# Patient Record
Sex: FEMALE | Race: WHITE | NOT HISPANIC OR LATINO | Employment: OTHER | URBAN - METROPOLITAN AREA
[De-identification: names, ages, dates, MRNs, and addresses within clinical notes are randomized per-mention and may not be internally consistent; named-entity substitution may affect disease eponyms.]

---

## 2017-01-05 ENCOUNTER — TRANSCRIBE ORDERS (OUTPATIENT)
Dept: URGENT CARE | Facility: CLINIC | Age: 82
End: 2017-01-05

## 2017-01-05 ENCOUNTER — GENERIC CONVERSION - ENCOUNTER (OUTPATIENT)
Dept: OTHER | Facility: OTHER | Age: 82
End: 2017-01-05

## 2017-01-05 ENCOUNTER — HOSPITAL ENCOUNTER (OUTPATIENT)
Dept: RADIOLOGY | Facility: CLINIC | Age: 82
Discharge: HOME | End: 2017-01-05
Payer: MEDICARE

## 2017-01-05 DIAGNOSIS — J18.9 PNEUMONIA: ICD-10-CM

## 2017-01-05 PROCEDURE — 71020 HB CHEST X-RAY 2VW FRONTAL&LATL: CPT

## 2017-01-27 ENCOUNTER — APPOINTMENT (OUTPATIENT)
Dept: LAB | Facility: HOSPITAL | Age: 82
End: 2017-01-27
Payer: MEDICARE

## 2017-01-27 DIAGNOSIS — N39.0 URINARY TRACT INFECTION: ICD-10-CM

## 2017-01-27 LAB
BACTERIA UR QL AUTO: NORMAL /HPF
BILIRUB UR QL STRIP: NEGATIVE
CLARITY UR: CLEAR
COLOR UR: YELLOW
GLUCOSE UR STRIP-MCNC: NEGATIVE MG/DL
HGB UR QL STRIP.AUTO: NEGATIVE
HYALINE CASTS #/AREA URNS LPF: NORMAL /LPF
KETONES UR STRIP-MCNC: NEGATIVE MG/DL
LEUKOCYTE ESTERASE UR QL STRIP: ABNORMAL
NITRITE UR QL STRIP: NEGATIVE
NON-SQ EPI CELLS URNS QL MICRO: NORMAL /HPF
PH UR STRIP.AUTO: 7.5 [PH] (ref 4.5–8)
PROT UR STRIP-MCNC: NEGATIVE MG/DL
RBC #/AREA URNS AUTO: NORMAL /HPF
SP GR UR STRIP.AUTO: 1.01 (ref 1–1.03)
UROBILINOGEN UR QL STRIP.AUTO: 0.2 E.U./DL
WBC #/AREA URNS AUTO: NORMAL /HPF

## 2017-01-27 PROCEDURE — 87086 URINE CULTURE/COLONY COUNT: CPT

## 2017-01-27 PROCEDURE — 81001 URINALYSIS AUTO W/SCOPE: CPT

## 2017-01-28 LAB — BACTERIA UR CULT: NORMAL

## 2017-02-15 ENCOUNTER — ALLSCRIPTS OFFICE VISIT (OUTPATIENT)
Dept: OTHER | Facility: OTHER | Age: 82
End: 2017-02-15

## 2017-02-15 DIAGNOSIS — E78.00 PURE HYPERCHOLESTEROLEMIA: ICD-10-CM

## 2017-02-15 DIAGNOSIS — M13.0 POLYARTHRITIS: ICD-10-CM

## 2017-02-15 DIAGNOSIS — R35.0 FREQUENCY OF MICTURITION: ICD-10-CM

## 2017-02-15 LAB
BILIRUB UR QL STRIP: NORMAL
COLOR UR: YELLOW
GLUCOSE (HISTORICAL): NORMAL
HGB UR QL STRIP.AUTO: NORMAL
KETONES UR STRIP-MCNC: NORMAL MG/DL
LEUKOCYTE ESTERASE UR QL STRIP: NORMAL
NITRITE UR QL STRIP: NORMAL
PH UR STRIP.AUTO: 7 [PH]
PROT UR STRIP-MCNC: NORMAL MG/DL
SP GR UR STRIP.AUTO: 1.01
UROBILINOGEN UR QL STRIP.AUTO: NORMAL

## 2017-02-28 ENCOUNTER — APPOINTMENT (OUTPATIENT)
Dept: LAB | Facility: CLINIC | Age: 82
End: 2017-02-28
Attending: EMERGENCY MEDICINE
Payer: MEDICARE

## 2017-02-28 ENCOUNTER — OFFICE VISIT (OUTPATIENT)
Dept: URGENT CARE | Facility: CLINIC | Age: 82
End: 2017-02-28
Payer: MEDICARE

## 2017-02-28 DIAGNOSIS — R35.0 FREQUENCY OF MICTURITION: ICD-10-CM

## 2017-02-28 PROCEDURE — 87086 URINE CULTURE/COLONY COUNT: CPT

## 2017-02-28 PROCEDURE — G0463 HOSPITAL OUTPT CLINIC VISIT: HCPCS

## 2017-02-28 PROCEDURE — 99214 OFFICE O/P EST MOD 30 MIN: CPT

## 2017-02-28 PROCEDURE — 87147 CULTURE TYPE IMMUNOLOGIC: CPT

## 2017-02-28 PROCEDURE — 87186 SC STD MICRODIL/AGAR DIL: CPT

## 2017-03-06 LAB — BACTERIA UR CULT: NORMAL

## 2017-03-20 ENCOUNTER — ALLSCRIPTS OFFICE VISIT (OUTPATIENT)
Dept: OTHER | Facility: OTHER | Age: 82
End: 2017-03-20

## 2017-03-20 DIAGNOSIS — M19.011 PRIMARY OSTEOARTHRITIS OF RIGHT SHOULDER: ICD-10-CM

## 2017-03-21 ENCOUNTER — TRANSCRIBE ORDERS (OUTPATIENT)
Dept: LAB | Facility: CLINIC | Age: 82
End: 2017-03-21

## 2017-03-21 ENCOUNTER — GENERIC CONVERSION - ENCOUNTER (OUTPATIENT)
Dept: OTHER | Facility: OTHER | Age: 82
End: 2017-03-21

## 2017-03-21 ENCOUNTER — APPOINTMENT (OUTPATIENT)
Dept: LAB | Facility: CLINIC | Age: 82
End: 2017-03-21
Payer: MEDICARE

## 2017-03-21 DIAGNOSIS — M13.0 POLYARTHRITIS: ICD-10-CM

## 2017-03-21 DIAGNOSIS — E78.00 PURE HYPERCHOLESTEROLEMIA: ICD-10-CM

## 2017-03-21 LAB
ALBUMIN SERPL BCP-MCNC: 3.4 G/DL (ref 3.5–5)
ALP SERPL-CCNC: 55 U/L (ref 46–116)
ALT SERPL W P-5'-P-CCNC: 32 U/L (ref 12–78)
ANION GAP SERPL CALCULATED.3IONS-SCNC: 6 MMOL/L (ref 4–13)
AST SERPL W P-5'-P-CCNC: 18 U/L (ref 5–45)
BILIRUB SERPL-MCNC: 0.47 MG/DL (ref 0.2–1)
BUN SERPL-MCNC: 20 MG/DL (ref 5–25)
CALCIUM SERPL-MCNC: 9.4 MG/DL (ref 8.3–10.1)
CHLORIDE SERPL-SCNC: 103 MMOL/L (ref 100–108)
CHOLEST SERPL-MCNC: 181 MG/DL (ref 50–200)
CO2 SERPL-SCNC: 31 MMOL/L (ref 21–32)
CREAT SERPL-MCNC: 0.91 MG/DL (ref 0.6–1.3)
CRP SERPL QL: <3 MG/L
ERYTHROCYTE [DISTWIDTH] IN BLOOD BY AUTOMATED COUNT: 13.3 % (ref 11.6–15.1)
GFR SERPL CREATININE-BSD FRML MDRD: 58.8 ML/MIN/1.73SQ M
GLUCOSE P FAST SERPL-MCNC: 87 MG/DL (ref 65–99)
HCT VFR BLD AUTO: 40.4 % (ref 34.8–46.1)
HDLC SERPL-MCNC: 62 MG/DL (ref 40–60)
HGB BLD-MCNC: 13 G/DL (ref 11.5–15.4)
LDLC SERPL CALC-MCNC: 93 MG/DL (ref 0–100)
MCH RBC QN AUTO: 30.2 PG (ref 26.8–34.3)
MCHC RBC AUTO-ENTMCNC: 32.2 G/DL (ref 31.4–37.4)
MCV RBC AUTO: 94 FL (ref 82–98)
PLATELET # BLD AUTO: 222 THOUSANDS/UL (ref 149–390)
PMV BLD AUTO: 10.5 FL (ref 8.9–12.7)
POTASSIUM SERPL-SCNC: 3.8 MMOL/L (ref 3.5–5.3)
PROT SERPL-MCNC: 7.3 G/DL (ref 6.4–8.2)
RBC # BLD AUTO: 4.3 MILLION/UL (ref 3.81–5.12)
SODIUM SERPL-SCNC: 140 MMOL/L (ref 136–145)
TRIGL SERPL-MCNC: 128 MG/DL
WBC # BLD AUTO: 3.78 THOUSAND/UL (ref 4.31–10.16)

## 2017-03-21 PROCEDURE — 80053 COMPREHEN METABOLIC PANEL: CPT

## 2017-03-21 PROCEDURE — 80061 LIPID PANEL: CPT

## 2017-03-21 PROCEDURE — 85027 COMPLETE CBC AUTOMATED: CPT

## 2017-03-21 PROCEDURE — 86140 C-REACTIVE PROTEIN: CPT

## 2017-03-21 PROCEDURE — 86038 ANTINUCLEAR ANTIBODIES: CPT

## 2017-03-21 PROCEDURE — 36415 COLL VENOUS BLD VENIPUNCTURE: CPT

## 2017-03-22 ENCOUNTER — GENERIC CONVERSION - ENCOUNTER (OUTPATIENT)
Dept: OTHER | Facility: OTHER | Age: 82
End: 2017-03-22

## 2017-03-22 LAB — RYE IGE QN: NEGATIVE

## 2017-03-27 ENCOUNTER — ALLSCRIPTS OFFICE VISIT (OUTPATIENT)
Dept: OTHER | Facility: OTHER | Age: 82
End: 2017-03-27

## 2017-03-28 ENCOUNTER — GENERIC CONVERSION - ENCOUNTER (OUTPATIENT)
Dept: OTHER | Facility: OTHER | Age: 82
End: 2017-03-28

## 2017-07-10 ENCOUNTER — OFFICE VISIT (OUTPATIENT)
Dept: URGENT CARE | Facility: CLINIC | Age: 82
End: 2017-07-10
Payer: MEDICARE

## 2017-07-10 ENCOUNTER — APPOINTMENT (OUTPATIENT)
Dept: LAB | Facility: CLINIC | Age: 82
End: 2017-07-10
Attending: PHYSICIAN ASSISTANT
Payer: MEDICARE

## 2017-07-10 ENCOUNTER — TRANSCRIBE ORDERS (OUTPATIENT)
Dept: URGENT CARE | Facility: CLINIC | Age: 82
End: 2017-07-10

## 2017-07-10 DIAGNOSIS — R07.9 CHEST PAIN: ICD-10-CM

## 2017-07-10 DIAGNOSIS — R35.0 FREQUENCY OF MICTURITION: ICD-10-CM

## 2017-07-10 PROCEDURE — 99213 OFFICE O/P EST LOW 20 MIN: CPT

## 2017-07-10 PROCEDURE — G0463 HOSPITAL OUTPT CLINIC VISIT: HCPCS

## 2017-07-10 PROCEDURE — 87086 URINE CULTURE/COLONY COUNT: CPT

## 2017-07-10 PROCEDURE — 81002 URINALYSIS NONAUTO W/O SCOPE: CPT

## 2017-07-16 LAB — BACTERIA UR CULT: NORMAL

## 2017-07-21 ENCOUNTER — ALLSCRIPTS OFFICE VISIT (OUTPATIENT)
Dept: OTHER | Facility: OTHER | Age: 82
End: 2017-07-21

## 2017-07-21 LAB
BILIRUB UR QL STRIP: NEGATIVE
COLOR UR: CLEAR
GLUCOSE (HISTORICAL): NEGATIVE
HGB UR QL STRIP.AUTO: NEGATIVE
KETONES UR STRIP-MCNC: NEGATIVE MG/DL
LEUKOCYTE ESTERASE UR QL STRIP: NEGATIVE
NITRITE UR QL STRIP: NEGATIVE
PH UR STRIP.AUTO: 7.5 [PH]
PROT UR STRIP-MCNC: NORMAL MG/DL
SP GR UR STRIP.AUTO: 1
UROBILINOGEN UR QL STRIP.AUTO: NORMAL

## 2017-07-22 ENCOUNTER — TRANSCRIBE ORDERS (OUTPATIENT)
Dept: URGENT CARE | Facility: CLINIC | Age: 82
End: 2017-07-22

## 2017-07-22 ENCOUNTER — APPOINTMENT (OUTPATIENT)
Dept: RADIOLOGY | Facility: CLINIC | Age: 82
End: 2017-07-22
Payer: MEDICARE

## 2017-07-22 DIAGNOSIS — R07.9 CHEST PAIN: ICD-10-CM

## 2017-07-22 PROCEDURE — 71100 X-RAY EXAM RIBS UNI 2 VIEWS: CPT

## 2017-07-22 PROCEDURE — 71020 HB CHEST X-RAY 2VW FRONTAL&LATL: CPT

## 2017-08-11 ENCOUNTER — ALLSCRIPTS OFFICE VISIT (OUTPATIENT)
Dept: OTHER | Facility: OTHER | Age: 82
End: 2017-08-11

## 2017-08-15 ENCOUNTER — TRANSCRIBE ORDERS (OUTPATIENT)
Dept: LAB | Facility: CLINIC | Age: 82
End: 2017-08-15

## 2017-08-15 ENCOUNTER — APPOINTMENT (OUTPATIENT)
Dept: LAB | Facility: CLINIC | Age: 82
End: 2017-08-15
Payer: MEDICARE

## 2017-08-15 DIAGNOSIS — E83.42 HYPOMAGNESEMIA: ICD-10-CM

## 2017-08-15 DIAGNOSIS — Z12.31 ENCOUNTER FOR SCREENING MAMMOGRAM FOR MALIGNANT NEOPLASM OF BREAST: ICD-10-CM

## 2017-08-15 LAB — MAGNESIUM SERPL-MCNC: 2.4 MG/DL (ref 1.6–2.6)

## 2017-08-15 PROCEDURE — 83735 ASSAY OF MAGNESIUM: CPT

## 2017-08-15 PROCEDURE — 36415 COLL VENOUS BLD VENIPUNCTURE: CPT

## 2017-08-20 ENCOUNTER — GENERIC CONVERSION - ENCOUNTER (OUTPATIENT)
Dept: OTHER | Facility: OTHER | Age: 82
End: 2017-08-20

## 2017-08-31 ENCOUNTER — GENERIC CONVERSION - ENCOUNTER (OUTPATIENT)
Dept: OTHER | Facility: OTHER | Age: 82
End: 2017-08-31

## 2017-09-01 ENCOUNTER — ALLSCRIPTS OFFICE VISIT (OUTPATIENT)
Dept: OTHER | Facility: OTHER | Age: 82
End: 2017-09-01

## 2017-09-27 DIAGNOSIS — Z12.31 ENCOUNTER FOR SCREENING MAMMOGRAM FOR MALIGNANT NEOPLASM OF BREAST: ICD-10-CM

## 2017-09-27 DIAGNOSIS — N64.4 MASTODYNIA: ICD-10-CM

## 2017-10-04 ENCOUNTER — GENERIC CONVERSION - ENCOUNTER (OUTPATIENT)
Dept: OTHER | Facility: OTHER | Age: 82
End: 2017-10-04

## 2017-10-17 ENCOUNTER — GENERIC CONVERSION - ENCOUNTER (OUTPATIENT)
Dept: OTHER | Facility: OTHER | Age: 82
End: 2017-10-17

## 2017-10-17 ENCOUNTER — HOSPITAL ENCOUNTER (OUTPATIENT)
Dept: MAMMOGRAPHY | Facility: HOSPITAL | Age: 82
Discharge: HOME | End: 2017-10-17
Payer: MEDICARE

## 2017-10-17 DIAGNOSIS — N64.4 PAINFUL BREASTS: ICD-10-CM

## 2017-10-17 DIAGNOSIS — Z12.31 ENCOUNTER FOR SCREENING MAMMOGRAM FOR MALIGNANT NEOPLASM OF BREAST: ICD-10-CM

## 2017-10-17 PROCEDURE — G0204 DX MAMMO INCL CAD BI: HCPCS

## 2017-10-17 PROCEDURE — G0279 TOMOSYNTHESIS, MAMMO: HCPCS

## 2017-11-08 ENCOUNTER — GENERIC CONVERSION - ENCOUNTER (OUTPATIENT)
Dept: OTHER | Facility: OTHER | Age: 82
End: 2017-11-08

## 2017-11-09 ENCOUNTER — ALLSCRIPTS OFFICE VISIT (OUTPATIENT)
Dept: OTHER | Facility: OTHER | Age: 82
End: 2017-11-09

## 2017-11-09 DIAGNOSIS — I47.1 SUPRAVENTRICULAR TACHYCARDIA (HCC): ICD-10-CM

## 2017-11-10 NOTE — PROGRESS NOTES
Assessment    1  Supraventricular tachycardia (427 89) (I47 1)   2  Ecchymoses, spontaneous (782 7) (R23 3)   3  Gomez's esophagus (530 85) (K22 70)   4  Generalized anxiety disorder (300 02) (F41 1)    Plan  Gomez's esophagus, Esophageal reflux    · Pantoprazole Sodium 40 MG Oral Tablet Delayed Release; TAKE 1 TABLETDAILY   · RaNITidine HCl - 150 MG Oral Tablet; TAKE 1 TABLET AT BEDTIME  Insomnia    · ClonazePAM 0 5 MG Oral Tablet; TAKE 1/2 Tablet QAM and 1 Tablet PO QPM  Supraventricular tachycardia    · HOLTER MONITOR - 24 HOUR; Status:Hold For - Scheduling; Requestedfor:09Nov2017;     Discussion/Summary    EKG reviewed and no changes noted  The patient will need to follow up in the next several weeks and see how things go  The patient was counseled regarding impressions  Chief Complaint  Pt presents today for hair loss, bruising and grooves in nails  History of Present Illness  HPI: The patient was seen and examined and noted to have issues with an elevated heart rate that she attributes to eating  She states her heart rate is around 150 BPM  The patient states that this resolves spontaneously  She states that she has this on most days  The patient notes area of ecchymosis that are excoriated  She states that this is spontaneous  She is on ASA  The patient notes mild linear groves of the finger nails  The patient states that there are no other issues at his time  Review of Systems   Constitutional: no fever,-- not feeling poorly,-- no recent weight gain,-- no chills-- and-- not feeling tired  ENT: no sore throat-- and-- no nasal discharge  Cardiovascular: the heart rate was not slow,-- no chest pain,-- no intermittent leg claudication-- and-- no palpitations  Respiratory: no shortness of breath,-- no cough,-- no orthopnea-- and-- no shortness of breath during exertion  Breasts: no complaints of breast pain, breast lump or nipple discharge    Gastrointestinal: no abdominal pain,-- no nausea,-- no vomiting-- and-- no diarrhea  Genitourinary: no complaints of dysuria, no incontinence, no pelvic pain, no dysmenorrhea, no vaginal discharge or abnormal vaginal bleeding  Musculoskeletal: no arthralgias-- and-- no myalgias  Neurological: no headache,-- no numbness,-- no tingling-- and-- no dizziness  Active Problems  1  Gomez's esophagus (530 85) (K22 70)   2  Breast mass, left (611 72) (N63 20)   3  Colon, diverticulosis (562 10) (K57 30)   4  Depression (311) (F32 9)   5  Depression screen (V79 0) (Z13 89)   6  Elevated glucose (790 29) (R73 09)   7  Elevated LFTs (790 6) (R79 89)   8  Esophageal reflux (530 81) (K21 9)   9  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)   10  Flu vaccine need (V04 81) (Z23)   11  Generalized anxiety disorder (300 02) (F41 1)   12  Glucose intolerance (impaired glucose tolerance) (790 22) (R73 02)   13  Glycosuria (791 5) (R81)   14  Hypercholesterolemia (272 0) (E78 00)   15  Immunization, tetanus-diphtheria (V06 5) (Z23)   16  Inflamed seborrheic keratosis (702 11) (L82 0)   17  Insomnia (780 52) (G47 00)   18  Lipoma of skin and subcutaneous tissue (214 1) (D17 30)   19  Neuralgia (729 2) (M79 2)   20  Occlusion and stenosis of carotid artery (433 10) (I65 29)   21  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)   22  Osteoporosis (733 00) (M81 0)   23  Pelvic pain in female (625 9) (R10 2)   24  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)   25  Rectal pain (569 42) (K62 89)   26  Screening for genitourinary condition (V81 6) (Z13 89)   27  Screening for neurological condition (V80 09) (Z13 89)   28  Supraventricular tachycardia (427 89) (I47 1)   29  Trigger finger, acquired (727 03) (M65 30)   30  Urine frequency (788 41) (R35 0)   31  Visit for screening mammogram (O18 13) (Z12 31)    Past Medical History  Active Problems And Past Medical History Reviewed: The active problems and past medical history were reviewed and updated today  Surgical History  Surgical History Reviewed: The surgical history was reviewed and updated today  Social History     · Denied: History of Alcohol   · Denied: History of Alcohol Use (History)   · Marital History -    · Never A Smoker   · Never Drank Alcohol   · Retired From Work  The social history was reviewed and updated today  The social history was reviewed and is unchanged  Family History  Family History Reviewed: The family history was reviewed and updated today  Current Meds   1  Aspirin 81 MG TABS; Therapy: (Recorded:32Hve7198) to Recorded   2  Calcium 250 MG Oral Capsule; Take 1 capsule twice daily; Therapy: 59DVR1484 to Recorded   3  ClonazePAM 0 5 MG Oral Tablet; TAKE 1/2 Tablet QAM and 1 Tablet PO QPM; Therapy: 31RGR5393 to (Evaluate:25Nov2017); Last Rx:26Oct2017 Ordered   4  Digoxin 125 MCG Oral Tablet; TAKE 1 TABLET DAILY; Therapy: 47HIJ5570 to (QFIPCRUD:02QWD4412)  Requested for: 57NLT7386; Last Rx:82Tvh8724 Ordered   5  Melatonin 5 MG Oral Tablet; Therapy: 20RRX3908 to Recorded   6  Metoprolol Tartrate 50 MG Oral Tablet; TAKE 1 TABLET THREE x DAILY  MDD:150 TDD:150  Requested for: 80OAT0473; Last Rx:68Fvb6452 Ordered   7  Motrin  MG Oral Tablet; Take 1-2 Tablets PO Q8 x 7 DAYS; Therapy: 51XAM2068 to (Evaluate:27Mar2017)  Requested for: 20Mar2017; Last Rx:20Mar2017 Ordered   8  Omega 3 CAPS; Therapy: (Recorded:13Mar2015) to Recorded   9  Pantoprazole Sodium 40 MG Oral Tablet Delayed Release; TAKE 1 TABLET DAILY; Therapy: 06LLK7630 to (Last Rx:76Zyy9862)  Requested for: 18TKW0572 Ordered   10  RaNITidine HCl - 150 MG Oral Tablet; TAKE 1 TABLET AT BEDTIME; Therapy: 20Hcc4362 to (Evaluate:40Zqd9395)  Requested for: 37AMP1108; Last  Rx:30Jun2017 Ordered   11  Vitamin D-3 CAPS; Therapy: (Recorded:13Mar2015) to Recorded    The medication list was reviewed and updated today  Allergies  1  Codeine Derivatives   2  Codeine Sulfate TABS   3  EPINEPHrine HCl SOLN   4  Magnesium Citrate TABS   5  Iodinated Contrast Media    Vitals   Recorded: 81ZZU5403 11:06AM   Temperature 97 7 F   Heart Rate 66   Respiration 16   Systolic 577   Diastolic 74   Height 5 ft 1 in   Weight 120 lb    BMI Calculated 22 67   BSA Calculated 1 52   O2 Saturation 98       Physical Exam   Constitutional  General appearance: No acute distress, well appearing and well nourished  Eyes  Conjunctiva and lids: No swelling, erythema or discharge  Ears, Nose, Mouth, and Throat  Otoscopic examination: Tympanic membranes translucent with normal light reflex  Canals patent without erythema  Nasal mucosa, septum, and turbinates: Normal without edema or erythema  Oropharynx: Normal with no erythema, edema, exudate or lesions  Pulmonary  Respiratory effort: No increased work of breathing or signs of respiratory distress  Auscultation of lungs: Clear to auscultation  Auscultation of the lungs revealed no expiratory wheezing,-- normal expiratory time-- and-- no inspiratory wheezing  no rales or crackles were heard bilaterally  no rhonchi  no friction rub  no wheezing  no diminished breath sounds  no bronchial breath sounds  Cardiovascular  Auscultation of heart: Normal rate and rhythm, normal S1 and S2, without murmurs  Examination of extremities for edema and/or varicosities: Normal    Carotid pulses: Normal    Abdomen  Abdomen: Non-tender, no masses  Liver and spleen: No hepatomegaly or splenomegaly  Lymphatic  Palpation of lymph nodes in neck: No lymphadenopathy  Musculoskeletal  Gait and station: Normal    Skin  Skin and subcutaneous tissue: Abnormal  -- small areas of ecchymosis with excoriation, normal appearing nails  Neurologic  Cranial nerves: Cranial nerves 2-12 intact     Psychiatric  Mood and affect: Normal          Future Appointments    Date/Time Provider Specialty Site   02/07/2018 11:00 AM Dale Bunch DO Internal Medicine [facility]       Signatures Electronically signed by : Peggy Andrade DO; Nov 9 2017 11:48AM EST                       (Author)

## 2018-01-09 NOTE — RESULT NOTES
Verified Results  (1) C-REACTIVE PROTEIN 21Mar2017 09:47AM [facility] Order Number: [id number]     Test Name Result Flag Reference   C-REACT PROTEIN <3 0 mg/L  <3 0   - Patient Instructions: This is a fasting blood test  Water, black tea or black coffee only after 9:00pm the night before test Drink 2 glasses of water the morning of test      (1) CBC/ PLT (NO DIFF) 37OFM1743 09:47AM [facility] Order Number: [id number]     Test Name Result Flag Reference   HEMATOCRIT 40 4 %  34 8-46 1   HEMOGLOBIN 13 0 g/dL  11 5-15 4   MCHC 32 2 g/dL  31 4-37 4   MCH 30 2 pg  26 8-34 3   MCV 94 fL  82-98   PLATELET COUNT 845 Thousands/uL  149-390   RBC COUNT 4 30 Million/uL  3 81-5 12   RDW 13 3 %  11 6-15 1   WBC COUNT 3 78 Thousand/uL L 4 31-10 16   MPV 10 5 fL  8 9-12 7     (1) LIPID PANEL FASTING W DIRECT LDL REFLEX 46SXC9881 09:47AM [facility] Order Number: [id number]     Test Name Result Flag Reference   CHOLESTEROL 181 mg/dL     LDL CHOLESTEROL CALCULATED 93 mg/dL  0-100   - Patient Instructions: This is a fasting blood test  Water, black tea or black coffee only after 9:00pm the night before test   Drink 2 glasses of water the morning of test     - Patient Instructions:  This is a fasting blood test  Water, black tea or black coffee only after 9:00pm the night before test Drink 2 glasses of water the morning of test   Triglyceride:         Normal              <150 mg/dl       Borderline High    150-199 mg/dl       High               200-499 mg/dl       Very High          >499 mg/dl  Cholesterol:         Desirable        <200 mg/dl      Borderline High  200-239 mg/dl      High             >239 mg/dl  HDL Cholesterol:        High    >59 mg/dL      Low     <41 mg/dL  LDL Cholesterol:        Optimal          <100 mg/dl        Near Optimal     100-129 mg/dl        Above Optimal          Borderline High   130-159 mg/dl          High              160-189 mg/dl Very High        >189 mg/dl  LDL CALCULATED:    This screening LDL is a calculated result  It does not have the accuracy of the Direct Measured LDL in the monitoring of patients with hyperlipidemia and/or statin therapy  Direct Measure LDL (DQP516) must be ordered separately in these patients  TRIGLYCERIDES 128 mg/dL  <=150   Specimen collection should occur prior to N-Acetylcysteine or Metamizole administration due to the potential for falsely depressed results  HDL,DIRECT 62 mg/dL H 40-60   Specimen collection should occur prior to Metamizole administration due to the potential for falsely depressed results  (1) COMPREHENSIVE METABOLIC PANEL 36RRO9201 94:61DH [facility] Order Number: [id number]     Test Name Result Flag Reference   SODIUM 140 mmol/L  136-145   POTASSIUM 3 8 mmol/L  3 5-5 3   CHLORIDE 103 mmol/L  100-108   CARBON DIOXIDE 31 mmol/L  21-32   ANION GAP (CALC) 6 mmol/L  4-13   BLOOD UREA NITROGEN 20 mg/dL  5-25   CREATININE 0 91 mg/dL  0 60-1 30   Standardized to IDMS reference method   CALCIUM 9 4 mg/dL  8 3-10 1   BILI, TOTAL 0 47 mg/dL  0 20-1 00   ALK PHOSPHATAS 55 U/L     ALT (SGPT) 32 U/L  12-78   AST(SGOT) 18 U/L  5-45   ALBUMIN 3 4 g/dL L 3 5-5 0   TOTAL PROTEIN 7 3 g/dL  6 4-8 2   eGFR Non-African American 58 8 ml/min/1 73sq m     - Patient Instructions: This is a fasting blood test  Water, black tea or black coffee only after 9:00pm the night before test Drink 2 glasses of water the morning of test   National Kidney Disease Education Program recommendations are as follows:  GFR calculation is accurate only with a steady state creatinine  Chronic Kidney disease less than 60 ml/min/1 73 sq  meters  Kidney failure less than 15 ml/min/1 73 sq  meters     GLUCOSE FASTING 87 mg/dL  65-99

## 2018-01-10 NOTE — RESULT NOTES
Verified Results  (1) OCTAVIO SCREEN W/REFLEX TO TITER/PATTERN [id number] 09:47AM Olman Solis Order Number: [id number]     Test Name Result Flag Reference   OCTAVIO SCREEN   Negative  Negative

## 2018-01-11 NOTE — RESULT NOTES
Verified Results  (1) MAGNESIUM 27Ejs3125 01:54PM Lucrecia Half Order Number: [id number]     Test Name Result Flag Reference   MAGNESIUM 2 4 mg/dL  1 6-2 6

## 2018-01-12 VITALS
BODY MASS INDEX: 22.66 KG/M2 | WEIGHT: 120 LBS | HEART RATE: 60 BPM | TEMPERATURE: 97.3 F | HEIGHT: 61 IN | SYSTOLIC BLOOD PRESSURE: 136 MMHG | RESPIRATION RATE: 16 BRPM | DIASTOLIC BLOOD PRESSURE: 68 MMHG

## 2018-01-12 NOTE — RESULT NOTES
Verified Results  * XR CHEST PA & LATERAL [id number] 11:16AM Christal Taylor Order Number: [id number]     Test Name Result Flag Reference   XR CHEST PA & LATERAL (Report)     CHEST      INDICATION: Pneumonia  Cough  COMPARISON: Chest x-ray dated November 23, 2016  VIEWS: Frontal and lateral projections; 2 images     FINDINGS:        The cardiomediastinal silhouette is unchanged from the prior exam      There has been interval improvement in aeration of right middle and lower lobes when compared to the prior exam; however, minimal infiltrates persist at the right middle and lower lobes  No new infiltrates are identified  There is no pleural effusion  Chronic obstructive airway changes are present  No pneumothorax  Visualized osseous structures appear within normal limits for the patient's age  IMPRESSION:     Significant interval improvement in aeration of right middle and lower lobe infiltrates when compared to a chest x-ray dated November 23, 2016  However, subtle residual right middle and lower lobe infiltrates persist  A follow-up chest x-ray in 4-6    weeks is recommended to assess for complete resolution  No new infiltrates identified       ##[id number]##[id number]       Workstation performed: [id number]     Signed by:   Debbie Nieves MD   12/7/16

## 2018-01-13 VITALS
DIASTOLIC BLOOD PRESSURE: 58 MMHG | SYSTOLIC BLOOD PRESSURE: 124 MMHG | RESPIRATION RATE: 16 BRPM | WEIGHT: 117 LBS | HEIGHT: 61 IN | HEART RATE: 60 BPM | BODY MASS INDEX: 22.09 KG/M2 | TEMPERATURE: 98.1 F

## 2018-01-13 VITALS
BODY MASS INDEX: 21.79 KG/M2 | WEIGHT: 115.38 LBS | DIASTOLIC BLOOD PRESSURE: 60 MMHG | SYSTOLIC BLOOD PRESSURE: 128 MMHG | RESPIRATION RATE: 16 BRPM | HEART RATE: 64 BPM | HEIGHT: 61 IN | TEMPERATURE: 96.6 F

## 2018-01-13 VITALS
RESPIRATION RATE: 16 BRPM | TEMPERATURE: 97.7 F | BODY MASS INDEX: 22.14 KG/M2 | SYSTOLIC BLOOD PRESSURE: 166 MMHG | WEIGHT: 117.25 LBS | HEIGHT: 61 IN | DIASTOLIC BLOOD PRESSURE: 84 MMHG | HEART RATE: 60 BPM | OXYGEN SATURATION: 97 %

## 2018-01-13 VITALS
RESPIRATION RATE: 16 BRPM | HEART RATE: 64 BPM | BODY MASS INDEX: 21.9 KG/M2 | WEIGHT: 116 LBS | TEMPERATURE: 97.5 F | SYSTOLIC BLOOD PRESSURE: 184 MMHG | DIASTOLIC BLOOD PRESSURE: 80 MMHG | HEIGHT: 61 IN

## 2018-01-14 VITALS
TEMPERATURE: 96.9 F | BODY MASS INDEX: 22.35 KG/M2 | HEART RATE: 60 BPM | SYSTOLIC BLOOD PRESSURE: 118 MMHG | RESPIRATION RATE: 18 BRPM | WEIGHT: 118.38 LBS | DIASTOLIC BLOOD PRESSURE: 52 MMHG | HEIGHT: 61 IN

## 2018-01-14 VITALS
TEMPERATURE: 97.7 F | RESPIRATION RATE: 16 BRPM | BODY MASS INDEX: 22.66 KG/M2 | HEIGHT: 61 IN | HEART RATE: 66 BPM | DIASTOLIC BLOOD PRESSURE: 74 MMHG | WEIGHT: 120 LBS | SYSTOLIC BLOOD PRESSURE: 136 MMHG | OXYGEN SATURATION: 98 %

## 2018-01-15 NOTE — RESULT NOTES
Verified Results  * XR CHEST PA & LATERAL [id number] 02:06PM Lawson Hernandez Order Number: [id number]     Test Name Result Flag Reference   XR CHEST PA & LATERAL (Report)     CHEST      INDICATION: Pneumonia  COMPARISON: 12/6/2016  VIEWS: Frontal and lateral projections, with and without nipple markers; 3 images     FINDINGS:        Heart shadow is enlarged but stable from prior exam      The lungs are clear  No pneumothorax or pleural effusion  Anterior pectus deformity is again noted  There is multilevel spondylosis of the midthoracic spine which is stable  IMPRESSION:     No active pulmonary disease         Workstation performed: [id number]     Signed by:   Anderson Johnson MD   1/5/17

## 2018-01-15 NOTE — RESULT NOTES
Verified Results  MAMMO DIAGNOSTIC BILATERAL W 3D & CAD [id number] 09:43AM Najma Crawford Order Number: [id number]    - Patient Instructions: To schedule this appointment, please contact Central Scheduling at [phone]  Do not wear any perfume, powder, lotion or deodorant on breast or underarm area  Please bring your doctors order, referral (if needed) and insurance information with you on the day of the test  Failure to bring this information may result in this test being rescheduled  Arrive 15 minutes prior to your appointment time to register  On the day of your test, please bring any prior mammogram or breast studies with you that were not performed at a [facility]  Failure to bring prior exams may result in your test needing to be rescheduled  Test Name Result Flag Reference   MAMMO DIAGNOSTIC BILATERAL W 3D & CAD (Report)     Patient History:   Patient's BMI is 19 8  Reason for exam: clinical finding  Mammo Diagnostic Bilateral W DBT and CAD: October 17, 2017 -    Check In #: [de-identified]   2D/3D Procedure   3D views: Bilateral MLO view(s) were taken  2D views: Bilateral CC, ML, and XCCL view(s) were taken  Technologist: JIMY Ledbetter (JIMY)(M)   Prior study comparison: October 13, 2016, mammo diagnostic    bilateral W CAD performed at [address] [place] [address]  October 13, 2016, US breast left limited performed at [address] [place] [address]  March 16, 2015, mammo screening    bilateral, performed at [facility]  March 21, 2014,    mammo diagnostic right, performed at [facility]  March 10,   2014, bilateral 3D carmen mammo, performed at [facility]  February 25, 2013, mammo diagnostic bilateral, performed at    [facility]  April 13, 2012, mammo diagnostic right,    performed at [facility]  April 13, 2012, bilateral    ultrasound, performed at [address] [place], [place] [address] [facility]       The breast tissue is heterogeneously dense, potentially limiting    the sensitivity of mammography  Patient risk, included in this    report, assists in determining the appropriate screening regimen    (such as 3-D mammography or the inclusion of automated breast    ultrasound or MRI)  3-D mammography may also remain indicated as    screening  No new dominant soft tissue mass, architectural    distortion or suspicious calcifications are noted  The skin and    nipple structures are within normal limits  Stable asymmetric    tissue again noted bilaterally  As the patient's complaints of    stabbing pain aren't diffusely bilaterally and there is no focal    new abnormality, ultrasound was not performed  No mammographic evidence of malignancy  No    significant changes when compared with prior studies  ACR BI-RADSï¾® Assessments: BiRad:2 - Benign     Recommendation:   Clinical management of both breasts  Routine screening mammogram of both breasts in 1 year  The patient is scheduled in a reminder system for screening    mammography  8-10% of cancers will be missed on mammography  Management of a    palpable abnormality must be based on clinical grounds  Patients   will be notified of their results via letter from our facility  Accredited by [address] [place] [address] of Radiology and FDA       Transcription Location: [facility] 98: [id number]     Risk Value(s):   Myriad Table: 1 5%   Signed by:   Remy Dugan MD   10/17/17

## 2018-01-15 NOTE — RESULT NOTES
Verified Results  * XR SPINE CERVICAL COMPLETE 4 OR 5 VW NON INJURY [id number] 11:36AM Marely Tierney Order Number: [id number]     Test Name Result Flag Reference   XR SPINE CERVICAL COMPLETE 4 OR 5 VW (Report)     CERVICAL SPINE     INDICATION: Left-sided neck pain     COMPARISON: None     VIEWS: AP, lateral and obliques an open-mouth AP ; 5 images     FINDINGS:     No evidence of fracture or subluxation  Narrowing of the C5-6 and C6-7 intervertebral disc spaces is seen      Narrowing of the C3-4 neural foramina bilaterally  Prominent marginal osteophyte from aeration anteriorly at C5-6 and C6-7  The prevertebral soft tissues are within normal limits  The lung apices are intact  IMPRESSION:     Degenerative disc disease, 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

## 2018-01-15 NOTE — RESULT NOTES
Verified Results  *US BREAST LEFT LIMITED (DIAGNOSTIC) 13Oct2016 09:55AM Juan Flannery     Test Name Result Flag Reference   US BREAST LEFT LIMITED (Report)     Patient History:   Patient's BMI is 19 8  Reason for exam: clinical finding  Mammo Diagnostic Bilateral W CAD: October 13, 2016 - Check In #:    [de-identified]   Bilateral MLO, CC, and XCCL view(s) were taken  ML, spot    compression CC, and spot compression MLO view(s) were taken of    the left breast      Technologist: JIMY Kapadia (R)(M)   Prior study comparison: March 16, 2015, mammo screening    bilateral, performed at [facility]  March 21, 2014,    mammo diagnostic right, performed at [facility]  March 10,   2014, bilateral 3D carmen mammo, performed at [facility]  February 25, 2013, mammo diagnostic bilateral, performed at    [facility]  April 13, 2012, mammo diagnostic right,    performed at [facility]  April 13, 2012, bilateral    ultrasound, performed at [facility]  There are scattered fibroglandular densities  No dominant soft tissue mass or suspicious calcifications are    noted  Asymmetric density inferomedial LEFT breast becomes less    dense on compression views  The nipples, skin and visualized    axillae are within normal limits  No evidence of malignancy  No significant changes   when compared with prior studies  US Breast Left Limited: October 13, 2016 - Check In #: [de-identified]   Technologist: Patrick Cage RDMS   A uniformly echogenic layer of fibroglandular tissue  The lower    inner quadrant of the left breast was scanned  Targeted    ultrasound of inferior medial LEFT breast was performed with a    high frequency linear transducer  There is no sonographic    evidence for cystic or solid mass lesion or suspicious foci of    acoustic attenuation  ASSESSMENT: BiRad:2 - Benign (Overall)     Recommendation:   Routine screening mammogram of both breasts in 1 year  Analyzed by CAD     Transcription Location: J[facility] 98: [id number]     Risk Value(s):   Myriad Table: 1 5%, JUSTIN 5 Year: 0 8%, NCI Lifetime: 0 8%   Signed by:   JEISON Cox MD   10/13/16     [facility] DIAGNOSTIC BILATERAL W CAD [id number] 09:54AM Keesha Lucia Order Number: [id number]   Performing Comments: 1 cm mass at 8 O'Clock of the left breast   - Patient Instructions: To schedule this appointment, please contact Central Scheduling at [phone]  Do not wear any perfume, powder, lotion or deodorant on breast or underarm area  Please bring your doctors order, referral (if needed) and insurance information with you on the day of the test  Failure to bring this information may result in this test being rescheduled  Arrive 15 minutes prior to your appointment time to register  On the day of your test, please bring any prior mammogram or breast studies with you that were not performed at a [facility]  Failure to bring prior exams may result in your test needing to be rescheduled   Order Number: [id number]   Performing Comments: 1 cm mass at 8 O'Clock of the left breast   - Patient Instructions: To schedule this appointment, please contact Central Scheduling at [phone]  Do not wear any perfume, powder, lotion or deodorant on breast or underarm area  Please bring your doctors order, referral (if needed) and insurance information with you on the day of the test  Failure to bring this information may result in this test being rescheduled  Arrive 15 minutes prior to your appointment time to register  On the day of your test, please bring any prior mammogram or breast studies with you that were not performed at a [facility]  Failure to bring prior exams may result in your test needing to be rescheduled  Test Name Result Flag Reference   MAMMO DIAGNOSTIC BILATERAL W CAD (Report)     Patient History:   Patient's BMI is 19 8       Reason for exam: clinical finding  Mammo Diagnostic Bilateral W CAD: October 13, 2016 - Check In #:    [de-identified]   Bilateral MLO, CC, and XCCL view(s) were taken  ML, spot    compression CC, and spot compression MLO view(s) were taken of    the left breast      Technologist: JIMY Cardenas (JIMY)(M)   Prior study comparison: March 16, 2015, mammo screening    bilateral, performed at [facility]  March 21, 2014,    mammo diagnostic right, performed at [facility]  March 10,   2014, bilateral 3D carmen mammo, performed at [facility]  February 25, 2013, mammo diagnostic bilateral, performed at    [facility]  April 13, 2012, mammo diagnostic right,    performed at [facility]  April 13, 2012, bilateral    ultrasound, performed at [facility]  There are scattered fibroglandular densities  No dominant soft tissue mass or suspicious calcifications are    noted  Asymmetric density inferomedial LEFT breast becomes less    dense on compression views  The nipples, skin and visualized    axillae are within normal limits  No evidence of malignancy  No significant changes   when compared with prior studies  US Breast Left Limited: October 13, 2016 - Check In #: [de-identified]   Technologist: Kingston Gamboa RDMS   A uniformly echogenic layer of fibroglandular tissue  The lower    inner quadrant of the left breast was scanned  Targeted    ultrasound of inferior medial LEFT breast was performed with a    high frequency linear transducer  There is no sonographic    evidence for cystic or solid mass lesion or suspicious foci of    acoustic attenuation  ASSESSMENT: BiRad:2 - Benign (Overall)     Recommendation:   Routine screening mammogram of both breasts in 1 year  Analyzed by CAD     Transcription Location: [facility] 98: [id number]     Risk Value(s):   Myriad Table: 1 5%, JUSTIN 5 Year: 0 8%, NCI Lifetime: 0 8%   Signed by:   JEISON Luis MD   10/13/16

## 2018-01-18 NOTE — RESULT NOTES
Verified Results  (1) URINALYSIS w URINE C/S REFLEX (will reflex a microscopy if leukocytes, occult blood, or nitrites are not within normal limits) [id number] 10:05AM Nicolasa Wolf Order Number: [id number]     Test Name Result Flag Reference   COLOR Yellow     CLARITY Slightly Cloudy     PH UA 6 5  4 5-8 0   LEUKOCYTE ESTERASE UA Negative  Negative   NITRITE UA Negative  Negative   PROTEIN UA Negative mg/dl  Negative   GLUCOSE UA Negative mg/dl  Negative   KETONES UA Negative mg/dl  Negative   UROBILINOGEN UA 0 2 E U /dl  0 2, 1 0 E U /dl   BILIRUBIN UA Negative  Negative   BLOOD UA Negative  Negative, Trace-Intact   SPECIFIC GRAVITY UA 1 020  1 003-1 030     (1) TSH [id number] 10:05AM Nicolasa Wolf Order Number: [id number]    Patients undergoing fluorescein dye angiography may retain small amounts of fluorescein in the body for 48-72 hours post procedure  Samples containing fluorescein can produce falsely depressed TSH values  If the patient had this procedure,a specimen should be resubmitted post fluorescein clearance          The recommended reference ranges for TSH during pregnancy are as follows:  First trimester 0 1 to 2 5 uIU/mL  Second trimester  0 2 to 3 0 uIU/mL  Third trimester 0 3 to 3 0 uIU/m     Test Name Result Flag Reference   TSH 2 137 uIU/mL  0 358-3 740     (1) T4, FREE [id number] 10:05AM Nicolasa Wolf Order Number: [id number]     Test Name Result Flag Reference   T4,FREE 1 06 ng/dL  0 76-1 46

## 2018-03-09 ENCOUNTER — OFFICE VISIT (OUTPATIENT)
Dept: INTERNAL MEDICINE CLINIC | Facility: CLINIC | Age: 83
End: 2018-03-09
Payer: MEDICARE

## 2018-03-09 VITALS
WEIGHT: 129.2 LBS | HEIGHT: 61 IN | RESPIRATION RATE: 14 BRPM | TEMPERATURE: 97.3 F | BODY MASS INDEX: 24.39 KG/M2 | DIASTOLIC BLOOD PRESSURE: 60 MMHG | SYSTOLIC BLOOD PRESSURE: 112 MMHG | HEART RATE: 56 BPM

## 2018-03-09 DIAGNOSIS — H91.92 HEARING LOSS ASSOCIATED WITH SYNDROME OF LEFT EAR: ICD-10-CM

## 2018-03-09 DIAGNOSIS — I10 HYPERTENSION, UNSPECIFIED TYPE: Primary | ICD-10-CM

## 2018-03-09 DIAGNOSIS — F51.01 PRIMARY INSOMNIA: ICD-10-CM

## 2018-03-09 DIAGNOSIS — B36.9 FUNGAL DERMATOSIS: ICD-10-CM

## 2018-03-09 PROBLEM — M15.9 GENERALIZED OSTEOARTHRITIS OF MULTIPLE SITES: Status: ACTIVE | Noted: 2017-11-30

## 2018-03-09 PROBLEM — K59.09 CHRONIC CONSTIPATION: Status: ACTIVE | Noted: 2017-12-14

## 2018-03-09 PROBLEM — I77.9 BILATERAL CAROTID ARTERY DISEASE (HCC): Status: ACTIVE | Noted: 2017-10-04

## 2018-03-09 PROBLEM — E55.9 VITAMIN D DEFICIENCY: Status: ACTIVE | Noted: 2017-11-30

## 2018-03-09 PROCEDURE — 99214 OFFICE O/P EST MOD 30 MIN: CPT | Performed by: INTERNAL MEDICINE

## 2018-03-09 RX ORDER — PANTOPRAZOLE SODIUM 40 MG/1
1 TABLET, DELAYED RELEASE ORAL DAILY
COMMUNITY
Start: 2017-08-09 | End: 2018-04-30 | Stop reason: SDUPTHER

## 2018-03-09 RX ORDER — CLONAZEPAM 0.5 MG/1
0.5 TABLET ORAL 2 TIMES DAILY
Qty: 75 TABLET | Refills: 0 | Status: SHIPPED | OUTPATIENT
Start: 2018-03-09 | End: 2018-07-16 | Stop reason: SDUPTHER

## 2018-03-09 RX ORDER — CLOTRIMAZOLE AND BETAMETHASONE DIPROPIONATE 10; .5 MG/ML; MG/ML
LOTION TOPICAL 2 TIMES DAILY
Qty: 30 ML | Refills: 0 | Status: SHIPPED | OUTPATIENT
Start: 2018-03-09 | End: 2018-03-09 | Stop reason: SDUPTHER

## 2018-03-09 RX ORDER — CALCIUM CARBONATE 500(1250)
TABLET ORAL
COMMUNITY
Start: 2017-12-14 | End: 2018-03-09

## 2018-03-09 RX ORDER — CLOTRIMAZOLE AND BETAMETHASONE DIPROPIONATE 10; .5 MG/ML; MG/ML
LOTION TOPICAL
Qty: 30 ML | Refills: 0 | Status: SHIPPED | OUTPATIENT
Start: 2018-03-09 | End: 2018-04-27 | Stop reason: ALTCHOICE

## 2018-03-09 RX ORDER — CHOLECALCIFEROL (VITAMIN D3) 25 MCG
CAPSULE ORAL
COMMUNITY
End: 2018-04-30 | Stop reason: SDUPTHER

## 2018-03-09 RX ORDER — RANITIDINE 150 MG/1
TABLET ORAL
Status: ON HOLD | COMMUNITY
Start: 2016-08-05 | End: 2018-03-19

## 2018-03-09 RX ORDER — CHOLECALCIFEROL (VITAMIN D3) 125 MCG
CAPSULE ORAL
COMMUNITY
Start: 2017-08-11 | End: 2018-04-30 | Stop reason: SDUPTHER

## 2018-03-09 RX ORDER — METOPROLOL TARTRATE 50 MG/1
TABLET, FILM COATED ORAL 3 TIMES DAILY
COMMUNITY
Start: 2017-11-09 | End: 2018-04-30 | Stop reason: SDUPTHER

## 2018-03-09 RX ORDER — DIGOXIN 125 MCG
1 TABLET ORAL DAILY
COMMUNITY
Start: 2011-10-24 | End: 2018-04-30 | Stop reason: SDUPTHER

## 2018-03-09 RX ORDER — CALCIUM CARBONATE 500(1250)
1 TABLET ORAL 2 TIMES DAILY
COMMUNITY
Start: 2017-08-11 | End: 2018-07-16

## 2018-03-09 RX ORDER — ASPIRIN 81 MG/1
1 TABLET ORAL DAILY
COMMUNITY
Start: 2017-11-30 | End: 2018-04-30 | Stop reason: SDUPTHER

## 2018-03-09 RX ORDER — CLONAZEPAM 0.5 MG/1
TABLET ORAL
COMMUNITY
Start: 2016-09-19 | End: 2018-03-09 | Stop reason: SDUPTHER

## 2018-03-09 RX ORDER — CHLORAL HYDRATE 500 MG
CAPSULE ORAL DAILY
COMMUNITY
End: 2018-04-30 | Stop reason: SDUPTHER

## 2018-03-09 RX ORDER — CLONAZEPAM 0.5 MG/1
TABLET ORAL
Qty: 75 TABLET | Refills: 0 | Status: CANCELLED | OUTPATIENT
Start: 2018-03-09

## 2018-03-09 NOTE — PROGRESS NOTES
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  -     Ambulatory referral to Audiology; Future    Primary insomnia  -     clonazePAM (KlonoPIN) 0 5 mg tablet; Take 1 tablet (0 5 mg total) by mouth 2 (two) times a day for 30 days Take 1/2 tablet in morning and 2 tablet in the evening    Other orders  -     aspirin (ADULT ASPIRIN EC LOW STRENGTH) 81 mg EC tablet; Take 1 tablet by mouth daily  -     Calcium 250 MG CAPS; Take 1 capsule by mouth 2 (two) times a day  -     Discontinue: clonazePAM (KlonoPIN) 0 5 mg tablet; Take by mouth Take 1/2 tablet in morning and 1 tablet in the evening   -     digoxin (LANOXIN) 0 125 mg tablet; Take 1 tablet by mouth daily  -     Melatonin 5 MG TABS; Take by mouth  -     metoprolol tartrate (LOPRESSOR) 50 mg tablet; Take by mouth 2 (two) times a day    -     Omega-3 Fatty Acids (OMEGA-3 FISH OIL) 500 MG CAPS; Take by mouth 2 (two) times a day    -     Discontinue: calcium carbonate (OYSTER SHELL,OSCAL) 500 mg; Take by mouth  -     pantoprazole (PROTONIX) 40 mg tablet; Take 1 tablet by mouth daily  -     ranitidine (ZANTAC) 150 mg tablet; Take by mouth  -     Cholecalciferol (VITAMIN D-3) 1000 units CAPS; Take by mouth          Subjective:      Patient ID: Michelle Thakur is a 80 y o  female  The patient notes since her oral hearing loss in the left ear  She states this is been present for almost 40 years  However, she is concerned and would like this evaluated  She had been previously seen by an ENT in [facility] and it sounds like she had previously followed up with Dr Chan Poe  The patient states no pain or drainage from the ear  She states that she has no vertigo and notes no tinnitus    She notes no prior exposure to loud noise  The patient also notes issues with insomnia  She states the whole clonazepam only allows her to sleep for approximately 6 hours  The patient states that she awakens at 2 in the morning and is unable to fall back asleep  The patient notes a pruritus in the area of her left breast   She notes this has been present for several weeks  She notes no erythema or scaling rash in the area  The patient states that this is persistent and present on most days her she notes no trauma to the area  The patient notes arthritis and pain in her left shoulder  This has been previous we evaluated and she had been offered steroid injections in the area  However she is not interested in this at this time  The following portions of the patient's history were reviewed and updated as appropriate:   She  has a past medical history of Abdominal distension; Abnormal weight loss; Bursitis of left hip; Chest pain; Dysuria; External hemorrhoids; Hypomagnesemia; Lyme disease; and Pneumonia of right middle lobe due to infectious organism [facility])  She   Patient Active Problem List    Diagnosis Date Noted    Chronic constipation 12/14/2017    Generalized osteoarthritis of multiple sites 11/30/2017    Vitamin D deficiency 11/30/2017    Bilateral carotid artery disease ([facility] [address] ) 10/04/2017    Breast mass, left 10/03/2016    Insomnia 09/19/2016    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/24/2016    Neuralgia 09/04/2015    Esophageal reflux 01/14/2015    Glucose intolerance (impaired glucose tolerance) 09/19/2014    Colon, diverticulosis 01/29/2013    Generalized anxiety disorder 09/04/2012    Hypercholesterolemia 06/19/2012    Osteoporosis 06/19/2012    Supraventricular tachycardia ([facility] [address] ) 06/19/2012     She  has a past surgical history that includes Cataract extraction; Hemorrhoid surgery; Hysterectomy; and Pelvic floor repair  Her family history is not on file  She was adopted    She  reports that she has never smoked  She has never used smokeless tobacco  She reports that she does not drink alcohol or use drugs  Current Outpatient Prescriptions   Medication Sig Dispense Refill    aspirin (ADULT ASPIRIN EC LOW STRENGTH) 81 mg EC tablet Take 1 tablet by mouth daily      Calcium 250 MG CAPS Take 1 capsule by mouth 2 (two) times a day      Cholecalciferol (VITAMIN D-3) 1000 units CAPS Take by mouth      clonazePAM (KlonoPIN) 0 5 mg tablet Take 1 tablet (0 5 mg total) by mouth 2 (two) times a day for 30 days Take 1/2 tablet in morning and 2 tablet in the evening 75 tablet 0    digoxin (LANOXIN) 0 125 mg tablet Take 1 tablet by mouth daily      Melatonin 5 MG TABS Take by mouth      metoprolol tartrate (LOPRESSOR) 50 mg tablet Take by mouth 2 (two) times a day        Omega-3 Fatty Acids (OMEGA-3 FISH OIL) 500 MG CAPS Take by mouth 2 (two) times a day        pantoprazole (PROTONIX) 40 mg tablet Take 1 tablet by mouth daily      ranitidine (ZANTAC) 150 mg tablet Take by mouth       No current facility-administered medications for this visit  No current outpatient prescriptions on file prior to visit  No current facility-administered medications on file prior to visit  She is allergic to codeine; epinephrine; iodinated diagnostic agents; and magnesium citrate       Review of Systems   Constitutional: Negative for chills, fatigue and fever  HENT: Negative for rhinorrhea, sinus pain, sinus pressure and sore throat  Respiratory: Negative for cough, chest tightness and shortness of breath  Cardiovascular: Negative  Gastrointestinal: Negative for anal bleeding, constipation, diarrhea, nausea and vomiting  Genitourinary: Negative  Musculoskeletal: Negative for arthralgias and myalgias  Neurological: Negative  Psychiatric/Behavioral: Negative            Objective:      /60 (BP Location: Left arm, Patient Position: Sitting, Cuff Size: Standard)   Pulse 56   Temp (!) 97 3 °F (36 3 °C)   Resp 14   Ht 5' 1" (1 549 m)   Wt 58 6 kg (129 lb 3 2 oz)   BMI 24 41 kg/m²          Physical Exam   Constitutional: She is oriented to person, place, and time  She appears well-developed and well-nourished  HENT:   Head: Normocephalic and atraumatic  Eyes: Conjunctivae are normal  Pupils are equal, round, and reactive to light  Cardiovascular: Normal rate, regular rhythm, normal heart sounds and intact distal pulses  Pulmonary/Chest: Effort normal and breath sounds normal    Abdominal: Soft  Musculoskeletal: Normal range of motion  She exhibits tenderness  Arms:  Neurological: She is alert and oriented to person, place, and time  Skin: Skin is warm and dry  Psychiatric: She has a normal mood and affect

## 2018-03-14 ENCOUNTER — APPOINTMENT (OUTPATIENT)
Dept: LAB | Facility: CLINIC | Age: 83
End: 2018-03-14
Payer: MEDICARE

## 2018-03-14 LAB
ALBUMIN SERPL BCP-MCNC: 3.3 G/DL (ref 3.5–5)
ALP SERPL-CCNC: 70 U/L (ref 46–116)
ALT SERPL W P-5'-P-CCNC: 22 U/L (ref 12–78)
ANION GAP SERPL CALCULATED.3IONS-SCNC: 4 MMOL/L (ref 4–13)
AST SERPL W P-5'-P-CCNC: 15 U/L (ref 5–45)
BASOPHILS # BLD AUTO: 0.04 THOUSANDS/ΜL (ref 0–0.1)
BASOPHILS NFR BLD AUTO: 1 % (ref 0–1)
BILIRUB SERPL-MCNC: 0.54 MG/DL (ref 0.2–1)
BUN SERPL-MCNC: 15 MG/DL (ref 5–25)
CALCIUM SERPL-MCNC: 9 MG/DL (ref 8.3–10.1)
CHLORIDE SERPL-SCNC: 104 MMOL/L (ref 100–108)
CHOLEST SERPL-MCNC: 308 MG/DL (ref 50–200)
CO2 SERPL-SCNC: 31 MMOL/L (ref 21–32)
CREAT SERPL-MCNC: 0.92 MG/DL (ref 0.6–1.3)
EOSINOPHIL # BLD AUTO: 0.24 THOUSAND/ΜL (ref 0–0.61)
EOSINOPHIL NFR BLD AUTO: 6 % (ref 0–6)
ERYTHROCYTE [DISTWIDTH] IN BLOOD BY AUTOMATED COUNT: 13.1 % (ref 11.6–15.1)
GFR SERPL CREATININE-BSD FRML MDRD: 57 ML/MIN/1.73SQ M
GLUCOSE P FAST SERPL-MCNC: 105 MG/DL (ref 65–99)
HCT VFR BLD AUTO: 42.5 % (ref 34.8–46.1)
HDLC SERPL-MCNC: 37 MG/DL (ref 40–60)
HGB BLD-MCNC: 13.9 G/DL (ref 11.5–15.4)
LDLC SERPL DIRECT ASSAY-MCNC: 200 MG/DL (ref 0–100)
LYMPHOCYTES # BLD AUTO: 1.22 THOUSANDS/ΜL (ref 0.6–4.47)
LYMPHOCYTES NFR BLD AUTO: 28 % (ref 14–44)
MCH RBC QN AUTO: 30.2 PG (ref 26.8–34.3)
MCHC RBC AUTO-ENTMCNC: 32.7 G/DL (ref 31.4–37.4)
MCV RBC AUTO: 92 FL (ref 82–98)
MONOCYTES # BLD AUTO: 0.51 THOUSAND/ΜL (ref 0.17–1.22)
MONOCYTES NFR BLD AUTO: 12 % (ref 4–12)
NEUTROPHILS # BLD AUTO: 2.28 THOUSANDS/ΜL (ref 1.85–7.62)
NEUTS SEG NFR BLD AUTO: 53 % (ref 43–75)
NRBC BLD AUTO-RTO: 0 /100 WBCS
PLATELET # BLD AUTO: 254 THOUSANDS/UL (ref 149–390)
PMV BLD AUTO: 10.1 FL (ref 8.9–12.7)
POTASSIUM SERPL-SCNC: 4.4 MMOL/L (ref 3.5–5.3)
PROT SERPL-MCNC: 7.8 G/DL (ref 6.4–8.2)
RBC # BLD AUTO: 4.6 MILLION/UL (ref 3.81–5.12)
SODIUM SERPL-SCNC: 139 MMOL/L (ref 136–145)
TRIGL SERPL-MCNC: 406 MG/DL
WBC # BLD AUTO: 4.3 THOUSAND/UL (ref 4.31–10.16)

## 2018-03-14 PROCEDURE — 36415 COLL VENOUS BLD VENIPUNCTURE: CPT | Performed by: INTERNAL MEDICINE

## 2018-03-14 PROCEDURE — 80053 COMPREHEN METABOLIC PANEL: CPT | Performed by: INTERNAL MEDICINE

## 2018-03-14 PROCEDURE — 83721 ASSAY OF BLOOD LIPOPROTEIN: CPT | Performed by: INTERNAL MEDICINE

## 2018-03-14 PROCEDURE — 80061 LIPID PANEL: CPT | Performed by: INTERNAL MEDICINE

## 2018-03-14 PROCEDURE — 85025 COMPLETE CBC W/AUTO DIFF WBC: CPT | Performed by: INTERNAL MEDICINE

## 2018-03-19 ENCOUNTER — HOSPITAL ENCOUNTER (INPATIENT)
Facility: HOSPITAL | Age: 83
LOS: 7 days | Discharge: HOME | DRG: 308 | End: 2018-03-26
Attending: EMERGENCY MEDICINE | Admitting: INTERNAL MEDICINE
Payer: MEDICARE

## 2018-03-19 ENCOUNTER — APPOINTMENT (EMERGENCY)
Dept: RADIOLOGY | Facility: HOSPITAL | Age: 83
DRG: 308 | End: 2018-03-19
Payer: MEDICARE

## 2018-03-19 DIAGNOSIS — R09.02 HYPOXEMIA: ICD-10-CM

## 2018-03-19 DIAGNOSIS — R91.1 PULMONARY NODULE: ICD-10-CM

## 2018-03-19 DIAGNOSIS — R05.9 COUGH: ICD-10-CM

## 2018-03-19 DIAGNOSIS — N63.20 BREAST MASS, LEFT: ICD-10-CM

## 2018-03-19 DIAGNOSIS — N32.89 MASS OF URINARY BLADDER DETERMINED BY ULTRASOUND: ICD-10-CM

## 2018-03-19 DIAGNOSIS — J18.9 HEALTHCARE-ASSOCIATED PNEUMONIA: ICD-10-CM

## 2018-03-19 DIAGNOSIS — K22.89 ESOPHAGEAL MASS: ICD-10-CM

## 2018-03-19 DIAGNOSIS — I47.1 SUPRAVENTRICULAR TACHYCARDIA (HCC): ICD-10-CM

## 2018-03-19 DIAGNOSIS — R50.9 ACUTE FEBRILE ILLNESS: Primary | ICD-10-CM

## 2018-03-19 DIAGNOSIS — R06.02 SHORTNESS OF BREATH: ICD-10-CM

## 2018-03-19 PROBLEM — E86.0 HYPOVOLEMIA DUE TO DEHYDRATION: Status: ACTIVE | Noted: 2018-03-19

## 2018-03-19 PROBLEM — E86.1 HYPOVOLEMIA DUE TO DEHYDRATION: Status: ACTIVE | Noted: 2018-03-19

## 2018-03-19 PROBLEM — N30.00 ACUTE CYSTITIS: Status: ACTIVE | Noted: 2018-03-19

## 2018-03-19 LAB
ALBUMIN SERPL BCP-MCNC: 2.8 G/DL (ref 3.5–5)
ALP SERPL-CCNC: 65 U/L (ref 46–116)
ALT SERPL W P-5'-P-CCNC: 24 U/L (ref 12–78)
ANION GAP SERPL CALCULATED.3IONS-SCNC: 9 MMOL/L (ref 4–13)
APTT PPP: 31 SECONDS (ref 23–35)
AST SERPL W P-5'-P-CCNC: 23 U/L (ref 5–45)
BACTERIA UR QL AUTO: ABNORMAL /HPF
BASOPHILS # BLD MANUAL: 0.09 THOUSAND/UL (ref 0–0.1)
BASOPHILS NFR MAR MANUAL: 1 % (ref 0–1)
BILIRUB SERPL-MCNC: 0.8 MG/DL (ref 0.2–1)
BILIRUB UR QL STRIP: NEGATIVE
BUN SERPL-MCNC: 14 MG/DL (ref 5–25)
CALCIUM SERPL-MCNC: 9.2 MG/DL (ref 8.3–10.1)
CHLORIDE SERPL-SCNC: 93 MMOL/L (ref 100–108)
CLARITY UR: ABNORMAL
CO2 SERPL-SCNC: 28 MMOL/L (ref 21–32)
COLOR UR: YELLOW
CREAT SERPL-MCNC: 0.95 MG/DL (ref 0.6–1.3)
DIGOXIN SERPL-MCNC: 1.1 NG/ML (ref 0.8–2)
EOSINOPHIL # BLD MANUAL: 0.26 THOUSAND/UL (ref 0–0.4)
EOSINOPHIL NFR BLD MANUAL: 3 % (ref 0–6)
ERYTHROCYTE [DISTWIDTH] IN BLOOD BY AUTOMATED COUNT: 13 % (ref 11.6–15.1)
GFR SERPL CREATININE-BSD FRML MDRD: 54 ML/MIN/1.73SQ M
GLUCOSE SERPL-MCNC: 148 MG/DL (ref 65–140)
GLUCOSE UR STRIP-MCNC: NEGATIVE MG/DL
HCT VFR BLD AUTO: 39.2 % (ref 34.8–46.1)
HGB BLD-MCNC: 13 G/DL (ref 11.5–15.4)
HGB UR QL STRIP.AUTO: ABNORMAL
INR PPP: 1.13 (ref 0.86–1.16)
KETONES UR STRIP-MCNC: NEGATIVE MG/DL
LACTATE SERPL-SCNC: 1.5 MMOL/L (ref 0.5–2)
LEUKOCYTE ESTERASE UR QL STRIP: ABNORMAL
LG PLATELETS BLD QL SMEAR: PRESENT
LYMPHOCYTES # BLD AUTO: 0.6 THOUSAND/UL (ref 0.6–4.47)
LYMPHOCYTES # BLD AUTO: 7 % (ref 14–44)
MAGNESIUM SERPL-MCNC: 1.9 MG/DL (ref 1.6–2.6)
MCH RBC QN AUTO: 30.4 PG (ref 26.8–34.3)
MCHC RBC AUTO-ENTMCNC: 33.2 G/DL (ref 31.4–37.4)
MCV RBC AUTO: 92 FL (ref 82–98)
MONOCYTES # BLD AUTO: 0.77 THOUSAND/UL (ref 0–1.22)
MONOCYTES NFR BLD: 9 % (ref 4–12)
NEUTROPHILS # BLD MANUAL: 6.83 THOUSAND/UL (ref 1.85–7.62)
NEUTS BAND NFR BLD MANUAL: 2 % (ref 0–8)
NEUTS SEG NFR BLD AUTO: 78 % (ref 43–75)
NITRITE UR QL STRIP: POSITIVE
NON-SQ EPI CELLS URNS QL MICRO: ABNORMAL /HPF
PH UR STRIP.AUTO: 6.5 [PH] (ref 4.5–8)
PLATELET # BLD AUTO: 234 THOUSANDS/UL (ref 149–390)
PLATELET # BLD AUTO: 246 THOUSANDS/UL (ref 149–390)
PLATELET BLD QL SMEAR: ADEQUATE
PMV BLD AUTO: 9.7 FL (ref 8.9–12.7)
PMV BLD AUTO: 9.8 FL (ref 8.9–12.7)
POTASSIUM SERPL-SCNC: 4.3 MMOL/L (ref 3.5–5.3)
PROT SERPL-MCNC: 7.5 G/DL (ref 6.4–8.2)
PROT UR STRIP-MCNC: NEGATIVE MG/DL
PROTHROMBIN TIME: 14.4 SECONDS (ref 12.1–14.4)
RBC # BLD AUTO: 4.28 MILLION/UL (ref 3.81–5.12)
RBC #/AREA URNS AUTO: ABNORMAL /HPF
SODIUM SERPL-SCNC: 130 MMOL/L (ref 136–145)
SP GR UR STRIP.AUTO: <=1.005 (ref 1–1.03)
TOTAL CELLS COUNTED SPEC: 100
TROPONIN I SERPL-MCNC: <0.02 NG/ML
TROPONIN I SERPL-MCNC: <0.02 NG/ML
UROBILINOGEN UR QL STRIP.AUTO: 0.2 E.U./DL
WBC # BLD AUTO: 8.54 THOUSAND/UL (ref 4.31–10.16)
WBC #/AREA URNS AUTO: ABNORMAL /HPF

## 2018-03-19 PROCEDURE — 84484 ASSAY OF TROPONIN QUANT: CPT | Performed by: EMERGENCY MEDICINE

## 2018-03-19 PROCEDURE — 81001 URINALYSIS AUTO W/SCOPE: CPT | Performed by: EMERGENCY MEDICINE

## 2018-03-19 PROCEDURE — 85027 COMPLETE CBC AUTOMATED: CPT | Performed by: EMERGENCY MEDICINE

## 2018-03-19 PROCEDURE — 93005 ELECTROCARDIOGRAM TRACING: CPT | Performed by: EMERGENCY MEDICINE

## 2018-03-19 PROCEDURE — 87186 SC STD MICRODIL/AGAR DIL: CPT | Performed by: EMERGENCY MEDICINE

## 2018-03-19 PROCEDURE — 87077 CULTURE AEROBIC IDENTIFY: CPT | Performed by: EMERGENCY MEDICINE

## 2018-03-19 PROCEDURE — 85730 THROMBOPLASTIN TIME PARTIAL: CPT | Performed by: EMERGENCY MEDICINE

## 2018-03-19 PROCEDURE — 72170 X-RAY EXAM OF PELVIS: CPT

## 2018-03-19 PROCEDURE — 84484 ASSAY OF TROPONIN QUANT: CPT | Performed by: INTERNAL MEDICINE

## 2018-03-19 PROCEDURE — 71046 X-RAY EXAM CHEST 2 VIEWS: CPT

## 2018-03-19 PROCEDURE — 99223 1ST HOSP IP/OBS HIGH 75: CPT | Performed by: INTERNAL MEDICINE

## 2018-03-19 PROCEDURE — 83605 ASSAY OF LACTIC ACID: CPT | Performed by: EMERGENCY MEDICINE

## 2018-03-19 PROCEDURE — 96361 HYDRATE IV INFUSION ADD-ON: CPT

## 2018-03-19 PROCEDURE — 36415 COLL VENOUS BLD VENIPUNCTURE: CPT

## 2018-03-19 PROCEDURE — 96374 THER/PROPH/DIAG INJ IV PUSH: CPT

## 2018-03-19 PROCEDURE — 87086 URINE CULTURE/COLONY COUNT: CPT | Performed by: EMERGENCY MEDICINE

## 2018-03-19 PROCEDURE — 80053 COMPREHEN METABOLIC PANEL: CPT | Performed by: EMERGENCY MEDICINE

## 2018-03-19 PROCEDURE — 85007 BL SMEAR W/DIFF WBC COUNT: CPT | Performed by: EMERGENCY MEDICINE

## 2018-03-19 PROCEDURE — 85049 AUTOMATED PLATELET COUNT: CPT | Performed by: INTERNAL MEDICINE

## 2018-03-19 PROCEDURE — 87040 BLOOD CULTURE FOR BACTERIA: CPT | Performed by: EMERGENCY MEDICINE

## 2018-03-19 PROCEDURE — 85610 PROTHROMBIN TIME: CPT | Performed by: EMERGENCY MEDICINE

## 2018-03-19 PROCEDURE — 83735 ASSAY OF MAGNESIUM: CPT | Performed by: EMERGENCY MEDICINE

## 2018-03-19 PROCEDURE — 80162 ASSAY OF DIGOXIN TOTAL: CPT | Performed by: EMERGENCY MEDICINE

## 2018-03-19 PROCEDURE — 87633 RESP VIRUS 12-25 TARGETS: CPT | Performed by: INTERNAL MEDICINE

## 2018-03-19 PROCEDURE — 99285 EMERGENCY DEPT VISIT HI MDM: CPT

## 2018-03-19 RX ORDER — B-COMPLEX WITH VITAMIN C
1 TABLET ORAL 2 TIMES DAILY WITH MEALS
Status: DISCONTINUED | OUTPATIENT
Start: 2018-03-20 | End: 2018-03-26 | Stop reason: HOSPADM

## 2018-03-19 RX ORDER — ONDANSETRON 2 MG/ML
4 INJECTION INTRAMUSCULAR; INTRAVENOUS EVERY 6 HOURS PRN
Status: DISCONTINUED | OUTPATIENT
Start: 2018-03-19 | End: 2018-03-26 | Stop reason: HOSPADM

## 2018-03-19 RX ORDER — GUAIFENESIN/DEXTROMETHORPHAN 100-10MG/5
10 SYRUP ORAL EVERY 4 HOURS PRN
Status: DISCONTINUED | OUTPATIENT
Start: 2018-03-19 | End: 2018-03-26 | Stop reason: HOSPADM

## 2018-03-19 RX ORDER — ACETAMINOPHEN 325 MG/1
975 TABLET ORAL ONCE
Status: COMPLETED | OUTPATIENT
Start: 2018-03-19 | End: 2018-03-19

## 2018-03-19 RX ORDER — CHLORAL HYDRATE 500 MG
1000 CAPSULE ORAL 2 TIMES DAILY
Status: DISCONTINUED | OUTPATIENT
Start: 2018-03-20 | End: 2018-03-26 | Stop reason: HOSPADM

## 2018-03-19 RX ORDER — PANTOPRAZOLE SODIUM 40 MG/1
40 TABLET, DELAYED RELEASE ORAL
Status: DISCONTINUED | OUTPATIENT
Start: 2018-03-20 | End: 2018-03-26 | Stop reason: HOSPADM

## 2018-03-19 RX ORDER — 0.9 % SODIUM CHLORIDE 0.9 %
3 VIAL (ML) INJECTION AS NEEDED
Status: DISCONTINUED | OUTPATIENT
Start: 2018-03-19 | End: 2018-03-23 | Stop reason: HOSPADM

## 2018-03-19 RX ORDER — IPRATROPIUM BROMIDE AND ALBUTEROL SULFATE 2.5; .5 MG/3ML; MG/3ML
3 SOLUTION RESPIRATORY (INHALATION) EVERY 4 HOURS PRN
Status: DISCONTINUED | OUTPATIENT
Start: 2018-03-19 | End: 2018-03-23 | Stop reason: SDUPTHER

## 2018-03-19 RX ORDER — ACETAMINOPHEN 325 MG/1
650 TABLET ORAL EVERY 6 HOURS PRN
Status: DISCONTINUED | OUTPATIENT
Start: 2018-03-19 | End: 2018-03-26 | Stop reason: HOSPADM

## 2018-03-19 RX ORDER — DIGOXIN 125 MCG
125 TABLET ORAL DAILY
Status: DISCONTINUED | OUTPATIENT
Start: 2018-03-20 | End: 2018-03-20

## 2018-03-19 RX ORDER — CLOTRIMAZOLE AND BETAMETHASONE DIPROPIONATE 10; .64 MG/G; MG/G
CREAM TOPICAL 2 TIMES DAILY
Status: DISCONTINUED | OUTPATIENT
Start: 2018-03-20 | End: 2018-03-26 | Stop reason: HOSPADM

## 2018-03-19 RX ORDER — DILTIAZEM HYDROCHLORIDE 5 MG/ML
10 INJECTION INTRAVENOUS ONCE
Status: COMPLETED | OUTPATIENT
Start: 2018-03-19 | End: 2018-03-19

## 2018-03-19 RX ORDER — SODIUM CHLORIDE 9 MG/ML
100 INJECTION, SOLUTION INTRAVENOUS CONTINUOUS
Status: DISCONTINUED | OUTPATIENT
Start: 2018-03-19 | End: 2018-03-20

## 2018-03-19 RX ORDER — LANOLIN ALCOHOL/MO/W.PET/CERES
5 CREAM (GRAM) TOPICAL
Status: DISCONTINUED | OUTPATIENT
Start: 2018-03-19 | End: 2018-03-22

## 2018-03-19 RX ORDER — CLONAZEPAM 0.5 MG/1
0.5 TABLET ORAL 2 TIMES DAILY PRN
Status: DISCONTINUED | OUTPATIENT
Start: 2018-03-19 | End: 2018-03-22

## 2018-03-19 RX ORDER — ASPIRIN 81 MG/1
81 TABLET ORAL DAILY
Status: DISCONTINUED | OUTPATIENT
Start: 2018-03-20 | End: 2018-03-26 | Stop reason: HOSPADM

## 2018-03-19 RX ADMIN — CLONAZEPAM 0.5 MG: 0.5 TABLET ORAL at 23:52

## 2018-03-19 RX ADMIN — AZITHROMYCIN MONOHYDRATE 500 MG: 500 INJECTION, POWDER, LYOPHILIZED, FOR SOLUTION INTRAVENOUS at 19:37

## 2018-03-19 RX ADMIN — CEFTRIAXONE 1000 MG: 1 INJECTION, SOLUTION INTRAVENOUS at 22:10

## 2018-03-19 RX ADMIN — GUAIFENESIN AND DEXTROMETHORPHAN 10 ML: 100; 10 SYRUP ORAL at 22:11

## 2018-03-19 RX ADMIN — DILTIAZEM HYDROCHLORIDE 10 MG: 5 INJECTION INTRAVENOUS at 17:55

## 2018-03-19 RX ADMIN — ACETAMINOPHEN 975 MG: 325 TABLET, FILM COATED ORAL at 17:56

## 2018-03-19 RX ADMIN — SODIUM CHLORIDE 100 ML/HR: 0.9 INJECTION, SOLUTION INTRAVENOUS at 22:09

## 2018-03-19 RX ADMIN — SODIUM CHLORIDE 1000 ML: 0.9 INJECTION, SOLUTION INTRAVENOUS at 17:32

## 2018-03-19 NOTE — ED NOTES
Pt has no sob/chest pain  Pt HR at 87  No complaints at this time  Call bell in reach        Helen Perez, RN  03/19/18 0082

## 2018-03-19 NOTE — ED NOTES
Pt  Verbal order to give Cardizem 10mg  PT states she does not feel anything  And this is normal for her  No sob/chest pain/palpitations        Fredi Krishnan RN  03/19/18 [id number]

## 2018-03-19 NOTE — ED PROVIDER NOTES
History  Chief Complaint   Patient presents with    Fall     Patient is a patient at [facility]  [place] found the patiant on the floor next to her bed  Patient states she was bending over to pick something up when she fell backwards from a standing position, landing on her butt  Patient states she has a cough that started on Saturday  This is an 80year old woman with the noted PMH who presents from [facility] by EMS for evaluation of a fall  Patient states that she was standing in her room bending forward from waist to retrieve an object on the floor when she lost her balance and fell backwards, landing on her buttocks and remaining in that position thereafer  Patient did not strike her head against the ground and did not injure anything else  She attempted to stand up immediately thereafter but was unable to do so under her own power  She was able to stand up with the assistance of another individual and was ambulatory subsequently  She denies any headache/back pain/chest pain/extremity pain/extremity weakness/syncope/palpitations/nausea/vomiting/dyspnea since that time  Separately, she notes a 4-5 day history upper respiratory symptoms consisting of cough that is nonproductive as well as myalgias/arthralgias  Patient has multiple sick contacts with individuals at her nursing facility but is unsure of any specific sick contacts  She has not been treating her symptoms anything at home  She has not taken any antibiotics in the past 30 days  She is unaware of any fevers specifically with her symptoms  During my examination the patient, she developed an uncontrolled SVT with rate 150-160  Patient remained awake and alert throughout this episode with no complaints of dyspnea/chest pain/lightheadedness/nausea/vomiting  She did note palpitations at the time of the onset of symptoms occurred  Reports similar history of previous episodes    Patient states previous episodes have been terminated with use of medication  The episode was terminated with use of an adjusted vagal maneuver with patient blowing into a empty 3 ml syringe while simultaneously being laid flat with legs raised above her head  Within 20 seconds, SVT terminated to sinus rhythm with bigeminy and PVCs with subsequent progression to a normal sinus rhythm with occasional PVCs  She will undergo broad differential workup for infectious etiology particularly of respiratory etiology  Will obtain a pelvic x-ray although she is not having any pain or discomfort in the pelvis and in fact has been weight-bearing on the lower extremities  Disposition depending upon results of workup  Primary survey completed at time of initial evaluation:  Airway patent  Patient phonating normally with no stridor/dysphonia  Lung sounds are present bilaterally with no wheeze/crackles/rhonchi  Distal pulses are present in all extremities  There is no evidence of visible external hemorrhage  GCS 15  [place] x4 with equal tone  Sensation intact in all extremities  Exposure completed to assess for other occult injuries  Secondary survey was completed for all other injuries  Notable findings are reviewed in the full examination section  History provided by:  Patient, EMS personnel and nursing home  Fall   Associated symptoms: no abdominal pain, no back pain, no chest pain, no headaches, no nausea, no neck pain and no vomiting        Prior to Admission Medications   Prescriptions Last Dose Informant Patient Reported? Taking?    Calcium 500 MG tablet 3/19/2018 at Unknown time  Yes Yes   Sig: Take 1 capsule by mouth 2 (two) times a day   Cholecalciferol (VITAMIN D-3) 1000 units CAPS 3/19/2018 at Unknown time  Yes Yes   Sig: Take by mouth   Melatonin 5 MG TABS 3/18/2018 at Unknown time  Yes Yes   Sig: Take by mouth   Omega-3 Fatty Acids (OMEGA-3 FISH OIL) 1000 MG CAPS 3/19/2018 at Unknown time  Yes Yes   Sig: Take by mouth daily RaNITidine HCl (RANITIDINE 75 PO)   Yes Yes   Sig: Take 150 mg by mouth   aspirin (ADULT ASPIRIN EC LOW STRENGTH) 81 mg EC tablet 3/19/2018 at Unknown time  Yes Yes   Sig: Take 1 tablet by mouth daily   clonazePAM (KlonoPIN) 0 5 mg tablet 3/18/2018 at Unknown time  No Yes   Sig: Take 1 tablet (0 5 mg total) by mouth 2 (two) times a day for 30 days Take 1/2 tablet in morning and 2 tablet in the evening   clotrimazole-betamethasone (LOTRISONE) 1-0 05 % lotion   No Yes   Sig: Apply topically to affected area on sides breasts BID   digoxin (LANOXIN) 0 125 mg tablet   Yes Yes   Sig: Take 1 tablet by mouth daily   ibuprofen (MOTRIN) 200 mg tablet   Yes Yes   Sig: Take by mouth every 6 (six) hours as needed for mild pain   metoprolol tartrate (LOPRESSOR) 50 mg tablet 3/19/2018 at Unknown time  Yes Yes   Sig: Take by mouth 3 (three) times a day     pantoprazole (PROTONIX) 40 mg tablet 3/19/2018 at Unknown time  Yes Yes   Sig: Take 1 tablet by mouth daily      Facility-Administered Medications: None       Past Medical History:   Diagnosis Date    Abdominal distension     Last Assessed: [id number]    Abnormal weight loss     Last Assessed: [id number]    Bursitis of left hip     Lst Assessed: [id number]    Chest pain     Last Assessed: [id number]    Dysuria     Last Assessed: [id number]    External hemorrhoids     Last Assessed: [id number]    Hypomagnesemia     Last Assessed: [id number]    Lyme disease     Last Assessed: [id number]    Pneumonia of right middle lobe due to infectious organism [facility])     Last Assessed: [id number]       Past Surgical History:   Procedure Laterality Date    CATARACT EXTRACTION      HEMORRHOID SURGERY      HYSTERECTOMY      PELVIC FLOOR REPAIR         Family History   Problem Relation Age of Onset    Adopted: Yes     I have reviewed and agree with the history as documented      Social History   Substance Use Topics    Smoking status: Never Smoker    Smokeless tobacco: Never Used    Alcohol use No        Review of Systems   Constitutional: Positive for fatigue and fever  Negative for chills  Respiratory: Positive for cough, chest tightness and shortness of breath  Cardiovascular: Negative for chest pain and palpitations  Gastrointestinal: Negative for abdominal pain, diarrhea, nausea and vomiting  Musculoskeletal: Negative for arthralgias, back pain, neck pain and neck stiffness  Skin: Negative for color change, pallor, rash and wound  Neurological: Negative for dizziness, weakness, light-headedness, numbness and headaches  Hematological: Negative for adenopathy  Does not bruise/bleed easily  Psychiatric/Behavioral: Negative for confusion and decreased concentration  The patient is not nervous/anxious  All other systems reviewed and are negative  Physical Exam  ED Triage Vitals   Temperature Pulse Respirations Blood Pressure SpO2   03/19/18 1645 03/19/18 1645 03/19/18 1645 03/19/18 1645 03/19/18 1645   (!) 101 1 °F (38 4 °C) 92 20 (!) 186/74 90 %      Temp Source Heart Rate Source Patient Position - Orthostatic VS BP Location FiO2 (%)   03/19/18 1645 03/19/18 1645 03/19/18 1645 03/19/18 1645 --   Temporal Monitor Sitting Right arm       Pain Score       03/19/18 1647       No Pain           Orthostatic Vital Signs  Vitals:    03/20/18 0427 03/20/18 0921 03/20/18 1113 03/20/18 1502   BP: 159/69 125/59  152/69   Pulse: 85 83 90 83   Patient Position - Orthostatic VS: Lying Lying  Lying       Physical Exam   Constitutional: She is oriented to person, place, and time  She appears well-developed and well-nourished  She is cooperative  No distress  HENT:   Head: Normocephalic and atraumatic  Right Ear: Hearing and external ear normal    Left Ear: Hearing and external ear normal    Nose: Nose normal    Mouth/Throat: Uvula is midline, oropharynx is clear and moist and mucous membranes are normal  No oropharyngeal exudate     Eyes: Conjunctivae, EOM and lids are normal  Pupils are equal, round, and reactive to light  Neck: Trachea normal, normal range of motion and phonation normal  Neck supple  No JVD present  No tracheal tenderness, no spinous process tenderness and no muscular tenderness present  No tracheal deviation present  No thyroid mass and no thyromegaly present  Cardiovascular: Normal rate, regular rhythm, S1 normal, S2 normal, normal heart sounds and intact distal pulses  Exam reveals no gallop and no friction rub  No murmur heard  Pulses:       Radial pulses are 2+ on the right side, and 2+ on the left side  Dorsalis pedis pulses are 2+ on the right side, and 2+ on the left side  Posterior tibial pulses are 2+ on the right side, and 2+ on the left side  Pulmonary/Chest: Effort normal  No stridor  Tachypnea (RR 24 on my exam) noted  No respiratory distress  She has no decreased breath sounds  She has no wheezes  She has no rhonchi  She has rales (scattered crackles in all lung fields bilaterally)  She exhibits no tenderness  Abdominal: Soft  She exhibits no distension and no mass  There is no tenderness  There is no rigidity, no rebound, no guarding and no CVA tenderness  Musculoskeletal: Normal range of motion  She exhibits no edema, tenderness or deformity  Lymphadenopathy:     She has no cervical adenopathy  Neurological: She is alert and oriented to person, place, and time  She has normal strength  No cranial nerve deficit or sensory deficit  She exhibits normal muscle tone  GCS eye subscore is 4  GCS verbal subscore is 5  GCS motor subscore is 6  PERRLA; EOMI  Sensation intact to light touch over face in V1-V3 distribution bilaterally  Facial expressions symmetric  Tongue/uvula midline  Shoulder shrug equal bilaterally  Strength 5/5 in UE/LE bilaterally  Sensation intact to light touch in UE/LE bilaterally  Skin: Skin is warm, dry and intact  No rash noted  She is not diaphoretic  No erythema     Psychiatric: She has a normal mood and affect  Her speech is normal and behavior is normal    Nursing note and vitals reviewed        ED Medications  Medications   sodium chloride (PF) 0 9 % injection 3 mL (not administered)   ondansetron (ZOFRAN) injection 4 mg (not administered)   enoxaparin (LOVENOX) subcutaneous injection 40 mg (40 mg Subcutaneous Given 3/20/18 1018)   acetaminophen (TYLENOL) tablet 650 mg (not administered)   cefTRIAXone (ROCEPHIN) IVPB (premix) 1,000 mg (1,000 mg Intravenous New Bag 3/19/18 2210)   dextromethorphan-guaiFENesin (ROBITUSSIN DM)  mg/5 mL oral syrup 10 mL (10 mL Oral Given 3/20/18 1600)   ipratropium-albuterol (DUO-NEB) 0 5-2 5 mg/3 mL inhalation solution 3 mL (not administered)   aspirin (ECOTRIN LOW STRENGTH) EC tablet 81 mg (81 mg Oral Given 3/20/18 1019)   calcium carbonate-vitamin D (OSCAL-D) 500 mg-200 units per tablet 1 tablet (1 tablet Oral Given 3/20/18 0919)   melatonin tablet 4 5 mg (not administered)   fish oil capsule 1,000 mg (1,000 mg Oral Given 3/20/18 1018)   pantoprazole (PROTONIX) EC tablet 40 mg (40 mg Oral Given 3/20/18 0526)   clonazePAM (KlonoPIN) tablet 0 5 mg (0 5 mg Oral Given 3/19/18 2352)   clotrimazole-betamethasone (LOTRISONE) 1-0 05 % cream ( Topical Given 3/20/18 1020)   diltiazem (CARDIZEM) tablet 30 mg (30 mg Oral Given 3/20/18 1019)   metoprolol tartrate (LOPRESSOR) tablet 50 mg (50 mg Oral Given 3/20/18 1018)   azithromycin (ZITHROMAX) 500 mg in sodium chloride 0 9 % 250 mL IVPB (not administered)   sodium chloride 0 9 % bolus 1,000 mL (0 mL Intravenous Stopped 3/19/18 1832)   acetaminophen (TYLENOL) tablet 975 mg (975 mg Oral Given 3/19/18 1756)   diltiazem (CARDIZEM) injection 10 mg (10 mg Intravenous Given 3/19/18 1755)   azithromycin (ZITHROMAX) 500 mg in sodium chloride 0 9 % 250 mL IVPB (500 mg Intravenous New Bag 3/19/18 1937)   cloNIDine (CATAPRES) tablet 0 1 mg (0 1 mg Oral Given 3/20/18 0050)   diltiazem (CARDIZEM) injection 10 mg (10 mg Intravenous Given 3/20/18 0108) nitroglycerin (NITRO-BID) 2 % TD ointment 1 inch (1 inch Topical Given 3/20/18 0307)       Diagnostic Studies  Results Reviewed     Procedure Component Value Units Date/Time    Urine Microscopic [[id number]]  (Abnormal) Collected:  03/19/18 1937    Lab Status:  Final result Specimen:  Urine from Urine, Clean Catch Updated:  03/19/18 2035     RBC, UA 2-4 (A) /hpf      WBC, UA 10-20 (A) /hpf      Epithelial Cells None Seen /hpf      Bacteria, UA Innumerable (A) /hpf     Urine culture [[id number]] Collected:  03/19/18 1937    Lab Status: In process Specimen:  Urine from Urine, Clean Catch Updated:  03/19/18 2035    UA w Reflex to Microscopic w Reflex to Culture [[id number]]  (Abnormal) Collected:  03/19/18 1937    Lab Status:  Final result Specimen:  Urine from Urine, Clean Catch Updated:  03/19/18 2031     Color, UA Yellow     Clarity, UA Cloudy     Specific Gravity, UA <=1 005     pH, UA 6 5     Leukocytes, UA Small (A)     Nitrite, UA Positive (A)     Protein, UA Negative mg/dl      Glucose, UA Negative mg/dl      Ketones, UA Negative mg/dl      Urobilinogen, UA 0 2 E U /dl      Bilirubin, UA Negative     Blood, UA Trace-Intact    Respiratory Pathogen Profile, PCR [[id number]] Collected:  03/19/18 1937    Lab Status: In process Specimen:  Nasopharyngeal from Nasopharyngeal Swab Updated:  03/19/18 2027    Troponin I [[id number]]  (Normal) Collected:  03/19/18 1803    Lab Status:  Final result Specimen:  Blood from Arm, Right Updated:  03/19/18 1830     Troponin I <0 02 ng/mL     Narrative:         [employer] Chemistry analyzer 99% cutoff is > 0 04 ng/mL in network labs    o cTnI 99% cutoff is useful only when applied to patients in the clinical setting of myocardial ischemia  o cTnI 99% cutoff should be interpreted in the context of clinical history, ECG findings and possibly cardiac imaging to establish correct diagnosis    o cTnI 99% cutoff may be suggestive but clearly not indicative of a coronary event without the clinical setting of myocardial ischemia  Blood culture #1 [[id number]] Collected:  03/19/18 1803    Lab Status: In process Specimen:  Blood from Arm, Right Updated:  03/19/18 1810    Magnesium [[id number]]  (Normal) Collected:  03/19/18 1652    Lab Status:  Final result Specimen:  Blood from Arm, Right Updated:  03/19/18 1800     Magnesium 1 9 mg/dL     Digoxin level [[id number]]  (Normal) Collected:  03/19/18 1652    Lab Status:  Final result Specimen:  Blood from Arm, Right Updated:  03/19/18 1800     Digoxin Lvl 1 1 ng/mL     CBC and differential [[id number]]  (Normal) Collected:  03/19/18 1652    Lab Status:  Final result Specimen:  Blood from Arm, Right Updated:  03/19/18 1738     WBC 8 54 Thousand/uL      RBC 4 28 Million/uL      Hemoglobin 13 0 g/dL      Hematocrit 39 2 %      MCV 92 fL      MCH 30 4 pg      MCHC 33 2 g/dL      RDW 13 0 %      MPV 9 7 fL      Platelets 371 Thousands/uL     Narrative: This is an appended report  These results have been appended to a previously verified report  Lactic Acid x2 [[id number]]  (Normal) Collected:  03/19/18 1652    Lab Status:  Final result Specimen:  Blood from Arm, Right Updated:  03/19/18 1725     LACTIC ACID 1 5 mmol/L     Narrative:         Result may be elevated if tourniquet was used during collection      Comprehensive metabolic panel [[id number]]  (Abnormal) Collected:  03/19/18 1652    Lab Status:  Final result Specimen:  Blood from Arm, Right Updated:  03/19/18 1721     Sodium 130 (L) mmol/L      Potassium 4 3 mmol/L      Chloride 93 (L) mmol/L      CO2 28 mmol/L      Anion Gap 9 mmol/L      BUN 14 mg/dL      Creatinine 0 95 mg/dL      Glucose 148 (H) mg/dL      Calcium 9 2 mg/dL      AST 23 U/L      ALT 24 U/L      Alkaline Phosphatase 65 U/L      Total Protein 7 5 g/dL      Albumin 2 8 (L) g/dL      Total Bilirubin 0 80 mg/dL      eGFR 54 ml/min/1 73sq m     Narrative:         National Kidney Disease Education Program recommendations are as follows:  GFR calculation is accurate only with a steady state creatinine  Chronic Kidney disease less than 60 ml/min/1 73 sq  meters  Kidney failure less than 15 ml/min/1 73 sq  meters  APTT [[id number]]  (Normal) Collected:  03/19/18 1652    Lab Status:  Final result Specimen:  Blood from Arm, Right Updated:  03/19/18 1714     PTT 31 seconds     Narrative: Therapeutic Heparin Range = 60-90 seconds    Protime-INR [[id number]]  (Normal) Collected:  03/19/18 1652    Lab Status:  Final result Specimen:  Blood from Arm, Right Updated:  03/19/18 1714     Protime 14 4 seconds      INR 1 13    Blood culture #2 [[id number]] Collected:  03/19/18 1652    Lab Status: In process Specimen:  Blood from Arm, Right Updated:  03/19/18 1658                 XR pelvis ap only 1 or 2 vw   ED Interpretation by Marilynn Ward DO (03/19 4384)   Significant overlying bowel gas  No evidence of acute fracture/dislocation  Moderate degenerative disease  Final Result by Gato Mesa MD (03/20 7180)      Limited exam due to overlying bowel gas  No definite fracture  Workstation performed: [id number]         X-ray chest 2 views   ED Interpretation by Marilynn Ward DO (03/19 1838)   Airway midline  There is a mild diffuse interstitial pattern consistent with inflammatory/infectious process; there is some suggestion of faint RUL infiltrate  Cardiomegaly  Cardiac and mediastinal silhouettes are within normal limits  Osseous structures appear normal       Final Result by Paolo Luque MD (03/20 5422)      Mildly increased interstitial markings without focal consolidation              Workstation performed: [id number]                    Procedures  ECG 12 Lead Documentation  Date/Time: 3/19/2018 5:27 PM  Performed by: Monique Youngblood  Authorized by: Monique Youngblood     Indications / Diagnosis:  Cardiac arrhythmia  ECG reviewed by me, the ED Provider: yes    Patient location:  ED  Previous ECG:     Comparison to cardiac monitor: Yes Interpretation:     Interpretation: abnormal    Rate:     ECG rate:  99    ECG rate assessment: normal    Rhythm:     Rhythm: sinus rhythm    Ectopy:     Ectopy: bigeminy and PVCs      PVCs:  Unifocal and frequent  QRS:     QRS axis:  Normal    QRS intervals:  Normal  Conduction:     Conduction: normal    ST segments:     ST segments:  Normal  T waves:     T waves: normal    Other findings:     Other findings: poor R wave progression    Comments:      Pr 174 qrs 84 qtc 387           Phone Contacts  ED Phone Contact    ED Course  ED Course as of Mar 20 1658   Mon Mar 19, 2018   1744 1  WBC wnl   2  Hg/Hct wnl   3  Plt wnl   4  Electrolytes show mild hyponatremia/hypochloremia  Mild hypoalbuminemia  5  Lactic acid wnl   6  INR wnl   7  Troponin/digoxin/magnesium levels pending  Patient is now having recurrent episodes of SVT with rate in the 150s/ 160s  She remains otherwise hemodynamically normal with unchanged BP/ respiratory rate/hypoxemia  She will be given IV diltiazem bolus in addition to IVF bolus  1801 Digoxin level therapeutic  Magnesium wnl     1834 CXR demonstrates some suggestion of an interstitial pattern possibly representing infiltrate  Given the multiple episodes of tachycardia as well as patient's age and relative debility, I will plan to admit patient to hospital and treat for infectious process--patient has HCAP risk factors (as she resides in a nursing home) but no MDR risk factors  Will utilize ceftriaxone/azithromycin  Page placed to hospitalist     HR has remained in 80-90s in NSR, RR 26, BP 180s/70s, O2 sat 98% 3 lpm  No additional episodes of SVT  1846 D/w DR Carlin Rodriguez of [facility]  Patient case discussed  He requests inpatient admission          MDM  Number of Diagnoses or Management Options  Acute febrile illness: new and requires workup  Healthcare-associated pneumonia of right upper lobe: new and requires workup  Hypoxemia: new and requires workup     Amount and/or Complexity of Data Reviewed  Clinical lab tests: ordered and reviewed  Tests in the radiology section of CPT®: reviewed and ordered  Decide to obtain previous medical records or to obtain history from someone other than the patient: yes  Review and summarize past medical records: yes  Discuss the patient with other providers: yes  Independent visualization of images, tracings, or specimens: yes    Risk of Complications, Morbidity, and/or Mortality  Presenting problems: high  Diagnostic procedures: high  Management options: high    Patient Progress  Patient progress: improved    The patient presented with a condition in which there was a high probability of imminent or life-threatening deterioration, and critical care services (excluding separately billable procedures) totalled 30-74 minutes  Disposition  Final diagnoses:   Acute febrile illness   Hypoxemia   Healthcare-associated pneumonia of right upper lobe     Time reflects when diagnosis was documented in both MDM as applicable and the Disposition within this note     Time User Action Codes Description Comment    3/19/2018  6:50 PM Joshua Lav Add [R50 9] Acute febrile illness     3/19/2018  6:50 PM Joshua Lav Add [R09 02] Hypoxemia     3/19/2018  6:50 PM Joshua Lav Add [J18 9] Healthcare-associated pneumonia     3/19/2018  6:50 PM Lien Stevenson 1521 [J18 9] Healthcare-associated pneumonia of right upper lobe     3/19/2018  9:31 PM Alison Gomez Add [I47 1] Supraventricular tachycardia [facility])       ED Disposition     ED Disposition Condition Comment    Admit  Case was discussed with Dr Natalie Serna and the patient's admission status was agreed to be Admission Status: inpatient status to the service of Dr Natalie Serna          Follow-up Information    None       Current Discharge Medication List      CONTINUE these medications which have NOT CHANGED    Details   aspirin (ADULT ASPIRIN EC LOW STRENGTH) 81 mg EC tablet Take 1 tablet by mouth daily      Calcium 500 MG tablet Take 1 capsule by mouth 2 (two) times a day      Cholecalciferol (VITAMIN D-3) 1000 units CAPS Take by mouth      clonazePAM (KlonoPIN) 0 5 mg tablet Take 1 tablet (0 5 mg total) by mouth 2 (two) times a day for 30 days Take 1/2 tablet in morning and 2 tablet in the evening  Qty: 75 tablet, Refills: 0    Associated Diagnoses: Primary insomnia      clotrimazole-betamethasone (LOTRISONE) 1-0 05 % lotion Apply topically to affected area on sides breasts BID  Qty: 30 mL, Refills: 0    Associated Diagnoses: Fungal dermatosis      digoxin (LANOXIN) 0 125 mg tablet Take 1 tablet by mouth daily      ibuprofen (MOTRIN) 200 mg tablet Take by mouth every 6 (six) hours as needed for mild pain      Melatonin 5 MG TABS Take by mouth      metoprolol tartrate (LOPRESSOR) 50 mg tablet Take by mouth 3 (three) times a day        Omega-3 Fatty Acids (OMEGA-3 FISH OIL) 1000 MG CAPS Take by mouth daily        pantoprazole (PROTONIX) 40 mg tablet Take 1 tablet by mouth daily      RaNITidine HCl (RANITIDINE 75 PO) Take 150 mg by mouth           No discharge procedures on file      ED Provider  Electronically Signed by           Anselmo Adamson DO  03/20/18 0333

## 2018-03-19 NOTE — ED NOTES
Pt went to xray   Verbal order okay to transfer pt to xr from dr Inessa Greenberg, RN  03/19/18 5626

## 2018-03-20 ENCOUNTER — APPOINTMENT (INPATIENT)
Dept: NON INVASIVE DIAGNOSTICS | Facility: HOSPITAL | Age: 83
DRG: 308 | End: 2018-03-20
Payer: MEDICARE

## 2018-03-20 LAB
ALBUMIN SERPL BCP-MCNC: 2.6 G/DL (ref 3.5–5)
ALP SERPL-CCNC: 63 U/L (ref 46–116)
ALT SERPL W P-5'-P-CCNC: 22 U/L (ref 12–78)
ANION GAP SERPL CALCULATED.3IONS-SCNC: 8 MMOL/L (ref 4–13)
AST SERPL W P-5'-P-CCNC: 21 U/L (ref 5–45)
ATRIAL RATE: 99 BPM
BILIRUB SERPL-MCNC: 0.6 MG/DL (ref 0.2–1)
BUN SERPL-MCNC: 10 MG/DL (ref 5–25)
CALCIUM SERPL-MCNC: 8.8 MG/DL (ref 8.3–10.1)
CHLORIDE SERPL-SCNC: 100 MMOL/L (ref 100–108)
CO2 SERPL-SCNC: 27 MMOL/L (ref 21–32)
CREAT SERPL-MCNC: 0.89 MG/DL (ref 0.6–1.3)
DIGOXIN SERPL-MCNC: 0.7 NG/ML (ref 0.8–2)
ERYTHROCYTE [DISTWIDTH] IN BLOOD BY AUTOMATED COUNT: 13.1 % (ref 11.6–15.1)
GFR SERPL CREATININE-BSD FRML MDRD: 59 ML/MIN/1.73SQ M
GLUCOSE SERPL-MCNC: 144 MG/DL (ref 65–140)
HCT VFR BLD AUTO: 38 % (ref 34.8–46.1)
HGB BLD-MCNC: 12.3 G/DL (ref 11.5–15.4)
INR PPP: 1.19 (ref 0.86–1.16)
MAGNESIUM SERPL-MCNC: 1.9 MG/DL (ref 1.6–2.6)
MCH RBC QN AUTO: 29.9 PG (ref 26.8–34.3)
MCHC RBC AUTO-ENTMCNC: 32.4 G/DL (ref 31.4–37.4)
MCV RBC AUTO: 93 FL (ref 82–98)
NT-PROBNP SERPL-MCNC: 1723 PG/ML
P AXIS: 70 DEGREES
PHOSPHATE SERPL-MCNC: 2.4 MG/DL (ref 2.3–4.1)
PLATELET # BLD AUTO: 241 THOUSANDS/UL (ref 149–390)
PMV BLD AUTO: 10.3 FL (ref 8.9–12.7)
POTASSIUM SERPL-SCNC: 3.8 MMOL/L (ref 3.5–5.3)
PR INTERVAL: 174 MS
PROT SERPL-MCNC: 7.2 G/DL (ref 6.4–8.2)
PROTHROMBIN TIME: 15 SECONDS (ref 12.1–14.4)
QRS AXIS: 27 DEGREES
QRSD INTERVAL: 84 MS
QT INTERVAL: 302 MS
QTC INTERVAL: 387 MS
RBC # BLD AUTO: 4.11 MILLION/UL (ref 3.81–5.12)
SODIUM SERPL-SCNC: 135 MMOL/L (ref 136–145)
T WAVE AXIS: -70 DEGREES
TROPONIN I SERPL-MCNC: <0.02 NG/ML
TSH SERPL DL<=0.05 MIU/L-ACNC: 3.7 UIU/ML (ref 0.36–3.74)
VENTRICULAR RATE: 99 BPM
WBC # BLD AUTO: 7.05 THOUSAND/UL (ref 4.31–10.16)

## 2018-03-20 PROCEDURE — 84443 ASSAY THYROID STIM HORMONE: CPT | Performed by: INTERNAL MEDICINE

## 2018-03-20 PROCEDURE — 84100 ASSAY OF PHOSPHORUS: CPT | Performed by: INTERNAL MEDICINE

## 2018-03-20 PROCEDURE — 97530 THERAPEUTIC ACTIVITIES: CPT

## 2018-03-20 PROCEDURE — 99222 1ST HOSP IP/OBS MODERATE 55: CPT | Performed by: INTERNAL MEDICINE

## 2018-03-20 PROCEDURE — 99233 SBSQ HOSP IP/OBS HIGH 50: CPT | Performed by: INTERNAL MEDICINE

## 2018-03-20 PROCEDURE — 97167 OT EVAL HIGH COMPLEX 60 MIN: CPT

## 2018-03-20 PROCEDURE — 97163 PT EVAL HIGH COMPLEX 45 MIN: CPT | Performed by: PHYSICAL THERAPIST

## 2018-03-20 PROCEDURE — 93306 TTE W/DOPPLER COMPLETE: CPT | Performed by: INTERNAL MEDICINE

## 2018-03-20 PROCEDURE — G8987 SELF CARE CURRENT STATUS: HCPCS

## 2018-03-20 PROCEDURE — 80162 ASSAY OF DIGOXIN TOTAL: CPT | Performed by: INTERNAL MEDICINE

## 2018-03-20 PROCEDURE — 84484 ASSAY OF TROPONIN QUANT: CPT | Performed by: INTERNAL MEDICINE

## 2018-03-20 PROCEDURE — G8978 MOBILITY CURRENT STATUS: HCPCS | Performed by: PHYSICAL THERAPIST

## 2018-03-20 PROCEDURE — 85610 PROTHROMBIN TIME: CPT | Performed by: INTERNAL MEDICINE

## 2018-03-20 PROCEDURE — G8988 SELF CARE GOAL STATUS: HCPCS

## 2018-03-20 PROCEDURE — 85027 COMPLETE CBC AUTOMATED: CPT | Performed by: INTERNAL MEDICINE

## 2018-03-20 PROCEDURE — G8979 MOBILITY GOAL STATUS: HCPCS | Performed by: PHYSICAL THERAPIST

## 2018-03-20 PROCEDURE — 83880 ASSAY OF NATRIURETIC PEPTIDE: CPT | Performed by: INTERNAL MEDICINE

## 2018-03-20 PROCEDURE — 93306 TTE W/DOPPLER COMPLETE: CPT

## 2018-03-20 PROCEDURE — 80053 COMPREHEN METABOLIC PANEL: CPT | Performed by: INTERNAL MEDICINE

## 2018-03-20 PROCEDURE — 83735 ASSAY OF MAGNESIUM: CPT | Performed by: INTERNAL MEDICINE

## 2018-03-20 PROCEDURE — 93010 ELECTROCARDIOGRAM REPORT: CPT | Performed by: INTERNAL MEDICINE

## 2018-03-20 PROCEDURE — 97116 GAIT TRAINING THERAPY: CPT | Performed by: PHYSICAL THERAPIST

## 2018-03-20 RX ORDER — METOPROLOL TARTRATE 50 MG/1
50 TABLET, FILM COATED ORAL EVERY 12 HOURS SCHEDULED
Status: DISCONTINUED | OUTPATIENT
Start: 2018-03-20 | End: 2018-03-20

## 2018-03-20 RX ORDER — METOPROLOL TARTRATE 50 MG/1
50 TABLET, FILM COATED ORAL EVERY 12 HOURS SCHEDULED
Status: DISCONTINUED | OUTPATIENT
Start: 2018-03-20 | End: 2018-03-26 | Stop reason: HOSPADM

## 2018-03-20 RX ORDER — IBUPROFEN 200 MG
TABLET ORAL EVERY 6 HOURS PRN
COMMUNITY
End: 2019-07-05 | Stop reason: HOSPADM

## 2018-03-20 RX ORDER — CLONIDINE HYDROCHLORIDE 0.1 MG/1
0.1 TABLET ORAL ONCE
Status: COMPLETED | OUTPATIENT
Start: 2018-03-20 | End: 2018-03-20

## 2018-03-20 RX ORDER — DILTIAZEM HYDROCHLORIDE 5 MG/ML
INJECTION INTRAVENOUS
Status: COMPLETED
Start: 2018-03-20 | End: 2018-03-20

## 2018-03-20 RX ORDER — DILTIAZEM HYDROCHLORIDE 5 MG/ML
10 INJECTION INTRAVENOUS ONCE
Status: COMPLETED | OUTPATIENT
Start: 2018-03-20 | End: 2018-03-20

## 2018-03-20 RX ORDER — METOPROLOL TARTRATE 50 MG/1
50 TABLET, FILM COATED ORAL 3 TIMES DAILY
Status: DISCONTINUED | OUTPATIENT
Start: 2018-03-20 | End: 2018-03-20

## 2018-03-20 RX ADMIN — AZITHROMYCIN MONOHYDRATE 500 MG: 500 INJECTION, POWDER, LYOPHILIZED, FOR SOLUTION INTRAVENOUS at 17:17

## 2018-03-20 RX ADMIN — DILTIAZEM HYDROCHLORIDE 10 MG: 5 INJECTION INTRAVENOUS at 01:08

## 2018-03-20 RX ADMIN — CLONIDINE HYDROCHLORIDE 0.1 MG: 0.1 TABLET ORAL at 00:50

## 2018-03-20 RX ADMIN — CLOTRIMAZOLE AND BETAMETHASONE DIPROPIONATE: 10; .5 CREAM TOPICAL at 10:20

## 2018-03-20 RX ADMIN — GUAIFENESIN AND DEXTROMETHORPHAN 10 ML: 100; 10 SYRUP ORAL at 16:00

## 2018-03-20 RX ADMIN — CALCIUM CARBONATE-VITAMIN D TAB 500 MG-200 UNIT 1 TABLET: 500-200 TAB at 17:15

## 2018-03-20 RX ADMIN — CLOTRIMAZOLE AND BETAMETHASONE DIPROPIONATE: 10; .5 CREAM TOPICAL at 17:19

## 2018-03-20 RX ADMIN — NITROGLYCERIN 1 INCH: 20 OINTMENT TOPICAL at 03:07

## 2018-03-20 RX ADMIN — Medication 1000 MG: at 10:18

## 2018-03-20 RX ADMIN — DILTIAZEM HYDROCHLORIDE 30 MG: 30 TABLET, FILM COATED ORAL at 10:19

## 2018-03-20 RX ADMIN — CEFTRIAXONE 1000 MG: 1 INJECTION, SOLUTION INTRAVENOUS at 21:13

## 2018-03-20 RX ADMIN — METOPROLOL TARTRATE 50 MG: 50 TABLET ORAL at 21:13

## 2018-03-20 RX ADMIN — DILTIAZEM HYDROCHLORIDE 30 MG: 30 TABLET, FILM COATED ORAL at 17:15

## 2018-03-20 RX ADMIN — METOPROLOL TARTRATE 50 MG: 50 TABLET ORAL at 01:20

## 2018-03-20 RX ADMIN — ENOXAPARIN SODIUM 40 MG: 40 INJECTION SUBCUTANEOUS at 10:18

## 2018-03-20 RX ADMIN — CALCIUM CARBONATE-VITAMIN D TAB 500 MG-200 UNIT 1 TABLET: 500-200 TAB at 09:19

## 2018-03-20 RX ADMIN — ACETAMINOPHEN 650 MG: 325 TABLET, FILM COATED ORAL at 19:16

## 2018-03-20 RX ADMIN — METOPROLOL TARTRATE 50 MG: 50 TABLET ORAL at 10:18

## 2018-03-20 RX ADMIN — Medication 1000 MG: at 17:15

## 2018-03-20 RX ADMIN — PANTOPRAZOLE SODIUM 40 MG: 40 TABLET, DELAYED RELEASE ORAL at 05:26

## 2018-03-20 RX ADMIN — ASPIRIN 81 MG: 81 TABLET, COATED ORAL at 10:19

## 2018-03-20 NOTE — SOCIAL WORK
Pt was admitted from [facility] assisted living  Cm has a call out to Minerva Aleman:admissions/marketing to answer questions re: initial assessment  Awaiting a call back

## 2018-03-20 NOTE — PLAN OF CARE
Problem: DISCHARGE PLANNING - CARE MANAGEMENT  Goal: Discharge to post-acute care or home with appropriate resources  INTERVENTIONS:  - Conduct assessment to determine patient/family and health care team treatment goals, and need for post-acute services based on payer coverage, community resources, and patient preferences, and barriers to discharge  - Address psychosocial, clinical, and financial barriers to discharge as identified in assessment in conjunction with the patient/family and health care team  - Arrange appropriate level of post-acute services according to patient's   needs and preference and payer coverage in collaboration with the physician and health care team  - Communicate with and update the patient/family, physician, and health care team regarding progress on the discharge plan  - Arrange appropriate transportation to post-acute venues  Outcome: Progressing  -return to [place] assisted living on dc  -family to transport pt on dc

## 2018-03-20 NOTE — H&P
History and Physical - [facility] Internal Medicine    Patient Information: Brad Vinson 80 y o  female MRN: [id number]  Unit/Bed#: 400-01 Encounter: [id number]  Admitting Physician: Roma Mendez MD  PCP: Kathleen Velez DO  Date of Admission:  03/19/18    Assessment/Plan:    Hospital Problem List:     Principal Problem:    Hypoxemia  Active Problems:    Supraventricular tachycardia ([address] )    Healthcare-associated pneumonia    Acute febrile illness    Acute cystitis    Hypovolemia due to dehydration      Plan for the Primary Problem(s):  · Admit to telemetry, gentle IV fluids, O2, empiric IV Rocephin & azithromycin  · Await urine cx & bld cx results    Plan for Additional Problems:   · TTE, TSH, cardiology consult, resume BB, IV cardizem bolus PRN  · PT/OT eval, orthostatic vitals    VTE Prophylaxis: Enoxaparin (Lovenox)  / sequential compression device   Code Status: DNR/DNI  POLST: POLST form is on file already (pre-hospital)    Anticipated Length of Stay:  Patient will be admitted on an Inpatient basis with an anticipated length of stay of  > 2 midnights  Justification for Hospital Stay: IV antibiotics, IV fluids      Chief Complaint:   Cough x 2 days    History of Present Illness:    Brad Vinson is a 80 y o  female who presents from [facility] following an accidental fall after she lost her balance while standing attempting to  object from the floor, fell backwards landing on her buttocks & was discovered on the floor next to her bed  She denies any loss of consciousness, head trauma, chest pain, palpitations, near syncope, preceding aura, tongue biting, convulsive activity, bowel/bladder incontinence or postictal confusion surrounding her fall  She reports subjective fever, anorexia, malaise & nausea overnight, preceded by worsening chesty nonproductive cough past 2 days  No vomiting, diarrhea, abd pain, chest pain, headache, neck stiffness, skin rash or acute confusion   She reports dysuria & urinary frequency  While at the ED, she had an episode of apparently asymptomatic SVT up to the 150-160s, per ED physician notes, that resolved following vagal maneuver with resultant NSR + occasional PVCs  She presently feels improved after receiving IV fluids & IV antibiotics at the ED  Review of Systems:    A 10+ point review of systems was obtained & is as above, otherwise negative  Review of Systems    Past Medical and Surgical History:     Past Medical History:   Diagnosis Date    Abdominal distension     Last Assessed: [id number]    Abnormal weight loss     Last Assessed: [id number]    Bursitis of left hip     Lst Assessed: [id number]    Chest pain     Last Assessed: 28Nyq1902    Dysuria     Last Assessed: [id number]    External hemorrhoids     Last Assessed: 85MZS5679    Hypomagnesemia     Last Assessed: [id number]    Lyme disease     Last Assessed: 25Krl5069    Pneumonia of right middle lobe due to infectious organism [facility])     Last Assessed: [id number]       Past Surgical History:   Procedure Laterality Date    CATARACT EXTRACTION      HEMORRHOID SURGERY      HYSTERECTOMY      PELVIC FLOOR REPAIR         Meds/Allergies:    Prior to Admission medications    Medication Sig Start Date End Date Taking?  Authorizing Provider   aspirin (ADULT ASPIRIN EC LOW STRENGTH) 81 mg EC tablet Take 1 tablet by mouth daily 11/30/17  Yes Historical Provider, MD   Calcium 250 MG CAPS Take 1 capsule by mouth 2 (two) times a day 8/11/17  Yes Historical Provider, MD   Cholecalciferol (VITAMIN D-3) 1000 units CAPS Take by mouth   Yes Historical Provider, MD   clonazePAM (KlonoPIN) 0 5 mg tablet Take 1 tablet (0 5 mg total) by mouth 2 (two) times a day for 30 days Take 1/2 tablet in morning and 2 tablet in the evening 3/9/18 4/8/18 Yes Estiven Jules DO   clotrimazole-betamethasone (LOTRISONE) 1-0 05 % lotion Apply topically to affected area on sides breasts BID 3/9/18  Yes Estiven Jules DO digoxin (LANOXIN) 0 125 mg tablet Take 1 tablet by mouth daily 10/24/11  Yes Historical Provider, MD   Melatonin 5 MG TABS Take by mouth 8/11/17  Yes Historical Provider, MD   metoprolol tartrate (LOPRESSOR) 50 mg tablet Take by mouth 2 (two) times a day   11/9/17  Yes Historical Provider, MD   Omega-3 Fatty Acids (OMEGA-3 FISH OIL) 500 MG CAPS Take by mouth 2 (two) times a day     Yes Historical Provider, MD   pantoprazole (PROTONIX) 40 mg tablet Take 1 tablet by mouth daily 8/9/17  Yes Historical Provider, MD   ranitidine (ZANTAC) 150 mg tablet Take by mouth 8/5/16  Yes Historical Provider, MD     I have reviewed home medications with patient personally  Allergies: Allergies   Allergen Reactions    Codeine GI Intolerance    Epinephrine Other (See Comments)     Increased Heart Rate, Palpitations    Iodinated Diagnostic Agents     Magnesium Citrate GI Intolerance       Social History:     Marital Status:     Patient Pre-hospital Living Situation: nursing facility    Substance Use History:   History   Alcohol Use No     History   Smoking Status    Never Smoker   Smokeless Tobacco    Never Used     History   Drug Use No       Family History:    non-contributory    Physical Exam:   General: in no overt resp distress  HEENT: oral mucosa dry, not pale, not jaundiced  Neck: supple, no JVD  Resp: crackles R base, no wheeze  Cardiovascular: S1 S2 audible, regular  GI: soft abdomen, nontender, bowel sounds present  Musculoskeletal: no pedal edema or cyanosis  Skin: no petechiae or suspicious rash  Psych: appropriately oriented in all spheres  Neuro: Awake, alert, essentially nonfocal      Vitals:   Blood Pressure: 142/64 (03/19/18 2045)  Pulse: 78 (03/19/18 2045)  Temperature: 98 7 °F (37 1 °C) (03/19/18 2045)  Temp Source: Temporal (03/19/18 2045)  Respirations: 18 (03/19/18 2045)  Height: 5' 3" (160 cm) (03/19/18 2045)  Weight - Scale: 59 6 kg (131 lb 6 3 oz) (03/19/18 2045)  SpO2: 94 % (03/19/18 2045)      Additional Data:     Lab Results: I have personally reviewed pertinent reports  Results from last 7 days  Lab Units 03/19/18  2219 03/19/18  1652 03/14/18  0931   WBC Thousand/uL  --  8 54 4 30*   HEMOGLOBIN g/dL  --  13 0 13 9   HEMATOCRIT %  --  39 2 42 5   PLATELETS Thousands/uL 234 246 254   NEUTROS PCT %  --   --  53   LYMPHS PCT %  --   --  28   LYMPHO PCT %  --  7*  --    MONOS PCT %  --   --  12   MONO PCT MAN %  --  9  --    EOS PCT %  --   --  6   EOSINO PCT MANUAL %  --  3  --        Results from last 7 days  Lab Units 03/19/18  1652   SODIUM mmol/L 130*   POTASSIUM mmol/L 4 3   CHLORIDE mmol/L 93*   CO2 mmol/L 28   BUN mg/dL 14   CREATININE mg/dL 0 95   CALCIUM mg/dL 9 2   TOTAL PROTEIN g/dL 7 5   BILIRUBIN TOTAL mg/dL 0 80   ALK PHOS U/L 65   ALT U/L 24   AST U/L 23   GLUCOSE RANDOM mg/dL 148*       Results from last 7 days  Lab Units 03/19/18  1652   INR  1 13     Invalid input(s): TROIP    Imaging: I have personally reviewed pertinent reports  No results found  EKG, Pathology, and Other Studies Reviewed on Admission:   · EKG: NSR    [employer] Records Reviewed: Yes     ** Please Note: Dragon 360 Dictation voice to text software may have been used in the creation of this document

## 2018-03-20 NOTE — PROGRESS NOTES
Patient emergently seen & examined: tachydysrhythmia 170s per RN, /110, 24, 98% sats on 2LPM O2  Received Cardizem 10mg IV x1 stat per my order with consequent NSR 80-90s  Tachypnea & tachycardia resolved upon my assessment, feels much improved  Beta blocker PO dose given, IV fluid stopped( ?vol overload ), NTG paste, will cont to monitor  Remains DNI/DNR per her instructions, cont other current mgt

## 2018-03-20 NOTE — OCCUPATIONAL THERAPY NOTE
Occupational Therapy Evaluation     Patient Name: Goldie Chen  STZKM'X Date: 3/20/2018  Problem List  Patient Active Problem List   Diagnosis    Gomez's esophagus    Bilateral carotid artery disease [facility] )    Breast mass, left    Chronic constipation    Colon, diverticulosis    Esophageal reflux    Generalized anxiety disorder    Generalized osteoarthritis of multiple sites    Glucose intolerance (impaired glucose tolerance)    Hypercholesterolemia    Insomnia    Neuralgia    Osteoporosis    Supraventricular tachycardia ([facility] )    Vitamin D deficiency    Hypoxemia    Healthcare-associated pneumonia    Acute febrile illness    Acute cystitis    Hypovolemia due to dehydration     Past Medical History  Past Medical History:   Diagnosis Date    Abdominal distension     Last Assessed: [id number]    Abnormal weight loss     Last Assessed: [id number]    Bursitis of left hip     Lst Assessed: 14VZW0029    Chest pain     Last Assessed: [id number]    Dysuria     Last Assessed: [id number]    External hemorrhoids     Last Assessed: 77GTR3054    Hypomagnesemia     Last Assessed: 01Ket0452    Lyme disease     Last Assessed: 02Lpb7303    Pneumonia of right middle lobe due to infectious organism [facility])     Last Assessed: 83ZSB5747     Past Surgical History  Past Surgical History:   Procedure Laterality Date    CATARACT EXTRACTION      HEMORRHOID SURGERY      HYSTERECTOMY      PELVIC FLOOR REPAIR               03/20/18 0916   Note Type   Note type Eval/Treat   Restrictions/Precautions   Weight Bearing Precautions Per Order No   Other Precautions Droplet precautions; Bed Alarm;Multiple lines;Telemetry;O2;Fall Risk   Pain Assessment   Pain Assessment No/denies pain   Home Living   Type of Home Assisted living   Additional Comments pt resides at [address] [place] [address]   Prior Function   Level of [place] Independent with ADLs and functional mobility; Needs assistance with IADLs   Lives With Facility staff   Receives Help From Personal care attendant   ADL Assistance Independent   IADLs Needs assistance   Falls in the last 6 months 1 to 4   Vocational Retired   Comments pt utilizes no device at baseline    Psychosocial   Psychosocial (WDL) WDL   ADL   Where Assessed Edge of bed   LB Dressing Assistance 5  Supervision/Setup   Additional Comments pt donned slippers and adjusted socks by bending forward at EOB   Bed Mobility   Supine to Sit 5  Supervision   Additional items Bedrails   Sit to Supine 5  Supervision   Additional items Bedrails   Transfers   Sit to Stand 5  Supervision   Stand to Sit 5  Supervision   Functional Mobility   Functional Mobility 5  Supervision   Additional Comments pt performed FM in hallway; pO2 ranged 96-97% throughout session; pt with minimal SOB on 3L O2    Balance   Static Sitting Good   Dynamic Sitting Good   Static Standing Fair +   Dynamic Standing Fair +   Ambulatory Fair +   Activity Tolerance   Activity Tolerance Patient limited by fatigue   RUE Assessment   RUE Assessment WFL   LUE Assessment   LUE Assessment WFL   Hand Function   Gross Motor Coordination Functional   Fine Motor Coordination Functional   Sensation   Light Touch No apparent deficits   Sharp/Dull No apparent deficits   Cognition   Overall Cognitive Status WFL   Arousal/Participation Alert   Attention Within functional limits   Orientation Level Oriented X4   Memory Within functional limits   Following Commands Follows one step commands without difficulty   Assessment   Limitation Decreased ADL status; Decreased UE strength;Decreased high-level ADLs; Decreased self-care trans;Decreased endurance   Assessment pt presents at evaluation performing at overall (S) level with no use of device; pt with minimal SOB throughout session; pt will benefit from continued OT intervention and recommend return to [facility] at d/c    Goals   Short Term Goal  pt will perform LB dressing at (I) level    Long Term Goal #1 pt will perform UE strengthening exercises    Long Term Goal #2 pt will perform UB/LB bathing and grooming tasks at (S) level    Long Term Goal pt will perform FM c no device at (I) level    Plan   Treatment Interventions ADL retraining;Functional transfer training;UE strengthening/ROM; Endurance training;Patient/family training; Activityengagement   Goal Expiration Date 04/03/18   OT Frequency 3-5x/wk   Recommendation   OT Discharge Recommendation (return to assisted living )   OT - OK to Discharge No   Barthel Index   Feeding 10   Bathing 0   Grooming Score 0   Dressing Score 5   Bladder Score 10   Bowels Score 10   Toilet Use Score 5   Transfers (Bed/Chair) Score 10   Mobility (Level Surface) Score 10   Stairs Score 0   Barthel Index Score 60     Pt will benefit from continued OT services in order to maximize (I) c ADL performance, FM c no device, and improve overall endurance/strength required to complete functional tasks in preparation for d/c  Pt left supine in bed at end of session; all needs within reach; all lines intact; scds connected and turned on

## 2018-03-20 NOTE — RESPIRATORY THERAPY NOTE
RT Protocol Note  Asa Buckle 80 y o  female MRN: [id number]  Unit/Bed#: 400-01 Encounter: [id number]    Assessment    Principal Problem:    Hypoxemia  Active Problems:    Supraventricular tachycardia ([address] )    Healthcare-associated pneumonia    Acute febrile illness    Acute cystitis    Hypovolemia due to dehydration      Home Pulmonary Medications:  N/A     Past Medical History:   Diagnosis Date    Abdominal distension     Last Assessed: [id number]    Abnormal weight loss     Last Assessed: [id number]    Bursitis of left hip     Lst Assessed: 11UDM4770    Chest pain     Last Assessed: 56Dud1817    Dysuria     Last Assessed: 86SOS0393    External hemorrhoids     Last Assessed: 29NPO3268    Hypomagnesemia     Last Assessed: 50GYW2045    Lyme disease     Last Assessed: 88Uvr6260    Pneumonia of right middle lobe due to infectious organism [facility])     Last Assessed: 79ZQX9298     Social History     Social History    Marital status:      Spouse name: N/A    Number of children: N/A    Years of education: N/A     Occupational History    Retired      Social History Main Topics    Smoking status: Never Smoker    Smokeless tobacco: Never Used    Alcohol use No    Drug use: No    Sexual activity: Not Asked     Other Topics Concern    None     Social History Narrative    None       Subjective         Objective    Physical Exam:   Assessment Type: Assess only  General Appearance: Alert, Awake  Respiratory Pattern: Normal  Chest Assessment: Chest expansion symmetrical  Bilateral Breath Sounds: Diminished, Expiratory wheezes    Vitals:  Blood pressure 125/59, pulse 90, temperature 99 8 °F (37 7 °C), temperature source Temporal, resp  rate 19, height 5' 3" (1 6 m), weight 59 6 kg (131 lb 6 3 oz), SpO2 97 %  Imaging and other studies: I have personally reviewed pertinent reports              Plan    Respiratory Plan: No distress/Pulmonary history        Resp Comments: (P) pt takes no Resp txs at home and does not wear O2 at home

## 2018-03-20 NOTE — PROGRESS NOTES
[address] Internal Medicine Progress Note  Patient: Brad Vinson 80 y o  female   MRN: [id number]  PCP: Kathleen Velez DO  Unit/Bed#: 400-01 Encounter: [id number]  Date Of Visit: 03/20/18    Assessment:    Principal Problem:    Hypoxemia  Active Problems:    Supraventricular tachycardia ([address] )    Healthcare-associated pneumonia    Acute febrile illness    Acute cystitis    Hypovolemia due to dehydration      Plan:    · Hypoxia  · Unclear etiology-CHF versus pneumonia  · Resolved  · Elevated BNP on admission  2D echo result still pending  · Chest x-ray showed mildly increased interstitial markings without focal consolidation  Patient empirically on IV azithromycin 500 mg daily and IV ceftriaxone 1 g daily  Blood cultures pending   Respiratory pathogen profile pending  · Respiratory protocol initiated on the patient  · Acute cystitis with hematuria  · Urine cultures pending  · Urinalysis looks like a UTI  · Empirically on IV ceftriaxone 1 g daily  · Paroxysmal SVT  · On metoprolol 50 mg p o  b i d  and diltiazem 30 mg p o  B i d   · Cardiology consulted for further input and management  · Encourage PT/OT  · History of fall-secondary to age/loss of balance/UTI      VTE Pharmacologic Prophylaxis:   Pharmacologic: Enoxaparin (Lovenox)  Mechanical VTE Prophylaxis in Place: Yes    Patient Centered Rounds: I have performed bedside rounds with nursing staff today  Discussions with Specialists or Other Care Team Provider:  No    Education and Discussions with Family / Patient:  Patient's son present at bedside    Time Spent for Care: 30 minutes  More than 50% of total time spent on counseling and coordination of care as described above      Current Length of Stay: 1 day(s)    Current Patient Status: Inpatient   Certification Statement: The patient will continue to require additional inpatient hospital stay due to IV antibiotics    Discharge Plan / Estimated Discharge Date:  March 21st, 2018    Code Status: Level 3 - DNAR and DNI      Subjective:   Patient seen examined at bedside  Patient states she is doing much better today  Patient denies fever, chills, nausea, vomiting, diarrhea, abdominal pain, chest pain, palpitation, shortness of breath, headache, dizziness, blurring of vision    Objective:     Vitals:   Temp (24hrs), Av 4 °F (37 4 °C), Min:98 3 °F (36 8 °C), Max:101 1 °F (38 4 °C)    HR:  [] 83  Resp:  [18-28] 20  BP: (125-210)/() 125/59  SpO2:  [90 %-98 %] 98 %  Body mass index is 23 28 kg/m²  Input and Output Summary (last 24 hours): Intake/Output Summary (Last 24 hours) at 18 1030  Last data filed at 18 0843   Gross per 24 hour   Intake          1831 67 ml   Output             1650 ml   Net           181 67 ml       Physical Exam:     Physical Exam   Constitutional: She is oriented to person, place, and time  She appears well-developed and well-nourished  HENT:   Head: Normocephalic and atraumatic  Eyes: Conjunctivae and EOM are normal  Pupils are equal, round, and reactive to light  Neck: Normal range of motion  Neck supple  Cardiovascular: Normal rate, regular rhythm, normal heart sounds and intact distal pulses  Pulmonary/Chest: Effort normal and breath sounds normal    Abdominal: Soft  Bowel sounds are normal    Musculoskeletal: Normal range of motion  Neurological: She is alert and oriented to person, place, and time  She has normal reflexes  Skin: Skin is warm and dry  Vitals reviewed            Additional Data:     Labs:      Results from last 7 days  Lab Units 18  0429  18  1652 18  0931   WBC Thousand/uL 7 05  --  8 54 4 30*   HEMOGLOBIN g/dL 12 3  --  13 0 13 9   HEMATOCRIT % 38 0  --  39 2 42 5   PLATELETS Thousands/uL 241  < > 246 254   NEUTROS PCT %  --   --   --  53   LYMPHS PCT %  --   --   --  28   LYMPHO PCT %  --   --  7*  --    MONOS PCT %  --   --   --  12   MONO PCT MAN %  --   --  9  --    EOS PCT %  --   --   --  6 EOSINO PCT MANUAL %  --   --  3  --    < > = values in this interval not displayed  Results from last 7 days  Lab Units 03/20/18  0429   SODIUM mmol/L 135*   POTASSIUM mmol/L 3 8   CHLORIDE mmol/L 100   CO2 mmol/L 27   BUN mg/dL 10   CREATININE mg/dL 0 89   CALCIUM mg/dL 8 8   TOTAL PROTEIN g/dL 7 2   BILIRUBIN TOTAL mg/dL 0 60   ALK PHOS U/L 63   ALT U/L 22   AST U/L 21   GLUCOSE RANDOM mg/dL 144*       Results from last 7 days  Lab Units 03/20/18  0436   INR  1 19*       * I Have Reviewed All Lab Data Listed Above  * Additional Pertinent Lab Tests Reviewed:  [address] Admission Reviewed    Imaging:    Imaging Reports Reviewed Today Include:  Chest x-ray showed mild interstitial markings with no consolidation  Imaging Personally Reviewed by Myself Includes:      Recent Cultures (last 7 days):           Last 24 Hours Medication List:     Current Facility-Administered Medications:  acetaminophen 650 mg Oral Q6H PRN Suze Davila MD    aspirin 81 mg Oral Daily Suze Davila MD    azithromycin 500 mg Intravenous Q24H [place]    calcium carbonate-vitamin D 1 tablet Oral BID With Meals Suze Davila MD    cefTRIAXone 1,000 mg Intravenous Q24H Suze Davila MD Last Rate: 1,000 mg (03/19/18 2210)   clonazePAM 0 5 mg Oral BID PRN Suze Davila MD    clotrimazole-betamethasone  Topical BID Suze Davila MD    dextromethorphan-guaiFENesin 10 mL Oral Q4H PRN Suze Davila MD    diltiazem 30 mg Oral BID Luis Underwood,     enoxaparin 40 mg Subcutaneous Daily Suze Davila MD    fish oil 1,000 mg Oral BID Suze Davila MD    ipratropium-albuterol 3 mL Nebulization Q4H PRN Suze Davila MD    melatonin 4 5 mg Oral HS PRN Suze Davila MD    metoprolol tartrate 50 mg Oral Q12H [facility] Luis Underwood DO    ondansetron 4 mg Intravenous Q6H PRN Suze Davila MD    pantoprazole 40 mg Oral Early Morning Suze Davila MD    sodium chloride (PF) 3 mL Intravenous PRN Ashley Eden, DO         Today, Patient Was Seen By: Duke Avila MD    ** Please Note: This note has been constructed using a voice recognition system   **

## 2018-03-20 NOTE — PHYSICIAN ADVISOR
Current patient class: Inpatient  The patient is currently on Hospital Day: 2      The patient was admitted to the hospital at [facility] on 3/19/18 for the following diagnosis:  Cough [R05]  Hypoxemia [R09 02]  Back pain [M54 9]  Healthcare-associated pneumonia [J18 9]  Acute febrile illness [R50 9]       There is documentation in the medical record of an expected length of stay of at least 2 midnights  The patient is therefore expected to satisfy the 2 midnight benchmark and given the 2 midnight presumption is appropriate for INPATIENT ADMISSION  Given this expectation of a satisfying stay, CMS instructs us that the patient is most often appropriate for inpatient admission under part A provided medical necessity is documented in the chart  After review of the relevant documentation, labs, vital signs and test results, the patient is appropriate for INPATIENT ADMISSION  Admission to the hospital as an inpatient is a complex decision making process which requires the practitioner to consider the patients presenting complaint, history and physical examination and all relevant testing  With this in mind, in this case, the patient was deemed appropriate for INPATIENT ADMISSION  After review of the documentation and testing available at the time of the admission I concur with this clinical determination of medical necessity  She is admitted with ambulatory dysfunction, hypoxia and paroxysmal SVT  She continues on IV antibiotics for presumed infection  Rationale is as follows: The patient is a 80 yrs old Female who presented to the ED at 3/19/2018  4:41 PM with a chief complaint of Fall (Patient is a patient at [facility] found the patiant on the floor next to her bed  Patient states she was bending over to pick something up when she fell backwards from a standing position, landing on her butt   Patient states she has a cough that started on Saturday )    The patients vitals on arrival were ED Triage Vitals   Temperature Pulse Respirations Blood Pressure SpO2   03/19/18 1645 03/19/18 1645 03/19/18 1645 03/19/18 1645 03/19/18 1645   (!) 101 1 °F (38 4 °C) 92 20 (!) 186/74 90 %      Temp Source Heart Rate Source Patient Position - Orthostatic VS BP Location FiO2 (%)   03/19/18 1645 03/19/18 1645 03/19/18 1645 03/19/18 1645 --   Temporal Monitor Sitting Right arm       Pain Score       03/19/18 1647       No Pain           Past Medical History:   Diagnosis Date    Abdominal distension     Last Assessed: [id number]    Abnormal weight loss     Last Assessed: [id number]    Bursitis of left hip     Lst Assessed: [id number]    Chest pain     Last Assessed: [id number]    Dysuria     Last Assessed: [id number]    External hemorrhoids     Last Assessed: [id number]    Hypomagnesemia     Last Assessed: [id number]    Lyme disease     Last Assessed: [id number]    Pneumonia of right middle lobe due to infectious organism [facility])     Last Assessed: [id number]     Past Surgical History:   Procedure Laterality Date    CATARACT EXTRACTION      HEMORRHOID SURGERY      HYSTERECTOMY      PELVIC FLOOR REPAIR             Consults have been placed to:   IP CONSULT TO CARDIOLOGY    Vitals:    03/20/18 0600 03/20/18 0921 03/20/18 1113 03/20/18 1502   BP:  125/59  152/69   BP Location:  Left arm  Left arm   Pulse:  83 90 83   Resp:  20 19 18   Temp:  99 8 °F (37 7 °C)  100 2 °F (37 9 °C)   TempSrc:  Temporal  Temporal   SpO2:   97% 96%   Weight: 59 6 kg (131 lb 6 3 oz)      Height:           Most recent labs:    Recent Labs      03/19/18   1652   03/20/18   0429  03/20/18   0436   WBC  8 54   --   7 05   --    HGB  13 0   --   12 3   --    HCT  39 2   --   38 0   --    PLT  246   < >  241   --    K  4 3   --   3 8   --    NA  130*   --   135*   --    CALCIUM  9 2   --   8 8   --    BUN  14   --   10   --    CREATININE  0 95   --   0 89   --    INR  1 13   --    --   1 19*   TROPONINI   --    < >  <0 02   --    AST  23   --   21   -- ALT  24   --   22   --    ALKPHOS  65   --   63   --    BILITOT  0 80   --   0 60   --     < > = values in this interval not displayed         Scheduled Meds:  Current Facility-Administered Medications:  acetaminophen 650 mg Oral Q6H PRN Derral Mcburney, MD    aspirin 81 mg Oral Daily Derral Mcburney, MD    azithromycin 500 mg Intravenous Q24H Shannan Cola, DO    calcium carbonate-vitamin D 1 tablet Oral BID With Meals Derral Mcburney, MD    cefTRIAXone 1,000 mg Intravenous Q24H Derral Mcburney, MD Last Rate: 1,000 mg (03/19/18 2210)   clonazePAM 0 5 mg Oral BID PRN Derral Mcburney, MD    clotrimazole-betamethasone  Topical BID Derral Mcburney, MD    dextromethorphan-guaiFENesin 10 mL Oral Q4H PRN Derral Mcburney, MD    diltiazem 30 mg Oral BID Luis Underwood,     enoxaparin 40 mg Subcutaneous Daily Derral Mcburney, MD    fish oil 1,000 mg Oral BID Derral Mcburney, MD    ipratropium-albuterol 3 mL Nebulization Q4H PRN Derral Mcburney, MD    melatonin 4 5 mg Oral HS PRN Derral Mcburney, MD    metoprolol tartrate 50 mg Oral Q12H [address] Luis Underwood,     ondansetron 4 mg Intravenous Q6H PRN Derral Mcburney, MD    pantoprazole 40 mg Oral Early Morning Derral Mcburney, MD    sodium chloride (PF) 3 mL Intravenous PRN Stu Craig, DO      Continuous Infusions:   PRN Meds:   acetaminophen    clonazePAM    dextromethorphan-guaiFENesin    ipratropium-albuterol    melatonin    ondansetron    Insert peripheral IV **AND** sodium chloride (PF)

## 2018-03-20 NOTE — SOCIAL WORK
Spoke with Liu Wellington at [facility] who stated pt was very independent there  Did everything for herself, doesn't use an Ad  Pt's family drives her to appointments  PCP is Bethany Quarles and [facility]  Plans are for pt to return to [facility] on dc  Cm will continue to follow

## 2018-03-20 NOTE — CONSULTS
Consultation - Cardiology   Shanon Fernandez 80 y o  female MRN: [id number]  Unit/Bed#: 400-01 Encounter: [id number]    Consults      Physician Requesting Consult: Jerardo Correa DO  Reason for Consult / Principal Problem:SVT    History of Present Illness   HPI: Shanon Fernandez is a 80y o  year old female who has a history of paroxysmal supraventricular tachycardia, hypertension presented to the hospital following a fall at the assisted living facility  She has been sick over the past few days with some fevers chills and generalized weakness  She did not have lightheadedness or dizziness or pass out  She said she lost her balance and fell backwards  She has had a mild cough which has been nonproductive  She denies any dizziness or syncope  There has been no lower extremity edema, PND, orthopnea  She has a known history of supraventricular tachycardia  She has been on metoprolol and digoxin for this historically  While in the emergency department she had an episode of SVT that broke with vagal maneuvers  She had another brief episode on the floor that broke spontaneously  Review of Systems   Constitution: Positive for chills, fever and malaise/fatigue  HENT: Negative  Eyes: Negative  Cardiovascular: Positive for irregular heartbeat and palpitations  Respiratory: Positive for cough  Endocrine: Negative  Hematologic/Lymphatic: Negative  Skin: Negative  Musculoskeletal: Negative  Gastrointestinal: Negative  Genitourinary: Negative  Neurological: Negative  Psychiatric/Behavioral: Negative        Historical Information   Past Medical History:   Diagnosis Date    Abdominal distension     Last Assessed: [id number]    Abnormal weight loss     Last Assessed: [id number]    Bursitis of left hip     Lst Assessed: [id number]    Chest pain     Last Assessed: [id number]    Dysuria     Last Assessed: [id number]    External hemorrhoids     Last Assessed: [id number]   Ophelia Cedeno Hypomagnesemia     Last Assessed: [id number]    Lyme disease     Last Assessed: [id number]    Pneumonia of right middle lobe due to infectious organism [facility])     Last Assessed: [id number]     Past Surgical History:   Procedure Laterality Date    CATARACT EXTRACTION      HEMORRHOID SURGERY      HYSTERECTOMY      PELVIC FLOOR REPAIR       History   Alcohol Use No     History   Drug Use No     History   Smoking Status    Never Smoker   Smokeless Tobacco    Never Used     Family History: non-contributory    Meds/Allergies   all current active meds have been reviewed       Allergies   Allergen Reactions    Codeine GI Intolerance    Epinephrine Other (See Comments)     Increased Heart Rate, Palpitations    Iodinated Diagnostic Agents     Magnesium Citrate GI Intolerance       Objective   Vitals: Blood pressure 159/69, pulse 85, temperature 98 3 °F (36 8 °C), temperature source Temporal, resp  rate 20, height 5' 3" (1 6 m), weight 59 6 kg (131 lb 6 3 oz), SpO2 98 %  , Body mass index is 23 28 kg/m² , Orthostatic Blood Pressures    Flowsheet Row Most Recent Value   Blood Pressure  159/69 filed at 03/20/2018 0427   Patient Position - Orthostatic VS  Lying filed at 03/20/2018 9711        Systolic (62ROF), KGZ:666 , Min:142 , RWY:869     Diastolic (94YFR), OBU:13, Min:64, Max:110      Intake/Output Summary (Last 24 hours) at 03/20/18 0848  Last data filed at 03/20/18 0843   Gross per 24 hour   Intake          1831 67 ml   Output             1650 ml   Net           181 67 ml       Weight (last 2 days)     Date/Time   Weight    03/20/18 0600  59 6 (131 39)    03/19/18 2045  59 6 (131 39)    03/19/18 1645  60 4 (133 2)              Invasive Devices     Peripheral Intravenous Line            Peripheral IV 03/19/18 Right;Ventral (anterior) Antecubital less than 1 day                  Physical Exam   Constitutional: She is oriented to person, place, and time  She appears well-nourished  No distress     HENT:   Head: Atraumatic  Eyes: Conjunctivae are normal  Pupils are equal, round, and reactive to light  Neck: Neck supple  Cardiovascular: Normal rate and regular rhythm  Exam reveals no friction rub  Murmur heard  Pulmonary/Chest: Effort normal  No respiratory distress  She has decreased breath sounds  She has no wheezes  She has no rhonchi  She has no rales  Abdominal: Bowel sounds are normal  She exhibits no distension  There is no tenderness  There is no rebound  Musculoskeletal: She exhibits no edema or deformity  Neurological: She is alert and oriented to person, place, and time  No cranial nerve deficit  Skin: Skin is warm and dry  No erythema  Nursing note and vitals reviewed            Laboratory Results:    Results from last 7 days  Lab Units 03/20/18 0429 03/19/18 2219 03/19/18  1803   TROPONIN I ng/mL <0 02 <0 02 <0 02       CBC with diff:   Results from last 7 days  Lab Units 03/20/18 0429 03/19/18 2219 03/19/18  1652 03/14/18  0931   WBC Thousand/uL 7 05  --  8 54 4 30*   HEMOGLOBIN g/dL 12 3  --  13 0 13 9   HEMATOCRIT % 38 0  --  39 2 42 5   MCV fL 93  --  92 92   PLATELETS Thousands/uL 241 234 246 254   MCH pg 29 9  --  30 4 30 2   MCHC g/dL 32 4  --  33 2 32 7   RDW % 13 1  --  13 0 13 1   MPV fL 10 3 9 8 9 7 10 1   NRBC AUTO /100 WBCs  --   --   --  0         CMP:  Results from last 7 days  Lab Units 03/20/18 0429 03/19/18  1652 03/14/18  0931   SODIUM mmol/L 135* 130* 139   POTASSIUM mmol/L 3 8 4 3 4 4   CHLORIDE mmol/L 100 93* 104   CO2 mmol/L 27 28 31   ANION GAP mmol/L 8 9 4   BUN mg/dL 10 14 15   CREATININE mg/dL 0 89 0 95 0 92   GLUCOSE RANDOM mg/dL 144* 148*  --    CALCIUM mg/dL 8 8 9 2 9 0   AST U/L 21 23 15   ALT U/L 22 24 22   ALK PHOS U/L 63 65 70   TOTAL PROTEIN g/dL 7 2 7 5 7 8   BILIRUBIN TOTAL mg/dL 0 60 0 80 0 54   EGFR ml/min/1 73sq m 59 54 57         BMP:  Results from last 7 days  Lab Units 03/20/18 0429 03/19/18  1652  03/14/18  0931   SODIUM mmol/L 135* 130*  -- 139   POTASSIUM mmol/L 3 8 4 3  --  4 4   CHLORIDE mmol/L 100 93*  --  104   CO2 mmol/L 27 28  --  31   BUN mg/dL 10 14  --  15   CREATININE mg/dL 0 89 0 95  --  0 92   GLUCOSE RANDOM mg/dL 144* 148*  < >  --    CALCIUM mg/dL 8 8 9 2  --  9 0   < > = values in this interval not displayed  BNP:  No results for input(s): BNP in the last 72 hours  Magnesium:   Results from last 7 days  Lab Units 03/20/18  0429 03/19/18  1652   MAGNESIUM mg/dL 1 9 1 9       Coags:   Results from last 7 days  Lab Units 03/20/18  0436 03/19/18  1652   PTT seconds  --  31   INR  1 19* 1 13       TSH:       Hemoglobin A1C       Lipid Profile:     Results from last 7 days  Lab Units 03/14/18  0931   CHOLESTEROL mg/dL 308*   TRIGLYCERIDES mg/dL 406*   HDL mg/dL 37*       Cardiac testing:   No results found for this or any previous visit  No results found for this or any previous visit  No results found for this or any previous visit  No results found for this or any previous visit  Imaging: I have personally reviewed pertinent reports  X-ray Chest 2 Views    Result Date: 3/20/2018  Narrative: CHEST INDICATION:   cough/hypoxemia with upper respiratory symptoms for 2 days  COMPARISON:  July 22, 2017  EXAM PERFORMED/VIEWS:  XR CHEST PA & LATERAL FINDINGS: The cardiac silhouette is stable in size  Redemonstration of aortic arch calcification  There are increased interstitial markings bilaterally  No pneumothorax or pleural effusion  Osseous structures appear within normal limits for patient age  Impression: Mildly increased interstitial markings without focal consolidation  Workstation performed: [id number]     Xr Pelvis Ap Only 1 Or 2 Vw    Result Date: 3/20/2018  Narrative: PELVIS INDICATION:  Cough, fall  COMPARISON:  None VIEWS: AP IMAGES:  1 FINDINGS:Evaluation of the sacrum and iliac wings is suboptimal due to overlying bowel gas  No definite fracture or pathologic bone lesions    Degenerative spurring is noted at the bilateral hips  No definite lytic or blastic lesions are seen  Regional soft tissues are unremarkable  Visualized lumbar spine is unremarkable  Impression: Limited exam due to overlying bowel gas  No definite fracture  Workstation performed: [id number]       EKG reviewed personally:  Normal sinus rhythm with PVCs in a pattern of bigeminy  Telemetry reviewed personally:  Brief SVT noted spontaneously terminated    Assessment:  Principal Problem:    Hypoxemia  Active Problems:    Supraventricular tachycardia (HCC)    Healthcare-associated pneumonia    Acute febrile illness    Acute cystitis    Hypovolemia due to dehydration      Assesment/ Plan:  1  Paroxysmal supraventricular tachycardia  2  Acute viral syndrome  3  Hypovolemia due to dehydration  4  Possible pneumonia  5  Hypertension    Recommendations: The patient has a longstanding history of known paroxysmal supraventricular tachycardia  She has been talked to about ablation in the past but has not been willing to undergo that yet  At this time discontinue digoxin change to oral Cardizem 30 mg p o  b i d  continue metoprolol at home dose 50 mg p o  b i d  agree with IV fluids  Keep potassium above 4 magnesium above 2  Check echocardiogram today  Will titrate Cardizem to help prevent supraventricular tachycardia    If she has recurrence she may consider ablation but she would like to talk to her outpatient cardiologist       Code Status: Level 3 - DNAR and DNI

## 2018-03-20 NOTE — PROGRESS NOTES
Pt's telemetry monitor alarming that pt's HR was in the 170s  Pt sustained this HR for approximately 3-5 minutes  Pts /110 manually, tachypneic at 28, O2 at 98% on 2L  Pt stating that she "feels lousy"  Dr Esteban Dey made aware  New orders obtained  0145 - After dose of IV Cardizem, pts HR 86, /70 manually, RR 24  Pt stating that she is feeling better  Dr Esteban Dey in to see patient  46 - New orders for IV fluids to be D/C'd and Nitro paste to be applied obtained  Pts /42 manually, HR 75, RR 20 prior to paste being applied  Will continue to monitor

## 2018-03-21 PROBLEM — J20.9 ACUTE TRACHEOBRONCHITIS: Status: ACTIVE | Noted: 2018-03-21

## 2018-03-21 LAB
ADENOVIRUS: NOT DETECTED
C PNEUM DNA SPEC QL NAA+PROBE: NOT DETECTED
FLUAV H1 RNA SPEC QL NAA+PROBE: NOT DETECTED
FLUAV H3 RNA SPEC QL NAA+PROBE: NOT DETECTED
FLUAV RNA SPEC QL NAA+PROBE: NOT DETECTED
FLUBV RNA SPEC QL NAA+PROBE: NOT DETECTED
HBOV DNA SPEC QL NAA+PROBE: NOT DETECTED
HCOV 229E RNA SPEC QL NAA+PROBE: NOT DETECTED
HCOV HKU1 RNA SPEC QL NAA+PROBE: NOT DETECTED
HCOV NL63 RNA SPEC QL NAA+PROBE: NOT DETECTED
HCOV OC43 RNA SPEC QL NAA+PROBE: NOT DETECTED
HPIV1 RNA SPEC QL NAA+PROBE: NOT DETECTED
HPIV2 RNA SPEC QL NAA+PROBE: NOT DETECTED
HPIV3 RNA SPEC QL NAA+PROBE: NOT DETECTED
HPIV4 RNA SPEC QL NAA+PROBE: NOT DETECTED
M PNEUMO DNA SPEC QL NAA+PROBE: NOT DETECTED
METAPNEUMOVIRUS: NOT DETECTED
RHINOVIRUS RNA SPEC QL NAA+PROBE: NOT DETECTED
RSV A RNA SPEC QL NAA+PROBE: NOT DETECTED
RSV B RNA SPEC QL NAA+PROBE: NOT DETECTED

## 2018-03-21 PROCEDURE — 97535 SELF CARE MNGMENT TRAINING: CPT

## 2018-03-21 PROCEDURE — 99233 SBSQ HOSP IP/OBS HIGH 50: CPT | Performed by: INTERNAL MEDICINE

## 2018-03-21 PROCEDURE — 97530 THERAPEUTIC ACTIVITIES: CPT

## 2018-03-21 PROCEDURE — 97116 GAIT TRAINING THERAPY: CPT

## 2018-03-21 PROCEDURE — 99232 SBSQ HOSP IP/OBS MODERATE 35: CPT | Performed by: INTERNAL MEDICINE

## 2018-03-21 RX ADMIN — METOPROLOL TARTRATE 50 MG: 50 TABLET ORAL at 21:16

## 2018-03-21 RX ADMIN — DILTIAZEM HYDROCHLORIDE 30 MG: 30 TABLET, FILM COATED ORAL at 14:48

## 2018-03-21 RX ADMIN — PANTOPRAZOLE SODIUM 40 MG: 40 TABLET, DELAYED RELEASE ORAL at 05:19

## 2018-03-21 RX ADMIN — CALCIUM CARBONATE-VITAMIN D TAB 500 MG-200 UNIT 1 TABLET: 500-200 TAB at 07:52

## 2018-03-21 RX ADMIN — ACETAMINOPHEN 650 MG: 325 TABLET, FILM COATED ORAL at 18:15

## 2018-03-21 RX ADMIN — CALCIUM CARBONATE-VITAMIN D TAB 500 MG-200 UNIT 1 TABLET: 500-200 TAB at 16:11

## 2018-03-21 RX ADMIN — ASPIRIN 81 MG: 81 TABLET, COATED ORAL at 08:04

## 2018-03-21 RX ADMIN — METOPROLOL TARTRATE 50 MG: 50 TABLET ORAL at 08:04

## 2018-03-21 RX ADMIN — Medication 1000 MG: at 18:04

## 2018-03-21 RX ADMIN — GUAIFENESIN AND DEXTROMETHORPHAN 10 ML: 100; 10 SYRUP ORAL at 11:48

## 2018-03-21 RX ADMIN — AZITHROMYCIN MONOHYDRATE 500 MG: 500 INJECTION, POWDER, LYOPHILIZED, FOR SOLUTION INTRAVENOUS at 18:04

## 2018-03-21 RX ADMIN — ACETAMINOPHEN 650 MG: 325 TABLET, FILM COATED ORAL at 06:43

## 2018-03-21 RX ADMIN — ENOXAPARIN SODIUM 40 MG: 40 INJECTION SUBCUTANEOUS at 08:04

## 2018-03-21 RX ADMIN — CEFTRIAXONE 1000 MG: 1 INJECTION, SOLUTION INTRAVENOUS at 21:16

## 2018-03-21 RX ADMIN — Medication 1000 MG: at 08:05

## 2018-03-21 RX ADMIN — DILTIAZEM HYDROCHLORIDE 30 MG: 30 TABLET, FILM COATED ORAL at 08:05

## 2018-03-21 RX ADMIN — DILTIAZEM HYDROCHLORIDE 30 MG: 30 TABLET, FILM COATED ORAL at 21:16

## 2018-03-21 NOTE — PHYSICAL THERAPY NOTE
PT treatment Note      03/21/18 0182   Restrictions/Precautions   Other Precautions (Droplet precautions;O2;Telemetry; Chair Alarm)   Bed Mobility   Additional Comments OOB in chair at start and end of PT session   Transfers   Sit to Stand 5  Supervision   Additional items Armrests; Verbal cues   Stand to Sit 5  Supervision   Additional items Armrests   Stand pivot 5  Supervision   Ambulation/Elevation   Gait pattern (Decreased foot clearance;Shuffling; Short stride)   Gait Assistance (CGA)   Assistive Device None   Distance 200'  SpO2 94-99%, HR 90-99 with 2 L O2   Balance   Static Sitting Good   Dynamic Sitting Good   Static Standing Fair +   Dynamic Standing Fair   Ambulatory Fair   Endurance Deficit   Endurance Deficit Yes   Activity Tolerance   Activity Tolerance Patient limited by fatigue   Assessment   Prognosis Good   Problem List Decreased strength;Decreased endurance; Impaired balance;Decreased mobility   Assessment performs functional mobility at [facility]) level of function  Cues for safety  Pt with altered gait pattern due to fatigue leading to increased fall risk  Pt would benefit from continued activity in PT to improve impairments and functional deficits  Plan   Treatment/Interventions Functional transfer training;LE strengthening/ROM; Therapeutic exercise; Endurance training;Bed mobility;Gait training   Progress Progressing toward goals   Recommendation   Recommendation (Home PT (return to assisted living facility))   Pt  OOB with call bell within reach, scd's connected and turned on and alarm on at end of PT session

## 2018-03-21 NOTE — OCCUPATIONAL THERAPY NOTE
OT Note     03/21/18 1747   Restrictions/Precautions   Other Precautions Droplet precautions;O2;Telemetry; Chair Alarm   ADL   Where Assessed Chair   Grooming Assistance 5  Supervision/Setup   UB Bathing Assistance 5  Supervision/Setup   UB Bathing Comments A with back   LB Bathing Assistance 5  Supervision/Setup   LB Bathing Comments S for standing balance   UB Dressing Assistance 4  Minimal Assistance   UB Dressing Comments A for fasteners   LB Dressing Assistance 5  Supervision/Setup   LB Dressing Comments Doffs pull up in stance, dons in seated, S for standing balance   Bed Mobility   Supine to Sit 5  Supervision   Additional items Bedrails   Transfers   Sit to Stand 5  Supervision   Additional items Bedrails   Stand to Sit 5  Supervision   Additional items Armrests   Functional Mobility   Functional Mobility 5  Supervision   Additional Comments Completes txfr EOB to recliner at bedside   Activity Tolerance   Activity Tolerance Patient tolerated treatment well   Assessment   Assessment Presents supine, agreeable to participate  Txfred EOB to recliner at bedside  Set up all items a m  sellf care  Refer above for details  Remains OOB in recliner on completion  Chair alarm applied and turned on  Chair alarm activated  Call bell and phone in reach     Recommendation   Recommendation (Continue OT services )

## 2018-03-21 NOTE — PROGRESS NOTES
[facility] Progress Note  Patient: Shanon Fernandez 80 y o  female   MRN: [id number]  PCP: Patience Castorena DO  Unit/Bed#: 400-01 Encounter: [id number]  Date Of Visit: 03/21/18    Assessment:    Principal Problem:    Hypoxemia  Active Problems:    Supraventricular tachycardia ([address] )    Healthcare-associated pneumonia    Acute febrile illness    Acute cystitis    Hypovolemia due to dehydration    Acute tracheobronchitis      Plan:    · Hypoxia  ? Unclear etiology-CHF versus pneumonia versus acute tracheobronchitis  ? Resolved  ? Elevated BNP on admission  2D echo result showed ejection fraction 60% with no regional wall motion abnormality  ? Chest x-ray showed mildly increased interstitial markings without focal consolidation  Patient empirically on IV azithromycin 500 mg daily and IV ceftriaxone 1 g daily  Blood cultures negative   Respiratory pathogen profile negative  ? Respiratory protocol initiated on the patient  · Acute cystitis with hematuria  ? Urine cultures pending  ? Urinalysis looks like a UTI  ? Empirically on IV ceftriaxone 1 g daily  · Paroxysmal SVT  ? On metoprolol 50 mg p o  b i d  and diltiazem 30 mg p o  B i d   ? Cardiology following the patient  · Encourage PT/OT  · History of fall-secondary to age/loss of balance/UTI      VTE Pharmacologic Prophylaxis:   Pharmacologic: Enoxaparin (Lovenox)  Mechanical VTE Prophylaxis in Place: Yes    Patient Centered Rounds: I have performed bedside rounds with nursing staff today  Discussions with Specialists or Other Care Team Provider:  Cardiology    Education and Discussions with Family / Patient:  No family members present at bedside    Time Spent for Care: 30 minutes  More than 50% of total time spent on counseling and coordination of care as described above      Current Length of Stay: 2 day(s)        Current Patient Status: Inpatient   Certification Statement: The patient will continue to require additional inpatient hospital stay due to IV antibiotics    Discharge Plan / Estimated Discharge Date:  2018    Code Status: Level 3 - DNAR and DNI      Subjective:   Patient seen examined at bedside  Patient states she is doing much better today but still complains of cough with no sputum production  Patient denies fever, chills, nausea, vomiting, diarrhea, abdominal pain, chest pain, palpitations, headache, dizziness, blurring of vision    Objective:     Vitals:   Temp (24hrs), Av °F (37 8 °C), Min:98 4 °F (36 9 °C), Max:101 3 °F (38 5 °C)    HR:  [] 101  Resp:  [18-22] 20  BP: (133-162)/(62-70) 149/66  SpO2:  [95 %-98 %] 95 %  Body mass index is 23 28 kg/m²  Input and Output Summary (last 24 hours): Intake/Output Summary (Last 24 hours) at 18 0958  Last data filed at 18 0839   Gross per 24 hour   Intake              460 ml   Output             1450 ml   Net             -990 ml       Physical Exam:     Physical Exam   Constitutional: She is oriented to person, place, and time  She appears well-developed and well-nourished  HENT:   Head: Normocephalic and atraumatic  Eyes: Conjunctivae and EOM are normal  Pupils are equal, round, and reactive to light  Neck: Normal range of motion  Neck supple  Cardiovascular: Normal rate, regular rhythm, normal heart sounds and intact distal pulses  Pulmonary/Chest: Effort normal and breath sounds normal    Abdominal: Soft  Bowel sounds are normal    Musculoskeletal: Normal range of motion  Neurological: She is alert and oriented to person, place, and time  She has normal reflexes  Skin: Skin is warm and dry  Vitals reviewed          Additional Data:     Labs:      Results from last 7 days  Lab Units 18  0429  18  1652   WBC Thousand/uL 7 05  --  8 54   HEMOGLOBIN g/dL 12 3  --  13 0   HEMATOCRIT % 38 0  --  39 2   PLATELETS Thousands/uL 241  < > 246   LYMPHO PCT %  --   --  7*   MONO PCT MAN %  --   --  9   EOSINO PCT MANUAL %  --   --  3 < > = values in this interval not displayed  Results from last 7 days  Lab Units 03/20/18  0429   SODIUM mmol/L 135*   POTASSIUM mmol/L 3 8   CHLORIDE mmol/L 100   CO2 mmol/L 27   BUN mg/dL 10   CREATININE mg/dL 0 89   CALCIUM mg/dL 8 8   TOTAL PROTEIN g/dL 7 2   BILIRUBIN TOTAL mg/dL 0 60   ALK PHOS U/L 63   ALT U/L 22   AST U/L 21   GLUCOSE RANDOM mg/dL 144*       Results from last 7 days  Lab Units 03/20/18  0436   INR  1 19*       * I Have Reviewed All Lab Data Listed Above  * Additional Pertinent Lab Tests Reviewed: [facility] Admission Reviewed    Imaging:    Imaging Reports Reviewed Today Include:  No imaging done today  Imaging Personally Reviewed by Myself Includes:      Recent Cultures (last 7 days):       Results from last 7 days  Lab Units 03/19/18  1803 03/19/18  1652   BLOOD CULTURE  No Growth at 24 hrs  No Growth at 24 hrs         Last 24 Hours Medication List:     Current Facility-Administered Medications:  acetaminophen 650 mg Oral Q6H PRN Yury Sandoval MD    aspirin 81 mg Oral Daily Yury Sandoval MD    azithromycin 500 mg Intravenous Q24H Kashif Herman DO Last Rate: Stopped (03/20/18 1845)   calcium carbonate-vitamin D 1 tablet Oral BID With Meals Yury Sandoval MD    cefTRIAXone 1,000 mg Intravenous Q24H Yury Sandoval MD Last Rate: 1,000 mg (03/20/18 2113)   clonazePAM 0 5 mg Oral BID PRN Yury Sandoval MD    clotrimazole-betamethasone  Topical BID Yury Sandoval MD    dextromethorphan-guaiFENesin 10 mL Oral Q4H PRN Yury Sandoval MD    diltiazem 30 mg Oral [facility] Luis Underwood DO    enoxaparin 40 mg Subcutaneous Daily Yury Sandoval MD    fish oil 1,000 mg Oral BID Yury Sandoval MD    ipratropium-albuterol 3 mL Nebulization Q4H PRN Yury Sandoval MD    melatonin 4 5 mg Oral HS PRN Yury Sandoval MD    metoprolol tartrate 50 mg Oral Q12H [address] Luis Underwood DO    ondansetron 4 mg Intravenous Q6H PRN Yury Sandoval MD pantoprazole 40 mg Oral Early Morning Kevin Simpson MD    sodium chloride (PF) 3 mL Intravenous PRN Marielos Bose DO         Today, Patient Was Seen By: Prem Andrea MD    ** Please Note: This note has been constructed using a voice recognition system   **

## 2018-03-21 NOTE — PROGRESS NOTES
Patient was noted to have episode of atrial flutter  Dr Jose Encarnacion notified, no new orders and will continue to monitor

## 2018-03-21 NOTE — PROGRESS NOTES
Cardiology Progress Note - Jeffry Fink 80 y o  female MRN: [id number]    Unit/Bed#: 400-01 Encounter: [id number]     Assesment/ Plan:  1  Paroxysmal supraventricular tachycardia  2  Acute viral syndrome  3  Hypovolemia due to dehydration  4  Possible pneumonia  5  Hypertension     Recommendations: The patient has a longstanding history of known paroxysmal supraventricular tachycardia  She has been talked to about ablation in the past but has not been willing to undergo that yet  increase Cardizem to 30 mg q 8 continue metoprolol at home dose 50 mg p o  b i d  agree with IV fluids  Keep potassium above 4 magnesium above 2    echocardiogram was reviewed no signs of elevated filling pressure  Will titrate Cardizem to help prevent supraventricular tachycardia  If she has recurrence she may consider ablation but she would like to talk to her outpatient cardiologist     Subjective:    No significant events overnight  Shortness of breath has improved  Brief SVT overnight    ROS    Objective:   Vitals: Blood pressure 149/66, pulse 101, temperature 100 2 °F (37 9 °C), temperature source Temporal, resp  rate 20, height 5' 3" (1 6 m), weight 59 6 kg (131 lb 6 3 oz), SpO2 95 %  , Body mass index is 23 28 kg/m² , Orthostatic Blood Pressures    Flowsheet Row Most Recent Value   Blood Pressure  149/66 filed at 03/21/2018 3173   Patient Position - Orthostatic VS  Standing - Orthostatic VS filed at 03/20/2018 1530         Systolic (09YQM), THR:559 , Min:125 , VHK:064     Diastolic (05XPN), MCX:36, Min:59, Max:70      Intake/Output Summary (Last 24 hours) at 03/21/18 0914  Last data filed at 03/21/18 0839   Gross per 24 hour   Intake              460 ml   Output             1450 ml   Net             -990 ml     Weight (last 2 days)     Date/Time   Weight    03/20/18 0600  59 6 (131 39)    03/19/18 2045  59 6 (131 39)    03/19/18 1645  60 4 (133 2)                Telemetry Review: No significant arrhythmias seen on telemetry review  EKG personally reviewed by Ruben Howard DO  Physical Exam   Constitutional: She is oriented to person, place, and time  She appears well-nourished  No distress  HENT:   Head: Atraumatic  Eyes: Conjunctivae are normal  Pupils are equal, round, and reactive to light  Neck: Neck supple  Cardiovascular: Normal rate, regular rhythm and S1 normal   Exam reveals no friction rub  Murmur heard  Systolic murmur is present with a grade of 1/6   Pulmonary/Chest: Effort normal  No respiratory distress  She has decreased breath sounds  She has no wheezes  She has rhonchi  She has no rales  Abdominal: Bowel sounds are normal  She exhibits no distension  There is no tenderness  There is no rebound  Musculoskeletal: She exhibits no edema or deformity  Neurological: She is alert and oriented to person, place, and time  No cranial nerve deficit  Skin: Skin is warm and dry  No erythema  Nursing note and vitals reviewed          Laboratory Results:    Results from last 7 days  Lab Units 03/20/18 0429 03/19/18 2219 03/19/18  1803   TROPONIN I ng/mL <0 02 <0 02 <0 02       CBC with diff:   Results from last 7 days  Lab Units 03/20/18 0429 03/19/18 2219 03/19/18 1652 03/14/18  0931   WBC Thousand/uL 7 05  --  8 54 4 30*   HEMOGLOBIN g/dL 12 3  --  13 0 13 9   HEMATOCRIT % 38 0  --  39 2 42 5   MCV fL 93  --  92 92   PLATELETS Thousands/uL 241 234 246 254   MCH pg 29 9  --  30 4 30 2   MCHC g/dL 32 4  --  33 2 32 7   RDW % 13 1  --  13 0 13 1   MPV fL 10 3 9 8 9 7 10 1   NRBC AUTO /100 WBCs  --   --   --  0         CMP:  Results from last 7 days  Lab Units 03/20/18 0429 03/19/18  1652 03/14/18  0931   SODIUM mmol/L 135* 130* 139   POTASSIUM mmol/L 3 8 4 3 4 4   CHLORIDE mmol/L 100 93* 104   CO2 mmol/L 27 28 31   ANION GAP mmol/L 8 9 4   BUN mg/dL 10 14 15   CREATININE mg/dL 0 89 0 95 0 92   GLUCOSE RANDOM mg/dL 144* 148*  --    CALCIUM mg/dL 8 8 9 2 9 0   AST U/L 21 23 15   ALT U/L 22 24 22   ALK PHOS U/L 63 65 70   TOTAL PROTEIN g/dL 7 2 7 5 7 8   BILIRUBIN TOTAL mg/dL 0 60 0 80 0 54   EGFR ml/min/1 73sq m 59 54 57         BMP:  Results from last 7 days  Lab Units 18  0429 18  1652  18  0931   SODIUM mmol/L 135* 130*  --  139   POTASSIUM mmol/L 3 8 4 3  --  4 4   CHLORIDE mmol/L 100 93*  --  104   CO2 mmol/L 27 28  --  31   BUN mg/dL 10 14  --  15   CREATININE mg/dL 0 89 0 95  --  0 92   GLUCOSE RANDOM mg/dL 144* 148*  < >  --    CALCIUM mg/dL 8 8 9 2  --  9 0   < > = values in this interval not displayed  BNP: No results for input(s): BNP in the last 72 hours      Magnesium:   Results from last 7 days  Lab Units 18  0429 18  1652   MAGNESIUM mg/dL 1 9 1 9       Coags:   Results from last 7 days  Lab Units 18  0436 18  1652   PTT seconds  --  31   INR  1 19* 1 13       TSH:        Hemoglobin A1C       Lipid Profile:   Results from last 7 days  Lab Units 18  0931   CHOLESTEROL mg/dL 308*   TRIGLYCERIDES mg/dL 406*   HDL mg/dL 37*       Cardiac testing:   Results for orders placed during the hospital encounter of 18   Echo complete with contrast if indicated    Narrative  [address], [place] [address]  [phone]    Transthoracic Echocardiogram  2D, M-mode, Doppler, and Color Doppler    Study date:  20-Mar-2018    Patient: [place] Hernández  MR number: [id number]  Account number: [de-identified]  : 02-Sep-1931  Age: 80 years  Gender: Female  Status: Inpatient  Location: Bedside  Height: 63 in  Weight: 131 lb  BP:    Indications: Fall    Diagnoses: R55  - Syncope and collapse    Sonographer:  LYNN Fontana  Primary Physician:  Laura Chappell DO  Referring Physician:  Hilda Mata DO  Group:  [facility]  Interpreting Physician:  Markie Curiel DO    SUMMARY    LEFT VENTRICLE:  Systolic function was normal  Ejection fraction was estimated in the range of 60 % to 65 %  Although no diagnostic regional wall motion abnormality was identified, this possibility cannot be completely excluded on the basis of this study  Wall thickness was mildly increased  Doppler parameters were consistent with abnormal left ventricular relaxation (grade 1 diastolic dysfunction)  MITRAL VALVE:  There was mild to moderate annular calcification  There was trace regurgitation  AORTIC VALVE:  There was mild stenosis  Estimated aortic valve area (by VTI) was 1 79 cm squared  TRICUSPID VALVE:  There was mild regurgitation  PERICARDIUM:  A small pericardial effusion was identified  There was no evidence of hemodynamic compromise  HISTORY: PRIOR HISTORY: Patient has no history of cardiovascular disease  PROCEDURE: The procedure was performed at the bedside  This was a routine study  The transthoracic approach was used  The study included complete 2D imaging, M-mode, complete spectral Doppler, and color Doppler  Image quality was adequate  LEFT VENTRICLE: Size was normal  Systolic function was normal  Ejection fraction was estimated in the range of 60 % to 65 %  Although no diagnostic regional wall motion abnormality was identified, this possibility cannot be completely  excluded on the basis of this study  Wall thickness was mildly increased  DOPPLER: Doppler parameters were consistent with abnormal left ventricular relaxation (grade 1 diastolic dysfunction)  RIGHT VENTRICLE: The size was normal  Systolic function was normal  Wall thickness was normal     LEFT ATRIUM: Size was normal     RIGHT ATRIUM: Size was normal     MITRAL VALVE: There was mild to moderate annular calcification  Valve structure was normal  There was normal leaflet separation  DOPPLER: The transmitral velocity was within the normal range  There was no evidence for stenosis  There was  trace regurgitation  AORTIC VALVE: The valve was trileaflet   Leaflets exhibited normal thickness and normal cuspal separation  DOPPLER: Transaortic velocity was within the normal range  There was mild stenosis  There was no regurgitation  TRICUSPID VALVE: The valve structure was normal  There was normal leaflet separation  DOPPLER: The transtricuspid velocity was within the normal range  There was no evidence for stenosis  There was mild regurgitation  PULMONIC VALVE: Leaflets exhibited normal thickness, no calcification, and normal cuspal separation  DOPPLER: The transpulmonic velocity was within the normal range  There was no regurgitation  PERICARDIUM: A small pericardial effusion was identified  There was no evidence of hemodynamic compromise  AORTA: The root exhibited normal size  SYSTEMIC VEINS: IVC: The inferior vena cava was not well visualized  MEASUREMENT TABLES    2D MEASUREMENTS  LVOT   (Reference normals)  Diam   19 mm   (--)    DOPPLER MEASUREMENTS  LVOT   (Reference normals)  Peak daly   119 cm/s   (--)  VTI   25 cm   (--)  Stroke vol   70 88 ml   (--)  Aortic valve   (Reference normals)  Peak daly   218 cm/s   (--)  VTI   40 cm   (--)  Obstr index, VTI   0 63    (--)  Valve area, VTI   1 79 cm squared   (--)  Obstr index, Vmax   0 55    (--)  Valve area, Vmax   1 56 cm squared   (--)    SYSTEM MEASUREMENT TABLES    2D  %FS: 31 19 %  Ao Diam: 2 46 cm  EDV(Teich): 79 74 ml  EF(Teich): 59 33 %  ESV(Teich): 32 43 ml  IVSd: 0 76 cm  LA Area: 11 14 cm2  LA Diam: 2 86 cm  LVEDV MOD A4C: 41 24 ml  LVEF MOD A4C: 55 22 %  LVESV MOD A4C: 18 47 ml  LVIDd: 4 23 cm  LVIDs: 2 91 cm  LVLd A4C: 6 8 cm  LVLs A4C: 5 92 cm  LVOT Diam: 1 87 cm  LVPWd: 0 84 cm  RA Area: 10 49 cm2  RV DIAM: 2 3 cm  SV MOD A4C: 22 77 ml  SV(Teich): 47 31 ml    CW  AV Env  Ti: 228 37 ms  AV VTI: 43 64 cm  AV Vmax: 2 32 m/s  AV Vmax: 2 34 m/s  AV Vmean: 1 91 m/s  AV maxP 54 mmHg  AV maxP 05 mmHg  AV meanPG: 15 5 mmHg  TR Vmax: 2 67 m/s  TR maxP 62 mmHg    MM  TAPSE: 2 62 cm    PW  CONG (VTI): 1 55 cm2  CONG Vmax: 1 4 cm2  CONG Vmax: 1 41 cm2  CONG Vmax, Pt: 1 37 cm2  CONG Vmax, Pt: 1 39 cm2  E' Sept: 0 08 m/s  E/E' Sept: 10 67  LVOT Env  Ti: 295 85 ms  LVOT VTI: 24 59 cm  LVOT Vmax: 1 16 m/s  LVOT Vmax: 1 19 m/s  LVOT Vmean: 0 83 m/s  LVOT maxP 43 mmHg  LVOT maxP 62 mmHg  LVOT meanPG: 3 24 mmHg  LVSV Dopp: 67 8 ml  MV A Grayson: 1 22 m/s  MV Dec [place]: 3 55 m/s2  MV DecT: 239 62 ms  MV E Grayson: 0 85 m/s  MV E/A Ratio: 0 7    [facility] Accredited Echocardiography Laboratory    Prepared and electronically signed by    Amada Pepe DO  Signed 20-Mar-2018 12:47:40       No results found for this or any previous visit  No results found for this or any previous visit  No results found for this or any previous visit      Meds/Allergies   all current active meds have been reviewed  Prescriptions Prior to Admission   Medication    aspirin (ADULT ASPIRIN EC LOW STRENGTH) 81 mg EC tablet    Calcium 500 MG tablet    Cholecalciferol (VITAMIN D-3) 1000 units CAPS    clonazePAM (KlonoPIN) 0 5 mg tablet    clotrimazole-betamethasone (LOTRISONE) 1-0 05 % lotion    digoxin (LANOXIN) 0 125 mg tablet    ibuprofen (MOTRIN) 200 mg tablet    Melatonin 5 MG TABS    metoprolol tartrate (LOPRESSOR) 50 mg tablet    Omega-3 Fatty Acids (OMEGA-3 FISH OIL) 1000 MG CAPS    pantoprazole (PROTONIX) 40 mg tablet    RaNITidine HCl (RANITIDINE 75 PO)          Assessment:  Principal Problem:    Hypoxemia  Active Problems:    Supraventricular tachycardia (HCC)    Healthcare-associated pneumonia    Acute febrile illness    Acute cystitis    Hypovolemia due to dehydration

## 2018-03-22 ENCOUNTER — APPOINTMENT (INPATIENT)
Dept: CT IMAGING | Facility: HOSPITAL | Age: 83
DRG: 308 | End: 2018-03-22
Payer: MEDICARE

## 2018-03-22 ENCOUNTER — APPOINTMENT (INPATIENT)
Dept: ULTRASOUND IMAGING | Facility: HOSPITAL | Age: 83
DRG: 308 | End: 2018-03-22
Payer: MEDICARE

## 2018-03-22 PROBLEM — R05.9 COUGH: Status: ACTIVE | Noted: 2018-03-22

## 2018-03-22 PROBLEM — R06.02 SHORTNESS OF BREATH: Status: ACTIVE | Noted: 2018-03-22

## 2018-03-22 LAB — BACTERIA UR CULT: ABNORMAL

## 2018-03-22 PROCEDURE — 99233 SBSQ HOSP IP/OBS HIGH 50: CPT | Performed by: INTERNAL MEDICINE

## 2018-03-22 PROCEDURE — 71250 CT THORAX DX C-: CPT

## 2018-03-22 PROCEDURE — 87040 BLOOD CULTURE FOR BACTERIA: CPT | Performed by: INTERNAL MEDICINE

## 2018-03-22 PROCEDURE — 99232 SBSQ HOSP IP/OBS MODERATE 35: CPT | Performed by: INTERNAL MEDICINE

## 2018-03-22 PROCEDURE — 97530 THERAPEUTIC ACTIVITIES: CPT

## 2018-03-22 PROCEDURE — 93005 ELECTROCARDIOGRAM TRACING: CPT

## 2018-03-22 PROCEDURE — 99222 1ST HOSP IP/OBS MODERATE 55: CPT | Performed by: INTERNAL MEDICINE

## 2018-03-22 PROCEDURE — 97535 SELF CARE MNGMENT TRAINING: CPT

## 2018-03-22 PROCEDURE — 76770 US EXAM ABDO BACK WALL COMP: CPT

## 2018-03-22 RX ORDER — BUDESONIDE 0.5 MG/2ML
0.5 INHALANT ORAL
Status: DISCONTINUED | OUTPATIENT
Start: 2018-03-22 | End: 2018-03-22

## 2018-03-22 RX ORDER — ZOLPIDEM TARTRATE 5 MG/1
5 TABLET ORAL
Status: DISCONTINUED | OUTPATIENT
Start: 2018-03-22 | End: 2018-03-26 | Stop reason: HOSPADM

## 2018-03-22 RX ORDER — METOPROLOL TARTRATE 5 MG/5ML
5 INJECTION INTRAVENOUS EVERY 6 HOURS PRN
Status: DISCONTINUED | OUTPATIENT
Start: 2018-03-22 | End: 2018-03-26 | Stop reason: HOSPADM

## 2018-03-22 RX ORDER — IPRATROPIUM BROMIDE AND ALBUTEROL SULFATE 2.5; .5 MG/3ML; MG/3ML
3 SOLUTION RESPIRATORY (INHALATION) EVERY 6 HOURS PRN
Status: DISCONTINUED | OUTPATIENT
Start: 2018-03-22 | End: 2018-03-26 | Stop reason: HOSPADM

## 2018-03-22 RX ORDER — POLYETHYLENE GLYCOL 3350 17 G/17G
17 POWDER, FOR SOLUTION ORAL DAILY
Status: DISCONTINUED | OUTPATIENT
Start: 2018-03-22 | End: 2018-03-26

## 2018-03-22 RX ORDER — CLONAZEPAM 0.5 MG/1
0.5 TABLET ORAL 2 TIMES DAILY PRN
Status: DISCONTINUED | OUTPATIENT
Start: 2018-03-22 | End: 2018-03-26 | Stop reason: HOSPADM

## 2018-03-22 RX ORDER — IPRATROPIUM BROMIDE AND ALBUTEROL SULFATE 2.5; .5 MG/3ML; MG/3ML
3 SOLUTION RESPIRATORY (INHALATION)
Status: DISCONTINUED | OUTPATIENT
Start: 2018-03-22 | End: 2018-03-22

## 2018-03-22 RX ADMIN — DILTIAZEM HYDROCHLORIDE 30 MG: 30 TABLET, FILM COATED ORAL at 05:20

## 2018-03-22 RX ADMIN — GUAIFENESIN AND DEXTROMETHORPHAN 10 ML: 100; 10 SYRUP ORAL at 04:12

## 2018-03-22 RX ADMIN — PANTOPRAZOLE SODIUM 40 MG: 40 TABLET, DELAYED RELEASE ORAL at 05:23

## 2018-03-22 RX ADMIN — METOPROLOL TARTRATE 50 MG: 50 TABLET ORAL at 20:49

## 2018-03-22 RX ADMIN — CALCIUM CARBONATE-VITAMIN D TAB 500 MG-200 UNIT 1 TABLET: 500-200 TAB at 08:17

## 2018-03-22 RX ADMIN — ACETAMINOPHEN 650 MG: 325 TABLET, FILM COATED ORAL at 13:47

## 2018-03-22 RX ADMIN — CLONAZEPAM 0.5 MG: 0.5 TABLET ORAL at 00:21

## 2018-03-22 RX ADMIN — Medication 1000 MG: at 17:36

## 2018-03-22 RX ADMIN — AZITHROMYCIN MONOHYDRATE 500 MG: 500 INJECTION, POWDER, LYOPHILIZED, FOR SOLUTION INTRAVENOUS at 17:37

## 2018-03-22 RX ADMIN — CLOTRIMAZOLE AND BETAMETHASONE DIPROPIONATE: 10; .5 CREAM TOPICAL at 17:36

## 2018-03-22 RX ADMIN — CLONAZEPAM 0.5 MG: 0.5 TABLET ORAL at 23:50

## 2018-03-22 RX ADMIN — METOPROLOL TARTRATE 50 MG: 50 TABLET ORAL at 08:40

## 2018-03-22 RX ADMIN — POLYETHYLENE GLYCOL 3350 17 G: 17 POWDER, FOR SOLUTION ORAL at 13:43

## 2018-03-22 RX ADMIN — DILTIAZEM HYDROCHLORIDE 30 MG: 30 TABLET, FILM COATED ORAL at 13:43

## 2018-03-22 RX ADMIN — DILTIAZEM HYDROCHLORIDE 30 MG: 30 TABLET, FILM COATED ORAL at 22:01

## 2018-03-22 RX ADMIN — ACETAMINOPHEN 650 MG: 325 TABLET, FILM COATED ORAL at 23:49

## 2018-03-22 RX ADMIN — GUAIFENESIN AND DEXTROMETHORPHAN 10 ML: 100; 10 SYRUP ORAL at 20:49

## 2018-03-22 RX ADMIN — ASPIRIN 81 MG: 81 TABLET, COATED ORAL at 08:40

## 2018-03-22 RX ADMIN — CALCIUM CARBONATE-VITAMIN D TAB 500 MG-200 UNIT 1 TABLET: 500-200 TAB at 17:36

## 2018-03-22 RX ADMIN — GUAIFENESIN AND DEXTROMETHORPHAN 10 ML: 100; 10 SYRUP ORAL at 08:46

## 2018-03-22 RX ADMIN — Medication 1000 MG: at 08:40

## 2018-03-22 RX ADMIN — ENOXAPARIN SODIUM 40 MG: 40 INJECTION SUBCUTANEOUS at 08:39

## 2018-03-22 RX ADMIN — CEFTRIAXONE 1000 MG: 1 INJECTION, SOLUTION INTRAVENOUS at 22:02

## 2018-03-22 RX ADMIN — METOPROLOL TARTRATE 5 MG: 5 INJECTION, SOLUTION INTRAVENOUS at 18:30

## 2018-03-22 NOTE — CONSULTS
Pulmonary Consultation   Nadine Ramey 80 y o  female MRN: [id number]  Unit/Bed#: 400-01 Encounter: [id number]      Reason for consultation: Pneumonia, SOB    Requesting physician: Ayana Reddy    Impressions/Recommendations:     · Pneumonia, community acquired  NO sputum for culture  Rocephin and Zithromax is adequate coverage  · SOB w/ cough, likely bronchospasm  Will add Duonebs and Pulmicort  · UTI  On Rocephin pending cultures    · Fatigue, will check TSH in AM but most likely due to work of breathing  · Slow transit constipation  She hasn't had a BM since admission on 3/19  Added Dulcolax and Miralax      History of Present Illness   HPI:  Nadine Ramey is a 80 y o  female who was admitted on 3/19 for CAP but continues to be SOB and fatigued  Cultures to date are negative including PCR  She also was recently noted to have a UTI on her admission cultures  She had a U-S which showed a bladder mass  Pt continues to have a dry cough but denies chest pain  She is afebrile  Review of systems:  Patient denies headache or vision changes  Weight is stable  Denies sore throat or runny nose  Denies fever, chills or sweats  Denies chest pain or palpitations  Denies nausea, vomiting or diarrhea  Denies lower extremity edema  Denies skin rashes  Denies dysuria or hematuria  All other 12-point review of systems are negative      Historical Information   Past Medical History:   Diagnosis Date    Abdominal distension     Last Assessed: [id number]    Abnormal weight loss     Last Assessed: [id number]    Bursitis of left hip     Lst Assessed: [id number]    Chest pain     Last Assessed: [id number]    Dysuria     Last Assessed: [id number]    External hemorrhoids     Last Assessed: [id number]    Hypomagnesemia     Last Assessed: [id number]    Lyme disease     Last Assessed: [id number]    Pneumonia of right middle lobe due to infectious organism [facility])     Last Assessed: [id number]     Past Surgical History:   Procedure Laterality Date    CATARACT EXTRACTION      HEMORRHOID SURGERY      HYSTERECTOMY      PELVIC FLOOR REPAIR       Family History   Problem Relation Age of Onset    Adopted: Yes       Tobacco history: non smoker    Family history: non contributory    Meds/Allergies   Current Facility-Administered Medications   Medication Dose Route Frequency    acetaminophen (TYLENOL) tablet 650 mg  650 mg Oral Q6H PRN    aspirin (ECOTRIN LOW STRENGTH) EC tablet 81 mg  81 mg Oral Daily    azithromycin (ZITHROMAX) 500 mg in sodium chloride 0 9 % 250 mL IVPB  500 mg Intravenous Q24H    bisacodyl (DULCOLAX) EC tablet 5 mg  5 mg Oral Daily PRN    budesonide (PULMICORT) inhalation solution 0 5 mg  0 5 mg Nebulization Q12H    calcium carbonate-vitamin D (OSCAL-D) 500 mg-200 units per tablet 1 tablet  1 tablet Oral BID With Meals    cefTRIAXone (ROCEPHIN) IVPB (premix) 1,000 mg  1,000 mg Intravenous Q24H    clonazePAM (KlonoPIN) tablet 0 5 mg  0 5 mg Oral BID PRN    clotrimazole-betamethasone (LOTRISONE) 1-0 05 % cream   Topical BID    dextromethorphan-guaiFENesin (ROBITUSSIN DM)  mg/5 mL oral syrup 10 mL  10 mL Oral Q4H PRN    diltiazem (CARDIZEM) tablet 30 mg  30 mg Oral Q8H [address]    enoxaparin (LOVENOX) subcutaneous injection 40 mg  40 mg Subcutaneous Daily    fish oil capsule 1,000 mg  1,000 mg Oral BID    ipratropium-albuterol (DUO-NEB) 0 5-2 5 mg/3 mL inhalation solution 3 mL  3 mL Nebulization Q4H PRN    ipratropium-albuterol (DUO-NEB) 0 5-2 5 mg/3 mL inhalation solution 3 mL  3 mL Nebulization Q6H    metoprolol tartrate (LOPRESSOR) tablet 50 mg  50 mg Oral Q12H ROSHNI    ondansetron (ZOFRAN) injection 4 mg  4 mg Intravenous Q6H PRN    pantoprazole (PROTONIX) EC tablet 40 mg  40 mg Oral Early Morning    polyethylene glycol (MIRALAX) packet 17 g  17 g Oral Daily    sodium chloride (PF) 0 9 % injection 3 mL  3 mL Intravenous PRN    zolpidem (AMBIEN) tablet 5 mg  5 mg Oral HS PRN Allergies   Allergen Reactions    Codeine GI Intolerance    Epinephrine Other (See Comments)     Increased Heart Rate, Palpitations    Iodinated Diagnostic Agents     Magnesium Citrate GI Intolerance       Vitals: Blood pressure 112/68, pulse 104, temperature 98 7 °F (37 1 °C), temperature source Temporal, resp  rate 19, height 5' 3" (1 6 m), weight 59 kg (130 lb), SpO2 98 %  , 2 lpm, Body mass index is 23 03 kg/m²  Intake/Output Summary (Last 24 hours) at 03/22/18 1545  Last data filed at 03/22/18 1430   Gross per 24 hour   Intake              720 ml   Output             2950 ml   Net            -2230 ml       Physical exam:    General Appearance:    Alert, cooperative, no conversational dyspnea or   accessory muscle use       Head/eyes:    Normocephalic, without obvious abnormality, atraumatic,         PERRL, extraocular muscles intact, no scleral icterus    Nose:   Nares normal, septum midline, mucosa normal, no drainage    or sinus tenderness   Throat:   Moist mucous membranes, no thrush   Neck:   Supple, trachea midline, no adenopathy; no carotid    bruit or JVD   Lungs:     Diminished w/ cough on end inspiration  NO wheezes   Chest Wall:    No tenderness or deformity    Heart:    Regular rate and rhythm, S1 and S2 normal, no murmur, rub   or gallop   Abdomen:     Soft, non-tender, bowel sounds active all four quadrants,     no masses, no organomegaly   Extremities:   Extremities normal, atraumatic, no cyanosis or edema   Skin:   Warm, dry, turgor normal, no rashes or lesions   Lymph nodes:   Cervical and supraclavicular nodes normal   Neurologic:   CNII-XII intact, normal strength, non-focal     Labs: I have personally reviewed pertinent lab results        Results from last 7 days  Lab Units 03/20/18  0429 03/19/18  2219 03/19/18  1652   WBC Thousand/uL 7 05  --  8 54   HEMOGLOBIN g/dL 12 3  --  13 0   HEMATOCRIT % 38 0  --  39 2   PLATELETS Thousands/uL 241 234 246           Results from last 7 days  Lab Units 03/20/18  0429 03/19/18  1652   SODIUM mmol/L 135* 130*   POTASSIUM mmol/L 3 8 4 3   CHLORIDE mmol/L 100 93*   CO2 mmol/L 27 28   BUN mg/dL 10 14   CREATININE mg/dL 0 89 0 95   CALCIUM mg/dL 8 8 9 2   TOTAL PROTEIN g/dL 7 2 7 5   BILIRUBIN TOTAL mg/dL 0 60 0 80   ALK PHOS U/L 63 65   ALT U/L 22 24   AST U/L 21 23   GLUCOSE RANDOM mg/dL 144* 148*       Results from last 7 days  Lab Units 03/20/18  0436 03/19/18  1652   INR  1 19* 1 13   PTT seconds  --  31       Results from last 7 days  Lab Units 03/20/18  0429 03/19/18  1652   MAGNESIUM mg/dL 1 9 1 9       NT-BNP     Imaging and other studies: I have personally reviewed pertinent films in PACS   Await CT Chest which is pending for today  NOTE: CXR shows patchy infiltrate    Pulmonary function testing: not done    Other Studies: I have personally reviewed pertinent films in PACS   U-S kidneys w/ possible mass in the bladder  Urine cultue growing Gm (-) ty  PCR swab (-)    Code Status: Level 3 - DNAR and DNI    Thank you for allowing us to participate in the care of your patient      Rachael Salomon MD

## 2018-03-22 NOTE — PROGRESS NOTES
Cardiology Progress Note - Rohini Al 80 y o  female MRN: [id number]    Unit/Bed#: 400-01 Encounter: [id number]     Assesment/ Plan:  1  Paroxysmal supraventricular tachycardia  2  Acute viral syndrome  3  Hypovolemia due to dehydration  4  Possible pneumonia  5  Hypertension     Recommendations: The patient has a longstanding history of known paroxysmal supraventricular tachycardia  She has been talked to about ablation in the past but has not been willing to undergo that yet  Continue  Cardizem at 30 mg q 8 continue metoprolol at home dose 50 mg p o  b i d  agree with IV fluids  Keep potassium above 4 magnesium above 2    echocardiogram was reviewed no signs of elevated filling pressure  Will titrate Cardizem to help prevent supraventricular tachycardia  If she has recurrence she may consider ablation but she would like to talk to her outpatient cardiologist    No further episodes  On telemetry call if any questions    Subjective:    No significant events overnight  Shortness of breath has improved  No episodes on telemetry  ROS      Objective:   Vitals: Blood pressure 140/51, pulse 102, temperature 99 °F (37 2 °C), temperature source Temporal, resp  rate 18, height 5' 3" (1 6 m), weight 59 kg (130 lb), SpO2 94 %  , Body mass index is 23 03 kg/m² ,   Orthostatic Blood Pressures    Flowsheet Row Most Recent Value   Blood Pressure  140/51 filed at 03/22/2018 0844   Patient Position - Orthostatic VS  Standing - Orthostatic VS filed at 03/22/2018 7682         Systolic (28BXV), BUV:618 , Min:130 , WZI:558     Diastolic (18TXP), CWS:37, Min:51, Max:84      Intake/Output Summary (Last 24 hours) at 03/22/18 0915  Last data filed at 03/22/18 0854   Gross per 24 hour   Intake              720 ml   Output             2950 ml   Net            -2230 ml     Weight (last 2 days)     Date/Time   Weight    03/22/18 0600  59 (130)    03/20/18 0600  59 6 (131 39)                Telemetry Review: No significant arrhythmias seen on telemetry review  EKG personally reviewed by Juan Manuel Gaming DO  Physical Exam   Constitutional: She is oriented to person, place, and time  She appears well-nourished  No distress  HENT:   Head: Atraumatic  Eyes: Conjunctivae are normal  Pupils are equal, round, and reactive to light  Neck: Neck supple  Cardiovascular: Normal rate, regular rhythm and S1 normal   Exam reveals no friction rub  Murmur heard  Systolic murmur is present with a grade of 1/6   Pulmonary/Chest: Effort normal  No respiratory distress  She has decreased breath sounds  She has no wheezes  She has rhonchi  She has no rales  She exhibits no tenderness  Abdominal: Bowel sounds are normal  She exhibits no distension  There is no tenderness  There is no rebound  Musculoskeletal: She exhibits no edema, tenderness or deformity  Neurological: She is alert and oriented to person, place, and time  No cranial nerve deficit  Skin: Skin is warm and dry  No erythema  Nursing note and vitals reviewed          Laboratory Results:    Results from last 7 days  Lab Units 03/20/18 0429 03/19/18 2219 03/19/18  1803   TROPONIN I ng/mL <0 02 <0 02 <0 02       CBC with diff:     Results from last 7 days  Lab Units 03/20/18 0429 03/19/18 2219 03/19/18  1652   WBC Thousand/uL 7 05  --  8 54   HEMOGLOBIN g/dL 12 3  --  13 0   HEMATOCRIT % 38 0  --  39 2   MCV fL 93  --  92   PLATELETS Thousands/uL 241 234 246   MCH pg 29 9  --  30 4   MCHC g/dL 32 4  --  33 2   RDW % 13 1  --  13 0   MPV fL 10 3 9 8 9 7         CMP:    Results from last 7 days  Lab Units 03/20/18  0429 03/19/18  1652   SODIUM mmol/L 135* 130*   POTASSIUM mmol/L 3 8 4 3   CHLORIDE mmol/L 100 93*   CO2 mmol/L 27 28   ANION GAP mmol/L 8 9   BUN mg/dL 10 14   CREATININE mg/dL 0 89 0 95   GLUCOSE RANDOM mg/dL 144* 148*   CALCIUM mg/dL 8 8 9 2   AST U/L 21 23   ALT U/L 22 24   ALK PHOS U/L 63 65   TOTAL PROTEIN g/dL 7 2 7 5   BILIRUBIN TOTAL mg/dL 0 60 0 80   EGFR ml/min/1 73sq m 59 54         BMP:    Results from last 7 days  Lab Units 18  0429 18  1652   SODIUM mmol/L 135* 130*   POTASSIUM mmol/L 3 8 4 3   CHLORIDE mmol/L 100 93*   CO2 mmol/L 27 28   BUN mg/dL 10 14   CREATININE mg/dL 0 89 0 95   GLUCOSE RANDOM mg/dL 144* 148*   CALCIUM mg/dL 8 8 9 2       BNP: No results for input(s): BNP in the last 72 hours  Magnesium:     Results from last 7 days  Lab Units 18  0429 18  1652   MAGNESIUM mg/dL 1 9 1 9       Coags:     Results from last 7 days  Lab Units 18  0436 18  1652   PTT seconds  --  31   INR  1 19* 1 13       TSH:        Hemoglobin A1C       Lipid Profile:       Cardiac testing:   Results for orders placed during the hospital encounter of 18   Echo complete with contrast if indicated    Narrative [address] [facility] [address] [facility] [address], [place] [address]  [phone]    Transthoracic Echocardiogram  2D, M-mode, Doppler, and Color Doppler    Study date:  20-Mar-2018    Patient: Prosper Soliz  MR number: [id number]  Account number: [de-identified]  : 02-Sep-1931  Age: 80 years  Gender: Female  Status: Inpatient  Location: Bedside  Height: 63 in  Weight: 131 lb  BP:    Indications: Fall    Diagnoses: R55  - Syncope and collapse    Sonographer:  LYNN Ibarra  Primary Physician:  Iraida Garcia DO  Referring Physician:  Lida Rios DO  Group:  [facility]  Interpreting Physician:  Remberto Nichole DO    SUMMARY    LEFT VENTRICLE:  Systolic function was normal  Ejection fraction was estimated in the range of 60 % to 65 %  Although no diagnostic regional wall motion abnormality was identified, this possibility cannot be completely excluded on the basis of this study  Wall thickness was mildly increased  Doppler parameters were consistent with abnormal left ventricular relaxation (grade 1 diastolic dysfunction)      MITRAL VALVE:  There was mild to moderate annular calcification  There was trace regurgitation  AORTIC VALVE:  There was mild stenosis  Estimated aortic valve area (by VTI) was 1 79 cm squared  TRICUSPID VALVE:  There was mild regurgitation  PERICARDIUM:  A small pericardial effusion was identified  There was no evidence of hemodynamic compromise  HISTORY: PRIOR HISTORY: Patient has no history of cardiovascular disease  PROCEDURE: The procedure was performed at the bedside  This was a routine study  The transthoracic approach was used  The study included complete 2D imaging, M-mode, complete spectral Doppler, and color Doppler  Image quality was adequate  LEFT VENTRICLE: Size was normal  Systolic function was normal  Ejection fraction was estimated in the range of 60 % to 65 %  Although no diagnostic regional wall motion abnormality was identified, this possibility cannot be completely  excluded on the basis of this study  Wall thickness was mildly increased  DOPPLER: Doppler parameters were consistent with abnormal left ventricular relaxation (grade 1 diastolic dysfunction)  RIGHT VENTRICLE: The size was normal  Systolic function was normal  Wall thickness was normal     LEFT ATRIUM: Size was normal     RIGHT ATRIUM: Size was normal     MITRAL VALVE: There was mild to moderate annular calcification  Valve structure was normal  There was normal leaflet separation  DOPPLER: The transmitral velocity was within the normal range  There was no evidence for stenosis  There was  trace regurgitation  AORTIC VALVE: The valve was trileaflet  Leaflets exhibited normal thickness and normal cuspal separation  DOPPLER: Transaortic velocity was within the normal range  There was mild stenosis  There was no regurgitation  TRICUSPID VALVE: The valve structure was normal  There was normal leaflet separation  DOPPLER: The transtricuspid velocity was within the normal range  There was no evidence for stenosis   There was mild regurgitation  PULMONIC VALVE: Leaflets exhibited normal thickness, no calcification, and normal cuspal separation  DOPPLER: The transpulmonic velocity was within the normal range  There was no regurgitation  PERICARDIUM: A small pericardial effusion was identified  There was no evidence of hemodynamic compromise  AORTA: The root exhibited normal size  SYSTEMIC VEINS: IVC: The inferior vena cava was not well visualized  MEASUREMENT TABLES    2D MEASUREMENTS  LVOT   (Reference normals)  Diam   19 mm   (--)    DOPPLER MEASUREMENTS  LVOT   (Reference normals)  Peak grayson   119 cm/s   (--)  VTI   25 cm   (--)  Stroke vol   70 88 ml   (--)  Aortic valve   (Reference normals)  Peak grayson   218 cm/s   (--)  VTI   40 cm   (--)  Obstr index, VTI   0 63    (--)  Valve area, VTI   1 79 cm squared   (--)  Obstr index, Vmax   0 55    (--)  Valve area, Vmax   1 56 cm squared   (--)    SYSTEM MEASUREMENT TABLES    2D  %FS: 31 19 %  Ao Diam: 2 46 cm  EDV(Teich): 79 74 ml  EF(Teich): 59 33 %  ESV(Teich): 32 43 ml  IVSd: 0 76 cm  LA Area: 11 14 cm2  LA Diam: 2 86 cm  LVEDV MOD A4C: 41 24 ml  LVEF MOD A4C: 55 22 %  LVESV MOD A4C: 18 47 ml  LVIDd: 4 23 cm  LVIDs: 2 91 cm  LVLd A4C: 6 8 cm  LVLs A4C: 5 92 cm  LVOT Diam: 1 87 cm  LVPWd: 0 84 cm  RA Area: 10 49 cm2  RV DIAM: 2 3 cm  SV MOD A4C: 22 77 ml  SV(Teich): 47 31 ml    CW  AV Env  Ti: 228 37 ms  AV VTI: 43 64 cm  AV Vmax: 2 32 m/s  AV Vmax: 2 34 m/s  AV Vmean: 1 91 m/s  AV maxP 54 mmHg  AV maxP 05 mmHg  AV meanPG: 15 5 mmHg  TR Vmax: 2 67 m/s  TR maxP 62 mmHg    MM  TAPSE: 2 62 cm    PW  CONG (VTI): 1 55 cm2  CONG Vmax: 1 4 cm2  CONG Vmax: 1 41 cm2  CONG Vmax, Pt: 1 37 cm2  CONG Vmax, Pt: 1 39 cm2  E' Sept: 0 08 m/s  E/E' Sept: 10 67  LVOT Env  Ti: 295 85 ms  LVOT VTI: 24 59 cm  LVOT Vmax: 1 16 m/s  LVOT Vmax: 1 19 m/s  LVOT Vmean: 0 83 m/s  LVOT maxP 43 mmHg  LVOT maxP 62 mmHg  LVOT meanPG: 3 24 mmHg  LVSV Dopp: 67 8 ml  MV A Grayson: 1 22 m/s  MV Dec [place]: 3 55 m/s2  MV DecT: 239 62 ms  MV E Grayson: 0 85 m/s  MV E/A Ratio: 0 7    [employer] Accredited Echocardiography [facility]    Prepared and electronically signed by    Carlita Colin DO  Signed 20-Mar-2018 12:47:40       No results found for this or any previous visit  No results found for this or any previous visit  No results found for this or any previous visit      Meds/Allergies   all current active meds have been reviewed  Prescriptions Prior to Admission   Medication    aspirin (ADULT ASPIRIN EC LOW STRENGTH) 81 mg EC tablet    Calcium 500 MG tablet    Cholecalciferol (VITAMIN D-3) 1000 units CAPS    clonazePAM (KlonoPIN) 0 5 mg tablet    clotrimazole-betamethasone (LOTRISONE) 1-0 05 % lotion    digoxin (LANOXIN) 0 125 mg tablet    ibuprofen (MOTRIN) 200 mg tablet    Melatonin 5 MG TABS    metoprolol tartrate (LOPRESSOR) 50 mg tablet    Omega-3 Fatty Acids (OMEGA-3 FISH OIL) 1000 MG CAPS    pantoprazole (PROTONIX) 40 mg tablet    RaNITidine HCl (RANITIDINE 75 PO)          Assessment:  Principal Problem:    Hypoxemia  Active Problems:    Supraventricular tachycardia (HCC)    Healthcare-associated pneumonia    Acute febrile illness    Acute cystitis    Hypovolemia due to dehydration    Acute tracheobronchitis

## 2018-03-22 NOTE — PROGRESS NOTES
[address] Internal Medicine Progress Note  Patient: Stacie All 80 y o  female   MRN: [id number]  PCP: Tracy Godoy DO  Unit/Bed#: 513-98 Encounter: [id number]  Date Of Visit: 03/22/18    Assessment:    Principal Problem:    Hypoxemia  Active Problems:    Other insomnia    Supraventricular tachycardia ([address] )    Healthcare-associated pneumonia    Acute febrile illness    Acute cystitis    Hypovolemia due to dehydration    Acute tracheobronchitis    Cough    Shortness of breath      Plan:    · Hypoxia  ? Unclear etiology-CHF versus pneumonia versus acute tracheobronchitis  ? Resolved  ? Elevated BNP on admission   2D echo result showed ejection fraction 60% with no regional wall motion abnormality  ? Chest x-ray showed mildly increased interstitial markings without focal consolidation   Patient empirically on IV azithromycin 500 mg daily and IV ceftriaxone 1 g daily   Blood cultures negative   Respiratory pathogen profile negative  ? Respiratory protocol initiated on the patient  ? Ordered CT chest without contrast because chest x-ray on the admission showed increased interstitial markings  ? Consulted Pulmonary for further input and management  ? Robitussin DM 10 cc p  o  q 4 hours as needed for cough  · Acute cystitis with hematuria  ? Urine cultures positive for gram-negative rods  Sensitivity and specificity still pending  ? Urinalysis looks like a UTI  ? Empirically on IV ceftriaxone 1 g daily  · Paroxysmal SVT  ? On metoprolol 50 mg p o  b i d  and diltiazem 30 mg p o  B i d   ? Cardiology following the patient  · Encourage PT/OT  · History of fall-secondary to age/loss of balance/UTI  · Will consider to discharge the patient tomorrow         VTE Pharmacologic Prophylaxis:   Pharmacologic: Enoxaparin (Lovenox)  Mechanical VTE Prophylaxis in Place: Yes    Patient Centered Rounds: I have performed bedside rounds with nursing staff today      Discussions with Specialists or Other Care Team Provider: Cardiology    Education and Discussions with Family / Patient:  No family member present at bedside    Time Spent for Care: 30 minutes  More than 50% of total time spent on counseling and coordination of care as described above  Current Length of Stay: 3 day(s)    Current Patient Status: Inpatient   Certification Statement: The patient will continue to require additional inpatient hospital stay due to  shortness of breath    Discharge Plan / Estimated Discharge Date:  2018    Code Status: Level 3 - DNAR and DNI      Subjective:   Patient seen examined at bedside  Patient states she had fever yesterday and was not feeling good yesterday  Patient feels better today  Patient states she could not sleep last night and feels tired  Patient was requesting for sleep medication  Objective:     Vitals:   Temp (24hrs), Av 2 °F (37 3 °C), Min:98 5 °F (36 9 °C), Max:101 3 °F (38 5 °C)    HR:  [] 102  Resp:  [18-22] 18  BP: (130-186)/(51-84) 140/51  SpO2:  [94 %-98 %] 94 %  Body mass index is 23 03 kg/m²  Input and Output Summary (last 24 hours): Intake/Output Summary (Last 24 hours) at 18 0930  Last data filed at 18 0854   Gross per 24 hour   Intake              720 ml   Output             2950 ml   Net            -2230 ml       Physical Exam:     Physical Exam   Constitutional: She is oriented to person, place, and time  She appears well-developed and well-nourished  HENT:   Head: Normocephalic and atraumatic  Eyes: Conjunctivae and EOM are normal  Pupils are equal, round, and reactive to light  Neck: Normal range of motion  Neck supple  Cardiovascular: Normal rate, regular rhythm, normal heart sounds and intact distal pulses  Pulmonary/Chest: Effort normal  She has rales  Abdominal: Soft  Bowel sounds are normal    Musculoskeletal: Normal range of motion  Neurological: She is alert and oriented to person, place, and time  She has normal reflexes     Skin: Skin is warm and dry  Vitals reviewed  Additional Data:     Labs:      Results from last 7 days  Lab Units 03/20/18  0429  03/19/18  1652   WBC Thousand/uL 7 05  --  8 54   HEMOGLOBIN g/dL 12 3  --  13 0   HEMATOCRIT % 38 0  --  39 2   PLATELETS Thousands/uL 241  < > 246   LYMPHO PCT %  --   --  7*   MONO PCT MAN %  --   --  9   EOSINO PCT MANUAL %  --   --  3   < > = values in this interval not displayed  Results from last 7 days  Lab Units 03/20/18  0429   SODIUM mmol/L 135*   POTASSIUM mmol/L 3 8   CHLORIDE mmol/L 100   CO2 mmol/L 27   BUN mg/dL 10   CREATININE mg/dL 0 89   CALCIUM mg/dL 8 8   TOTAL PROTEIN g/dL 7 2   BILIRUBIN TOTAL mg/dL 0 60   ALK PHOS U/L 63   ALT U/L 22   AST U/L 21   GLUCOSE RANDOM mg/dL 144*       Results from last 7 days  Lab Units 03/20/18  0436   INR  1 19*       * I Have Reviewed All Lab Data Listed Above  * Additional Pertinent Lab Tests Reviewed: [address] Admission Reviewed    Imaging:    Imaging Reports Reviewed Today Include:  No imaging done today  Imaging Personally Reviewed by Myself Includes:      Recent Cultures (last 7 days):       Results from last 7 days  Lab Units 03/19/18  1937 03/19/18  1803 03/19/18  1652   BLOOD CULTURE   --  No Growth at 48 hrs  No Growth at 48 hrs     URINE CULTURE    >100,000 cfu/ml Gram Negative Brent Enteric Like*  --   --        Last 24 Hours Medication List:     Current Facility-Administered Medications:  acetaminophen 650 mg Oral Q6H PRN Alexandre Edouard MD    aspirin 81 mg Oral Daily Alexandre Edouard MD    azithromycin 500 mg Intravenous Q24H [employer], DO Last Rate: 500 mg (03/21/18 1804)   calcium carbonate-vitamin D 1 tablet Oral BID With Meals Alexandre Edouard MD    cefTRIAXone 1,000 mg Intravenous Q24H Alexandre Edouard MD Last Rate: 1,000 mg (03/21/18 2116)   clonazePAM 0 5 mg Oral BID PRN Eulalia Bledsoe MD    clotrimazole-betamethasone  Topical BID Alexandre Edouard MD dextromethorphan-guaiFENesin 10 mL Oral Q4H PRN Alexandre Edouard MD    diltiazem 30 mg Oral [facility] Luis Underwood,     enoxaparin 40 mg Subcutaneous Daily Alexandre Edouard MD    fish oil 1,000 mg Oral BID Alexandre Edouard MD    ipratropium-albuterol 3 mL Nebulization Q4H PRN Alexandre Edouard MD    metoprolol tartrate 50 mg Oral Q12H [facility] Luis [place], DO    ondansetron 4 mg Intravenous Q6H PRN Alexandre Edouard MD    pantoprazole 40 mg Oral Early Morning Alexandre Edouard MD    sodium chloride (PF) 3 mL Intravenous PRN Ana Maria Watts, DO    zolpidem 5 mg Oral HS PRN Eulalia Bledsoe MD         Today, Patient Was Seen By: Eulalia Bledsoe MD    ** Please Note: This note has been constructed using a voice recognition system   **

## 2018-03-22 NOTE — OCCUPATIONAL THERAPY NOTE
[address] Progress Note     Patient Name: Karlos Moe  TRPAX'U Date: 3/22/2018  Problem List  Patient Active Problem List   Diagnosis    Gomez's esophagus    Bilateral carotid artery disease ([address] )    Breast mass, left    Chronic constipation    Colon, diverticulosis    Esophageal reflux    Generalized anxiety disorder    Generalized osteoarthritis of multiple sites    Glucose intolerance (impaired glucose tolerance)    Hypercholesterolemia    Other insomnia    Neuralgia    Osteoporosis    [facility]    Vitamin D deficiency    Hypoxemia    Healthcare-associated pneumonia    Acute febrile illness    Acute cystitis    Hypovolemia due to dehydration    Acute tracheobronchitis    Cough    Shortness of breath             03/22/18 1700   Restrictions/Precautions   Other Precautions Bed Alarm; Chair Alarm;O2;Fall Risk   ADL   Where Assessed Edge of bed   Grooming Assistance 5  Supervision/Setup   Grooming Deficit Setup;Verbal cueing;Supervision/safety   Grooming Comments Pt completed oral hygiene and hair grooming  UB Bathing Assistance 5  Supervision/Setup   UB Bathing Deficit Setup;Supervision/safety   UB Bathing Comments A with back   LB Bathing Assistance 5  Supervision/Setup   LB Bathing Deficit Setup   LB Bathing Comments S for standing balance, 1 posterior LOB noted with pt I recovering  UB Dressing Assistance 5  Supervision/Setup   UB Dressing Deficit Setup   UB Dressing Comments A with back fastern and managing lines     LB Dressing Assistance 5  Supervision/Setup   LB Dressing Deficit Setup   LB Dressing Comments pt don/doff briefs as well as socks   Toileting Assistance  5  Supervision/Setup   Toileting Deficit Setup   Toileting Comments pt completed vida hygiene I   Bed Mobility   Rolling L 5  Supervision   Supine to Sit 5  Supervision   Additional items (no bedrails)   Transfers   Sit to Stand 5  Supervision   Additional items Bedrails   Stand to Sit 5  Supervision   Additional items Armrests   Functional Mobility   Functional Mobility 5  Supervision   Additional Comments Pt completed FM within room from EOB to toilet and back  as well as to chair  Toilet Transfers   Toilet Transfer From Rolling walker   Toilet Transfer Type To   Toilet Transfer to Standard bedside commode   Toilet Transfer Technique Ambulating   Toilet Transfers Supervision   Toilet Transfers Comments pt used RW for support   Activity Tolerance   Activity Tolerance Patient tolerated treatment well   Assessment   Assessment Pt cooperative and agreeable to complete Ot at this time including morning ADL  Pt finsihed session seated in bedside chair with all needs in reach and lines in tact   Nursing noted of pt having not had a BM since she has been in the hospital

## 2018-03-23 ENCOUNTER — TELEPHONE (OUTPATIENT)
Dept: OTHER | Facility: HOSPITAL | Age: 83
End: 2018-03-23

## 2018-03-23 PROBLEM — K22.89 ESOPHAGEAL MASS: Status: ACTIVE | Noted: 2018-03-23

## 2018-03-23 PROBLEM — N32.89 MASS OF URINARY BLADDER DETERMINED BY ULTRASOUND: Status: ACTIVE | Noted: 2018-03-23

## 2018-03-23 PROBLEM — R91.1 PULMONARY NODULE: Status: ACTIVE | Noted: 2018-03-23

## 2018-03-23 LAB
ANION GAP SERPL CALCULATED.3IONS-SCNC: 8 MMOL/L (ref 4–13)
BASOPHILS # BLD AUTO: 0.04 THOUSANDS/ΜL (ref 0–0.1)
BASOPHILS NFR BLD AUTO: 1 % (ref 0–1)
BUN SERPL-MCNC: 10 MG/DL (ref 5–25)
CALCIUM SERPL-MCNC: 9.5 MG/DL (ref 8.3–10.1)
CHLORIDE SERPL-SCNC: 95 MMOL/L (ref 100–108)
CO2 SERPL-SCNC: 30 MMOL/L (ref 21–32)
CREAT SERPL-MCNC: 0.68 MG/DL (ref 0.6–1.3)
EOSINOPHIL # BLD AUTO: 0.26 THOUSAND/ΜL (ref 0–0.61)
EOSINOPHIL NFR BLD AUTO: 4 % (ref 0–6)
ERYTHROCYTE [DISTWIDTH] IN BLOOD BY AUTOMATED COUNT: 12.8 % (ref 11.6–15.1)
GFR SERPL CREATININE-BSD FRML MDRD: 79 ML/MIN/1.73SQ M
GLUCOSE SERPL-MCNC: 118 MG/DL (ref 65–140)
HCT VFR BLD AUTO: 35.4 % (ref 34.8–46.1)
HGB BLD-MCNC: 11.4 G/DL (ref 11.5–15.4)
LYMPHOCYTES # BLD AUTO: 1.13 THOUSANDS/ΜL (ref 0.6–4.47)
LYMPHOCYTES NFR BLD AUTO: 17 % (ref 14–44)
MCH RBC QN AUTO: 29.7 PG (ref 26.8–34.3)
MCHC RBC AUTO-ENTMCNC: 32.2 G/DL (ref 31.4–37.4)
MCV RBC AUTO: 92 FL (ref 82–98)
MONOCYTES # BLD AUTO: 1.03 THOUSAND/ΜL (ref 0.17–1.22)
MONOCYTES NFR BLD AUTO: 16 % (ref 4–12)
NEUTROPHILS # BLD AUTO: 4.12 THOUSANDS/ΜL (ref 1.85–7.62)
NEUTS SEG NFR BLD AUTO: 62 % (ref 43–75)
PLATELET # BLD AUTO: 301 THOUSANDS/UL (ref 149–390)
PMV BLD AUTO: 9.8 FL (ref 8.9–12.7)
POTASSIUM SERPL-SCNC: 3.8 MMOL/L (ref 3.5–5.3)
RBC # BLD AUTO: 3.84 MILLION/UL (ref 3.81–5.12)
SODIUM SERPL-SCNC: 133 MMOL/L (ref 136–145)
WBC # BLD AUTO: 6.58 THOUSAND/UL (ref 4.31–10.16)

## 2018-03-23 PROCEDURE — 85025 COMPLETE CBC W/AUTO DIFF WBC: CPT | Performed by: INTERNAL MEDICINE

## 2018-03-23 PROCEDURE — 99232 SBSQ HOSP IP/OBS MODERATE 35: CPT | Performed by: INTERNAL MEDICINE

## 2018-03-23 PROCEDURE — 97530 THERAPEUTIC ACTIVITIES: CPT

## 2018-03-23 PROCEDURE — 80048 BASIC METABOLIC PNL TOTAL CA: CPT | Performed by: INTERNAL MEDICINE

## 2018-03-23 PROCEDURE — 99233 SBSQ HOSP IP/OBS HIGH 50: CPT | Performed by: INTERNAL MEDICINE

## 2018-03-23 PROCEDURE — 99222 1ST HOSP IP/OBS MODERATE 55: CPT | Performed by: PHYSICIAN ASSISTANT

## 2018-03-23 PROCEDURE — 97116 GAIT TRAINING THERAPY: CPT

## 2018-03-23 RX ADMIN — ENOXAPARIN SODIUM 40 MG: 40 INJECTION SUBCUTANEOUS at 10:30

## 2018-03-23 RX ADMIN — ACETAMINOPHEN 650 MG: 325 TABLET, FILM COATED ORAL at 16:00

## 2018-03-23 RX ADMIN — PIPERACILLIN SODIUM,TAZOBACTAM SODIUM 3.38 G: 3; .375 INJECTION, POWDER, FOR SOLUTION INTRAVENOUS at 23:22

## 2018-03-23 RX ADMIN — PANTOPRAZOLE SODIUM 40 MG: 40 TABLET, DELAYED RELEASE ORAL at 05:06

## 2018-03-23 RX ADMIN — DILTIAZEM HYDROCHLORIDE 30 MG: 30 TABLET, FILM COATED ORAL at 21:54

## 2018-03-23 RX ADMIN — DILTIAZEM HYDROCHLORIDE 30 MG: 30 TABLET, FILM COATED ORAL at 05:06

## 2018-03-23 RX ADMIN — POLYETHYLENE GLYCOL 3350 17 G: 17 POWDER, FOR SOLUTION ORAL at 10:33

## 2018-03-23 RX ADMIN — Medication 1000 MG: at 10:29

## 2018-03-23 RX ADMIN — ASPIRIN 81 MG: 81 TABLET, COATED ORAL at 10:29

## 2018-03-23 RX ADMIN — CLONAZEPAM 0.5 MG: 0.5 TABLET ORAL at 23:22

## 2018-03-23 RX ADMIN — METOPROLOL TARTRATE 50 MG: 50 TABLET ORAL at 21:54

## 2018-03-23 RX ADMIN — CLOTRIMAZOLE AND BETAMETHASONE DIPROPIONATE: 10; .5 CREAM TOPICAL at 17:59

## 2018-03-23 RX ADMIN — CALCIUM CARBONATE-VITAMIN D TAB 500 MG-200 UNIT 1 TABLET: 500-200 TAB at 10:29

## 2018-03-23 RX ADMIN — METOPROLOL TARTRATE 50 MG: 50 TABLET ORAL at 10:30

## 2018-03-23 RX ADMIN — CALCIUM CARBONATE-VITAMIN D TAB 500 MG-200 UNIT 1 TABLET: 500-200 TAB at 16:01

## 2018-03-23 RX ADMIN — Medication 1000 MG: at 17:59

## 2018-03-23 RX ADMIN — ACETAMINOPHEN 650 MG: 325 TABLET, FILM COATED ORAL at 23:22

## 2018-03-23 RX ADMIN — METOPROLOL TARTRATE 5 MG: 5 INJECTION, SOLUTION INTRAVENOUS at 23:25

## 2018-03-23 RX ADMIN — PIPERACILLIN SODIUM,TAZOBACTAM SODIUM 3.38 G: 3; .375 INJECTION, POWDER, FOR SOLUTION INTRAVENOUS at 11:20

## 2018-03-23 RX ADMIN — PIPERACILLIN SODIUM,TAZOBACTAM SODIUM 3.38 G: 3; .375 INJECTION, POWDER, FOR SOLUTION INTRAVENOUS at 17:59

## 2018-03-23 RX ADMIN — DILTIAZEM HYDROCHLORIDE 30 MG: 30 TABLET, FILM COATED ORAL at 16:00

## 2018-03-23 NOTE — CONSULTS
Consultation - Kim Maxwell 80 y o  female MRN: [id number]    Unit/Bed#: 400-01 Encounter: [id number]      Assessment/Plan     Assessment:  In summary, this is a 80year old female with history of abrupt onset of cough and fever  CT of the chest shows patchy infiltrate in the right lung with consolidation in the right lower lobe medially  The possibility of mass in that location is raised  Alternatively this could represent rounded atelectasis    She is under treatment for pneumonia  Continued treatment and observation are recommended  If her radiographic picture is not improving further investigation such as bronchoscopy could be considered  I would not be in favor of a PET-CT at this time as this would not be helpful to distinguish between malignant, inflammatory, infectious pulmonary processes  With the patient and her son  They voiced understanding and agreement  Plan:  See above  History of Present Illness     HPI: Kim Maxwell is a 80y o  year old female who presents with shortness of breath  Chest x-ray showed patchy right pulmonary infiltrate  CT of the chest showed right upper lobe consolidation with other areas in the lingula and right lower lobe medially  No lymphadenopathy or effusion is noted  CMP is normal except albumin 2 6, CBC and differential normal except 16% monocytes  Inpatient consult to Hematology  Consult performed by: Osvaldo Duarte ordered by: Brennon Kim          Review of Systems   Constitutional: Negative for appetite change, diaphoresis, fatigue and fever  HENT: Negative for sinus pain  Eyes: Negative for discharge  Respiratory: Positive for cough  Negative for shortness of breath  Cardiovascular: Negative for chest pain  Gastrointestinal: Negative for abdominal pain, constipation and diarrhea  Endocrine: Negative for cold intolerance  Genitourinary: Negative for difficulty urinating and hematuria     Musculoskeletal: Negative for joint swelling  Skin: Negative for rash  Allergic/Immunologic: Negative for environmental allergies  Neurological: Negative for dizziness and headaches  Hematological: Negative for adenopathy  Psychiatric/Behavioral: Negative for agitation  Historical Information   Past Medical History:   Diagnosis Date    Abdominal distension     Last Assessed: [id number]    Abnormal weight loss     Last Assessed: [id number]    Bursitis of left hip     Lst Assessed: [id number]    Chest pain     Last Assessed: [id number]    Dysuria     Last Assessed: [id number]    External hemorrhoids     Last Assessed: [id number]    Hypomagnesemia     Last Assessed: [id number]    Lyme disease     Last Assessed: [id number]    Pneumonia of right middle lobe due to infectious organism [facility])     Last Assessed: [id number]     Past Surgical History:   Procedure Laterality Date    CATARACT EXTRACTION      HEMORRHOID SURGERY      HYSTERECTOMY      PELVIC FLOOR REPAIR       Social History   History   Alcohol Use No     History   Drug Use No     History   Smoking Status    Never Smoker   Smokeless Tobacco    Never Used     Family History:   Family History   Problem Relation Age of Onset    Adopted: Yes       Meds/Allergies   all current active meds have been reviewed  Allergies   Allergen Reactions    Codeine GI Intolerance    Epinephrine Other (See Comments)     Increased Heart Rate, Palpitations    Iodinated Diagnostic Agents     Magnesium Citrate GI Intolerance       Objective   Vitals: Blood pressure 168/77, pulse 84, temperature 99 3 °F (37 4 °C), temperature source Temporal, resp  rate 18, height 5' 3" (1 6 m), weight 52 6 kg (115 lb 15 4 oz), SpO2 98 %      Intake/Output Summary (Last 24 hours) at 03/23/18 1641  Last data filed at 03/23/18 1340   Gross per 24 hour   Intake              600 ml   Output              950 ml   Net             -350 ml     Invasive Devices     Peripheral Intravenous Line            Peripheral IV 03/22/18 Left Hand 1 day                Physical Exam   Constitutional: She is oriented to person, place, and time  She appears well-developed  HENT:   Head: Normocephalic  Eyes: Pupils are equal, round, and reactive to light  Neck: Neck supple  Cardiovascular: Normal rate  No murmur heard  Pulmonary/Chest: No respiratory distress  She has no wheezes  She has no rales  Abdominal: Soft  She exhibits no distension  There is no tenderness  There is no rebound  Musculoskeletal: She exhibits no edema  Lymphadenopathy:     She has no cervical adenopathy  Neurological: She is alert and oriented to person, place, and time  She displays normal reflexes  Skin: Skin is warm  No rash noted  Psychiatric: She has a normal mood and affect  Thought content normal        Lab Results: I have personally reviewed pertinent reports  Imaging Studies: I have personally reviewed pertinent reports  EKG, Pathology, and Other Studies: I have personally reviewed pertinent reports  Code Status: Level 3 - DNAR and DNI  Advance Directive and Living Will: Yes    Power of :    POLST:      Counseling / Coordination of Care  Total floor / unit time spent today 40 minutes  Greater than 50% of total time was spent with the patient and / or family counseling and / or coordination of care  A description of the counseling / coordination of care: see note

## 2018-03-23 NOTE — CONSULTS
Consultation - [facility]) [facility]  Jairo Gomez 80 y o  female MRN: [id number]  Unit/Bed#: 400-01 Encounter: [id number]        ASSESSMENT/PLAN:   1  Abnormal chest CT   -In addition to her pneumonia and bilateral parapneumonic effusions, a density with masslike appearance was noted in the azygo-esophageal recess measuring 3 3 x 3 0 cm    -She denies any symptoms associated with this, including difficulty swallowing    -Bladder mass was also noted on ultrasound and possible lung mass was noted on CT    -Discussed the risks and benefits of upper endoscopy with her and she states she would like to pursue testing to evaluate this abnormality     -Recommend outpatient EGD and EUS once she has recovered from her acute issues  Inpatient consult to gastroenterology  Consult performed by: Manolo Jiménez  Consult ordered by: Vickie العراقي          Reason for Consult / Principal Problem: Hypoxemia    HPI: Jairo Gomez is a 80y o  year old female with a PMH of HTN is admitted for pneumonia after having a fall at the nursing home where she resides  She was incidentially found to have a masslike density in the azygo-esophageal recess on chest CT  She states she is asymptomatic aside from weakness and cough  She denies any GI complaints including difficulty swallowing, nausea, vomiting, change in appetite, change in bowel habits, hematochezia, melena or jaundice  Concerns regarding weight loss have been noted in her chart in the past two years however she has since gained some weight  She was 106lbs in 2016 and was weighed today at 115lbs  She is unsure of her family history as she was adopted  She believes she has had upper endoscopies in the past  She denies personal history of cancer  Review of Systems: as per HPI  Review of Systems   Constitutional: Negative for activity change, appetite change, chills, fatigue, fever and unexpected weight change     HENT: Negative for mouth sores, sore throat and trouble swallowing  Respiratory: Positive for cough  Negative for shortness of breath  Cardiovascular: Negative for chest pain  Gastrointestinal: Negative for abdominal distention, abdominal pain, blood in stool, constipation, diarrhea, nausea and vomiting  Skin: Negative for color change, pallor, rash and wound  Neurological: Negative for tremors and syncope  All other systems reviewed and are negative        Historical Information   Past Medical History:   Diagnosis Date    Abdominal distension     Last Assessed: [id number]    Abnormal weight loss     Last Assessed: [id number]    Bursitis of left hip     Lst Assessed: [id number]    Chest pain     Last Assessed: [id number]    Dysuria     Last Assessed: [id number]    External hemorrhoids     Last Assessed: [id number]    Hypomagnesemia     Last Assessed: [id number]    Lyme disease     Last Assessed: [id number]    Pneumonia of right middle lobe due to infectious organism [facility])     Last Assessed: [id number]     Past Surgical History:   Procedure Laterality Date    CATARACT EXTRACTION      HEMORRHOID SURGERY      HYSTERECTOMY      PELVIC FLOOR REPAIR       Social History   History   Alcohol Use No     History   Drug Use No     History   Smoking Status    Never Smoker   Smokeless Tobacco    Never Used     Family History   Problem Relation Age of Onset    Adopted: Yes       Meds/Allergies     Prescriptions Prior to Admission   Medication    aspirin (ADULT ASPIRIN EC LOW STRENGTH) 81 mg EC tablet    Calcium 500 MG tablet    Cholecalciferol (VITAMIN D-3) 1000 units CAPS    clonazePAM (KlonoPIN) 0 5 mg tablet    clotrimazole-betamethasone (LOTRISONE) 1-0 05 % lotion    digoxin (LANOXIN) 0 125 mg tablet    ibuprofen (MOTRIN) 200 mg tablet    Melatonin 5 MG TABS    metoprolol tartrate (LOPRESSOR) 50 mg tablet    Omega-3 Fatty Acids (OMEGA-3 FISH OIL) 1000 MG CAPS    pantoprazole (PROTONIX) 40 mg tablet    RaNITidine HCl (RANITIDINE 75 PO)     Current Facility-Administered Medications   Medication Dose Route Frequency    acetaminophen (TYLENOL) tablet 650 mg  650 mg Oral Q6H PRN    aspirin (ECOTRIN LOW STRENGTH) EC tablet 81 mg  81 mg Oral Daily    bisacodyl (DULCOLAX) EC tablet 5 mg  5 mg Oral Daily PRN    calcium carbonate-vitamin D (OSCAL-D) 500 mg-200 units per tablet 1 tablet  1 tablet Oral BID With Meals    clonazePAM (KlonoPIN) tablet 0 5 mg  0 5 mg Oral BID PRN    clotrimazole-betamethasone (LOTRISONE) 1-0 05 % cream   Topical BID    dextromethorphan-guaiFENesin (ROBITUSSIN DM)  mg/5 mL oral syrup 10 mL  10 mL Oral Q4H PRN    diltiazem (CARDIZEM) tablet 30 mg  30 mg Oral Q8H [address]    enoxaparin (LOVENOX) subcutaneous injection 40 mg  40 mg Subcutaneous Daily    fish oil capsule 1,000 mg  1,000 mg Oral BID    ipratropium-albuterol (DUO-NEB) 0 5-2 5 mg/3 mL inhalation solution 3 mL  3 mL Nebulization Q4H PRN    ipratropium-albuterol (DUO-NEB) 0 5-2 5 mg/3 mL inhalation solution 3 mL  3 mL Nebulization Q6H PRN    metoprolol (LOPRESSOR) injection 5 mg  5 mg Intravenous Q6H PRN    metoprolol tartrate (LOPRESSOR) tablet 50 mg  50 mg Oral Q12H ROSHNI    ondansetron (ZOFRAN) injection 4 mg  4 mg Intravenous Q6H PRN    pantoprazole (PROTONIX) EC tablet 40 mg  40 mg Oral Early Morning    piperacillin-tazobactam (ZOSYN) 3 375 g in sodium chloride 0 9 % 50 mL IVPB  3 375 g Intravenous Q6H    polyethylene glycol (MIRALAX) packet 17 g  17 g Oral Daily    sodium chloride (PF) 0 9 % injection 3 mL  3 mL Intravenous PRN    zolpidem (AMBIEN) tablet 5 mg  5 mg Oral HS PRN       Allergies   Allergen Reactions    Codeine GI Intolerance    Epinephrine Other (See Comments)     Increased Heart Rate, Palpitations    Iodinated Diagnostic Agents     Magnesium Citrate GI Intolerance       Objective     Blood pressure (!) 173/73, pulse 87, temperature 98 3 °F (36 8 °C), temperature source Temporal, resp   rate 18, height 5' 3" (1 6 m), weight 52 6 kg (115 lb 15 4 oz), SpO2 94 %  Intake/Output Summary (Last 24 hours) at 03/23/18 1054  Last data filed at 03/23/18 0817   Gross per 24 hour   Intake              600 ml   Output              950 ml   Net             -350 ml       PHYSICAL EXAM     Physical Exam   Constitutional: She is oriented to person, place, and time  She is cooperative  No distress  Nasal cannula in place  HENT:   Head: Normocephalic and atraumatic  Eyes: Right eye exhibits no discharge  Left eye exhibits no discharge  No scleral icterus  Neck: Neck supple  No tracheal deviation present  Cardiovascular: Normal rate, regular rhythm and normal heart sounds  Exam reveals no gallop and no friction rub  No murmur heard  Pulmonary/Chest: Effort normal and breath sounds normal    Abdominal: Soft  Bowel sounds are normal  She exhibits no distension and no mass  There is no tenderness  There is no rebound and no guarding  Neurological: She is alert and oriented to person, place, and time  Skin: Skin is warm and dry  Psychiatric: She has a normal mood and affect  Lab Results:   CBC: Lab Results   Component Value Date    WBC 6 58 03/23/2018    HGB 11 4 (L) 03/23/2018    HCT 35 4 03/23/2018    MCV 92 03/23/2018     03/23/2018    MCH 29 7 03/23/2018    MCHC 32 2 03/23/2018    RDW 12 8 03/23/2018    MPV 9 8 03/23/2018   ,   CMP: Lab Results   Component Value Date     (L) 03/23/2018    K 3 8 03/23/2018    CL 95 (L) 03/23/2018    CO2 30 03/23/2018    ANIONGAP 8 03/23/2018    BUN 10 03/23/2018    CREATININE 0 68 03/23/2018    GLUCOSE 118 03/23/2018    CALCIUM 9 5 03/23/2018    EGFR 79 03/23/2018   ,   Lipase: No results found for: LIPASE,  PT/INR: No results found for: PT, INR,   Troponin: No results found for: TROPONINI,   Magnesium: No results found for: MAG,   Phosphorous: No results found for: PHOS  Imaging Studies: I have personally reviewed pertinent reports        CT CHEST WITHOUT IV CONTRAST     INDICATION:   Persistent nonproductive cough      COMPARISON: Chest x-ray from 3/19/2018      TECHNIQUE: CT examination of the chest was performed without intravenous contrast   Axial, sagittal, and coronal 2D reformatted images were created from the source data and submitted for interpretation      Radiation dose length product (DLP) for this visit:  277 22 mGy-cm   This examination, like all CT scans performed in the [facility], was performed utilizing techniques to minimize radiation dose exposure, including the use of iterative   reconstruction and automated exposure control       FINDINGS:     LUNGS:  Alveolar consolidation in the right upper lobe with air bronchograms  Additional foci of consolidation noted in the lingula and both lower lobes  Density with masslike appearance in the azygo-esophageal recess measuring 3 3 x 3 0 cm  Additional right lower lobe nodule measuring 8 mm on image 2/38      PLEURA:  Small bilateral pleural effusions likely parapneumonic effusions      HEART/GREAT VESSELS:  Mild cardiomegaly  No aortic aneurysm      MEDIASTINUM AND RASTA:  Unremarkable      CHEST WALL AND LOWER NECK:   Unremarkable      VISUALIZED STRUCTURES IN THE UPPER ABDOMEN:  Unremarkable      OSSEOUS STRUCTURES:  No acute fracture or destructive osseous lesion      IMPRESSION:     Alveolar consolidation in the right upper lobe with air bronchograms  Additional foci of consolidation noted in the lingula and both lower lobes  Findings consistent with multilobar pneumonia  Bilateral parapneumonic effusions      Density with masslike appearance in the azygo-esophageal recess measuring 3 3 x 3 0 cm  Neoplasm not excluded      Additional right lower lobe nodule measuring 8 mm on image 2/38        Consider PET/CT scan for further evaluation of these suspicious right lower lobe abnormalities when the patient's clinical condition permits  Patient was seen and examined by Dr Keyla Nicholas  All key medical decisions were made by Dr Naila Harris  Thank you for allowing us to participate in the care of this present patient  We will follow-up with you closely

## 2018-03-23 NOTE — PROGRESS NOTES
[address] Internal Medicine Progress Note  Patient: Martina Martinez 80 y o  female   MRN: [id number]  PCP: Evelin Crystal DO  Unit/Bed#: 400-04 Encounter: [id number]  Date Of Visit: 03/23/18    Assessment:    Principal Problem:    Hypoxemia  Active Problems:    Other insomnia    Supraventricular tachycardia ([address] )    Healthcare-associated pneumonia    Acute febrile illness    Acute cystitis    Hypovolemia due to dehydration    Acute tracheobronchitis    Cough    Shortness of breath    Mass of urinary bladder determined by ultrasound    Esophageal mass      Plan:    · Hypoxia-resolved  ? Unclear etiology-CHF versus pneumonia versus acute tracheobronchitis  ? Resolved  ? Elevated BNP on admission   2D echo result showed ejection fraction 60% with no regional wall motion abnormality  ? Chest x-ray showed mildly increased interstitial markings without focal consolidation   Patient empirically on IV Zosyn 3 375 g q 6 hours  Blood cultures negative  Respiratory pathogen profile negative  ? Respiratory protocol initiated on the patient  ? Ordered CT chest without contrast because chest x-ray on the admission showed increased interstitial markings  ? Consulted Pulmonary for further input and management  ? Robitussin DM 10 cc p  o  q 4 hours as needed for cough  · Acute cystitis with hematuria  ? Urine cultures positive for gram-negative rods  Sensitivity and specificity positive for E coli  ? Urinalysis looks like a UTI  ? Empirically on IV ceftriaxone 1 g daily  · Paroxysmal SVT  ? On metoprolol 50 mg p o  b i d  and diltiazem 30 mg p o  B i d   ? Cardiology following the patient  · Encourage PT/OT  · History of fall-secondary to age/loss of balance/UTI  · Questionable mass at azygo-esophageal recess  ? 3 x 3 1 cm  ? Incidental finding on CT scan of chest  ? Consulted Gastroenterology for further input and management  ?  Consulted heme oncology for further input and management  · Left posterior bladder wall filling defect  ? Incidental finding on renal ultrasound  ? Polypoid bladder neoplasm not excluded  ? Consulted Urology for further input and management  ? Consulted heme oncology for further input and management  · Pulmonary nodule  ? 8 mm pulmonary nodule seen on right lower lobe on CT scan of chest  ? Need follow-up as an outpatient with PET scan         VTE Pharmacologic Prophylaxis:   Pharmacologic: Enoxaparin (Lovenox)  Mechanical VTE Prophylaxis in Place: Yes    Patient Centered Rounds: I have performed bedside rounds with nursing staff today  Discussions with Specialists or Other Care Team Provider:  No    Education and Discussions with Family / Patient:  No family members present at bedside    Time Spent for Care: 45 minutes  More than 50% of total time spent on counseling and coordination of care as described above  Current Length of Stay: 4 day(s)    Current Patient Status: Inpatient   Certification Statement: The patient will continue to require additional inpatient hospital stay due to Pneumonia, UTI    Discharge Plan / Estimated Discharge Date:  2018    Code Status: Level 3 - DNAR and DNI      Subjective:   Patient seen examined at bedside  Patient states she is doing much better today  Patient states her cough is subsiding  Patient denies fever, chills, nausea, vomiting, diarrhea, abdominal pain, chest pain, palpitation, shortness of breath  Patient complains of tiredness  Objective:     Vitals:   Temp (24hrs), Av 7 °F (37 1 °C), Min:97 7 °F (36 5 °C), Max:100 4 °F (38 °C)    HR:  [] 78  Resp:  [18-24] 18  BP: (112-176)/(54-81) 146/74  SpO2:  [94 %-98 %] 94 %  Body mass index is 20 54 kg/m²  Input and Output Summary (last 24 hours):        Intake/Output Summary (Last 24 hours) at 18 0955  Last data filed at 18 0817   Gross per 24 hour   Intake              600 ml   Output             1050 ml   Net             -450 ml       Physical Exam:     Physical Exam Constitutional: She is oriented to person, place, and time  She appears well-developed and well-nourished  HENT:   Head: Normocephalic and atraumatic  Eyes: Conjunctivae and EOM are normal  Pupils are equal, round, and reactive to light  Neck: Normal range of motion  Neck supple  Cardiovascular: Normal rate, regular rhythm, normal heart sounds and intact distal pulses  Pulmonary/Chest: Effort normal and breath sounds normal    Abdominal: Soft  Bowel sounds are normal    Musculoskeletal: Normal range of motion  Neurological: She is alert and oriented to person, place, and time  She has normal reflexes  Skin: Skin is warm and dry  Vitals reviewed  Additional Data:     Labs:      Results from last 7 days  Lab Units 03/23/18  0445   WBC Thousand/uL 6 58   HEMOGLOBIN g/dL 11 4*   HEMATOCRIT % 35 4   PLATELETS Thousands/uL 301   NEUTROS PCT % 62   LYMPHS PCT % 17   MONOS PCT % 16*   EOS PCT % 4       Results from last 7 days  Lab Units 03/23/18  0445 03/20/18  0429   SODIUM mmol/L 133* 135*   POTASSIUM mmol/L 3 8 3 8   CHLORIDE mmol/L 95* 100   CO2 mmol/L 30 27   BUN mg/dL 10 10   CREATININE mg/dL 0 68 0 89   CALCIUM mg/dL 9 5 8 8   TOTAL PROTEIN g/dL  --  7 2   BILIRUBIN TOTAL mg/dL  --  0 60   ALK PHOS U/L  --  63   ALT U/L  --  22   AST U/L  --  21   GLUCOSE RANDOM mg/dL 118 144*       Results from last 7 days  Lab Units 03/20/18  0436   INR  1 19*       * I Have Reviewed All Lab Data Listed Above  * Additional Pertinent Lab Tests Reviewed: [address] Admission Reviewed    Imaging:    Imaging Reports Reviewed Today Include:  CT scan chest, renal ultrasound  Imaging Personally Reviewed by Myself Includes:      Recent Cultures (last 7 days):       Results from last 7 days  Lab Units 03/19/18  1937 03/19/18  1803 03/19/18  1652   BLOOD CULTURE   --  No Growth at 72 hrs  No Growth at 72 hrs     URINE CULTURE    >100,000 cfu/ml Escherichia coli*  --   --        Last 24 Hours Medication List:     Current Facility-Administered Medications:  acetaminophen 650 mg Oral Q6H PRN John Carrillo MD    aspirin 81 mg Oral Daily John Carrillo MD    azithromycin 500 mg Intravenous Q24H [employer], DO Last Rate: 500 mg (03/22/18 1737)   bisacodyl 5 mg Oral Daily PRN Felecia Mast MD    calcium carbonate-vitamin D 1 tablet Oral BID With Meals John Carrillo MD    cefTRIAXone 1,000 mg Intravenous Q24H John Carrillo MD Last Rate: 1,000 mg (03/22/18 2202)   clonazePAM 0 5 mg Oral BID PRN Lucille Gaitan MD    clotrimazole-betamethasone  Topical BID John Carrillo MD    dextromethorphan-guaiFENesin 10 mL Oral Q4H PRN John Carrillo MD    diltiazem 30 mg Oral [facility] Luis Underwood, DO    enoxaparin 40 mg Subcutaneous Daily John Carrillo MD    fish oil 1,000 mg Oral BID John Carrillo MD    ipratropium-albuterol 3 mL Nebulization Q4H PRN John Carrillo MD    ipratropium-albuterol 3 mL Nebulization Q6H PRN [employer], DO    metoprolol 5 mg Intravenous Q6H PRN Lucille Gaitan MD    metoprolol tartrate 50 mg Oral Q12H [facility] Luis [place], DO    ondansetron 4 mg Intravenous Q6H PRN Jonh Carrillo MD    pantoprazole 40 mg Oral Early Morning John Carrillo MD    polyethylene glycol 17 g Oral Daily Felecia Mast MD    sodium chloride (PF) 3 mL Intravenous PRN Anselm Oddi, DO    zolpidem 5 mg Oral HS PRN Lucille Gaitan MD         Today, Patient Was Seen By: Lucille Gaitan MD    ** Please Note: This note has been constructed using a voice recognition system   **

## 2018-03-23 NOTE — TELEPHONE ENCOUNTER
Could you look over this patients chart and see what Dr Gloria Melendrez says if he needs procedure or office visit, then advise on when to schedule him?

## 2018-03-23 NOTE — PHYSICAL THERAPY NOTE
PT treatment Note      03/23/18 1340   Pain Assessment   Pain Score No Pain   Restrictions/Precautions   Other Precautions (Bed Alarm; Chair Alarm;O2;Fall Risk)   Subjective   Subjective I'm trying to get some sleep but I need to walk  Bed Mobility   Rolling L 5  Supervision   Supine to Sit 5  Supervision   Additional items Assist x 1;HOB elevated; Bedrails; Increased time required   Transfers   Sit to Stand 5  Supervision   Additional items Assist x 1;Bedrails;Verbal cues   Stand to Sit 5  Supervision   Additional items Bedrails   Stand pivot (CGA)   Ambulation/Elevation   Gait pattern (Decreased foot clearance;Shuffling; Short stride)   Gait Assistance (CGA)   Additional items Assist x 1   Assistive Device None   Distance 200'   Balance   Static Sitting Good   Dynamic Sitting Good   Static Standing Fair +   Dynamic Standing Fair   Ambulatory Fair   Endurance Deficit   Endurance Deficit Yes   Activity Tolerance   Activity Tolerance Patient limited by fatigue   Assessment   Prognosis Good   Problem List Decreased range of motion;Decreased endurance; Impaired balance;Decreased mobility   Assessment Performs functional mobility at [facility]) level of function  Cues for safety  Pt with altered gait pattern/unsteady gait  Recommended use of a d , pt  declines  No SOB  Pt would benefit from continued activity in PT to improve impairments and functional deficits  Plan   Treatment/Interventions Functional transfer training;LE strengthening/ROM; Therapeutic exercise; Endurance training;Bed mobility;Gait training   Progress Progressing toward goals   Recommendation   Recommendation Home PT   Pt  In bed  with call bell within reach, scd's connected and turned on and alarm on at end of PT session

## 2018-03-23 NOTE — PROGRESS NOTES
Patient OOB to chair; family at bedside  Call bell in reach; bed in low position  1500 Patient febrile; tylenol given

## 2018-03-23 NOTE — TELEPHONE ENCOUNTER
Patient needs an OV with Dr Mónica Perea in 2 to 3 weeks  Would recommend OV be for cystoscopy if Dr Mónica Perea agreeable  Patient has concerning mass in bladder on recent ultrasound   Patient also has UTI

## 2018-03-23 NOTE — PROGRESS NOTES
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   She will need a pulmonary follow up after her PET/CT    Plan:  · PET/CT as an out patient w/ pulm follow up to discuss  ----------------------------------------------------------------------------------------------------------------------    HPI/Interval History:   A little better this mornig  Still w/ cough on deep inspiration  NOTE: CT Chest w/ para esophageal lung mass noted  Agree w/ PET/CT  She will need out patient pulmonary follow up  Vitals:   Blood pressure 170/72, pulse 89, temperature 100 °F (37 8 °C), temperature source Temporal, resp  rate 18, height 5' 3" (1 6 m), weight 52 6 kg (115 lb 15 4 oz), SpO2 98 %  ,Body mass index is 20 54 kg/m²  SpO2: 98 %  SpO2 Activity: At Rest  O2 Device: Nasal cannula    Physical Exam:   Gen: Alert and without respiratory distress  HEENT: PERRL  O/P: clear  no erythema or exudate  Neck: Supple  There is no JVD, no LAD or thyromegaly appreciated  Trachea is midline  Chest: diminished due to cough on deep inspiration  Cardiac: RRR no M,G,R  Abdomen: Soft and nontender  Bowel sounds are present    Extremities: no C,C,E      Labs:    CBC:    Results from last 7 days  Lab Units 03/23/18  0445 03/20/18  0429 03/19/18  2219 03/19/18  1652   WBC Thousand/uL 6 58 7 05  --  8 54   HEMOGLOBIN g/dL 11 4* 12 3  --  13 0   HEMATOCRIT % 35 4 38 0  --  39 2   PLATELETS Thousands/uL 301 241 234 246       CMP:     Results from last 7 days  Lab Units 03/23/18  0445 03/20/18  0429 03/19/18  1652   SODIUM mmol/L 133* 135* 130*   POTASSIUM mmol/L 3 8 3 8 4 3   CHLORIDE mmol/L 95* 100 93*   CO2 mmol/L 30 27 28   BUN mg/dL 10 10 14   CREATININE mg/dL 0 68 0 89 0 95   CALCIUM mg/dL 9 5 8 8 9 2   TOTAL PROTEIN g/dL  --  7 2 7 5 BILIRUBIN TOTAL mg/dL  --  0 60 0 80   ALK PHOS U/L  --  63 65   ALT U/L  --  22 24   AST U/L  --  21 23   GLUCOSE RANDOM mg/dL 118 144* 148*         Imaging studies:  CT Chest from yesterday PM noted to have midline paraesophageal lung mass which could be malignancy vs esoph or ppulm retention cyst   Agree w/ PET/CT      Dorinda Hernandez MD    Portions of the record may have been created with voice recognition software  Occasional wrong word or "sound a like" substitutions may have occurred due to the inherent limitations of voice recognition software  Read the chart carefully and recognize, using context, where substitutions have occurred

## 2018-03-23 NOTE — CASE MANAGEMENT
Continued Stay Review    Date: 03/23/18    Vital Signs: BP (!) 173/73   Pulse 87   Temp 98 3 °F (36 8 °C) (Temporal)   Resp 18   Ht 5' 3" (1 6 m)   Wt 52 6 kg (115 lb 15 4 oz)   SpO2 94%   BMI 20 54 kg/m²     Medications:   Scheduled Meds:   Current Facility-Administered Medications:  acetaminophen 650 mg Oral Q6H PRN   aspirin 81 mg Oral Daily   bisacodyl 5 mg Oral Daily PRN   calcium carbonate-vitamin D 1 tablet Oral BID With Meals   clonazePAM 0 5 mg Oral BID PRN   clotrimazole-betamethasone  Topical BID   dextromethorphan-guaiFENesin 10 mL Oral Q4H PRN   diltiazem 30 mg Oral Q8H ROSHNI   enoxaparin 40 mg Subcutaneous Daily   fish oil 1,000 mg Oral BID   ipratropium-albuterol 3 mL Nebulization Q6H PRN   metoprolol 5 mg Intravenous Q6H PRN   metoprolol tartrate 50 mg Oral Q12H ROSHNI   ondansetron 4 mg Intravenous Q6H PRN   pantoprazole 40 mg Oral Early Morning   piperacillin-tazobactam 3 375 g Intravenous Q6H   polyethylene glycol 17 g Oral Daily   zolpidem 5 mg Oral HS PRN     Continuous Infusions:    PRN Meds:   acetaminophen    bisacodyl    clonazePAM    dextromethorphan-guaiFENesin    ipratropium-albuterol    metoprolol    ondansetron    zolpidem    Abnormal Labs/Diagnostic Results:   SODIUM 133*   CHLORIDE 95*     INR   1 19*     US kidney, bladder:  1  Left renal cyst   No hydronephrosis  Otherwise unremarkable appearance of the kidneys  2  Possible left posterior bladder nonmobile filling defect  Polypoid bladder neoplasm is not excluded  Further evaluation with nonemergent urological consultation or, if possible, contrast-enhanced CT recommended  CT Chest:  Alveolar consolidation in the right upper lobe with air bronchograms  Additional foci of consolidation noted in the lingula and both lower lobes  Findings consistent with multilobar pneumonia  Bilateral parapneumonic effusions      Density with masslike appearance in the azygo-esophageal recess measuring 3 3 x 3 0 cm   Neoplasm not excluded      Additional right lower lobe nodule measuring 8 mm on image 2/38        Consider PET/CT scan for further evaluation of these suspicious right lower lobe abnormalities when the patient's clinical condition permits  Age/Sex: 80 y o  female     Assessment/Plan:   Principal Problem:    Hypoxemia  Active Problems:    Other insomnia    Supraventricular tachycardia (HCC)    Healthcare-associated pneumonia    Acute febrile illness    Acute cystitis    Hypovolemia due to dehydration    Acute tracheobronchitis    Cough    Shortness of breath    Mass of urinary bladder determined by ultrasound    Esophageal mass        Plan:     · Hypoxia-resolved  ? Unclear etiology-CHF versus pneumonia versus acute tracheobronchitis  ? Resolved  ? Elevated BNP on admission   2D echo result showed ejection fraction 60% with no regional wall motion abnormality  ? Chest x-ray showed mildly increased interstitial markings without focal consolidation   Patient empirically on IV Zosyn 3 375 g q 6 hours  Blood cultures negative  Respiratory pathogen profile negative  ? Respiratory protocol initiated on the patient  ? Ordered CT chest without contrast because chest x-ray on the admission showed increased interstitial markings  ? Consulted Pulmonary for further input and management  ? Robitussin DM 10 cc p  o  q 4 hours as needed for cough  · Acute cystitis with hematuria  ? Urine cultures positive for gram-negative rods   Sensitivity and specificity positive for E coli  ? Urinalysis looks like a UTI  ? Empirically on IV ceftriaxone 1 g daily  · Paroxysmal SVT  ? On metoprolol 50 mg p o  b i d  and diltiazem 30 mg p o  B i d   ? Cardiology following the patient  · Encourage PT/OT  · History of fall-secondary to age/loss of balance/UTI  · Questionable mass at azygo-esophageal recess  ? 3 x 3 1 cm  ? Incidental finding on CT scan of chest  ? Consulted Gastroenterology for further input and management  ?  Consulted heme oncology for further input and management  · Left posterior bladder wall filling defect  ? Incidental finding on renal ultrasound  ? Polypoid bladder neoplasm not excluded  ? Consulted Urology for further input and management  ? Consulted heme oncology for further input and management  · Pulmonary nodule  ? 8 mm pulmonary nodule seen on right lower lobe on CT scan of chest  ? Need follow-up as an outpatient with PET scan           VTE Pharmacologic Prophylaxis:   Pharmacologic: Enoxaparin (Lovenox)  Mechanical VTE Prophylaxis in Place: Yes     Current Patient Status: Inpatient   Certification Statement: The patient will continue to require additional inpatient hospital stay due to Pneumonia, UTI     Discharge Plan / Estimated Discharge Date:  March 25, 2018      Thank you,  [facility] in the [facility] by Kai Meneses for 2017  Network Utilization Review Department  Phone: [phone]; Fax [phone]  ATTENTION: The Network Utilization Review Department is now centralized for our 7 Facilities  Make a note that we have a new phone and fax numbers for our Department  Please call with any questions or concerns to [phone] and carefully follow the prompts so that you are directed to the right person  All voicemails are confidential  Fax any determinations, approvals, denials, and requests for initial or continue stay review clinical to [phone]  Due to HIGH CALL volume, it would be easier if you could please send faxed requests to expedite your requests and in part, help us provide discharge notifications faster

## 2018-03-24 PROBLEM — E87.1 HYPONATREMIA: Status: ACTIVE | Noted: 2018-03-24

## 2018-03-24 LAB
BACTERIA BLD CULT: NORMAL
BACTERIA BLD CULT: NORMAL

## 2018-03-24 PROCEDURE — 99233 SBSQ HOSP IP/OBS HIGH 50: CPT | Performed by: INTERNAL MEDICINE

## 2018-03-24 RX ADMIN — DILTIAZEM HYDROCHLORIDE 30 MG: 30 TABLET, FILM COATED ORAL at 21:20

## 2018-03-24 RX ADMIN — PIPERACILLIN SODIUM,TAZOBACTAM SODIUM 3.38 G: 3; .375 INJECTION, POWDER, FOR SOLUTION INTRAVENOUS at 12:39

## 2018-03-24 RX ADMIN — CALCIUM CARBONATE-VITAMIN D TAB 500 MG-200 UNIT 1 TABLET: 500-200 TAB at 17:09

## 2018-03-24 RX ADMIN — PANTOPRAZOLE SODIUM 40 MG: 40 TABLET, DELAYED RELEASE ORAL at 05:37

## 2018-03-24 RX ADMIN — CALCIUM CARBONATE-VITAMIN D TAB 500 MG-200 UNIT 1 TABLET: 500-200 TAB at 08:22

## 2018-03-24 RX ADMIN — CLOTRIMAZOLE AND BETAMETHASONE DIPROPIONATE: 10; .5 CREAM TOPICAL at 08:23

## 2018-03-24 RX ADMIN — DILTIAZEM HYDROCHLORIDE 30 MG: 30 TABLET, FILM COATED ORAL at 14:32

## 2018-03-24 RX ADMIN — PIPERACILLIN SODIUM,TAZOBACTAM SODIUM 3.38 G: 3; .375 INJECTION, POWDER, FOR SOLUTION INTRAVENOUS at 23:20

## 2018-03-24 RX ADMIN — Medication 1000 MG: at 17:09

## 2018-03-24 RX ADMIN — PIPERACILLIN SODIUM,TAZOBACTAM SODIUM 3.38 G: 3; .375 INJECTION, POWDER, FOR SOLUTION INTRAVENOUS at 05:37

## 2018-03-24 RX ADMIN — ASPIRIN 81 MG: 81 TABLET, COATED ORAL at 08:22

## 2018-03-24 RX ADMIN — ENOXAPARIN SODIUM 40 MG: 40 INJECTION SUBCUTANEOUS at 08:23

## 2018-03-24 RX ADMIN — Medication 1000 MG: at 08:24

## 2018-03-24 RX ADMIN — CLOTRIMAZOLE AND BETAMETHASONE DIPROPIONATE: 10; .5 CREAM TOPICAL at 18:00

## 2018-03-24 RX ADMIN — PIPERACILLIN SODIUM,TAZOBACTAM SODIUM 3.38 G: 3; .375 INJECTION, POWDER, FOR SOLUTION INTRAVENOUS at 17:09

## 2018-03-24 RX ADMIN — POLYETHYLENE GLYCOL 3350 17 G: 17 POWDER, FOR SOLUTION ORAL at 08:24

## 2018-03-24 RX ADMIN — DILTIAZEM HYDROCHLORIDE 30 MG: 30 TABLET, FILM COATED ORAL at 05:36

## 2018-03-24 RX ADMIN — METOPROLOL TARTRATE 50 MG: 50 TABLET ORAL at 21:20

## 2018-03-24 RX ADMIN — METOPROLOL TARTRATE 50 MG: 50 TABLET ORAL at 08:24

## 2018-03-24 NOTE — PLAN OF CARE
DISCHARGE PLANNING     Discharge to home or other facility with appropriate resources Progressing        DISCHARGE PLANNING - CARE MANAGEMENT     Discharge to post-acute care or home with appropriate resources Progressing        INFECTION - ADULT     Absence or prevention of progression during hospitalization Progressing     Absence of fever/infection during neutropenic period Progressing        Knowledge Deficit     Patient/family/caregiver demonstrates understanding of disease process, treatment plan, medications, and discharge instructions Progressing        PAIN - ADULT     Verbalizes/displays adequate comfort level or baseline comfort level Progressing        Potential for Falls     Patient will remain free of falls Progressing        Prexisting or High Potential for Compromised Skin Integrity     Skin integrity is maintained or improved Progressing        SAFETY ADULT     Maintain or return to baseline ADL function Progressing     Maintain or return mobility status to optimal level Progressing          DISCHARGE PLANNING     Discharge to home or other facility with appropriate resources Progressing        DISCHARGE PLANNING - CARE MANAGEMENT     Discharge to post-acute care or home with appropriate resources Progressing        INFECTION - ADULT     Absence or prevention of progression during hospitalization Progressing     Absence of fever/infection during neutropenic period Progressing        Knowledge Deficit     Patient/family/caregiver demonstrates understanding of disease process, treatment plan, medications, and discharge instructions Progressing        PAIN - ADULT     Verbalizes/displays adequate comfort level or baseline comfort level Progressing        Potential for Falls     Patient will remain free of falls Progressing        Prexisting or High Potential for Compromised Skin Integrity     Skin integrity is maintained or improved Progressing        SAFETY ADULT     Maintain or return to baseline ADL function Progressing     Maintain or return mobility status to optimal level Progressing

## 2018-03-24 NOTE — PROGRESS NOTES
[facility] Progress Note  Patient: Rudy Rust 80 y o  female   MRN: [id number]  PCP: Melody Valdez DO  Unit/Bed#: 400-01 Encounter: [id number]  Date Of Visit: 03/24/18    Assessment:    Principal Problem:    Hypoxemia  Active Problems:    Other insomnia    Supraventricular tachycardia ([address] )    Healthcare-associated pneumonia    Acute febrile illness    Acute cystitis    Hypovolemia due to dehydration    Acute tracheobronchitis    Cough    Shortness of breath    Mass of urinary bladder determined by ultrasound    Esophageal mass    Pulmonary nodule    Hyponatremia      Plan:    · Pneumonia  ? CT chest showed:  Alveolar consolidation in the right upper lobe with air bronchogram   Additional foci of consolidation noted in the lingula and both lower lobes  Finding consistent with multilobar pneumonia  Bilateral parapneumonic effusions  ? Elevated BNP on admission   2D echo result showed ejection fraction 60% with no regional wall motion abnormality  ? Chest x-ray showed mildly increased interstitial markings without focal consolidation   Patient empirically on IV Zosyn 3 375 g q 6 hours  Blood cultures negative  Respiratory pathogen profile negative  ? Respiratory protocol initiated on the patient  ? Consulted Pulmonary for further input and management  ? Robitussin DM 10 cc p  o  q 4 hours as needed for cough  · Acute cystitis with hematuria  ? Urine cultures positive for gram-negative rods   Sensitivity and specificity positive for E coli  ? Urinalysis looks like a UTI  ? Empirically on IV Zosyn 3 375 g q 6 hours  · Paroxysmal SVT  ? On metoprolol 50 mg p o  b i d  and diltiazem 30 mg p o  B i d   ? Will follow up with Cardiology as an outpatient for possible ablation  · Encourage PT/OT  · History of fall-secondary to age/loss of balance/UTI  · Questionable mass at azygo-esophageal recess  ? 3 x 3 1 cm  ?  Incidental finding on CT scan of chest  ? Will follow up with Gastroenterology as an outpatient for EGD  · Left posterior bladder wall filling defect  ? Incidental finding on renal ultrasound  ? Polypoid bladder neoplasm not excluded  ? Will follow up with Urology as an outpatient for cystoscopy  · Pulmonary nodule  ? 8 mm pulmonary nodule seen on right lower lobe on CT scan of chest  ? Need follow-up as an outpatient with PET scan  · Encourage PT/OT  · Will consider to discharge the patient tomorrow on p o  antibiotics         VTE Pharmacologic Prophylaxis:   Pharmacologic: Enoxaparin (Lovenox)  Mechanical VTE Prophylaxis in Place: Yes    Patient Centered Rounds: I have performed bedside rounds with nursing staff today  Discussions with Specialists or Other Care Team Provider:  Gastroenterology, heme oncology, pulmonary, Urology    Education and Discussions with Family / Patient:  No family member present at bedside    Time Spent for Care: 30 minutes  More than 50% of total time spent on counseling and coordination of care as described above  Current Length of Stay: 5 day(s)    Current Patient Status: Inpatient   Certification Statement: The patient will continue to require additional inpatient hospital stay due to IV antibiotics    Discharge Plan / Estimated Discharge Date:  2018    Code Status: Level 3 - DNAR and DNI      Subjective:   Patient seen examined at bedside  Patient states she is doing much better since admission  Per patient complains of generalized weakness and wants to do more physical therapy  Patient wants to go home tomorrow    Objective:     Vitals:   Temp (24hrs), Av °F (37 2 °C), Min:97 6 °F (36 4 °C), Max:100 1 °F (37 8 °C)    HR:  [65-89] 75  Resp:  [18-20] 18  BP: (116-178)/(65-84) 149/65  SpO2:  [96 %-98 %] 96 %  Body mass index is 22 53 kg/m²  Input and Output Summary (last 24 hours):        Intake/Output Summary (Last 24 hours) at 18 0854  Last data filed at 18 2139   Gross per 24 hour   Intake              480 ml   Output 800 ml   Net             -320 ml       Physical Exam:     Physical Exam   Constitutional: She is oriented to person, place, and time  She appears well-developed and well-nourished  HENT:   Head: Normocephalic and atraumatic  Eyes: Conjunctivae and EOM are normal  Pupils are equal, round, and reactive to light  Neck: Normal range of motion  Neck supple  Cardiovascular: Normal rate, regular rhythm, normal heart sounds and intact distal pulses  Pulmonary/Chest: Effort normal and breath sounds normal    Abdominal: Soft  Bowel sounds are normal    Musculoskeletal: Normal range of motion  Neurological: She is alert and oriented to person, place, and time  She has normal reflexes  Skin: Skin is warm and dry  Vitals reviewed  Additional Data:     Labs:      Results from last 7 days  Lab Units 03/23/18  0445   WBC Thousand/uL 6 58   HEMOGLOBIN g/dL 11 4*   HEMATOCRIT % 35 4   PLATELETS Thousands/uL 301   NEUTROS PCT % 62   LYMPHS PCT % 17   MONOS PCT % 16*   EOS PCT % 4       Results from last 7 days  Lab Units 03/23/18  0445 03/20/18  0429   SODIUM mmol/L 133* 135*   POTASSIUM mmol/L 3 8 3 8   CHLORIDE mmol/L 95* 100   CO2 mmol/L 30 27   BUN mg/dL 10 10   CREATININE mg/dL 0 68 0 89   CALCIUM mg/dL 9 5 8 8   TOTAL PROTEIN g/dL  --  7 2   BILIRUBIN TOTAL mg/dL  --  0 60   ALK PHOS U/L  --  63   ALT U/L  --  22   AST U/L  --  21   GLUCOSE RANDOM mg/dL 118 144*       Results from last 7 days  Lab Units 03/20/18  0436   INR  1 19*       * I Have Reviewed All Lab Data Listed Above  * Additional Pertinent [facility] Tests [facility]ed[facility]    Imaging:    Imaging Reports Reviewed Today Include:  No imaging done today  Imaging Personally Reviewed by Myself Includes:      Recent Cultures (last 7 days):       Results from last 7 days  Lab Units 03/22/18  1757 03/19/18  1937 03/19/18  1803 03/19/18  1652   BLOOD CULTURE  No Growth at 24 hrs   --  No Growth After 4 Days   No Growth After 4 Days  URINE CULTURE   --    >100,000 cfu/ml Escherichia coli*  --   --        Last 24 Hours Medication List:     Current Facility-Administered Medications:  acetaminophen 650 mg Oral Q6H PRN Karma Lopes MD    aspirin 81 mg Oral Daily Karma Lopes MD    bisacodyl 5 mg Oral Daily PRN Artur Morales MD    calcium carbonate-vitamin D 1 tablet Oral BID With Meals Karma Lopes MD    clonazePAM 0 5 mg Oral BID PRN Duke Avila MD    clotrimazole-betamethasone  Topical BID Karma Lopes MD    dextromethorphan-guaiFENesin 10 mL Oral Q4H PRN Karma Lopes MD    diltiazem 30 mg Oral [facility] Luis Underwood,     enoxaparin 40 mg Subcutaneous Daily Karma Lopes MD    fish oil 1,000 mg Oral BID Karma Lopes MD    ipratropium-albuterol 3 mL Nebulization Q6H PRN Jerome Michaud, DO    metoprolol 5 mg Intravenous Q6H PRN Duke Avila MD    metoprolol tartrate 50 mg Oral Q12H [address] Luis Underwood,     ondansetron 4 mg Intravenous Q6H PRN Karma Lopes MD    pantoprazole 40 mg Oral Early Morning Karma Lopes MD    piperacillin-tazobactam 3 375 g Intravenous Q6H Duke Avila MD Last Rate: 3 375 g (03/24/18 0537)   polyethylene glycol 17 g Oral Daily Artur Morales MD    zolpidem 5 mg Oral HS PRN Duke Avila MD         Today, Patient Was Seen By: Duke Avila MD    ** Please Note: This note has been constructed using a voice recognition system   **

## 2018-03-25 LAB
ANION GAP SERPL CALCULATED.3IONS-SCNC: 6 MMOL/L (ref 4–13)
BUN SERPL-MCNC: 11 MG/DL (ref 5–25)
CALCIUM SERPL-MCNC: 9.4 MG/DL (ref 8.3–10.1)
CHLORIDE SERPL-SCNC: 97 MMOL/L (ref 100–108)
CO2 SERPL-SCNC: 30 MMOL/L (ref 21–32)
CREAT SERPL-MCNC: 0.74 MG/DL (ref 0.6–1.3)
ERYTHROCYTE [DISTWIDTH] IN BLOOD BY AUTOMATED COUNT: 13.2 % (ref 11.6–15.1)
GFR SERPL CREATININE-BSD FRML MDRD: 74 ML/MIN/1.73SQ M
GLUCOSE SERPL-MCNC: 103 MG/DL (ref 65–140)
HCT VFR BLD AUTO: 34.5 % (ref 34.8–46.1)
HGB BLD-MCNC: 11 G/DL (ref 11.5–15.4)
MCH RBC QN AUTO: 29.8 PG (ref 26.8–34.3)
MCHC RBC AUTO-ENTMCNC: 31.9 G/DL (ref 31.4–37.4)
MCV RBC AUTO: 94 FL (ref 82–98)
PLATELET # BLD AUTO: 335 THOUSANDS/UL (ref 149–390)
PMV BLD AUTO: 9 FL (ref 8.9–12.7)
POTASSIUM SERPL-SCNC: 4 MMOL/L (ref 3.5–5.3)
RBC # BLD AUTO: 3.69 MILLION/UL (ref 3.81–5.12)
SODIUM SERPL-SCNC: 133 MMOL/L (ref 136–145)
WBC # BLD AUTO: 5.87 THOUSAND/UL (ref 4.31–10.16)

## 2018-03-25 PROCEDURE — 85027 COMPLETE CBC AUTOMATED: CPT | Performed by: INTERNAL MEDICINE

## 2018-03-25 PROCEDURE — 80048 BASIC METABOLIC PNL TOTAL CA: CPT | Performed by: INTERNAL MEDICINE

## 2018-03-25 PROCEDURE — 99233 SBSQ HOSP IP/OBS HIGH 50: CPT | Performed by: INTERNAL MEDICINE

## 2018-03-25 RX ORDER — LISINOPRIL 10 MG/1
10 TABLET ORAL DAILY
Status: DISCONTINUED | OUTPATIENT
Start: 2018-03-25 | End: 2018-03-26 | Stop reason: HOSPADM

## 2018-03-25 RX ADMIN — DILTIAZEM HYDROCHLORIDE 30 MG: 30 TABLET, FILM COATED ORAL at 15:15

## 2018-03-25 RX ADMIN — CLOTRIMAZOLE AND BETAMETHASONE DIPROPIONATE: 10; .5 CREAM TOPICAL at 08:37

## 2018-03-25 RX ADMIN — POLYETHYLENE GLYCOL 3350 17 G: 17 POWDER, FOR SOLUTION ORAL at 08:36

## 2018-03-25 RX ADMIN — Medication 1000 MG: at 08:36

## 2018-03-25 RX ADMIN — CALCIUM CARBONATE-VITAMIN D TAB 500 MG-200 UNIT 1 TABLET: 500-200 TAB at 08:35

## 2018-03-25 RX ADMIN — METOPROLOL TARTRATE 50 MG: 50 TABLET ORAL at 08:36

## 2018-03-25 RX ADMIN — METOPROLOL TARTRATE 50 MG: 50 TABLET ORAL at 21:50

## 2018-03-25 RX ADMIN — PIPERACILLIN SODIUM,TAZOBACTAM SODIUM 3.38 G: 3; .375 INJECTION, POWDER, FOR SOLUTION INTRAVENOUS at 12:04

## 2018-03-25 RX ADMIN — PANTOPRAZOLE SODIUM 40 MG: 40 TABLET, DELAYED RELEASE ORAL at 05:17

## 2018-03-25 RX ADMIN — PIPERACILLIN SODIUM,TAZOBACTAM SODIUM 3.38 G: 3; .375 INJECTION, POWDER, FOR SOLUTION INTRAVENOUS at 05:17

## 2018-03-25 RX ADMIN — ENOXAPARIN SODIUM 40 MG: 40 INJECTION SUBCUTANEOUS at 08:35

## 2018-03-25 RX ADMIN — ASPIRIN 81 MG: 81 TABLET, COATED ORAL at 08:34

## 2018-03-25 RX ADMIN — CALCIUM CARBONATE-VITAMIN D TAB 500 MG-200 UNIT 1 TABLET: 500-200 TAB at 17:15

## 2018-03-25 RX ADMIN — CLONAZEPAM 0.5 MG: 0.5 TABLET ORAL at 00:10

## 2018-03-25 RX ADMIN — CLONAZEPAM 0.5 MG: 0.5 TABLET ORAL at 21:53

## 2018-03-25 RX ADMIN — DILTIAZEM HYDROCHLORIDE 30 MG: 30 TABLET, FILM COATED ORAL at 21:50

## 2018-03-25 RX ADMIN — CLOTRIMAZOLE AND BETAMETHASONE DIPROPIONATE: 10; .5 CREAM TOPICAL at 18:48

## 2018-03-25 RX ADMIN — Medication 1000 MG: at 18:48

## 2018-03-25 RX ADMIN — LISINOPRIL 10 MG: 10 TABLET ORAL at 12:03

## 2018-03-25 RX ADMIN — DILTIAZEM HYDROCHLORIDE 30 MG: 30 TABLET, FILM COATED ORAL at 05:17

## 2018-03-25 RX ADMIN — PIPERACILLIN SODIUM,TAZOBACTAM SODIUM 3.38 G: 3; .375 INJECTION, POWDER, FOR SOLUTION INTRAVENOUS at 18:48

## 2018-03-25 RX ADMIN — GUAIFENESIN AND DEXTROMETHORPHAN 10 ML: 100; 10 SYRUP ORAL at 02:33

## 2018-03-25 NOTE — PROGRESS NOTES
[facility] Internal Medicine Progress Note  Patient: Mamadou Atwood 80 y o  female   MRN: [id number]  PCP: Winsome Carrillo DO  Unit/Bed#: 400-01 Encounter: [id number]  Date Of Visit: 03/25/18    Assessment:    Principal Problem:    Healthcare-associated pneumonia  Active Problems:    Other insomnia    Supraventricular tachycardia (HCC)    Hypoxemia    Acute febrile illness    Acute cystitis    Hypovolemia due to dehydration    Acute tracheobronchitis    Cough    Shortness of breath    Mass of urinary bladder determined by ultrasound    Esophageal mass    Pulmonary nodule    Hyponatremia      Plan:    · Pneumonia  ? CT chest showed:  Alveolar consolidation in the right upper lobe with air bronchogram   Additional foci of consolidation noted in the lingula and both lower lobes  Finding consistent with multilobar pneumonia  Bilateral parapneumonic effusions  ? Elevated BNP on admission   2D echo result showed ejection fraction 60% with no regional wall motion abnormality  ? Chest x-ray showed mildly increased interstitial markings without focal consolidation   Patient empirically on IV Zosyn 3 375 g q 6 hours  Blood cultures negative  Respiratory pathogen profile negative  ? Respiratory protocol initiated on the patient  ? Consulted Pulmonary for further input and management  ? Robitussin DM 10 cc p  o  q 4 hours as needed for cough  · Acute cystitis with hematuria  ? Urine cultures positive for gram-negative rods   Sensitivity and specificity positive for E coli  ? Urinalysis looks like a UTI  ? Empirically on IV Zosyn 3 375 g q 6 hours  · Paroxysmal SVT  ? On metoprolol 50 mg p o  b i d  and diltiazem 30 mg p o  B i d   ? Will follow up with Cardiology as an outpatient for possible ablation  · Encourage PT/OT  · History of fall-secondary to age/loss of balance/UTI  · Questionable mass at azygo-esophageal recess  ? 3 x 3 1 cm  ?  Incidental finding on CT scan of chest  ? Will follow up with Gastroenterology as an outpatient for EGD  · Left posterior bladder wall filling defect  ? Incidental finding on renal ultrasound  ? Polypoid bladder neoplasm not excluded  ? Will follow up with Urology as an outpatient for cystoscopy  · Pulmonary nodule  ? 8 mm pulmonary nodule seen on right lower lobe on CT scan of chest  ? Need follow-up as an outpatient with PET scan  · Encourage PT/OT  · Will consider to discharge the patient tomorrow on p o  antibiotics      VTE Pharmacologic Prophylaxis:   Pharmacologic: Enoxaparin (Lovenox)  Mechanical VTE Prophylaxis in Place: Yes    Patient Centered Rounds: I have performed bedside rounds with nursing staff today  Discussions with Specialists or Other Care Team Provider:  No    Education and Discussions with Family / Patient:  No family members present at bedside    Time Spent for Care: 30 minutes  More than 50% of total time spent on counseling and coordination of care as described above  Current Length of Stay: 6 day(s)    Current Patient Status: Inpatient   Certification Statement: The patient will continue to require additional inpatient hospital stay due to IV antibiotics    Discharge Plan / Estimated Discharge Date:  2018    Code Status: Level 3 - DNAR and DNI      Subjective:   Patient seen examined at bedside  Patient states she is doing much better today  Patient denies fever, chills, nausea, vomiting, diarrhea, abdominal pain, chest pain, palpitations, shortness of breath, headache, dizziness    Objective:     Vitals:   Temp (24hrs), Av 9 °F (37 2 °C), Min:98 7 °F (37 1 °C), Max:99 1 °F (37 3 °C)    HR:  [75-82] 79  Resp:  [18-19] 18  BP: (164-180)/(72-80) 164/73  SpO2:  [95 %-100 %] 97 %  Body mass index is 23 08 kg/m²  Input and Output Summary (last 24 hours):        Intake/Output Summary (Last 24 hours) at 18 0860  Last data filed at 18 0256   Gross per 24 hour   Intake             1300 ml   Output             1600 ml   Net             -300 ml Physical Exam:     Physical Exam   Constitutional: She is oriented to person, place, and time  She appears well-developed and well-nourished  HENT:   Head: Normocephalic and atraumatic  Eyes: Conjunctivae and EOM are normal  Pupils are equal, round, and reactive to light  Neck: Normal range of motion  Neck supple  Cardiovascular: Normal rate, regular rhythm, normal heart sounds and intact distal pulses  Pulmonary/Chest: Effort normal and breath sounds normal    Abdominal: Soft  Bowel sounds are normal    Musculoskeletal: Normal range of motion  Neurological: She is alert and oriented to person, place, and time  She has normal reflexes  Skin: Skin is warm and dry  Vitals reviewed  Additional Data:     Labs:      Results from last 7 days  Lab Units 03/25/18  0451 03/23/18  0445   WBC Thousand/uL 5 87 6 58   HEMOGLOBIN g/dL 11 0* 11 4*   HEMATOCRIT % 34 5* 35 4   PLATELETS Thousands/uL 335 301   NEUTROS PCT %  --  62   LYMPHS PCT %  --  17   MONOS PCT %  --  16*   EOS PCT %  --  4       Results from last 7 days  Lab Units 03/25/18  0451  03/20/18  0429   SODIUM mmol/L 133*  < > 135*   POTASSIUM mmol/L 4 0  < > 3 8   CHLORIDE mmol/L 97*  < > 100   CO2 mmol/L 30  < > 27   BUN mg/dL 11  < > 10   CREATININE mg/dL 0 74  < > 0 89   CALCIUM mg/dL 9 4  < > 8 8   TOTAL PROTEIN g/dL  --   --  7 2   BILIRUBIN TOTAL mg/dL  --   --  0 60   ALK PHOS U/L  --   --  63   ALT U/L  --   --  22   AST U/L  --   --  21   GLUCOSE RANDOM mg/dL 103  < > 144*   < > = values in this interval not displayed  Results from last 7 days  Lab Units 03/20/18  0436   INR  1 19*       * I Have Reviewed All Lab Data Listed Above  * Additional Pertinent Lab Tests Reviewed:  [facility] Admission Reviewed    Imaging:    Imaging Reports Reviewed Today Include:  No imaging done today  Imaging Personally Reviewed by Myself Includes:      Recent Cultures (last 7 days):       Results from last 7 days  Lab Units 03/22/18  1757 03/19/18  1937 03/19/18  1803 03/19/18  1652   BLOOD CULTURE  No Growth at 48 hrs  --  No Growth After 5 Days  No Growth After 5 Days  URINE CULTURE   --    >100,000 cfu/ml Escherichia coli*  --   --        Last 24 Hours Medication List:     Current Facility-Administered Medications:  acetaminophen 650 mg Oral Q6H PRN John Carrillo MD    aspirin 81 mg Oral Daily John Carrillo MD    bisacodyl 5 mg Oral Daily PRN Felecia Mast MD    calcium carbonate-vitamin D 1 tablet Oral BID With Meals John Carrillo MD    clonazePAM 0 5 mg Oral BID PRN Lucille Gaitan MD    clotrimazole-betamethasone  Topical BID John Carrillo MD    dextromethorphan-guaiFENesin 10 mL Oral Q4H PRN John Carrillo MD    diltiazem 30 mg Oral [facility] Luis Underwood, DO    enoxaparin 40 mg Subcutaneous Daily John Carrillo MD    fish oil 1,000 mg Oral BID John Carrillo MD    ipratropium-albuterol 3 mL Nebulization Q6H PRN Lb Mathias, DO    lisinopril 10 mg Oral Daily Lucille Gaitan MD    metoprolol 5 mg Intravenous Q6H PRN Lucille Gaitan MD    metoprolol tartrate 50 mg Oral Q12H [facility] Luis Underwood, DO    ondansetron 4 mg Intravenous Q6H PRN John Carrillo MD    pantoprazole 40 mg Oral Early Morning John Carrillo MD    piperacillin-tazobactam 3 375 g Intravenous Q6H Lucille Gaitan MD Last Rate: Stopped (03/25/18 0600)   polyethylene glycol 17 g Oral Daily Felecia Mast MD    zolpidem 5 mg Oral HS PRN Lucille Gaitan MD         Today, Patient Was Seen By: Lucille Gaitan MD    ** Please Note: This note has been constructed using a voice recognition system   **

## 2018-03-26 VITALS
DIASTOLIC BLOOD PRESSURE: 74 MMHG | TEMPERATURE: 99.5 F | HEART RATE: 71 BPM | RESPIRATION RATE: 18 BRPM | SYSTOLIC BLOOD PRESSURE: 173 MMHG | OXYGEN SATURATION: 93 % | HEIGHT: 63 IN | WEIGHT: 134.92 LBS | BODY MASS INDEX: 23.91 KG/M2

## 2018-03-26 PROBLEM — R06.02 SHORTNESS OF BREATH: Status: RESOLVED | Noted: 2018-03-22 | Resolved: 2018-03-26

## 2018-03-26 PROBLEM — R09.02 HYPOXEMIA: Status: RESOLVED | Noted: 2018-03-19 | Resolved: 2018-03-26

## 2018-03-26 PROBLEM — R05.9 COUGH: Status: RESOLVED | Noted: 2018-03-22 | Resolved: 2018-03-26

## 2018-03-26 PROBLEM — J20.9 ACUTE TRACHEOBRONCHITIS: Status: RESOLVED | Noted: 2018-03-21 | Resolved: 2018-03-26

## 2018-03-26 PROBLEM — E86.1 HYPOVOLEMIA DUE TO DEHYDRATION: Status: RESOLVED | Noted: 2018-03-19 | Resolved: 2018-03-26

## 2018-03-26 PROBLEM — E86.0 HYPOVOLEMIA DUE TO DEHYDRATION: Status: RESOLVED | Noted: 2018-03-19 | Resolved: 2018-03-26

## 2018-03-26 PROBLEM — N30.00 ACUTE CYSTITIS: Status: RESOLVED | Noted: 2018-03-19 | Resolved: 2018-03-26

## 2018-03-26 PROBLEM — J18.9 HEALTHCARE-ASSOCIATED PNEUMONIA: Status: RESOLVED | Noted: 2018-03-19 | Resolved: 2018-03-26

## 2018-03-26 PROBLEM — R50.9 ACUTE FEBRILE ILLNESS: Status: RESOLVED | Noted: 2018-03-19 | Resolved: 2018-03-26

## 2018-03-26 PROBLEM — E87.1 HYPONATREMIA: Status: RESOLVED | Noted: 2018-03-24 | Resolved: 2018-03-26

## 2018-03-26 LAB
ATRIAL RATE: 150 BPM
QRS AXIS: 42 DEGREES
QRSD INTERVAL: 82 MS
QT INTERVAL: 332 MS
QTC INTERVAL: 441 MS
T WAVE AXIS: 7 DEGREES
VENTRICULAR RATE: 106 BPM

## 2018-03-26 PROCEDURE — 97530 THERAPEUTIC ACTIVITIES: CPT | Performed by: PHYSICAL THERAPIST

## 2018-03-26 PROCEDURE — 97116 GAIT TRAINING THERAPY: CPT | Performed by: PHYSICAL THERAPIST

## 2018-03-26 PROCEDURE — 87493 C DIFF AMPLIFIED PROBE: CPT | Performed by: INTERNAL MEDICINE

## 2018-03-26 PROCEDURE — 99232 SBSQ HOSP IP/OBS MODERATE 35: CPT | Performed by: INTERNAL MEDICINE

## 2018-03-26 PROCEDURE — 93010 ELECTROCARDIOGRAM REPORT: CPT | Performed by: INTERNAL MEDICINE

## 2018-03-26 PROCEDURE — 99239 HOSP IP/OBS DSCHRG MGMT >30: CPT | Performed by: INTERNAL MEDICINE

## 2018-03-26 RX ORDER — LISINOPRIL 10 MG/1
10 TABLET ORAL DAILY
Qty: 30 TABLET | Refills: 0 | Status: SHIPPED | OUTPATIENT
Start: 2018-03-27 | End: 2018-03-26

## 2018-03-26 RX ORDER — GUAIFENESIN/DEXTROMETHORPHAN 100-10MG/5
10 SYRUP ORAL EVERY 4 HOURS PRN
Qty: 118 ML | Refills: 0 | Status: SHIPPED | OUTPATIENT
Start: 2018-03-26 | End: 2018-04-05

## 2018-03-26 RX ORDER — LISINOPRIL 10 MG/1
10 TABLET ORAL DAILY
Qty: 30 TABLET | Refills: 0 | Status: SHIPPED | OUTPATIENT
Start: 2018-03-27 | End: 2018-04-30 | Stop reason: SDUPTHER

## 2018-03-26 RX ADMIN — DILTIAZEM HYDROCHLORIDE 30 MG: 30 TABLET, FILM COATED ORAL at 14:42

## 2018-03-26 RX ADMIN — ASPIRIN 81 MG: 81 TABLET, COATED ORAL at 09:36

## 2018-03-26 RX ADMIN — DILTIAZEM HYDROCHLORIDE 30 MG: 30 TABLET, FILM COATED ORAL at 06:16

## 2018-03-26 RX ADMIN — PIPERACILLIN SODIUM,TAZOBACTAM SODIUM 3.38 G: 3; .375 INJECTION, POWDER, FOR SOLUTION INTRAVENOUS at 00:27

## 2018-03-26 RX ADMIN — LISINOPRIL 10 MG: 10 TABLET ORAL at 09:35

## 2018-03-26 RX ADMIN — ENOXAPARIN SODIUM 40 MG: 40 INJECTION SUBCUTANEOUS at 09:45

## 2018-03-26 RX ADMIN — CLOTRIMAZOLE AND BETAMETHASONE DIPROPIONATE: 10; .5 CREAM TOPICAL at 09:37

## 2018-03-26 RX ADMIN — PANTOPRAZOLE SODIUM 40 MG: 40 TABLET, DELAYED RELEASE ORAL at 06:16

## 2018-03-26 RX ADMIN — PIPERACILLIN SODIUM,TAZOBACTAM SODIUM 3.38 G: 3; .375 INJECTION, POWDER, FOR SOLUTION INTRAVENOUS at 06:17

## 2018-03-26 RX ADMIN — Medication 1000 MG: at 09:34

## 2018-03-26 RX ADMIN — CALCIUM CARBONATE-VITAMIN D TAB 500 MG-200 UNIT 1 TABLET: 500-200 TAB at 09:39

## 2018-03-26 RX ADMIN — GUAIFENESIN AND DEXTROMETHORPHAN 10 ML: 100; 10 SYRUP ORAL at 00:32

## 2018-03-26 RX ADMIN — METOPROLOL TARTRATE 50 MG: 50 TABLET ORAL at 09:34

## 2018-03-26 NOTE — SOCIAL WORK
Pt's son:Luis Rodriguesdudley Espinal will be over at 2pm to take pt to [facility] assisted living

## 2018-03-26 NOTE — PROGRESS NOTES
Progress Note - Pulmonary   Severa Sea 80 y o  female MRN: [id number]  Unit/Bed#: 400-01 Encounter: [id number]      Assessment/Plan:    · Healthcare associated Pneumonia - completed 7 days of antibiotics  Check room air saturations  Patient does not use home oxygen    · Abnormal chest CT with bilateral infiltrates, right lower lobe pulmonary nodule (8mm) and masslike appearance in the azygo-esophageal recess of unclear etiology  Outpatient PET scan and office follow-up thereafter  Subjective:   Patient feels better  No significant cough or shortness of breath  Hoping to go home today  Objective:     Vitals: Blood pressure (!) 138/43, pulse 71, temperature 99 5 °F (37 5 °C), temperature source Temporal, resp  rate 18, height 5' 3" (1 6 m), weight 61 2 kg (134 lb 14 7 oz), SpO2 96 %  , 3L, Body mass index is 23 9 kg/m²  Intake/Output Summary (Last 24 hours) at 03/26/18 0938  Last data filed at 03/26/18 0643   Gross per 24 hour   Intake              240 ml   Output             1650 ml   Net            -1410 ml       Physical Exam:      General:  Awake, alert, no distress   HEENT: PERRL, EOMI, moist mucosa    Heart:  Regular rate and rhythm, no murmur   Lungs: Scattered bilateral crackles   Abdomen: Soft, nontender, normal bowel sounds   Extremities: No clubbing, cyanosis or edema    Labs: I have personally reviewed pertinent lab results        Results from last 7 days  Lab Units 03/25/18  0451 03/23/18  0445 03/20/18  0429   WBC Thousand/uL 5 87 6 58 7 05   HEMOGLOBIN g/dL 11 0* 11 4* 12 3   HEMATOCRIT % 34 5* 35 4 38 0   PLATELETS Thousands/uL 335 301 241           Results from last 7 days  Lab Units 03/25/18  0451 03/23/18  0445 03/20/18  0429 03/19/18  1652   SODIUM mmol/L 133* 133* 135* 130*   POTASSIUM mmol/L 4 0 3 8 3 8 4 3   CHLORIDE mmol/L 97* 95* 100 93*   CO2 mmol/L 30 30 27 28   BUN mg/dL 11 10 10 14   CREATININE mg/dL 0 74 0 68 0 89 0 95   CALCIUM mg/dL 9 4 9 5 8 8 9 2   TOTAL PROTEIN g/dL  --   --  7 2 7 5   BILIRUBIN TOTAL mg/dL  --   --  0 60 0 80   ALK PHOS U/L  --   --  63 65   ALT U/L  --   --  22 24   AST U/L  --   --  21 23   GLUCOSE RANDOM mg/dL 103 118 144* 148*       Results from last 7 days  Lab Units 03/20/18  0436 03/19/18  1652   INR  1 19* 1 13   PTT seconds  --  31       Results from last 7 days  Lab Units 03/20/18  0429 03/19/18  1652   MAGNESIUM mg/dL 1 9 1 9

## 2018-03-26 NOTE — DISCHARGE INSTRUCTIONS
Acute Bronchitis   AMBULATORY CARE:   Acute bronchitis  is swelling and irritation in the air passages of your lungs  This irritation may cause you to cough or have other breathing problems  Acute bronchitis often starts because of another illness, such as a cold or the flu  The illness spreads from your nose and throat to your windpipe and airways  Bronchitis is often called a chest cold  Acute bronchitis lasts about 3 to 6 weeks and is usually not a serious illness  Your cough can last for several weeks  You may have any of the following symptoms:   · A cough with sputum that may be clear, yellow, or green    · Feeling more tired than usual, and body aches    · A fever and chills    · Wheezing when you breathe    · A tight chest or pain when you breathe or cough  Seek care immediately if:   · You cough up blood  · Your lips or fingernails turn blue  · You feel like you are not getting enough air when you breathe  Contact your healthcare provider if:   · You have a fever  · Your breathing problems do not go away or get worse  · Your cough does not get better within 4 weeks  · You have questions or concerns about your condition or care  Self-care:   · Get more rest   Rest helps your body to heal  Slowly start to do more each day  Rest when you feel it is needed  · Avoid irritants in the air  Avoid chemicals, fumes, and dust  Wear a face mask if you must work around dust or fumes  Stay inside on days when air pollution levels are high  If you have allergies, stay inside when pollen counts are high  Do not use aerosol products, such as spray-on deodorant, bug spray, and hair spray  · Do not smoke or be around others who smoke  Nicotine and other chemicals in cigarettes and cigars damages the cilia that move mucus out of your lungs  Ask your healthcare provider for information if you currently smoke and need help to quit  E-cigarettes or smokeless tobacco still contain nicotine   Talk to your healthcare provider before you use these products  · Drink liquids as directed  Liquids help keep your air passages moist and help you cough up mucus  You may need to drink more liquids when you have acute bronchitis  Ask how much liquid to drink each day and which liquids are best for you  · Use a humidifier or vaporizer  Use a cool mist humidifier or a vaporizer to increase air moisture in your home  This may make it easier for you to breathe and help decrease your cough  Prevent acute bronchitis by doing the following:   · Get the vaccinations you need  Ask your healthcare provider if you should get vaccinated against the flu or pneumonia  · Prevent the spread of germs  You can decrease your risk of acute bronchitis and other illnesses by doing the following:     [facility] your hands often with soap and water  Carry germ-killing hand lotion or gel with you  You can use the lotion or gel to clean your hands when soap and water are not available  ¨ Do not touch your eyes, nose, or mouth unless you have washed your hands first     ¨ Always cover your mouth when you cough to prevent the spread of germs  It is best to cough into a tissue or your shirt sleeve instead of into your hand  Ask those around you cover their mouths when they cough  ¨ Try to avoid people who have a cold or the flu  If you are sick, stay away from others as much as possible  Medicines: Your healthcare provider may  give you any of the following:  · Ibuprofen or acetaminophen  are medicines that help lower your fever  They are available without a doctor's order  Ask your healthcare provider which medicine is right for you  Ask how much to take and how often to take it  Follow directions  These medicines can cause stomach bleeding if not taken correctly  Ibuprofen can cause kidney damage  Do not take ibuprofen if you have kidney disease, an ulcer, or allergies to aspirin  Acetaminophen can cause liver damage   Do not take more than 4,000 milligrams in 24 hours  · Decongestants  help loosen mucus in your lungs and make it easier to cough up  This can help you breathe easier  · Cough suppressants  decrease your urge to cough  If your cough produces mucus, do not take a cough suppressant unless your healthcare provider tells you to  Your healthcare provider may suggest that you take a cough suppressant at night so you can rest     · Inhalers  may be given  Your healthcare provider may give you one or more inhalers to help you breathe easier and cough less  An inhaler gives your medicine to open your airways  Ask your healthcare provider to show you how to use your inhaler correctly  Follow up with your healthcare provider as directed:  Write down questions you have so you will remember to ask them during your follow-up visits  © 2017 [facility] Information is for End User's use only and may not be sold, redistributed or otherwise used for commercial purposes  All illustrations and images included in CareNotes® are the copyrighted property of [employer] M [employer]  or Kai Meneses  The above information is an  only  It is not intended as medical advice for individual conditions or treatments  Talk to your doctor, nurse or pharmacist before following any medical regimen to see if it is safe and effective for you  Acute Bronchitis   WHAT YOU SHOULD KNOW:   Acute bronchitis is swelling and irritation in the air passages of your lungs  This irritation may cause you to cough or have other breathing problems  Acute bronchitis often starts because of another viral illness, such as a cold or the flu  The illness spreads from your nose and throat to your windpipe and airways  Bronchitis is often called a chest cold  Acute bronchitis lasts about 2 weeks and is usually not a serious illness  AFTER YOU LEAVE:   Medicines:   · Ibuprofen or acetaminophen:  These medicines help lower a fever   They are available without a doctor's order  Ask your healthcare provider which medicine is right for you  Ask how much to take and how often to take it  Follow directions  These medicines can cause stomach bleeding if not taken correctly  Ibuprofen can cause kidney damage  Do not take ibuprofen if you have kidney disease, an ulcer, or allergies to aspirin  Acetaminophen can cause liver damage  Do not drink alcohol if you take acetaminophen  · Cough medicine: This medicine helps loosen mucus in your lungs and make it easier to cough up  This can help you breathe easier  · Inhalers: You may need one or more inhalers to help you breathe easier and cough less  An inhaler gives your medicine in a mist form so that you can breathe it into your lungs  Ask your healthcare provider to show you how to use your inhaler correctly  · Steroid medicine:  Steroid medicine helps open your air passages so you can breathe easier  · Take your medicine as directed  Call your healthcare provider if you think your medicine is not helping or if you have side effects  Tell him if you are allergic to any medicine  Keep a list of the medicines, vitamins, and herbs you take  Include the amounts, and when and why you take them  Bring the list or the pill bottles to follow-up visits  Carry your medicine list with you in case of an emergency  How to use an inhaler:   · Shake the inhaler well to make sure you get the correct amount of medicine per puff  Remove the cover from your inhaler's mouthpiece  If you are using a spacer, connect your inhaler to the flat end of the spacer  · Exhale as much air from your lungs as you can  Put the mouthpiece in your mouth past your front teeth and rest it on the top of your tongue  Do not block the mouthpiece opening with your tongue  · Breathe in through your mouth at a slow and steady rate  As you do this, press the inhaler to release the puff of medicine   Finish breathing in slowly and deeply as you inhale the medicine  When your lungs are full, hold your breath for 10 seconds  Then breathe out slowly through puckered lips or through your nose  · If you need to take more puffs, wait at least 1 minute between each puff  · Rinse your mouth with water after you use the inhaler  This may keep you from getting a mouth infection or irritation  · Follow the instructions that come with your inhaler to clean it  You should clean your inhaler at least once a week  Ways to care for yourself:   · Avoid alcohol:  Alcohol dulls your urge to cough and sneeze  When you have bronchitis, you need to be able to cough and sneeze to clear your air passages  Alcohol also causes your body to lose fluid  This can make the mucus in your lungs thicker and harder to cough up  · Avoid irritants in the air:  Do not smoke or allow others to smoke around you  Avoid chemicals, fumes, and dust  Wear a face mask if you must work around dust or fumes  Stay inside on days when air pollution levels are high  If you have allergies, stay inside when pollen counts are high  Avoid aerosol products  This includes spray-on deodorant, bug spray, and hair spray  · Drink more liquids:  Most people should drink at least 8 eight-ounce cups of water a day  You may need to drink more liquids when you have acute bronchitis  Liquids help keep your air passages moist and help you cough up mucus  · Get more rest:  You may feel like resting more  Slowly start to do more each day  Rest when you feel it is needed  · Eat healthy foods:  Eat a variety healthy foods every day  Your diet should include fruits, vegetables, breads, and protein (such as chicken, fish, and beans)  Dairy products (such as milk, cheese, and ice cream) can sometimes increase the amount of mucus your body makes  Ask if you should decrease your intake of dairy products      · Use a humidifier:  Use a cool mist humidifier to increase air moisture in your home  This may make it easier for you to breathe and help decrease your cough  Decrease your risk of acute bronchitis:   · Get the vaccinations you need:  Ask your healthcare provider if you should get vaccinated against the flu or pneumonia  · Avoid things that may irritate your lungs:  Stay inside or cover your mouth and nose with a scarf when you are outside during cold weather  You should also stay inside on days when air pollution levels are high  If you have allergies, stay inside when pollen counts are high  Avoid using aerosol products in your home  This includes spray-on deodorant, bug spray, and hair spray  · Avoid the spread of germs:        [facility] your hands often with soap and water  Carry germ-killing gel with you  You can use the gel to clean your hands when there is no soap and water available  ¨ Do not touch your eyes, nose, or mouth unless you have washed your hands first     ¨ Always cover your mouth when you cough  Cough into a tissue or your shirtsleeve so you do not spread germs from your hands  ¨ Try to avoid people who have a cold or the flu  If you are sick, stay away from others as much as possible  Follow up with your healthcare provider as directed:  Write down questions you have so you will remember to ask them during your follow-up visits  Contact your healthcare provider if:   · You have a fever  · Your skin becomes itchy or you have a rash after you take your medicine  · Your breathing problems do not go away or get worse  · Your cough does not get better with treatment  · You cough up blood  · You have questions or concerns about your condition or care  Seek care immediately or call 911 if:   · You faint  · Your lips or fingernails turn blue  · You feel like you are not getting enough air when you breathe  · You have swelling of your lips, tongue, or throat that makes it hard to breathe or swallow    © 2014 [facility]  Information is for End User's use only and may not be sold, redistributed or otherwise used for commercial purposes  All illustrations and images included in CareNotes® are the copyrighted property of [employer]  or [address]  The above information is an  only  It is not intended as medical advice for individual conditions or treatments  Talk to your doctor, nurse or pharmacist before following any medical regimen to see if it is safe and effective for you  Acute Cough   WHAT YOU NEED TO KNOW:   What is an acute cough? An acute cough can last up to 3 weeks  Common causes of an acute cough include a cold, allergies, or a lung infection  How is the cause of an acute cough diagnosed? Your healthcare provider will examine you and listen to your lungs  Tell your healthcare provider if you cough up any mucus, or have a fever or shortness of breath  Also tell your provider what makes the cough better or worse  Depending on your symptoms, you may need a chest x-ray  A sample of mucus may be collected and tested for infection  How is an acute cough treated? An acute cough usually goes away on its own  Ask your healthcare provider about medicines you can take to decrease your cough  You may need medicine to stop the cough, decrease swelling in your airways, or help open your airways  Medicine may also be given to help you cough up mucus  If you have an infection caused by bacteria, you may need antibiotics  What can I do to manage my cough? · Do not smoke and stay away from others who smoke  Nicotine and other chemicals in cigarettes and cigars can cause lung damage and make your cough worse  Ask your healthcare provider for information if you currently smoke and need help to quit  E-cigarettes or smokeless tobacco still contain nicotine  Talk to your healthcare provider before you use these products  · Drink extra liquids as directed    Liquids will help thin and loosen mucus so you can cough it up  Liquids will also help prevent dehydration  Examples of good liquids to drink include water, fruit juice, and broth  Do not drink liquids that contain caffeine  Caffeine can increase your risk for dehydration  Ask your healthcare provider how much liquid to drink each day  · Rest as directed  Do not do activities that make your cough worse, such as exercise  · Use a humidifier or vaporizer  Use a cool mist humidifier or a vaporizer to increase air moisture in your home  This may make it easier for you to breathe and help decrease your cough  · Eat 2 to 5 mL of honey 2 times each day  Honey can help thin mucus and decrease your cough  · Use cough drops or lozenges  These can help decrease throat irritation and your cough  When should I seek immediate care? · You have trouble breathing or feel short of breath  · You cough up blood, or you see blood in your mucus  · You faint or feel weak or dizzy  · You have chest pain when you cough or take a deep breath  · You have new wheezing  When should I contact my healthcare provider? · You have a fever  · Your cough lasts longer than 4 weeks  · Your symptoms do not improve with treatment  · You have questions or concerns about your condition or care  CARE AGREEMENT:   You have the right to help plan your care  Learn about your health condition and how it may be treated  Discuss treatment options with your caregivers to decide what care you want to receive  You always have the right to refuse treatment  The above information is an  only  It is not intended as medical advice for individual conditions or treatments  Talk to your doctor, nurse or pharmacist before following any medical regimen to see if it is safe and effective for you    © 2017 [address] Information is for End User's use only and may not be sold, redistributed or otherwise used for commercial purposes  All illustrations and images included in CareNotes® are the copyrighted property of [employer]  or Kai Meneses  Cold Symptoms, Ambulatory Care   GENERAL INFORMATION:   Cold symptoms  include sneezing, dry throat, a stuffy nose, headache, watery eyes, and a cough  Your cough may be dry, or you may cough up mucus  You may also have muscle aches, joint pain, and tiredness  Rarely, you may have a fever  Cold symptoms occur from inflammation in your upper respiratory system caused by a virus  Most colds go away without treatment  Seek immediate care for the following symptoms:   · A heartbeat that is much faster than usual for you     · A swollen neck that is sore to the touch     · Increased tiredness and weakness    · Pinpoint or larger reddish-purple dots on your skin     · Poor or no appetite  Treatment for cold symptoms  may include NSAIDS to decrease muscle aches and fever  Do not give NSAID medicines to children under 10months of age without direction from your child's doctor  Cold medicines may also be given to decrease coughing, nasal stuffiness, sneezing, and a runny nose  Do not give cold medicines to children under 11years of age without direction from your child's doctor  Manage your cold symptoms with the following:   · Drink liquids  to help thin and loosen thick mucus so you can cough it up  Liquids will also keep you hydrated  Ask your healthcare provider which liquids are best for you and how much to drink each day  · Do not smoke  because it may worsen your symptoms and increase the length of time you feel sick  Talk with your healthcare provider if you need help to stop smoking  Prevent the spread of germs  by washing your hands often  You can spread your cold germs to others for at least 3 days after your symptoms start  Do not share items, such as eating utensils   Cover your nose and mouth when you cough or sneeze using the crook of your elbow instead of your hands  Throw used tissues in the garbage  Follow up with your healthcare provider as directed:  Write down your questions so you remember to ask them during your visits  CARE AGREEMENT:   You have the right to help plan your care  Learn about your health condition and how it may be treated  Discuss treatment options with your caregivers to decide what care you want to receive  You always have the right to refuse treatment  The above information is an  only  It is not intended as medical advice for individual conditions or treatments  Talk to your doctor, nurse or pharmacist before following any medical regimen to see if it is safe and effective for you  © 2014 [address] is for End User's use only and may not be sold, redistributed or otherwise used for commercial purposes  All illustrations and images included in CareNotes® are the copyrighted property of [employer]  or Kaizaira Meneses  Atrial Tachycardia   WHAT YOU NEED TO KNOW:   Atrial tachycardia is a condition that causes your heart to beat more than 100 times each minute  Atrial tachycardia is also called supraventricular tachycardia  It can develop because of problems with your heart's electrical system  Any of the following may also put you at risk for atrial tachycardia:  · A heart condition, hypertension, or fatigue    · Anxiety, stress, or pain    · Large amounts of caffeine from coffee, tea, and energy drinks    · Heavy alcohol use or cigarette smoking    · Use of some medicines or street drugs  DISCHARGE INSTRUCTIONS:   Call 911 or have someone call if:  · You have any of the following signs of a heart attack:     ¨ Squeezing, pressure, or pain in your chest that lasts longer than a few minutes  Chest pain may come and go  ¨ Pain in your jaw, neck, one or both arms, upper and lower back, or stomach      ¨ Shortness of breath, or panting    ¨ Nausea or vomiting    ¨ Lightheadedness    · You cannot be woken  Contact your healthcare provider if:  · Your pulse is faster than your healthcare provider said it should be  · You have frequent periods of a fast heart rate  · You feel weak or dizzy  · You have questions or concerns about your condition or care  Medicines: You may  be given any of the following:  · Antiarrhythmia medicines  help keep your heartbeat in a regular rhythm  · Beta blockers  help slow your heartbeat and keep it in a regular rhythm  · Calcium channel blockers  help slow your heartbeat  · Blood thinners  help prevent blood clots  Clots can lead to stroke, heart attack, and death  Aspirin is a type of blood thinner  You may need to take an aspirin each day to help prevent blood clots  Do not take acetaminophen or ibuprofen instead  Do not take more or less aspirin than healthcare providers say to take  If you are on other blood thinner medicine, ask your healthcare provider before you take aspirin for any reason  · Take your medicine as directed  Contact your healthcare provider if you think your medicine is not helping or if you have side effects  Tell him or her if you are allergic to any medicine  Keep a list of the medicines, vitamins, and herbs you take  Include the amounts, and when and why you take them  Bring the list or the pill bottles to follow-up visits  Carry your medicine list with you in case of an emergency  Follow up with your healthcare provider or cardiologist as directed: You may need more tests  Write down your questions so you remember to ask them during your visits  Prevention and Management:   · Decrease the amount of caffeine you drink  Caffeine can increase your heart rate  · Limit or do not drink alcohol  Alcohol can increase your heart rate  Ask your healthcare provider if it is safe for you to drink alcohol  Also ask how much is safe for you to drink  · Do not smoke    Nicotine and other chemicals in cigarettes can cause damage to your heart  Ask your healthcare provider for information if you currently smoke and need help to quit  E-cigarettes or smokeless tobacco still contain nicotine  Talk to your healthcare provider before you use these products  · Do not use illegal drugs  Drugs such as meth and cocaine can increase your heart rate  Talk to your healthcare provider if you use illegal drugs and need help to quit  · Get more rest   Fatigue can cause your heart rate to increase  · Learn ways to cope with stress  Stress, fear, and anxiety can cause a fast heart rate  Your healthcare provider may recommend relaxation techniques and deep breathing exercises  Your healthcare provider may recommend you talk to someone about your stress or anxiety, such as a counselor or a trusted friend  Check your pulse as directed: Your healthcare provider will show you how to check your pulse, and how often to check it  Write down how fast your pulse is and if it feels regular or like it is skipping beats  Also write down the activity you were doing if your heart rate is above 100  Bring the information with you to your follow-up appointment  © 2017 [address]  Information is for End User's use only and may not be sold, redistributed or otherwise used for commercial purposes  All illustrations and images included in CareNotes® are the copyrighted property of [employer] M [employer]  or Kai Meneses  The above information is an  only  It is not intended as medical advice for individual conditions or treatments  Talk to your doctor, nurse or pharmacist before following any medical regimen to see if it is safe and effective for you  Community Acquired Pneumonia   WHAT YOU NEED TO KNOW:   Community-acquired pneumonia (CAP) is a lung infection that you get outside of a hospital or nursing home setting  Your lungs become inflamed and cannot work well   CAP may be caused by bacteria, viruses, or fungi  DISCHARGE INSTRUCTIONS:   Seek care immediately if:   · You are confused and cannot think clearly  · You have increased trouble breathing  · Your lips or fingernails turn gray or blue  Contact your healthcare provider if:   · Your symptoms do not get better, or they get worse  · You are urinating less, or not at all  · You have questions or concerns about your condition or care  Medicines:   · Medicines  may be given to treat a bacterial, viral, or fungal infection  You may also be given medicines to dilate your bronchial tubes to help you breathe more easily  · Take your medicine as directed  Contact your healthcare provider if you think your medicine is not helping or if you have side effects  Tell him or her if you are allergic to any medicine  Keep a list of the medicines, vitamins, and herbs you take  Include the amounts, and when and why you take them  Bring the list or the pill bottles to follow-up visits  Carry your medicine list with you in case of an emergency  Follow up with your healthcare provider within 3 days or as directed: You may need another x-ray  Write down your questions so you remember to ask them during your visits  Deep breathing and coughing:  Deep breathing helps open the air passages in your lungs  Coughing helps bring up mucus from your lungs  Take a deep breath and hold the breath as long as you can  Then push the air out of your lungs with a deep, strong cough  Spit out any mucus you have coughed up  Take 10 deep breaths in a row every hour that you are awake  Remember to follow each deep breath with a cough  Do not smoke or allow others to smoke around you:  Nicotine and other chemicals in cigarettes and cigars can cause lung damage  Ask your healthcare provider for information if you currently smoke and need help to quit  E-cigarettes or smokeless tobacco still contain nicotine   Talk to your healthcare provider before you use these products  Manage CAP at home:   · Breathe warm, moist air  This helps loosen mucus  Loosely place a warm, wet washcloth over your nose and mouth  A room humidifier may also help make the air moist     · Drink liquids as directed  Ask your healthcare provider how much liquid to drink each day and which liquids to drink  Liquids help make mucus thin and easier to get out of your body  · Gently tap your chest   This helps loosen mucus so it is easier to cough  Lie with your head lower than your chest several times a day and tap your chest      · Get plenty of rest   Rest helps your body heal   Prevent CAP:   · Wash your hands often with soap and water  Carry germ-killing hand gel with you  You can use the gel to clean your hands when soap and water are not available  Do not touch your eyes, nose, or mouth unless you have washed your hands first      · Clean surfaces often  Clean doorknobs, countertops, cell phones, and other surfaces that are touched often  · Always cover your mouth when you cough  Cough into a tissue or your shirtsleeve so you do not spread germs from your hands  · Try to avoid people who have a cold or the flu  If you are sick, stay away from others as much as possible  · Ask about vaccines  You may need a vaccine to help prevent pneumonia  Get an influenza (flu) vaccine every year as soon as it becomes available  © 2017 [address] [employer] Information is for End User's use only and may not be sold, redistributed or otherwise used for commercial purposes  All illustrations and images included in CareNotes® are the copyrighted property of [employer]  or Kai Meneses  The above information is an  only  It is not intended as medical advice for individual conditions or treatments  Talk to your doctor, nurse or pharmacist before following any medical regimen to see if it is safe and effective for you      Dehydration WHAT YOU NEED TO KNOW:   What is dehydration? Dehydration is a condition that develops when your body does not have enough fluid  You may become dehydrated if you do not drink enough water or lose too much fluid  Fluid loss may also cause loss of electrolytes (minerals), such as sodium  What increases my risk for dehydration? · Vomiting, diarrhea, or fever    · Being in the sun or heat for too long    · Sweating while playing sports    · Diseases, such as stroke, diabetes, or infections    · Medicines that cause you to lose water and salt, such as diuretics (water pills)    · Older age with decreased ability to sense thirst or to urinate  What are the signs and symptoms of dehydration? · Dry eyes or mouth    · Increased thirst    · Dark yellow urine    · Urinating little or not at all    · Tiredness or body weakness    · Headache, dizziness, or confusion    · Irregular or fast breathing, fast or pounding heartbeat, and low blood pressure    · Sudden weight loss  How is dehydration diagnosed? Your healthcare provider will examine you and check your breathing and heartbeat  He or she will look at your eyes, skin, mouth, and tongue  He or she will ask you how much liquid you have been drinking, and how much you are urinating  Tell him or her if you have been vomiting or have diarrhea  Blood and urine tests are used to check your electrolyte levels  The tests may show the cause of your dehydration, such as infection or diabetes  They may also show if your kidneys are working correctly  How is dehydration treated? · Oral liquids:      ¨ If you are mildly to moderately dehydrated, you may need an oral rehydration solution (ORS)  This drink contains the right amount of salt, sugar, and minerals in water to replace body fluids  Ask your healthcare provider where you can get an ORS  ¨ Drink an ORS in small amounts if you have been vomiting  If you vomit, wait 30 minutes and try again   Ask healthcare providers how much ORS you need when you are dehydrated and how often you should drink it  ¨ A sports drink is not  the same as an ORS  Do not drink sports drinks without asking your healthcare provider  ¨ Do not drink soft drinks or fruit juices  These can make your condition worse  · You may receive fluid through an IV  Electrolytes may also be included in the fluid  · Hypodermoclysis gives your body a large amount of water quickly  The water is given into the deepest layer of your skin  Ask your healthcare provider for more information about hypodermoclysis  What can I do to prevent dehydration? · Drink liquids as directed  Liquids that contain water, sugar, and minerals can help your body hold in fluid and help prevent dehydration  Drink liquids throughout the day, not just when you feel thirsty  Men should drink about 3 liters (13 eight-ounce cups) of liquid each day  Women should drink about 2 liters (9 eight-ounce cups) of liquid each day  Drink even more liquid if you will be outdoors, in the sun for a long time, or exercising  · Stay cool  Limit the time you spend outdoors during the hottest part of the day  Dress in lightweight clothes  · Keep track of how often you urinate  If you urinate less than usual or your urine is darker, drink more liquids  When should I seek immediate care? · You have a seizure  · You are confused or cannot think clearly  · You are extremely sleepy, or another person cannot wake you  · You become dizzy or faint when you stand  · You are not able to urinate  · You have trouble breathing  · You have a fast or irregular heartbeat  · Your hands or feet are cold, or your face is pale  When should I contact my healthcare provider? · You have trouble drinking liquids because you are vomiting  · Your symptoms get worse  · You have a fever  · You feel very weak or tired      · You have questions or concerns about your condition or care   CARE AGREEMENT:   You have the right to help plan your care  Learn about your health condition and how it may be treated  Discuss treatment options with your caregivers to decide what care you want to receive  You always have the right to refuse treatment  The above information is an  only  It is not intended as medical advice for individual conditions or treatments  Talk to your doctor, nurse or pharmacist before following any medical regimen to see if it is safe and effective for you  © 2017 [address] [employer]  Information is for End User's use only and may not be sold, redistributed or otherwise used for commercial purposes  All illustrations and images included in CareNotes® are the copyrighted property of [employer] M [employer]  or Kai Meneses  Dehydration   WHAT YOU NEED TO KNOW:   Dehydration is a condition that develops when your body does not have enough fluid  You may become dehydrated if you do not drink enough water or lose too much fluid  Fluid loss may also cause loss of electrolytes (minerals), such as sodium  DISCHARGE INSTRUCTIONS:   Seek care immediately if:   · You have a seizure  · You are confused or cannot think clearly  · You are extremely sleepy, or another person cannot wake you  · You become dizzy or faint when you stand  · You are not able to urinate  · You have trouble breathing  · You have a fast or irregular heartbeat  · Your hands or feet are cold, or your face is pale  Contact your healthcare provider if:   · You have trouble drinking liquids because you are vomiting  · Your symptoms get worse  · You have a fever  · You feel very weak or tired  · You have questions or concerns about your condition or care  Follow up with your healthcare provider as directed:  Write down your questions so you remember to ask them during your visits  Prevent or manage dehydration:   · Drink liquids as directed    Liquids that contain water, sugar, and minerals can help your body hold in fluid and help prevent dehydration  Drink liquids throughout the day, not just when you feel thirsty  Men should drink about 3 liters (13 eight-ounce cups) of liquid each day  Women should drink about 2 liters (9 eight-ounce cups) of liquid each day  Drink even more liquid if you will be outdoors, in the sun for a long time, or exercising  · Stay cool  Limit the time you spend outdoors during the hottest part of the day  Dress in lightweight clothes  · Keep track of how often you urinate  If you urinate less than usual or your urine is darker, drink more liquids  © 2017 26[address] [facility] Information is for End User's use only and may not be sold, redistributed or otherwise used for commercial purposes  All illustrations and images included in CareNotes® are the copyrighted property of [employer] M [employer] Inc  or Kai Meneses  The above information is an  only  It is not intended as medical advice for individual conditions or treatments  Talk to your doctor, nurse or pharmacist before following any medical regimen to see if it is safe and effective for you  Hypoxemia   AMBULATORY CARE:   Hypoxemia  occurs when there is a low level of oxygen in your blood  Oxygen is needed to keep your organs and tissues alive  Long-term hypoxemia can become life threatening if it is not treated  Common symptoms include the following:   · Feeling of not being able to catch your breath    · Frequent headaches    · Severe sleepiness    · Severe mood changes or irritability  Seek care immediately if:   · You have fainting spells with activity  · You have chest pain  Contact your healthcare provider if:   · Your symptoms do not change or become worse with treatment  · You have questions or concerns about your condition or care  Treatment for hypoxemia  will depend on how severe your hypoxemia is   You may need oxygen therapy for activity, such as exercise  You may need oxygen only when you sleep  Your healthcare provider may have you use oxygen all the time if you have severe hypoxemia  He may order pulmonary rehabilitation (RI) if your hypoxemia is related to certain conditions, such as COPD  RI consists of exercise training and nutrition and disease education  You will learn ways to conserve (save) your energy and develop a breathing action plan  RI also offers counseling and support groups  © 2017 [address] [employer] Information is for End User's use only and may not be sold, redistributed or otherwise used for commercial purposes  All illustrations and images included in CareNotes® are the copyrighted property of [employer] M [employer] Inc  or [employer]  The above information is an  only  It is not intended as medical advice for individual conditions or treatments  Talk to your doctor, nurse or pharmacist before following any medical regimen to see if it is safe and effective for you  Insomnia   WHAT YOU NEED TO KNOW:   Insomnia is a condition that makes it hard to fall or stay asleep  Lack of sleep can lead to attention or memory problems during the day  You may also be boyle, depressed, clumsy, or have headaches  DISCHARGE INSTRUCTIONS:   Contact your healthcare provider if:   · Your symptoms do not get better, or they get worse  · You begin to use drugs or alcohol to fall asleep  · You have questions or concerns about your condition or care  Medicines:   · Medicines  may help you sleep more regularly or help you feel less anxious  · Take your medicine as directed  Contact your healthcare provider if you think your medicine is not helping or if you have side effects  Tell him or her if you are allergic to any medicine  Keep a list of the medicines, vitamins, and herbs you take  Include the amounts, and when and why you take them  Bring the list or the pill bottles to follow-up visits   Carry your medicine list with you in case of an emergency  What you can do to improve your sleep:   · Create a sleep schedule  This will help you form a sleep routine  Keep a record of your sleep patterns, and any sleeping problems you have  Bring the record to follow-up visits with healthcare providers  · Do not take naps  Naps could make it hard for you to fall asleep at bedtime  · Keep your bedroom cool, quiet, and dark  Turn on white noise, such as a fan, to help you relax  Do not use your bed for any activity that will keep you awake  Do not read, exercise, eat, or watch TV in your bedroom  · Get up if you do not fall asleep within 20 minutes  Move to another room and do something relaxing until you become sleepy  · Limit caffeine, alcohol, and food to earlier in the day  Only drink caffeine in the morning  Do not drink alcohol within 6 hours of bedtime  Do not eat a heavy meal right before you go to bed  · Exercise regularly  Daily exercise may help you sleep better  Do not exercise within 4 hours of bedtime  Follow up with your healthcare provider as directed: Your healthcare provider may refer you for cognitive behavioral therapy  A behavioral therapist may help you find ways to relax, decrease stress, and improve sleep  Write down your questions so you remember to ask them during your visits  © 2017 [address]  Information is for End User's use only and may not be sold, redistributed or otherwise used for commercial purposes  All illustrations and images included in CareNotes® are the copyrighted property of [employer] M [employer]  or [address]  The above information is an  only  It is not intended as medical advice for individual conditions or treatments  Talk to your doctor, nurse or pharmacist before following any medical regimen to see if it is safe and effective for you  Pneumonia   WHAT YOU NEED TO KNOW:   What is pneumonia?   Pneumonia is an infection in your lungs caused by bacteria, viruses, fungi, or parasites  You can become infected if you come in contact with someone who is sick  You can get pneumonia if you recently had surgery or needed a ventilator to help you breathe  Pneumonia can also be caused by accidentally inhaling saliva or small pieces of food  Pneumonia may cause mild symptoms, or it can be severe and life-threatening  What increases my risk for pneumonia? · A cold or the flu    · Health conditions, such as heart or lung disease    · A weakened immune system caused by HIV, cancer, or steroid use    · Recent hospitalization    · Smoking    · Excess alcohol use    · Older age  What are the signs and symptoms of pneumonia? · Fever or chills    · Cough    · Shortness of breath or rapid breathing    · Chest pain when you cough or breathe deeply    · Headache    · Vomiting    · Fatigue or confusion  How is pneumonia diagnosed? Your healthcare provider will listen to your lungs  Tell him or her if you have other health conditions  Give your provider a complete list of all medicines you have taken recently  You may need any of the following:  · Blood tests  may show signs of an infection or the bacteria causing your pneumonia  Blood tests can also show how much oxygen is in your blood  · A chest x-ray  may show signs of infection in your lungs  · Pulse oximetry  measures the amount of oxygen in your blood  · A mucus sample  is collected and tested for the germ that is causing your illness  It can help your healthcare provider choose the best medicine to treat the infection  How is pneumonia treated? · Antibiotics  treat pneumonia caused by bacteria  · Acetaminophen  decreases pain and fever  It is available without a doctor's order  Ask how much to take and how often to take it  Follow directions   Read the labels of all other medicines you are using to see if they also contain acetaminophen, or ask your doctor or pharmacist  Acetaminophen can cause liver damage if not taken correctly  Do not use more than 4 grams (4,000 milligrams) total of acetaminophen in one day  · NSAIDs , such as ibuprofen, help decrease swelling, pain, and fever  This medicine is available with or without a doctor's order  NSAIDs can cause stomach bleeding or kidney problems in certain people  If you take blood thinner medicine, always ask your healthcare provider if NSAIDs are safe for you  Always read the medicine label and follow directions  · Airway clearance techniques  are exercises to help remove mucus so you can breathe more easily  Your healthcare provider will show you how to do the exercises  These exercises may be used along with machines or devices to help decrease your symptoms  · Respiratory support  is given to help you breathe  You may receive oxygen to increase the level of oxygen in your blood  You may also need a machine to help you breathe  How can I manage my symptoms? · Rest as needed  Rest often while you recover  Slowly start to do more each day  · Drink liquids as directed  Ask how much liquid to drink each day and which liquids are best for you  Liquids help thin your mucus, which may make it easier for you to cough it up  · Do not smoke  Avoid secondhand smoke  Smoking increases your risk for pneumonia  Smoking also makes it harder for you to get better after you have had pneumonia  Ask your healthcare provider for information if you currently smoke and need help to quit  E-cigarettes or smokeless tobacco still contain nicotine  Talk to your healthcare provider before you use these products  · Use a cool mist humidifier  A humidifier will help increase air moisture in your home  This may make it easier for you to breathe and help decrease your cough  · Keep your head elevated  You may be able to breathe better if you lie down with the head of your bed up  How can I prevent pneumonia? · Prevent the spread of germs  Wash your hands often with soap and water  Use gel hand cleanser when there is no soap and water available  Do not touch your eyes, nose, or mouth unless you have washed your hands first  Cover your mouth when you cough  Cough into a tissue or your shirtsleeve so you do not spread germs from your hands  If you are sick, stay away from others as much as possible  · Limit alcohol  Women should limit alcohol to 1 drink a day  Men should limit alcohol to 2 drinks a day  A drink of alcohol is 12 ounces of beer, 5 ounces of wine, or 1½ ounces of liquor  · Ask about vaccines  You may need a vaccine to help prevent pneumonia  Get an influenza (flu) vaccine every year as soon as it becomes available  When should I seek immediate care? · You cough up blood  · Your heart beats more than 100 beats in 1 minute  · You are very tired, confused, and cannot think clearly  · You have chest pain or trouble breathing  · Your lips or fingernails turn gray or blue  When should I contact my healthcare provider? · Your symptoms are the same or get worse 48 hours after you start antibiotics  · Your fever is not below 99°F (37 2°C) 48 hours after you start antibiotics  · You have a fever higher than 101°F (38 3°C)  · You cannot eat, or you have loss of appetite, nausea, or are vomiting  · You have questions or concerns about your condition or care  CARE AGREEMENT:   You have the right to help plan your care  Learn about your health condition and how it may be treated  Discuss treatment options with your caregivers to decide what care you want to receive  You always have the right to refuse treatment  The above information is an  only  It is not intended as medical advice for individual conditions or treatments  Talk to your doctor, nurse or pharmacist before following any medical regimen to see if it is safe and effective for you    © 2017 26[address] [facility] Information is for End User's use only and may not be sold, redistributed or otherwise used for commercial purposes  All illustrations and images included in CareNotes® are the copyrighted property of [employer]  or Kai Meneses  Pulmonary Nodules   WHAT YOU NEED TO KNOW:   What are pulmonary nodules? Pulmonary nodules are areas of abnormal tissue in your lungs  You may not have any symptoms, or you may have chest tightness, a cough, chest pain, or shortness of breath  Nodules are usually found with an x-ray or CT scan  Most nodules are not cancerous  However, it is still important for you to return for follow-up testing to monitor your condition  What increases my risk for pulmonary nodules? · Smoking    · A history of a lung infection or lung surgery    · Exposure to asbestos or air pollution  How are pulmonary nodules managed? · Your healthcare provider will refer you to a pulmonologist  Some nodules do not need treatment  You may need a CT scan every 3 to 12 months to monitor your nodules for any change or growth  You may need to continue these tests for 1 to 3 years  · If you have a history of smoking, a family history of lung cancer, or the nodule is large, you may need a PET scan or a biopsy  A PET scan takes pictures of your lungs after a small amount of radiation is injected into your body  A biopsy is a sample of tissue taken from the nodule  A biopsy can be done with a needle, or during a bronchoscopy or surgery  Ask your healthcare provider for more information about a lung biopsy  How can I decrease my risk for lung cancer? Do not smoke  Nicotine and other chemicals in cigarettes and cigars can cause lung damage or cancer  Stay away from others who smoke  Ask your healthcare provider for information if you or someone close to you currently smokes and needs help to quit  E-cigarettes or smokeless tobacco still contain nicotine   Talk to your healthcare provider before you use these products  Call 911 for any of the following:   · You have severe shortness of breath or trouble breathing  · Your lips or nails look blue or pale  When should I seek immediate care? · You cough up blood  · You suddenly feel lightheaded or are short of breath  · You have chest pain when you take a deep breath or cough  · You cannot think clearly  When should I contact my healthcare provider? · Your symptoms do not improve  · You have new symptoms  · You have questions or concerns about your condition or care  CARE AGREEMENT:   You have the right to help plan your care  Learn about your health condition and how it may be treated  Discuss treatment options with your caregivers to decide what care you want to receive  You always have the right to refuse treatment  The above information is an  only  It is not intended as medical advice for individual conditions or treatments  Talk to your doctor, nurse or pharmacist before following any medical regimen to see if it is safe and effective for you  © 2017 [address] [employer]  Information is for End User's use only and may not be sold, redistributed or otherwise used for commercial purposes  All illustrations and images included in CareNotes® are the copyrighted property of [employer]  or Kai Zaira  Pulmonary Nodules   WHAT YOU NEED TO KNOW:   Pulmonary nodules are areas of abnormal tissue in your lungs  You may not have any symptoms, or you may have chest tightness, a cough, chest pain, or shortness of breath  Nodules are usually found with an x-ray or CT scan  Most nodules are not cancerous  However, it is still important for you to return for follow-up testing to monitor your condition  DISCHARGE INSTRUCTIONS:   Call 911 for any of the following:   · You have severe shortness of breath or trouble breathing  · Your lips or nails look blue or pale    Seek care immediately if:   · You cough up blood  · You suddenly feel lightheaded or are short of breath  · You have chest pain when you take a deep breath or cough  · You cannot think clearly  Contact your healthcare provider if:   · Your symptoms do not improve  · You have new symptoms  · You have questions or concerns about your condition or care  Follow up with your healthcare provider:  Your healthcare provider will refer you to a pulmonologist  Your pulmonologist will monitor your nodules for any change or growth  Nodules that grow quickly may require a biopsy to check for cancer  You may need to be monitored for 1 to 3 years  Write down your questions so you remember to ask them during your visits  Do not smoke:  Nicotine and other chemicals in cigarettes and cigars can cause lung damage or cancer  Stay away from others who smoke  Ask your healthcare provider for information if you or someone close to you currently smokes and needs help to quit  E-cigarettes or smokeless tobacco still contain nicotine  Talk to your healthcare provider before you use these products  © 2017 [address] [facility] Information is for End User's use only and may not be sold, redistributed or otherwise used for commercial purposes  All illustrations and images included in CareNotes® are the copyrighted property of [employer]  or [employer]  The above information is an  only  It is not intended as medical advice for individual conditions or treatments  Talk to your doctor, nurse or pharmacist before following any medical regimen to see if it is safe and effective for you  Shortness of Breath   WHAT YOU NEED TO KNOW:   Shortness of breath is a feeling that you cannot get enough air when you breathe in  You may have this feeling only during activity, or all the time  Your symptoms can range from mild to severe   Shortness of breath may be a sign of a serious health condition that needs immediate care  DISCHARGE INSTRUCTIONS:   Seek care immediately if:   · Your signs and symptoms are the same or worse within 24 hours of treatment  · The skin over your ribs or on your neck sinks in when you breathe  · You feel confused or dizzy  Contact your healthcare provider if:   · You have new or worsening symptoms  · You have questions or concerns about your condition or care  Medicines:   · Medicines  may be used to treat the cause of your symptoms  You may need medicine to treat a bacterial infection or reduce anxiety  Other medicines may be used to open your airway, reduce swelling, or remove extra fluid  If you have a heart condition, you may need medicine to help your heart beat more strongly or regularly  · Take your medicine as directed  Contact your healthcare provider if you think your medicine is not helping or if you have side effects  Tell him or her if you are allergic to any medicine  Keep a list of the medicines, vitamins, and herbs you take  Include the amounts, and when and why you take them  Bring the list or the pill bottles to follow-up visits  Carry your medicine list with you in case of an emergency  Manage shortness of breath:   · Create an action plan  You and your healthcare provider can work together to create a plan for how to handle shortness of breath  The plan can include daily activities, treatment changes, and what to do if you have severe breathing problems  · Lean forward on your elbows when you sit  This helps your lungs expand and may make it easier to breathe  · Use pursed-lip breathing any time you feel short of breath  Breathe in through your nose and then slowly breathe out through your mouth with your lips slightly puckered  It should take you twice as long to breathe out as it did to breathe in  · Do not smoke    Nicotine and other chemicals in cigarettes and cigars can cause lung damage and make shortness of breath worse  Ask your healthcare provider for information if you currently smoke and need help to quit  E-cigarettes or smokeless tobacco still contain nicotine  Talk to your healthcare provider before you use these products  · Reach or maintain a healthy weight  Your healthcare provider can help you create a safe weight loss plan if you are overweight  · Exercise as directed  Exercise can help your lungs work more easily  Exercise can also help you lose weight if needed  Try to get at least 30 minutes of exercise most days of the week  Follow up with your healthcare provider or specialist as directed:  Write down your questions so you remember to ask them during your visits  © 2017 [address] [employer]  Information is for End User's use only and may not be sold, redistributed or otherwise used for commercial purposes  All illustrations and images included in CareNotes® are the copyrighted property of [employer]  or [employer]  The above information is an  only  It is not intended as medical advice for individual conditions or treatments  Talk to your doctor, nurse or pharmacist before following any medical regimen to see if it is safe and effective for you  Supraventricular Tachycardia   WHAT YOU NEED TO KNOW:   What is supraventricular tachycardia? Supraventricular tachycardia (SVT) is a condition that causes your heart to beat much faster than it should  SVT is a type of abnormal heart rhythm, called an arrhythmia, that starts in the upper part of your heart  It may last from a few seconds or hours to several days  What increases my risk for SVT? · Health conditions such as anemia, a thyroid disorder, or heart problems    · Drinking caffeine, herbs, or using dietary supplements    · Dehydration    · Pregnancy    · Exercise, fever, or stress    · Smoking, drinking alcohol, or using illegal drugs  What are the signs and symptoms of SVT?    · A pounding, racing, or fluttering heartbeat    · Fatigue, weakness, or shortness of breath    · Feeling lightheaded, dizzy, or faint  · Pain, pressure, or tightness in your chest, neck, jaw, arms, or upper back    · Nausea    · Feeling anxious, scared, or worried that something bad may happen  How is SVT diagnosed? Your healthcare provider will ask about other health conditions and your symptoms  He will also listen to your heart and lungs  You may need any of the following tests:  · Blood or urine tests  are done to check for causes of SVT  · An EKG  is a test to record your heart rhythm and how fast your heart beats  It is used to check for problems with your heart  Your healthcare provider may do an EKG when you are resting, then again after you exercise  You may also need to wear a portable heart monitor at home for a short time  · A chest x-ray  shows a picture of your heart and lungs  · An echocardiogram  is a type of ultrasound  Sound waves are used to show the structure and function of your heart  · A tilt table test  checks your heart and blood pressure when your body changes positions  · An electrophysiology study  is a procedure to map the electrical pathways in your heart that control your heartbeat  How is SVT treated? Treatment will depend on what is causing your SVT and your symptoms  You may not need treatment or you may need any of the following:  · Medicines  help control your heart rate and rhythm  · Vagal maneuvers  are ways to use your own body to slow down your heart rate  Your healthcare provider may have bear down like you are having a bowel movement  You healthcare provider may teach you how to do vagal maneuvers so you can do them at home  · Carotid sinus massage  is a type of massage to help slow your heart rate  Your healthcare provider will apply steady pressure to a blood vessel on one side of your neck  Do  not  do a carotid sinus massage on yourself or anyone else  · Cardioversion  is a procedure to return your heart rate and rhythm to normal  It can be done with medicine or an electric shock  You may need cardioversion if medicine does not work  · Ablation  is a procedure that uses a catheter to damage the small area of your heart that is causing abnormal electrical signals  This will stop the electrical problem and allow your heart to beat regularly  How can I manage or prevent SVT? · Perform vagal maneuvers as directed when you have symptoms of SVT  Lie down flat and bear down like you are having a bowel movement  Do this for 10 to 30 seconds  · Do not drink caffeine or alcohol  These can increase your risk for SVT  · Keep a record of your symptoms  Write down what you ate or what you were doing before an episode of SVT  Also write down anything you did to make the SVT stop  Bring your record to follow up visits with your healthcare provider  · Eat heart-healthy foods  These include fruits, vegetables, whole-grain breads, low-fat dairy products, beans, lean meats, and fish  Replace butter and margarine with heart-healthy oils such as olive oil and canola oil  · Exercise regularly and maintain a healthy weight  Ask about the best exercise plan for you  Ask your healthcare provider how much you should weigh  Ask him to help you create a weight loss plan if you are overweight  · Do not smoke  Nicotine and other chemicals in cigarettes and cigars can cause heart and lung damage  Ask your healthcare provider for information if you currently smoke and need help to quit  E-cigarettes or smokeless tobacco still contain nicotine  Talk to your healthcare provider before you use these products    Call 911 for the following:   · You have any of the following signs of a heart attack:      ¨ Squeezing, pressure, or pain in your chest that lasts longer than 5 minutes or returns    ¨ Discomfort or pain in your back, neck, jaw, stomach, or arm ¨ Trouble breathing    ¨ Nausea or vomiting    ¨ Lightheadedness or a sudden cold sweat, especially with chest pain or trouble breathing    When should I seek immediate care? · You have dizziness, lightheadedness, or feel faint  · You have sudden numbness or weakness in your arms or legs  When should I contact my healthcare provider? · Your symptoms get worse, or you have new symptoms  · You have swelling in your ankles or feet  · You have questions or concerns about your condition or care  CARE AGREEMENT:   You have the right to help plan your care  Learn about your health condition and how it may be treated  Discuss treatment options with your caregivers to decide what care you want to receive  You always have the right to refuse treatment  The above information is an  only  It is not intended as medical advice for individual conditions or treatments  Talk to your doctor, nurse or pharmacist before following any medical regimen to see if it is safe and effective for you  © 2017 [address] [employer] Information is for End User's use only and may not be sold, redistributed or otherwise used for commercial purposes  All illustrations and images included in CareNotes® are the copyrighted property of [employer]  or [employer]  Supraventricular Tachycardia   WHAT YOU NEED TO KNOW:   Supraventricular tachycardia (SVT) is a condition that causes your heart to beat much faster than it should  SVT is a type of abnormal heart rhythm, called an arrhythmia, that starts in the upper part of your heart  It may last a few seconds or hours to several days     DISCHARGE INSTRUCTIONS:   Call 911 for the following:   · You have any of the following signs of a heart attack:      ¨ Squeezing, pressure, or pain in your chest that lasts longer than 5 minutes or returns    ¨ Discomfort or pain in your back, neck, jaw, stomach, or arm     ¨ Trouble breathing    ¨ Nausea or vomiting    ¨ Lightheadedness or a sudden cold sweat, especially with chest pain or trouble breathing    Seek care immediately if:   · You have dizziness, lightheadedness, or feel faint  · You have sudden numbness or weakness in your arms or legs  Contact your healthcare provider if:   · Your symptoms get worse, or you have new symptoms  · You have swelling in your ankles or feet  · You have questions or concerns about your condition or care  Medicines:   · Medicines  can help control your heart rate and rhythm  You may need more than one medicine to treat your symptoms  · Take your medicine as directed  Contact your healthcare provider if you think your medicine is not helping or if you have side effects  Tell him or her if you are allergic to any medicine  Keep a list of the medicines, vitamins, and herbs you take  Include the amounts, and when and why you take them  Bring the list or the pill bottles to follow-up visits  Carry your medicine list with you in case of an emergency  How to manage or prevent SVT:   · Perform vagal maneuvers as directed when you have symptoms of SVT  Lie down flat and bear down like you are having a bowel movement  Do this for 10 to 30 seconds  · Do not drink caffeine or alcohol  These can increase your risk for SVT  · Keep a record of your symptoms  Write down what you ate or what you were doing before an episode of SVT  Also write down anything you did to make the SVT stop  Bring your record to follow up visits with your healthcare provider  · Eat heart-healthy foods  These include fruits, vegetables, whole-grain breads, low-fat dairy products, beans, lean meats, and fish  Replace butter and margarine with heart-healthy oils such as olive oil and canola oil  · Exercise regularly and maintain a healthy weight  Ask about the best exercise plan for you  Ask your healthcare provider how much you should weigh   Ask him to help you create a weight loss plan if you are overweight  · Do not smoke  Nicotine and other chemicals in cigarettes and cigars can cause heart and lung damage  Ask your healthcare provider for information if you currently smoke and need help to quit  E-cigarettes or smokeless tobacco still contain nicotine  Talk to your healthcare provider before you use these products  Follow up with your healthcare provider as directed:  Write down your questions so you remember to ask them during your visits  © 2017 [address] [employer] Information is for End User's use only and may not be sold, redistributed or otherwise used for commercial purposes  All illustrations and images included in CareNotes® are the copyrighted property of [employer]  or [employer] Menes[employer]  The above information is an  only  It is not intended as medical advice for individual conditions or treatments  Talk to your doctor, nurse or pharmacist before following any medical regimen to see if it is safe and effective for you  Urinary Traction Infection in Older Adults   WHAT YOU NEED TO KNOW:   What is a urinary tract infection? A urinary tract infection (UTI) is caused by bacteria that get inside your urinary tract  Your urinary tract includes your kidneys, ureters, bladder, and urethra  Urine is made in your kidneys, and it flows from the ureters to the bladder  Urine leaves the bladder through the urethra  A UTI is more common in your lower urinary tract, which includes your bladder and urethra  What increases my risk for a UTI?    · A UTI within the last 3 months    · Menopause in women    · Enlarged prostate in men    · Medicines that affect urination    · Urinary incontinence (you cannot control your bladder)     · Sexual intercourse    · Medical conditions, such as diabetes or obesity    · Urinary tract problems, such as a narrowing, kidney stones, or inability to empty your bladder completely  What are the signs and symptoms of a UTI?   · Fever and chills    · Pain or burning when you urinate    · Urine that smells bad or looks cloudy, or blood in your urine    · Urinating more often or waking from sleep to urinate    · Sudden, strong need to urinate    · Pain or pressure in your lower abdomen     · Leaking urine    · Confusion or agitation    · Fatigue, shakiness, and weakness  How is a UTI diagnosed? Your healthcare provider will ask about your symptoms  He or she may also examine you  You may need any of the following:  · A urinalysis  will give information about your urinary tract and overall health  · A urine culture  may show the type of germ causing the infection  How is a UTI treated? Medicines treat the bacterial infection or decrease pain and burning when you urinate  You may also need medicines to decrease the urge to urinate often  Your healthcare provider may recommend cranberry juice or cranberry supplements to help decrease your symptoms  How can I manage my symptoms? · Urinate when you feel the urge  Do not hold your urine because bacteria can grow in the bladder if urine stays in the bladder too long  It may be helpful to urinate at least every 3 to 4 hours  · Drink liquids as directed  Liquids can help flush bacteria from your urinary tract  Ask how much liquid to drink each day and which liquids are best for you  You may need to drink more liquids than usual to help flush out the bacteria  Do not drink alcohol, caffeine, and citrus juices  These can irritate your bladder and increase your symptoms  · Apply heat  on your abdomen for 20 to 30 minutes every 2 hours for as many days as directed  Heat helps decrease discomfort and pressure in your bladder  How can I help prevent a UTI? · Women should wipe front to back  after urinating or having a bowel movement  This may prevent germs from getting into the urinary tract       · Urinate after you have sex  to flush away bacteria that can enter your urinary tract during sex  · Wear cotton underwear and clothes that fit loose  Tight pants and nylon underwear can trap moisture and cause bacteria to grow  When should I seek immediate care? · You are urinating very little or not at all  · You are vomiting  · You have a high fever with shaking chills  · You have side or back pain that gets worse  When should I contact my healthcare provider? · You have a fever  · You are a woman and you have increased white or yellow discharge from your vagina  · You do not feel better after 2 days of taking antibiotics  · You have questions or concerns about your condition or care  CARE AGREEMENT:   You have the right to help plan your care  Learn about your health condition and how it may be treated  Discuss treatment options with your caregivers to decide what care you want to receive  You always have the right to refuse treatment  The above information is an  only  It is not intended as medical advice for individual conditions or treatments  Talk to your doctor, nurse or pharmacist before following any medical regimen to see if it is safe and effective for you  © 2017 [address] [facility] Information is for End User's use only and may not be sold, redistributed or otherwise used for commercial purposes  All illustrations and images included in CareNotes® are the copyrighted property of [employer]  or [employer]  Urinary Traction Infection in Older Adults   WHAT YOU NEED TO KNOW:   A urinary tract infection (UTI) is caused by bacteria that get inside your urinary tract  Your urinary tract includes your kidneys, ureters, bladder, and urethra  Urine is made in your kidneys, and it flows from the ureters to the bladder  Urine leaves the bladder through the urethra  A UTI is more common in your lower urinary tract, which includes your bladder and urethra     DISCHARGE INSTRUCTIONS:   Seek care immediately if:   · You are urinating very little or not at all  · You are vomiting  · You have a high fever with shaking chills  · You have side or back pain that gets worse  Contact your healthcare provider if:   · You have a fever  · You are a woman and you have increased white or yellow discharge from your vagina  · You do not feel better after 2 days of taking antibiotics  · You have questions or concerns about your condition or care  Medicines:   · Medicines  help treat the bacterial infection or decrease pain and burning when you urinate  You may also need medicines to decrease the urge to urinate often  Your healthcare provider may recommend cranberry juice or cranberry supplements to help decrease your symptoms  · Take your medicine as directed  Contact your healthcare provider if you think your medicine is not helping or if you have side effects  Tell him or her if you are allergic to any medicine  Keep a list of the medicines, vitamins, and herbs you take  Include the amounts, and when and why you take them  Bring the list or the pill bottles to follow-up visits  Carry your medicine list with you in case of an emergency  Self-care:   · Urinate when you feel the urge  Do not hold your urine because bacteria can grow in the bladder if urine stays in the bladder too long  It may be helpful to urinate at least every 3 to 4 hours  · Drink liquids as directed  Liquids can help flush bacteria from your urinary tract  Ask how much liquid to drink each day and which liquids are best for you  You may need to drink more liquids than usual to help flush out the bacteria  Do not drink alcohol, caffeine, and citrus juices  These can irritate your bladder and increase your symptoms  · Apply heat  on your abdomen for 20 to 30 minutes every 2 hours for as many days as directed  Heat helps decrease discomfort and pressure in your bladder    Prevent a UTI:   · Women should wipe front to back  after urinating or having a bowel movement  This may prevent germs from getting into the urinary tract  · Urinate after you have sex  to flush away bacteria that can enter your urinary tract during sex  · Wear cotton underwear and clothes that fit loose  Tight pants and nylon underwear can trap moisture and cause bacteria to grow  Follow up with your healthcare provider as directed:  Write down your questions so you remember to ask them during your visits  © 2017 [address] [employer]  Information is for End User's use only and may not be sold, redistributed or otherwise used for commercial purposes  All illustrations and images included in CareNotes® are the copyrighted property of [employer]  or Kai Meneses  The above information is an  only  It is not intended as medical advice for individual conditions or treatments  Talk to your doctor, nurse or pharmacist before following any medical regimen to see if it is safe and effective for you

## 2018-03-26 NOTE — PHYSICAL THERAPY NOTE
PT Treatment     03/26/18 1146   Pain Assessment   Pain Assessment No/denies pain   Pain Score No Pain   Restrictions/Precautions   Other Precautions Chair Alarm; Fall Risk;O2   General   Family/Caregiver Present No   Cognition   Arousal/Participation Alert   Orientation Level Oriented X4   Following Commands Follows all commands and directions without difficulty   Subjective   Subjective Pt states she feels pretty good and may be going home today  Bed Mobility   Additional Comments OOB in chair when PT entered room  returned to sitting in chair at bedside with call bell in reach LE's elevated and chair alarm on  Transfers   Sit to Stand 5  Supervision   Stand to Sit 5  Supervision   Stand pivot 5  Supervision   Additional Comments Pt on 3L's O2 at rest however pt not on O2 at [facility]  Trialed pt on room air with SpO2 at rest 97% HR 63 and after 200ft of ambulation SpO2 93% HR 70 with no complaint of SOB  Pt able to ambulate without A  D  without LOB with shuffling gait pattern decreased step and stride length with decreased foot clearance  Ambulation/Elevation   Gait pattern Decreased foot clearance;Shuffling; Short stride   Gait Assistance 5  Supervision   Assistive Device None   Distance 200ft no A D  Supervision no LOB and no drop in spO2 below 90% on room air  no lightheadedness dizziness or complaint during ambulation  Balance   Static Sitting Good   Dynamic Sitting Good   Static Standing Fair +   Dynamic Standing Fair   Ambulatory Fair   Endurance Deficit   Endurance Deficit Yes   Endurance Deficit Description limited ambulation tolerance due to fatigue   Activity Tolerance   Activity Tolerance Patient limited by fatigue   Assessment   Prognosis Good   Problem List Decreased strength;Decreased endurance; Impaired balance;Decreased mobility   Assessment Pt continues to perform all transfers and ambulation at a (S) level without A D  and no drop in SpO2 below 93% on room air   Pt still ambulates with shuffling gait pattern with decreased renata and increased time to complete task but will be safe to return to [facility]  Pt returned to sitting in chair with bell in reach and alarm on  Pt will be safe to return to [facility] and would benefit from home PT  Plan   Treatment/Interventions Functional transfer training; Endurance training;Patient/family training;Gait training   Progress Progressing toward goals   Recommendation   Recommendation Home PT   PT - OK to Discharge Yes   Pt with SCD's on when PT entered room  SCD's reapplied and turned on with pt seated in chair at bedside alarm on and bell in reach

## 2018-03-26 NOTE — SOCIAL WORK
Pt medically ready for dc back to [facility] on this date  Cm called [facility] and spoke with Babita Chen care director, as Darshan Meyers is off today  Marta Pelletier aware that possible c diff but we cannot do a c diff study for 3 days as pt has been on stool softeners  Per Marta Pelletier, if our physician writes order/script, [facility] will do a c diff study there on Thursday  Dr Cely Marmolejo aware and will be writing script/order

## 2018-03-26 NOTE — NURSING NOTE
Patient discharged on 3/26/18, D/C instructions reviewed with pt and family with adequate understanding verbalized  No s/s of distress upon d/c

## 2018-03-26 NOTE — PLAN OF CARE
Problem: DISCHARGE PLANNING - CARE MANAGEMENT  Goal: Discharge to post-acute care or home with appropriate resources  INTERVENTIONS:  - Conduct assessment to determine patient/family and health care team treatment goals, and need for post-acute services based on payer coverage, community resources, and patient preferences, and barriers to discharge  - Address psychosocial, clinical, and financial barriers to discharge as identified in assessment in conjunction with the patient/family and health care team  - Arrange appropriate level of post-acute services according to patient's   needs and preference and payer coverage in collaboration with the physician and health care team  - Communicate with and update the patient/family, physician, and health care team regarding progress on the discharge plan  - Arrange appropriate transportation to post-acute venues   Outcome: Completed Date Met: 03/26/18  -dc to [facility]  -son to pick pt up at 2pm

## 2018-03-26 NOTE — PLAN OF CARE
DISCHARGE PLANNING     Discharge to home or other facility with appropriate resources Progressing        DISCHARGE PLANNING - CARE MANAGEMENT     Discharge to post-acute care or home with appropriate resources Progressing        INFECTION - ADULT     Absence or prevention of progression during hospitalization Progressing     Absence of fever/infection during neutropenic period Progressing        Knowledge Deficit     Patient/family/caregiver demonstrates understanding of disease process, treatment plan, medications, and discharge instructions Progressing        PAIN - ADULT     Verbalizes/displays adequate comfort level or baseline comfort level Progressing        Potential for Falls     Patient will remain free of falls Progressing        Prexisting or High Potential for Compromised Skin Integrity     Skin integrity is maintained or improved Progressing        SAFETY ADULT     Maintain or return to baseline ADL function Progressing     Maintain or return mobility status to optimal level Progressing no

## 2018-03-26 NOTE — DISCHARGE SUMMARY
Discharge Summary - [facility] Medicine    Patient Information: Sabrina Keys 80 y o  female MRN: [id number]  Unit/Bed#: 400-01 Encounter: [id number]    Discharging Physician / Practitioner: Sascha Sanchez MD  PCP: Greg Bey DO  Admission Date: 3/19/2018  Discharge Date: 03/26/18    Reason for Admission:  Fall/hypoxia/SVT    Discharge Diagnoses:     Principal Problem (Resolved):    Healthcare-associated pneumonia  Active Problems:    Supraventricular tachycardia ([address] )    Mass of urinary bladder determined by ultrasound    Esophageal mass    Pulmonary nodule  Resolved Problems:    Other insomnia    Hypoxemia    Acute febrile illness    Acute cystitis    Hypovolemia due to dehydration    Acute tracheobronchitis    Cough    Shortness of breath    Hyponatremia      Consultations During Hospital Stay:  · Cardiology  · Gastroenterology  · Pulmonary  · Heme oncology    Procedures Performed:     · Echocardiogram  · X-ray of pelvis  · X-ray of chest  · Ultrasound kidney and bladder  · CT chest without contrast    Significant Findings / Test Results:     · Echocardiogram:  Estimated ejection fraction is 60-65%  No diagnostic regional wall motion abnormality was identified  Grade 1 diastolic dysfunction  Mild aortic stenosis  · X-ray of pelvis:  Limited exam due to overlying bowel gas  No definite fracture  · Chest x-ray:  Mildly increased interstitial markings without focal consolidation  · Ultrasound of kidneys:  Left renal cyst   No hydronephrosis  Otherwise unremarkable appearance of the kidney  Possible left posterior bladder non mobile filling defect  Polypoid bladder neoplasm is not excluded  · CT chest without contrast:  Alveolar consolidation in the right upper lobe with air bronchogram   Additional foci of consolidation noted in the lingula and both lower lobes  Finding consistent with multilobar pneumonia  Bilateral parapneumonic effusion    Density with masslike appearance in the azygoesophageal recess measuring 3 3 x 3 cm  Neoplasm not excluded  Additional right lower lobe nodule measuring 8 mm  Incidental Findings:   · Pulmonary nodule 8 mm the right lower lobe  · 3 3 x 3 cm masslike appearance in the azygoesophageal recess   · Urine cultures positive for E coli    Test Results Pending at Discharge (will require follow up): · None     Outpatient Tests Requested:  · PET scan  · Upper EGD  Patient is to follow up with Gastroenterology as an outpatient  · Patient need to follow up with electrophysiology Cardiology for possible ablation for paroxysmal SVT  · Outpatient cystoscopy-patient need to follow up with Urology as an outpatient    Complications:  None    Hospital Course:     Rudy Rust is a 80 y o  female patient who originally presented to the hospital on 3/19/2018 due to from [facility] following an accidental fall after she lost her balance while standing attempting to  object from the floor, fell backwards landing on her buttocks & was discovered on the floor next to her bed  She denies any loss of consciousness, head trauma, chest pain, palpitations, near syncope, preceding aura, tongue biting, convulsive activity, bowel/bladder incontinence or postictal confusion surrounding her fall  She reports subjective fever, anorexia, malaise & nausea overnight, preceded by worsening chesty nonproductive cough past 2 days  No vomiting, diarrhea, abd pain, chest pain, headache, neck stiffness, skin rash or acute confusion  She reports dysuria & urinary frequency      While at the ED, she had an episode of apparently asymptomatic SVT up to the 150-160s, per ED physician notes, that resolved following vagal maneuver with resultant NSR + occasional PVCs  She presently feels improved after receiving IV fluids & IV antibiotics at the Ed  Patient was admitted to telemetry  Cardiology was consulted on the patient and echo was done    Patient was already on beta blocker and cardiology started the patient on Cardizem p  o  30 mg q 8 hours  Heart rate is under control  And they recommended outpatient EP follow-up for possible ablation  Patient did develop hypoxia in the hospital and chest x-ray showed questionable pneumonia  Patient was started on IV Zosyn 3 375 g Q 6 hours  Symptoms resolved  Blood cultures x2 were negative  Patient urine culture was positive for E coli and was covered by Zosyn  CT scan of chest was done as patient had interstitial markings in the chest x-ray and showed multilobar pneumonia and possible 3 3 x 3 cm azygos esophageal mass  Gastroenterology was consulted for that and was recommended to follow up as an outpatient for EGD  Bladder scan showed questionable bladder mass and Urology was consulted and discussed over the phone and patient was advised to follow up as an outpatient for cystoscopy  Patient is stable to be discharged and patient is prescribed Cardizem 30 mg Q 8 hours and lisinopril 10 mg p  o  daily  Patient is advised to keep up with outpatient followups with Gastroenterology, Urology, PCP, Cardiology  Patient did develop 1 episode of diarrhea today morning at the time of discharge and patient did receive MiraLax laxative yesterday and according to the protocol C diff has to be tested after 3 days of of laxatives  Patient is advised to follow up is here diff toxin as an outpatient 3 days from the date of discharge  Patient is advised to follow up with the PCP if in case the C diff is positive    Condition at Discharge: good     Discharge Day Visit / Exam:     * Please refer to separate progress for these details *    Discharge instructions/Information to patient and family:   See after visit summary for information provided to patient and family  Provisions for Follow-Up Care:  See after visit summary for information related to follow-up care and any pertinent home health orders        Disposition:     Other Sanjay Baker at   [facility] Discharges to [facility]:   · Not Applicable to this Patient - Not Applicable to this Patient    Planned Readmission:  No    Discharge Statement:  I spent 60 minutes discharging the patient  This time was spent on the day of discharge  I had direct contact with the patient on the day of discharge  Greater than 50% of the total time was spent examining patient, answering all patient questions, arranging and discussing plan of care with patient as well as directly providing post-discharge instructions  Additional time then spent on discharge activities  Discharge Medications:  See after visit summary for reconciled discharge medications provided to patient and family  ** Please Note: This note has been constructed using a voice recognition system   **

## 2018-03-26 NOTE — PLAN OF CARE
DISCHARGE PLANNING     Discharge to home or other facility with appropriate resources Completed        INFECTION - ADULT     Absence or prevention of progression during hospitalization Completed     Absence of fever/infection during neutropenic period Completed        Knowledge Deficit     Patient/family/caregiver demonstrates understanding of disease process, treatment plan, medications, and discharge instructions Completed        PAIN - ADULT     Verbalizes/displays adequate comfort level or baseline comfort level Completed        Potential for Falls     Patient will remain free of falls Completed        Prexisting or High Potential for Compromised Skin Integrity     Skin integrity is maintained or improved Completed        SAFETY ADULT     Maintain or return to baseline ADL function Completed     Maintain or return mobility status to optimal level Completed

## 2018-03-27 ENCOUNTER — TELEPHONE (OUTPATIENT)
Dept: INTERNAL MEDICINE CLINIC | Facility: CLINIC | Age: 83
End: 2018-03-27

## 2018-03-27 LAB
BACTERIA BLD CULT: NORMAL
C DIFF TOX GENS STL QL NAA+PROBE: NORMAL

## 2018-03-27 NOTE — TELEPHONE ENCOUNTER
----- Message from Santosh Schreiber sent at 3/26/2018  3:54 PM EDT -----  Regarding: TCM  Tcm discharge 03/26

## 2018-03-28 ENCOUNTER — TELEPHONE (OUTPATIENT)
Dept: INTERNAL MEDICINE CLINIC | Facility: CLINIC | Age: 83
End: 2018-03-28

## 2018-03-28 ENCOUNTER — TRANSITIONAL CARE MANAGEMENT (OUTPATIENT)
Dept: INTERNAL MEDICINE CLINIC | Facility: CLINIC | Age: 83
End: 2018-03-28

## 2018-03-28 NOTE — TELEPHONE ENCOUNTER
----- Message from Alberto Cunningham sent at 3/26/2018  3:54 PM EDT -----  Regarding: TCM  Tcm discharge 03/26

## 2018-03-28 NOTE — TELEPHONE ENCOUNTER
----- Message from Reyna Jenkins sent at 3/26/2018  3:54 PM EDT -----  Regarding: TCM  Tcm discharge 03/26

## 2018-04-05 ENCOUNTER — OFFICE VISIT (OUTPATIENT)
Dept: PULMONOLOGY | Facility: HOSPITAL | Age: 83
End: 2018-04-05
Payer: MEDICARE

## 2018-04-05 VITALS
SYSTOLIC BLOOD PRESSURE: 110 MMHG | WEIGHT: 125 LBS | HEIGHT: 63 IN | DIASTOLIC BLOOD PRESSURE: 76 MMHG | HEART RATE: 85 BPM | BODY MASS INDEX: 22.15 KG/M2

## 2018-04-05 DIAGNOSIS — R91.8 RIGHT LOWER LOBE LUNG MASS: Primary | ICD-10-CM

## 2018-04-05 DIAGNOSIS — J18.9 HCAP (HEALTHCARE-ASSOCIATED PNEUMONIA): ICD-10-CM

## 2018-04-05 PROCEDURE — 99214 OFFICE O/P EST MOD 30 MIN: CPT | Performed by: INTERNAL MEDICINE

## 2018-04-05 NOTE — PROGRESS NOTES
Assessment:    HCAP (healthcare-associated pneumonia)  Clinically the patient has completely recovered from her recent bout of healthcare associated pneumonia  She was discharged from the hospital over 2 weeks ago  She completed all of her antibiotics  We will evaluate for complete resolution on upcoming PET scan images  Right lower lobe lung mass  While in the hospital last month, the patient had a CT scan of her chest   In addition to patchy bilateral alveolar opacities consistent with pneumonia, the patient had a right lower lobe mass in her azygos recess  She needs a PET scan to evaluate hypermetabolism or not  If PET scan is positive, we will consider IR biopsy  If PET scan is negative we will repeat CT scan in 6-12 months  Both the patient and her son understand next steps in evaluation process  Plan:    Diagnoses and all orders for this visit:    Right lower lobe lung mass  -     NM PET CT skull base to mid thigh; Future    HCAP (healthcare-associated pneumonia)        Follow-up:   3 weeksFollow-up:      HPI:  Overall the patient feels better but she still feels tired  She doesn't do much activity  She denies any shortness of breath at rest or with exertion  She denies any cough or chest pain  No wheezing  No fevers  She is eating 3 meals a day  She has a good appetite but she doesn't like the meals she is having  She has not noticed any significant weight changes    No lung disease at baseline  Lifelong non-smoker  Review of Systems   Constitutional: Positive for fatigue  Negative for fever and unexpected weight change  Respiratory: Negative for shortness of breath  Cardiovascular: Negative for chest pain and leg swelling  Gastrointestinal: Negative for nausea         The following portions of the patient's history were reviewed and updated as appropriate: allergies, current medications, past family history, past medical history, past social history, past surgical history and problem list     VITALS:  Vitals:    04/05/18 1153   BP: 110/76   Pulse: 85   Weight: 56 7 kg (125 lb)   Height: 5' 3" (1 6 m)       Physical Exam  General:  Patient is awake, alert, non-toxic and in no acute respiratory distress  Neck: No JVD  CV:  Regular, +S1 and S2, No murmurs, gallops or rubs appreciated  Lungs:  Clear to auscultation bilateral without wheeze, rales or rhonchi  Abdomen: Soft, +BS, Non-tender, non-distended  Extremities: No clubbing, cyanosis or edema  Neuro: No focal deficits        Diagnostic Testing:    CBC:  Lab Results   Component Value Date    WBC 5 87 03/25/2018    HGB 11 0 (L) 03/25/2018    HCT 34 5 (L) 03/25/2018    MCV 94 03/25/2018     03/25/2018         BMP:   Lab Results   Component Value Date     (L) 03/25/2018    K 4 0 03/25/2018    CL 97 (L) 03/25/2018    CO2 30 03/25/2018    ANIONGAP 6 03/25/2018    BUN 11 03/25/2018    CREATININE 0 74 03/25/2018    GLUCOSE 103 03/25/2018    GLUF 105 (H) 03/14/2018    CALCIUM 9 4 03/25/2018    AST 21 03/20/2018    ALT 22 03/20/2018    ALKPHOS 63 03/20/2018    PROT 7 2 03/20/2018    BILITOT 0 60 03/20/2018    EGFR 74 03/25/2018         Imaging studies:  I have personally reviewed pertinent films in PACS  3/22/18 CT chest:LUNGS:  Alveolar consolidation in the right upper lobe with air bronchograms  Additional foci of consolidation noted in the lingula and both lower lobes  Density with masslike appearance in the azygo-esophageal recess measuring 3 3 x 3 0 cm  Additional right lower lobe nodule measuring 8 mm on image 2/38      PLEURA:  Small bilateral pleural effusions likely parapneumonic effusions        Melia Dodson DO

## 2018-04-05 NOTE — ASSESSMENT & PLAN NOTE
While in the hospital last month, the patient had a CT scan of her chest   In addition to patchy bilateral alveolar opacities consistent with pneumonia, the patient had a right lower lobe mass in her azygos recess  She needs a PET scan to evaluate hypermetabolism or not  If PET scan is positive, we will consider IR biopsy  If PET scan is negative we will repeat CT scan in 6-12 months  Both the patient and her son understand next steps in evaluation process

## 2018-04-05 NOTE — ASSESSMENT & PLAN NOTE
Clinically the patient has completely recovered from her recent bout of healthcare associated pneumonia  She was discharged from the hospital over 2 weeks ago  She completed all of her antibiotics  We will evaluate for complete resolution on upcoming PET scan images

## 2018-04-10 ENCOUNTER — OFFICE VISIT (OUTPATIENT)
Dept: INTERNAL MEDICINE CLINIC | Facility: CLINIC | Age: 83
End: 2018-04-10
Payer: MEDICARE

## 2018-04-10 VITALS
HEIGHT: 63 IN | SYSTOLIC BLOOD PRESSURE: 112 MMHG | HEART RATE: 65 BPM | RESPIRATION RATE: 16 BRPM | TEMPERATURE: 97.8 F | BODY MASS INDEX: 22.43 KG/M2 | DIASTOLIC BLOOD PRESSURE: 62 MMHG | WEIGHT: 126.6 LBS

## 2018-04-10 DIAGNOSIS — K22.89 ESOPHAGEAL MASS: ICD-10-CM

## 2018-04-10 DIAGNOSIS — I47.1 SUPRAVENTRICULAR TACHYCARDIA (HCC): ICD-10-CM

## 2018-04-10 DIAGNOSIS — N32.89 MASS OF URINARY BLADDER DETERMINED BY ULTRASOUND: ICD-10-CM

## 2018-04-10 DIAGNOSIS — J18.9 HCAP (HEALTHCARE-ASSOCIATED PNEUMONIA): Primary | ICD-10-CM

## 2018-04-10 PROCEDURE — 99495 TRANSJ CARE MGMT MOD F2F 14D: CPT | Performed by: PHYSICIAN ASSISTANT

## 2018-04-10 NOTE — PROGRESS NOTES
Transition of Care  Follow-up After Hospitalization    Lisandra Trent 80 y o  female   Date:  4/10/2018    Date and time hospital follow up call was made:  3/28/2018  [facility] reviewed:  Records not available  Patient was hopsitalized at:  [address]  Date of admission:  3/19/18  Date of discharge:  3/26/18  Diagnosis:  pneumonia  Scheduled for follow up?:  Yes       Admit Date:3/19/18   Discharge Date:3/26/18  Diagnosis: pnuemonia, bronchitis, UTI  Location: [address] [place] [address]  records were reviewed  Medications upon discharge reviewed/updated  Consults: cardiology, urology, GI  Discharge Disposition:  [place] where she resided prior to the episode  Follow up visits with other specialists: Pulmonary, cardiology, urology and gastroenterology      Assessment and Plan:    1  Pneumonia: resolved  2  Bladder Mass:  Continue with urology appt for cystoscopy  3  Esophageal mass: Continue with GI for EGD  4  Episodic SVT: Continue with upcoming appt with Ep  She has an appt for a PET scan scheduled  RTO 3 months  There are no diagnoses linked to this encounter  HPI:  Pt presents for ROBE  She was transported to the hospital from [facility] for a fall  She was found to have UTI, pneumonia, and bronchitis  She was adequately treated with IV Zosyn  Incidentally found was a possible bladder mass and she was referred to urology and has an appt scheduled and will likely get cystoscopy  While in the hospital she also had asymptomatic episodes of SVT  She was placed on cardizem and episode resolved  She has upcoming appt with electrophysiology for evaluation for ablation  She also was found to have a possible azygous esophageal mass and will be following with GI regarding this for EGD  She feels well today  Vitals are all stable  She does have some rib pain from the fall at [facility] but this is steadily improving           ROS: Review of Systems   Constitutional: Negative for chills and fever  HENT: Negative for congestion, ear pain, hearing loss, postnasal drip, rhinorrhea, sinus pain, sinus pressure, sore throat and trouble swallowing  Eyes: Negative for pain and visual disturbance  Respiratory: Negative for cough, chest tightness, shortness of breath and wheezing  Cardiovascular: Negative  Negative for chest pain, palpitations and leg swelling  Gastrointestinal: Negative for abdominal pain, blood in stool, constipation, diarrhea, nausea and vomiting  Endocrine: Negative for cold intolerance, heat intolerance, polydipsia, polyphagia and polyuria  Genitourinary: Negative for difficulty urinating, dysuria, flank pain and urgency  Musculoskeletal: Negative for arthralgias, back pain, gait problem and myalgias  Skin: Negative for rash  Allergic/Immunologic: Negative  Neurological: Negative for dizziness, weakness, light-headedness and headaches  Hematological: Negative  Psychiatric/Behavioral: Negative for behavioral problems, dysphoric mood and sleep disturbance  The patient is not nervous/anxious          Past Medical History:   Diagnosis Date    Abdominal distension     Last Assessed: [id number]    Abnormal weight loss     Last Assessed: [id number]    Arthritis     Bronchitis     Bursitis of left hip     Lst Assessed: [id number]    Chest pain     Last Assessed: 21Rth4710    Dysuria     Last Assessed: [id number]    External hemorrhoids     Last Assessed: [id number]    GERD (gastroesophageal reflux disease)     Hypomagnesemia     Last Assessed: 09Ybd7011    Lyme disease     Last Assessed: 77Cvq3455    Osteoporosis     Pneumonia of right middle lobe due to infectious organism [facility])     Last Assessed: [id number]    Urinary tract infection        Past Surgical History:   Procedure Laterality Date    CATARACT EXTRACTION      HEMORRHOID SURGERY      HYSTERECTOMY      PELVIC FLOOR REPAIR         Social History     Social History    Marital status:      Spouse name: N/A    Number of children: N/A    Years of education: N/A     Occupational History    Retired      Social History Main Topics    Smoking status: Never Smoker    Smokeless tobacco: Never Used    Alcohol use No    Drug use: No    Sexual activity: Not Asked     Other Topics Concern    None     Social History Narrative    None       Family History   Problem Relation Age of Onset    Adopted:  Yes       Allergies   Allergen Reactions    Codeine GI Intolerance    Epinephrine Other (See Comments)     Increased Heart Rate, Palpitations    Iodinated Diagnostic Agents     Magnesium Citrate GI Intolerance         Current Outpatient Prescriptions:     aspirin (ADULT ASPIRIN EC LOW STRENGTH) 81 mg EC tablet, Take 1 tablet by mouth daily, Disp: , Rfl:     Calcium 500 MG tablet, Take 1 capsule by mouth 2 (two) times a day, Disp: , Rfl:     Cholecalciferol (VITAMIN D-3) 1000 units CAPS, Take by mouth, Disp: , Rfl:     clotrimazole-betamethasone (LOTRISONE) 1-0 05 % lotion, Apply topically to affected area on sides breasts BID, Disp: 30 mL, Rfl: 0    digoxin (LANOXIN) 0 125 mg tablet, Take 1 tablet by mouth daily, Disp: , Rfl:     diltiazem (CARDIZEM) 30 mg tablet, Take 1 tablet (30 mg total) by mouth every 8 (eight) hours, Disp: 90 tablet, Rfl: 0    ibuprofen (MOTRIN) 200 mg tablet, Take by mouth every 6 (six) hours as needed for mild pain, Disp: , Rfl:     lisinopril (ZESTRIL) 10 mg tablet, Take 1 tablet (10 mg total) by mouth daily, Disp: 30 tablet, Rfl: 0    Melatonin 5 MG TABS, Take by mouth, Disp: , Rfl:     metoprolol tartrate (LOPRESSOR) 50 mg tablet, Take by mouth 3 (three) times a day  , Disp: , Rfl:     Omega-3 Fatty Acids (OMEGA-3 FISH OIL) 1000 MG CAPS, Take by mouth daily  , Disp: , Rfl:     pantoprazole (PROTONIX) 40 mg tablet, Take 1 tablet by mouth daily, Disp: , Rfl:     RaNITidine HCl (RANITIDINE 75 PO), Take 150 mg by mouth, Disp: , Rfl:     clonazePAM (KlonoPIN) 0 5 mg tablet, Take 1 tablet (0 5 mg total) by mouth 2 (two) times a day for 30 days Take 1/2 tablet in morning and 2 tablet in the evening, Disp: 75 tablet, Rfl: 0      Physical Exam:  /62 (BP Location: Left arm, Patient Position: Sitting, Cuff Size: Standard)   Pulse 65   Temp 97 8 °F (36 6 °C) (Tympanic)   Resp 16   Ht 5' 3" (1 6 m)   Wt 57 4 kg (126 lb 9 6 oz)   BMI 22 43 kg/m²     Physical Exam   Constitutional: She is oriented to person, place, and time  She appears well-developed and well-nourished  No distress  HENT:   Head: Normocephalic and atraumatic  Right Ear: External ear normal    Left Ear: External ear normal    Nose: Nose normal    Mouth/Throat: Oropharynx is clear and moist  No oropharyngeal exudate  Eyes: Conjunctivae and EOM are normal  Pupils are equal, round, and reactive to light  Right eye exhibits no discharge  Left eye exhibits no discharge  No scleral icterus  Neck: Normal range of motion  Neck supple  No thyromegaly present  Cardiovascular: Normal rate, regular rhythm and normal heart sounds  Exam reveals no gallop and no friction rub  No murmur heard  Pulmonary/Chest: Effort normal and breath sounds normal  No respiratory distress  She has no wheezes  She has no rales  Abdominal: Soft  Bowel sounds are normal  She exhibits no distension  There is no tenderness  Musculoskeletal: Normal range of motion  She exhibits no edema, tenderness or deformity  Neurological: She is alert and oriented to person, place, and time  No cranial nerve deficit  Skin: Skin is warm and dry  She is not diaphoretic  Psychiatric: She has a normal mood and affect   Her behavior is normal  Judgment and thought content normal            Labs:  Lab Results   Component Value Date    WBC 5 87 03/25/2018    HGB 11 0 (L) 03/25/2018    HCT 34 5 (L) 03/25/2018    MCV 94 03/25/2018     03/25/2018     Lab Results   Component Value Date     (L) 03/25/2018    K 4 0 03/25/2018    CL 97 (L) 03/25/2018    CO2 30 03/25/2018    ANIONGAP 6 03/25/2018    BUN 11 03/25/2018    CREATININE 0 74 03/25/2018    GLUCOSE 103 03/25/2018    GLUF 105 (H) 03/14/2018    CALCIUM 9 4 03/25/2018    AST 21 03/20/2018    ALT 22 03/20/2018    ALKPHOS 63 03/20/2018    PROT 7 2 03/20/2018    BILITOT 0 60 03/20/2018    EGFR 74 03/25/2018

## 2018-04-16 DIAGNOSIS — Z71.9 ENCOUNTER FOR CONSULTATION: Primary | ICD-10-CM

## 2018-04-17 ENCOUNTER — OFFICE VISIT (OUTPATIENT)
Dept: GASTROENTEROLOGY | Facility: MEDICAL CENTER | Age: 83
End: 2018-04-17
Payer: MEDICARE

## 2018-04-17 VITALS
WEIGHT: 127 LBS | HEART RATE: 54 BPM | BODY MASS INDEX: 22.5 KG/M2 | DIASTOLIC BLOOD PRESSURE: 72 MMHG | HEIGHT: 63 IN | SYSTOLIC BLOOD PRESSURE: 146 MMHG | TEMPERATURE: 97.9 F

## 2018-04-17 DIAGNOSIS — R93.89 ABNORMAL CT OF THE CHEST: ICD-10-CM

## 2018-04-17 PROCEDURE — 99204 OFFICE O/P NEW MOD 45 MIN: CPT | Performed by: INTERNAL MEDICINE

## 2018-04-17 NOTE — LETTER
April 18, 2018     Manuel Lopez, [address] [facility] [address]    Patient: Sharonda Coyne   YOB: 1931   Date of Visit: 4/17/2018       Dear Dr Ester Hurtado: Thank you for referring Louise Raygoza to me for evaluation  Below are my notes for this consultation  If you have questions, please do not hesitate to call me  I look forward to following your patient along with you  Sincerely,    PINEDA Pacheco  [facility]  Mobile: [phone]  Available on [facility]  [email or web address]           CC: No Recipients  Kena Kraft MD  4/18/2018 10:01 AM  Sign at close encounter  [address] [facility] - Outpatient Consultation  Sharonda Coyne 80 y o  female MRN: [id number]  Encounter: [id number]      PCP: Manuel Lopez DO  Referring: Manuel Lopez DO  [facility]  [address]      ASSESSMENT AND PLAN:      1  Abnormal CT of the chest  Abnormal imaging of the chest reveals "azygo-esophageal recess mass" of unknown origin  Mass appears posterior to the esophagus, however imaging is limited by lack of enteric contrast  Differential includes esophageal malignancy vs lung cancer vs lymph node enlargement  Esophageal cancer is less likely given concurrent pulmonary nodule and EGD performed < 5 years ago without cancer  Will obtain CT chest with PO/IV contrast administration to delineate anatomy  Patient would benefit most from diagnostic EGD followed by EUS with FNA of the mass for tissue sampling    - Ambulatory referral to Gastroenterology  - CT chest abdomen pelvis w contrast; Future : CT chest with IV/PO contrast      ______________________________________________________________________    HPI:      Patient is an 80year old female who presents as hospital followup for abnormal GI imaging  She has a past medical history of GERD, hypertension, hyperlipidemia  She has a resident in assisted living [place] [facility]    She presents today with her youngest son  She also has a history of Gomez's esophagus and underwent EGD within the last 4 years  She states this EGD was a follow up of her Gomez's and had no other abnormal findings  She was recently admitted to hospital from 03/19 through 3/26 for treatment after a fall and found to have pneumonia  She underwent CT chest without enteric contrast which demonstrated "Density with masslike appearance in the azygo-esophageal recess measuring 3 3 x 3 0 cm" of unknown etiology  She is otherwise asymptomatic from a GI perspective and has noted no unintentional weight loss, dysphagia, odynophagia, GERD, or abdominal pain  REVIEW OF SYSTEMS:    CONSTITUTIONAL: Denies any fever, chills, rigors, and weight loss  HEENT: No earache or tinnitus  Denies hearing loss or visual disturbances  CARDIOVASCULAR: No chest pain or palpitations  RESPIRATORY: Denies any cough, hemoptysis, shortness of breath or dyspnea on exertion  GASTROINTESTINAL: As noted in the History of Present Illness  GENITOURINARY: No problems with urination  Denies any hematuria or dysuria  NEUROLOGIC: No dizziness or vertigo, denies headaches  MUSCULOSKELETAL: Denies any muscle or joint pain  SKIN: Denies skin rashes or itching  ENDOCRINE: Denies excessive thirst  Denies intolerance to heat or cold  PSYCHOSOCIAL: Denies depression or anxiety  Denies any recent memory loss         Historical Information   Past Medical History:   Diagnosis Date    Abdominal distension     Last Assessed: [id number]    Abnormal weight loss     Last Assessed: 36Tbe7589    Arthritis     Bronchitis     Bursitis of left hip     Lst Assessed: 06ALV7515    Chest pain     Last Assessed: 06Tir1106    Colon polyp     Dysuria     Last Assessed: [id number]    External hemorrhoids     Last Assessed: 24JCP1871    GERD (gastroesophageal reflux disease)     Hyperlipidemia     Hypertension     Hypomagnesemia     Last Assessed: [id number]    Lyme disease     Last Assessed: 30Kzl7552    Osteoporosis     Pneumonia of right middle lobe due to infectious organism [facility])     Last Assessed: 29SXQ7675    Urinary tract infection      Past Surgical History:   Procedure Laterality Date    CATARACT EXTRACTION      HEMORRHOID SURGERY      HYSTERECTOMY      PELVIC FLOOR REPAIR       Social History   History   Alcohol Use No     History   Drug Use No     History   Smoking Status    Never Smoker   Smokeless Tobacco    Never Used     Family History   Problem Relation Age of Onset    Adopted: Yes       Meds/Allergies       Current Outpatient Prescriptions:     aspirin (ADULT ASPIRIN EC LOW STRENGTH) 81 mg EC tablet    Calcium 500 MG tablet    Cholecalciferol (VITAMIN D-3) 1000 units CAPS    clotrimazole-betamethasone (LOTRISONE) 1-0 05 % lotion    digoxin (LANOXIN) 0 125 mg tablet    diltiazem (CARDIZEM) 30 mg tablet    ibuprofen (MOTRIN) 200 mg tablet    lisinopril (ZESTRIL) 10 mg tablet    Melatonin 5 MG TABS    metoprolol tartrate (LOPRESSOR) 50 mg tablet    Omega-3 Fatty Acids (OMEGA-3 FISH OIL) 1000 MG CAPS    pantoprazole (PROTONIX) 40 mg tablet    RaNITidine HCl (RANITIDINE 75 PO)    clonazePAM (KlonoPIN) 0 5 mg tablet    Allergies   Allergen Reactions    Codeine GI Intolerance    Epinephrine Other (See Comments)     Increased Heart Rate, Palpitations    Iodinated Diagnostic Agents     Magnesium Citrate GI Intolerance           Objective     Blood pressure 146/72, pulse (!) 54, temperature 97 9 °F (36 6 °C), temperature source Tympanic, height 5' 3" (1 6 m), weight 57 6 kg (127 lb)  Body mass index is 22 5 kg/m²       PHYSICAL EXAM:      General Appearance:   Alert, cooperative, no distress   HEENT:   Normocephalic, atraumatic, anicteric      Neck:  Supple, symmetrical, trachea midline   Lungs:   Clear to auscultation bilaterally; no rales, rhonchi or wheezing; respirations unlabored    Heart[de-identified]   Regular rate and rhythm; no murmur, rub, or gallop  Abdomen:   Soft, non-tender, non-distended; normal bowel sounds; no masses, no organomegaly    Genitalia:   Deferred    Rectal:   Deferred    Extremities:  No cyanosis, clubbing or edema    Pulses:  2+ and symmetric    Skin:  No jaundice, rashes, or lesions    Lymph nodes:  No palpable cervical lymphadenopathy        Lab Results:     Lab Results   Component Value Date    WBC 5 87 03/25/2018    HGB 11 0 (L) 03/25/2018    HCT 34 5 (L) 03/25/2018    MCV 94 03/25/2018     03/25/2018       Lab Results   Component Value Date     (L) 03/25/2018    K 4 0 03/25/2018    CL 97 (L) 03/25/2018    CO2 30 03/25/2018    ANIONGAP 6 03/25/2018    BUN 11 03/25/2018    CREATININE 0 74 03/25/2018    GLUCOSE 103 03/25/2018    GLUF 105 (H) 03/14/2018    CALCIUM 9 4 03/25/2018    AST 21 03/20/2018    ALT 22 03/20/2018    ALKPHOS 63 03/20/2018    PROT 7 2 03/20/2018    BILITOT 0 60 03/20/2018    EGFR 74 03/25/2018       Lab Results   Component Value Date    INR 1 19 (H) 03/20/2018    INR 1 13 03/19/2018    PROTIME 15 0 (H) 03/20/2018    PROTIME 14 4 03/19/2018         Radiology Results:   X-ray Chest 2 Views    Result Date: 3/20/2018  Narrative: CHEST INDICATION:   cough/hypoxemia with upper respiratory symptoms for 2 days  COMPARISON:  July 22, 2017  EXAM PERFORMED/VIEWS:  XR CHEST PA & LATERAL FINDINGS: The cardiac silhouette is stable in size  Redemonstration of aortic arch calcification  There are increased interstitial markings bilaterally  No pneumothorax or pleural effusion  Osseous structures appear within normal limits for patient age  Impression: Mildly increased interstitial markings without focal consolidation  Workstation performed: [id number]     Ct Chest Wo Contrast    Result Date: 3/22/2018  Narrative: CT CHEST WITHOUT IV CONTRAST INDICATION:   Persistent nonproductive cough  COMPARISON: Chest x-ray from 3/19/2018   TECHNIQUE: CT examination of the chest was performed without intravenous contrast  Axial, sagittal, and coronal 2D reformatted images were created from the source data and submitted for interpretation  Radiation dose length product (DLP) for this visit:  277 22 mGy-cm   This examination, like all CT scans performed in the [facility], was performed utilizing techniques to minimize radiation dose exposure, including the use of iterative  reconstruction and automated exposure control  FINDINGS: LUNGS:  Alveolar consolidation in the right upper lobe with air bronchograms  Additional foci of consolidation noted in the lingula and both lower lobes  Density with masslike appearance in the azygo-esophageal recess measuring 3 3 x 3 0 cm  Additional right lower lobe nodule measuring 8 mm on image 2/38  PLEURA:  Small bilateral pleural effusions likely parapneumonic effusions  HEART/GREAT VESSELS:  Mild cardiomegaly  No aortic aneurysm  MEDIASTINUM AND RASTA:  Unremarkable  CHEST WALL AND LOWER NECK:   Unremarkable  VISUALIZED STRUCTURES IN THE UPPER ABDOMEN:  Unremarkable  OSSEOUS STRUCTURES:  No acute fracture or destructive osseous lesion  Impression: Alveolar consolidation in the right upper lobe with air bronchograms  Additional foci of consolidation noted in the lingula and both lower lobes  Findings consistent with multilobar pneumonia  Bilateral parapneumonic effusions  Density with masslike appearance in the azygo-esophageal recess measuring 3 3 x 3 0 cm  Neoplasm not excluded  Additional right lower lobe nodule measuring 8 mm on image 2/38  Consider PET/CT scan for further evaluation of these suspicious right lower lobe abnormalities when the patient's clinical condition permits  Workstation performed: [id number]     Xr Pelvis Ap Only 1 Or 2 Vw    Result Date: 3/20/2018  Narrative: PELVIS INDICATION:  Cough, fall  COMPARISON:  None VIEWS: AP IMAGES:  1 FINDINGS:Evaluation of the sacrum and iliac wings is suboptimal due to overlying bowel gas   No definite fracture or pathologic bone lesions  Degenerative spurring is noted at the bilateral hips  No definite lytic or blastic lesions are seen  Regional soft tissues are unremarkable  Visualized lumbar spine is unremarkable  Impression: Limited exam due to overlying bowel gas  No definite fracture  Workstation performed: [id number]     Us Kidney And Bladder    Result Date: 3/22/2018  Narrative: RENAL ULTRASOUND INDICATION:   URINARY TRACT INFECTION uti Rule out pyelonephritis/hydronephrosis  Urinary tract infection COMPARISON: None TECHNIQUE:   Ultrasound of the retroperitoneum was performed with a curvilinear transducer utilizing volumetric sweeps and still imaging techniques  Technically difficult study to perform secondary to patient having difficulty with breath-holding FINDINGS: KIDNEYS: Symmetric and normal size  Right kidney:  11 8 x 4 2 cm  Left kidney:  10 2 x 5 5 cm  Right kidney Normal echogenicity and contour  No suspicious masses detected  No hydronephrosis  No shadowing calculi  No perinephric fluid collections  Left kidney Normal echogenicity and contour  No suspicious masses detected  Multiple renal cysts identified, one of which contains a thin internal septation  No hydronephrosis  No shadowing calculi  No perinephric fluid collections  URETERS: Nonvisualized  BLADDER: Normally distended  Suspicion of filling defect within the urinary bladder although difficult to visualize secondary to limitations of motion  This may measure up to 2 0 x 1 7 x 1 5 cm  Bilateral ureteral jets detected  Impression: 1  Left renal cyst   No hydronephrosis  Otherwise unremarkable appearance of the kidneys 2  Possible left posterior bladder nonmobile filling defect  Polypoid bladder neoplasm is not excluded  Further evaluation with nonemergent urological consultation or, if possible, contrast-enhanced CT recommended   The examination demonstrates a significant  finding and was documented as such in Epic for liaison and referring practitioner notification   Workstation performed: [id number]

## 2018-04-18 NOTE — PROGRESS NOTES
[facility] - Outpatient Consultation  Sharonda Coyne 80 y o  female MRN: [id number]  Encounter: [id number]      PCP: Manuel Lopez DO  Referring: DO Edward Hermosillo  [address]      ASSESSMENT AND PLAN:      1  Abnormal CT of the chest  Abnormal imaging of the chest reveals "azygo-esophageal recess mass" of unknown origin  Mass appears posterior to the esophagus, however imaging is limited by lack of enteric contrast  Differential includes esophageal malignancy vs lung cancer vs lymph node enlargement  Esophageal cancer is less likely given concurrent pulmonary nodule and EGD performed < 5 years ago without cancer  Will obtain CT chest with PO/IV contrast administration to delineate anatomy  Patient would benefit most from diagnostic EGD followed by EUS with FNA of the mass for tissue sampling    - Ambulatory referral to Gastroenterology  - CT chest abdomen pelvis w contrast; Future : CT chest with IV/PO contrast      ______________________________________________________________________    HPI:      Patient is an 80year old female who presents as hospital followup for abnormal GI imaging  She has a past medical history of GERD, hypertension, hyperlipidemia  She has a resident in [facility] living [place] [facility]  She presents today with her youngest son  She also has a history of Gomez's esophagus and underwent EGD within the last 4 years  She states this EGD was a follow up of her Gomez's and had no other abnormal findings  She was recently admitted to hospital from 03/19 through 3/26 for treatment after a fall and found to have pneumonia  She underwent CT chest without enteric contrast which demonstrated "Density with masslike appearance in the azygo-esophageal recess measuring 3 3 x 3 0 cm" of unknown etiology   She is otherwise asymptomatic from a GI perspective and has noted no unintentional weight loss, dysphagia, odynophagia, GERD, or abdominal pain  REVIEW OF SYSTEMS:    CONSTITUTIONAL: Denies any fever, chills, rigors, and weight loss  HEENT: No earache or tinnitus  Denies hearing loss or visual disturbances  CARDIOVASCULAR: No chest pain or palpitations  RESPIRATORY: Denies any cough, hemoptysis, shortness of breath or dyspnea on exertion  GASTROINTESTINAL: As noted in the History of Present Illness  GENITOURINARY: No problems with urination  Denies any hematuria or dysuria  NEUROLOGIC: No dizziness or vertigo, denies headaches  MUSCULOSKELETAL: Denies any muscle or joint pain  SKIN: Denies skin rashes or itching  ENDOCRINE: Denies excessive thirst  Denies intolerance to heat or cold  PSYCHOSOCIAL: Denies depression or anxiety  Denies any recent memory loss         Historical Information   Past Medical History:   Diagnosis Date    Abdominal distension     Last Assessed: [id number]    Abnormal weight loss     Last Assessed: [id number]    Arthritis     Bronchitis     Bursitis of left hip     Lst Assessed: [id number]    Chest pain     Last Assessed: [id number]    Colon polyp     Dysuria     Last Assessed: [id number]    External hemorrhoids     Last Assessed: [id number]    GERD (gastroesophageal reflux disease)     Hyperlipidemia     Hypertension     Hypomagnesemia     Last Assessed: [id number]    Lyme disease     Last Assessed: [id number]    Osteoporosis     Pneumonia of right middle lobe due to infectious organism [facility])     Last Assessed: [id number]    Urinary tract infection      Past Surgical History:   Procedure Laterality Date    CATARACT EXTRACTION      HEMORRHOID SURGERY      HYSTERECTOMY      PELVIC FLOOR REPAIR       Social History   History   Alcohol Use No     History   Drug Use No     History   Smoking Status    Never Smoker   Smokeless Tobacco    Never Used     Family History   Problem Relation Age of Onset    Adopted: Yes       Meds/Allergies       Current Outpatient Prescriptions:     aspirin (ADULT ASPIRIN EC LOW STRENGTH) 81 mg EC tablet    Calcium 500 MG tablet    Cholecalciferol (VITAMIN D-3) 1000 units CAPS    clotrimazole-betamethasone (LOTRISONE) 1-0 05 % lotion    digoxin (LANOXIN) 0 125 mg tablet    diltiazem (CARDIZEM) 30 mg tablet    ibuprofen (MOTRIN) 200 mg tablet    lisinopril (ZESTRIL) 10 mg tablet    Melatonin 5 MG TABS    metoprolol tartrate (LOPRESSOR) 50 mg tablet    Omega-3 Fatty Acids (OMEGA-3 FISH OIL) 1000 MG CAPS    pantoprazole (PROTONIX) 40 mg tablet    RaNITidine HCl (RANITIDINE 75 PO)    clonazePAM (KlonoPIN) 0 5 mg tablet    Allergies   Allergen Reactions    Codeine GI Intolerance    Epinephrine Other (See Comments)     Increased Heart Rate, Palpitations    Iodinated Diagnostic Agents     Magnesium Citrate GI Intolerance           Objective     Blood pressure 146/72, pulse (!) 54, temperature 97 9 °F (36 6 °C), temperature source Tympanic, height 5' 3" (1 6 m), weight 57 6 kg (127 lb)  Body mass index is 22 5 kg/m²  PHYSICAL EXAM:      General Appearance:   Alert, cooperative, no distress   HEENT:   Normocephalic, atraumatic, anicteric      Neck:  Supple, symmetrical, trachea midline   Lungs:   Clear to auscultation bilaterally; no rales, rhonchi or wheezing; respirations unlabored    Heart[de-identified]   Regular rate and rhythm; no murmur, rub, or gallop     Abdomen:   Soft, non-tender, non-distended; normal bowel sounds; no masses, no organomegaly    Genitalia:   Deferred    Rectal:   Deferred    Extremities:  No cyanosis, clubbing or edema    Pulses:  2+ and symmetric    Skin:  No jaundice, rashes, or lesions    Lymph nodes:  No palpable cervical lymphadenopathy        Lab Results:     Lab Results   Component Value Date    WBC 5 87 03/25/2018    HGB 11 0 (L) 03/25/2018    HCT 34 5 (L) 03/25/2018    MCV 94 03/25/2018     03/25/2018       Lab Results   Component Value Date     (L) 03/25/2018    K 4 0 03/25/2018    CL 97 (L) 03/25/2018    CO2 30 03/25/2018    ANIONGAP 6 03/25/2018    BUN 11 03/25/2018    CREATININE 0 74 03/25/2018    GLUCOSE 103 03/25/2018    GLUF 105 (H) 03/14/2018    CALCIUM 9 4 03/25/2018    AST 21 03/20/2018    ALT 22 03/20/2018    ALKPHOS 63 03/20/2018    PROT 7 2 03/20/2018    BILITOT 0 60 03/20/2018    EGFR 74 03/25/2018       Lab Results   Component Value Date    INR 1 19 (H) 03/20/2018    INR 1 13 03/19/2018    PROTIME 15 0 (H) 03/20/2018    PROTIME 14 4 03/19/2018         Radiology Results:   X-ray Chest 2 Views    Result Date: 3/20/2018  Narrative: CHEST INDICATION:   cough/hypoxemia with upper respiratory symptoms for 2 days  COMPARISON:  July 22, 2017  EXAM PERFORMED/VIEWS:  XR CHEST PA & LATERAL FINDINGS: The cardiac silhouette is stable in size  Redemonstration of aortic arch calcification  There are increased interstitial markings bilaterally  No pneumothorax or pleural effusion  Osseous structures appear within normal limits for patient age  Impression: Mildly increased interstitial markings without focal consolidation  Workstation performed: [id number]     Ct Chest Wo Contrast    Result Date: 3/22/2018  Narrative: CT CHEST WITHOUT IV CONTRAST INDICATION:   Persistent nonproductive cough  COMPARISON: Chest x-ray from 3/19/2018  TECHNIQUE: CT examination of the chest was performed without intravenous contrast   Axial, sagittal, and coronal 2D reformatted images were created from the source data and submitted for interpretation  Radiation dose length product (DLP) for this visit:  277 22 mGy-cm   This examination, like all CT scans performed in the [facility], was performed utilizing techniques to minimize radiation dose exposure, including the use of iterative  reconstruction and automated exposure control  FINDINGS: LUNGS:  Alveolar consolidation in the right upper lobe with air bronchograms  Additional foci of consolidation noted in the lingula and both lower lobes    Density with masslike appearance in the azygo-esophageal recess measuring 3 3 x 3 0 cm  Additional right lower lobe nodule measuring 8 mm on image 2/38  PLEURA:  Small bilateral pleural effusions likely parapneumonic effusions  HEART/GREAT VESSELS:  Mild cardiomegaly  No aortic aneurysm  MEDIASTINUM AND RASTA:  Unremarkable  CHEST WALL AND LOWER NECK:   Unremarkable  VISUALIZED STRUCTURES IN THE UPPER ABDOMEN:  Unremarkable  OSSEOUS STRUCTURES:  No acute fracture or destructive osseous lesion  Impression: Alveolar consolidation in the right upper lobe with air bronchograms  Additional foci of consolidation noted in the lingula and both lower lobes  Findings consistent with multilobar pneumonia  Bilateral parapneumonic effusions  Density with masslike appearance in the azygo-esophageal recess measuring 3 3 x 3 0 cm  Neoplasm not excluded  Additional right lower lobe nodule measuring 8 mm on image 2/38  Consider PET/CT scan for further evaluation of these suspicious right lower lobe abnormalities when the patient's clinical condition permits  Workstation performed: [id number]     Xr Pelvis Ap Only 1 Or 2 Vw    Result Date: 3/20/2018  Narrative: PELVIS INDICATION:  Cough, fall  COMPARISON:  None VIEWS: AP IMAGES:  1 FINDINGS:Evaluation of the sacrum and iliac wings is suboptimal due to overlying bowel gas  No definite fracture or pathologic bone lesions  Degenerative spurring is noted at the bilateral hips  No definite lytic or blastic lesions are seen  Regional soft tissues are unremarkable  Visualized lumbar spine is unremarkable  Impression: Limited exam due to overlying bowel gas  No definite fracture  Workstation performed: [id number]     Us Kidney And Bladder    Result Date: 3/22/2018  Narrative: RENAL ULTRASOUND INDICATION:   URINARY TRACT INFECTION uti Rule out pyelonephritis/hydronephrosis     Urinary tract infection COMPARISON: None TECHNIQUE:   Ultrasound of the retroperitoneum was performed with a curvilinear transducer utilizing volumetric sweeps and still imaging techniques  Technically difficult study to perform secondary to patient having difficulty with breath-holding FINDINGS: KIDNEYS: Symmetric and normal size  Right kidney:  11 8 x 4 2 cm  Left kidney:  10 2 x 5 5 cm  Right kidney Normal echogenicity and contour  No suspicious masses detected  No hydronephrosis  No shadowing calculi  No perinephric fluid collections  Left kidney Normal echogenicity and contour  No suspicious masses detected  Multiple renal cysts identified, one of which contains a thin internal septation  No hydronephrosis  No shadowing calculi  No perinephric fluid collections  URETERS: Nonvisualized  BLADDER: Normally distended  Suspicion of filling defect within the urinary bladder although difficult to visualize secondary to limitations of motion  This may measure up to 2 0 x 1 7 x 1 5 cm  Bilateral ureteral jets detected  Impression: 1  Left renal cyst   No hydronephrosis  Otherwise unremarkable appearance of the kidneys 2  Possible left posterior bladder nonmobile filling defect  Polypoid bladder neoplasm is not excluded  Further evaluation with nonemergent urological consultation or, if possible, contrast-enhanced CT recommended  The examination demonstrates a significant  finding and was documented as such in [facility] for liaison and referring practitioner notification   Workstation performed: [id number]

## 2018-04-20 ENCOUNTER — HOSPITAL ENCOUNTER (OUTPATIENT)
Dept: RADIOLOGY | Age: 83
Discharge: HOME | End: 2018-04-20
Payer: MEDICARE

## 2018-04-20 DIAGNOSIS — D38.1 NEOPLASM OF UNCERTAIN BEHAVIOR OF TRACHEA, BRONCHUS, AND LUNG: ICD-10-CM

## 2018-04-20 LAB — GLUCOSE SERPL-MCNC: 100 MG/DL (ref 65–140)

## 2018-04-20 PROCEDURE — 82948 REAGENT STRIP/BLOOD GLUCOSE: CPT

## 2018-04-20 PROCEDURE — A9552 F18 FDG: HCPCS

## 2018-04-20 PROCEDURE — 78815 PET IMAGE W/CT SKULL-THIGH: CPT

## 2018-04-21 ENCOUNTER — PREP FOR PROCEDURE (OUTPATIENT)
Dept: GASTROENTEROLOGY | Facility: MEDICAL CENTER | Age: 83
End: 2018-04-21

## 2018-04-21 DIAGNOSIS — R93.89 ABNORMAL CT OF THE CHEST: Primary | ICD-10-CM

## 2018-04-23 ENCOUNTER — TELEPHONE (OUTPATIENT)
Dept: GASTROENTEROLOGY | Facility: CLINIC | Age: 83
End: 2018-04-23

## 2018-04-23 ENCOUNTER — HOSPITAL ENCOUNTER (OUTPATIENT)
Dept: CT IMAGING | Facility: HOSPITAL | Age: 83
Discharge: HOME | End: 2018-04-23
Attending: INTERNAL MEDICINE
Payer: MEDICARE

## 2018-04-23 DIAGNOSIS — R93.89 ABNORMAL CT OF THE CHEST: ICD-10-CM

## 2018-04-23 PROCEDURE — 71250 CT THORAX DX C-: CPT

## 2018-04-23 PROCEDURE — 74176 CT ABD & PELVIS W/O CONTRAST: CPT

## 2018-04-23 NOTE — TELEPHONE ENCOUNTER
----- Message from Martínez Jean MD sent at 4/22/2018 11:29 PM EDT -----  Regarding: Linear EUS  Roger Ayala, sure I'll plan to do EGD with Linear EUS and FNA  Leila Hale, please schedule this patient for Linear EUS with me within the next two weeks  Thank you,   Micheline Esqueda    ----- Message -----  From: Gregor Cueto MD  Sent: 4/21/2018  10:46 PM  To: Martínez Jean MD    Hi Dr Cheri Ramírez,    This patient has a mass seen in her azygous-esophageal recess  She recently had an EGD 2 years ago for Gomez's Esophagus, so I doubt this is an esophageal origin  Would you be able to do a quick diagnostic EGD and then, EUS with possible FNA to evaluate this area? I would really appreciate your help      Thanks, Roger Ayala

## 2018-04-24 ENCOUNTER — PROCEDURE VISIT (OUTPATIENT)
Dept: UROLOGY | Facility: CLINIC | Age: 83
End: 2018-04-24
Payer: MEDICARE

## 2018-04-24 VITALS
BODY MASS INDEX: 22.5 KG/M2 | WEIGHT: 127 LBS | HEIGHT: 63 IN | SYSTOLIC BLOOD PRESSURE: 122 MMHG | DIASTOLIC BLOOD PRESSURE: 70 MMHG | HEART RATE: 50 BPM

## 2018-04-24 DIAGNOSIS — N32.89 BLADDER MASS: Primary | ICD-10-CM

## 2018-04-24 DIAGNOSIS — N32.89 MASS OF URINARY BLADDER DETERMINED BY ULTRASOUND: ICD-10-CM

## 2018-04-24 DIAGNOSIS — Z87.440 HISTORY OF FREQUENT URINARY TRACT INFECTIONS: ICD-10-CM

## 2018-04-24 PROCEDURE — 52000 CYSTOURETHROSCOPY: CPT | Performed by: UROLOGY

## 2018-04-24 PROCEDURE — 99204 OFFICE O/P NEW MOD 45 MIN: CPT | Performed by: UROLOGY

## 2018-04-24 PROCEDURE — 81002 URINALYSIS NONAUTO W/O SCOPE: CPT | Performed by: UROLOGY

## 2018-04-24 NOTE — PROGRESS NOTES
UROLOGY NEW CONSULT NOTE     CHIEF COMPLAINT   Pam Tamez is a 80 y o  female with a complaint of questionable bladder mass on US, negative PET scan presenting for:    Chief Complaint   Patient presents with    Cystoscopy       History of Present Illness:     14-year-old female with a prior history of pelvic organ prolapse and prolapse repair surgery  Patient denies a significant history of urinary symptoms or hematuria  She does report at least over a year ago she had recurrent infections  She has had recent urine culture testing which is positive for bacteriuria  She underwent a recent renal and bladder ultrasound which demonstrated a questionable bladder lesion  Patient had a follow-up PET scan which did not demonstrate hypermetabolic activity in the bladder  She presents today to review the ultrasound and perform a cystourethroscopy      Past Medical History:     Past Medical History:   Diagnosis Date    Abdominal distension     Last Assessed: [id number]    Abnormal weight loss     Last Assessed: [id number]    Arthritis     Bronchitis     Bursitis of left hip     Lst Assessed: [id number]    Chest pain     Last Assessed: [id number]    Colon polyp     Dysuria     Last Assessed: [id number]    External hemorrhoids     Last Assessed: [id number]    GERD (gastroesophageal reflux disease)     Hyperlipidemia     Hypertension     Hypomagnesemia     Last Assessed: [id number]    Lyme disease     Last Assessed: [id number]    Osteoporosis     Pneumonia of right middle lobe due to infectious organism [facility])     Last Assessed: [id number]    Urinary tract infection        PAST SURGICAL HISTORY:     Past Surgical History:   Procedure Laterality Date    CATARACT EXTRACTION      HEMORRHOID SURGERY      HYSTERECTOMY      PELVIC FLOOR REPAIR         CURRENT MEDICATIONS:     Current Outpatient Prescriptions   Medication Sig Dispense Refill    aspirin (ADULT ASPIRIN EC LOW STRENGTH) 81 mg EC tablet Take 1 tablet by mouth daily      Calcium 500 MG tablet Take 1 capsule by mouth 2 (two) times a day      Cholecalciferol (VITAMIN D-3) 1000 units CAPS Take by mouth      clotrimazole-betamethasone (LOTRISONE) 1-0 05 % lotion Apply topically to affected area on sides breasts BID 30 mL 0    digoxin (LANOXIN) 0 125 mg tablet Take 1 tablet by mouth daily      diltiazem (CARDIZEM) 30 mg tablet Take 1 tablet (30 mg total) by mouth every 8 (eight) hours 90 tablet 0    ibuprofen (MOTRIN) 200 mg tablet Take by mouth every 6 (six) hours as needed for mild pain      lisinopril (ZESTRIL) 10 mg tablet Take 1 tablet (10 mg total) by mouth daily 30 tablet 0    Melatonin 5 MG TABS Take by mouth      metoprolol tartrate (LOPRESSOR) 50 mg tablet Take by mouth 3 (three) times a day        Omega-3 Fatty Acids (OMEGA-3 FISH OIL) 1000 MG CAPS Take by mouth daily        pantoprazole (PROTONIX) 40 mg tablet Take 1 tablet by mouth daily      RaNITidine HCl (RANITIDINE 75 PO) Take 150 mg by mouth      clonazePAM (KlonoPIN) 0 5 mg tablet Take 1 tablet (0 5 mg total) by mouth 2 (two) times a day for 30 days Take 1/2 tablet in morning and 2 tablet in the evening 75 tablet 0     No current facility-administered medications for this visit  ALLERGIES:     Allergies   Allergen Reactions    Codeine GI Intolerance    Epinephrine Other (See Comments)     Increased Heart Rate, Palpitations    Iodinated Diagnostic Agents     Magnesium Citrate GI Intolerance       SOCIAL HISTORY:     Social History     Social History    Marital status:       Spouse name: N/A    Number of children: N/A    Years of education: N/A     Occupational History    Retired      Social History Main Topics    Smoking status: Never Smoker    Smokeless tobacco: Never Used    Alcohol use No    Drug use: No    Sexual activity: Not Asked     Other Topics Concern    None     Social History Narrative    None       SOCIAL HISTORY:     Family History   Problem Relation Age of Onset    Adopted: Yes       REVIEW OF SYSTEMS:     Review of Systems   Constitutional: Negative  HENT: Negative  Respiratory: Negative  Cardiovascular: Negative  Gastrointestinal: Negative  Genitourinary: Negative  Musculoskeletal: Negative  Neurological: Negative  Psychiatric/Behavioral: Negative  PHYSICAL EXAM:     /70   Pulse (!) 50   Ht 5' 3" (1 6 m)   Wt 57 6 kg (127 lb)   BMI 22 50 kg/m²     General:  Healthy appearing elderly female in no acute distress  They have a normal affect  There is not appear to be any gross neurologic defects or abnormalities  HEENT:  Normocephalic, atraumatic  Neck is supple without any palpable lymphadenopathy  Cardiovascular:  Patient has normal palpable distal radial pulses  There is no significant peripheral edema  No JVD is noted  Respiratory:  Patient has unlabored respirations  There is no audible wheeze or rhonchi  Abdomen:   Abdomen is soft and nontender  There is no tympany  Inguinal and umbilical hernia are not appreciated  Genitourinary:   Normal external female genitaliaGenitourinary:    Musculoskeletal:  Patient does not have significant CVA tenderness in the flank with palpation or percussion  They full range of motion in all 4 extremities  Strength in all 4 extremities appears congruent  Patient is able to ambulate without assistance or difficulty  Dermatologic:  Patient has no skin abnormalities or rashes        LABS:     CBC:   Lab Results   Component Value Date    WBC 5 87 03/25/2018    HGB 11 0 (L) 03/25/2018    HCT 34 5 (L) 03/25/2018    MCV 94 03/25/2018     03/25/2018       BMP:   Lab Results   Component Value Date    GLUCOSE 103 03/25/2018    CALCIUM 9 4 03/25/2018     (L) 03/25/2018    K 4 0 03/25/2018    CO2 30 03/25/2018    CL 97 (L) 03/25/2018    BUN 11 03/25/2018    CREATININE 0 74 03/25/2018     Urine culture   Order: [id number] - Reflex for Order [id number]   Status: Final result   Visible to patient:  No (Inaccessible in MyChart)   Next appt:  04/25/2018 at 11:20 AM in Cardiology Rocky Coughlin MD)   Specimen Information: Urine, Clean Catch; Urine        Culture           >100,000 cfu/ml Escherichia coli        Susceptibility      Escherichia coli     FARZANA     Ampicillin ($$) <=8 00 ug/ml"><=8 00 ug/ml Susceptible     Aztreonam ($$$)  <=4 ug/ml"><=4 ug/ml Susceptible     Cefazolin ($) <=2 00 ug/ml"><=2 00 ug/ml Susceptible     Ciprofloxacin ($)  <=1 00 ug/ml"><=1 00 ug/ml Susceptible     Gentamicin ($$) <=1 ug/ml"><=1 ug/ml Susceptible     Levofloxacin ($) <=0 25 ug/ml"><=0 25 ug/ml Susceptible     Nitrofurantoin <=32 ug/ml"><=32 ug/ml Susceptible     Tetracycline <=4 ug/ml"><=4 ug/ml Susceptible     Tobramycin ($) <=1 ug/ml"><=1 ug/ml Susceptible     Trimethoprim + Sulfamethoxazole ($$$) <=2/38 ug/ml"><=2/38 ug/ml Susceptible     ZID Performed Yes                Specimen Collected: 03/19/18  7:37 PM   Last Resulted: 03/22/18  2:06 PM                  IMAGING:     3/22/18  RENAL ULTRASOUND     INDICATION:   URINARY TRACT INFECTION  uti  Rule out pyelonephritis/hydronephrosis  Urinary tract infection     COMPARISON: None     TECHNIQUE:   Ultrasound of the retroperitoneum was performed with a curvilinear transducer utilizing volumetric sweeps and still imaging techniques  Technically difficult study to perform secondary to patient having difficulty with breath-holding     FINDINGS:     KIDNEYS:  Symmetric and normal size  Right kidney:  11 8 x 4 2 cm  Left kidney:  10 2 x 5 5 cm      Right kidney  Normal echogenicity and contour  No suspicious masses detected  No hydronephrosis  No shadowing calculi  No perinephric fluid collections      Left kidney  Normal echogenicity and contour  No suspicious masses detected  Multiple renal cysts identified, one of which contains a thin internal septation  No hydronephrosis  No shadowing calculi    No perinephric fluid collections      URETERS:  Nonvisualized      BLADDER:   Normally distended  Suspicion of filling defect within the urinary bladder although difficult to visualize secondary to limitations of motion  This may measure up to 2 0 x 1 7 x 1 5 cm  Bilateral ureteral jets detected         IMPRESSION:        1  Left renal cyst   No hydronephrosis  Otherwise unremarkable appearance of the kidneys  2  Possible left posterior bladder nonmobile filling defect  Polypoid bladder neoplasm is not excluded  Further evaluation with nonemergent urological consultation or, if possible, contrast-enhanced CT recommended  4/20/18  PET/CT SCAN     INDICATION: Abnormal chest CT, right lower lung nodule, mass in the zygoesophageal recess, evaluate for malignancy, 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, bronchus and lung     MODIFIER: PI     COMPARISON: CT chest 3/22/2018 and priors, including CT AP 6/7/2013     CELL TYPE:  None     TECHNIQUE:   8 6 mCi F-18-FDG administered IV  Multiplanar attenuation corrected and non attenuation corrected PET images are available for interpretation, and contiguous, low dose, axial CT sections were obtained from the vertex through the femurs   following the administration of oral contrast material (7 5 cc Omnipaque-240 in 300 cc water)  Intravenous contrast material was not utilized  This examination, like all CT scans performed in the [facility], was performed utilizing   techniques to minimize radiation dose exposure, including the use of iterative reconstruction and automated exposure control      Fasting serum glucose: 100 mg/dl     FINDINGS:      VISUALIZED BRAIN:     No acute abnormalities are seen      HEAD/NECK:     Asymmetric left posterior nasopharynx activity, SUV 4 5, is likely physiologic/inflammatory  Corresponding right side has an SUV of 1 8      No FDG avid cervical adenopathy is seen    CT images: Scattered sinus mucosal thickening      CHEST: There are improving bilateral patchy infiltrates, most compatible with resolving pneumonia      7 mm right lung base nodule series 3 image 117, and 5 mm right middle lung nodule image 120, are unchanged from the prior CT of 6/7/2013, favoring a benign etiology  Additional small scattered nodular densities are present bilaterally  These do not   demonstrate significant FDG activity      Previously described mass in the right lower lung azygoesophageal recess now measures 1 9 x 2 2 cm  On the prior CT of 6/7/2013, this measured 1 2 x 1 3 cm  This demonstrates only minimal FDG activity, SUV 1 6, appearing similar to background soft   tissue activity  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      No hypermetabolic hilar adenopathy  Shotty mediastinal nodes without significant FDG activity  Minimal left axillary activity is likely related to the left upper extremity injection      CT images: Left basilar scarring/atelectasis  Coronary atherosclerosis        ABDOMEN:     No FDG avid soft tissue lesions are seen  CT images: Hepatomegaly      PELVIS:   No FDG avid soft tissue lesions are seen  CT images: Unremarkable      OSSEOUS STRUCTURES:  Mild activity in the left posterior 10th and 11th ribs near the costovertebral junctions, SUV measuring up to 3 2, may be posttraumatic  No additional FDG avid lesions are seen  CT images: Spine degenerative change  Pectus excavatum deformity      IMPRESSION:  1  Right lower medial lung mass measuring 1 9 x 2 2 cm demonstrates only minimal FDG activity, 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  Consider tissue sampling for diagnosis, if clinically warranted  2   Improving bilateral pneumonia  3   Additional scattered nodular densities that are likely benign  4   Mild activity in the left posterior 10th and 11th ribs near the costovertebral junctions may be post traumatic  This may be correlated clinically    5   No additional hypermetabolic lesions that are concerning for malignancy  PROCEDURE:     See note    ASSESSMENT:     80 y o  female with questionable bladder mass on US but negative PET scan    PLAN:     Patient had a normal screening cystourethroscopy today  I have recommended follow-up in 6 months with a urinalysis  Patient appears to have had recurrent bacteriuria  Although the question is whether not this has been significantly symptomatic for the patient  I have recommended that we continue to screen regularly for positive cultures and treat the patient if she has symptoms of fever or chills, mental status changes, or pain  In the absence of the symptoms, the patient may simply have asymptomatic bacteriuria and this should not be treated with antibiotic therapy

## 2018-04-24 NOTE — PROGRESS NOTES
Cystoscopy  Date/Time: 4/24/2018 11:44 AM  Performed by: Lashaun Chase by: Massiel Xiong     Procedure details: cystoscopy    Patient tolerance: Patient tolerated the procedure well with no immediate complications    Additional Procedure Details:   Cystoscopy Procedure note    Risk and benefits of flexible cystoscopy were discussed  Informed consent was obtained  A urine dip is adequate for cystoscopy  The patient was placed into the modified supine position  Her genitalia was prepped with Betadine and draped in a sterile fashion  Viscous 2% lidocaine was instilled into the urethra and allowed dwell time for local anesthesia  Flexible cystoscopy was then performed with a 16F scope  Urethra was inspected on entrace and exit of the scope  The bladder was thoroughly inspected in a 360 degree fashion  Both ureteral orifices were visualized in their orthotopic location with clear efflux of urine  The bladder mucosa was thoroughly inspected  There was no evidence of mucosal abnormalities, stones, or lesions  Retroflexion for evaluation of the bladder neck was normal       Overall this was a negative cystoscopy  A female office staff member was present throughout the entire procedure

## 2018-04-24 NOTE — PROGRESS NOTES
Please call patient - no evidence of mass from esophagus  Have ordered EGD to confirm no esophagus abnormality and EUS - ultrasound done at the same time from inside to evaluate that mass  She should be contacted to schedule this within the next two weeks  It will be done by Dr Constantine Alcaraz

## 2018-04-25 ENCOUNTER — OFFICE VISIT (OUTPATIENT)
Dept: CARDIOLOGY CLINIC | Facility: CLINIC | Age: 83
End: 2018-04-25
Payer: MEDICARE

## 2018-04-25 VITALS
WEIGHT: 127.8 LBS | SYSTOLIC BLOOD PRESSURE: 130 MMHG | HEIGHT: 63 IN | BODY MASS INDEX: 22.64 KG/M2 | HEART RATE: 42 BPM | DIASTOLIC BLOOD PRESSURE: 82 MMHG

## 2018-04-25 DIAGNOSIS — I10 ESSENTIAL HYPERTENSION: ICD-10-CM

## 2018-04-25 DIAGNOSIS — R00.1 SINUS BRADYCARDIA: ICD-10-CM

## 2018-04-25 DIAGNOSIS — I47.1 SVT (SUPRAVENTRICULAR TACHYCARDIA) (HCC): Primary | ICD-10-CM

## 2018-04-25 DIAGNOSIS — I48.0 PAROXYSMAL ATRIAL FIBRILLATION (HCC): ICD-10-CM

## 2018-04-25 PROCEDURE — 99204 OFFICE O/P NEW MOD 45 MIN: CPT | Performed by: INTERNAL MEDICINE

## 2018-04-25 PROCEDURE — 93000 ELECTROCARDIOGRAM COMPLETE: CPT | Performed by: INTERNAL MEDICINE

## 2018-04-25 NOTE — PROGRESS NOTES
Electrophysiology-Cardiology (EP)   Beau Santacruz 80 y o  female MRN: [id number]  Unit/Bed#:  Encounter: [id number]              IMPRESSION:   79 yo female    1) paroxysmal svt per history had episodes at [facility] March 2018  but tele not stored in epic for review, Dr Christine Carlos saw her, she had diltazem 30mg q8 added and seemed well controlled, asymtptomatic, she has been offered ablation in past and declined  She is also on digoxin/metoprolol  She was in [facility] for Viral illness/GI illness and PNA  2) paroxysmal afib, EKG on 3/22/18 shows afib 108bpm (reviewed and intepreted myself) She is not on anticoagulation no known h/o afib  3) Sinus bardycardia 42bpm today, likely from combination of diltiazem/metop/digoxin  4) Essential HTN well controlled today      PLAN:  1  Recommend Loop recorder to r/o asymptomatic paroxysmal afib, since her one episode was recorded during viral illness/pna unclear if long term issue or high burden warranting anticoagulation  2  Discussed SVT ablation but she is reluctant to do this  She is on 3 AV anna meds w bradycardia so alternative is pacemaker  Ablation in my opinion is lower risk  3  D/c diltiazem for bradycardia  F/u Loop recorder (will ask Dr Tom Shetty to implant)    4  F/u Dr Tom Shetty 1 month            Referring Physian: No att  providers found    Chief Complain/Reason for Referal: Referred by Dr Christine Carlos after admission at [facility] for SVT/Afib  Mirta Stevenson is a 80 y o     Patient Active Problem List    Diagnosis Date Noted    History of frequent urinary tract infections 04/24/2018    Right lower lobe lung mass 04/05/2018    Mass of urinary bladder determined by ultrasound 03/23/2018    Esophageal mass 03/23/2018    HCAP (healthcare-associated pneumonia) 03/19/2018    Chronic constipation 12/14/2017    Generalized osteoarthritis of multiple sites 11/30/2017    Vitamin D deficiency 11/30/2017    Bilateral carotid artery disease ([facility] [address] ) 10/04/2017    Breast mass, left 10/03/2016    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/24/2016    Neuralgia 09/04/2015    Esophageal reflux 01/14/2015    Glucose intolerance (impaired glucose tolerance) 09/19/2014    Colon, diverticulosis 01/29/2013    Generalized anxiety disorder 09/04/2012    Hypercholesterolemia 06/19/2012    Osteoporosis 06/19/2012    Supraventricular tachycardia ([address] ) 06/19/2012        79 yo female    1) paroxysmal svt per history had episodes at [facility] March 2018  but tele not stored in epic for review, Dr Valere Eisenmenger saw her, she had diltazem 30mg q8 added and seemed well controlled, asymtptomatic, she has been offered ablation in past and declined  She is also on digoxin/metoprolol  She was in [facility] for Viral illness/GI illness and PNA  2) paroxysmal afib, EKG on 3/22/18 shows afib 108bpm (reviewed and intepreted myself) She is not on anticoagulation no known h/o afib  3) Sinus bardycardia 42bpm today, likely from combination of diltiazem/metop/digoxin  4) Essential HTN well controlled today          Past Medical History:   Diagnosis Date    Abdominal distension     Last Assessed: [id number]    Abnormal weight loss     Last Assessed: [id number]    Arthritis     Bronchitis     Bursitis of left hip     Lst Assessed: [id number]    Chest pain     Last Assessed: [id number]    Colon polyp     Dysuria     Last Assessed: [id number]    External hemorrhoids     Last Assessed: [id number]    GERD (gastroesophageal reflux disease)     Hyperlipidemia     Hypertension     Hypomagnesemia     Last Assessed: [id number]    Lyme disease     Last Assessed: [id number]    Osteoporosis     Pneumonia of right middle lobe due to infectious organism [facility])     Last Assessed: [id number]    Urinary tract infection          (Not in a hospital admission)    Scheduled Meds:  Continuous Infusions:  No current facility-administered medications for this visit  PRN Meds:    Allergies   Allergen Reactions    Codeine GI Intolerance    Epinephrine Other (See Comments)     Increased Heart Rate, Palpitations    Iodinated Diagnostic Agents     Magnesium Citrate GI Intolerance     I reviewed the Home Medication list in the chart  Family History   Problem Relation Age of Onset    Adopted: Yes       Social History     Social History    Marital status:      Spouse name: N/A    Number of children: N/A    Years of education: N/A     Occupational History    Retired      Social History Main Topics    Smoking status: Never Smoker    Smokeless tobacco: Never Used    Alcohol use No    Drug use: No    Sexual activity: Not on file     Other Topics Concern    Not on file     Social History Narrative    No narrative on file       Review of Systems -12 Point ROS reviewed and are negative or noted in chart except for Pertinent Positives Pertaining to Cardiovascular and Respiratory in HPI above  Vitals:    04/25/18 1118   BP: 130/82   Pulse: (!) 42     Vitals:    04/25/18 1118   Weight: 58 kg (127 lb 12 8 oz)   [unfilled]      GEN: Now acute distress, Alert and oriented, well appearing elderly femail  HEENT:Head, neck, ears, oral pharynx: Mucus membranes moist, oral pharynx clear, nares clear  External ears normal  EYES: Pupils equal, sclera anicteric, midline, normal conjuctiva  NECK: No JVD, supple, no obvious masses or thryomegaly or goiter  CARDIOVASCULAR: RRR, No murmur, rub, gallops S1,S2  LUNGS: Clear To auscultation bilaterally, normal effort, no rales, rhonchi, crackles  ABDOMEN: Soft, nondistended, nontender, without obvious organomegaly or ascites  EXTREMITIES/VASCULAR: No edema  Radial pulses intact, pedal pulses difficult to palpate, warm an well perfused  PSYCH: Normal Affect, no overt suicidal ideation, linear speech pattern without evidence of psychosis     NEURO: Grossly intact, moving all extremiteis equal, face symmetric, alert and responsive, no obvious focal defecits  HEME: No bleeding, bruising, petechia, purpura  SKIN: No significant rashes, warm, no diaphoresis or pallor  Lab Results:     CBC with diff:       Invalid input(s):  RBC, TOTALCELLSCOUNTED, SEGS%, GRANS%, LYMPHS%, EOS%, BASO%, ABNEUT, ABGRANS, ABLYMPHS, ABMOMOS, ABEOS, ABBASO      CMP:        BMP:      BNP:   Results Reviewed     None        No results for input(s): BNP in the last 72 hours  Magnesium:       Coags:       TSH:       Lipid Profile:         Cardiac testing:   Results for orders placed during the hospital encounter of 18   Echo complete with contrast if indicated    Narrative [address] [facility] [address]  [address], [place] [address]  [phone]    Transthoracic Echocardiogram  2D, M-mode, Doppler, and Color Doppler    Study date:  20-Mar-2018    Patient: Shannan Felty  MR number: [id number]  Account number: [de-identified]  : 02-Sep-1931  Age: 80 years  Gender: Female  Status: Inpatient  Location: Bedside  Height: 63 in  Weight: 131 lb  BP:    Indications: Fall    Diagnoses: R55  - Syncope and collapse    Sonographer:  LYNN Hopkins  Primary Physician:  Tanvi Stevenson DO  Referring Physician:  Francesco Mustafa DO  Group:  [id number]  Interpreting Physician:  Mckayla Hanks DO    SUMMARY    LEFT VENTRICLE:  Systolic function was normal  Ejection fraction was estimated in the range of 60 % to 65 %  Although no diagnostic regional wall motion abnormality was identified, this possibility cannot be completely excluded on the basis of this study  Wall thickness was mildly increased  Doppler parameters were consistent with abnormal left ventricular relaxation (grade 1 diastolic dysfunction)  MITRAL VALVE:  There was mild to moderate annular calcification  There was trace regurgitation  AORTIC VALVE:  There was mild stenosis  Estimated aortic valve area (by VTI) was 1 79 cm squared  TRICUSPID VALVE:  There was mild regurgitation      PERICARDIUM:  A small pericardial effusion was identified  There was no evidence of hemodynamic compromise  HISTORY: PRIOR HISTORY: Patient has no history of cardiovascular disease  PROCEDURE: The procedure was performed at the bedside  This was a routine study  The transthoracic approach was used  The study included complete 2D imaging, M-mode, complete spectral Doppler, and color Doppler  Image quality was adequate  LEFT VENTRICLE: Size was normal  Systolic function was normal  Ejection fraction was estimated in the range of 60 % to 65 %  Although no diagnostic regional wall motion abnormality was identified, this possibility cannot be completely  excluded on the basis of this study  Wall thickness was mildly increased  DOPPLER: Doppler parameters were consistent with abnormal left ventricular relaxation (grade 1 diastolic dysfunction)  RIGHT VENTRICLE: The size was normal  Systolic function was normal  Wall thickness was normal     LEFT ATRIUM: Size was normal     RIGHT ATRIUM: Size was normal     MITRAL VALVE: There was mild to moderate annular calcification  Valve structure was normal  There was normal leaflet separation  DOPPLER: The transmitral velocity was within the normal range  There was no evidence for stenosis  There was  trace regurgitation  AORTIC VALVE: The valve was trileaflet  Leaflets exhibited normal thickness and normal cuspal separation  DOPPLER: Transaortic velocity was within the normal range  There was mild stenosis  There was no regurgitation  TRICUSPID VALVE: The valve structure was normal  There was normal leaflet separation  DOPPLER: The transtricuspid velocity was within the normal range  There was no evidence for stenosis  There was mild regurgitation  PULMONIC VALVE: Leaflets exhibited normal thickness, no calcification, and normal cuspal separation  DOPPLER: The transpulmonic velocity was within the normal range  There was no regurgitation  PERICARDIUM: A small pericardial effusion was identified   There was no evidence of hemodynamic compromise  AORTA: The root exhibited normal size  SYSTEMIC VEINS: IVC: The inferior vena cava was not well visualized  MEASUREMENT TABLES    2D MEASUREMENTS  LVOT   (Reference normals)  Diam   19 mm   (--)    DOPPLER MEASUREMENTS  LVOT   (Reference normals)  Peak grayson   119 cm/s   (--)  VTI   25 cm   (--)  Stroke vol   70 88 ml   (--)  Aortic valve   (Reference normals)  Peak grayson   218 cm/s   (--)  VTI   40 cm   (--)  Obstr index, VTI   0 63    (--)  Valve area, VTI   1 79 cm squared   (--)  Obstr index, Vmax   0 55    (--)  Valve area, Vmax   1 56 cm squared   (--)    SYSTEM MEASUREMENT TABLES    2D  %FS: 31 19 %  Ao Diam: 2 46 cm  EDV(Teich): 79 74 ml  EF(Teich): 59 33 %  ESV(Teich): 32 43 ml  IVSd: 0 76 cm  LA Area: 11 14 cm2  LA Diam: 2 86 cm  LVEDV MOD A4C: 41 24 ml  LVEF MOD A4C: 55 22 %  LVESV MOD A4C: 18 47 ml  LVIDd: 4 23 cm  LVIDs: 2 91 cm  LVLd A4C: 6 8 cm  LVLs A4C: 5 92 cm  LVOT Diam: 1 87 cm  LVPWd: 0 84 cm  RA Area: 10 49 cm2  RV DIAM: 2 3 cm  SV MOD A4C: 22 77 ml  SV(Teich): 47 31 ml    CW  AV Env  Ti: 228 37 ms  AV VTI: 43 64 cm  AV Vmax: 2 32 m/s  AV Vmax: 2 34 m/s  AV Vmean: 1 91 m/s  AV maxP 54 mmHg  AV maxP 05 mmHg  AV meanPG: 15 5 mmHg  TR Vmax: 2 67 m/s  TR maxP 62 mmHg    MM  TAPSE: 2 62 cm    PW  CONG (VTI): 1 55 cm2  CONG Vmax: 1 4 cm2  CONG Vmax: 1 41 cm2  CONG Vmax, Pt: 1 37 cm2  CONG Vmax, Pt: 1 39 cm2  E' Sept: 0 08 m/s  E/E' Sept: 10 67  LVOT Env  Ti: 295 85 ms  LVOT VTI: 24 59 cm  LVOT Vmax: 1 16 m/s  LVOT Vmax: 1 19 m/s  LVOT Vmean: 0 83 m/s  LVOT maxP 43 mmHg  LVOT maxP 62 mmHg  LVOT meanPG: 3 24 mmHg  LVSV Dopp: 67 8 ml  MV A Grayson: 1 22 m/s  MV Dec Day: 3 55 m/s2  MV DecT: 239 62 ms  MV E Grayson: 0 85 m/s  MV E/A Ratio: 0 7    [facility]    Prepared and electronically signed by    Alicia Maher DO  Signed 20-Mar-2018 12:47:40       No results found for this or any previous visit  No results found for this or any previous visit  No results found for this or any previous visit  EKG/TELE: Today sinus rigo 42bpm; 3/22 afib       I have personally reviewed the EKG, Echocardiogram, CXR and Telemetry images directly and the above is my interpretation

## 2018-04-25 NOTE — TELEPHONE ENCOUNTER
I spoke with [place] Nurse EUS scheduled with Dr Huerta at [place] on 5/7/18  I gave her verbal instructions/mailed

## 2018-04-25 NOTE — LETTER
April 25, 2018     Ninoska Adkins, [address] [address]    Patient: Brittny Augustin   YOB: 1931   Date of Visit: 4/25/2018       Dear Dr Eileen Wills: Thank you for referring Luis Enrique López to me for evaluation  Below are my notes for this consultation  If you have questions, please do not hesitate to call me  I look forward to following your patient along with you  Sincerely,        Sarbjit Hubbard MD        CC: MD Sarbjit Lu MD  4/25/2018 12:21 PM  Sign at close encounter  Electrophysiology-Cardiology (EP)   Brittny Augustin 80 y o  female MRN: [id number]  Unit/Bed#:  Encounter: [id number]              IMPRESSION:   81 yo female    1) paroxysmal svt per history had episodes at [facility] March 2018  but tele not stored in epic for review, Dr Cathy Hua saw her, she had diltazem 30mg q8 added and seemed well controlled, asymtptomatic, she has been offered ablation in past and declined  She is also on digoxin/metoprolol  She was in [facility] for Viral illness/GI illness and PNA  2) paroxysmal afib, EKG on 3/22/18 shows afib 108bpm (reviewed and intepreted myself) She is not on anticoagulation no known h/o afib  3) Sinus bardycardia 42bpm today, likely from combination of diltiazem/metop/digoxin  4) Essential HTN well controlled today      PLAN:  1  Recommend Loop recorder to r/o asymptomatic paroxysmal afib, since her one episode was recorded during viral illness/pna unclear if long term issue or high burden warranting anticoagulation  2  Discussed SVT ablation but she is reluctant to do this  She is on 3 AV anna meds w bradycardia so alternative is pacemaker  Ablation in my opinion is lower risk  3  D/c diltiazem for bradycardia  F/u Loop recorder (will ask Dr Rocio Ovalles to implant)    4   F/u Dr Rocio Ovalles 1 month            Referring Physian: No att  providers found    Chief Complain/Reason for Referal: Referred by Dr Cathy Hua after admission at [facility] for SVT/Afib  HPI:Fawn Mock is a 80 y o  Patient Active Problem List    Diagnosis Date Noted    History of frequent urinary tract infections 04/24/2018    Right lower lobe lung mass 04/05/2018    Mass of urinary bladder determined by ultrasound 03/23/2018    Esophageal mass 03/23/2018    HCAP (healthcare-associated pneumonia) 03/19/2018    Chronic constipation 12/14/2017    Generalized osteoarthritis of multiple sites 11/30/2017    Vitamin D deficiency 11/30/2017    Bilateral carotid artery disease ([facility] [address] ) 10/04/2017    Breast mass, left 10/03/2016    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/24/2016    Neuralgia 09/04/2015    Esophageal reflux 01/14/2015    Glucose intolerance (impaired glucose tolerance) 09/19/2014    Colon, diverticulosis 01/29/2013    Generalized anxiety disorder 09/04/2012    Hypercholesterolemia 06/19/2012    Osteoporosis 06/19/2012    Supraventricular tachycardia ([facility] [address] ) 06/19/2012        79 yo female    1) paroxysmal svt per history had episodes at [facility] March 2018  but tele not stored in epic for review, Dr Dennis Krishna saw her, she had diltazem 30mg q8 added and seemed well controlled, asymtptomatic, she has been offered ablation in past and declined  She is also on digoxin/metoprolol   She was in [facility] for Viral illness/GI illness and PNA  2) paroxysmal afib, EKG on 3/22/18 shows afib 108bpm (reviewed and intepreted myself) She is not on anticoagulation no known h/o afib  3) Sinus bardycardia 42bpm today, likely from combination of diltiazem/metop/digoxin  4) Essential HTN well controlled today          Past Medical History:   Diagnosis Date    Abdominal distension     Last Assessed: [id number]    Abnormal weight loss     Last Assessed: [id number]    Arthritis     Bronchitis     Bursitis of left hip     Lst Assessed: [id number]    Chest pain     Last Assessed: [id number]    Colon polyp     Dysuria     Last Assessed: [id number]    External hemorrhoids     Last Assessed: [id number]    GERD (gastroesophageal reflux disease)     Hyperlipidemia     Hypertension     Hypomagnesemia     Last Assessed: [id number]    Lyme disease     Last Assessed: [id number]    Osteoporosis     Pneumonia of right middle lobe due to infectious organism [facility])     Last Assessed: [id number]    Urinary tract infection          (Not in a hospital admission)    Scheduled Meds:  Continuous Infusions:  No current facility-administered medications for this visit  PRN Meds:  Allergies   Allergen Reactions    Codeine GI Intolerance    Epinephrine Other (See Comments)     Increased Heart Rate, Palpitations    Iodinated Diagnostic Agents     Magnesium Citrate GI Intolerance     I reviewed the Home Medication list in the chart  Family History   Problem Relation Age of Onset    Adopted: Yes       Social History     Social History    Marital status:      Spouse name: N/A    Number of children: N/A    Years of education: N/A     Occupational History    Retired      Social History Main Topics    Smoking status: Never Smoker    Smokeless tobacco: Never Used    Alcohol use No    Drug use: No    Sexual activity: Not on file     Other Topics Concern    Not on file     Social History Narrative    No narrative on file       Review of Systems -12 Point ROS reviewed and are negative or noted in chart except for Pertinent Positives Pertaining to Cardiovascular and Respiratory in HPI above  Vitals:    04/25/18 1118   BP: 130/82   Pulse: (!) 42     Vitals:    04/25/18 1118   Weight: 58 kg (127 lb 12 8 oz)   [unfilled]      GEN: Now acute distress, Alert and oriented, well appearing elderly femail  HEENT:Head, neck, ears, oral pharynx: Mucus membranes moist, oral pharynx clear, nares clear   External ears normal  EYES: Pupils equal, sclera anicteric, midline, normal conjuctiva  NECK: No JVD, supple, no obvious masses or thryomegaly or goiter  CARDIOVASCULAR: RRR, No murmur, rub, gallops S1,S2  LUNGS: Clear To auscultation bilaterally, normal effort, no rales, rhonchi, crackles  ABDOMEN: Soft, nondistended, nontender, without obvious organomegaly or ascites  EXTREMITIES/VASCULAR: No edema  Radial pulses intact, pedal pulses difficult to palpate, warm an well perfused  PSYCH: Normal Affect, no overt suicidal ideation, linear speech pattern without evidence of psychosis  NEURO: Grossly intact, moving all extremiteis equal, face symmetric, alert and responsive, no obvious focal defecits  HEME: No bleeding, bruising, petechia, purpura  SKIN: No significant rashes, warm, no diaphoresis or pallor  Lab Results:     CBC with diff:       Invalid input(s):  RBC, TOTALCELLSCOUNTED, SEGS%, GRANS%, LYMPHS%, EOS%, BASO%, ABNEUT, ABGRANS, ABLYMPHS, ABMOMOS, ABEOS, ABBASO      CMP:        BMP:      BNP:   Results Reviewed     None        No results for input(s): BNP in the last 72 hours  Magnesium:       Coags:       TSH:       Lipid Profile:         Cardiac testing:   Results for orders placed during the hospital encounter of 18   Echo complete with contrast if indicated    Narrative [address] [facility] [address] [facility] [address], [facility] [address]  [phone]    Transthoracic Echocardiogram  2D, M-mode, Doppler, and Color Doppler    Study date:  20-Mar-2018    Patient: Del Beckford  MR number: [id number]  Account number: [de-identified]  : 02-Sep-1931  Age: 80 years  Gender: Female  Status: Inpatient  Location: Bedside  Height: 63 in  Weight: 131 lb  BP:    Indications: Fall    Diagnoses: R55  - Syncope and collapse    Sonographer:  LYNN Leggett  Primary Physician:  Anastasia Comer DO  Referring Physician:  Nahomy Briggs DO  Group:  [id number]  Interpreting Physician:  Trina Kunz DO    SUMMARY    LEFT VENTRICLE:  Systolic function was normal  Ejection fraction was estimated in the range of 60 % to 65 %    Although no diagnostic regional wall motion abnormality was identified, this possibility cannot be completely excluded on the basis of this study  Wall thickness was mildly increased  Doppler parameters were consistent with abnormal left ventricular relaxation (grade 1 diastolic dysfunction)  MITRAL VALVE:  There was mild to moderate annular calcification  There was trace regurgitation  AORTIC VALVE:  There was mild stenosis  Estimated aortic valve area (by VTI) was 1 79 cm squared  TRICUSPID VALVE:  There was mild regurgitation  PERICARDIUM:  A small pericardial effusion was identified  There was no evidence of hemodynamic compromise  HISTORY: PRIOR HISTORY: Patient has no history of cardiovascular disease  PROCEDURE: The procedure was performed at the bedside  This was a routine study  The transthoracic approach was used  The study included complete 2D imaging, M-mode, complete spectral Doppler, and color Doppler  Image quality was adequate  LEFT VENTRICLE: Size was normal  Systolic function was normal  Ejection fraction was estimated in the range of 60 % to 65 %  Although no diagnostic regional wall motion abnormality was identified, this possibility cannot be completely  excluded on the basis of this study  Wall thickness was mildly increased  DOPPLER: Doppler parameters were consistent with abnormal left ventricular relaxation (grade 1 diastolic dysfunction)  RIGHT VENTRICLE: The size was normal  Systolic function was normal  Wall thickness was normal     LEFT ATRIUM: Size was normal     RIGHT ATRIUM: Size was normal     MITRAL VALVE: There was mild to moderate annular calcification  Valve structure was normal  There was normal leaflet separation  DOPPLER: The transmitral velocity was within the normal range  There was no evidence for stenosis  There was  trace regurgitation  AORTIC VALVE: The valve was trileaflet  Leaflets exhibited normal thickness and normal cuspal separation   DOPPLER: Transaortic velocity was within the normal range  There was mild stenosis  There was no regurgitation  TRICUSPID VALVE: The valve structure was normal  There was normal leaflet separation  DOPPLER: The transtricuspid velocity was within the normal range  There was no evidence for stenosis  There was mild regurgitation  PULMONIC VALVE: Leaflets exhibited normal thickness, no calcification, and normal cuspal separation  DOPPLER: The transpulmonic velocity was within the normal range  There was no regurgitation  PERICARDIUM: A small pericardial effusion was identified  There was no evidence of hemodynamic compromise  AORTA: The root exhibited normal size  SYSTEMIC VEINS: IVC: The inferior vena cava was not well visualized  MEASUREMENT TABLES    2D MEASUREMENTS  LVOT   (Reference normals)  Diam   19 mm   (--)    DOPPLER MEASUREMENTS  LVOT   (Reference normals)  Peak daly   119 cm/s   (--)  VTI   25 cm   (--)  Stroke vol   70 88 ml   (--)  Aortic valve   (Reference normals)  Peak daly   218 cm/s   (--)  VTI   40 cm   (--)  Obstr index, VTI   0 63    (--)  Valve area, VTI   1 79 cm squared   (--)  Obstr index, Vmax   0 55    (--)  Valve area, Vmax   1 56 cm squared   (--)    SYSTEM MEASUREMENT TABLES    2D  %FS: 31 19 %  Ao Diam: 2 46 cm  EDV(Teich): 79 74 ml  EF(Teich): 59 33 %  ESV(Teich): 32 43 ml  IVSd: 0 76 cm  LA Area: 11 14 cm2  LA Diam: 2 86 cm  LVEDV MOD A4C: 41 24 ml  LVEF MOD A4C: 55 22 %  LVESV MOD A4C: 18 47 ml  LVIDd: 4 23 cm  LVIDs: 2 91 cm  LVLd A4C: 6 8 cm  LVLs A4C: 5 92 cm  LVOT Diam: 1 87 cm  LVPWd: 0 84 cm  RA Area: 10 49 cm2  RV DIAM: 2 3 cm  SV MOD A4C: 22 77 ml  SV(Teich): 47 31 ml    CW  AV Env  Ti: 228 37 ms  AV VTI: 43 64 cm  AV Vmax: 2 32 m/s  AV Vmax: 2 34 m/s  AV Vmean: 1 91 m/s  AV maxP 54 mmHg  AV maxP 05 mmHg  AV meanPG: 15 5 mmHg  TR Vmax: 2 67 m/s  TR maxP 62 mmHg    MM  TAPSE: 2 62 cm    PW  CONG (VTI): 1 55 cm2  CONG Vmax: 1 4 cm2  CONG Vmax: 1 41 cm2  CONG Vmax, Pt: 1 37 cm2  CONG Vmax, Pt: 1 39 cm2  E' Sept: 0 08 m/s  E/E' Sept: 10 67  LVOT Env  Ti: 295 85 ms  LVOT VTI: 24 59 cm  LVOT Vmax: 1 16 m/s  LVOT Vmax: 1 19 m/s  LVOT Vmean: 0 83 m/s  LVOT maxP 43 mmHg  LVOT maxP 62 mmHg  LVOT meanPG: 3 24 mmHg  LVSV Dopp: 67 8 ml  MV A [place]: 1 22 m/s  MV Dec [place]: 3 55 m/s2  MV DecT: 239 62 ms  MV E [place]: 0 85 m/s  MV E/A Ratio: 0 7    [facility] Accredited Echocardiography Laboratory    Prepared and electronically signed by    Courtney Blanco DO  Signed 20-Mar-2018 12:47:40       No results found for this or any previous visit  No results found for this or any previous visit  No results found for this or any previous visit  EKG/TELE: Today sinus rigo 42bpm; 3/22 afib       I have personally reviewed the EKG, Echocardiogram, CXR and Telemetry images directly and the above is my interpretation

## 2018-04-27 ENCOUNTER — OFFICE VISIT (OUTPATIENT)
Dept: PULMONOLOGY | Facility: HOSPITAL | Age: 83
End: 2018-04-27
Payer: MEDICARE

## 2018-04-27 VITALS
WEIGHT: 129 LBS | HEART RATE: 55 BPM | SYSTOLIC BLOOD PRESSURE: 104 MMHG | OXYGEN SATURATION: 95 % | DIASTOLIC BLOOD PRESSURE: 62 MMHG | BODY MASS INDEX: 22.85 KG/M2

## 2018-04-27 DIAGNOSIS — R91.8 RIGHT LOWER LOBE LUNG MASS: Primary | ICD-10-CM

## 2018-04-27 DIAGNOSIS — J18.9 HCAP (HEALTHCARE-ASSOCIATED PNEUMONIA): ICD-10-CM

## 2018-04-27 PROCEDURE — 99214 OFFICE O/P EST MOD 30 MIN: CPT | Performed by: INTERNAL MEDICINE

## 2018-04-27 RX ORDER — CLONAZEPAM 0.5 MG/1
0.5 TABLET ORAL
COMMUNITY
End: 2018-07-16

## 2018-04-27 NOTE — PROGRESS NOTES
Assessment/Plan:    HCAP (healthcare-associated pneumonia)  resolving    Right lower lobe lung mass  Lung mass noted on CT Chest is slightly smaller than when she had the pneumonia and the PET scan shows only minimal uptake, making the suspicion of cancer mild but not non existent  We will repeat the CT Chest in 2 months to monitor and if the lung mass is any larger we will plan for biopsy  Diagnoses and all orders for this visit:    Right lower lobe lung mass  -     CT chest without contrast; Future    HCAP (healthcare-associated pneumonia)    Other orders  -     clonazePAM (KlonoPIN) 0 5 mg tablet; Take 0 5 mg by mouth 3 (three) times a day      (med list updated)    Subjective:      Patient ID: Francois Castillo is a 80 y o  female  Pt is here for review of PET scan that was ordered to evaluate a RLL lung mass noted on CT last month when she was admitted for pneumonia  Unfortunately, the PET is inconclusive and shows a low grade SUV uptake of the mass  A CT Chest done by GI to evaluate possible esophageal lesion shows regression of the mass  I discussed these findings w/ Jeremi Edwards and her son and they agree it is most reasonable to re check an interim CT Chest before proceeding w/ biopsy  I will follow w/ them after the CT to discuss further plans  Jeremi Edwards is asymptomatic from a pulmonary perspective  She is frail due to age but denies cough, sputum, hemoptysis, weight loss or anorexia  She lives in a personal care home but her son appears to be god support  The following portions of the patient's history were reviewed and updated as appropriate: allergies, current medications, past family history, past medical history, past social history, past surgical history and problem list     Review of Systems   All other systems reviewed and are negative          Objective:      /62 (BP Location: Left arm, Patient Position: Sitting)   Pulse 55   Wt 58 5 kg (129 lb)   SpO2 95%   BMI 22 85 kg/m² Physical Exam   Constitutional: She is oriented to person, place, and time  She appears well-developed and well-nourished  No distress  Elderly, frail but able to answer questions   HENT:   Mouth/Throat: No oropharyngeal exudate  Eyes: Pupils are equal, round, and reactive to light  No scleral icterus  Neck: No JVD present  No tracheal deviation present  No thyromegaly present  Cardiovascular: Normal rate, regular rhythm, normal heart sounds and intact distal pulses  Pulmonary/Chest: No respiratory distress  She has no wheezes  She has no rales  She exhibits no tenderness  Moderate kyphosis   Abdominal: Soft  Bowel sounds are normal  She exhibits no distension  There is no tenderness  Musculoskeletal: Normal range of motion  Diffuse muscle wasting   Lymphadenopathy:     She has no cervical adenopathy  Neurological: She is alert and oriented to person, place, and time  Skin: Skin is warm and dry  Psychiatric:   dysphoric   Vitals reviewed

## 2018-04-27 NOTE — LETTER
April 27, 2018     [facility], [address] [facility] [address] [address]    Patient: Sahara Doll   YOB: 1931   Date of Visit: 4/27/2018       Dear Dr Adam Judd: Thank you for referring Noe Tan to me for evaluation  Below are my notes for this consultation  If you have questions, please do not hesitate to call me  I look forward to following your patient along with you  Sincerely,        Emy Green MD        CC: No Recipients  Emy Green MD  4/27/2018  5:51 PM  Sign at close encounter  Assessment/Plan:    HCAP (healthcare-associated pneumonia)  resolving    Right lower lobe lung mass  Lung mass noted on CT Chest is slightly smaller than when she had the pneumonia and the PET scan shows only minimal uptake, making the suspicion of cancer mild but not non existent  We will repeat the CT Chest in 2 months to monitor and if the lung mass is any larger we will plan for biopsy  Diagnoses and all orders for this visit:    Right lower lobe lung mass  -     CT chest without contrast; Future    HCAP (healthcare-associated pneumonia)    Other orders  -     clonazePAM (KlonoPIN) 0 5 mg tablet; Take 0 5 mg by mouth 3 (three) times a day      (med list updated)    Subjective:      Patient ID: Sahara Doll is a 80 y o  female  Pt is here for review of PET scan that was ordered to evaluate a RLL lung mass noted on CT last month when she was admitted for pneumonia  Unfortunately, the PET is inconclusive and shows a low grade SUV uptake of the mass  A CT Chest done by GI to evaluate possible esophageal lesion shows regression of the mass  I discussed these findings w/ Linh Taylor and her son and they agree it is most reasonable to re check an interim CT Chest before proceeding w/ biopsy  I will follow w/ them after the CT to discuss further plans  Linh Claudia is asymptomatic from a pulmonary perspective    She is frail due to age but denies cough, sputum, hemoptysis, weight loss or anorexia  She lives in a personal care home but her son appears to be god support  The following portions of the patient's history were reviewed and updated as appropriate: allergies, current medications, past family history, past medical history, past social history, past surgical history and problem list     Review of Systems   All other systems reviewed and are negative  Objective:      /62 (BP Location: Left arm, Patient Position: Sitting)   Pulse 55   Wt 58 5 kg (129 lb)   SpO2 95%   BMI 22 85 kg/m²           Physical Exam   Constitutional: She is oriented to person, place, and time  She appears well-developed and well-nourished  No distress  Elderly, frail but able to answer questions   HENT:   Mouth/Throat: No oropharyngeal exudate  Eyes: Pupils are equal, round, and reactive to light  No scleral icterus  Neck: No JVD present  No tracheal deviation present  No thyromegaly present  Cardiovascular: Normal rate, regular rhythm, normal heart sounds and intact distal pulses  Pulmonary/Chest: No respiratory distress  She has no wheezes  She has no rales  She exhibits no tenderness  Moderate kyphosis   Abdominal: Soft  Bowel sounds are normal  She exhibits no distension  There is no tenderness  Musculoskeletal: Normal range of motion  Diffuse muscle wasting   Lymphadenopathy:     She has no cervical adenopathy  Neurological: She is alert and oriented to person, place, and time  Skin: Skin is warm and dry  Psychiatric:   dysphoric   Vitals reviewed

## 2018-04-27 NOTE — PATIENT INSTRUCTIONS
HCAP (healthcare-associated pneumonia)  resolving    Right lower lobe lung mass  Lung mass noted on CT Chest is slightly smaller than when she had the pneumonia and the PET scan shows only minimal uptake, making the suspicion of cancer mild but not non existent  We will repeat the CT Chest in 2 months to monitor and if the lung mass is any larger we will plan for biopsy

## 2018-04-30 DIAGNOSIS — M81.0 OSTEOPOROSIS, UNSPECIFIED OSTEOPOROSIS TYPE, UNSPECIFIED PATHOLOGICAL FRACTURE PRESENCE: ICD-10-CM

## 2018-04-30 DIAGNOSIS — I48.91 ATRIAL FIBRILLATION, UNSPECIFIED TYPE (HCC): ICD-10-CM

## 2018-04-30 DIAGNOSIS — E78.5 HYPERLIPIDEMIA, UNSPECIFIED HYPERLIPIDEMIA TYPE: ICD-10-CM

## 2018-04-30 DIAGNOSIS — G47.00 INSOMNIA, UNSPECIFIED TYPE: ICD-10-CM

## 2018-04-30 DIAGNOSIS — I47.1 SUPRAVENTRICULAR TACHYCARDIA (HCC): ICD-10-CM

## 2018-04-30 DIAGNOSIS — I10 HYPERTENSION, UNSPECIFIED TYPE: Primary | ICD-10-CM

## 2018-04-30 DIAGNOSIS — E50.9 VITAMIN A DEFICIENCY: ICD-10-CM

## 2018-04-30 DIAGNOSIS — K21.9 GASTROESOPHAGEAL REFLUX DISEASE, ESOPHAGITIS PRESENCE NOT SPECIFIED: ICD-10-CM

## 2018-04-30 RX ORDER — CHLORAL HYDRATE 500 MG
1 CAPSULE ORAL DAILY
Qty: 30 EACH | Refills: 5 | Status: SHIPPED | OUTPATIENT
Start: 2018-04-30 | End: 2018-10-16

## 2018-04-30 RX ORDER — CHOLECALCIFEROL (VITAMIN D3) 125 MCG
1 CAPSULE ORAL
Qty: 30 TABLET | Refills: 5 | Status: SHIPPED | OUTPATIENT
Start: 2018-04-30 | End: 2021-04-26

## 2018-04-30 RX ORDER — DIGOXIN 125 MCG
125 TABLET ORAL DAILY
Qty: 30 TABLET | Refills: 5 | Status: SHIPPED | OUTPATIENT
Start: 2018-04-30 | End: 2019-06-17 | Stop reason: SDUPTHER

## 2018-04-30 RX ORDER — CALCIUM CARBONATE 500(1250)
1 TABLET ORAL
Qty: 30 TABLET | Refills: 5 | Status: SHIPPED | OUTPATIENT
Start: 2018-04-30 | End: 2018-11-06 | Stop reason: SDUPTHER

## 2018-04-30 RX ORDER — ASPIRIN 81 MG/1
81 TABLET ORAL DAILY
Qty: 30 TABLET | Refills: 5 | Status: SHIPPED | OUTPATIENT
Start: 2018-04-30 | End: 2020-02-28 | Stop reason: SDUPTHER

## 2018-04-30 RX ORDER — CHOLECALCIFEROL (VITAMIN D3) 25 MCG
1 CAPSULE ORAL DAILY
Qty: 30 CAPSULE | Refills: 5 | Status: SHIPPED | OUTPATIENT
Start: 2018-04-30 | End: 2020-03-02

## 2018-04-30 RX ORDER — PANTOPRAZOLE SODIUM 40 MG/1
40 TABLET, DELAYED RELEASE ORAL DAILY
Qty: 30 TABLET | Refills: 0 | Status: SHIPPED | OUTPATIENT
Start: 2018-04-30 | End: 2018-06-28 | Stop reason: SDUPTHER

## 2018-04-30 RX ORDER — METOPROLOL TARTRATE 50 MG/1
50 TABLET, FILM COATED ORAL 3 TIMES DAILY
Qty: 90 TABLET | Refills: 5 | Status: SHIPPED | OUTPATIENT
Start: 2018-04-30 | End: 2019-06-29 | Stop reason: SDUPTHER

## 2018-04-30 RX ORDER — RANITIDINE 150 MG/1
150 TABLET ORAL
Qty: 30 TABLET | Refills: 5 | Status: SHIPPED | OUTPATIENT
Start: 2018-04-30 | End: 2019-04-01 | Stop reason: SDUPTHER

## 2018-04-30 RX ORDER — LISINOPRIL 10 MG/1
10 TABLET ORAL DAILY
Qty: 30 TABLET | Refills: 5 | Status: SHIPPED | OUTPATIENT
Start: 2018-04-30 | End: 2020-01-08 | Stop reason: SDUPTHER

## 2018-05-01 ENCOUNTER — TELEPHONE (OUTPATIENT)
Dept: GASTROENTEROLOGY | Facility: CLINIC | Age: 83
End: 2018-05-01

## 2018-05-01 ENCOUNTER — TELEPHONE (OUTPATIENT)
Dept: UROLOGY | Facility: CLINIC | Age: 83
End: 2018-05-01

## 2018-05-01 DIAGNOSIS — R30.0 DYSURIA: Primary | ICD-10-CM

## 2018-05-01 NOTE — TELEPHONE ENCOUNTER
Pt's son, Cholo Vanegas, called office  Pt seen by Dr Gael Christiansen on 4/24/2018 for possible bladder mass  Cystoscopy normal   Cholo Vanegas reports pt experiencing dysuria and blood tinged toilet paper x several days  Pt is a resident at [facility]  [phone]  Contacted [facility] and plan to obtain urine culture  Script fax to [facility] [phone]  Son aware

## 2018-05-03 ENCOUNTER — TELEPHONE (OUTPATIENT)
Dept: GASTROENTEROLOGY | Facility: CLINIC | Age: 83
End: 2018-05-03

## 2018-05-03 NOTE — TELEPHONE ENCOUNTER
DR CHUNG'S PT    [facility] [phone] called to cancel pt procedure scheduled on 05/07 as per family request  The family didn't know why she is having this procedure so they will wait for her urine results then call back if needed to

## 2018-05-10 ENCOUNTER — TELEPHONE (OUTPATIENT)
Dept: UROLOGY | Facility: CLINIC | Age: 83
End: 2018-05-10

## 2018-05-10 NOTE — TELEPHONE ENCOUNTER
Spoke with staff at [facility] re pt's urinary complaints  Pt has no complaints at this time  Staff will notify office if pt has any voiding issues  ----- Message from Jimmy Au MD sent at 5/10/2018 11:56 AM EDT -----  Staph epidermidis  Likely contaminant  Will not specifically treat  Symptoms of blood on toilet paper and minor dysuria  Continue to monitor

## 2018-05-22 DIAGNOSIS — K59.04 CHRONIC IDIOPATHIC CONSTIPATION: Primary | ICD-10-CM

## 2018-05-22 RX ORDER — POLYETHYLENE GLYCOL 3350 17 G/17G
17 POWDER, FOR SOLUTION ORAL DAILY
Qty: 578 G | Refills: 3 | Status: ON HOLD | OUTPATIENT
Start: 2018-05-22 | End: 2019-07-05 | Stop reason: SDUPTHER

## 2018-05-22 NOTE — TELEPHONE ENCOUNTER
Was prescribe polyethylene in the hospital, hometown wants to know if she should continue it and if so needs a printed script faxed with the form I put in your basket

## 2018-06-22 ENCOUNTER — APPOINTMENT (OUTPATIENT)
Dept: RADIOLOGY | Facility: CLINIC | Age: 83
End: 2018-06-22
Payer: MEDICARE

## 2018-06-22 ENCOUNTER — OFFICE VISIT (OUTPATIENT)
Dept: URGENT CARE | Facility: CLINIC | Age: 83
End: 2018-06-22
Payer: MEDICARE

## 2018-06-22 VITALS
OXYGEN SATURATION: 97 % | HEART RATE: 76 BPM | DIASTOLIC BLOOD PRESSURE: 80 MMHG | TEMPERATURE: 98.7 F | SYSTOLIC BLOOD PRESSURE: 138 MMHG

## 2018-06-22 DIAGNOSIS — M25.572 ACUTE LEFT ANKLE PAIN: Primary | ICD-10-CM

## 2018-06-22 DIAGNOSIS — M25.572 ACUTE LEFT ANKLE PAIN: ICD-10-CM

## 2018-06-22 PROCEDURE — G0463 HOSPITAL OUTPT CLINIC VISIT: HCPCS | Performed by: PHYSICIAN ASSISTANT

## 2018-06-22 PROCEDURE — 99213 OFFICE O/P EST LOW 20 MIN: CPT | Performed by: PHYSICIAN ASSISTANT

## 2018-06-22 PROCEDURE — 73610 X-RAY EXAM OF ANKLE: CPT

## 2018-06-22 RX ADMIN — Medication 10 MG: at 12:12

## 2018-06-22 NOTE — PROGRESS NOTES
[address]        NAME: Claudeen Bibles is a 80 y o  female  : 1931    MRN: [id number]  DATE: 2018  TIME: 12:07 PM    Assessment and Plan   Acute left ankle pain [M25 572]  1  Acute left ankle pain  XR ankle 3+ vw left    dexamethasone (DECADRON) injection 10 mg         Patient Instructions     If there is no improvement of the ankle pain over the weekend follow up with a podiatrist   Follow up with PCP in 3-5 days  Proceed to  ER if symptoms worsen  Chief Complaint     Chief Complaint   Patient presents with    Ankle Pain     Started yesterday no trauma         History of Present Illness       Patient started with left ankle pain yesterday  She states it is painful with weight-bearing  She does not have pain when sitting  She did know some swelling to the ankle  There is no known trauma  No history of gout  Review of Systems   Review of Systems   Constitutional: Negative for chills and fever  HENT: Negative for postnasal drip and sneezing  Eyes: Negative for redness  Respiratory: Negative for cough  Cardiovascular: Negative for chest pain  Gastrointestinal: Negative for abdominal pain  Musculoskeletal: Negative for arthralgias and myalgias  Neurological: Negative for dizziness and headaches  Hematological: Negative for adenopathy           Current Medications       Current Outpatient Prescriptions:     aspirin (ADULT ASPIRIN EC LOW STRENGTH) 81 mg EC tablet, Take 1 tablet (81 mg total) by mouth daily, Disp: 30 tablet, Rfl: 5    Calcium 500 MG tablet, Take 1 capsule by mouth 2 (two) times a day, Disp: , Rfl:     calcium carbonate (OYSTER SHELL,OSCAL) 500 mg, Take 1 tablet by mouth daily with breakfast, Disp: 30 tablet, Rfl: 5    Cholecalciferol (VITAMIN D-3) 1000 units CAPS, Take 1 capsule (1,000 Units total) by mouth daily, Disp: 30 capsule, Rfl: 5    clonazePAM (KlonoPIN) 0 5 mg tablet, Take 1 tablet (0 5 mg total) by mouth 2 (two) times a day for 30 days Take 1/2 tablet in morning and 2 tablet in the evening, Disp: 75 tablet, Rfl: 0    clonazePAM (KlonoPIN) 0 5 mg tablet, Take 0 5 mg by mouth 3 (three) times a day, Disp: , Rfl:     digoxin (LANOXIN) 0 125 mg tablet, Take 1 tablet (125 mcg total) by mouth daily, Disp: 30 tablet, Rfl: 5    diltiazem (CARDIZEM) 30 mg tablet, Take 1 tablet (30 mg total) by mouth 3 (three) times a day, Disp: 90 tablet, Rfl: 5    ibuprofen (MOTRIN) 200 mg tablet, Take by mouth every 6 (six) hours as needed for mild pain, Disp: , Rfl:     lisinopril (ZESTRIL) 10 mg tablet, Take 1 tablet (10 mg total) by mouth daily, Disp: 30 tablet, Rfl: 5    Melatonin 5 MG TABS, Take 1 tablet (5 mg total) by mouth daily at bedtime, Disp: 30 tablet, Rfl: 5    metoprolol tartrate (LOPRESSOR) 50 mg tablet, Take 1 tablet (50 mg total) by mouth 3 (three) times a day, Disp: 90 tablet, Rfl: 5    Omega-3 Fatty Acids (OMEGA-3 FISH OIL) 1000 MG CAPS, Take 1,000 mg by mouth daily, Disp: 30 each, Rfl: 5    pantoprazole (PROTONIX) 40 mg tablet, Take 1 tablet (40 mg total) by mouth daily, Disp: 30 tablet, Rfl: 0    polyethylene glycol (GLYCOLAX) powder, Take 17 g by mouth daily, Disp: 578 g, Rfl: 3    ranitidine (ZANTAC) 150 mg tablet, Take 1 tablet (150 mg total) by mouth daily at bedtime, Disp: 30 tablet, Rfl: 5    RaNITidine HCl (RANITIDINE 75 PO), Take 150 mg by mouth, Disp: , Rfl:     Current Facility-Administered Medications:     dexamethasone (DECADRON) injection 10 mg, 10 mg, Intramuscular, Once, Gato Tabares PA-C    Current Allergies     Allergies as of 06/22/2018 - Reviewed 06/22/2018   Allergen Reaction Noted    Codeine GI Intolerance 06/19/2013    Epinephrine Other (See Comments) 06/19/2013    Iodinated diagnostic agents  11/23/2016    Magnesium citrate GI Intolerance 06/26/2013            The following portions of the patient's history were reviewed and updated as appropriate: allergies, current medications, past family history, past medical history, past social history, past surgical history and problem list      Past Medical History:   Diagnosis Date    Abdominal distension     Last Assessed: [id number]    Abnormal weight loss     Last Assessed: [id number]    Arthritis     Bronchitis     Bursitis of left hip     Lst Assessed: [id number]    Chest pain     Last Assessed: [id number]    Colon polyp     Dysuria     Last Assessed: [id number]    External hemorrhoids     Last Assessed: [id number]    GERD (gastroesophageal reflux disease)     Hyperlipidemia     Hypertension     Hypomagnesemia     Last Assessed: [id number]    Lyme disease     Last Assessed: [id number]    Osteoporosis     Pneumonia of right middle lobe due to infectious organism [facility])     Last Assessed: [id number]    Urinary tract infection        Past Surgical History:   Procedure Laterality Date    CATARACT EXTRACTION      HEMORRHOID SURGERY      HYSTERECTOMY      PELVIC FLOOR REPAIR         Family History   Problem Relation Age of Onset    Adopted: Yes         Medications have been verified  Objective   /80   Pulse 76   Temp 98 7 °F (37 1 °C) (Tympanic)   SpO2 97%        Physical Exam     Physical Exam   Constitutional: She is oriented to person, place, and time  She appears well-developed and well-nourished  HENT:   Head: Normocephalic and atraumatic  Eyes: Conjunctivae are normal    Cardiovascular: Normal rate and regular rhythm  Pulmonary/Chest: Effort normal    Musculoskeletal:   Swelling over the lateral malleolus into the proximal foot  There is mild tenderness over the distal lateral malleolus  No ecchymosis erythema rashes open wounds  Neurovascular status is intact  She has good range of motion  Neurological: She is alert and oriented to person, place, and time  Skin: Skin is warm and dry  No rash noted  Psychiatric: She has a normal mood and affect   Her behavior is normal  Thought content normal    Nursing note and vitals reviewed  Left ankle x-ray viewed by me and independently reviewed by me contemporaneously as no acute fracture or bony abnormality

## 2018-06-25 NOTE — PHYSICAL THERAPY NOTE
PT Evaluation     03/20/18 0956   Note Type   Note type Eval/Treat   Pain Assessment   Pain Assessment No/denies pain   Pain Score No Pain   Home Living   Type of Home Assisted living   Additional Comments [facility]   Prior Function   Level of [place] Independent with ADLs and functional mobility  ((I) ambulation no A  D )   Lives With Facility staff   Receives Help From Personal care attendant   ADL Assistance Independent   IADLs Needs assistance   Restrictions/Precautions   Other Precautions Droplet precautions; Bed Alarm;Multiple lines;Telemetry; Fall Risk;O2   General   Family/Caregiver Present Yes   Cognition   Arousal/Participation Alert   Orientation Level Oriented to person;Oriented to place   Following Commands Follows all commands and directions without difficulty   RLE Assessment   RLE Assessment WFL  (4/5)   LLE Assessment   LLE Assessment WFL  (4/5)   Coordination   Sensation WFL   Light Touch   RLE Light Touch Grossly intact   LLE Light Touch Grossly intact   Bed Mobility   Supine to Sit 5  Supervision   Additional items Bedrails   Sit to Supine 5  Supervision   Additional items Bedrails;Verbal cues   Additional Comments Pt on 2L's O2 with SpO2 96% HR 84 at rest and 96% HR 93 after ambulation  Transfers   Sit to Stand 5  Supervision   Additional items Bedrails   Stand to Sit 5  Supervision   Stand pivot 5  Supervision   Additional items Verbal cues  (no A D )   Additional Comments Pt able to perform transfers and ambulation without A D  at a CGA to (S) level  Pt with mild unsteadiness during ambulation with shuffling gait pattern  Pt with decreased foot clearance and is at increased risk for falls with limited ambulation tolerance due to fatigue  Ambulation/Elevation   Gait pattern Decreased foot clearance;Shuffling; Short stride   Gait Assistance (CGA)   Assistive Device None   Distance 200ft no A D  CGA with fatigue and limited ambulation tolerance with increased fall risk  Balance   Static Sitting Good   Dynamic Sitting Good   Static Standing Fair +   Dynamic Standing Fair   Ambulatory Fair -   Endurance Deficit   Endurance Deficit Yes   Endurance Deficit Description limited ambulation tolerance due to fatigue   Activity Tolerance   Activity Tolerance Patient limited by fatigue   Assessment   Prognosis Good   Problem List Decreased strength;Decreased endurance;Decreased mobility; Impaired balance   Assessment Pt is an 80year old female presenting with fair to good strength balance and functional mobility performing all bed mobility transfers and ambulation at a CGA to (S) level  Pt with altered gait pattern due to fatigue leading to increased fall risk  Pt is on 2L's O2 to maintain spO2 greater than 90%  Pt would benefit from continued activity in PT to improve impairments and functional deficits  Pt will be safe to return to assisted living facility and would benefit from home PT  Goals   Patient Goals To feel better and return to [facility] Expiration Date 04/03/18   Long Term Goal #1 (I) with all bed mobility and transfers no A D  Long Term Goal #2 Pt will ambulate 300ft with least restrictive A D  (S)   Plan   Treatment/Interventions Functional transfer training;LE strengthening/ROM; Therapeutic exercise; Endurance training;Patient/family training;Bed mobility;Gait training   PT Frequency (3-5x/wk)   Recommendation   Recommendation Home PT  (return to assisted living facility)   PT - OK to Discharge Yes  (when medically stable for discharge)   Pt with SCD's on when PT entered room  SCD's reapplied and turned on with pt supine in bed with call bell in reach bed alarm on  Alert and oriented to person, place and time

## 2018-06-27 ENCOUNTER — HOSPITAL ENCOUNTER (OUTPATIENT)
Dept: CT IMAGING | Facility: HOSPITAL | Age: 83
Discharge: HOME | End: 2018-06-27
Attending: INTERNAL MEDICINE
Payer: MEDICARE

## 2018-06-27 ENCOUNTER — OFFICE VISIT (OUTPATIENT)
Dept: PULMONOLOGY | Facility: HOSPITAL | Age: 83
End: 2018-06-27
Payer: MEDICARE

## 2018-06-27 VITALS
SYSTOLIC BLOOD PRESSURE: 118 MMHG | HEIGHT: 63 IN | HEART RATE: 53 BPM | DIASTOLIC BLOOD PRESSURE: 80 MMHG | OXYGEN SATURATION: 97 % | WEIGHT: 128 LBS | BODY MASS INDEX: 22.68 KG/M2

## 2018-06-27 DIAGNOSIS — R91.8 RIGHT LOWER LOBE LUNG MASS: Primary | ICD-10-CM

## 2018-06-27 DIAGNOSIS — R91.8 RIGHT LOWER LOBE LUNG MASS: ICD-10-CM

## 2018-06-27 PROCEDURE — 99213 OFFICE O/P EST LOW 20 MIN: CPT | Performed by: INTERNAL MEDICINE

## 2018-06-27 PROCEDURE — 71250 CT THORAX DX C-: CPT

## 2018-06-27 NOTE — PROGRESS NOTES
Assessment/Plan:    Right lower lobe lung mass  Continues to decrease in size by my exam   I will call her son with the official report  They are NOT interested in further CT scans and I agree  The nodule is most likely scar since it continues to diminish in size  Diagnoses and all orders for this visit:    Right lower lobe lung mass          Subjective:      Patient ID: Lisandra Trent is a 80 y o  female  Doing well without resp symptoms of SOB, cough, sputum, chest discomfort  Denies constitutional symptoms of fever, chills sweats, weight loss, decreased appetite  Anxious to hear about her CT Chest   NOTE: I reviewed the films without the official read and by me review the RLL mass is significantly improved  The following portions of the patient's history were reviewed and updated as appropriate: allergies, current medications, past family history, past medical history, past social history, past surgical history and problem list     Review of Systems   All other systems reviewed and are negative  Objective:      /80 (BP Location: Left arm, Patient Position: Sitting)   Pulse (!) 53   Ht 5' 3" (1 6 m)   Wt 58 1 kg (128 lb)   SpO2 97%   BMI 22 67 kg/m²          Physical Exam   Constitutional: She is oriented to person, place, and time  She appears well-developed and well-nourished  No distress  HENT:   Mouth/Throat: Oropharynx is clear and moist  No oropharyngeal exudate  Neck: Normal range of motion  No tracheal deviation present  No thyromegaly present  Cardiovascular: Normal rate, regular rhythm, normal heart sounds and intact distal pulses  Exam reveals no gallop and no friction rub  No murmur heard  Pulmonary/Chest: Effort normal and breath sounds normal  No respiratory distress  She has no wheezes  She has no rales  She exhibits no tenderness  Abdominal: Soft  Bowel sounds are normal  She exhibits no distension  There is no tenderness     Musculoskeletal: Normal range of motion  Ambulatory    Neurological: She is alert and oriented to person, place, and time  Skin: Skin is warm and dry  Psychiatric: She has a normal mood and affect  Vitals reviewed

## 2018-06-27 NOTE — PATIENT INSTRUCTIONS
Right lower lobe lung mass  Continues to decrease in size by my exam   I will call her son with the official report  They are NOT interested in further CT scans and I agree  The nodule is most likely scar since it continues to diminish in size

## 2018-06-27 NOTE — ASSESSMENT & PLAN NOTE
Continues to decrease in size by my exam   I will call her son with the official report  They are NOT interested in further CT scans and I agree  The nodule is most likely scar since it continues to diminish in size

## 2018-06-27 NOTE — LETTER
June 27, 2018     Nadiya Redmanix, [address] [facility], [place], operated by [facility] [id number]    Patient: Yoana Clark   YOB: 1931   Date of Visit: 6/27/2018       Dear Dr Shannan Urban: Thank you for referring Shonda Hudson to me for evaluation  Below are my notes for this consultation  If you have questions, please do not hesitate to call me  I look forward to following your patient along with you           Sincerely,        Summer Villagomez MD        CC: No Recipients

## 2018-06-28 DIAGNOSIS — K21.9 GASTROESOPHAGEAL REFLUX DISEASE, ESOPHAGITIS PRESENCE NOT SPECIFIED: ICD-10-CM

## 2018-06-28 RX ORDER — PANTOPRAZOLE SODIUM 40 MG/1
40 TABLET, DELAYED RELEASE ORAL DAILY
Qty: 90 TABLET | Refills: 3 | Status: SHIPPED | OUTPATIENT
Start: 2018-06-28 | End: 2018-08-17 | Stop reason: SDUPTHER

## 2018-07-16 ENCOUNTER — OFFICE VISIT (OUTPATIENT)
Dept: INTERNAL MEDICINE CLINIC | Facility: CLINIC | Age: 83
End: 2018-07-16
Payer: MEDICARE

## 2018-07-16 VITALS
BODY MASS INDEX: 22.93 KG/M2 | HEIGHT: 63 IN | TEMPERATURE: 98.4 F | OXYGEN SATURATION: 94 % | WEIGHT: 129.4 LBS | RESPIRATION RATE: 16 BRPM | DIASTOLIC BLOOD PRESSURE: 74 MMHG | SYSTOLIC BLOOD PRESSURE: 154 MMHG | HEART RATE: 60 BPM

## 2018-07-16 DIAGNOSIS — L30.9 ECZEMA, UNSPECIFIED TYPE: ICD-10-CM

## 2018-07-16 DIAGNOSIS — R53.81 DEBILITATED: Primary | ICD-10-CM

## 2018-07-16 DIAGNOSIS — F51.01 PRIMARY INSOMNIA: ICD-10-CM

## 2018-07-16 PROCEDURE — 99214 OFFICE O/P EST MOD 30 MIN: CPT | Performed by: INTERNAL MEDICINE

## 2018-07-16 RX ORDER — KETOCONAZOLE 20 MG/ML
0.5 SHAMPOO TOPICAL ONCE
Qty: 120 ML | Refills: 0 | Status: SHIPPED | OUTPATIENT
Start: 2018-07-16 | End: 2019-06-04

## 2018-07-16 RX ORDER — LOPERAMIDE HYDROCHLORIDE 2 MG/1
2 CAPSULE ORAL
COMMUNITY
End: 2019-06-04

## 2018-07-16 RX ORDER — CLONAZEPAM 1 MG/1
1.5 TABLET ORAL
Qty: 45 TABLET | Refills: 0
Start: 2018-07-16 | End: 2018-08-10 | Stop reason: SDUPTHER

## 2018-07-16 RX ORDER — ACETAMINOPHEN 325 MG/1
650 TABLET ORAL EVERY 4 HOURS PRN
Status: ON HOLD | COMMUNITY
End: 2019-07-05 | Stop reason: SDUPTHER

## 2018-07-16 RX ORDER — CLONAZEPAM 1 MG/1
1.5 TABLET ORAL
Qty: 45 TABLET | Refills: 0 | Status: SHIPPED | OUTPATIENT
Start: 2018-07-16 | End: 2018-07-16 | Stop reason: SDUPTHER

## 2018-07-16 RX ORDER — GUAIFENESIN AND DEXTROMETHORPHAN HYDROBROMIDE 100; 10 MG/5ML; MG/5ML
10 SOLUTION ORAL EVERY 12 HOURS
COMMUNITY
End: 2018-10-26 | Stop reason: SDUPTHER

## 2018-07-17 NOTE — PROGRESS NOTES
Assessment/Plan:       Diagnoses and all orders for this visit:    Debilitated  -     Cane    Eczema, unspecified type  -     ketoconazole (NIZORAL) 2 % shampoo; Apply 0 5 application topically once for 1 dose    Primary insomnia  -     Discontinue: clonazePAM (KlonoPIN) 1 mg tablet; Take 1 5 tablets (1 5 mg total) by mouth daily at bedtime for 30 days Take 1/2 tablet in morning and 2 tablet in the evening  -     clonazePAM (KlonoPIN) 1 mg tablet; Take 1 5 tablets (1 5 mg total) by mouth daily at bedtime for 30 days    Other orders  -     loperamide (IMODIUM) 2 mg capsule; 2 mg TAKE 1 CAPSULE BY MOUTH AFTER EACH LOOSE STOOL MAX 4 CAPSULES DAILY  -     acetaminophen (TYLENOL) 325 mg tablet; Take 650 mg by mouth every 4 (four) hours as needed for mild pain  -     dextromethorphan-guaiFENesin (ROBAFEN DM CLEAR)  mg/5 mL oral syrup; Take 10 mL by mouth every 12 (twelve) hours        No problem-specific Assessment & Plan notes found for this encounter  We will follow up in the next weeks and see how she is doing    Subjective:      Patient ID: Claudene Gaudy is a 80 y o  female  The patient has several complaints today  The patient states that she has issues with daytime somnolence  The patient states that she also has problems with insomnia at night  We discussed increasing her PM dose of Clonazepam and discontinuing the her AM dose  She was amendable to this  The patient states that she also has issues with CLARKE  She notes no other neurological issues  The patient states that her symptoms are relieved with the use of OTC NSAIDs  I noted that we would continue to follow this  The patient has issues with a mass noted previously on a CT of the chest   This has now been determined to be benign and  The patient is doing well with her GERD  She notes scaling itchy scalp  Hypertension   Associated symptoms include headaches   Pertinent negatives include no chest pain, palpitations or shortness of breath  The following portions of the patient's history were reviewed and updated as appropriate:   She has a past medical history of Abdominal distension; Abnormal weight loss; Arthritis; Bronchitis; Bursitis of left hip; Chest pain; Colon polyp; Dysuria; External hemorrhoids; GERD (gastroesophageal reflux disease); Hyperlipidemia; Hypertension; Hypomagnesemia; Lyme disease; Osteoporosis; Pneumonia of right middle lobe due to infectious organism [facility]); and Urinary tract infection  ,   does not have any pertinent problems on file  ,   has a past surgical history that includes Cataract extraction; Hemorrhoid surgery; Hysterectomy; and Pelvic floor repair  ,  family history is not on file  She was adopted  ,   reports that she has never smoked  She has never used smokeless tobacco  She reports that she does not drink alcohol or use drugs  ,  is allergic to codeine; epinephrine; iodinated diagnostic agents; and magnesium citrate     Current Outpatient Prescriptions   Medication Sig Dispense Refill    acetaminophen (TYLENOL) 325 mg tablet Take 650 mg by mouth every 4 (four) hours as needed for mild pain      aspirin (ADULT ASPIRIN EC LOW STRENGTH) 81 mg EC tablet Take 1 tablet (81 mg total) by mouth daily 30 tablet 5    calcium carbonate (OYSTER SHELL,OSCAL) 500 mg Take 1 tablet by mouth daily with breakfast 30 tablet 5    Cholecalciferol (VITAMIN D-3) 1000 units CAPS Take 1 capsule (1,000 Units total) by mouth daily 30 capsule 5    dextromethorphan-guaiFENesin (ROBAFEN DM CLEAR)  mg/5 mL oral syrup Take 10 mL by mouth every 12 (twelve) hours      digoxin (LANOXIN) 0 125 mg tablet Take 1 tablet (125 mcg total) by mouth daily 30 tablet 5    ibuprofen (MOTRIN) 200 mg tablet Take by mouth every 6 (six) hours as needed for mild pain      lisinopril (ZESTRIL) 10 mg tablet Take 1 tablet (10 mg total) by mouth daily 30 tablet 5    loperamide (IMODIUM) 2 mg capsule 2 mg TAKE 1 CAPSULE BY MOUTH AFTER EACH LOOSE STOOL MAX 4 CAPSULES DAILY      Melatonin 5 MG TABS Take 1 tablet (5 mg total) by mouth daily at bedtime 30 tablet 5    metoprolol tartrate (LOPRESSOR) 50 mg tablet Take 1 tablet (50 mg total) by mouth 3 (three) times a day 90 tablet 5    Omega-3 Fatty Acids (OMEGA-3 FISH OIL) 1000 MG CAPS Take 1,000 mg by mouth daily 30 each 5    pantoprazole (PROTONIX) 40 mg tablet Take 1 tablet (40 mg total) by mouth daily 90 tablet 3    polyethylene glycol (GLYCOLAX) powder Take 17 g by mouth daily 578 g 3    ranitidine (ZANTAC) 150 mg tablet Take 1 tablet (150 mg total) by mouth daily at bedtime 30 tablet 5    clonazePAM (KlonoPIN) 1 mg tablet Take 1 5 tablets (1 5 mg total) by mouth daily at bedtime for 30 days 45 tablet 0    ketoconazole (NIZORAL) 2 % shampoo Apply 0 5 application topically once for 1 dose 120 mL 0     No current facility-administered medications for this visit  Review of Systems   Constitutional: Negative for chills, fatigue and fever  HENT: Negative  Respiratory: Negative for cough, chest tightness and shortness of breath  Cardiovascular: Negative for chest pain and palpitations  Gastrointestinal: Negative for abdominal pain, constipation, diarrhea, nausea and vomiting  Genitourinary: Negative  Musculoskeletal: Negative for arthralgias and myalgias  Neurological: Positive for headaches  Psychiatric/Behavioral: The patient is nervous/anxious  Objective:  Vitals:    07/16/18 0955 07/16/18 2348   BP: (!) 174/66 154/74   BP Location: Left arm    Patient Position: Sitting    Cuff Size: Standard    Pulse: 60    Resp: 16    Temp: 98 4 °F (36 9 °C)    TempSrc: Tympanic    SpO2: 94%    Weight: 58 7 kg (129 lb 6 4 oz)    Height: 5' 3" (1 6 m)      Body mass index is 22 92 kg/m²  Physical Exam   Constitutional: She is oriented to person, place, and time  She appears well-developed and well-nourished  HENT:   Head: Normocephalic and atraumatic     Eyes: Conjunctivae and EOM are normal  Pupils are equal, round, and reactive to light  Cardiovascular: Normal rate, regular rhythm, normal heart sounds and intact distal pulses  No murmur heard  Pulmonary/Chest: Effort normal and breath sounds normal  No respiratory distress  She has no wheezes  She has no rales  Abdominal: Soft  Bowel sounds are normal  She exhibits no distension  There is no tenderness  There is no rebound  Musculoskeletal: Normal range of motion  Neurological: She is alert and oriented to person, place, and time  Skin:        Psychiatric: She has a normal mood and affect

## 2018-08-10 DIAGNOSIS — F51.01 PRIMARY INSOMNIA: ICD-10-CM

## 2018-08-10 RX ORDER — CLONAZEPAM 1 MG/1
1.5 TABLET ORAL
Qty: 45 TABLET | Refills: 0 | Status: SHIPPED | OUTPATIENT
Start: 2018-08-10 | End: 2018-09-18 | Stop reason: SDUPTHER

## 2018-08-17 ENCOUNTER — OFFICE VISIT (OUTPATIENT)
Dept: INTERNAL MEDICINE CLINIC | Facility: CLINIC | Age: 83
End: 2018-08-17
Payer: MEDICARE

## 2018-08-17 VITALS
WEIGHT: 129.8 LBS | DIASTOLIC BLOOD PRESSURE: 62 MMHG | SYSTOLIC BLOOD PRESSURE: 128 MMHG | HEIGHT: 63 IN | OXYGEN SATURATION: 97 % | BODY MASS INDEX: 23 KG/M2 | HEART RATE: 57 BPM | TEMPERATURE: 98.5 F | RESPIRATION RATE: 16 BRPM

## 2018-08-17 DIAGNOSIS — K21.9 GASTROESOPHAGEAL REFLUX DISEASE, ESOPHAGITIS PRESENCE NOT SPECIFIED: ICD-10-CM

## 2018-08-17 PROCEDURE — 99214 OFFICE O/P EST MOD 30 MIN: CPT | Performed by: INTERNAL MEDICINE

## 2018-08-17 RX ORDER — PANTOPRAZOLE SODIUM 40 MG/1
TABLET, DELAYED RELEASE ORAL
Qty: 90 TABLET | Refills: 0 | Status: ON HOLD | OUTPATIENT
Start: 2018-08-17 | End: 2019-07-05 | Stop reason: SDUPTHER

## 2018-08-17 RX ORDER — CLOTRIMAZOLE AND BETAMETHASONE DIPROPIONATE 10; .64 MG/G; MG/G
CREAM TOPICAL
COMMUNITY
End: 2018-10-16

## 2018-08-17 NOTE — PROGRESS NOTES
Assessment/Plan:       Diagnoses and all orders for this visit:    Gastroesophageal reflux disease, esophagitis presence not specified  -     pantoprazole (PROTONIX) 40 mg tablet; One Tab PO Q11 AM    Other orders  -     clotrimazole-betamethasone (LOTRISONE) 1-0 05 % cream; Apply topically APPLY TOPICALLY TO AFFECTED AREAMON SIDES OF BREASTS TWICE A DAY        No problem-specific Assessment & Plan notes found for this encounter  We will change the dosing of the Pantoprazole to 40 mg QAM   We will continue to follow the leg cramps without change of medications  We offered and she declined a walker for ambulatory dysfunction  Subjective:      Patient ID: Yoana Clark is a 80 y o  female  The patient states that she is awakened by staff to take her Pantoprazole in the early AM   I noted that it needed to be dosed once daily  out from food and other meds and I noted that it would be dosed at 11 AM   She notes episodic leg cramps and I noted that we should continue conservative care secondary to concerns regarding polypharmacy  The patient notes continued issues with ambulation and we offered the patient a walker, but she declined  The following portions of the patient's history were reviewed and updated as appropriate:   She has a past medical history of Abdominal distension; Abnormal weight loss; Arthritis; Bronchitis; Bursitis of left hip; Chest pain; Colon polyp; Dysuria; External hemorrhoids; GERD (gastroesophageal reflux disease); Hyperlipidemia; Hypertension; Hypomagnesemia; Lyme disease; Osteoporosis; Pneumonia of right middle lobe due to infectious organism [facility]); and Urinary tract infection  ,   does not have any pertinent problems on file  ,   has a past surgical history that includes Cataract extraction; Hemorrhoid surgery; Hysterectomy; and Pelvic floor repair  ,  family history is not on file  She was adopted  ,   reports that she has never smoked   She has never used smokeless tobacco  She reports that she does not drink alcohol or use drugs  ,  is allergic to codeine; epinephrine; iodinated diagnostic agents; and magnesium citrate     Current Outpatient Prescriptions   Medication Sig Dispense Refill    acetaminophen (TYLENOL) 325 mg tablet Take 650 mg by mouth every 4 (four) hours as needed for mild pain      aspirin (ADULT ASPIRIN EC LOW STRENGTH) 81 mg EC tablet Take 1 tablet (81 mg total) by mouth daily 30 tablet 5    calcium carbonate (OYSTER SHELL,OSCAL) 500 mg Take 1 tablet by mouth daily with breakfast 30 tablet 5    Cholecalciferol (VITAMIN D-3) 1000 units CAPS Take 1 capsule (1,000 Units total) by mouth daily 30 capsule 5    clonazePAM (KlonoPIN) 1 mg tablet Take 1 5 tablets (1 5 mg total) by mouth daily at bedtime for 30 days 45 tablet 0    clotrimazole-betamethasone (LOTRISONE) 1-0 05 % cream Apply topically APPLY TOPICALLY TO AFFECTED AREAMON SIDES OF BREASTS TWICE A DAY      dextromethorphan-guaiFENesin (ROBAFEN DM CLEAR)  mg/5 mL oral syrup Take 10 mL by mouth every 12 (twelve) hours      digoxin (LANOXIN) 0 125 mg tablet Take 1 tablet (125 mcg total) by mouth daily 30 tablet 5    ibuprofen (MOTRIN) 200 mg tablet Take by mouth every 6 (six) hours as needed for mild pain      ketoconazole (NIZORAL) 2 % shampoo Apply 0 5 application topically once for 1 dose 120 mL 0    lisinopril (ZESTRIL) 10 mg tablet Take 1 tablet (10 mg total) by mouth daily 30 tablet 5    loperamide (IMODIUM) 2 mg capsule 2 mg TAKE 1 CAPSULE BY MOUTH AFTER EACH LOOSE STOOL MAX 4 CAPSULES DAILY      Melatonin 5 MG TABS Take 1 tablet (5 mg total) by mouth daily at bedtime 30 tablet 5    metoprolol tartrate (LOPRESSOR) 50 mg tablet Take 1 tablet (50 mg total) by mouth 3 (three) times a day 90 tablet 5    Omega-3 Fatty Acids (OMEGA-3 FISH OIL) 1000 MG CAPS Take 1,000 mg by mouth daily 30 each 5    pantoprazole (PROTONIX) 40 mg tablet One Tab PO Q11 AM 90 tablet 0    polyethylene glycol (GLYCOLAX) powder Take 17 g by mouth daily 578 g 3    ranitidine (ZANTAC) 150 mg tablet Take 1 tablet (150 mg total) by mouth daily at bedtime 30 tablet 5     No current facility-administered medications for this visit  Review of Systems   Constitutional: Negative for chills, fatigue and fever  HENT: Negative for facial swelling, sinus pain, sinus pressure and sneezing  Respiratory: Negative for chest tightness and shortness of breath  Cardiovascular: Negative for chest pain and palpitations  Gastrointestinal: Negative for abdominal pain, constipation, diarrhea and nausea  Genitourinary: Negative  Musculoskeletal: Positive for myalgias  Neurological: Negative  Psychiatric/Behavioral: Negative  Objective:  Vitals:    08/17/18 1101   BP: 128/62   BP Location: Left arm   Patient Position: Sitting   Cuff Size: Large   Pulse: 57   Resp: 16   Temp: 98 5 °F (36 9 °C)   SpO2: 97%   Weight: 58 9 kg (129 lb 12 8 oz)   Height: 5' 3" (1 6 m)     Body mass index is 22 99 kg/m²  Physical Exam   Constitutional: She is oriented to person, place, and time  She appears well-developed and well-nourished  HENT:   Head: Normocephalic and atraumatic  Eyes: Conjunctivae and EOM are normal  Pupils are equal, round, and reactive to light  Neck: Normal range of motion  Cardiovascular: Normal rate, regular rhythm, normal heart sounds and intact distal pulses  Pulmonary/Chest: Effort normal and breath sounds normal  No respiratory distress  She has no wheezes  She has no rales  Abdominal: Soft  She exhibits no distension  There is no tenderness  There is no rebound  Musculoskeletal: Normal range of motion  She exhibits no edema or tenderness  Neurological: She is alert and oriented to person, place, and time  No cranial nerve deficit  Coordination normal    Skin: Skin is warm and dry  Psychiatric: She has a normal mood and affect  Vitals reviewed

## 2018-09-18 DIAGNOSIS — F41.1 GAD (GENERALIZED ANXIETY DISORDER): Primary | ICD-10-CM

## 2018-09-18 DIAGNOSIS — F51.01 PRIMARY INSOMNIA: ICD-10-CM

## 2018-09-18 RX ORDER — CLONAZEPAM 1 MG/1
1.5 TABLET ORAL
Qty: 45 TABLET | Refills: 0 | Status: SHIPPED | OUTPATIENT
Start: 2018-09-18 | End: 2018-10-17 | Stop reason: SDUPTHER

## 2018-10-16 ENCOUNTER — HOSPITAL ENCOUNTER (EMERGENCY)
Facility: HOSPITAL | Age: 83
Discharge: HOME | End: 2018-10-16
Attending: EMERGENCY MEDICINE | Admitting: EMERGENCY MEDICINE
Payer: MEDICARE

## 2018-10-16 ENCOUNTER — APPOINTMENT (EMERGENCY)
Dept: RADIOLOGY | Facility: HOSPITAL | Age: 83
End: 2018-10-16
Payer: MEDICARE

## 2018-10-16 VITALS
WEIGHT: 132.72 LBS | TEMPERATURE: 99.8 F | RESPIRATION RATE: 22 BRPM | HEART RATE: 65 BPM | HEIGHT: 63 IN | DIASTOLIC BLOOD PRESSURE: 64 MMHG | BODY MASS INDEX: 23.52 KG/M2 | OXYGEN SATURATION: 93 % | SYSTOLIC BLOOD PRESSURE: 142 MMHG

## 2018-10-16 DIAGNOSIS — J40 BRONCHITIS: Primary | ICD-10-CM

## 2018-10-16 LAB
ALBUMIN SERPL BCP-MCNC: 3 G/DL (ref 3.5–5)
ALP SERPL-CCNC: 59 U/L (ref 46–116)
ALT SERPL W P-5'-P-CCNC: 33 U/L (ref 12–78)
ANION GAP SERPL CALCULATED.3IONS-SCNC: 9 MMOL/L (ref 4–13)
APTT PPP: 30 SECONDS (ref 24–36)
AST SERPL W P-5'-P-CCNC: 35 U/L (ref 5–45)
BASOPHILS # BLD AUTO: 0.04 THOUSANDS/ΜL (ref 0–0.1)
BASOPHILS NFR BLD AUTO: 1 % (ref 0–1)
BILIRUB SERPL-MCNC: 0.7 MG/DL (ref 0.2–1)
BUN SERPL-MCNC: 9 MG/DL (ref 5–25)
CALCIUM SERPL-MCNC: 9.1 MG/DL (ref 8.3–10.1)
CHLORIDE SERPL-SCNC: 95 MMOL/L (ref 100–108)
CO2 SERPL-SCNC: 26 MMOL/L (ref 21–32)
CREAT SERPL-MCNC: 0.89 MG/DL (ref 0.6–1.3)
EOSINOPHIL # BLD AUTO: 0.17 THOUSAND/ΜL (ref 0–0.61)
EOSINOPHIL NFR BLD AUTO: 2 % (ref 0–6)
ERYTHROCYTE [DISTWIDTH] IN BLOOD BY AUTOMATED COUNT: 13.9 % (ref 11.6–15.1)
GFR SERPL CREATININE-BSD FRML MDRD: 58 ML/MIN/1.73SQ M
GLUCOSE SERPL-MCNC: 157 MG/DL (ref 65–140)
HCT VFR BLD AUTO: 39.9 % (ref 34.8–46.1)
HGB BLD-MCNC: 12.7 G/DL (ref 11.5–15.4)
IMM GRANULOCYTES # BLD AUTO: 0.04 THOUSAND/UL (ref 0–0.2)
IMM GRANULOCYTES NFR BLD AUTO: 1 % (ref 0–2)
INR PPP: 1.02 (ref 0.86–1.17)
LACTATE SERPL-SCNC: 2.4 MMOL/L (ref 0.5–2)
LYMPHOCYTES # BLD AUTO: 0.49 THOUSANDS/ΜL (ref 0.6–4.47)
LYMPHOCYTES NFR BLD AUTO: 7 % (ref 14–44)
MCH RBC QN AUTO: 28.7 PG (ref 26.8–34.3)
MCHC RBC AUTO-ENTMCNC: 31.8 G/DL (ref 31.4–37.4)
MCV RBC AUTO: 90 FL (ref 82–98)
MONOCYTES # BLD AUTO: 0.5 THOUSAND/ΜL (ref 0.17–1.22)
MONOCYTES NFR BLD AUTO: 7 % (ref 4–12)
NEUTROPHILS # BLD AUTO: 5.86 THOUSANDS/ΜL (ref 1.85–7.62)
NEUTS SEG NFR BLD AUTO: 82 % (ref 43–75)
NRBC BLD AUTO-RTO: 0 /100 WBCS
PLATELET # BLD AUTO: 217 THOUSANDS/UL (ref 149–390)
PMV BLD AUTO: 9.8 FL (ref 8.9–12.7)
POTASSIUM SERPL-SCNC: 4.6 MMOL/L (ref 3.5–5.3)
PROT SERPL-MCNC: 7.6 G/DL (ref 6.4–8.2)
PROTHROMBIN TIME: 12.9 SECONDS (ref 11.8–14.2)
RBC # BLD AUTO: 4.43 MILLION/UL (ref 3.81–5.12)
SODIUM SERPL-SCNC: 130 MMOL/L (ref 136–145)
TROPONIN I SERPL-MCNC: <0.02 NG/ML
WBC # BLD AUTO: 7.1 THOUSAND/UL (ref 4.31–10.16)

## 2018-10-16 PROCEDURE — 36415 COLL VENOUS BLD VENIPUNCTURE: CPT | Performed by: PHYSICIAN ASSISTANT

## 2018-10-16 PROCEDURE — 93005 ELECTROCARDIOGRAM TRACING: CPT

## 2018-10-16 PROCEDURE — 85610 PROTHROMBIN TIME: CPT | Performed by: PHYSICIAN ASSISTANT

## 2018-10-16 PROCEDURE — 85025 COMPLETE CBC W/AUTO DIFF WBC: CPT | Performed by: PHYSICIAN ASSISTANT

## 2018-10-16 PROCEDURE — 85730 THROMBOPLASTIN TIME PARTIAL: CPT | Performed by: PHYSICIAN ASSISTANT

## 2018-10-16 PROCEDURE — 80053 COMPREHEN METABOLIC PANEL: CPT | Performed by: PHYSICIAN ASSISTANT

## 2018-10-16 PROCEDURE — 84484 ASSAY OF TROPONIN QUANT: CPT | Performed by: PHYSICIAN ASSISTANT

## 2018-10-16 PROCEDURE — 99284 EMERGENCY DEPT VISIT MOD MDM: CPT

## 2018-10-16 PROCEDURE — 83605 ASSAY OF LACTIC ACID: CPT | Performed by: PHYSICIAN ASSISTANT

## 2018-10-16 PROCEDURE — 71046 X-RAY EXAM CHEST 2 VIEWS: CPT

## 2018-10-16 PROCEDURE — 87040 BLOOD CULTURE FOR BACTERIA: CPT | Performed by: PHYSICIAN ASSISTANT

## 2018-10-16 PROCEDURE — 96360 HYDRATION IV INFUSION INIT: CPT

## 2018-10-16 RX ORDER — DOXYCYCLINE HYCLATE 100 MG/1
100 CAPSULE ORAL ONCE
Status: COMPLETED | OUTPATIENT
Start: 2018-10-16 | End: 2018-10-16

## 2018-10-16 RX ORDER — DOXYCYCLINE 100 MG/1
100 TABLET ORAL 2 TIMES DAILY
Qty: 13 TABLET | Refills: 0 | Status: SHIPPED | OUTPATIENT
Start: 2018-10-16 | End: 2018-10-23

## 2018-10-16 RX ORDER — AZITHROMYCIN 250 MG/1
500 TABLET, FILM COATED ORAL ONCE
Status: DISCONTINUED | OUTPATIENT
Start: 2018-10-16 | End: 2018-10-16

## 2018-10-16 RX ORDER — SENNA PLUS 8.6 MG/1
1 TABLET ORAL DAILY
COMMUNITY
End: 2018-10-29 | Stop reason: SDUPTHER

## 2018-10-16 RX ORDER — SODIUM CHLORIDE 9 MG/ML
125 INJECTION, SOLUTION INTRAVENOUS CONTINUOUS
Status: DISCONTINUED | OUTPATIENT
Start: 2018-10-16 | End: 2018-10-16 | Stop reason: HOSPADM

## 2018-10-16 RX ADMIN — SODIUM CHLORIDE 125 ML/HR: 0.9 INJECTION, SOLUTION INTRAVENOUS at 11:18

## 2018-10-16 RX ADMIN — DOXYCYCLINE HYCLATE 100 MG: 100 CAPSULE ORAL at 12:46

## 2018-10-16 NOTE — ED PROVIDER NOTES
History  Chief Complaint   Patient presents with    Fever - 75 years or older     Cough, fever, sore throat since yesterday     Patient presents to the emergency department today for evaluation of fever and cough  She presents via [facility]  Patient lives at assisted living facility who noted a fever and cough  Patient states the cough started yesterday she has felt sweaty and chilled  She denies shortness of breath or chest pain  No history of cough nausea vomiting abdominal pain leg pain or leg edema  No back pain  Nonsmoker  Prior to Admission Medications   Prescriptions Last Dose Informant Patient Reported? Taking?    Cholecalciferol (VITAMIN D-3) 1000 units CAPS 10/16/2018 at Unknown time  No Yes   Sig: Take 1 capsule (1,000 Units total) by mouth daily   Melatonin 5 MG TABS 10/15/2018 at Unknown time  No Yes   Sig: Take 1 tablet (5 mg total) by mouth daily at bedtime   acetaminophen (TYLENOL) 325 mg tablet   Yes Yes   Sig: Take 650 mg by mouth every 4 (four) hours as needed for mild pain   aspirin (ADULT ASPIRIN EC LOW STRENGTH) 81 mg EC tablet 10/16/2018 at Unknown time  No Yes   Sig: Take 1 tablet (81 mg total) by mouth daily   calcium carbonate (OYSTER SHELL,OSCAL) 500 mg 10/15/2018 at Unknown time  No Yes   Sig: Take 1 tablet by mouth daily with breakfast   clonazePAM (KlonoPIN) 1 mg tablet 10/15/2018 at Unknown time  No Yes   Sig: Take 1 5 tablets (1 5 mg total) by mouth daily at bedtime for 30 days   dextromethorphan-guaiFENesin (ROBAFEN DM CLEAR)  mg/5 mL oral syrup   Yes Yes   Sig: Take 10 mL by mouth every 12 (twelve) hours   digoxin (LANOXIN) 0 125 mg tablet 10/16/2018 at Unknown time  No Yes   Sig: Take 1 tablet (125 mcg total) by mouth daily   ibuprofen (MOTRIN) 200 mg tablet  Self Yes Yes   Sig: Take by mouth every 6 (six) hours as needed for mild pain   ketoconazole (NIZORAL) 2 % shampoo   No Yes   Sig: Apply 0 5 application topically once for 1 dose   lisinopril (ZESTRIL) 10 mg tablet 10/16/2018 at Unknown time  No Yes   Sig: Take 1 tablet (10 mg total) by mouth daily   loperamide (IMODIUM) 2 mg capsule   Yes Yes   Si mg TAKE 1 CAPSULE BY MOUTH AFTER EACH LOOSE STOOL MAX 4 CAPSULES DAILY   magnesium hydroxide (MILK OF MAGNESIA) 800 MG/5ML suspension   Yes Yes   Sig: Take 30 mL by mouth daily as needed for constipation   metoprolol tartrate (LOPRESSOR) 50 mg tablet 10/16/2018 at Unknown time  No Yes   Sig: Take 1 tablet (50 mg total) by mouth 3 (three) times a day   pantoprazole (PROTONIX) 40 mg tablet Past Week at Unknown time  No Yes   Sig: One Tab PO Q11 AM   polyethylene glycol (GLYCOLAX) powder 10/15/2018 at Unknown time  No Yes   Sig: Take 17 g by mouth daily   ranitidine (ZANTAC) 150 mg tablet 10/15/2018 at Unknown time  No Yes   Sig: Take 1 tablet (150 mg total) by mouth daily at bedtime   senna (SENOKOT) 8 6 MG tablet 10/16/2018 at Unknown time  Yes Yes   Sig: Take 1 tablet by mouth daily      Facility-Administered Medications: None       Past Medical History:   Diagnosis Date    Abdominal distension     Last Assessed: [id number]    Abnormal weight loss     Last Assessed: [id number]    Arthritis     Bronchitis     Bursitis of left hip     Lst Assessed: [id number]    Chest pain     Last Assessed: [id number]    Colon polyp     Dysuria     Last Assessed: [id number]    External hemorrhoids     Last Assessed: [id number]    Generalized anxiety disorder     GERD (gastroesophageal reflux disease)     Hyperlipidemia     Hypertension     Hypomagnesemia     Last Assessed: [id number]    Lyme disease     Last Assessed: [id number]    Osteoporosis     Pneumonia of right middle lobe due to infectious organism [facility])     Last Assessed: [id number]    Urinary tract infection        Past Surgical History:   Procedure Laterality Date    CATARACT EXTRACTION      HEMORRHOID SURGERY      HYSTERECTOMY      PELVIC FLOOR REPAIR         Family History   Problem Relation Age of Onset    Adopted: Yes     I have reviewed and agree with the history as documented  Social History   Substance Use Topics    Smoking status: Never Smoker    Smokeless tobacco: Never Used    Alcohol use No        Review of Systems   Constitutional: Positive for fever  Negative for activity change, appetite change, chills, diaphoresis, fatigue and unexpected weight change  HENT: Negative  Eyes: Negative  Respiratory: Positive for cough  Negative for apnea, choking, chest tightness, shortness of breath, wheezing and stridor  Cardiovascular: Negative  Gastrointestinal: Negative  Endocrine: Negative  Genitourinary: Negative  Musculoskeletal: Negative  Skin: Negative  Allergic/Immunologic: Negative  Neurological: Negative  Hematological: Negative  Psychiatric/Behavioral: Negative  All other systems reviewed and are negative  Physical Exam  Physical Exam   Constitutional: She is oriented to person, place, and time  She appears well-developed and well-nourished  No distress  HENT:   Head: Normocephalic and atraumatic  Right Ear: External ear normal    Left Ear: External ear normal    Nose: Nose normal    Mouth/Throat: Oropharynx is clear and moist  No oropharyngeal exudate  Eyes: Pupils are equal, round, and reactive to light  EOM are normal    Neck: Normal range of motion  Cardiovascular: Normal rate, regular rhythm, normal heart sounds and intact distal pulses  Exam reveals no gallop and no friction rub  No murmur heard  Pulmonary/Chest: Effort normal  No respiratory distress  She has wheezes  She exhibits no tenderness  Patient does exhibit scattered wheeze and rhonchi   Abdominal: There is no tenderness  Musculoskeletal: Normal range of motion  Neurological: She is alert and oriented to person, place, and time  Skin: Skin is warm  Capillary refill takes less than 2 seconds  She is not diaphoretic     Psychiatric: She has a normal mood and affect  Vitals reviewed  Vital Signs  ED Triage Vitals [10/16/18 1031]   Temperature Pulse Respirations Blood Pressure SpO2   99 8 °F (37 7 °C) 84 20 154/69 92 %      Temp Source Heart Rate Source Patient Position - Orthostatic VS BP Location FiO2 (%)   Oral Monitor Lying Left arm --      Pain Score       No Pain           Vitals:    10/16/18 1031   BP: 154/69   Pulse: 84   Patient Position - Orthostatic VS: Lying       Visual Acuity      ED Medications  Medications   sodium chloride 0 9 % infusion (125 mL/hr Intravenous New Bag 10/16/18 1118)   doxycycline hyclate (VIBRAMYCIN) capsule 100 mg (not administered)       Diagnostic Studies  Results Reviewed     Procedure Component Value Units Date/Time    Lactic acid, plasma [[id number]]  (Abnormal) Collected:  10/16/18 1105    Lab Status:  Final result Specimen:  Blood from Arm, Right Updated:  10/16/18 1228     LACTIC ACID 2 4 (HH) mmol/L     Narrative:         Result may be elevated if tourniquet was used during collection  Comprehensive metabolic panel [[id number]]  (Abnormal) Collected:  10/16/18 1105    Lab Status:  Final result Specimen:  Blood from Arm, Right Updated:  10/16/18 1221     Sodium 130 (L) mmol/L      Potassium 4 6 mmol/L      Chloride 95 (L) mmol/L      CO2 26 mmol/L      ANION GAP 9 mmol/L      BUN 9 mg/dL      Creatinine 0 89 mg/dL      Glucose 157 (H) mg/dL      Calcium 9 1 mg/dL      AST 35 U/L      ALT 33 U/L      Alkaline Phosphatase 59 U/L      Total Protein 7 6 g/dL      Albumin 3 0 (L) g/dL      Total Bilirubin 0 70 mg/dL      eGFR 58 ml/min/1 73sq m     Narrative:         National Kidney Disease Education Program recommendations are as follows:  GFR calculation is accurate only with a steady state creatinine  Chronic Kidney disease less than 60 ml/min/1 73 sq  meters  Kidney failure less than 15 ml/min/1 73 sq  meters      Troponin I [[id number]]  (Normal) Collected:  10/16/18 1105    Lab Status:  Final result Specimen:  Blood from Arm, Right Updated:  10/16/18 1144     Troponin I <0 02 ng/mL     Protime-INR [[id number]]  (Normal) Collected:  10/16/18 1105    Lab Status:  Final result Specimen:  Blood from Arm, Right Updated:  10/16/18 1125     Protime 12 9 seconds      INR 1 02    APTT [[id number]]  (Normal) Collected:  10/16/18 1105    Lab Status:  Final result Specimen:  Blood from Arm, Right Updated:  10/16/18 1125     PTT 30 seconds     CBC and differential [[id number]]  (Abnormal) Collected:  10/16/18 1105    Lab Status:  Final result Specimen:  Blood from Arm, Right Updated:  10/16/18 1115     WBC 7 10 Thousand/uL      RBC 4 43 Million/uL      Hemoglobin 12 7 g/dL      Hematocrit 39 9 %      MCV 90 fL      MCH 28 7 pg      MCHC 31 8 g/dL      RDW 13 9 %      MPV 9 8 fL      Platelets 134 Thousands/uL      nRBC 0 /100 WBCs      Neutrophils Relative 82 (H) %      Immat GRANS % 1 %      Lymphocytes Relative 7 (L) %      Monocytes Relative 7 %      Eosinophils Relative 2 %      Basophils Relative 1 %      Neutrophils Absolute 5 86 Thousands/µL      Immature Grans Absolute 0 04 Thousand/uL      Lymphocytes Absolute 0 49 (L) Thousands/µL      Monocytes Absolute 0 50 Thousand/µL      Eosinophils Absolute 0 17 Thousand/µL      Basophils Absolute 0 04 Thousands/µL     Blood culture #1 [[id number]] Collected:  10/16/18 1105    Lab Status: In process Specimen:  Blood from Arm, Right Updated:  10/16/18 1111    Blood culture #2 [[id number]] Collected:  10/16/18 1106    Lab Status: In process Specimen:  Blood from Arm, Left Updated:  10/16/18 1111                 XR chest 2 views   Final Result by JEISON Ibarra MD (10/16 1200)      No acute cardiopulmonary disease  Right lower lobe nodule              Workstation performed: [id number]                    Procedures  Procedures       Phone Contacts  ED Phone Contact    ED Course  ED Course as of Oct 16 1236   Tue Oct 16, 2018   1156 Troponin I: <0 02   1156 INR: 1 02   1156 WBC: 7 10   1156 SL AMB HEMOGLOBIN: 12 7   1156 Platelet Count: 164   5819 FINDINGS:    Cardiomediastinal silhouette appears unremarkable  Ill-defined faint nodule, right lower lobe corresponds with CAT scan of June 27, 2018   No acute pulmonary infiltrates or pleural effusions  Osseous structures appear within normal limits for patient age  Impression       No acute cardiopulmonary disease  Right lower lobe nodule  1228 Sodium: (!) 130   1228 SL AMB POTASSIUM: 4 6   1228 CO2: 26   1228 BUN: 9   1228 Albumin: (!) 3 0         HEART Risk Score      Most Recent Value   History  0 Filed at: 10/16/2018 1200   ECG  0 Filed at: 10/16/2018 1200   Age  2 Filed at: 10/16/2018 1200   Risk Factors  1 Filed at: 10/16/2018 1200   Troponin  0 Filed at: 10/16/2018 1200   Heart Score Risk Calculator   History  0 Filed at: 10/16/2018 1200   ECG  0 Filed at: 10/16/2018 1200   Age  2 Filed at: 10/16/2018 1200   Risk Factors  1 Filed at: 10/16/2018 1200   Troponin  0 Filed at: 10/16/2018 1200   HEART Score  3 Filed at: 10/16/2018 1200   HEART Score  3 Filed at: 10/16/2018 1200                            MDM  CritCare Time    Disposition  Final diagnoses:   Bronchitis     Time reflects when diagnosis was documented in both MDM as applicable and the Disposition within this note     Time User Action Codes Description Comment    10/16/2018 12:29 PM Ani SHRESTHA Add [J40] Bronchitis       ED Disposition     ED Disposition Condition Comment    Discharge  Barbara Hubbard discharge to home/self care      Condition at discharge: Good        Follow-up Information     Follow up With Specialties Details Why [address] DO Internal Medicine Schedule an appointment as soon as possible for a visit  [address] [address]  [phone]            Patient's Medications   Discharge Prescriptions    DOXYCYCLINE (ADOXA) 100 MG TABLET    Take 1 tablet (100 mg total) by mouth 2 (two) times a day for 7 days Start Date: 10/16/2018End Date: 10/23/2018       Order Dose: 100 mg       Quantity: 13 tablet    Refills: 0     No discharge procedures on file      ED Provider  Electronically Signed by           Brenda Messer PA-C  10/16/18 1233

## 2018-10-16 NOTE — ED PROCEDURE NOTE
Procedure  ECG 12 Lead Documentation  Date/Time: 10/16/2018 11:59 AM  Performed by: Yolis Hdz  Authorized by: Adelso SHRESTHA     Indications / Diagnosis:  Cough/fever  ECG reviewed by me, the ED Provider: yes    Patient location:  ED  Previous ECG:     Previous ECG:  Compared to current    Similarity:  No change  Interpretation:     Interpretation: normal    Rate:     ECG rate:  80    ECG rate assessment: normal    Rhythm:     Rhythm: sinus rhythm    Ectopy:     Ectopy: none    QRS:     QRS axis:  Normal    QRS intervals:  Normal  Conduction:     Conduction: normal    ST segments:     ST segments:  Normal  T waves:     T waves: normal                       Brooke Melton PA-C  10/16/18 1200

## 2018-10-16 NOTE — DISCHARGE INSTRUCTIONS
Acute Bronchitis   WHAT YOU NEED TO KNOW:   Acute bronchitis is swelling and irritation in the air passages of your lungs  This irritation may cause you to cough or have other breathing problems  Acute bronchitis often starts because of another illness, such as a cold or the flu  The illness spreads from your nose and throat to your windpipe and airways  Bronchitis is often called a chest cold  Acute bronchitis lasts about 3 to 6 weeks and is usually not a serious illness  Your cough can last for several weeks  DISCHARGE INSTRUCTIONS:   Return to the emergency department if:   · You cough up blood  · Your lips or fingernails turn blue  · You feel like you are not getting enough air when you breathe  Contact your healthcare provider if:   · You have a fever  · Your breathing problems do not go away or get worse  · Your cough does not get better within 4 weeks  · You have questions or concerns about your condition or care  Self-care:   · Get more rest   Rest helps your body to heal  Slowly start to do more each day  Rest when you feel it is needed  · Avoid irritants in the air  Avoid chemicals, fumes, and dust  Wear a face mask if you must work around dust or fumes  Stay inside on days when air pollution levels are high  If you have allergies, stay inside when pollen counts are high  Do not use aerosol products, such as spray-on deodorant, bug spray, and hair spray  · Do not smoke or be around others who smoke  Nicotine and other chemicals in cigarettes and cigars damages the cilia that move mucus out of your lungs  Ask your healthcare provider for information if you currently smoke and need help to quit  E-cigarettes or smokeless tobacco still contain nicotine  Talk to your healthcare provider before you use these products  · Drink liquids as directed  Liquids help keep your air passages moist and help you cough up mucus   You may need to drink more liquids when you have acute bronchitis  Ask how much liquid to drink each day and which liquids are best for you  · Use a humidifier or vaporizer  Use a cool mist humidifier or a vaporizer to increase air moisture in your home  This may make it easier for you to breathe and help decrease your cough  Decrease risk for acute bronchitis:   · Get the vaccinations you need  Ask your healthcare provider if you should get vaccinated against the flu or pneumonia  · Prevent the spread of germs  You can decrease your risk of acute bronchitis and other illnesses by doing the following:     [facility] your hands often with soap and water  Carry germ-killing hand lotion or gel with you  You can use the lotion or gel to clean your hands when soap and water are not available  ¨ Do not touch your eyes, nose, or mouth unless you have washed your hands first     ¨ Always cover your mouth when you cough to prevent the spread of germs  It is best to cough into a tissue or your shirt sleeve instead of into your hand  Ask those around you cover their mouths when they cough  ¨ Try to avoid people who have a cold or the flu  If you are sick, stay away from others as much as possible  Medicines: Your healthcare provider may  give you any of the following:  · Ibuprofen or acetaminophen  are medicines that help lower your fever  They are available without a doctor's order  Ask your healthcare provider which medicine is right for you  Ask how much to take and how often to take it  Follow directions  These medicines can cause stomach bleeding if not taken correctly  Ibuprofen can cause kidney damage  Do not take ibuprofen if you have kidney disease, an ulcer, or allergies to aspirin  Acetaminophen can cause liver damage  Do not take more than 4,000 milligrams in 24 hours  · Decongestants  help loosen mucus in your lungs and make it easier to cough up  This can help you breathe easier  · Cough suppressants  decrease your urge to cough   If your cough produces mucus, do not take a cough suppressant unless your healthcare provider tells you to  Your healthcare provider may suggest that you take a cough suppressant at night so you can rest     · Inhalers  may be given  Your healthcare provider may give you one or more inhalers to help you breathe easier and cough less  An inhaler gives your medicine to open your airways  Ask your healthcare provider to show you how to use your inhaler correctly  · Take your medicine as directed  Contact your healthcare provider if you think your medicine is not helping or if you have side effects  Tell him of her if you are allergic to any medicine  Keep a list of the medicines, vitamins, and herbs you take  Include the amounts, and when and why you take them  Bring the list or the pill bottles to follow-up visits  Carry your medicine list with you in case of an emergency  Follow up with your healthcare provider as directed:  Write down questions you have so you will remember to ask them during your follow-up visits  © 2017 [address] Information is for End User's use only and may not be sold, redistributed or otherwise used for commercial purposes  All illustrations and images included in CareNotes® are the copyrighted property of [employer]  or Kai Meneses  The above information is an  only  It is not intended as medical advice for individual conditions or treatments  Talk to your doctor, nurse or pharmacist before following any medical regimen to see if it is safe and effective for you

## 2018-10-17 DIAGNOSIS — F51.01 PRIMARY INSOMNIA: ICD-10-CM

## 2018-10-17 LAB
ATRIAL RATE: 80 BPM
P AXIS: 73 DEGREES
PR INTERVAL: 216 MS
QRS AXIS: 37 DEGREES
QRSD INTERVAL: 84 MS
QT INTERVAL: 354 MS
QTC INTERVAL: 408 MS
T WAVE AXIS: 51 DEGREES
VENTRICULAR RATE: 80 BPM

## 2018-10-17 PROCEDURE — 93010 ELECTROCARDIOGRAM REPORT: CPT | Performed by: INTERNAL MEDICINE

## 2018-10-18 RX ORDER — CLONAZEPAM 1 MG/1
TABLET ORAL
Qty: 45 TABLET | Refills: 0 | Status: SHIPPED | OUTPATIENT
Start: 2018-10-18 | End: 2018-11-06 | Stop reason: SDUPTHER

## 2018-10-19 ENCOUNTER — OFFICE VISIT (OUTPATIENT)
Dept: INTERNAL MEDICINE CLINIC | Facility: CLINIC | Age: 83
End: 2018-10-19
Payer: MEDICARE

## 2018-10-19 VITALS
SYSTOLIC BLOOD PRESSURE: 160 MMHG | WEIGHT: 131 LBS | HEART RATE: 89 BPM | BODY MASS INDEX: 23.21 KG/M2 | TEMPERATURE: 97.4 F | OXYGEN SATURATION: 96 % | DIASTOLIC BLOOD PRESSURE: 60 MMHG | HEIGHT: 63 IN

## 2018-10-19 DIAGNOSIS — J40 BRONCHITIS: Primary | ICD-10-CM

## 2018-10-19 DIAGNOSIS — R91.8 RIGHT LOWER LOBE LUNG MASS: ICD-10-CM

## 2018-10-19 DIAGNOSIS — R05.9 COUGH: ICD-10-CM

## 2018-10-19 PROBLEM — I10 HYPERTENSION: Status: ACTIVE | Noted: 2018-10-19

## 2018-10-19 PROCEDURE — 99214 OFFICE O/P EST MOD 30 MIN: CPT | Performed by: NURSE PRACTITIONER

## 2018-10-19 RX ORDER — PREDNISONE 20 MG/1
20 TABLET ORAL DAILY
Qty: 7 TABLET | Refills: 0 | Status: SHIPPED | OUTPATIENT
Start: 2018-10-19 | End: 2018-10-22 | Stop reason: SDUPTHER

## 2018-10-21 LAB
BACTERIA BLD CULT: NORMAL
BACTERIA BLD CULT: NORMAL

## 2018-10-22 DIAGNOSIS — J40 BRONCHITIS: ICD-10-CM

## 2018-10-22 RX ORDER — PREDNISONE 20 MG/1
20 TABLET ORAL DAILY
Qty: 7 TABLET | Refills: 0 | Status: SHIPPED | OUTPATIENT
Start: 2018-10-22 | End: 2019-06-04

## 2018-10-25 NOTE — PROGRESS NOTES
10/29/2018      No chief complaint on file  Assessment and Plan    80 y o  female managed by Dr Brannon Ramos    1  Questionable bladder mass on U/S 3/22/18 but negative PET scan 4/20/18 and cystoscopy 4/24/18  - microscopic urinalysis 10/12/18 with no hematuria     2  UTI/asymptomatic bacteruria  - recent microscopic urinalysis 1/23/18 with no signs of UTI   - continue to screen regularly for positive cultures and treat the patient if she has symptoms of fever or chills, mental status changes, or pain  - proper hydration  - cranberry supplementation     FU 1 year with microscopic urinalysis prior, the patient educated to call the office with any hematuria and or signs of infection        History of Present Illness  Barbara Hubbard is a 80 y o  female here for follow up evaluation of a questionable bladder mass and history of urinary tract infections  On ultrasound 3/22/2018 it was question of a bladder mass but the patient subsequently underwent a negative PET scan 4/20/18 and cystoscopy 4/24/18  She presents today with a microscopic urinalysis obtained prior to her visit which reveals no abnormalities  She is doing well from a lower urinary tract standpoint denies any infections or signs of infections recently  Review of Systems   Constitutional: Negative for activity change, chills and fever  Gastrointestinal: Negative for abdominal distention and abdominal pain  Musculoskeletal: Negative for back pain and gait problem  Psychiatric/Behavioral: Negative for behavioral problems and confusion  Urinary Incontinence Screening      Most Recent Value   Urinary Incontinence   Urinary Incontinence? No   Incomplete emptying? No   Urinary frequency? No   Urinary urgency? No   Urinary hesitancy? No   Dysuria (painful difficult urination)? No   Nocturia (waking up to use the bathroom)? No   Straining (having to push to go)? No   Weak stream?  No   Intermittent stream?  No   Post void dribbling? No          Past Medical History  Past Medical History:   Diagnosis Date    Abdominal distension     Last Assessed: [id number]    Abnormal weight loss     Last Assessed: [id number]    Arthritis     Bronchitis     Bursitis of left hip     Lst Assessed: [id number]    Chest pain     Last Assessed: [id number]    Colon polyp     Dysuria     Last Assessed: [id number]    External hemorrhoids     Last Assessed: [id number]    Generalized anxiety disorder     GERD (gastroesophageal reflux disease)     Hyperlipidemia     Hypertension     Hypomagnesemia     Last Assessed: [id number]    Lyme disease     Last Assessed: [id number]    Osteoporosis     Pneumonia of right middle lobe due to infectious organism [facility])     Last Assessed: [id number]    Urinary tract infection        Past Social History  Past Surgical History:   Procedure Laterality Date    CATARACT EXTRACTION      HEMORRHOID SURGERY      HYSTERECTOMY      PELVIC FLOOR REPAIR       History   Smoking Status    Never Smoker   Smokeless Tobacco    Never Used       Past Family History  Family History   Problem Relation Age of Onset    Adopted: Yes       Past Social history  Social History     Social History    Marital status:       Spouse name: N/A    Number of children: N/A    Years of education: N/A     Occupational History    Retired      Social History Main Topics    Smoking status: Never Smoker    Smokeless tobacco: Never Used    Alcohol use No    Drug use: No    Sexual activity: Not on file     Other Topics Concern    Not on file     Social History Narrative    No narrative on file       Current Medications  Current Outpatient Prescriptions   Medication Sig Dispense Refill    aspirin (ADULT ASPIRIN EC LOW STRENGTH) 81 mg EC tablet Take 1 tablet (81 mg total) by mouth daily 30 tablet 5    calcium carbonate (OYSTER SHELL,OSCAL) 500 mg Take 1 tablet by mouth daily with breakfast 30 tablet 5    Cholecalciferol (VITAMIN D-3) 1000 units CAPS Take 1 capsule (1,000 Units total) by mouth daily 30 capsule 5    clonazePAM (KlonoPIN) 1 mg tablet TAKE ONE AND ONE-HALF TABLET (1 5MG) BY MOUTH DAILY AT BEDTIME (ANXIETY) 45 tablet 0    digoxin (LANOXIN) 0 125 mg tablet Take 1 tablet (125 mcg total) by mouth daily 30 tablet 5    ibuprofen (MOTRIN) 200 mg tablet Take by mouth every 6 (six) hours as needed for mild pain      lisinopril (ZESTRIL) 10 mg tablet Take 1 tablet (10 mg total) by mouth daily 30 tablet 5    magnesium hydroxide (MILK OF MAGNESIA) 800 MG/5ML suspension Take 30 mL by mouth daily as needed for constipation      Melatonin 5 MG TABS Take 1 tablet (5 mg total) by mouth daily at bedtime 30 tablet 5    metoprolol tartrate (LOPRESSOR) 50 mg tablet Take 1 tablet (50 mg total) by mouth 3 (three) times a day 90 tablet 5    pantoprazole (PROTONIX) 40 mg tablet One Tab PO Q11 AM 90 tablet 0    polyethylene glycol (GLYCOLAX) powder Take 17 g by mouth daily 578 g 3    predniSONE 20 mg tablet Take 1 tablet (20 mg total) by mouth daily 7 tablet 0    ranitidine (ZANTAC) 150 mg tablet Take 1 tablet (150 mg total) by mouth daily at bedtime 30 tablet 5    senna (SENOKOT) 8 6 mg TAKE ONE TABLET BY MOUTH ONCE DAILY (CONSTIPATION) 30 tablet 0    acetaminophen (TYLENOL) 325 mg tablet Take 650 mg by mouth every 4 (four) hours as needed for mild pain      dextromethorphan-guaiFENesin (ROBAFEN DM CLEAR)  mg/5 mL oral syrup Take 10 mL by mouth 2 (two) times a day as needed (cough) for up to 180 days 473 mL 1    ketoconazole (NIZORAL) 2 % shampoo Apply 0 5 application topically once for 1 dose 120 mL 0    loperamide (IMODIUM) 2 mg capsule 2 mg TAKE 1 CAPSULE BY MOUTH AFTER EACH LOOSE STOOL MAX 4 CAPSULES DAILY       No current facility-administered medications for this visit          Allergies  Allergies   Allergen Reactions    Codeine GI Intolerance    Epinephrine Other (See Comments)     Increased Heart Rate, Palpitations    Iodinated Diagnostic Agents     Magnesium Citrate GI Intolerance         The following portions of the patient's history were reviewed and updated as appropriate: allergies, current medications, past medical history, past social history, past surgical history and problem list       Vitals  Vitals:    10/29/18 1031   BP: 110/70   Pulse: 60   Weight: 58 1 kg (128 lb)           Physical Exam  Constitutional   General appearance: Patient is seated and in no acute distress, well appearing and well nourished  Head and Face   Head and face: Normal     Eyes   Conjunctiva and lids: No erythema, swelling or discharge  Ears, Nose, Mouth, and Throat   Hearing: Normal     Pulmonary   Respiratory effort: No increased work of breathing or signs of respiratory distress  Cardiovascular   Examination of extremities for edema and/or varicosities: Normal     Abdomen   Abdomen: Non-tender, no masses  Musculoskeletal   Gait and station: Normal     Skin   Skin and subcutaneous tissue: Warm, dry, and intact  No visible lesions or rashes  Psychiatric   Judgment and insight: Normal  Recent and remote memory:  Normal  Mood and affect: Normal      Results  No results found for this or any previous visit (from the past 1 hour(s))  ]  No results found for: PSA  Lab Results   Component Value Date    GLUCOSE 165 (H) 06/29/2015    CALCIUM 9 1 10/16/2018     (L) 10/16/2018    K 4 6 10/16/2018    CO2 26 10/16/2018    CL 95 (L) 10/16/2018    BUN 9 10/16/2018    CREATININE 0 89 10/16/2018     Lab Results   Component Value Date    WBC 7 10 10/16/2018    HGB 12 7 10/16/2018    HCT 39 9 10/16/2018    MCV 90 10/16/2018     10/16/2018       Orders  Orders Placed This Encounter   Procedures    Urinalysis with microscopic     Standing Status:   Future     Standing Expiration Date:   10/29/2019

## 2018-10-26 DIAGNOSIS — R05.9 COUGH: Primary | ICD-10-CM

## 2018-10-26 RX ORDER — GUAIFENESIN AND DEXTROMETHORPHAN HYDROBROMIDE 100; 10 MG/5ML; MG/5ML
10 SOLUTION ORAL 2 TIMES DAILY PRN
Qty: 473 ML | Refills: 1 | Status: SHIPPED | OUTPATIENT
Start: 2018-10-26 | End: 2019-01-09

## 2018-10-29 ENCOUNTER — OFFICE VISIT (OUTPATIENT)
Dept: UROLOGY | Facility: CLINIC | Age: 83
End: 2018-10-29
Payer: MEDICARE

## 2018-10-29 VITALS
SYSTOLIC BLOOD PRESSURE: 110 MMHG | WEIGHT: 128 LBS | HEART RATE: 60 BPM | BODY MASS INDEX: 22.67 KG/M2 | DIASTOLIC BLOOD PRESSURE: 70 MMHG

## 2018-10-29 DIAGNOSIS — K59.03 DRUG-INDUCED CONSTIPATION: Primary | ICD-10-CM

## 2018-10-29 DIAGNOSIS — N32.89 MASS OF URINARY BLADDER DETERMINED BY ULTRASOUND: Primary | ICD-10-CM

## 2018-10-29 PROCEDURE — 99213 OFFICE O/P EST LOW 20 MIN: CPT | Performed by: PHYSICIAN ASSISTANT

## 2018-10-29 RX ORDER — SENNOSIDES 8.6 MG
TABLET ORAL
Qty: 30 TABLET | Refills: 0 | Status: SHIPPED | OUTPATIENT
Start: 2018-10-29 | End: 2018-11-26 | Stop reason: SDUPTHER

## 2018-11-06 DIAGNOSIS — F51.01 PRIMARY INSOMNIA: ICD-10-CM

## 2018-11-06 DIAGNOSIS — M81.0 OSTEOPOROSIS, UNSPECIFIED OSTEOPOROSIS TYPE, UNSPECIFIED PATHOLOGICAL FRACTURE PRESENCE: ICD-10-CM

## 2018-11-06 RX ORDER — CALCIUM CARBONATE 500(1250)
1 TABLET ORAL
Qty: 30 TABLET | Refills: 5 | Status: SHIPPED | OUTPATIENT
Start: 2018-11-06 | End: 2018-11-13 | Stop reason: SDUPTHER

## 2018-11-06 RX ORDER — CLONAZEPAM 1 MG/1
TABLET ORAL
Qty: 45 TABLET | Refills: 0 | Status: SHIPPED | OUTPATIENT
Start: 2018-11-06 | End: 2018-12-04 | Stop reason: SDUPTHER

## 2018-11-13 DIAGNOSIS — M81.0 OSTEOPOROSIS, UNSPECIFIED OSTEOPOROSIS TYPE, UNSPECIFIED PATHOLOGICAL FRACTURE PRESENCE: ICD-10-CM

## 2018-11-13 RX ORDER — CALCIUM CARBONATE 500(1250)
TABLET ORAL
Qty: 60 EACH | Refills: 0 | Status: SHIPPED | OUTPATIENT
Start: 2018-11-13 | End: 2018-11-26 | Stop reason: SDUPTHER

## 2018-11-26 DIAGNOSIS — K59.03 DRUG-INDUCED CONSTIPATION: ICD-10-CM

## 2018-11-26 DIAGNOSIS — M81.0 OSTEOPOROSIS, UNSPECIFIED OSTEOPOROSIS TYPE, UNSPECIFIED PATHOLOGICAL FRACTURE PRESENCE: ICD-10-CM

## 2018-11-26 RX ORDER — CALCIUM CARBONATE 500(1250)
TABLET ORAL
Qty: 60 EACH | Refills: 0 | Status: SHIPPED | OUTPATIENT
Start: 2018-11-26 | End: 2018-12-22 | Stop reason: SDUPTHER

## 2018-11-26 RX ORDER — SENNOSIDES 8.6 MG
TABLET ORAL
Qty: 30 TABLET | Refills: 0 | Status: ON HOLD | OUTPATIENT
Start: 2018-11-26 | End: 2019-07-05 | Stop reason: SDUPTHER

## 2018-12-04 DIAGNOSIS — F51.01 PRIMARY INSOMNIA: ICD-10-CM

## 2018-12-04 RX ORDER — CLONAZEPAM 1 MG/1
TABLET ORAL
Qty: 45 TABLET | Refills: 0 | Status: SHIPPED | OUTPATIENT
Start: 2018-12-04 | End: 2019-01-14 | Stop reason: SDUPTHER

## 2018-12-22 DIAGNOSIS — M81.0 OSTEOPOROSIS, UNSPECIFIED OSTEOPOROSIS TYPE, UNSPECIFIED PATHOLOGICAL FRACTURE PRESENCE: ICD-10-CM

## 2018-12-24 RX ORDER — CALCIUM CARBONATE 500(1250)
TABLET ORAL
Qty: 60 EACH | Refills: 0 | Status: SHIPPED | OUTPATIENT
Start: 2018-12-24 | End: 2020-02-24

## 2019-01-08 NOTE — CASE MANAGEMENT
Initial Clinical Review    Admission: Date/Time/Statement: 3/19/18 @ 1848     Orders Placed This Encounter   Procedures    Inpatient Admission     Standing Status:   Standing     Number of Occurrences:   1     Order Specific Question:   Admitting Physician     Answer:   Kavitha Brooks     Order Specific Question:   Level of Care     Answer:   Med Surg [16]     Order Specific Question:   Estimated length of stay     Answer:   More than 2 Midnights     Order Specific Question:   Certification     Answer:   I certify that inpatient services are medically necessary for this patient for a duration of greater than two midnights  See H&P and MD Progress Notes for additional information about the patient's course of treatment  ED: Date/Time/Mode of Arrival:   ED Arrival Information     Expected Arrival Acuity Means of Arrival Escorted By Service Admission Type    - 3/19/2018 16:41 [facility]    Arrival Complaint    fall      Chief Complaint:   Chief Complaint   Patient presents with    Fall     Patient is a patient at [facility]  [place] found the patiant on the floor next to her bed  Patient states she was bending over to pick something up when she fell backwards from a standing position, landing on her butt  Patient states she has a cough that started on Saturday  History of Illness:   80year old woman with the noted PMH who presents from [facility] facility by EMS for evaluation of a fall  Patient states that she was standing in her room bending for the weight is because that when she lost her balance and fell backwards, landing on her buttocks and remaining in that position thereafer  Patient did not strike her head against the ground and did not injure anything else  She attempted to stand up immediately thereafter was unable to under her own power    She was able to stand up with the assistance of another individual and was ambulatory at that time  She denies any headache/back pain/chest pain/extremity pain/extremity weakness/syncope/palpitations/nausea/vomiting/dyspnea since that time  Separately, she notes a 4-5 day history upper respiratory symptoms consisting of cough that is nonproductive as well as myalgias/arthralgias  Patient has multiple sick contacts with individuals at her nursing facility but is unsure of any specific sick contacts  She has not been treating her symptoms anything at home  She has not taken any antibiotics in the past 30 days  She is unaware of any fevers specifically with her symptoms  During my examination the patient, she developed an uncontrolled SVT with rate 150-160  Patient remained awake and alert throughout this episode with no complaints of dyspnea/chest pain/lightheadedness/nausea/vomiting  She did note significant palpitations at the time of the onset of symptoms occurred  Reports similar history of previous episodes  Patient states previous episodes have been terminated with use of medication  The episode was terminated with use of an adjusted vagal maneuver with patient blowing into a empty syringe while simultaneously being laid flat with legs raised above her head  Within 20 seconds, SVT terminated to and now a sinus rhythm with bigeminy and PVCs with subsequent progression to a normal sinus rhythm with occasional PVCs  ED Vital Signs:   ED Triage Vitals   Temperature Pulse Respirations Blood Pressure SpO2   03/19/18 1645 03/19/18 1645 03/19/18 1645 03/19/18 1645 03/19/18 1645   (!) 101 1 °F (38 4 °C) 92 20 (!) 186/74 90 %      Temp Source Heart Rate Source Patient Position - Orthostatic VS BP Location FiO2 (%)   03/19/18 1645 03/19/18 1645 03/19/18 1645 03/19/18 1645 --   Temporal Monitor Sitting Right arm       Pain Score       03/19/18 1647       No Pain        Wt Readings from Last 1 Encounters:   03/20/18 59 6 kg (131 lb 6 3 oz)   Vital Signs (abnormal):    Abnormal Labs/Diagnostic Test Results:    CL 93 GLUC 148 ALB 2 8 GFR 54   U/A+LEUK, NITRITE, BLOOD, BACTERIA  XRAY PELVIS=Limited exam due to overlying bowel gas  No definite fracture  CXR=Mildly increased interstitial markings without focal consolidation  EKG=  Ventricular Rate BPM 99    Atrial Rate BPM 99    AL Interval ms 174    QRSD Interval ms 84    QT Interval ms 302    QTC Interval ms 387    P Axis degrees 70    QRS Axis degrees 27    T Wave Axis degrees -70      Sinus rhythm with frequent Premature ventricular complexes in a pattern of bigeminy  Possible Left atrial enlargement  Marked ST abnormality, possible lateral subendocardial injury  Abnormal ECG      ED Treatment:   Medication Administration from 03/19/2018 1641 to 03/19/2018 2033       Date/Time Order Dose Route Action Action by Comments     03/19/2018 1832 sodium chloride 0 9 % bolus 1,000 mL 0 mL Intravenous Stopped Andrew Aceves RN      03/19/2018 1732 sodium chloride 0 9 % bolus 1,000 mL 1,000 mL Intravenous New Bag Andrew Aceves RN      03/19/2018 1756 acetaminophen (TYLENOL) tablet 975 mg 975 mg Oral Given Andrew Aceves RN      03/19/2018 1755 diltiazem (CARDIZEM) injection 10 mg 10 mg Intravenous Given Andrew Aceves RN      03/19/2018 1937 azithromycin (ZITHROMAX) 500 mg in sodium chloride 0 9 % 250 mL IVPB 500 mg Intravenous Donte JENNIFER Acosta       Past Medical/Surgical History:    Active Ambulatory Problems     Diagnosis Date Noted    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/24/2016    Bilateral carotid artery disease ([facility] [address] ) 10/04/2017    Breast mass, left 10/03/2016    Chronic constipation 12/14/2017    Colon, diverticulosis 01/29/2013    Esophageal reflux 01/14/2015    Generalized anxiety disorder 09/04/2012    Generalized osteoarthritis of multiple sites 11/30/2017    Glucose intolerance (impaired glucose tolerance) 09/19/2014    Hypercholesterolemia 06/19/2012    Insomnia 09/19/2016    Neuralgia 09/04/2015    Osteoporosis 06/19/2012    Supraventricular tachycardia ([address] ) 06/19/2012    Vitamin D deficiency 11/30/2017     Resolved Ambulatory Problems     Diagnosis Date Noted    No Resolved Ambulatory Problems     Past Medical History:   Diagnosis Date    Abdominal distension     Abnormal weight loss     Bursitis of left hip     Chest pain     Dysuria     External hemorrhoids     Hypomagnesemia     Lyme disease     Pneumonia of right middle lobe due to infectious organism [facility])    Admitting Diagnosis: Cough [R05]  Hypoxemia [R09 02]  Back pain [M54 9]  Healthcare-associated pneumonia [J18 9]  Acute febrile illness [R50 9]  Age/Sex: 80 y o  female  Assessment/Plan:   Principal Problem:    Hypoxemia  Active Problems:    Supraventricular tachycardia (HCC)    Healthcare-associated pneumonia    Acute febrile illness    Acute cystitis    Hypovolemia due to dehydration  Plan for the Primary Problem(s):  · Admit to telemetry, gentle IV fluids, O2, empiric IV Rocephin & azithromycin  ? Await urine cx & bld cx results  Plan for Additional Problems:   · TTE, TSH, cardiology consult, resume BB, IV cardizem bolus PRN  · PT/OT eval, orthostatic vitals  Anticipated Length of Stay:  Patient will be admitted on an Inpatient basis with an anticipated length of stay of  > 2 midnights     Justification for Hospital Stay: IV antibiotics, IV fluids  Admission Orders:  TELEMETRY  CONT PULSE OX  PT/OT EVAL & TX  CONSULT CARDIO  VENODYNES  DROPLET ISOLATION  ORTHOSTATIC BP QS  Scheduled Meds:   Current Facility-Administered Medications:  acetaminophen 650 mg Oral Q6H PRN John Carrillo MD    aspirin 81 mg Oral Daily John Carrillo MD    azithromycin 500 mg Intravenous Q24H Lb Mathias DO    calcium carbonate-vitamin D 1 tablet Oral BID With Meals John Carrillo MD    cefTRIAXone 1,000 mg Intravenous Q24H John Carrillo MD Last Rate: 1,000 mg (03/19/18 2210)   clonazePAM 0 5 mg Oral BID PRN Anna Marie Styles MD Jason    clotrimazole-betamethasone  Topical BID Susan Weston MD    dextromethorphan-guaiFENesin 10 mL Oral Q4H PRN Susan Weston MD    diltiazem 30 mg Oral BID Luis Underwood, DO    enoxaparin 40 mg Subcutaneous Daily Susan Weston MD    fish oil 1,000 mg Oral BID Susan Weston MD    ipratropium-albuterol 3 mL Nebulization Q4H PRN Susan Weston MD    melatonin 4 5 mg Oral HS PRN Susan Weston MD    metoprolol tartrate 50 mg Oral Q12H [facility] Luis Underwood, DO    ondansetron 4 mg Intravenous Q6H PRN Susan Weston MD    pantoprazole 40 mg Oral Early Morning Susan Weston MD    sodium chloride (PF) 3 mL Intravenous PRN Elaine Solis DO      Continuous Infusions:  IVF @ 100CC/HR    PRN Meds:   acetaminophen    clonazePAM    dextromethorphan-guaiFENesin    ipratropium-albuterol    melatonin    ondansetron    Insert peripheral IV **AND** sodium chloride (PF) no

## 2019-01-09 ENCOUNTER — OFFICE VISIT (OUTPATIENT)
Dept: INTERNAL MEDICINE CLINIC | Facility: CLINIC | Age: 84
End: 2019-01-09
Payer: MEDICARE

## 2019-01-09 VITALS
SYSTOLIC BLOOD PRESSURE: 174 MMHG | HEIGHT: 63 IN | TEMPERATURE: 99.4 F | RESPIRATION RATE: 18 BRPM | DIASTOLIC BLOOD PRESSURE: 86 MMHG | BODY MASS INDEX: 23.04 KG/M2 | OXYGEN SATURATION: 94 % | HEART RATE: 70 BPM | WEIGHT: 130 LBS

## 2019-01-09 DIAGNOSIS — I10 ESSENTIAL HYPERTENSION: Primary | ICD-10-CM

## 2019-01-09 DIAGNOSIS — E78.5 HYPERLIPIDEMIA, UNSPECIFIED HYPERLIPIDEMIA TYPE: ICD-10-CM

## 2019-01-09 DIAGNOSIS — K21.00 GASTROESOPHAGEAL REFLUX DISEASE WITH ESOPHAGITIS: ICD-10-CM

## 2019-01-09 DIAGNOSIS — Z12.31 ENCOUNTER FOR SCREENING MAMMOGRAM FOR MALIGNANT NEOPLASM OF BREAST: ICD-10-CM

## 2019-01-09 DIAGNOSIS — K22.70 BARRETT'S ESOPHAGUS WITHOUT DYSPLASIA: ICD-10-CM

## 2019-01-09 DIAGNOSIS — Z23 ENCOUNTER FOR IMMUNIZATION: ICD-10-CM

## 2019-01-09 PROCEDURE — 99214 OFFICE O/P EST MOD 30 MIN: CPT | Performed by: INTERNAL MEDICINE

## 2019-01-09 PROCEDURE — G0008 ADMIN INFLUENZA VIRUS VAC: HCPCS | Performed by: INTERNAL MEDICINE

## 2019-01-09 PROCEDURE — 90662 IIV NO PRSV INCREASED AG IM: CPT | Performed by: INTERNAL MEDICINE

## 2019-01-09 RX ORDER — CHLORAL HYDRATE 500 MG
1000 CAPSULE ORAL DAILY
COMMUNITY
End: 2019-09-06 | Stop reason: SDUPTHER

## 2019-01-09 NOTE — PROGRESS NOTES
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    Gastroesophageal reflux disease with esophagitis    Encounter for screening mammogram for malignant neoplasm of breast  -     Mammo screening bilateral w 3d & cad; Future    Encounter for immunization  -     Cancel: SYRINGE/SINGLE-DOSE VIAL: influenza vaccine, [id number], quadrivalent, 0 5 mL, preservative-free (AFLURIA, FLUARIX, FLULAVAL, FLUZONE)  -     PREFERRED: influenza vaccine, [id number], high-dose, PF 0 5 mL, for patients 65 yr+ (FLUZONE HIGH-DOSE)    Other orders  -     Omega-3 Fatty Acids (FISH OIL) 1,000 mg; Take 1,000 mg by mouth daily        No problem-specific Assessment & Plan notes found for this encounter  We will followup on her BP after readings are done at the Assisted living  Subjective:      Patient ID: Severa Sea is a 80 y o  female  The patient is here for follow up an is noted to have issues with an elevated BP on exam   The patient states there are no other issues at this time  We discussed mammogram and noted that there is no recommendation regarding mammogram for women over 75 years  I noted that we could hold off on evaluation for now and she was amendable to this  The patient's GERD is doing well with the Ranitidine and Pantoprazole  The patient states that she does have some issues with episodic mild odynophagia and we discussed follow up with GI and we will continues to follow that  The patient notes no other issues at this time  Hypertension   This is a chronic problem  The current episode started more than 1 year ago  The problem has been rapidly worsening since onset  The problem is uncontrolled  Pertinent negatives include no anxiety, blurred vision, chest pain, headaches, malaise/fatigue, neck pain, orthopnea, palpitations, peripheral edema, PND, shortness of breath or sweats  There are no associated agents to hypertension  Risk factors for coronary artery disease include sedentary lifestyle  Past treatments include beta blockers and ACE inhibitors  The current treatment provides mild improvement  There is no history of angina, kidney disease, CAD/MI, CVA, heart failure, left ventricular hypertrophy, PVD or retinopathy  The following portions of the patient's history were reviewed and updated as appropriate:   She has a past medical history of Abdominal distension; Abnormal weight loss; Arthritis; Bronchitis; Bursitis of left hip; Chest pain; Colon polyp; Dysuria; External hemorrhoids; Generalized anxiety disorder; GERD (gastroesophageal reflux disease); Hyperlipidemia; Hypertension; Hypomagnesemia; Lyme disease; Osteoporosis; Pneumonia of right middle lobe due to infectious organism [facility]); and Urinary tract infection  ,   does not have any pertinent problems on file  ,   has a past surgical history that includes Cataract extraction; Hemorrhoid surgery; Hysterectomy; and Pelvic floor repair  ,  family history is not on file  She was adopted  ,   reports that she has never smoked  She has never used smokeless tobacco  She reports that she does not drink alcohol or use drugs  ,  is allergic to codeine; epinephrine; iodinated diagnostic agents; and magnesium citrate     Current Outpatient Prescriptions   Medication Sig Dispense Refill    acetaminophen (TYLENOL) 325 mg tablet Take 650 mg by mouth every 4 (four) hours as needed for mild pain      aspirin (ADULT ASPIRIN EC LOW STRENGTH) 81 mg EC tablet Take 1 tablet (81 mg total) by mouth daily 30 tablet 5    calcium carbonate (OYSTER SHELL,OSCAL) 500 mg TAKE ONE TABLET BY MOUTH TWICE DAILY (SUPPLEMENT) 60 each 0    Cholecalciferol (VITAMIN D-3) 1000 units CAPS Take 1 capsule (1,000 Units total) by mouth daily 30 capsule 5    clonazePAM (KlonoPIN) 1 mg tablet TAKE ONE AND ONE-HALF TABLET (1 5MG) BY MOUTH DAILY AT BEDTIME (ANXIETY) 45 tablet 0    digoxin (LANOXIN) 0 125 mg tablet Take 1 tablet (125 mcg total) by mouth daily 30 tablet 5    ibuprofen (MOTRIN) 200 mg tablet Take by mouth every 6 (six) hours as needed for mild pain      lisinopril (ZESTRIL) 10 mg tablet Take 1 tablet (10 mg total) by mouth daily 30 tablet 5    loperamide (IMODIUM) 2 mg capsule 2 mg TAKE 1 CAPSULE BY MOUTH AFTER EACH LOOSE STOOL MAX 4 CAPSULES DAILY      magnesium hydroxide (MILK OF MAGNESIA) 800 MG/5ML suspension Take 30 mL by mouth daily as needed for constipation      Melatonin 5 MG TABS Take 1 tablet (5 mg total) by mouth daily at bedtime 30 tablet 5    metoprolol tartrate (LOPRESSOR) 50 mg tablet Take 1 tablet (50 mg total) by mouth 3 (three) times a day 90 tablet 5    Omega-3 Fatty Acids (FISH OIL) 1,000 mg Take 1,000 mg by mouth daily      pantoprazole (PROTONIX) 40 mg tablet One Tab PO Q11 AM 90 tablet 0    polyethylene glycol (GLYCOLAX) powder Take 17 g by mouth daily 578 g 3    ranitidine (ZANTAC) 150 mg tablet Take 1 tablet (150 mg total) by mouth daily at bedtime 30 tablet 5    senna (SENOKOT) 8 6 mg TAKE ONE TABLET BY MOUTH ONCE DAILY (CONSTIPATION) 30 tablet 0    ketoconazole (NIZORAL) 2 % shampoo Apply 0 5 application topically once for 1 dose 120 mL 0    predniSONE 20 mg tablet Take 1 tablet (20 mg total) by mouth daily 7 tablet 0     No current facility-administered medications for this visit  Review of Systems   Constitutional: Negative for chills, fatigue, fever and malaise/fatigue  HENT: Negative  Eyes: Negative for blurred vision  Respiratory: Negative for shortness of breath  Cardiovascular: Negative for chest pain, palpitations, orthopnea and PND     Gastrointestinal: Negative for abdominal pain, constipation, diarrhea and nausea  Genitourinary: Negative  Musculoskeletal: Negative for neck pain  Neurological: Negative for headaches  Psychiatric/Behavioral: Negative  Objective:  Vitals:    01/09/19 1021   BP: (!) 174/86   BP Location: Left arm   Patient Position: Sitting   Cuff Size: Standard   Pulse: 70   Resp: 18   Temp: 99 4 °F (37 4 °C)   SpO2: 94%   Weight: 59 kg (130 lb)   Height: 5' 3" (1 6 m)     Body mass index is 23 03 kg/m²  Physical Exam   Constitutional: She is oriented to person, place, and time  She appears well-developed and well-nourished  HENT:   Head: Normocephalic and atraumatic  Eyes: Pupils are equal, round, and reactive to light  Conjunctivae and EOM are normal    Neck: Normal range of motion  Neck supple  Cardiovascular: Normal rate, normal heart sounds and intact distal pulses  Exam reveals no gallop  No murmur heard  Pulmonary/Chest: Effort normal and breath sounds normal  No respiratory distress  She has no wheezes  She has no rales  Abdominal: Soft  Bowel sounds are normal  She exhibits no distension  There is no tenderness  There is no rebound  Musculoskeletal: Normal range of motion  She exhibits no edema or tenderness  Neurological: She is alert and oriented to person, place, and time  No cranial nerve deficit  Coordination normal    Skin: Skin is warm and dry  Psychiatric: She has a normal mood and affect  Nursing note and vitals reviewed

## 2019-01-14 DIAGNOSIS — F51.01 PRIMARY INSOMNIA: ICD-10-CM

## 2019-01-15 RX ORDER — CLONAZEPAM 1 MG/1
TABLET ORAL
Qty: 45 TABLET | Refills: 0 | Status: SHIPPED | OUTPATIENT
Start: 2019-01-15 | End: 2019-07-05 | Stop reason: HOSPADM

## 2019-02-07 DIAGNOSIS — M25.552 PAIN OF LEFT HIP JOINT: ICD-10-CM

## 2019-02-07 DIAGNOSIS — R07.81 RIB PAIN ON LEFT SIDE: Primary | ICD-10-CM

## 2019-02-07 DIAGNOSIS — M89.8X5 PAIN OF LEFT FEMUR: ICD-10-CM

## 2019-02-20 DIAGNOSIS — Z12.39 SCREENING FOR BREAST CANCER: Primary | ICD-10-CM

## 2019-04-01 DIAGNOSIS — K21.9 GASTROESOPHAGEAL REFLUX DISEASE, ESOPHAGITIS PRESENCE NOT SPECIFIED: ICD-10-CM

## 2019-04-01 RX ORDER — RANITIDINE 150 MG/1
TABLET ORAL
Qty: 30 TABLET | Refills: 0 | Status: SHIPPED | OUTPATIENT
Start: 2019-04-01 | End: 2019-08-30 | Stop reason: SDUPTHER

## 2019-06-04 ENCOUNTER — OFFICE VISIT (OUTPATIENT)
Dept: INTERNAL MEDICINE CLINIC | Facility: CLINIC | Age: 84
End: 2019-06-04
Payer: MEDICARE

## 2019-06-04 VITALS
WEIGHT: 125.6 LBS | BODY MASS INDEX: 22.25 KG/M2 | HEIGHT: 63 IN | OXYGEN SATURATION: 95 % | TEMPERATURE: 98.1 F | SYSTOLIC BLOOD PRESSURE: 132 MMHG | RESPIRATION RATE: 18 BRPM | HEART RATE: 72 BPM | DIASTOLIC BLOOD PRESSURE: 82 MMHG

## 2019-06-04 DIAGNOSIS — R39.9 UTI SYMPTOMS: Primary | ICD-10-CM

## 2019-06-04 DIAGNOSIS — H91.13 PRESBYCUSIS OF BOTH EARS: ICD-10-CM

## 2019-06-04 LAB
SL AMB  POCT GLUCOSE, UA: NEGATIVE
SL AMB LEUKOCYTE ESTERASE,UA: POSITIVE
SL AMB POCT BILIRUBIN,UA: NEGATIVE
SL AMB POCT BLOOD,UA: ABNORMAL
SL AMB POCT CLARITY,UA: ABNORMAL
SL AMB POCT COLOR,UA: CLEAR
SL AMB POCT KETONES,UA: NEGATIVE
SL AMB POCT NITRITE,UA: POSITIVE
SL AMB POCT PH,UA: 6
SL AMB POCT SPECIFIC GRAVITY,UA: 1.01
SL AMB POCT URINE PROTEIN: NEGATIVE
SL AMB POCT UROBILINOGEN: NORMAL

## 2019-06-04 PROCEDURE — 87086 URINE CULTURE/COLONY COUNT: CPT | Performed by: INTERNAL MEDICINE

## 2019-06-04 PROCEDURE — 81002 URINALYSIS NONAUTO W/O SCOPE: CPT | Performed by: INTERNAL MEDICINE

## 2019-06-04 PROCEDURE — 99213 OFFICE O/P EST LOW 20 MIN: CPT | Performed by: INTERNAL MEDICINE

## 2019-06-04 RX ORDER — SULFAMETHOXAZOLE AND TRIMETHOPRIM 800; 160 MG/1; MG/1
1 TABLET ORAL EVERY 12 HOURS SCHEDULED
Qty: 14 TABLET | Refills: 0 | Status: SHIPPED | OUTPATIENT
Start: 2019-06-04 | End: 2019-06-11

## 2019-06-07 LAB — BACTERIA UR CULT: ABNORMAL

## 2019-06-10 DIAGNOSIS — F51.01 PRIMARY INSOMNIA: Primary | ICD-10-CM

## 2019-06-17 DIAGNOSIS — I48.91 ATRIAL FIBRILLATION, UNSPECIFIED TYPE (HCC): ICD-10-CM

## 2019-06-17 RX ORDER — DIGOXIN 125 MCG
TABLET ORAL
Qty: 30 TABLET | Refills: 0 | Status: SHIPPED | OUTPATIENT
Start: 2019-06-17 | End: 2019-09-03 | Stop reason: SDUPTHER

## 2019-06-29 DIAGNOSIS — I10 HYPERTENSION, UNSPECIFIED TYPE: ICD-10-CM

## 2019-06-30 RX ORDER — METOPROLOL TARTRATE 50 MG/1
TABLET, FILM COATED ORAL
Qty: 90 TABLET | Refills: 0 | Status: ON HOLD | OUTPATIENT
Start: 2019-06-30 | End: 2019-07-05 | Stop reason: SDUPTHER

## 2019-07-03 ENCOUNTER — HOSPITAL ENCOUNTER (INPATIENT)
Facility: HOSPITAL | Age: 84
LOS: 1 days | Discharge: HOME | DRG: 689 | End: 2019-07-05
Attending: EMERGENCY MEDICINE | Admitting: FAMILY MEDICINE
Payer: MEDICARE

## 2019-07-03 ENCOUNTER — APPOINTMENT (EMERGENCY)
Dept: CT IMAGING | Facility: HOSPITAL | Age: 84
DRG: 689 | End: 2019-07-03
Payer: MEDICARE

## 2019-07-03 DIAGNOSIS — I16.0 HYPERTENSIVE URGENCY: ICD-10-CM

## 2019-07-03 DIAGNOSIS — K59.03 DRUG-INDUCED CONSTIPATION: ICD-10-CM

## 2019-07-03 DIAGNOSIS — N30.01 ACUTE CYSTITIS WITH HEMATURIA: ICD-10-CM

## 2019-07-03 DIAGNOSIS — N39.0 UTI (URINARY TRACT INFECTION): Primary | ICD-10-CM

## 2019-07-03 DIAGNOSIS — K21.9 GASTROESOPHAGEAL REFLUX DISEASE, ESOPHAGITIS PRESENCE NOT SPECIFIED: ICD-10-CM

## 2019-07-03 DIAGNOSIS — I10 HYPERTENSION, UNSPECIFIED TYPE: ICD-10-CM

## 2019-07-03 DIAGNOSIS — R91.8 LUNG MASS: ICD-10-CM

## 2019-07-03 DIAGNOSIS — K59.04 CHRONIC IDIOPATHIC CONSTIPATION: ICD-10-CM

## 2019-07-03 DIAGNOSIS — G93.40 ACUTE ENCEPHALOPATHY: ICD-10-CM

## 2019-07-03 LAB
ALBUMIN SERPL BCP-MCNC: 3.2 G/DL (ref 3.5–5)
ALP SERPL-CCNC: 83 U/L (ref 46–116)
ALT SERPL W P-5'-P-CCNC: 23 U/L (ref 12–78)
ANION GAP SERPL CALCULATED.3IONS-SCNC: 6 MMOL/L (ref 4–13)
APTT PPP: 29 SECONDS (ref 23–37)
AST SERPL W P-5'-P-CCNC: 16 U/L (ref 5–45)
BACTERIA UR QL AUTO: ABNORMAL /HPF
BASOPHILS # BLD AUTO: 0.06 THOUSANDS/ΜL (ref 0–0.1)
BASOPHILS NFR BLD AUTO: 1 % (ref 0–1)
BILIRUB SERPL-MCNC: 0.3 MG/DL (ref 0.2–1)
BILIRUB UR QL STRIP: NEGATIVE
BUN SERPL-MCNC: 12 MG/DL (ref 5–25)
CALCIUM SERPL-MCNC: 9.5 MG/DL (ref 8.3–10.1)
CHLORIDE SERPL-SCNC: 95 MMOL/L (ref 100–108)
CLARITY UR: ABNORMAL
CO2 SERPL-SCNC: 29 MMOL/L (ref 21–32)
COLOR UR: YELLOW
CREAT SERPL-MCNC: 0.98 MG/DL (ref 0.6–1.3)
DIGOXIN SERPL-MCNC: 0.9 NG/ML (ref 0.8–2)
EOSINOPHIL # BLD AUTO: 0.15 THOUSAND/ΜL (ref 0–0.61)
EOSINOPHIL NFR BLD AUTO: 2 % (ref 0–6)
ERYTHROCYTE [DISTWIDTH] IN BLOOD BY AUTOMATED COUNT: 13.8 % (ref 11.6–15.1)
GFR SERPL CREATININE-BSD FRML MDRD: 52 ML/MIN/1.73SQ M
GLUCOSE SERPL-MCNC: 143 MG/DL (ref 65–140)
GLUCOSE SERPL-MCNC: 152 MG/DL (ref 65–140)
GLUCOSE UR STRIP-MCNC: NEGATIVE MG/DL
HCT VFR BLD AUTO: 43.1 % (ref 34.8–46.1)
HGB BLD-MCNC: 13.9 G/DL (ref 11.5–15.4)
HGB UR QL STRIP.AUTO: ABNORMAL
IMM GRANULOCYTES # BLD AUTO: 0.03 THOUSAND/UL (ref 0–0.2)
IMM GRANULOCYTES NFR BLD AUTO: 0 % (ref 0–2)
INR PPP: 0.97 (ref 0.84–1.19)
KETONES UR STRIP-MCNC: NEGATIVE MG/DL
LEUKOCYTE ESTERASE UR QL STRIP: ABNORMAL
LYMPHOCYTES # BLD AUTO: 0.96 THOUSANDS/ΜL (ref 0.6–4.47)
LYMPHOCYTES NFR BLD AUTO: 13 % (ref 14–44)
MAGNESIUM SERPL-MCNC: 1.9 MG/DL (ref 1.6–2.6)
MCH RBC QN AUTO: 29.8 PG (ref 26.8–34.3)
MCHC RBC AUTO-ENTMCNC: 32.3 G/DL (ref 31.4–37.4)
MCV RBC AUTO: 92 FL (ref 82–98)
MONOCYTES # BLD AUTO: 0.57 THOUSAND/ΜL (ref 0.17–1.22)
MONOCYTES NFR BLD AUTO: 8 % (ref 4–12)
NEUTROPHILS # BLD AUTO: 5.79 THOUSANDS/ΜL (ref 1.85–7.62)
NEUTS SEG NFR BLD AUTO: 76 % (ref 43–75)
NITRITE UR QL STRIP: POSITIVE
NON-SQ EPI CELLS URNS QL MICRO: ABNORMAL /HPF
NRBC BLD AUTO-RTO: 0 /100 WBCS
PH UR STRIP.AUTO: 7 [PH]
PHOSPHATE SERPL-MCNC: 3.6 MG/DL (ref 2.3–4.1)
PLATELET # BLD AUTO: 289 THOUSANDS/UL (ref 149–390)
PMV BLD AUTO: 9 FL (ref 8.9–12.7)
POTASSIUM SERPL-SCNC: 4.2 MMOL/L (ref 3.5–5.3)
PROT SERPL-MCNC: 8.2 G/DL (ref 6.4–8.2)
PROT UR STRIP-MCNC: NEGATIVE MG/DL
PROTHROMBIN TIME: 12.9 SECONDS (ref 11.6–14.5)
RBC # BLD AUTO: 4.67 MILLION/UL (ref 3.81–5.12)
RBC #/AREA URNS AUTO: ABNORMAL /HPF
SODIUM SERPL-SCNC: 130 MMOL/L (ref 136–145)
SP GR UR STRIP.AUTO: 1.01 (ref 1–1.03)
TROPONIN I SERPL-MCNC: <0.02 NG/ML
UROBILINOGEN UR QL STRIP.AUTO: 0.2 E.U./DL
WBC # BLD AUTO: 7.56 THOUSAND/UL (ref 4.31–10.16)
WBC #/AREA URNS AUTO: ABNORMAL /HPF

## 2019-07-03 PROCEDURE — 36415 COLL VENOUS BLD VENIPUNCTURE: CPT | Performed by: EMERGENCY MEDICINE

## 2019-07-03 PROCEDURE — 83735 ASSAY OF MAGNESIUM: CPT | Performed by: EMERGENCY MEDICINE

## 2019-07-03 PROCEDURE — 99285 EMERGENCY DEPT VISIT HI MDM: CPT

## 2019-07-03 PROCEDURE — 80162 ASSAY OF DIGOXIN TOTAL: CPT | Performed by: EMERGENCY MEDICINE

## 2019-07-03 PROCEDURE — 70450 CT HEAD/BRAIN W/O DYE: CPT

## 2019-07-03 PROCEDURE — 81001 URINALYSIS AUTO W/SCOPE: CPT | Performed by: EMERGENCY MEDICINE

## 2019-07-03 PROCEDURE — 80053 COMPREHEN METABOLIC PANEL: CPT | Performed by: EMERGENCY MEDICINE

## 2019-07-03 PROCEDURE — 85025 COMPLETE CBC W/AUTO DIFF WBC: CPT | Performed by: EMERGENCY MEDICINE

## 2019-07-03 PROCEDURE — 96365 THER/PROPH/DIAG IV INF INIT: CPT

## 2019-07-03 PROCEDURE — 85730 THROMBOPLASTIN TIME PARTIAL: CPT | Performed by: EMERGENCY MEDICINE

## 2019-07-03 PROCEDURE — 87086 URINE CULTURE/COLONY COUNT: CPT | Performed by: EMERGENCY MEDICINE

## 2019-07-03 PROCEDURE — 82948 REAGENT STRIP/BLOOD GLUCOSE: CPT

## 2019-07-03 PROCEDURE — 84100 ASSAY OF PHOSPHORUS: CPT | Performed by: EMERGENCY MEDICINE

## 2019-07-03 PROCEDURE — 85610 PROTHROMBIN TIME: CPT | Performed by: EMERGENCY MEDICINE

## 2019-07-03 PROCEDURE — 93005 ELECTROCARDIOGRAM TRACING: CPT

## 2019-07-03 PROCEDURE — 84484 ASSAY OF TROPONIN QUANT: CPT | Performed by: EMERGENCY MEDICINE

## 2019-07-03 RX ORDER — CEFTRIAXONE 1 G/50ML
1000 INJECTION, SOLUTION INTRAVENOUS ONCE
Status: COMPLETED | OUTPATIENT
Start: 2019-07-03 | End: 2019-07-03

## 2019-07-03 RX ORDER — ACETAMINOPHEN 325 MG/1
650 TABLET ORAL ONCE
Status: DISCONTINUED | OUTPATIENT
Start: 2019-07-03 | End: 2019-07-03

## 2019-07-03 RX ADMIN — CEFTRIAXONE 1000 MG: 1 INJECTION, SOLUTION INTRAVENOUS at 22:50

## 2019-07-04 PROBLEM — N30.00 ACUTE CYSTITIS: Status: ACTIVE | Noted: 2019-07-04

## 2019-07-04 PROBLEM — I16.0 HYPERTENSIVE URGENCY: Status: ACTIVE | Noted: 2018-10-19

## 2019-07-04 PROBLEM — G93.41 ACUTE METABOLIC ENCEPHALOPATHY: Status: ACTIVE | Noted: 2019-07-04

## 2019-07-04 PROBLEM — N30.01 ACUTE CYSTITIS WITH HEMATURIA: Status: ACTIVE | Noted: 2019-07-04

## 2019-07-04 PROBLEM — R91.8 LUNG MASS: Status: ACTIVE | Noted: 2019-07-04

## 2019-07-04 LAB
ANION GAP SERPL CALCULATED.3IONS-SCNC: 5 MMOL/L (ref 4–13)
BUN SERPL-MCNC: 10 MG/DL (ref 5–25)
CALCIUM SERPL-MCNC: 9.7 MG/DL (ref 8.3–10.1)
CHLORIDE SERPL-SCNC: 100 MMOL/L (ref 100–108)
CO2 SERPL-SCNC: 30 MMOL/L (ref 21–32)
CREAT SERPL-MCNC: 0.93 MG/DL (ref 0.6–1.3)
ERYTHROCYTE [DISTWIDTH] IN BLOOD BY AUTOMATED COUNT: 13.8 % (ref 11.6–15.1)
GFR SERPL CREATININE-BSD FRML MDRD: 55 ML/MIN/1.73SQ M
GLUCOSE SERPL-MCNC: 106 MG/DL (ref 65–140)
HCT VFR BLD AUTO: 43.1 % (ref 34.8–46.1)
HGB BLD-MCNC: 13.6 G/DL (ref 11.5–15.4)
MAGNESIUM SERPL-MCNC: 2 MG/DL (ref 1.6–2.6)
MCH RBC QN AUTO: 29.1 PG (ref 26.8–34.3)
MCHC RBC AUTO-ENTMCNC: 31.6 G/DL (ref 31.4–37.4)
MCV RBC AUTO: 92 FL (ref 82–98)
PHOSPHATE SERPL-MCNC: 4 MG/DL (ref 2.3–4.1)
PLATELET # BLD AUTO: 304 THOUSANDS/UL (ref 149–390)
PMV BLD AUTO: 9.4 FL (ref 8.9–12.7)
POTASSIUM SERPL-SCNC: 4.2 MMOL/L (ref 3.5–5.3)
PROCALCITONIN SERPL-MCNC: <0.05 NG/ML
RBC # BLD AUTO: 4.68 MILLION/UL (ref 3.81–5.12)
SODIUM SERPL-SCNC: 135 MMOL/L (ref 136–145)
WBC # BLD AUTO: 5.92 THOUSAND/UL (ref 4.31–10.16)

## 2019-07-04 PROCEDURE — 99285 EMERGENCY DEPT VISIT HI MDM: CPT | Performed by: EMERGENCY MEDICINE

## 2019-07-04 PROCEDURE — G8988 SELF CARE GOAL STATUS: HCPCS | Performed by: DEVELOPMENTAL THERAPIST

## 2019-07-04 PROCEDURE — 84100 ASSAY OF PHOSPHORUS: CPT | Performed by: NURSE PRACTITIONER

## 2019-07-04 PROCEDURE — 83735 ASSAY OF MAGNESIUM: CPT | Performed by: NURSE PRACTITIONER

## 2019-07-04 PROCEDURE — 80048 BASIC METABOLIC PNL TOTAL CA: CPT | Performed by: NURSE PRACTITIONER

## 2019-07-04 PROCEDURE — G8987 SELF CARE CURRENT STATUS: HCPCS | Performed by: DEVELOPMENTAL THERAPIST

## 2019-07-04 PROCEDURE — 97162 PT EVAL MOD COMPLEX 30 MIN: CPT | Performed by: PHYSICAL THERAPIST

## 2019-07-04 PROCEDURE — 99223 1ST HOSP IP/OBS HIGH 75: CPT | Performed by: INTERNAL MEDICINE

## 2019-07-04 PROCEDURE — 84145 PROCALCITONIN (PCT): CPT | Performed by: NURSE PRACTITIONER

## 2019-07-04 PROCEDURE — 97166 OT EVAL MOD COMPLEX 45 MIN: CPT | Performed by: DEVELOPMENTAL THERAPIST

## 2019-07-04 PROCEDURE — G8979 MOBILITY GOAL STATUS: HCPCS | Performed by: PHYSICAL THERAPIST

## 2019-07-04 PROCEDURE — 85027 COMPLETE CBC AUTOMATED: CPT | Performed by: NURSE PRACTITIONER

## 2019-07-04 PROCEDURE — G8978 MOBILITY CURRENT STATUS: HCPCS | Performed by: PHYSICAL THERAPIST

## 2019-07-04 PROCEDURE — 97535 SELF CARE MNGMENT TRAINING: CPT | Performed by: DEVELOPMENTAL THERAPIST

## 2019-07-04 RX ORDER — LABETALOL 20 MG/4 ML (5 MG/ML) INTRAVENOUS SYRINGE
10 EVERY 4 HOURS PRN
Status: DISCONTINUED | OUTPATIENT
Start: 2019-07-04 | End: 2019-07-05 | Stop reason: HOSPADM

## 2019-07-04 RX ORDER — LISINOPRIL 10 MG/1
10 TABLET ORAL DAILY
Status: DISCONTINUED | OUTPATIENT
Start: 2019-07-04 | End: 2019-07-05

## 2019-07-04 RX ORDER — ASPIRIN 81 MG/1
81 TABLET ORAL DAILY
Status: DISCONTINUED | OUTPATIENT
Start: 2019-07-04 | End: 2019-07-05 | Stop reason: HOSPADM

## 2019-07-04 RX ORDER — AMLODIPINE BESYLATE 5 MG/1
5 TABLET ORAL 2 TIMES DAILY
Status: DISCONTINUED | OUTPATIENT
Start: 2019-07-04 | End: 2019-07-05 | Stop reason: HOSPADM

## 2019-07-04 RX ORDER — LOPERAMIDE HYDROCHLORIDE 2 MG/1
2 CAPSULE ORAL 4 TIMES DAILY PRN
COMMUNITY
End: 2019-07-05 | Stop reason: HOSPADM

## 2019-07-04 RX ORDER — SODIUM CHLORIDE 9 MG/ML
50 INJECTION, SOLUTION INTRAVENOUS CONTINUOUS
Status: DISCONTINUED | OUTPATIENT
Start: 2019-07-04 | End: 2019-07-04

## 2019-07-04 RX ORDER — AMLODIPINE BESYLATE 5 MG/1
5 TABLET ORAL DAILY
Status: DISCONTINUED | OUTPATIENT
Start: 2019-07-04 | End: 2019-07-04

## 2019-07-04 RX ORDER — PANTOPRAZOLE SODIUM 40 MG/1
40 TABLET, DELAYED RELEASE ORAL
Status: DISCONTINUED | OUTPATIENT
Start: 2019-07-04 | End: 2019-07-05 | Stop reason: HOSPADM

## 2019-07-04 RX ORDER — ACETAMINOPHEN 325 MG/1
650 TABLET ORAL EVERY 6 HOURS PRN
Status: DISCONTINUED | OUTPATIENT
Start: 2019-07-04 | End: 2019-07-05 | Stop reason: HOSPADM

## 2019-07-04 RX ORDER — METOPROLOL TARTRATE 50 MG/1
50 TABLET, FILM COATED ORAL 3 TIMES DAILY
Status: DISCONTINUED | OUTPATIENT
Start: 2019-07-04 | End: 2019-07-05 | Stop reason: HOSPADM

## 2019-07-04 RX ORDER — DIGOXIN 125 MCG
125 TABLET ORAL DAILY
Status: DISCONTINUED | OUTPATIENT
Start: 2019-07-04 | End: 2019-07-05 | Stop reason: HOSPADM

## 2019-07-04 RX ORDER — CEFTRIAXONE 1 G/50ML
1000 INJECTION, SOLUTION INTRAVENOUS EVERY 24 HOURS
Status: DISCONTINUED | OUTPATIENT
Start: 2019-07-04 | End: 2019-07-05 | Stop reason: HOSPADM

## 2019-07-04 RX ORDER — GUAIFENESIN 100 MG/5ML
200 SYRUP ORAL 3 TIMES DAILY PRN
COMMUNITY
End: 2019-07-05 | Stop reason: HOSPADM

## 2019-07-04 RX ADMIN — LISINOPRIL 10 MG: 10 TABLET ORAL at 08:27

## 2019-07-04 RX ADMIN — METOPROLOL TARTRATE 50 MG: 50 TABLET, FILM COATED ORAL at 08:27

## 2019-07-04 RX ADMIN — AMLODIPINE BESYLATE 5 MG: 5 TABLET ORAL at 17:30

## 2019-07-04 RX ADMIN — METOPROLOL TARTRATE 50 MG: 50 TABLET, FILM COATED ORAL at 17:30

## 2019-07-04 RX ADMIN — DIGOXIN 125 MCG: 125 TABLET ORAL at 08:27

## 2019-07-04 RX ADMIN — METOPROLOL TARTRATE 50 MG: 50 TABLET, FILM COATED ORAL at 21:13

## 2019-07-04 RX ADMIN — CEFTRIAXONE 1000 MG: 1 INJECTION, SOLUTION INTRAVENOUS at 21:13

## 2019-07-04 RX ADMIN — SODIUM CHLORIDE 50 ML/HR: 0.9 INJECTION, SOLUTION INTRAVENOUS at 01:46

## 2019-07-04 RX ADMIN — ASPIRIN 81 MG: 81 TABLET, COATED ORAL at 08:27

## 2019-07-04 RX ADMIN — PANTOPRAZOLE SODIUM 40 MG: 40 TABLET, DELAYED RELEASE ORAL at 06:16

## 2019-07-04 RX ADMIN — LABETALOL 20 MG/4 ML (5 MG/ML) INTRAVENOUS SYRINGE 10 MG: at 09:35

## 2019-07-04 RX ADMIN — ENOXAPARIN SODIUM 40 MG: 40 INJECTION SUBCUTANEOUS at 08:28

## 2019-07-04 RX ADMIN — METOPROLOL TARTRATE 50 MG: 50 TABLET, FILM COATED ORAL at 02:15

## 2019-07-04 RX ADMIN — AMLODIPINE BESYLATE 5 MG: 5 TABLET ORAL at 08:27

## 2019-07-04 NOTE — ASSESSMENT & PLAN NOTE
· Add amlodipine 5 mg PO BID  · Continue lisinopril 10 mg PO Qdaily  · Continue metoprolol 50 mg PO TID  · PRN IV labetalol  · Follow the blood pressure trend

## 2019-07-04 NOTE — ASSESSMENT & PLAN NOTE
· Likely hypovolemic hyponatremia  · Improved with NSS IV fluids  · Discontinue the IV fluids with the hypertensive urgency  · Follow the sodium level

## 2019-07-04 NOTE — PROGRESS NOTES
Progress Note - Dorinda Le 1931, 80 y o  female MRN: [id number]    Unit/Bed#: 423-01 Encounter: [id number]    Primary Care Provider: Bc Mendoza DO   Date and time admitted to hospital: 7/3/2019  9:34 PM        * Acute metabolic encephalopathy  Assessment & Plan  · Likely secondary to acute cystitis  · Continue IV ceftriaxone  · PT/OT    Acute cystitis with hematuria  Assessment & Plan  · Continue IV ceftriaxone  · Await the urine culture result    Hypertensive urgency  Assessment & Plan  · Add amlodipine 5 mg PO BID  · Continue lisinopril 10 mg PO Qdaily  · Continue metoprolol 50 mg PO TID  · PRN IV labetalol  · Follow the blood pressure trend    Hyponatremia  Assessment & Plan  · Likely hypovolemic hyponatremia  · Improved with NSS IV fluids  · Discontinue the IV fluids with the hypertensive urgency  · Follow the sodium level      VTE Pharmacologic Prophylaxis:   Pharmacologic: Enoxaparin (Lovenox)  Mechanical VTE Prophylaxis in Place: Yes    Patient Centered Rounds: I have performed bedside rounds with nursing staff today  Time Spent for Care: 20 minutes  More than 50% of total time spent on counseling and coordination of care as described above  Current Length of Stay: 0 day(s)    Current Patient Status: Inpatient   Certification Statement: The patient will continue to require additional inpatient hospital stay due to the need for IV antibiotics and the need for monitoring of her blood pressure trend  Code Status: Level 3 - DNAR and DNI      Subjective: The patient was seen and examined  The patient is doing better  Her confusion has improved  No chest pain  No shortness of breath  No abdominal pain  No nausea or vomiting        Objective:     Vitals:   Temp (24hrs), Av 4 °F (36 9 °C), Min:97 8 °F (36 6 °C), Max:98 7 °F (37 1 °C)    Temp:  [97 8 °F (36 6 °C)-98 7 °F (37 1 °C)] 97 8 °F (36 6 °C)  HR:  [70-82] 78  Resp:  [18-20] 18  BP: (150-234)/() 150/78  SpO2:  [91 %-95 %] 95 %  Body mass index is 21 49 kg/m²  Input and Output Summary (last 24 hours): Intake/Output Summary (Last 24 hours) at 7/4/2019 1155  Last data filed at 7/4/2019 1022  Gross per 24 hour   Intake 660 ml   Output 500 ml   Net 160 ml       Physical Exam:     Physical Exam  General:  NAD, awake, alert, follows commands  HEENT:  NC/AT, mucous membranes moist  Neck:  Supple, No JVP elevation  CV:  + S1, + S2, RRR  Pulm:  Lung fields are CTA bilaterally  Abd:  Soft, Non-tender, Non-distended  Ext:  No clubbing/cyanosis/edema  Skin:  No rashes      Additional Data:    Labs:    Results from last 7 days   Lab Units 07/04/19  0603 07/03/19  2159   WBC Thousand/uL 5 92 7 56   HEMOGLOBIN g/dL 13 6 13 9   HEMATOCRIT % 43 1 43 1   PLATELETS Thousands/uL 304 289   NEUTROS PCT %  --  76*   LYMPHS PCT %  --  13*   MONOS PCT %  --  8   EOS PCT %  --  2     Results from last 7 days   Lab Units 07/04/19  0603 07/03/19  2159   SODIUM mmol/L 135* 130*   POTASSIUM mmol/L 4 2 4 2   CHLORIDE mmol/L 100 95*   CO2 mmol/L 30 29   BUN mg/dL 10 12   CREATININE mg/dL 0 93 0 98   ANION GAP mmol/L 5 6   CALCIUM mg/dL 9 7 9 5   ALBUMIN g/dL  --  3 2*   TOTAL BILIRUBIN mg/dL  --  0 30   ALK PHOS U/L  --  83   ALT U/L  --  23   AST U/L  --  16   GLUCOSE RANDOM mg/dL 106 152*     Results from last 7 days   Lab Units 07/03/19  2159   INR  0 97     Results from last 7 days   Lab Units 07/03/19  2144   POC GLUCOSE mg/dl 143*                   * I Have Reviewed All Lab Data Listed Above  * Additional Pertinent Lab Tests Reviewed:  [address] Admission Reviewed        Recent Cultures (last 7 days):           Last 24 Hours Medication List:     Current Facility-Administered Medications:  acetaminophen 650 mg Oral Q6H PRN [employer], DO   amLODIPine 5 mg Oral BID [employer], DO   aspirin 81 mg Oral Daily Audrey Y Swank, CRNP   cefTRIAXone 1,000 mg Intravenous Q24H Audrey Y Swank, CRNP   digoxin 125 mcg Oral Daily Audrey Y Jordanaank, CRNP   enoxaparin 40 mg Subcutaneous Daily Audrey Y Jordanaank, CRCHARLENE   Labetalol HCl 10 mg Intravenous Q4H PRN Rolanda Ba DO   lisinopril 10 mg Oral Daily Audrey Y Jordanaank, TAWANA   metoprolol tartrate 50 mg Oral TID Rolanda Ba DO   pantoprazole 40 mg Oral [employer] TAWANA Anand        Today, Patient Was Seen By: Rolanda Ba DO    ** Please Note: Dictation voice to text software may have been used in the creation of this document   **

## 2019-07-04 NOTE — ASSESSMENT & PLAN NOTE
UA very suspicious of UTI  Rocephin initiated emergency room-continue  Admit to med surge  Monitor per protocol  Provide gentle fluids  Provide supportive care

## 2019-07-04 NOTE — PHYSICAL THERAPY NOTE
PHYSICAL THERAPY NOTE  Patient Name: Claudeen Bibles  CKURM'M Date: 7/4/2019 07/04/19 1000   Note Type   Note type Eval/Treat   Pain Assessment   Pain Assessment 0-10   Pain Score No Pain   Home Living   Type of Home Assisted living  [facility])   Home Layout One level   Home Equipment Cane  ([place])   Prior Function   Level of [place] Independent with ADLs and functional mobility; Needs assistance with IADLs   Lives With Facility staff   Receives Help From Personal care attendant   ADL Assistance Independent   IADLs Needs assistance   Falls in the last 6 months 0  (Denies Falls)   Restrictions/Precautions   Other Precautions Telemetry; Fall Risk   General   Family/Caregiver Present No   Cognition   Overall Cognitive Status WFL   Arousal/Participation Alert   Orientation Level Oriented to person;Oriented to place;Oriented to situation   RLE Assessment   RLE Assessment WFL   LLE Assessment   LLE Assessment WFL   Coordination   Movements are Fluid and Coordinated 1   Transfers   Sit to Stand 4  Minimal assistance   Additional items Bedrails   Stand to Sit 4  Minimal assistance   Additional items Bedrails   Ambulation/Elevation   Gait pattern Forward Flexion; Wide GENARO   Gait Assistance 4  Minimal assist   Assistive Device Rolling walker   Distance 150 ft   Balance   Static Sitting Good   Dynamic Sitting Good   Static Standing Fair +   Dynamic Standing Fair   Ambulatory Fair   Endurance Deficit   Endurance Deficit Yes   Endurance Deficit Description LE muscle fatigue   Activity Tolerance   Activity Tolerance Patient limited by fatigue   Assessment   Prognosis Good   Problem List Decreased strength; Impaired balance;Decreased endurance;Decreased mobility   Assessment Pt is 80 y o  female seen for PT evaluation on 7/4/19 s/p [facility] w/ acute metabolic encephalopathy, UTI, left thigh pain   PT consulted to assess pt's functional mobility and d/c needs  Order placed for PT eval and tx  Performed at least 2 patient identifiers during session: Name and wristband  Comorbidities affecting pt's physical performance at time of assessment include: LE weakness, UTI, osteoporosis, arthritis, anxiety, h/o Lyme Disease  LOF prior to admission was independent with [facility], independent with ADLs  Personal factors affecting pt at time of IE include: decreased endurance, fatigue from being admitted overnight without much sleep, weakness  Please find objective findings from PT assessment regarding body systems outlined above with impairments and limitations including weakness, impaired balance, decreased endurance, pain, decreased activity tolerance and fall risk, as well as mobility assessment  Pt's clinical presentation is currently unstable/unpredictable seen in pt's presentation of ongoing medical assessment  Given co-morbidities and presented presentation, high Level eval was completed  Pt to benefit from continued PT tx to address deficits as defined above and maximize level of functional independent mobility and consistency  From PT/mobility standpoint, recommendation at time of d/c would be continued PT upon return to [facility] in order to promote return to PLOF and independence  Goals   Patient Goals To get moving again, I don't want to lose my independence"   STG Expiration Date 07/11/19   Short Term Goal #1 Bed mobility mod I  (Transfers mod I with bedrail)   Short Term Goal #2 Gait mod I with least restrictive AD for at least 200 feet for ambulation to DR at [facility]   Plan   Treatment/Interventions Functional transfer training;Elevations; Therapeutic exercise; Endurance training;Gait training   PT Frequency   (3-5 times per week)   Recommendation   Recommendation Other (Comment)  (Ongoing PT upon return to [facility])   Equipment Recommended   (Consider RW vs SPC)   PT - OK to Discharge Yes  (When medically stable)     Patient OOB to recliner chair upon return to room  Lines intact, needs in reach  Patient SCDs not replaced, nsg aware, patient declined reapplication  OK to discharge to [facility] from PT standpoint

## 2019-07-04 NOTE — ASSESSMENT & PLAN NOTE
Blood pressure currently elevated  Patient missed evening dose of metoprolol 50 mg-will give now  Continue prior to admission medication  Admit to med surge  Monitor per protocol

## 2019-07-04 NOTE — ASSESSMENT & PLAN NOTE
Acute metabolic encephalopathy most likely secondary to UTI  Please see treatment plan for UTI  Provide supportive care    Ct Head Without Contrast-7/3/2019-"Impression: No acute intracranial abnormality "

## 2019-07-04 NOTE — ASSESSMENT & PLAN NOTE
Current sodium is 130-chart review indicates patient's sodium levels last couple visits was 130-135 with over the last year  Trend BMP  Continue with gentle fluids

## 2019-07-04 NOTE — OCCUPATIONAL THERAPY NOTE
[address]  Evaluation     Patient Name: Luke MALONEY Date: 7/4/2019  Problem List  Patient Active Problem List   Diagnosis    Gomez's esophagus    Bilateral carotid artery disease ([address] )    Breast mass, left    Chronic constipation    Colon, diverticulosis    Esophageal reflux    Generalized anxiety disorder    Generalized osteoarthritis of multiple sites    Glucose intolerance (impaired glucose tolerance)    Hypercholesterolemia    Neuralgia    Osteoporosis    SVT (supraventricular tachycardia) (HCC)    Vitamin D deficiency    HCAP (healthcare-associated pneumonia)    Acute febrile illness    Cough    Mass of urinary bladder determined by ultrasound    Esophageal mass    Hyponatremia    Right lower lobe lung mass    History of frequent urinary tract infections    Paroxysmal atrial fibrillation (HCC)    Sinus bradycardia    Debilitated    Eczema    Hypertensive urgency    Acute metabolic encephalopathy    Acute cystitis with hematuria    Lung mass    Acute encephalopathy     Past Medical History  Past Medical History:   Diagnosis Date    Abdominal distension     Last Assessed: [id number]    Abnormal weight loss     Last Assessed: [id number]    Arthritis     Bronchitis     Bursitis of left hip     Lst Assessed: [id number]    Chest pain     Last Assessed: [id number]    Colon polyp     Dysuria     Last Assessed: [id number]    External hemorrhoids     Last Assessed: [id number]    Generalized anxiety disorder     GERD (gastroesophageal reflux disease)     Hyperlipidemia     Hypertension     Hypomagnesemia     Last Assessed: [id number]    Lyme disease     Last Assessed: [id number]    Osteoporosis     Pneumonia of right middle lobe due to infectious organism [facility])     Last Assessed: [id number]    Urinary tract infection      Past Surgical History  Past Surgical History:   Procedure Laterality Date    CATARACT EXTRACTION      HEMORRHOID SURGERY      HYSTERECTOMY      PELVIC FLOOR REPAIR             07/04/19 1031   Note Type   Note type Eval/Treat   Restrictions/Precautions   Other Precautions Chair Alarm; Bed Alarm;Telemetry; Fall Risk   Pain Assessment   Pain Assessment No/denies pain   Pain Score No Pain   Home Living   Type of Home Assisted living  ([place])   Home Layout One level   [address]   Prior Function   Level of [place] Independent with ADLs and functional mobility   Lives With Facility staff   Receives Help From Personal care attendant   ADL Assistance Independent   IADLs Needs assistance   Falls in the last 6 months 0   Comments Ambulates with SPC at baseline  Per pt  she completes her own ADLs    Lifestyle   Autonomy Pt  I with ADLs and uses SPC for functional mobility    Reciprocal Relationships Pt  resides at [facility]    ADL   Grooming Assistance 5  Supervision/Setup   UB Bathing Assistance 5  Supervision/Setup   LB Bathing Assistance 5  Supervision/Setup   UB Dressing Assistance 5  Supervision/Setup   LB Dressing Assistance 5  Supervision/Setup   Toileting Assistance  5  Supervision/Setup   Bed Mobility   Supine to Sit 4  Minimal assistance   Sit to Supine   (Pt  OOB in bedside chair at end of session)   Transfers   Sit to Stand 4  Minimal assistance   Stand to Sit 4  Minimal assistance   Toilet transfer 4  Minimal assistance   Functional Mobility   Functional Mobility 4  Minimal assistance  (hand held assistance from bed to bathroom )   RUE Assessment   RUE Assessment WFL   LUE Assessment   LUE Assessment WFL   Hand Function   Gross Motor Coordination Functional   Fine Motor Coordination Functional   Cognition   Arousal/Participation Alert; Cooperative   Attention Within functional limits   Orientation Level Oriented to person;Oriented to place;Oriented to situation   Assessment   Limitation Decreased ADL status; Decreased UE strength;Decreased endurance;Decreased self-care trans   Prognosis Good Assessment Pt is a 80 y o  female seen for OT evaluation s/p admit to [facility] on 0/8[facility]6646 w/ Acute metabolic encephalopathy  Comorbidities affecting pt's functional performance at time of assessment include: See above  Personal factors affecting pt at time of IE include:difficulty performing ADLS  Prior to admission, pt was I with functional mobility using SPC and completed her ADLs independently  Upon evaluation: Pt requires Supervision for ADLs and min a for transfers and functional mobility 2* the following deficits impacting occupational performance: weakness, decreased strength, decreased balance and decreased tolerance  Pt to benefit from continued skilled OT tx while in the hospital to address deficits as defined above and maximize level of functional independence w ADL's and functional mobility  Occupational Performance areas to address include: eating, grooming, bathing/shower, toilet hygiene, dressing, functional mobility and clothing management  From OT standpoint, recommendation at time of d/c would be to return to [facility] when medically stable  Goals   LTG Time Frame 10-14   Long Term Goal #1 Pt  to complete ADL transfers at mod i level to resume PLOF    Long Term Goal #2 Pt  to perform ADL tasks at I level to resume PLOF   Plan   Treatment Interventions ADL retraining;Functional transfer training;UE strengthening/ROM; Endurance training;Patient/family training; Activityengagement   Goal Expiration Date 07/18/19   Treatment Day 1   OT Frequency 3-5x/wk   Additional Treatment Session   Start Time 1016   End Time 1030   Recommendation   OT Discharge Recommendation   (Return to [place])   Barthel Index   Feeding 10   Bathing 5   Grooming Score 5   Dressing Score 5   Bladder Score 10   Bowels Score 10   Toilet Use Score 5   Transfers (Bed/Chair) Score 10   Mobility (Level Surface) Score 10   Stairs Score 5   Barthel Index Score 75     Pt   OOB in bedside chair at end of session with needs in reach, chair alarm on, scds on  Lines intact at this time       Elsa Lamb, OT

## 2019-07-04 NOTE — PLAN OF CARE
Problem: Potential for Falls  Goal: Patient will remain free of falls  Description  INTERVENTIONS:  - Assess patient frequently for physical needs  -  Identify cognitive and physical deficits and behaviors that affect risk of falls    -  [place] fall precautions as indicated by assessment   - Educate patient/family on patient safety including physical limitations  - Instruct patient to call for assistance with activity based on assessment  - Modify environment to reduce risk of injury  - Consider OT/PT consult to assist with strengthening/mobility  Outcome: Progressing     Problem: GENITOURINARY - ADULT  Goal: Maintains or returns to baseline urinary function  Description  INTERVENTIONS:  - Assess urinary function  - Encourage oral fluids to ensure adequate hydration  - Administer IV fluids as ordered to ensure adequate hydration  - Administer ordered medications as needed  - Offer frequent toileting  - Follow urinary retention protocol if ordered  Outcome: Progressing     Problem: PAIN - ADULT  Goal: Verbalizes/displays adequate comfort level or baseline comfort level  Description  Interventions:  - Encourage patient to monitor pain and request assistance  - Assess pain using appropriate pain scale  - Administer analgesics based on type and severity of pain and evaluate response  - Implement non-pharmacological measures as appropriate and evaluate response  - Consider cultural and social influences on pain and pain management  - Notify physician/advanced practitioner if interventions unsuccessful or patient reports new pain  Outcome: Progressing     Problem: INFECTION - ADULT  Goal: Absence or prevention of progression during hospitalization  Description  INTERVENTIONS:  - Assess and monitor for signs and symptoms of infection  - Monitor lab/diagnostic results  - Monitor all insertion sites, i e  indwelling lines, tubes, and drains  - Childs appropriate cooling/warming therapies per order  - Administer medications as ordered  - Instruct and encourage patient and family to use good hand hygiene technique  - Identify and instruct in appropriate isolation precautions for identified infection/condition   Outcome: Progressing     Problem: SAFETY ADULT  Goal: Maintain or return to baseline ADL function  Description  INTERVENTIONS:  -  Assess patient's ability to carry out ADLs; assess patient's baseline for ADL function and identify physical deficits which impact ability to perform ADLs (bathing, care of mouth/teeth, toileting, grooming, dressing, etc )  - Assess/evaluate cause of self-care deficits   - Assess range of motion  - Assess patient's mobility; develop plan if impaired  - Assess patient's need for assistive devices and provide as appropriate  - Encourage maximum independence but intervene and supervise when necessary  ¯ Involve family in performance of ADLs  ¯ Assess for home care needs following discharge   ¯ Request OT consult to assist with ADL evaluation and planning for discharge  ¯ Provide patient education as appropriate  Outcome: Progressing  Goal: Maintain or return mobility status to optimal level  Description  INTERVENTIONS:  - Assess patient's baseline mobility status (ambulation, transfers, stairs, etc )    - Identify cognitive and physical deficits and behaviors that affect mobility  - Identify mobility aids required to assist with transfers and/or ambulation (gait belt, sit-to-stand, lift, walker, cane, etc )  - [place] fall precautions as indicated by assessment  - Record patient progress and toleration of activity level on Mobility SBAR; progress patient to next Phase/Stage  - Instruct patient to call for assistance with activity based on assessment  - Request Rehabilitation consult to assist with strengthening/weightbearing, etc   Outcome: Progressing     Problem: DISCHARGE PLANNING  Goal: Discharge to home or other facility with appropriate resources  Description  INTERVENTIONS:  - Identify barriers to discharge w/patient and caregiver  - Arrange for needed discharge resources and transportation as appropriate  - Identify discharge learning needs (meds, wound care, etc )  - Arrange for interpretive services to assist at discharge as needed  - Refer to Case Management Department for coordinating discharge planning if the patient needs post-hospital services based on physician/advanced practitioner order or complex needs related to functional status, cognitive ability, or social support system  Outcome: Progressing     Problem: Knowledge Deficit  Goal: Patient/family/caregiver demonstrates understanding of disease process, treatment plan, medications, and discharge instructions  Description  Complete learning assessment and assess knowledge base    Interventions:  - Provide teaching at level of understanding  - Provide teaching via preferred learning methods  Outcome: Progressing

## 2019-07-04 NOTE — PLAN OF CARE
Problem: OCCUPATIONAL THERAPY ADULT  Goal: Performs self-care activities at highest level of function for planned discharge setting  See evaluation for individualized goals  Description  Outcome: Progressing  Note:   Limitation: Decreased ADL status, Decreased UE strength, Decreased endurance, Decreased self-care trans  Prognosis: Good  Assessment: Pt is a 80 y o  female seen for OT evaluation s/p admit to [facility] on 1/3/8416 w/ Acute metabolic encephalopathy  Comorbidities affecting pt's functional performance at time of assessment include: See above  Personal factors affecting pt at time of IE include:difficulty performing ADLS  Prior to admission, pt was I with functional mobility using SPC and completed her ADLs independently  Upon evaluation: Pt requires Supervision for ADLs and min a for transfers and functional mobility 2* the following deficits impacting occupational performance: weakness, decreased strength, decreased balance and decreased tolerance  Pt to benefit from continued skilled OT tx while in the hospital to address deficits as defined above and maximize level of functional independence w ADL's and functional mobility  Occupational Performance areas to address include: eating, grooming, bathing/shower, toilet hygiene, dressing, functional mobility and clothing management  From OT standpoint, recommendation at time of d/c would be to return to [facility] when medically stable         OT Discharge Recommendation: (Return to [facility])

## 2019-07-04 NOTE — H&P
H&P- Giorgi Morton 9/2/1931, 80 y o  female MRN: [id number]    Unit/Bed#: [facility] Encounter: [id number]    Primary Care Provider: Boogie Interiano DO   Date and time admitted to hospital: 7/3/2019  9:34 PM        Acute cystitis  Assessment & Plan  UA very suspicious of UTI  Rocephin initiated emergency room-continue  Admit to med surge  Monitor per protocol  Provide gentle fluids  Provide supportive care    * Acute metabolic encephalopathy  Assessment & Plan  Acute metabolic encephalopathy most likely secondary to UTI  Please see treatment plan for UTI  Provide supportive care    Ct Head Without Contrast-7/3/2019-"Impression: No acute intracranial abnormality "      Hypertension  Assessment & Plan  Blood pressure currently elevated  Patient missed evening dose of metoprolol 50 mg-will give now  Continue prior to admission medication  Admit to med surge  Monitor per protocol    Hyponatremia  Assessment & Plan  Current sodium is 130-chart review indicates patient's sodium levels last couple visits was 130-135 with over the last year  Trend BMP  Continue with gentle fluids          VTE Prophylaxis: Enoxaparin (Lovenox)  / sequential compression device   Code Status: DNR/ DNI  POLST: POLST is not applicable to this patient    Anticipated Length of Stay:  Patient will be admitted on an Inpatient basis with an anticipated length of stay of  Greater than 2 midnights  Justification for Hospital Stay:  Acute metabolic encephalopathy and UTI    Total Time for Visit, including Counseling / Coordination of Care: 1 hour  Greater than 50% of this total time spent on direct patient counseling and coordination of care  Chief Complaint:  Resident Topher Mack in emergency room by ml Cancino for altered mental status and left eye pain    History of Present Illness:    Giorgi Morton is a 80 y o  female who presented to emergency room for evaluation of altered mental status and left thigh pain    Chart review indicates patient was brought in by ALS from [facility] nursing facility  Patient has a past medical history including  Osteoporosis, Lyme disease, hypomagnesemia, hypertension, hyperlipidemia, GERD, generalized anxiety disorder, bronchitis, arthritis  Due to the acute condition states unable to perform in review of systems  Images and labs were collected emergency room-see below  Patient was admitted by emergency room doctor for acute metabolic encephalopathy secondary to UTI  Review of Systems:    Review of Systems   Unable to perform ROS: Acuity of condition       Past Medical and Surgical History:     Past Medical History:   Diagnosis Date    Abdominal distension     Last Assessed: [id number]    Abnormal weight loss     Last Assessed: [id number]    Arthritis     Bronchitis     Bursitis of left hip     Lst Assessed: [id number]    Chest pain     Last Assessed: [id number]    Colon polyp     Dysuria     Last Assessed: [id number]    External hemorrhoids     Last Assessed: [id number]    Generalized anxiety disorder     GERD (gastroesophageal reflux disease)     Hyperlipidemia     Hypertension     Hypomagnesemia     Last Assessed: [id number]    Lyme disease     Last Assessed: [id number]    Osteoporosis     Pneumonia of right middle lobe due to infectious organism [facility])     Last Assessed: [id number]    Urinary tract infection        Past Surgical History:   Procedure Laterality Date    CATARACT EXTRACTION      HEMORRHOID SURGERY      HYSTERECTOMY      PELVIC FLOOR REPAIR         Meds/Allergies:    Prior to Admission medications    Medication Sig Start Date End Date Taking?  Authorizing Provider   acetaminophen (TYLENOL) 325 mg tablet Take 650 mg by mouth every 4 (four) hours as needed for mild pain    Historical Provider, MD   aspirin (ADULT ASPIRIN EC LOW STRENGTH) 81 mg EC tablet Take 1 tablet (81 mg total) by mouth daily 4/30/18   Nadiya Redmanix, DO   calcium carbonate (OYSTER SHELL,OSCAL) 500 mg TAKE ONE TABLET BY MOUTH TWICE DAILY (SUPPLEMENT) 12/24/18   [facility],    Cholecalciferol (VITAMIN D-3) 1000 units CAPS Take 1 capsule (1,000 Units total) by mouth daily 4/30/18   Kiesha Garcia DO   clonazePAM (KlonoPIN) 1 mg tablet Take one and one-half tablet(1 5mg) by mouth daily at bedtime 1/15/19   Kiesha Garcia,    digoxin (LANOXIN) 0 125 mg tablet TAKE ONE TABLET BY MOUTH ONCE DAILY (AFIB) 6/17/19   [facility],    ibuprofen (MOTRIN) 200 mg tablet Take by mouth every 6 (six) hours as needed for mild pain    Historical Provider, MD   lisinopril (ZESTRIL) 10 mg tablet Take 1 tablet (10 mg total) by mouth daily 4/30/18   [facility],    magnesium hydroxide (MILK OF MAGNESIA) 800 MG/5ML suspension Take 30 mL by mouth daily as needed for constipation    Historical Provider, MD   Melatonin 5 MG CAPS TAKE ONE CAPSULE BY MOUTH AT BEDTIME (INSOMNIA) 6/10/19   Kiesha Garcia DO   Melatonin 5 MG TABS Take 1 tablet (5 mg total) by mouth daily at bedtime 4/30/18   [facility] DO   metoprolol tartrate (LOPRESSOR) 50 mg tablet TAKE ONE TABLET BY MOUTH 3 TIMES A DAY (HTN) 6/30/19   [facility],    Omega-3 Fatty Acids (FISH OIL) 1,000 mg Take 1,000 mg by mouth daily    Historical Provider, MD   pantoprazole (PROTONIX) 40 mg tablet One Tab PO Q11 AM 8/17/18   Kiesha Garcia,    polyethylene glycol [facility]) powder Take 17 g by mouth daily 5/22/18   Kiesha Garcia,    ranitidine (ZANTAC) 150 mg tablet TAKE ONE TABLET BY MOUTH AT BEDTIME (GERD) 4/1/19   Kiesha Garcia, DO   senna (SENOKOT) 8 6 mg TAKE ONE TABLET BY MOUTH ONCE DAILY (CONSTIPATION) 11/26/18   Kiesha Garcia,      I have reveiwed home medications using records provided by [facility]  Allergies:    Allergies   Allergen Reactions    Codeine GI Intolerance    Epinephrine Other (See Comments)     Increased Heart Rate, Palpitations    Iodinated Diagnostic Agents     Magnesium Citrate GI Intolerance       Social History:     Marital Status:    Occupation:  Retired  Patient Pre-hospital Living Situation:  SNF  Patient Pre-hospital Level of Mobility:  limited  Patient Pre-hospital Diet Restrictions:  Cardiac  Substance Use History:   Social History     Substance and Sexual Activity   Alcohol Use No     Social History     Tobacco Use   Smoking Status Never Smoker   Smokeless Tobacco Never Used     Social History     Substance and Sexual Activity   Drug Use No       Family History:    Family History   Adopted: Yes       Physical Exam:     Vitals:   Blood Pressure: (!) 208/79 (07/04/19 0000)  Pulse: 81 (07/04/19 0000)  Temperature: 98 6 °F (37 °C) (07/03/19 2139)  Temp Source: Temporal (07/03/19 2139)  Respirations: 20 (07/04/19 0000)  Weight - Scale: 57 kg (125 lb 10 6 oz) (07/03/19 2139)  SpO2: 93 % (07/04/19 0000)    Physical Exam   Constitutional: She appears well-developed and well-nourished  She appears ill  HENT:   Head: Normocephalic and atraumatic  Eyes: Right eye exhibits no discharge  Left eye exhibits no discharge  Neck: Normal range of motion  Neck supple  No tracheal deviation present  No thyromegaly present  Cardiovascular: Normal rate, regular rhythm, normal heart sounds and intact distal pulses  Exam reveals no gallop and no friction rub  No murmur heard  Pulmonary/Chest: Effort normal and breath sounds normal  No stridor  No respiratory distress  She has no wheezes  Abdominal: Soft  Bowel sounds are normal  She exhibits no distension  There is no tenderness  There is no guarding  Musculoskeletal: She exhibits no edema, tenderness or deformity  Neurological: She is alert  GCS eye subscore is 4  GCS verbal subscore is 4  GCS motor subscore is 6  Skin: Capillary refill takes 2 to 3 seconds  No rash noted  No erythema  No pallor  Psychiatric: She has a normal mood and affect   Her behavior is normal  Judgment and thought content normal            Additional Data:     Lab Results: I have personally reviewed pertinent reports  Results from last 7 days   Lab Units 07/03/19  2159   WBC Thousand/uL 7 56   HEMOGLOBIN g/dL 13 9   HEMATOCRIT % 43 1   PLATELETS Thousands/uL 289   NEUTROS PCT % 76*   LYMPHS PCT % 13*   MONOS PCT % 8   EOS PCT % 2     Results from last 7 days   Lab Units 07/03/19  2159   POTASSIUM mmol/L 4 2   CHLORIDE mmol/L 95*   CO2 mmol/L 29   BUN mg/dL 12   CREATININE mg/dL 0 98   CALCIUM mg/dL 9 5   ALK PHOS U/L 83   ALT U/L 23   AST U/L 16     Results from last 7 days   Lab Units 07/03/19  2159   INR  0 97       Imaging: I have personally reviewed pertinent reports  Ct Head Without Contrast    Result Date: 7/3/2019  Narrative: CT BRAIN - WITHOUT CONTRAST INDICATION:   ams, L eye pain  COMPARISON:  3/3/2009  TECHNIQUE:  CT examination of the brain was performed  In addition to axial images, coronal 2D reformatted images were created and submitted for interpretation  Radiation dose length product (DLP) for this visit:  910 24 mGy-cm   This examination, like all CT scans performed in the [facility], was performed utilizing techniques to minimize radiation dose exposure, including the use of iterative  reconstruction and automated exposure control  IMAGE QUALITY:  Diagnostic  FINDINGS: PARENCHYMA:  No intracranial mass, mass effect or midline shift  No CT signs of acute infarction  No acute parenchymal hemorrhage  VENTRICLES AND EXTRA-AXIAL SPACES:  Normal for the patient's age  VISUALIZED ORBITS AND PARANASAL SINUSES: Post left maxillary sinus functional endoscopic sinus surgery  Unremarkable  CALVARIUM AND EXTRACRANIAL SOFT TISSUES:  Normal      Impression: No acute intracranial abnormality  Workstation performed: [id number]       [facility] Records Reviewed: Yes     ** Please Note: This note has been constructed using a voice recognition system   **

## 2019-07-04 NOTE — ED PROVIDER NOTES
History  Chief Complaint   Patient presents with    Altered Mental Status     Patient presents to ED with ALS from [facility] for evaulation of altered mental status  Patient c/o left eye pain since 65     HPI  49-year-old woman presents for evaluation of altered mental status  Patient is resident of a nursing home  EMS was called secondary to altered mental status  When they got there, patient was at her baseline  EN route to the hospital however she does endorse some left eye pain  They gave her 650 of Tylenol on route to the hospital   Here in the emergency department, patient is well-appearing but seems confused  She has no complaints  Prior to Admission Medications   Prescriptions Last Dose Informant Patient Reported? Taking?    Cholecalciferol (VITAMIN D-3) 1000 units CAPS  Other (Specify) No No   Sig: Take 1 capsule (1,000 Units total) by mouth daily   Melatonin 5 MG CAPS   No No   Sig: TAKE ONE CAPSULE BY MOUTH AT BEDTIME (INSOMNIA)   Melatonin 5 MG TABS  Other (Specify) No No   Sig: Take 1 tablet (5 mg total) by mouth daily at bedtime   Omega-3 Fatty Acids (FISH OIL) 1,000 mg   Yes No   Sig: Take 1,000 mg by mouth daily   acetaminophen (TYLENOL) 325 mg tablet  Other (Specify) Yes No   Sig: Take 650 mg by mouth every 4 (four) hours as needed for mild pain   aspirin (ADULT ASPIRIN EC LOW STRENGTH) 81 mg EC tablet  Other (Specify) No No   Sig: Take 1 tablet (81 mg total) by mouth daily   calcium carbonate (OYSTER SHELL,OSCAL) 500 mg   No No   Sig: TAKE ONE TABLET BY MOUTH TWICE DAILY (SUPPLEMENT)   clonazePAM (KlonoPIN) 1 mg tablet   No No   Sig: Take one and one-half tablet(1 5mg) by mouth daily at bedtime   digoxin (LANOXIN) 0 125 mg tablet   No No   Sig: TAKE ONE TABLET BY MOUTH ONCE DAILY (AFIB)   ibuprofen (MOTRIN) 200 mg tablet  Other (Specify) Yes No   Sig: Take by mouth every 6 (six) hours as needed for mild pain   lisinopril (ZESTRIL) 10 mg tablet  Other (Specify) No No   Sig: Take 1 tablet (10 mg total) by mouth daily   magnesium hydroxide (MILK OF MAGNESIA) 800 MG/5ML suspension  Other (Specify) Yes No   Sig: Take 30 mL by mouth daily as needed for constipation   metoprolol tartrate (LOPRESSOR) 50 mg tablet   No No   Sig: TAKE ONE TABLET BY MOUTH 3 TIMES A DAY (HTN)   pantoprazole (PROTONIX) 40 mg tablet  Other (Specify) No No   Sig: One Tab PO Q11 AM   polyethylene glycol (GLYCOLAX) powder  Other (Specify) No No   Sig: Take 17 g by mouth daily   ranitidine (ZANTAC) 150 mg tablet   No No   Sig: TAKE ONE TABLET BY MOUTH AT BEDTIME (GERD)   senna (SENOKOT) 8 6 mg   No No   Sig: TAKE ONE TABLET BY MOUTH ONCE DAILY (CONSTIPATION)      Facility-Administered Medications: None       Past Medical History:   Diagnosis Date    Abdominal distension     Last Assessed: [id number]    Abnormal weight loss     Last Assessed: [id number]    Arthritis     Bronchitis     Bursitis of left hip     Lst Assessed: [id number]    Chest pain     Last Assessed: [id number]    Colon polyp     Dysuria     Last Assessed: [id number]    External hemorrhoids     Last Assessed: [id number]    Generalized anxiety disorder     GERD (gastroesophageal reflux disease)     Hyperlipidemia     Hypertension     Hypomagnesemia     Last Assessed: [id number]    Lyme disease     Last Assessed: [id number]    Osteoporosis     Pneumonia of right middle lobe due to infectious organism [facility])     Last Assessed: [id number]    Urinary tract infection        Past Surgical History:   Procedure Laterality Date    CATARACT EXTRACTION      HEMORRHOID SURGERY      HYSTERECTOMY      PELVIC FLOOR REPAIR         Family History   Adopted: Yes     I have reviewed and agree with the history as documented      Social History     Tobacco Use    Smoking status: Never Smoker    Smokeless tobacco: Never Used   Substance Use Topics    Alcohol use: No    Drug use: No        Review of Systems   Unable to perform ROS: Dementia       Physical Exam  Physical Exam   Constitutional: She appears well-developed and well-nourished  No distress  HENT:   Head: Normocephalic and atraumatic  Right Ear: External ear normal    Left Ear: External ear normal    Mouth/Throat: Oropharynx is clear and moist    Eyes: Pupils are equal, round, and reactive to light  Conjunctivae and EOM are normal  Right eye exhibits no discharge  Left eye exhibits no discharge  No scleral icterus  Neck: Normal range of motion  Neck supple  No tracheal deviation present  No thyromegaly present  Cardiovascular: Normal rate, regular rhythm and intact distal pulses  Exam reveals no gallop and no friction rub  No murmur heard  Pulmonary/Chest: Effort normal and breath sounds normal  No stridor  No respiratory distress  She has no wheezes  She has no rales  Abdominal: Soft  Bowel sounds are normal  She exhibits no distension  There is no tenderness  There is no rebound and no guarding  Musculoskeletal: Normal range of motion  She exhibits no edema or deformity  Neurological: She is alert  No cranial nerve deficit  Mildly confused   Skin: Skin is warm and dry  No rash noted  She is not diaphoretic  No erythema  Psychiatric: She has a normal mood and affect  Her behavior is normal  Thought content normal    Nursing note and vitals reviewed        Vital Signs  ED Triage Vitals [07/03/19 2139]   Temperature Pulse Respirations Blood Pressure SpO2   98 6 °F (37 °C) 82 19 (!) 229/110 93 %      Temp Source Heart Rate Source Patient Position - Orthostatic VS BP Location FiO2 (%)   Temporal Monitor Lying Left arm --      Pain Score       5           Vitals:    07/03/19 2215 07/03/19 2300 07/03/19 2345 07/04/19 0000   BP: (!) 234/100 (!) 215/91 (!) 180/66 (!) 208/79   Pulse: 82 74 70 81   Patient Position - Orthostatic VS: Sitting            Visual Acuity  Visual Acuity      Most Recent Value   L Pupil Size (mm)  2   R Pupil Size (mm)  2          ED Medications  Medications   sodium chloride 0 9 % infusion (has no administration in time range)   cefTRIAXone (ROCEPHIN) IVPB (premix) 1,000 mg (0 mg Intravenous Stopped 7/3/19 2329)       Diagnostic Studies  Results Reviewed     Procedure Component Value Units Date/Time    Urine Microscopic [[id number]]  (Abnormal) Collected:  07/03/19 2209    Lab Status:  Final result Specimen:  Urine, Straight Cath Updated:  07/03/19 2236     RBC, UA 10-20 /hpf      WBC, UA 20-30 /hpf      Epithelial Cells Moderate /hpf      Bacteria, UA Innumerable /hpf     Urine culture [[id number]] Collected:  07/03/19 2209    Lab Status:   In process Specimen:  Urine, Straight Cath Updated:  07/03/19 2236    Phosphorus [[id number]]  (Normal) Collected:  07/03/19 2159    Lab Status:  Final result Specimen:  Blood from Arm, Right Updated:  07/03/19 2234     Phosphorus 3 6 mg/dL     Magnesium [[id number]]  (Normal) Collected:  07/03/19 2159    Lab Status:  Final result Specimen:  Blood from Arm, Right Updated:  07/03/19 2234     Magnesium 1 9 mg/dL     Digoxin level [[id number]]  (Normal) Collected:  07/03/19 2159    Lab Status:  Final result Specimen:  Blood from Arm, Right Updated:  07/03/19 2234     Digoxin Lvl 0 9 ng/mL     Comprehensive metabolic panel [[id number]]  (Abnormal) Collected:  07/03/19 2159    Lab Status:  Final result Specimen:  Blood from Arm, Right Updated:  07/03/19 2233     Sodium 130 mmol/L      Potassium 4 2 mmol/L      Chloride 95 mmol/L      CO2 29 mmol/L      ANION GAP 6 mmol/L      BUN 12 mg/dL      Creatinine 0 98 mg/dL      Glucose 152 mg/dL      Calcium 9 5 mg/dL      AST 16 U/L      ALT 23 U/L      Alkaline Phosphatase 83 U/L      Total Protein 8 2 g/dL      Albumin 3 2 g/dL      Total Bilirubin 0 30 mg/dL      eGFR 52 ml/min/1 73sq m     Narrative:       Chelly guidelines for Chronic Kidney Disease (CKD):     Stage 1 with normal or high GFR (GFR > 90 mL/min/1 73 square meters)    Stage 2 Mild CKD (GFR = 60-89 mL/min/1 73 square meters)    Stage 3A Moderate CKD (GFR = 45-59 mL/min/1 73 square meters)    Stage 3B Moderate CKD (GFR = 30-44 mL/min/1 73 square meters)    Stage 4 Severe CKD (GFR = 15-29 mL/min/1 73 square meters)    Stage 5 End Stage CKD (GFR <15 mL/min/1 73 square meters)  Note: GFR calculation is accurate only with a steady state creatinine    Troponin I [[id number]]  (Normal) Collected:  07/03/19 2159    Lab Status:  Final result Specimen:  Blood from Arm, Right Updated:  07/03/19 2223     Troponin I <0 02 ng/mL     UA w Reflex to Microscopic w Reflex to Culture [[id number]]  (Abnormal) Collected:  07/03/19 2209    Lab Status:  Final result Specimen:  Urine, Straight Cath Updated:  07/03/19 2222     Color, UA Yellow     Clarity, UA Cloudy     Specific [place], UA 1 010     pH, UA 7 0     Leukocytes, UA Large     Nitrite, UA Positive     Protein, UA Negative mg/dl      Glucose, UA Negative mg/dl      Ketones, UA Negative mg/dl      Urobilinogen, UA 0 2 E U /dl      Bilirubin, UA Negative     Blood, UA Small    Protime-INR [[id number]]  (Normal) Collected:  07/03/19 2159    Lab Status:  Final result Specimen:  Blood from Arm, Right Updated:  07/03/19 2215     Protime 12 9 seconds      INR 0 97    APTT [[id number]]  (Normal) Collected:  07/03/19 2159    Lab Status:  Final result Specimen:  Blood from Arm, Right Updated:  07/03/19 2215     PTT 29 seconds     CBC and differential [[id number]]  (Abnormal) Collected:  07/03/19 2159    Lab Status:  Final result Specimen:  Blood from Arm, Right Updated:  07/03/19 2206     WBC 7 56 Thousand/uL      RBC 4 67 Million/uL      Hemoglobin 13 9 g/dL      Hematocrit 43 1 %      MCV 92 fL      MCH 29 8 pg      MCHC 32 3 g/dL      RDW 13 8 %      MPV 9 0 fL      Platelets 255 Thousands/uL      nRBC 0 /100 WBCs      Neutrophils Relative 76 %      Immat GRANS % 0 %      Lymphocytes Relative 13 %      Monocytes Relative 8 %      Eosinophils Relative 2 %      Basophils Relative 1 % Neutrophils Absolute 5 79 Thousands/µL      Immature Grans Absolute 0 03 Thousand/uL      Lymphocytes Absolute 0 96 Thousands/µL      Monocytes Absolute 0 57 Thousand/µL      Eosinophils Absolute 0 15 Thousand/µL      Basophils Absolute 0 06 Thousands/µL     Fingerstick Glucose (POCT) [[id number]]  (Abnormal) Collected:  07/03/19 2144    Lab Status:  Final result Updated:  07/03/19 2144     POC Glucose 143 mg/dl                  CT head without contrast   Final Result by Lashaun Jackson MD (07/03 2347)      No acute intracranial abnormality  Workstation performed: [id number]                    Procedures  Procedures       ED Course  ED Course as of Jul 04 0050   Thu Jul 04, 2019   0002 Blood Pressure(!): 215/91   0003 Blood Pressure(!): 215/91                               MDM  Number of Diagnoses or Management Options  Acute encephalopathy:   UTI (urinary tract infection):   Diagnosis management comments: 59-year-old woman presents for evaluation of altered mental status  Will do a CT of her head in the setting altered mental status  Will get a UA in the setting altered mental status    Will evaluate with basic labs to include a digoxin level as patient has digoxin on her medication list   Will admit the patient to the hospital      Disposition  Final diagnoses:   Acute encephalopathy   UTI (urinary tract infection)     Time reflects when diagnosis was documented in both MDM as applicable and the Disposition within this note     Time User Action Codes Description Comment    7/4/2019 12:24 AM Mike Cruel Add [G93 40] Acute encephalopathy     7/4/2019 12:24 AM Mike Cruel Add [N39 0] UTI (urinary tract infection)     7/4/2019 12:24 AM Mike Cruel Modify [G93 40] Acute encephalopathy     7/4/2019 12:24 AM Mike Cruel Modify [N39 0] UTI (urinary tract infection)       ED Disposition     ED Disposition Condition Date/Time Comment    Admit Stable Wed Jul 3, 2019 11:50 PM       Follow-up Information    None         Patient's Medications   Discharge Prescriptions    No medications on file     No discharge procedures on file      ED Provider  Electronically Signed by           Dheeraj Serrato MD  07/04/19 4095

## 2019-07-05 ENCOUNTER — APPOINTMENT (INPATIENT)
Dept: ULTRASOUND IMAGING | Facility: HOSPITAL | Age: 84
DRG: 689 | End: 2019-07-05
Payer: MEDICARE

## 2019-07-05 VITALS
BODY MASS INDEX: 20.78 KG/M2 | WEIGHT: 121.69 LBS | OXYGEN SATURATION: 90 % | SYSTOLIC BLOOD PRESSURE: 156 MMHG | RESPIRATION RATE: 16 BRPM | HEIGHT: 64 IN | TEMPERATURE: 98.3 F | HEART RATE: 57 BPM | DIASTOLIC BLOOD PRESSURE: 67 MMHG

## 2019-07-05 PROBLEM — K76.0 HEPATIC STEATOSIS: Status: ACTIVE | Noted: 2019-07-05

## 2019-07-05 PROBLEM — Q61.02 MULTIPLE RENAL CYSTS: Status: ACTIVE | Noted: 2019-07-05

## 2019-07-05 PROBLEM — H57.12 LEFT EYE PAIN: Status: ACTIVE | Noted: 2019-07-05

## 2019-07-05 LAB
25(OH)D3 SERPL-MCNC: 34.8 NG/ML (ref 30–100)
ALBUMIN SERPL BCP-MCNC: 2.8 G/DL (ref 3.5–5)
ALP SERPL-CCNC: 61 U/L (ref 46–116)
ALT SERPL W P-5'-P-CCNC: 16 U/L (ref 12–78)
ANION GAP SERPL CALCULATED.3IONS-SCNC: 7 MMOL/L (ref 4–13)
AST SERPL W P-5'-P-CCNC: 13 U/L (ref 5–45)
ATRIAL RATE: 84 BPM
BASOPHILS # BLD AUTO: 0.04 THOUSANDS/ΜL (ref 0–0.1)
BASOPHILS NFR BLD AUTO: 1 % (ref 0–1)
BILIRUB SERPL-MCNC: 0.3 MG/DL (ref 0.2–1)
BUN SERPL-MCNC: 10 MG/DL (ref 5–25)
CALCIUM SERPL-MCNC: 8.8 MG/DL (ref 8.3–10.1)
CHLORIDE SERPL-SCNC: 101 MMOL/L (ref 100–108)
CK SERPL-CCNC: 94 U/L (ref 26–192)
CO2 SERPL-SCNC: 28 MMOL/L (ref 21–32)
CREAT SERPL-MCNC: 0.9 MG/DL (ref 0.6–1.3)
DIGOXIN SERPL-MCNC: 0.8 NG/ML (ref 0.8–2)
EOSINOPHIL # BLD AUTO: 0.22 THOUSAND/ΜL (ref 0–0.61)
EOSINOPHIL NFR BLD AUTO: 5 % (ref 0–6)
ERYTHROCYTE [DISTWIDTH] IN BLOOD BY AUTOMATED COUNT: 13.7 % (ref 11.6–15.1)
GFR SERPL CREATININE-BSD FRML MDRD: 58 ML/MIN/1.73SQ M
GLUCOSE SERPL-MCNC: 102 MG/DL (ref 65–140)
HCT VFR BLD AUTO: 41.5 % (ref 34.8–46.1)
HGB BLD-MCNC: 13.1 G/DL (ref 11.5–15.4)
IMM GRANULOCYTES # BLD AUTO: 0.02 THOUSAND/UL (ref 0–0.2)
IMM GRANULOCYTES NFR BLD AUTO: 0 % (ref 0–2)
LACTATE SERPL-SCNC: 1.6 MMOL/L (ref 0.5–2)
LYMPHOCYTES # BLD AUTO: 1.21 THOUSANDS/ΜL (ref 0.6–4.47)
LYMPHOCYTES NFR BLD AUTO: 25 % (ref 14–44)
MAGNESIUM SERPL-MCNC: 1.9 MG/DL (ref 1.6–2.6)
MCH RBC QN AUTO: 29.4 PG (ref 26.8–34.3)
MCHC RBC AUTO-ENTMCNC: 31.6 G/DL (ref 31.4–37.4)
MCV RBC AUTO: 93 FL (ref 82–98)
MONOCYTES # BLD AUTO: 0.56 THOUSAND/ΜL (ref 0.17–1.22)
MONOCYTES NFR BLD AUTO: 12 % (ref 4–12)
NEUTROPHILS # BLD AUTO: 2.84 THOUSANDS/ΜL (ref 1.85–7.62)
NEUTS SEG NFR BLD AUTO: 57 % (ref 43–75)
NRBC BLD AUTO-RTO: 0 /100 WBCS
P AXIS: 77 DEGREES
PHOSPHATE SERPL-MCNC: 3.5 MG/DL (ref 2.3–4.1)
PLATELET # BLD AUTO: 267 THOUSANDS/UL (ref 149–390)
PMV BLD AUTO: 8.8 FL (ref 8.9–12.7)
POTASSIUM SERPL-SCNC: 4.1 MMOL/L (ref 3.5–5.3)
PR INTERVAL: 210 MS
PROCALCITONIN SERPL-MCNC: <0.05 NG/ML
PROT SERPL-MCNC: 7.3 G/DL (ref 6.4–8.2)
QRS AXIS: 81 DEGREES
QRSD INTERVAL: 78 MS
QT INTERVAL: 366 MS
QTC INTERVAL: 432 MS
RBC # BLD AUTO: 4.46 MILLION/UL (ref 3.81–5.12)
SODIUM SERPL-SCNC: 136 MMOL/L (ref 136–145)
T WAVE AXIS: 89 DEGREES
VENTRICULAR RATE: 84 BPM
WBC # BLD AUTO: 4.89 THOUSAND/UL (ref 4.31–10.16)

## 2019-07-05 PROCEDURE — 83735 ASSAY OF MAGNESIUM: CPT | Performed by: INTERNAL MEDICINE

## 2019-07-05 PROCEDURE — 97116 GAIT TRAINING THERAPY: CPT | Performed by: PHYSICAL THERAPIST

## 2019-07-05 PROCEDURE — 80162 ASSAY OF DIGOXIN TOTAL: CPT | Performed by: INTERNAL MEDICINE

## 2019-07-05 PROCEDURE — 82306 VITAMIN D 25 HYDROXY: CPT | Performed by: INTERNAL MEDICINE

## 2019-07-05 PROCEDURE — 82550 ASSAY OF CK (CPK): CPT | Performed by: INTERNAL MEDICINE

## 2019-07-05 PROCEDURE — 97110 THERAPEUTIC EXERCISES: CPT | Performed by: PHYSICAL THERAPIST

## 2019-07-05 PROCEDURE — 83605 ASSAY OF LACTIC ACID: CPT | Performed by: INTERNAL MEDICINE

## 2019-07-05 PROCEDURE — 97535 SELF CARE MNGMENT TRAINING: CPT

## 2019-07-05 PROCEDURE — 93010 ELECTROCARDIOGRAM REPORT: CPT | Performed by: INTERNAL MEDICINE

## 2019-07-05 PROCEDURE — 99239 HOSP IP/OBS DSCHRG MGMT >30: CPT | Performed by: INTERNAL MEDICINE

## 2019-07-05 PROCEDURE — 84100 ASSAY OF PHOSPHORUS: CPT | Performed by: INTERNAL MEDICINE

## 2019-07-05 PROCEDURE — 76770 US EXAM ABDO BACK WALL COMP: CPT

## 2019-07-05 PROCEDURE — 80053 COMPREHEN METABOLIC PANEL: CPT | Performed by: INTERNAL MEDICINE

## 2019-07-05 PROCEDURE — 84145 PROCALCITONIN (PCT): CPT | Performed by: INTERNAL MEDICINE

## 2019-07-05 PROCEDURE — 85025 COMPLETE CBC W/AUTO DIFF WBC: CPT | Performed by: INTERNAL MEDICINE

## 2019-07-05 RX ORDER — PANTOPRAZOLE SODIUM 40 MG/1
40 TABLET, DELAYED RELEASE ORAL
Refills: 0
Start: 2019-07-05 | End: 2019-09-17 | Stop reason: SDUPTHER

## 2019-07-05 RX ORDER — SENNOSIDES 8.6 MG
1 TABLET ORAL
Qty: 30 TABLET | Refills: 0
Start: 2019-07-05 | End: 2019-08-12 | Stop reason: SDUPTHER

## 2019-07-05 RX ORDER — AMLODIPINE BESYLATE 5 MG/1
5 TABLET ORAL 2 TIMES DAILY
Qty: 60 TABLET | Refills: 0 | Status: SHIPPED | OUTPATIENT
Start: 2019-07-05 | End: 2020-03-23

## 2019-07-05 RX ORDER — POLYETHYLENE GLYCOL 3350 17 G/17G
17 POWDER, FOR SOLUTION ORAL DAILY
Qty: 578 G | Refills: 0
Start: 2019-07-05 | End: 2019-08-19 | Stop reason: SDUPTHER

## 2019-07-05 RX ORDER — LISINOPRIL 20 MG/1
20 TABLET ORAL DAILY
Status: DISCONTINUED | OUTPATIENT
Start: 2019-07-05 | End: 2019-07-05 | Stop reason: HOSPADM

## 2019-07-05 RX ORDER — AMLODIPINE BESYLATE 5 MG/1
5 TABLET ORAL 2 TIMES DAILY
Refills: 0
Start: 2019-07-05 | End: 2019-07-05

## 2019-07-05 RX ORDER — METOPROLOL TARTRATE 50 MG/1
75 TABLET, FILM COATED ORAL EVERY 12 HOURS SCHEDULED
Qty: 90 TABLET | Refills: 0
Start: 2019-07-05 | End: 2021-01-01

## 2019-07-05 RX ORDER — ACETAMINOPHEN 325 MG/1
650 TABLET ORAL EVERY 6 HOURS PRN
Refills: 0
Start: 2019-07-05 | End: 2021-06-01 | Stop reason: SDUPTHER

## 2019-07-05 RX ORDER — CEPHALEXIN 500 MG/1
500 CAPSULE ORAL EVERY 12 HOURS SCHEDULED
Qty: 6 CAPSULE | Refills: 0 | Status: SHIPPED | OUTPATIENT
Start: 2019-07-05 | End: 2019-07-08

## 2019-07-05 RX ADMIN — PANTOPRAZOLE SODIUM 40 MG: 40 TABLET, DELAYED RELEASE ORAL at 06:12

## 2019-07-05 RX ADMIN — METOPROLOL TARTRATE 50 MG: 50 TABLET, FILM COATED ORAL at 08:55

## 2019-07-05 RX ADMIN — DIGOXIN 125 MCG: 125 TABLET ORAL at 08:55

## 2019-07-05 RX ADMIN — AMLODIPINE BESYLATE 5 MG: 5 TABLET ORAL at 08:53

## 2019-07-05 RX ADMIN — ASPIRIN 81 MG: 81 TABLET, COATED ORAL at 08:54

## 2019-07-05 RX ADMIN — ENOXAPARIN SODIUM 40 MG: 40 INJECTION SUBCUTANEOUS at 08:55

## 2019-07-05 RX ADMIN — LISINOPRIL 20 MG: 20 TABLET ORAL at 08:55

## 2019-07-05 NOTE — PHYSICAL THERAPY NOTE
Physical Therapy Treatment    Patient Name: Claudeen Bibles    EUNFF'V Date: 7/5/2019     Problem List  Patient Active Problem List   Diagnosis    Gomez's esophagus    Bilateral carotid artery disease ([address] )    Breast mass, left    Chronic constipation    Colon, diverticulosis    Esophageal reflux    Generalized anxiety disorder    Generalized osteoarthritis of multiple sites    Glucose intolerance (impaired glucose tolerance)    Hypercholesterolemia    Neuralgia    Osteoporosis    SVT (supraventricular tachycardia) (HCC)    Vitamin D deficiency    HCAP (healthcare-associated pneumonia)    Acute febrile illness    Cough    Mass of urinary bladder determined by ultrasound    Esophageal mass    Hyponatremia    Right lower lobe lung mass    History of frequent urinary tract infections    Paroxysmal atrial fibrillation (HCC)    Sinus bradycardia    Debilitated    Eczema    Hypertensive urgency    Acute metabolic encephalopathy    Acute cystitis with hematuria    Lung mass    Acute encephalopathy    Multiple renal cysts    Hepatic steatosis        Past Medical History  Past Medical History:   Diagnosis Date    Abdominal distension     Last Assessed: [id number]    Abnormal weight loss     Last Assessed: [id number]    Arthritis     Bronchitis     Bursitis of left hip     Lst Assessed: [id number]    Chest pain     Last Assessed: [id number]    Colon polyp     Dysuria     Last Assessed: [id number]    External hemorrhoids     Last Assessed: [id number]    Generalized anxiety disorder     GERD (gastroesophageal reflux disease)     Hyperlipidemia     Hypertension     Hypomagnesemia     Last Assessed: [id number]    Lyme disease     Last Assessed: [id number]    Multiple renal cysts 7/5/2019    Osteoporosis     Pneumonia of right middle lobe due to infectious organism [facility])     Last Assessed: [id number]    Urinary tract infection Past Surgical History  Past Surgical History:   Procedure Laterality Date    CATARACT EXTRACTION      HEMORRHOID SURGERY      HYSTERECTOMY      PELVIC FLOOR REPAIR             07/05/19 0932   Pain Assessment   Pain Assessment No/denies pain   Pain Score No Pain   Restrictions/Precautions   Other Precautions Bed Alarm; Fall Risk   Cognition   Arousal/Participation Alert   Orientation Level Oriented X4   Following Commands Follows all commands and directions without difficulty   Bed Mobility   Sit to Supine 6  Modified independent   Additional items Bedrails   Additional Comments pt seated at EOB when PT entered room  Transfers   Sit to Stand 5  Supervision   Additional items   (no A D )   Stand to Sit 5  Supervision   Additional items   (no A D  )   Stand pivot 5  Supervision   Additional items   (with RW)   Additional Comments pt with no LOB during ambulation  Pt elected to use RW due to feeling tired this date but uses SPC or no A D  at baseline  No LOB or instability during ambulation  Ambulation/Elevation   Gait pattern   (decreased renata)   Gait Assistance 5  Supervision   Assistive Device Rolling walker   Distance 450ft with RW (S)   Balance   Static Sitting Good   Dynamic Sitting Good   Static Standing Fair +  (with RW)   Dynamic Standing Fair +  (with RW)   Ambulatory Fair +  (with RW)   Endurance Deficit   Endurance Deficit Yes   Endurance Deficit Description fatigue limiting activity and ambulation tolerance  Activity Tolerance   Activity Tolerance Patient limited by fatigue   Exercises   Hip Flexion Sitting;20 reps;AROM; Bilateral   Hip Abduction Sitting;20 reps;AROM; Bilateral   Hip Adduction Sitting;20 reps;AROM; Bilateral   Knee AROM Long Arc Quad Sitting;20 reps;AROM; Bilateral   Ankle Pumps Sitting;20 reps;AROM; Bilateral   Assessment   Prognosis Good   Problem List Decreased strength;Decreased endurance; Impaired balance;Decreased mobility   Assessment Pt tolerated all seated TE well through full ROM  Pt utilized RW due to feeling fatigued this date  No LOB or instability with use of RW  Pt noted fatigue after session but will be safe to return to [facility] on discharge  Pt states she is being discharged this date  continue PT if not discharged today  Goals   Treatment Day 1   Plan   Treatment/Interventions Functional transfer training;LE strengthening/ROM; Therapeutic exercise; Endurance training;Patient/family training;Gait training;Bed mobility   Progress Progressing toward goals   Recommendation   Recommendation   (return to [facility])   PT - OK to Discharge Yes   Pt seated at EOB when PT entered  SCD's reapplied and turned on with pt supine in bed with call bell in reach, bed alarm on

## 2019-07-05 NOTE — ASSESSMENT & PLAN NOTE
· The patient was treated with IV ceftriaxone during the hospitalization  · The patient was transitioned to cephalexin 500 mg PO every 12 hours for 3 additional days to complete the antibiotic course

## 2019-07-05 NOTE — ASSESSMENT & PLAN NOTE
· Resolved  · No acute abnormality observed on physical exam  · Outpatient follow-up with Ophthalmology if the left eye pain recurs

## 2019-07-05 NOTE — UTILIZATION REVIEW
Initial Clinical Review    Admission: Date/Time/Statement: 7/3/19 @ 2133    Orders Placed This Encounter   Procedures    Inpatient Admission     Standing Status:   Standing     Number of Occurrences:   1     Order Specific Question:   Admitting Physician     Answer:   Angela Turner     Order Specific Question:   Level of Care     Answer:   Med Surg [16]     Order Specific Question:   Estimated length of stay     Answer:   More than 2 Midnights     Order Specific Question:   Certification     Answer:   I certify that inpatient services are medically necessary for this patient for a duration of greater than two midnights  See H&P and MD Progress Notes for additional information about the patient's course of treatment  ED Arrival Information     Expected Arrival Acuity Means of Arrival Escorted By Service Admission Type    7/3/2019  7/3/2019 21:33 Emergent Ambulance [facility]FFILIATED WITH [facility] Emergency    Arrival Complaint    -        Chief Complaint   Patient presents with    Altered Mental Status     Patient presents to ED with ALS from [facility] for evaulation of altered mental status  Patient c/o left eye pain since 1700     Assessment/Plan:   72-year-old woman presents for evaluation of altered mental status  Patient is resident of a nursing home  EMS was called secondary to altered mental status  Patient has hx dementia and mildy confused  When they got there, patient was at her baseline  EN route to the hospital however she does endorse some left eye pain  They gave her 650 of Tylenol on route to the hospital   Here in the emergency department, patient is well-appearing but seems confused  She has no complaints    Acute cystitis  Assessment & Plan  UA very suspicious of UTI  Rocephin initiated emergency room-continue  Admit to med surge  Monitor per protocol  Provide gentle fluids  Provide supportive care     * Acute metabolic encephalopathy  Assessment & Plan  Acute metabolic encephalopathy most likely secondary to UTI  Please see treatment plan for UTI  Provide supportive care     Ct Head Without Contrast-7/3/2019-"Impression: No acute intracranial abnormality  "        Hypertension  Assessment & Plan  Blood pressure currently elevated  Patient missed evening dose of metoprolol 50 mg-will give now  Continue prior to admission medication  Admit to med surge  Monitor per protocol       Anticipated Length of Stay:  Patient will be admitted on an Inpatient basis with an anticipated length of stay of  Greater than 2 midnights     Justification for Hospital Stay:  Acute metabolic encephalopathy and UTI    ED Triage Vitals [07/03/19 2139]   Temperature Pulse Respirations Blood Pressure SpO2   98 6 °F (37 °C) 82 19 (!) 229/110 93 %      Temp Source Heart Rate Source Patient Position - Orthostatic VS BP Location FiO2 (%)   Temporal Monitor Lying Left arm --      Pain Score       5        Wt Readings from Last 1 Encounters:   07/05/19 55 2 kg (121 lb 11 1 oz)     Additional Vital Signs:   07/04/19 2112  --  70  --  146/72  --  --  --  Sitting   07/04/19 1501  98 4 °F (36 9 °C)  66  18  132/60  --  96 %  None (Room air)  Lying   07/04/19 1046  --  --  --  150/78  --  --  --  Sitting   07/04/19 0932  --  78  --  190/80Abnormal   --  --  --  Lying   07/04/19 0742  97 8 °F (36 6 °C)  71  18  198/82Abnormal   --  95 %  None (Room air)  Lying   07/04/19 0500  --  --  --  162/90  --  --  --  Lying   07/04/19 0235  --  --  --  --  --  --  None (Room air)  --   07/04/19 0157  98 7 °F (37 1 °C)  75  20  180/67Abnormal   97  92 %  None (Room air)  Sitting   07/04/19 0130  --  77  20  180/77Abnormal   --  93 %  None (Room air)  --   07/04/19 0100  --  75  20  171/73Abnormal   --  91 %  --  --   07/04/19 0045  --  70  20  158/68  --  91 %  None (Room air)  --   07/04/19 0000  --  81  20  208/79Abnormal   --  93 %  --  --   07/03/19 2345  --  70  20  180/66Abnormal   --  94 %  None (Room air)  --   07/03/19 2300 --  74  20  215/91Abnormal   --  93 %  --  --   07/03/19 2215  --  82  19  234/100Abnormal   --  94 %  None (Room air)  Sitting   07/03/19 2155  --  --  --  180/90Abnormal                Pertinent Labs/Diagnostic Test Results:   Results from last 7 days   Lab Units 07/05/19 0539 07/04/19  0603 07/03/19  2159   WBC Thousand/uL 4 89 5 92 7 56   HEMOGLOBIN g/dL 13 1 13 6 13 9   HEMATOCRIT % 41 5 43 1 43 1   PLATELETS Thousands/uL 267 304 289   NEUTROS ABS Thousands/µL 2 84  --  5 79         Results from last 7 days   Lab Units 07/05/19 0539 07/04/19 0603 07/03/19 2159   SODIUM mmol/L 136 135* 130*   POTASSIUM mmol/L 4 1 4 2 4 2   CHLORIDE mmol/L 101 100 95*   CO2 mmol/L 28 30 29   ANION GAP mmol/L 7 5 6   BUN mg/dL 10 10 12   CREATININE mg/dL 0 90 0 93 0 98   EGFR ml/min/1 73sq m 58 55 52   CALCIUM mg/dL 8 8 9 7 9 5   MAGNESIUM mg/dL 1 9 2 0 1 9   PHOSPHORUS mg/dL 3 5 4 0 3 6     Results from last 7 days   Lab Units 07/05/19 0539 07/03/19  2159   AST U/L 13 16   ALT U/L 16 23   ALK PHOS U/L 61 83   TOTAL PROTEIN g/dL 7 3 8 2   ALBUMIN g/dL 2 8* 3 2*   TOTAL BILIRUBIN mg/dL 0 30 0 30     Results from last 7 days   Lab Units 07/03/19  2144   POC GLUCOSE mg/dl 143*     Results from last 7 days   Lab Units 07/05/19 0539 07/04/19  0603 07/03/19  2159   GLUCOSE RANDOM mg/dL 102 106 152*     Results from last 7 days   Lab Units 07/05/19  0539   CK TOTAL U/L 94     Results from last 7 days   Lab Units 07/03/19  2159   TROPONIN I ng/mL <0 02         Results from last 7 days   Lab Units 07/03/19  2159   PROTIME seconds 12 9   INR  0 97   PTT seconds 29     Results from last 7 days   Lab Units 07/04/19  0603   PROCALCITONIN ng/ml <0 05     Results from last 7 days   Lab Units 07/05/19  0539   LACTIC ACID mmol/L 1 6     Results from last 7 days   Lab Units 07/05/19  0539 07/03/19  2159   DIGOXIN LVL ng/mL 0 8 0 9     Results from last 7 days   Lab Units 07/03/19  2209   CLARITY UA  Cloudy   COLOR UA  Yellow   SPEC GRAV UA 1 010   PH UA  7 0   GLUCOSE UA mg/dl Negative   KETONES UA mg/dl Negative   BLOOD UA  Small*   PROTEIN UA mg/dl Negative   NITRITE UA  Positive*   BILIRUBIN UA  Negative   UROBILINOGEN UA E U /dl 0 2   LEUKOCYTES UA  Large*   WBC UA /hpf 20-30*   RBC UA /hpf 10-20*   BACTERIA UA /hpf Innumerable*   EPITHELIAL CELLS WET PREP /hpf Moderate*     Results from last 7 days   Lab Units 07/03/19  2209   URINE CULTURE  Culture too young- will reincubate       ED Treatment:   Medication Administration from 07/03/2019 2133 to 07/04/2019 0273       Date/Time Order Dose Route Action Action by Comments     07/03/2019 2329 cefTRIAXone (ROCEPHIN) IVPB (premix) 1,000 mg 0 mg Intravenous Stopped Ania Paul RN      07/03/2019 2250 cefTRIAXone (ROCEPHIN) IVPB (premix) 1,000 mg 1,000 mg Intravenous New Bag Rylee Delfino Mention, RN      07/04/2019 0146 sodium chloride 0 9 % infusion 50 mL/hr Intravenous New Bag Ania Paul RN         Past Medical History:   Diagnosis Date    Abdominal distension     Last Assessed: [id number]    Abnormal weight loss     Last Assessed: [id number]    Arthritis     Bronchitis     Bursitis of left hip     Lst Assessed: [id number]    Chest pain     Last Assessed: [id number]    Colon polyp     Dysuria     Last Assessed: 31KYV8041    External hemorrhoids     Last Assessed: [id number]    Generalized anxiety disorder     GERD (gastroesophageal reflux disease)     Hyperlipidemia     Hypertension     Hypomagnesemia     Last Assessed: [id number]    Lyme disease     Last Assessed: [id number]    Multiple renal cysts 7/5/2019    Osteoporosis     Pneumonia of right middle lobe due to infectious organism [facility])     Last Assessed: [id number]    Urinary tract infection      Present on Admission:   Acute metabolic encephalopathy   Hypertensive urgency   Acute cystitis with hematuria      Admitting Diagnosis: UTI (urinary tract infection) [N39 0]  Altered mental status [R41 82]  Acute encephalopathy [G93 40]  Age/Sex: 80 y o  female  Admission Orders:    Current Facility-Administered Medications:  acetaminophen 650 mg Oral Q6H PRN   amLODIPine 5 mg Oral BID   aspirin 81 mg Oral Daily   cefTRIAXone 1,000 mg Intravenous Q24H   digoxin 125 mcg Oral Daily   enoxaparin 40 mg Subcutaneous Daily   Labetalol HCl 10 mg Intravenous Q4H PRN   lisinopril 20 mg Oral Daily   metoprolol tartrate 50 mg Oral TID   pantoprazole 40 mg Oral Early Morning       IP CONSULT TO CASE MANAGEMENT    Network Utilization Review Department  Phone: [phone]; Fax [phone]  [email or web address]  ATTENTION: Please call with any questions or concerns to [phone]  and carefully listen to the prompts so that you are directed to the right person  Send all requests for admission clinical reviews, approved or denied determinations and any other requests to fax [phone]   All voicemails are confidential

## 2019-07-05 NOTE — SOCIAL WORK
Pt lives at [facility] facility  Pt uses the cane to Ambulate their  Pt is independent with ADL's  Pt's sons transport her to [facility]

## 2019-07-05 NOTE — ASSESSMENT & PLAN NOTE
· Present on admission  · Likely secondary to acute cystitis  · The acute metabolic encephalopathy resolved by the time of discharge

## 2019-07-05 NOTE — SOCIAL WORK
Pt is being discharged today  Pt will return to [facility]  Pt's sons will transport the patient   They are picking the patient up at 1:00pm  As per PT should use a walker at [place]  Pt was given a walker  I notified Phil Medrano of transport time and that patient will need a walker

## 2019-07-05 NOTE — NURSING NOTE
Pt discharged in stable condition to [facility]  AVS reviewed and verbalized an understanding    Report called to Brisa Lee at [phone]

## 2019-07-05 NOTE — ASSESSMENT & PLAN NOTE
· Likely hypovolemic hyponatremia  · Resolved with NSS IV fluids  · Follow the sodium level after discharge with her PCP

## 2019-07-05 NOTE — OCCUPATIONAL THERAPY NOTE
OT Note     07/05/19 0854   Restrictions/Precautions   Other Precautions Fall Risk   ADL   Where Assessed Standing at sink   Grooming Assistance 6  Modified Independent   UB Bathing Assistance 6  Modified Independent   UB Bathing Comments A with back   LB Bathing Assistance 6  Modified Independent   LB Bathing Comments Completes LEs, vida/buttocks in stance   UB Dressing Assistance 5  Supervision/Setup   UB Dressing Comments A with fasteners   LB Dressing Assistance 5  Supervision/Setup   LB Dressing Comments Dons underwear in seated without difficulty   Functional Standing Tolerance   Time 20 mins   Activity Self care   Comments No LOB   Transfers   Sit to Stand 6  Modified independent   Additional items Armrests   Stand to Sit 6  Modified independent   Additional items Armrests   Functional Mobility   Functional Mobility 5  Supervision   Additional Comments Completes txfr EOB to sinkside and return to reclineer at bedside   Additional items   (No A device)   Activity Tolerance   Activity Tolerance Patient tolerated treatment well   Assessment   Assessment Presents seated EOB, agreeable to participate  Completes a m  self care with setup all items at mod I level  Appears to be functioning at prior level of I  Will speak with OTR regarding d/c active OT services  Plan   Goal Expiration Date 07/18/19   Treatment Day 2   Recommendation   OT Discharge Recommendation   (retirement)   Seated recliner at bedside on completion tx session  All needs in reach

## 2019-07-05 NOTE — ASSESSMENT & PLAN NOTE
· Outpatient surveillance imaging with her PCP  · Monitor the patient's LFT's (liver function tests) as an outpatient with her PCP

## 2019-07-05 NOTE — PLAN OF CARE
Problem: Potential for Falls  Goal: Patient will remain free of falls  Description  INTERVENTIONS:  - Assess patient frequently for physical needs  -  Identify cognitive and physical deficits and behaviors that affect risk of falls  -  [place] fall precautions as indicated by assessment   High fall risk    - Educate patient/family on patient safety including physical limitations  - Instruct patient to call for assistance with activity based on assessment  - Modify environment to reduce risk of injury  - Consider OT/PT consult to assist with strengthening/mobility   7/5/2019 0945 by Clover Thayer RN  Outcome: Progressing  7/5/2019 0945 by Clover Thayer RN  Outcome: Progressing     Problem: GENITOURINARY - ADULT  Goal: Maintains or returns to baseline urinary function  Description  INTERVENTIONS:  - Assess urinary function  - Encourage oral fluids to ensure adequate hydration  - Administer IV fluids as ordered to ensure adequate hydration  - Administer ordered medications as needed  - Offer frequent toileting  - Follow urinary retention protocol if ordered  7/5/2019 0945 by Clover Thayer RN  Outcome: Progressing  7/5/2019 0945 by Clover Thayer RN  Outcome: Progressing     Problem: PAIN - ADULT  Goal: Verbalizes/displays adequate comfort level or baseline comfort level  Description  Interventions:  - Encourage patient to monitor pain and request assistance  - Assess pain using appropriate pain scale  - Administer analgesics based on type and severity of pain and evaluate response  - Implement non-pharmacological measures as appropriate and evaluate response  - Consider cultural and social influences on pain and pain management  - Notify physician/advanced practitioner if interventions unsuccessful or patient reports new pain  7/5/2019 0945 by Clover Thayer RN  Outcome: Progressing  7/5/2019 0945 by Clover Thayer RN  Outcome: Progressing     Problem: INFECTION - ADULT  Goal: Absence or prevention of progression during hospitalization  Description  INTERVENTIONS:  - Assess and monitor for signs and symptoms of infection  - Monitor lab/diagnostic results  - Monitor all insertion sites, i e  indwelling lines, tubes, and drains  - [place] appropriate cooling/warming therapies per order  - Administer medications as ordered  - Instruct and encourage patient and family to use good hand hygiene technique  - Identify and instruct in appropriate isolation precautions for identified infection/condition   7/5/2019 0945 by Nilam Patel RN  Outcome: Progressing  7/5/2019 0945 by Nilam Patel RN  Outcome: Progressing     Problem: SAFETY ADULT  Goal: Maintain or return to baseline ADL function  Description  INTERVENTIONS:  -  Assess patient's ability to carry out ADLs; assess patient's baseline for ADL function and identify physical deficits which impact ability to perform ADLs (bathing, care of mouth/teeth, toileting, grooming, dressing, etc )  - Assess/evaluate cause of self-care deficits   - Assess range of motion  - Assess patient's mobility; develop plan if impaired  - Assess patient's need for assistive devices and provide as appropriate  - Encourage maximum independence but intervene and supervise when necessary  ¯ Involve family in performance of ADLs  ¯ Assess for home care needs following discharge   ¯ Request OT consult to assist with ADL evaluation and planning for discharge  ¯ Provide patient education as appropriate  7/5/2019 0945 by Nilam Patel RN  Outcome: Progressing  7/5/2019 0945 by Nilam Patel RN  Outcome: Progressing  Goal: Maintain or return mobility status to optimal level  Description  INTERVENTIONS:  - Assess patient's baseline mobility status (ambulation, transfers, stairs, etc )    - Identify cognitive and physical deficits and behaviors that affect mobility  - Identify mobility aids required to assist with transfers and/or ambulation (gait belt, sit-to-stand, lift, walker, cane, etc )  - [place] fall precautions as indicated by assessment  - Record patient progress and toleration of activity level on Mobility SBAR; progress patient to next Phase/Stage  - Instruct patient to call for assistance with activity based on assessment  - Request Rehabilitation consult to assist with strengthening/weightbearing, etc   7/5/2019 0945 by Thelma Reynolds RN  Outcome: Progressing  7/5/2019 0945 by Thelma Reynolds RN  Outcome: Progressing     Problem: DISCHARGE PLANNING  Goal: Discharge to home or other facility with appropriate resources  Description  INTERVENTIONS:  - Identify barriers to discharge w/patient and caregiver  - Arrange for needed discharge resources and transportation as appropriate  - Identify discharge learning needs (meds, wound care, etc )  - Arrange for interpretive services to assist at discharge as needed  - Refer to Case Management Department for coordinating discharge planning if the patient needs post-hospital services based on physician/advanced practitioner order or complex needs related to functional status, cognitive ability, or social support system  7/5/2019 0945 by Thelma Reynolds RN  Outcome: Progressing  7/5/2019 0945 by Thelma Reynolds RN  Outcome: Progressing     Problem: Knowledge Deficit  Goal: Patient/family/caregiver demonstrates understanding of disease process, treatment plan, medications, and discharge instructions  Description  Complete learning assessment and assess knowledge base    Interventions:  - Provide teaching at level of understanding  - Provide teaching via preferred learning methods  7/5/2019 0945 by Thelma Reynolds RN  Outcome: Progressing  7/5/2019 0945 by Thelma Reynolds RN  Outcome: Progressing

## 2019-07-05 NOTE — DISCHARGE SUMMARY
Discharge- Idania Ray 9/2/1931, 80 y o  female MRN: [id number]    Unit/Bed#: 423-01 Encounter: [id number]    Primary Care Provider: Yfn Fountain DO   Date and time admitted to hospital: 7/3/2019  9:34 PM        * Acute metabolic encephalopathy  Assessment & Plan  · Present on admission  · Likely secondary to acute cystitis  · The acute metabolic encephalopathy resolved by the time of discharge    Acute cystitis with hematuria  Assessment & Plan  · The patient was treated with IV ceftriaxone during the hospitalization  · The patient was transitioned to cephalexin 500 mg PO every 12 hours for 3 additional days to complete the antibiotic course    Hypertensive urgency  Assessment & Plan  · The patient was initiated on amlodipine 5 mg PO BID, which will be continued upon discharge  · Continue lisinopril 10 mg PO Qdaily  · Change metoprolol to 75 mg PO BID instead of 50 mg PO TID  · PRN IV labetalol was utilized during the hospitalization  Outpatient follow-up with PCP in regards to this matter      Left eye pain  Assessment & Plan  · Resolved  · No acute abnormality observed on physical exam  · Outpatient follow-up with Ophthalmology if the left eye pain recurs    Hepatic steatosis  Assessment & Plan  · Outpatient surveillance imaging with her PCP  · Monitor the patient's LFT's (liver function tests) as an outpatient with her PCP    Multiple renal cysts  Assessment & Plan  · Outpatient surveillance imaging    Lung mass  Assessment & Plan  · Outpatient follow-up with Pulmonology for surveillance imaging    Hyponatremia  Assessment & Plan  · Likely hypovolemic hyponatremia  · Resolved with NSS IV fluids  · Follow the sodium level after discharge with her PCP    I updated the patient's son on the telephone, who was in agreement with the discharge plan      Discharging Physician / Practitioner: Paula Reyna DO  PCP: Yfn Fountain DO  Admission Date:   Admission Orders (From admission, onward)    Ordered 07/04/19 0037  Inpatient Admission  Once             Discharge Date: 07/05/19    Consultations During Hospital Stay:  · None    Procedures Performed:   · None    Significant Findings / Test Results:   Ct Head Without Contrast    Result Date: 7/3/2019  Impression: No acute intracranial abnormality  Workstation performed: [id number]     Us Kidney And Bladder    Result Date: 7/5/2019  Impression: 1  No hydronephrosis  2  Left renal cysts including a minimally complicated 1 2 cm lateral midpole cyst, possibly hemorrhagic or proteinaceous cyst  Follow-up ultrasound in 6-9 months recommended  3  Fatty liver  Workstation performed: [id number]     Microbiology Results (last 21 days)     Procedure Component Value - Date/Time   Urine culture [[id number]] Collected: 07/03/19 2209   Lab Status: Preliminary result Specimen: Urine, Straight Cath Updated: 07/05/19 9836    Urine Culture Culture too young- will reincubate       Incidental Findings:   · None     Test Results Pending at Discharge (will require follow up):   · Urine culture     Outpatient Tests Requested:  · CBC with diff , CMP, Mag, Phos in 5 days    Complications:  None    Reason for Admission:  Acute metabolic encephalopathy and acute cystitis    Hospital Course:     Idania Ray is a 80 y o  female patient who originally presented to the hospital on 7/3/2019 due to confusion, generalized weakness, and left eye pain  Please see above list of diagnoses and related plan for additional information  Condition at Discharge: good     Discharge Day Visit / Exam:     Subjective: The patient was seen and examined  The patient is doing better  The confusion has resolved  The left eye pain has resolved  No chest pain  No shortness of breath  No abdominal pain  No nausea or vomiting      Vitals: Blood Pressure: 156/67 (07/05/19 1048)  Pulse: 57 (07/05/19 1048)  Temperature: 98 3 °F (36 8 °C) (07/05/19 0710)  Temp Source: Temporal (07/05/19 0710)  Respirations: 16 (07/05/19 0710)  Height: 5' 4" (162 6 cm) (07/04/19 0157)  Weight - Scale: 55 2 kg (121 lb 11 1 oz) (07/05/19 0600)  SpO2: 90 % (07/05/19 0710)  Exam:   Physical Exam  General:  NAD, awake, alert, follows commands  HEENT:  NC/AT, mucous membranes moist  Neck:  Supple, No JVP elevation  CV:  + S1, + S2, Bradycardic, Regular rhythm  Pulm:  Lung fields are CTA bilaterally  Abd:  Soft, Non-tender, Non-distended  Ext:  No clubbing/cyanosis/edema  Skin:  No rashes    Discharge instructions/Information to patient and family:   See after visit summary for information provided to patient and family  Provisions for Follow-Up Care:  See after visit summary for information related to follow-up care and any pertinent home health orders  Disposition:     Other: [address] [employer]    Planned Readmission:  No     Discharge Statement:  I spent 35 minutes discharging the patient  This time was spent on the day of discharge  I had direct contact with the patient on the day of discharge  Greater than 50% of the total time was spent examining patient, answering all patient questions, arranging and discussing plan of care with patient as well as directly providing post-discharge instructions  Additional time then spent on discharge activities  Discharge Medications:  See after visit summary for reconciled discharge medications provided to patient and family        ** Please Note: This note has been constructed using a voice recognition system **

## 2019-07-06 LAB — BACTERIA UR CULT: ABNORMAL

## 2019-07-08 ENCOUNTER — TRANSITIONAL CARE MANAGEMENT (OUTPATIENT)
Dept: INTERNAL MEDICINE CLINIC | Facility: CLINIC | Age: 84
End: 2019-07-08

## 2019-07-08 DIAGNOSIS — F51.01 PRIMARY INSOMNIA: Primary | ICD-10-CM

## 2019-07-08 RX ORDER — CLONAZEPAM 0.5 MG/1
1.5 TABLET, ORALLY DISINTEGRATING ORAL
Qty: 90 TABLET | Refills: 0 | Status: SHIPPED | OUTPATIENT
Start: 2019-07-08 | End: 2019-09-24

## 2019-07-09 PROBLEM — I49.5 SICK SINUS SYNDROME (HCC): Status: ACTIVE | Noted: 2019-07-09

## 2019-07-10 ENCOUNTER — OFFICE VISIT (OUTPATIENT)
Dept: INTERNAL MEDICINE CLINIC | Facility: CLINIC | Age: 84
End: 2019-07-10
Payer: MEDICARE

## 2019-07-10 VITALS
TEMPERATURE: 97.9 F | BODY MASS INDEX: 21.17 KG/M2 | HEART RATE: 71 BPM | OXYGEN SATURATION: 96 % | HEIGHT: 64 IN | DIASTOLIC BLOOD PRESSURE: 78 MMHG | SYSTOLIC BLOOD PRESSURE: 157 MMHG | RESPIRATION RATE: 18 BRPM | WEIGHT: 124 LBS

## 2019-07-10 DIAGNOSIS — R91.1 LUNG NODULE < 6CM ON CT: Primary | ICD-10-CM

## 2019-07-10 DIAGNOSIS — G43.B0 OPHTHALMOPLEGIC MIGRAINE, NOT INTRACTABLE: ICD-10-CM

## 2019-07-10 DIAGNOSIS — N30.00 ACUTE CYSTITIS WITHOUT HEMATURIA: ICD-10-CM

## 2019-07-10 PROCEDURE — 99496 TRANSJ CARE MGMT HIGH F2F 7D: CPT | Performed by: INTERNAL MEDICINE

## 2019-07-10 RX ORDER — IBUPROFEN 200 MG
200 TABLET ORAL DAILY PRN
Qty: 1 TABLET | Refills: 0 | Status: SHIPPED | OUTPATIENT
Start: 2019-07-10 | End: 2020-10-05

## 2019-07-10 NOTE — PROGRESS NOTES
Assessment/Plan:     No problem-specific Assessment & Plan notes found for this encounter  Diagnoses and all orders for this visit:    Lung nodule < 6cm on CT  -     CT chest w contrast; Future    Ophthalmoplegic migraine, not intractable  -     ibuprofen (MOTRIN) 200 mg tablet; Take 1 tablet (200 mg total) by mouth daily as needed for mild pain (Migraine HA) for up to 30 doses    Acute cystitis without hematuria         Subjective:     Patient ID: Aaron Jacome is a 80 y o  female  The patient was seen and examined and noted to have issues with an change of mental status  The patient BP was noted to be elevated and she noted discomfort behind the left eye  She went to the ER and was given Rocephin IV and subsequently  She improved and was discharged she was noted to have lesions in the left kidney described as complex cysts and these will need to be re-evaluated  The patient is also interest in follow up on the 2018 CT of the chest       Review of Systems   Constitutional: Negative for chills, fatigue and fever  HENT: Negative for postnasal drip, sinus pressure and sneezing  Respiratory: Negative for cough, choking and shortness of breath  Cardiovascular: Positive for chest pain  Negative for leg swelling  Gastrointestinal: Negative for abdominal pain, constipation, diarrhea and nausea  Musculoskeletal: Positive for arthralgias, joint swelling and myalgias  Neurological: Negative  Psychiatric/Behavioral: Negative  Objective:     Physical Exam   Constitutional: She is oriented to person, place, and time  She appears well-developed and well-nourished  HENT:   Head: Atraumatic  Eyes: Pupils are equal, round, and reactive to light  EOM are normal    Neck: Normal range of motion  Neck supple  Cardiovascular: Normal rate, regular rhythm, normal heart sounds and intact distal pulses  Exam reveals no gallop and no friction rub  No murmur heard    Pulmonary/Chest: Effort normal and breath sounds normal  No respiratory distress  She has no wheezes  She has no rales  Abdominal: Soft  Bowel sounds are normal  She exhibits no distension and no mass  There is no guarding  Musculoskeletal: Normal range of motion  She exhibits no edema or tenderness  Neurological: She is alert and oriented to person, place, and time  Skin: Skin is warm and dry  Nursing note and vitals reviewed  Vitals:    07/10/19 1040   BP: 157/78   BP Location: Left arm   Patient Position: Sitting   Cuff Size: Adult   Pulse: 71   Resp: 18   Temp: 97 9 °F (36 6 °C)   TempSrc: Tympanic   SpO2: 96%   Weight: 56 2 kg (124 lb)   Height: 5' 4" (1 626 m)       Transitional Care Management Review:  Jong Hillman is a 80 y o  female here for TCM follow up  During the TCM phone call patient stated:    TCM Call (since 6/9/2019)     Date and time call was made  7/8/2019 12:54 PM    Hospital care reviewed  Records reviewed    Patient was hospitialized at  [address]    Date of Admission  07/03/19    Date of discharge  07/05/19    Diagnosis  Acute Metabolic Encephalopathy    Disposition  Nursing Home    Were the patients medications reviewed and updated  Yes    Current Symptoms  None      TCM Call (since 6/9/2019)     Post hospital issues  None    Should patient be enrolled in anticoag monitoring? No    Scheduled for follow up?   Yes    Patients specialists  Pulmonlolgist    Pulmonologist name  Augustine White    Pulmonologist contact #  [phone]    Endocrinologist name  [phone]    Did you obtain your prescribed medications  Yes    Do you need help managing your prescriptions or medications  Yes    Why type of assitance do you need  nursing home    Is transportation to your appointment needed  Yes    Specify why  does not drive    I have advised the patient to call PCP with any new or worsening symptoms  antonia/ma    Are you recieving any outpatient services  No    Are you recieving home care services  No    Are you using any community resources  No    Current waiver services  No    Have you fallen in the last 12 months  No    Interperter language line needed  No    Counseling  Patient; Caregiver    Counseling topics  Diagnostic results; Prognosis; instructions for management; Risk factor reduction; Importance of RX compliance;  Activities of daily living          Felisha Cheese, DO

## 2019-07-11 ENCOUNTER — OFFICE VISIT (OUTPATIENT)
Dept: CARDIOLOGY CLINIC | Facility: CLINIC | Age: 84
End: 2019-07-11
Payer: MEDICARE

## 2019-07-11 VITALS
HEART RATE: 60 BPM | HEIGHT: 63 IN | DIASTOLIC BLOOD PRESSURE: 68 MMHG | WEIGHT: 126 LBS | BODY MASS INDEX: 22.32 KG/M2 | SYSTOLIC BLOOD PRESSURE: 140 MMHG

## 2019-07-11 DIAGNOSIS — I10 BENIGN ESSENTIAL HTN: ICD-10-CM

## 2019-07-11 DIAGNOSIS — I47.1 SVT (SUPRAVENTRICULAR TACHYCARDIA) (HCC): ICD-10-CM

## 2019-07-11 DIAGNOSIS — R91.8 LUNG MASS: Primary | ICD-10-CM

## 2019-07-11 DIAGNOSIS — R00.1 SINUS BRADYCARDIA: Primary | ICD-10-CM

## 2019-07-11 PROCEDURE — 99214 OFFICE O/P EST MOD 30 MIN: CPT | Performed by: INTERNAL MEDICINE

## 2019-07-11 NOTE — PROGRESS NOTES
Patient ID: Claudene Gaudy is a 80 y o  female  Plan:      Sinus bradycardia  No longer with episodic sxs  SVT (supraventricular tachycardia) (HCC)  No recurrence  No clear afib  Even if so, balance is limited and I would hesitate to add a/c  Benign essential HTN  Reasonably well controlled  Follow up Plan: At this point I will see her again on a p r n  Basis  The main issue going forward is to avoid falling  All reviewed with the patient and her son  HPI:  The patient is seen in follow-up today regarding history of hypertension, bradycardia, and tachycard home  ia  No recent chest pain or chest pressure  She had a recent admission for confusion associated with the urinary tract infection  No syncope but her balance is fair at best   She resides in a personal care      No results found for this visit on 07/11/19  Most recent or relevant cardiac/vascular testing:    Echocardiogram 3/20/2018:  Mild aortic stenosis  Normal ejection fraction  Myoview 03/14/2006:  Normal       Past Surgical History:   Procedure Laterality Date    CATARACT EXTRACTION      HEMORRHOID SURGERY      HYSTERECTOMY      PELVIC FLOOR REPAIR       CMP:   Lab Results   Component Value Date     06/29/2015    K 4 1 07/05/2019    K 4 5 06/29/2015     07/05/2019    CL 97 (L) 06/29/2015    CO2 28 07/05/2019    CO2 30 3 06/29/2015    BUN 10 07/05/2019    BUN 12 06/29/2015    CREATININE 0 90 07/05/2019    CREATININE 0 89 06/29/2015    GLUCOSE 165 (H) 06/29/2015    EGFR 58 07/05/2019       Lipid Profile:   Lab Results   Component Value Date    CHOL 164 01/08/2015    TRIG 406 (H) 03/14/2018    TRIG 88 01/08/2015    HDL 37 (L) 03/14/2018    HDL 61 01/08/2015         Review of Systems   10  point ROS  was otherwise non pertinent or negative except as per HPI or as below  Gait:  Fair  Uses a cane when she remembers          Objective:     /68   Pulse 60 Comment: regular  Ht 5' 3" (1 6 m)   Wt 57 2 kg (126 lb)   BMI 22 32 kg/m²     PHYSICAL EXAM:    General:  Normal appearance in no distress  Eyes:  Anicteric  Oral mucosa:  Moist   Neck:  No JVD  Carotid upstrokes are brisk without bruits  No masses  Chest:  Clear to auscultation and percussion  Cardiac:  Normal PMI  Normal S1 and S2   Grade 1-2 systolic murmur loudest at the base  Abdomen:  Soft and nontender  No palpable organomegaly or aortic enlargement  Extremities:  No peripheral edema  Musculoskeletal:  Symmetric  Vascular:  Femoral pulses are brisk without bruits  Popliteal pulses are intact bilaterally  Pedal pulses are intact  Neuro:  Grossly symmetric  Psych:  Alert and oriented x3          Current Outpatient Medications:     acetaminophen (TYLENOL) 325 mg tablet, Take 2 tablets (650 mg total) by mouth every 6 (six) hours as needed for mild pain, headaches or fever, Disp: , Rfl: 0    amLODIPine (NORVASC) 5 mg tablet, Take 1 tablet (5 mg total) by mouth 2 (two) times a day, Disp: 60 tablet, Rfl: 0    aspirin (ADULT ASPIRIN EC LOW STRENGTH) 81 mg EC tablet, Take 1 tablet (81 mg total) by mouth daily, Disp: 30 tablet, Rfl: 5    calcium carbonate (OYSTER SHELL,OSCAL) 500 mg, TAKE ONE TABLET BY MOUTH TWICE DAILY (SUPPLEMENT), Disp: 60 each, Rfl: 0    Cholecalciferol (VITAMIN D-3) 1000 units CAPS, Take 1 capsule (1,000 Units total) by mouth daily, Disp: 30 capsule, Rfl: 5    clonazePAM (KlonoPIN) 0 5 MG disintegrating tablet, Take 3 tablets (1 5 mg total) by mouth daily at bedtime for 30 days, Disp: 90 tablet, Rfl: 0    digoxin (LANOXIN) 0 125 mg tablet, TAKE ONE TABLET BY MOUTH ONCE DAILY (AFIB), Disp: 30 tablet, Rfl: 0    ibuprofen (MOTRIN) 200 mg tablet, Take 1 tablet (200 mg total) by mouth daily as needed for mild pain (Migraine HA) for up to 30 doses, Disp: 1 tablet, Rfl: 0    lisinopril (ZESTRIL) 10 mg tablet, Take 1 tablet (10 mg total) by mouth daily, Disp: 30 tablet, Rfl: 5    magnesium hydroxide (MILK OF MAGNESIA) 800 MG/5ML suspension, Take 30 mL by mouth daily as needed for constipation, Disp: , Rfl:     Melatonin 5 MG TABS, Take 1 tablet (5 mg total) by mouth daily at bedtime, Disp: 30 tablet, Rfl: 5    metoprolol tartrate (LOPRESSOR) 50 mg tablet, Take 1 5 tablets (75 mg total) by mouth every 12 (twelve) hours, Disp: 90 tablet, Rfl: 0    Omega-3 Fatty Acids (FISH OIL) 1,000 mg, Take 1,000 mg by mouth daily, Disp: , Rfl:     pantoprazole (PROTONIX) 40 mg tablet, Take 1 tablet (40 mg total) by mouth daily before breakfast One Tab PO Q11 AM, Disp: , Rfl: 0    polyethylene glycol (GLYCOLAX) powder, Take 17 g by mouth daily, Disp: 578 g, Rfl: 0    ranitidine (ZANTAC) 150 mg tablet, TAKE ONE TABLET BY MOUTH AT BEDTIME (GERD), Disp: 30 tablet, Rfl: 0    senna (SENOKOT) 8 6 mg, Take 1 tablet (8 6 mg total) by mouth daily at bedtime Hold for diarrhea or loose stools, Disp: 30 tablet, Rfl: 0  Allergies   Allergen Reactions    Codeine GI Intolerance    Epinephrine Other (See Comments)     Increased Heart Rate, Palpitations    Iodinated Diagnostic Agents     Magnesium Citrate GI Intolerance     Past Medical History:   Diagnosis Date    Abdominal distension     Last Assessed: [id number]    Abnormal weight loss     Last Assessed: [id number]    Arthritis     Bronchitis     Bursitis of left hip     Lst Assessed: [id number]    Chest pain     Last Assessed: [id number]    Colon polyp     Dysuria     Last Assessed: [id number]    External hemorrhoids     Last Assessed: [id number]    Generalized anxiety disorder     GERD (gastroesophageal reflux disease)     History of echocardiogram 03/20/2018    EF 60%, mild to moderate mitral annular calcification  mild AS, mild TR       Hyperlipidemia     Hypertension     Hypomagnesemia     Last Assessed: [id number]    Lyme disease     Last Assessed: [id number]    Multiple renal cysts 7/5/2019    Nonrheumatic mitral valve regurgitation     Osteoporosis     Pneumonia of right middle lobe due to infectious organism [facility])     Last Assessed: [id number]    SVT (supraventricular tachycardia)     Urinary tract infection            Social History     Tobacco Use   Smoking Status Never Smoker   Smokeless Tobacco Never Used

## 2019-07-19 ENCOUNTER — HOSPITAL ENCOUNTER (OUTPATIENT)
Dept: CT IMAGING | Facility: HOSPITAL | Age: 84
Discharge: HOME | End: 2019-07-19

## 2019-07-19 ENCOUNTER — HOSPITAL ENCOUNTER (OUTPATIENT)
Dept: CT IMAGING | Facility: HOSPITAL | Age: 84
Discharge: HOME | End: 2019-07-19
Payer: MEDICARE

## 2019-07-19 DIAGNOSIS — R91.8 LUNG MASS: Primary | ICD-10-CM

## 2019-07-19 DIAGNOSIS — R91.8 LUNG MASS: ICD-10-CM

## 2019-07-19 DIAGNOSIS — R91.1 LUNG NODULE < 6CM ON CT: ICD-10-CM

## 2019-07-19 PROCEDURE — 71250 CT THORAX DX C-: CPT

## 2019-07-22 ENCOUNTER — TELEPHONE (OUTPATIENT)
Dept: INTERNAL MEDICINE CLINIC | Facility: CLINIC | Age: 84
End: 2019-07-22

## 2019-07-22 DIAGNOSIS — R39.9 UTI SYMPTOMS: Primary | ICD-10-CM

## 2019-07-22 RX ORDER — SULFAMETHOXAZOLE AND TRIMETHOPRIM 800; 160 MG/1; MG/1
1 TABLET ORAL EVERY 12 HOURS SCHEDULED
Qty: 14 TABLET | Refills: 0 | Status: SHIPPED | OUTPATIENT
Start: 2019-07-22 | End: 2019-07-29

## 2019-07-22 NOTE — TELEPHONE ENCOUNTER
I called juanj[facility]ral[facility] back and spoke to juma-she is aware now that the rx was sent in for bactrum   js

## 2019-07-22 NOTE — TELEPHONE ENCOUNTER
They send the results of a urine dip from yesterday-can you please look over this and let them know what to do  It was a trace of blood and high end leucocytes  Please call the nurses station-the famly is concerned-she is very confused and unsteady gait      [phone] for the nurse

## 2019-08-05 ENCOUNTER — TELEPHONE (OUTPATIENT)
Dept: INTERNAL MEDICINE CLINIC | Facility: CLINIC | Age: 84
End: 2019-08-05

## 2019-08-12 DIAGNOSIS — K59.03 DRUG-INDUCED CONSTIPATION: ICD-10-CM

## 2019-08-12 RX ORDER — SENNOSIDES 8.6 MG
1 TABLET ORAL
Qty: 30 TABLET | Refills: 5 | Status: SHIPPED | OUTPATIENT
Start: 2019-08-12 | End: 2020-02-26

## 2019-08-15 DIAGNOSIS — F41.1 GENERALIZED ANXIETY DISORDER: Primary | ICD-10-CM

## 2019-08-15 RX ORDER — CLONAZEPAM 0.5 MG/1
TABLET ORAL
Qty: 90 TABLET | Refills: 0 | Status: SHIPPED | OUTPATIENT
Start: 2019-08-15 | End: 2019-09-16 | Stop reason: SDUPTHER

## 2019-08-19 DIAGNOSIS — K59.04 CHRONIC IDIOPATHIC CONSTIPATION: ICD-10-CM

## 2019-08-19 RX ORDER — POLYETHYLENE GLYCOL 3350 17 G/17G
POWDER, FOR SOLUTION ORAL
Qty: 500 G | Refills: 5 | Status: SHIPPED | OUTPATIENT
Start: 2019-08-19 | End: 2020-04-27

## 2019-08-21 DIAGNOSIS — N30.00 ACUTE CYSTITIS WITHOUT HEMATURIA: Primary | ICD-10-CM

## 2019-08-21 RX ORDER — SULFAMETHOXAZOLE AND TRIMETHOPRIM 800; 160 MG/1; MG/1
1 TABLET ORAL EVERY 12 HOURS SCHEDULED
Qty: 14 TABLET | Refills: 0 | Status: SHIPPED | OUTPATIENT
Start: 2019-08-21 | End: 2019-08-28

## 2019-08-30 DIAGNOSIS — K21.9 GASTROESOPHAGEAL REFLUX DISEASE, ESOPHAGITIS PRESENCE NOT SPECIFIED: ICD-10-CM

## 2019-08-30 RX ORDER — RANITIDINE 150 MG/1
TABLET ORAL
Qty: 30 TABLET | Refills: 0 | Status: SHIPPED | OUTPATIENT
Start: 2019-08-30 | End: 2019-09-26 | Stop reason: SDUPTHER

## 2019-08-31 ENCOUNTER — OFFICE VISIT (OUTPATIENT)
Dept: URGENT CARE | Facility: CLINIC | Age: 84
End: 2019-08-31
Payer: MEDICARE

## 2019-08-31 VITALS
HEIGHT: 63 IN | DIASTOLIC BLOOD PRESSURE: 70 MMHG | RESPIRATION RATE: 16 BRPM | WEIGHT: 126 LBS | SYSTOLIC BLOOD PRESSURE: 161 MMHG | HEART RATE: 61 BPM | OXYGEN SATURATION: 97 % | TEMPERATURE: 97.8 F | BODY MASS INDEX: 22.32 KG/M2

## 2019-08-31 DIAGNOSIS — R31.9 URINARY TRACT INFECTION WITH HEMATURIA, SITE UNSPECIFIED: Primary | ICD-10-CM

## 2019-08-31 DIAGNOSIS — N39.0 URINARY TRACT INFECTION WITH HEMATURIA, SITE UNSPECIFIED: Primary | ICD-10-CM

## 2019-08-31 DIAGNOSIS — R30.0 DYSURIA: ICD-10-CM

## 2019-08-31 LAB
SL AMB  POCT GLUCOSE, UA: ABNORMAL
SL AMB LEUKOCYTE ESTERASE,UA: ABNORMAL
SL AMB POCT BILIRUBIN,UA: ABNORMAL
SL AMB POCT BLOOD,UA: ABNORMAL
SL AMB POCT CLARITY,UA: ABNORMAL
SL AMB POCT COLOR,UA: YELLOW
SL AMB POCT KETONES,UA: ABNORMAL
SL AMB POCT NITRITE,UA: ABNORMAL
SL AMB POCT PH,UA: 6.5
SL AMB POCT SPECIFIC GRAVITY,UA: 1
SL AMB POCT URINE PROTEIN: ABNORMAL
SL AMB POCT UROBILINOGEN: 0.2

## 2019-08-31 PROCEDURE — G0463 HOSPITAL OUTPT CLINIC VISIT: HCPCS | Performed by: NURSE PRACTITIONER

## 2019-08-31 PROCEDURE — 87077 CULTURE AEROBIC IDENTIFY: CPT | Performed by: NURSE PRACTITIONER

## 2019-08-31 PROCEDURE — 87086 URINE CULTURE/COLONY COUNT: CPT | Performed by: NURSE PRACTITIONER

## 2019-08-31 PROCEDURE — 99213 OFFICE O/P EST LOW 20 MIN: CPT | Performed by: NURSE PRACTITIONER

## 2019-08-31 PROCEDURE — 87186 SC STD MICRODIL/AGAR DIL: CPT | Performed by: NURSE PRACTITIONER

## 2019-08-31 RX ORDER — CEPHALEXIN 500 MG/1
500 CAPSULE ORAL 3 TIMES DAILY
Qty: 21 CAPSULE | Refills: 0 | Status: SHIPPED | OUTPATIENT
Start: 2019-08-31 | End: 2019-09-07

## 2019-08-31 NOTE — PROGRESS NOTES
[facility]        NAME: Coby Johns is a 80 y o  female  : 1931    MRN: [id number]  DATE: 2019  TIME: 12:59 PM    Assessment and Plan   Urinary tract infection with hematuria, site unspecified [N39 0, R31 9]  1  Urinary tract infection with hematuria, site unspecified  cephalexin (KEFLEX) 500 mg capsule   2  Dysuria  POCT urine dip auto non-scope    Urine culture         Patient Instructions     Patient Instructions   Take the keflex as ordered until completed  We will send the urine for culture, but will only call you if an antibiotic change would be needed  Urinary Traction Infection in Older Adults   AMBULATORY CARE:   A urinary tract infection  (UTI) is caused by bacteria that get inside your urinary tract  Your urinary tract includes your kidneys, ureters, bladder, and urethra  Urine is made in your kidneys, and it flows from the ureters to the bladder  Urine leaves the bladder through the urethra  A UTI is more common in your lower urinary tract, which includes your bladder and urethra  Common signs and symptoms include the following:   · Fever and chills    · Pain or burning when you urinate    · Urine that smells bad or looks cloudy, or blood in your urine    · Urinating more often or waking from sleep to urinate    · Sudden, strong need to urinate    · Pain or pressure in your lower abdomen     · Leaking urine    · Confusion or agitation    · Fatigue, shakiness, and weakness  Seek care immediately if:   · You are urinating very little or not at all  · You are vomiting  · You have a high fever with shaking chills  · You have side or back pain that gets worse  Contact your healthcare provider if:   · You have a fever  · You are a woman and you have increased white or yellow discharge from your vagina  · You do not feel better after 2 days of taking antibiotics  · You have questions or concerns about your condition or care    Treatment:  Medicines treat the bacterial infection or decrease pain and burning when you urinate  You may also need medicines to decrease the urge to urinate often  Your healthcare provider may recommend cranberry juice or cranberry supplements to help decrease your symptoms  Self-care:   · Urinate when you feel the urge  Do not hold your urine because bacteria can grow in the bladder if urine stays in the bladder too long  It may be helpful to urinate at least every 3 to 4 hours  · Drink liquids as directed  Liquids can help flush bacteria from your urinary tract  Ask how much liquid to drink each day and which liquids are best for you  You may need to drink more liquids than usual to help flush out the bacteria  Do not drink alcohol, caffeine, and citrus juices  These can irritate your bladder and increase your symptoms  · Apply heat  on your abdomen for 20 to 30 minutes every 2 hours for as many days as directed  Heat helps decrease discomfort and pressure in your bladder  Prevent a UTI:   · Women should wipe front to back  after urinating or having a bowel movement  This may prevent germs from getting into the urinary tract  · Urinate after you have sex  to flush away bacteria that can enter your urinary tract during sex  · Wear cotton underwear and clothes that fit loose  Tight pants and nylon underwear can trap moisture and cause bacteria to grow  Follow up with your healthcare provider as directed:  Write down your questions so you remember to ask them during your visits  © 2017 [address] [employer] Information is for End User's use only and may not be sold, redistributed or otherwise used for commercial purposes  All illustrations and images included in CareNotes® are the copyrighted property of A D [employer] M [employer]  or Kai Meneses  The above information is an  only  It is not intended as medical advice for individual conditions or treatments   Talk to your doctor, nurse or pharmacist before following any medical regimen to see if it is safe and effective for you  Follow up with PCP in 3-5 days  Proceed to  ER if symptoms worsen  Chief Complaint     Chief Complaint   Patient presents with    Possible UTI     x 2 weeks         History of Present Illness       Patient with a history of recurrent UTIs  She just completed Bactrim that was ordered on August 21st, but she states that her urine still has an odor--she states this is always how she knows she has a UTI  She denies burning, frequency, urgency etc       Review of Systems   Review of Systems   Genitourinary: Negative for dysuria, frequency, pelvic pain and urgency  Reports odor with urination   All other systems reviewed and are negative          Current Medications       Current Outpatient Medications:     acetaminophen (TYLENOL) 325 mg tablet, Take 2 tablets (650 mg total) by mouth every 6 (six) hours as needed for mild pain, headaches or fever, Disp: , Rfl: 0    amLODIPine (NORVASC) 5 mg tablet, Take 1 tablet (5 mg total) by mouth 2 (two) times a day, Disp: 60 tablet, Rfl: 0    aspirin (ADULT ASPIRIN EC LOW STRENGTH) 81 mg EC tablet, Take 1 tablet (81 mg total) by mouth daily, Disp: 30 tablet, Rfl: 5    calcium carbonate (OYSTER SHELL,OSCAL) 500 mg, TAKE ONE TABLET BY MOUTH TWICE DAILY (SUPPLEMENT), Disp: 60 each, Rfl: 0    Cholecalciferol (VITAMIN D-3) 1000 units CAPS, Take 1 capsule (1,000 Units total) by mouth daily, Disp: 30 capsule, Rfl: 5    clonazePAM (KlonoPIN) 0 5 mg tablet, TAKE THREE TABLETS (1 5MG) BY MOUTH AT BEDTIME (ANXIETY), Disp: 90 tablet, Rfl: 0    digoxin (LANOXIN) 0 125 mg tablet, TAKE ONE TABLET BY MOUTH ONCE DAILY (AFIB), Disp: 30 tablet, Rfl: 0    ibuprofen (MOTRIN) 200 mg tablet, Take 1 tablet (200 mg total) by mouth daily as needed for mild pain (Migraine HA) for up to 30 doses, Disp: 1 tablet, Rfl: 0    lisinopril (ZESTRIL) 10 mg tablet, Take 1 tablet (10 mg total) by mouth daily, Disp: 30 tablet, Rfl: 5    magnesium hydroxide (MILK OF MAGNESIA) 800 MG/5ML suspension, Take 30 mL by mouth daily as needed for constipation, Disp: , Rfl:     Melatonin 5 MG TABS, Take 1 tablet (5 mg total) by mouth daily at bedtime, Disp: 30 tablet, Rfl: 5    metoprolol tartrate (LOPRESSOR) 50 mg tablet, Take 1 5 tablets (75 mg total) by mouth every 12 (twelve) hours, Disp: 90 tablet, Rfl: 0    Omega-3 Fatty Acids (FISH OIL) 1,000 mg, Take 1,000 mg by mouth daily, Disp: , Rfl:     pantoprazole (PROTONIX) 40 mg tablet, Take 1 tablet (40 mg total) by mouth daily before breakfast One Tab PO Q11 AM, Disp: , Rfl: 0    polyethylene glycol (GLYCOLAX) powder, DISSOLVE 17GRAMS (ONE CAPFUL) IN 8 OUNCES OF WATER AND DRINK BY MOUTHDAILY FOR CONSTIPATION, Disp: 500 g, Rfl: 5    ranitidine (ZANTAC) 150 mg tablet, TAKE ONE TABLET BY MOUTH AT BEDTIME (GERD), Disp: 30 tablet, Rfl: 0    senna (SENOKOT) 8 6 mg, Take 1 tablet (8 6 mg total) by mouth daily at bedtime Hold for diarrhea or loose stools, Disp: 30 tablet, Rfl: 5    cephalexin (KEFLEX) 500 mg capsule, Take 1 capsule (500 mg total) by mouth 3 (three) times a day for 7 days, Disp: 21 capsule, Rfl: 0    clonazePAM (KlonoPIN) 0 5 MG disintegrating tablet, Take 3 tablets (1 5 mg total) by mouth daily at bedtime for 30 days, Disp: 90 tablet, Rfl: 0    Current Allergies     Allergies as of 08/31/2019 - Reviewed 08/31/2019   Allergen Reaction Noted    Codeine GI Intolerance 06/19/2013    Epinephrine Other (See Comments) 06/19/2013    Iodinated diagnostic agents  11/23/2016    Magnesium citrate GI Intolerance 06/26/2013            The following portions of the patient's history were reviewed and updated as appropriate: allergies, current medications, past family history, past medical history, past social history, past surgical history and problem list      Past Medical History:   Diagnosis Date    Abdominal distension     Last Assessed: [id number]    Abnormal weight loss     Last Assessed: [id number]    Arthritis     Bronchitis     Bursitis of left hip     Lst Assessed: [id number]    Chest pain     Last Assessed: [id number]    Colon polyp     Dysuria     Last Assessed: [id number]    External hemorrhoids     Last Assessed: [id number]    Generalized anxiety disorder     GERD (gastroesophageal reflux disease)     History of echocardiogram 03/20/2018    EF 60%, mild to moderate mitral annular calcification  mild AS, mild TR   Hyperlipidemia     Hypertension     Hypomagnesemia     Last Assessed: [id number]    Lyme disease     Last Assessed: [id number]    Multiple renal cysts 7/5/2019    Nonrheumatic mitral valve regurgitation     Osteoporosis     Pneumonia of right middle lobe due to infectious organism [facility])     Last Assessed: [id number]    SVT (supraventricular tachycardia)     Urinary tract infection        Past Surgical History:   Procedure Laterality Date    CATARACT EXTRACTION      HEMORRHOID SURGERY      HYSTERECTOMY      PELVIC FLOOR REPAIR         Family History   Adopted: Yes         Medications have been verified  Objective   /70   Pulse 61   Temp 97 8 °F (36 6 °C)   Resp 16   Ht 5' 3" (1 6 m)   Wt 57 2 kg (126 lb)   SpO2 97%   BMI 22 32 kg/m²        Physical Exam     Physical Exam   Constitutional: She is oriented to person, place, and time  She appears well-developed and well-nourished  No distress  HENT:   Head: Normocephalic and atraumatic  Abdominal: Soft  She exhibits no distension  There is no tenderness  There is no CVA tenderness  Neurological: She is alert and oriented to person, place, and time  Skin: Skin is warm and dry  Capillary refill takes less than 2 seconds  She is not diaphoretic  Psychiatric: She has a normal mood and affect  Her behavior is normal  Judgment and thought content normal    Nursing note and vitals reviewed

## 2019-08-31 NOTE — PATIENT INSTRUCTIONS
Take the keflex as ordered until completed  We will send the urine for culture, but will only call you if an antibiotic change would be needed  Urinary Traction Infection in Older Adults   AMBULATORY CARE:   A urinary tract infection  (UTI) is caused by bacteria that get inside your urinary tract  Your urinary tract includes your kidneys, ureters, bladder, and urethra  Urine is made in your kidneys, and it flows from the ureters to the bladder  Urine leaves the bladder through the urethra  A UTI is more common in your lower urinary tract, which includes your bladder and urethra  Common signs and symptoms include the following:   · Fever and chills    · Pain or burning when you urinate    · Urine that smells bad or looks cloudy, or blood in your urine    · Urinating more often or waking from sleep to urinate    · Sudden, strong need to urinate    · Pain or pressure in your lower abdomen     · Leaking urine    · Confusion or agitation    · Fatigue, shakiness, and weakness  Seek care immediately if:   · You are urinating very little or not at all  · You are vomiting  · You have a high fever with shaking chills  · You have side or back pain that gets worse  Contact your healthcare provider if:   · You have a fever  · You are a woman and you have increased white or yellow discharge from your vagina  · You do not feel better after 2 days of taking antibiotics  · You have questions or concerns about your condition or care  Treatment:  Medicines treat the bacterial infection or decrease pain and burning when you urinate  You may also need medicines to decrease the urge to urinate often  Your healthcare provider may recommend cranberry juice or cranberry supplements to help decrease your symptoms  Self-care:   · Urinate when you feel the urge  Do not hold your urine because bacteria can grow in the bladder if urine stays in the bladder too long   It may be helpful to urinate at least every 3 to 4 hours      · Drink liquids as directed  Liquids can help flush bacteria from your urinary tract  Ask how much liquid to drink each day and which liquids are best for you  You may need to drink more liquids than usual to help flush out the bacteria  Do not drink alcohol, caffeine, and citrus juices  These can irritate your bladder and increase your symptoms  · Apply heat  on your abdomen for 20 to 30 minutes every 2 hours for as many days as directed  Heat helps decrease discomfort and pressure in your bladder  Prevent a UTI:   · Women should wipe front to back  after urinating or having a bowel movement  This may prevent germs from getting into the urinary tract  · Urinate after you have sex  to flush away bacteria that can enter your urinary tract during sex  · Wear cotton underwear and clothes that fit loose  Tight pants and nylon underwear can trap moisture and cause bacteria to grow  Follow up with your healthcare provider as directed:  Write down your questions so you remember to ask them during your visits  © 2017 [facility] Information is for End User's use only and may not be sold, redistributed or otherwise used for commercial purposes  All illustrations and images included in CareNotes® are the copyrighted property of [employer] M , Inc  or Kai Meneses  The above information is an  only  It is not intended as medical advice for individual conditions or treatments  Talk to your doctor, nurse or pharmacist before following any medical regimen to see if it is safe and effective for you

## 2019-09-03 DIAGNOSIS — I48.91 ATRIAL FIBRILLATION, UNSPECIFIED TYPE (HCC): ICD-10-CM

## 2019-09-03 LAB — BACTERIA UR CULT: ABNORMAL

## 2019-09-03 RX ORDER — DIGOXIN 125 MCG
TABLET ORAL
Qty: 30 TABLET | Refills: 0 | Status: SHIPPED | OUTPATIENT
Start: 2019-09-03 | End: 2019-11-13 | Stop reason: SDUPTHER

## 2019-09-05 DIAGNOSIS — M15.9 GENERALIZED OSTEOARTHRITIS OF MULTIPLE SITES: Primary | ICD-10-CM

## 2019-09-06 DIAGNOSIS — E78.00 HYPERCHOLESTEROLEMIA: Primary | ICD-10-CM

## 2019-09-06 RX ORDER — CHLORAL HYDRATE 500 MG
1000 CAPSULE ORAL DAILY
Qty: 30 CAPSULE | Refills: 5 | Status: SHIPPED | OUTPATIENT
Start: 2019-09-06 | End: 2020-02-24

## 2019-09-16 DIAGNOSIS — F41.1 GENERALIZED ANXIETY DISORDER: ICD-10-CM

## 2019-09-16 RX ORDER — CLONAZEPAM 0.5 MG/1
TABLET ORAL
Qty: 90 TABLET | Refills: 4 | Status: SHIPPED | OUTPATIENT
Start: 2019-09-16 | End: 2020-02-10

## 2019-09-16 NOTE — TELEPHONE ENCOUNTER
08/15/2019  1   08/15/2019  Clonazepam 0 5 MG Tablet  90 00 30 Da O'n  [id number]   Adali ([id number])  0   [place]  [place]   07/09/2019  1   07/05/2019  Clonazepam 0 5 MG Tablet  90 00 30 Da O'n  [id number]   Adali ([id number])  0   [place]     Reviewed PDMP script appropriate

## 2019-09-17 DIAGNOSIS — K21.9 GASTROESOPHAGEAL REFLUX DISEASE, ESOPHAGITIS PRESENCE NOT SPECIFIED: ICD-10-CM

## 2019-09-17 DIAGNOSIS — N30.00 ACUTE CYSTITIS WITHOUT HEMATURIA: Primary | ICD-10-CM

## 2019-09-17 RX ORDER — SULFAMETHOXAZOLE AND TRIMETHOPRIM 800; 160 MG/1; MG/1
1 TABLET ORAL EVERY 12 HOURS SCHEDULED
Qty: 14 TABLET | Refills: 0 | Status: SHIPPED | OUTPATIENT
Start: 2019-09-17 | End: 2019-09-24

## 2019-09-17 RX ORDER — PANTOPRAZOLE SODIUM 40 MG/1
40 TABLET, DELAYED RELEASE ORAL
Qty: 30 TABLET | Refills: 5 | Status: SHIPPED | OUTPATIENT
Start: 2019-09-17 | End: 2020-03-11

## 2019-09-24 ENCOUNTER — OFFICE VISIT (OUTPATIENT)
Dept: INTERNAL MEDICINE CLINIC | Facility: CLINIC | Age: 84
End: 2019-09-24
Payer: MEDICARE

## 2019-09-24 VITALS
RESPIRATION RATE: 14 BRPM | WEIGHT: 131 LBS | TEMPERATURE: 98.4 F | SYSTOLIC BLOOD PRESSURE: 152 MMHG | HEART RATE: 70 BPM | BODY MASS INDEX: 23.21 KG/M2 | HEIGHT: 63 IN | DIASTOLIC BLOOD PRESSURE: 60 MMHG

## 2019-09-24 DIAGNOSIS — I77.9 BILATERAL CAROTID ARTERY DISEASE, UNSPECIFIED TYPE (HCC): ICD-10-CM

## 2019-09-24 DIAGNOSIS — R07.81 RIB PAIN ON LEFT SIDE: Primary | ICD-10-CM

## 2019-09-24 PROCEDURE — 99215 OFFICE O/P EST HI 40 MIN: CPT | Performed by: INTERNAL MEDICINE

## 2019-09-24 NOTE — PROGRESS NOTES
Assessment/Plan:    Problem List Items Addressed This Visit        Cardiovascular and Mediastinum    Bilateral carotid artery disease ([facility] [address] )      Other Visit Diagnoses     Rib pain on left side    -  Primary    Relevant Orders    XR ribs 2 vw left           Diagnoses and all orders for this visit:    Rib pain on left side  -     XR ribs 2 vw left; Future    Bilateral carotid artery disease, unspecified type ([address] )        No problem-specific Assessment & Plan notes found for this encounter  Subjective:      Patient ID: Lisandra Trent is a 80 y o  female  The patient has multiple issues and states that she is concerned regarding the recent run of UTI  She is finishing the last dose of bactrim today  She has concerns regarding the pain in the area of the left axilla around the left 6th rib  She states that there is pain radiating in the area  The patient states that she has pain the radiates around from the posterior axillary line to the anterior axillary line  The pain is short in duration, but bothers her  She has difficulty noting what causes or diminishes the pain  The patient state that she has taken nothing for this  The patient is very concerned regarding possible CA and we discussed the must recent PET scan that was negative  The entire visit was 50 minutes of which 30 minutes were spent discussing the above issues at length        The following portions of the patient's history were reviewed and updated as appropriate:   She has a past medical history of Abdominal distension, Abnormal weight loss, Arthritis, Bronchitis, Bursitis of left hip, Chest pain, Colon polyp, Dysuria, External hemorrhoids, Generalized anxiety disorder, GERD (gastroesophageal reflux disease), History of echocardiogram (03/20/2018), Hyperlipidemia, Hypertension, Hypomagnesemia, Lyme disease, Multiple renal cysts (7/5/2019), Nonrheumatic mitral valve regurgitation, Osteoporosis, Pneumonia of right middle lobe due to infectious organism [facility]), SVT (supraventricular tachycardia), and Urinary tract infection  ,  does not have any pertinent problems on file  ,   has a past surgical history that includes Cataract extraction; Hemorrhoid surgery; Hysterectomy; and Pelvic floor repair  ,  family history is not on file  She was adopted  ,   reports that she has never smoked  She has never used smokeless tobacco  She reports that she does not drink alcohol or use drugs  ,  is allergic to codeine; epinephrine; iodinated diagnostic agents; and magnesium citrate     Current Outpatient Medications   Medication Sig Dispense Refill    acetaminophen (TYLENOL) 325 mg tablet Take 2 tablets (650 mg total) by mouth every 6 (six) hours as needed for mild pain, headaches or fever  0    amLODIPine (NORVASC) 5 mg tablet Take 1 tablet (5 mg total) by mouth 2 (two) times a day 60 tablet 0    aspirin (ADULT ASPIRIN EC LOW STRENGTH) 81 mg EC tablet Take 1 tablet (81 mg total) by mouth daily 30 tablet 5    calcium carbonate (OYSTER SHELL,OSCAL) 500 mg TAKE ONE TABLET BY MOUTH TWICE DAILY (SUPPLEMENT) 60 each 0    Cholecalciferol (VITAMIN D-3) 1000 units CAPS Take 1 capsule (1,000 Units total) by mouth daily 30 capsule 5    clonazePAM (KlonoPIN) 0 5 mg tablet TAKE THREE TABLETS (1 5MG) BY MOUTH AT BEDTIME (ANXIETY) 90 tablet 4    digoxin (LANOXIN) 0 125 mg tablet TAKE ONE TABLET BY MOUTH ONCE DAILY (AFIB) 30 tablet 0    ibuprofen (MOTRIN) 200 mg tablet Take 1 tablet (200 mg total) by mouth daily as needed for mild pain (Migraine HA) for up to 30 doses 1 tablet 0    magnesium hydroxide (MILK OF MAGNESIA) 800 MG/5ML suspension Take 30 mL by mouth daily as needed for constipation      Melatonin 5 MG TABS Take 1 tablet (5 mg total) by mouth daily at bedtime 30 tablet 5    metoprolol tartrate (LOPRESSOR) 50 mg tablet Take 1 5 tablets (75 mg total) by mouth every 12 (twelve) hours 90 tablet 0    Omega-3 Fatty Acids (FISH OIL) 1,000 mg Take 1 capsule (1,000 mg total) by mouth daily 30 capsule 5    Oyster Shell 500 MG TABS Take 1 tablet (500 mg total) by mouth 2 (two) times a day 60 tablet 5    pantoprazole (PROTONIX) 40 mg tablet Take 1 tablet (40 mg total) by mouth daily before breakfast One Tab PO Q11 AM 30 tablet 5    polyethylene glycol (GLYCOLAX) powder DISSOLVE 17GRAMS (ONE CAPFUL) IN 8 OUNCES OF WATER AND DRINK BY MOUTHDAILY FOR CONSTIPATION 500 g 5    ranitidine (ZANTAC) 150 mg tablet TAKE ONE TABLET BY MOUTH AT BEDTIME (GERD) 30 tablet 0    senna (SENOKOT) 8 6 mg Take 1 tablet (8 6 mg total) by mouth daily at bedtime Hold for diarrhea or loose stools 30 tablet 5    sulfamethoxazole-trimethoprim (BACTRIM DS) 800-160 mg per tablet Take 1 tablet by mouth every 12 (twelve) hours for 7 days 14 tablet 0    lisinopril (ZESTRIL) 10 mg tablet Take 1 tablet (10 mg total) by mouth daily (Patient not taking: Reported on 9/24/2019) 30 tablet 5     No current facility-administered medications for this visit  Review of Systems   Constitutional: Negative for chills, fatigue and fever  HENT: Negative  Respiratory: Negative for cough, chest tightness and shortness of breath  Cardiovascular: Negative for chest pain, palpitations and leg swelling  Gastrointestinal: Negative for abdominal pain, constipation, diarrhea, nausea and vomiting  Genitourinary: Negative  Musculoskeletal: Positive for myalgias  Negative for arthralgias and back pain  Skin: Negative  Neurological: Negative  Psychiatric/Behavioral: The patient is nervous/anxious  Objective:  Vitals:    09/24/19 1600   BP: 152/60   Pulse: 70   Resp: 14   Temp: 98 4 °F (36 9 °C)   TempSrc: Tympanic   Weight: 59 4 kg (131 lb)   Height: 5' 3" (1 6 m)     Body mass index is 23 21 kg/m²  Physical Exam   Constitutional: She is oriented to person, place, and time  She appears well-developed and well-nourished  HENT:   Head: Normocephalic and atraumatic     Eyes: Pupils are equal, round, and reactive to light  EOM are normal    Neck: Normal range of motion  Neck supple  Cardiovascular: Normal rate, regular rhythm, normal heart sounds and intact distal pulses  No murmur heard  Pulmonary/Chest: Effort normal and breath sounds normal  No respiratory distress  She has no wheezes  Abdominal: Soft  Bowel sounds are normal  There is no tenderness  Musculoskeletal: Normal range of motion  She exhibits tenderness  She exhibits no edema  Arms:  Neurological: She is alert and oriented to person, place, and time  Skin: Skin is warm and dry  Psychiatric: She has a normal mood and affect  Nursing note and vitals reviewed         PHQ-9 Depression Screening    PHQ-9:    Frequency of the following problems over the past two weeks:       Little interest or pleasure in doing things:  0 - not at all  Feeling down, depressed, or hopeless:  0 - not at all  PHQ-2 Score:  0

## 2019-09-26 DIAGNOSIS — K21.9 GASTROESOPHAGEAL REFLUX DISEASE, ESOPHAGITIS PRESENCE NOT SPECIFIED: ICD-10-CM

## 2019-09-26 RX ORDER — RANITIDINE 150 MG/1
TABLET ORAL
Qty: 30 TABLET | Refills: 5 | Status: SHIPPED | OUTPATIENT
Start: 2019-09-26 | End: 2020-01-07

## 2019-10-31 DIAGNOSIS — Z87.440 HISTORY OF FREQUENT URINARY TRACT INFECTIONS: Primary | ICD-10-CM

## 2019-11-07 ENCOUNTER — OFFICE VISIT (OUTPATIENT)
Dept: UROLOGY | Facility: CLINIC | Age: 84
End: 2019-11-07
Payer: MEDICARE

## 2019-11-07 VITALS
HEART RATE: 80 BPM | BODY MASS INDEX: 23.44 KG/M2 | WEIGHT: 132.28 LBS | HEIGHT: 63 IN | DIASTOLIC BLOOD PRESSURE: 70 MMHG | RESPIRATION RATE: 20 BRPM | SYSTOLIC BLOOD PRESSURE: 150 MMHG

## 2019-11-07 DIAGNOSIS — N32.89 MASS OF URINARY BLADDER DETERMINED BY ULTRASOUND: Primary | ICD-10-CM

## 2019-11-07 PROCEDURE — 99213 OFFICE O/P EST LOW 20 MIN: CPT | Performed by: PHYSICIAN ASSISTANT

## 2019-11-07 NOTE — PROGRESS NOTES
11/7/2019      Chief Complaint   Patient presents with    Urinary Tract Infection       Assessment and Plan    80 y o  female managed by Dr Kadeem Ruiz    1  Questionable bladder mass on U/S 3/22/18  - negative PET scan 04/20/2018  - benign cystoscopy 04/24/2018  - microscopic urinalysis 10/12/2018 with no hematuria  - recent urinalysis 10/30/2019 with no hematuria    2  UTI/asymptomatic bacteruria  - recent urinalysis with leukocytes and nitrites, no culture done  - asymptomatic of any UTI symptoms at this time  - unable to provide repeat specimen office visit today  - monitor for development of fevers, chills, does status change, bladder pain, dysuria  - continue with her good hydration  - continue cranberry supplement      FU 1 year with microscopic urinalysis prior, the patient educated to call the office with any hematuria and or signs of infection        History of Present Illness  Chloe Henson is a 80 y o  female here for follow up evaluation for questionable bladder mass found on ultrasound last year  She subsequently underwent a PET scan in April 2018 which was negative, and an office cystoscopy which was benign that same month  She has never had microscopic or gross hematuria  She does have a history of urinary tract infections, as well as some asymptomatic bacteriuria  On her recent urinalysis there is nitrites and leukocytes, but no associated urinary tract symptoms  No culture was performed  As she is asymptomatic I am not climb to treat at present  Again there was no hematuria present on the specimen  She is staying well hydrated, to drink six bottles of water a day  She moves her bowels pretty much regularly but does note if she is constipated she feels she does not empty all the way or has to return to the toilet within the hour  This is an infrequent occurrence      Review of Systems   Constitutional: Negative for activity change, appetite change, chills, fever and unexpected weight change  HENT: Negative  Respiratory: Negative  Negative for shortness of breath  Cardiovascular: Negative  Negative for chest pain  Gastrointestinal: Negative for abdominal pain, diarrhea, nausea and vomiting  Endocrine: Negative  Genitourinary: Negative for decreased urine volume, difficulty urinating, dysuria, flank pain, frequency, hematuria and urgency  Musculoskeletal: Negative for back pain and gait problem  Skin: Negative  Allergic/Immunologic: Negative  Neurological: Negative  Hematological: Negative for adenopathy  Does not bruise/bleed easily  Urinary Incontinence Screening      Most Recent Value   Urinary Incontinence   Urinary Incontinence? No   Incomplete emptying? No   Urinary frequency? No   Urinary urgency? No   Urinary hesitancy? No   Dysuria (painful difficult urination)? No   Nocturia (waking up to use the bathroom)? No   Straining (having to push to go)? No   Weak stream?  No   Intermittent stream?  No   Post void dribbling? No          Past Medical History  Past Medical History:   Diagnosis Date    Abdominal distension     Last Assessed: [id number]    Abnormal weight loss     Last Assessed: [id number]    Arthritis     Bronchitis     Bursitis of left hip     Lst Assessed: [id number]    Chest pain     Last Assessed: [id number]    Colon polyp     Dysuria     Last Assessed: [id number]    External hemorrhoids     Last Assessed: [id number]    Generalized anxiety disorder     GERD (gastroesophageal reflux disease)     History of echocardiogram 03/20/2018    EF 60%, mild to moderate mitral annular calcification  mild AS, mild TR       Hyperlipidemia     Hypertension     Hypomagnesemia     Last Assessed: [id number]    Lyme disease     Last Assessed: [id number]    Multiple renal cysts 7/5/2019    Nonrheumatic mitral valve regurgitation     Osteoporosis     Pneumonia of right middle lobe due to infectious organism [facility])     Last Assessed: [id number]  SVT (supraventricular tachycardia)     Urinary tract infection        Past Social History  Past Surgical History:   Procedure Laterality Date    CATARACT EXTRACTION      HEMORRHOID SURGERY      HYSTERECTOMY      PELVIC FLOOR REPAIR       Social History     Tobacco Use   Smoking Status Never Smoker   Smokeless Tobacco Never Used       Past Family History  Family History   Adopted: Yes       Past Social history  Social History     Socioeconomic History    Marital status:       Spouse name: Not on file    Number of children: Not on file    Years of education: Not on file    Highest education level: Not on file   Occupational History    Occupation: Retired   Social Needs    Financial resource strain: Not on file    Food insecurity:     Worry: Not on file     Inability: Not on file   [employer] needs:     Medical: Not on file     Non-medical: Not on file   Tobacco Use    Smoking status: Never Smoker    Smokeless tobacco: Never Used   Substance and Sexual Activity    Alcohol use: Never     Frequency: Never     Binge frequency: Never    Drug use: No    Sexual activity: Not Currently   Lifestyle    Physical activity:     Days per week: Not on file     Minutes per session: Not on file    Stress: Not on file   Relationships    Social connections:     Talks on phone: Not on file     Gets together: Not on file     Attends Yarsanism service: Not on file     Active member of club or organization: Not on file     Attends meetings of clubs or organizations: Not on file     Relationship status: Not on file    Intimate partner violence:     Fear of current or ex partner: Not on file     Emotionally abused: Not on file     Physically abused: Not on file     Forced sexual activity: Not on file   Other Topics Concern    Not on file   Social History Narrative    RETIRED       Current Medications  Current Outpatient Medications   Medication Sig Dispense Refill    acetaminophen (TYLENOL) 325 mg tablet Take 2 tablets (650 mg total) by mouth every 6 (six) hours as needed for mild pain, headaches or fever  0    amLODIPine (NORVASC) 5 mg tablet Take 1 tablet (5 mg total) by mouth 2 (two) times a day 60 tablet 0    aspirin (ADULT ASPIRIN EC LOW STRENGTH) 81 mg EC tablet Take 1 tablet (81 mg total) by mouth daily 30 tablet 5    calcium carbonate (OYSTER SHELL,OSCAL) 500 mg TAKE ONE TABLET BY MOUTH TWICE DAILY (SUPPLEMENT) 60 each 0    Cholecalciferol (VITAMIN D-3) 1000 units CAPS Take 1 capsule (1,000 Units total) by mouth daily 30 capsule 5    clonazePAM (KlonoPIN) 0 5 mg tablet TAKE THREE TABLETS (1 5MG) BY MOUTH AT BEDTIME (ANXIETY) 90 tablet 4    digoxin (LANOXIN) 0 125 mg tablet TAKE ONE TABLET BY MOUTH ONCE DAILY (AFIB) 30 tablet 0    ibuprofen (MOTRIN) 200 mg tablet Take 1 tablet (200 mg total) by mouth daily as needed for mild pain (Migraine HA) for up to 30 doses 1 tablet 0    lisinopril (ZESTRIL) 10 mg tablet Take 1 tablet (10 mg total) by mouth daily (Patient not taking: Reported on 9/24/2019) 30 tablet 5    magnesium hydroxide (MILK OF MAGNESIA) 800 MG/5ML suspension Take 30 mL by mouth daily as needed for constipation      Melatonin 5 MG TABS Take 1 tablet (5 mg total) by mouth daily at bedtime 30 tablet 5    metoprolol tartrate (LOPRESSOR) 50 mg tablet Take 1 5 tablets (75 mg total) by mouth every 12 (twelve) hours 90 tablet 0    Omega-3 Fatty Acids (FISH OIL) 1,000 mg Take 1 capsule (1,000 mg total) by mouth daily 30 capsule 5    Oyster Shell 500 MG TABS Take 1 tablet (500 mg total) by mouth 2 (two) times a day 60 tablet 5    pantoprazole (PROTONIX) 40 mg tablet Take 1 tablet (40 mg total) by mouth daily before breakfast One Tab PO Q11 AM 30 tablet 5    polyethylene glycol (GLYCOLAX) powder DISSOLVE 17GRAMS (ONE CAPFUL) IN 8 OUNCES OF WATER AND DRINK BY MOUTHDAILY FOR CONSTIPATION 500 g 5    ranitidine (ZANTAC) 150 mg tablet TAKE ONE TABLET BY MOUTH AT BEDTIME (GERD) 30 tablet 5    senna (SENOKOT) 8 6 mg Take 1 tablet (8 6 mg total) by mouth daily at bedtime Hold for diarrhea or loose stools 30 tablet 5     No current facility-administered medications for this visit  Allergies  Allergies   Allergen Reactions    Codeine GI Intolerance    Epinephrine Other (See Comments)     Increased Heart Rate, Palpitations    Iodinated Diagnostic Agents     Magnesium Citrate GI Intolerance         The following portions of the patient's history were reviewed and updated as appropriate: allergies, current medications, past medical history, past social history, past surgical history and problem list       Vitals  Vitals:    11/07/19 1103   BP: 150/70   Pulse: 80   Resp: 20   Weight: 60 kg (132 lb 4 4 oz)   Height: 5' 3" (1 6 m)       Physical Exam   Constitutional: She is oriented to person, place, and time  She appears well-developed and well-nourished  HENT:   Head: Normocephalic and atraumatic  Pulmonary/Chest: Effort normal    Musculoskeletal: She exhibits no edema  Neurological: She is alert and oriented to person, place, and time  Skin: Skin is warm  Psychiatric: She has a normal mood and affect  Nursing note and vitals reviewed  Results  No results found for this or any previous visit (from the past 1 hour(s))  ]  No results found for: PSA  Lab Results   Component Value Date    GLUCOSE 165 (H) 06/29/2015    CALCIUM 8 8 07/05/2019     06/29/2015    K 4 1 07/05/2019    CO2 28 07/05/2019     07/05/2019    BUN 10 07/05/2019    CREATININE 0 90 07/05/2019     Lab Results   Component Value Date    WBC 4 89 07/05/2019    HGB 13 1 07/05/2019    HCT 41 5 07/05/2019    MCV 93 07/05/2019     07/05/2019       Orders  Orders Placed This Encounter   Procedures    Urinalysis with microscopic     Standing Status:   Future     Standing Expiration Date:   5/7/2021

## 2019-11-13 DIAGNOSIS — I48.91 ATRIAL FIBRILLATION, UNSPECIFIED TYPE (HCC): ICD-10-CM

## 2019-11-13 RX ORDER — DIGOXIN 125 MCG
TABLET ORAL
Qty: 30 TABLET | Refills: 0 | Status: SHIPPED | OUTPATIENT
Start: 2019-11-13 | End: 2019-12-12 | Stop reason: SDUPTHER

## 2019-12-06 DIAGNOSIS — R39.9 UTI SYMPTOMS: Primary | ICD-10-CM

## 2019-12-06 RX ORDER — SULFAMETHOXAZOLE AND TRIMETHOPRIM 800; 160 MG/1; MG/1
1 TABLET ORAL EVERY 12 HOURS SCHEDULED
Qty: 14 TABLET | Refills: 0 | Status: SHIPPED | OUTPATIENT
Start: 2019-12-06 | End: 2019-12-13

## 2019-12-12 DIAGNOSIS — I48.91 ATRIAL FIBRILLATION, UNSPECIFIED TYPE (HCC): ICD-10-CM

## 2019-12-12 RX ORDER — DIGOXIN 125 MCG
TABLET ORAL
Qty: 30 TABLET | Refills: 0 | Status: SHIPPED | OUTPATIENT
Start: 2019-12-12 | End: 2020-01-14

## 2020-01-07 DIAGNOSIS — K21.9 GASTROESOPHAGEAL REFLUX DISEASE, ESOPHAGITIS PRESENCE NOT SPECIFIED: Primary | ICD-10-CM

## 2020-01-07 RX ORDER — FAMOTIDINE 20 MG/1
20 TABLET, FILM COATED ORAL
Qty: 30 TABLET | Refills: 5 | Status: SHIPPED | OUTPATIENT
Start: 2020-01-07 | End: 2020-06-29

## 2020-01-08 DIAGNOSIS — I47.1 SUPRAVENTRICULAR TACHYCARDIA (HCC): ICD-10-CM

## 2020-01-08 RX ORDER — LISINOPRIL 10 MG/1
10 TABLET ORAL DAILY
Qty: 30 TABLET | Refills: 5 | Status: SHIPPED | OUTPATIENT
Start: 2020-01-08 | End: 2020-06-22

## 2020-01-14 DIAGNOSIS — I48.91 ATRIAL FIBRILLATION, UNSPECIFIED TYPE (HCC): ICD-10-CM

## 2020-01-14 RX ORDER — DIGOXIN 125 MCG
TABLET ORAL
Qty: 30 TABLET | Refills: 0 | Status: SHIPPED | OUTPATIENT
Start: 2020-01-14 | End: 2020-02-11

## 2020-02-08 DIAGNOSIS — F41.1 GENERALIZED ANXIETY DISORDER: ICD-10-CM

## 2020-02-10 RX ORDER — CLONAZEPAM 0.5 MG/1
TABLET ORAL
Qty: 90 TABLET | Refills: 0 | Status: SHIPPED | OUTPATIENT
Start: 2020-02-10 | End: 2020-03-16

## 2020-02-11 DIAGNOSIS — I48.91 ATRIAL FIBRILLATION, UNSPECIFIED TYPE (HCC): ICD-10-CM

## 2020-02-11 RX ORDER — DIGOXIN 125 MCG
TABLET ORAL
Qty: 30 TABLET | Refills: 0 | Status: SHIPPED | OUTPATIENT
Start: 2020-02-11 | End: 2020-03-19

## 2020-02-22 DIAGNOSIS — M81.0 OSTEOPOROSIS, UNSPECIFIED OSTEOPOROSIS TYPE, UNSPECIFIED PATHOLOGICAL FRACTURE PRESENCE: ICD-10-CM

## 2020-02-24 DIAGNOSIS — E78.00 HYPERCHOLESTEROLEMIA: ICD-10-CM

## 2020-02-24 RX ORDER — CHLORAL HYDRATE 500 MG
CAPSULE ORAL
Qty: 30 EACH | Refills: 0 | Status: SHIPPED | OUTPATIENT
Start: 2020-02-24 | End: 2020-03-24

## 2020-02-24 RX ORDER — CALCIUM CARBONATE 500(1250)
TABLET ORAL
Qty: 60 EACH | Refills: 0 | Status: SHIPPED | OUTPATIENT
Start: 2020-02-24 | End: 2020-04-06

## 2020-02-26 DIAGNOSIS — K59.03 DRUG-INDUCED CONSTIPATION: ICD-10-CM

## 2020-02-26 RX ORDER — SENNOSIDES 8.6 MG
TABLET ORAL
Qty: 30 TABLET | Refills: 5 | Status: SHIPPED | OUTPATIENT
Start: 2020-02-26 | End: 2020-09-17

## 2020-02-28 DIAGNOSIS — F41.1 GENERALIZED ANXIETY DISORDER: Primary | ICD-10-CM

## 2020-02-28 DIAGNOSIS — I10 HYPERTENSION, UNSPECIFIED TYPE: ICD-10-CM

## 2020-02-28 DIAGNOSIS — I48.91 ATRIAL FIBRILLATION, UNSPECIFIED TYPE (HCC): ICD-10-CM

## 2020-02-28 RX ORDER — ASPIRIN 81 MG/1
81 TABLET ORAL DAILY
Qty: 30 TABLET | Refills: 5 | Status: SHIPPED | OUTPATIENT
Start: 2020-02-28 | End: 2020-08-12

## 2020-03-02 DIAGNOSIS — E50.9 VITAMIN A DEFICIENCY: ICD-10-CM

## 2020-03-02 RX ORDER — UBIDECARENONE 100 MG
CAPSULE ORAL
Qty: 30 CAPSULE | Refills: 5 | Status: SHIPPED | OUTPATIENT
Start: 2020-03-02 | End: 2020-08-12

## 2020-03-11 DIAGNOSIS — K21.9 GASTROESOPHAGEAL REFLUX DISEASE, ESOPHAGITIS PRESENCE NOT SPECIFIED: ICD-10-CM

## 2020-03-11 RX ORDER — PANTOPRAZOLE SODIUM 40 MG/1
TABLET, DELAYED RELEASE ORAL
Qty: 30 TABLET | Refills: 4 | Status: SHIPPED | OUTPATIENT
Start: 2020-03-11 | End: 2020-08-13

## 2020-03-16 DIAGNOSIS — F41.1 GENERALIZED ANXIETY DISORDER: ICD-10-CM

## 2020-03-16 RX ORDER — CLONAZEPAM 0.5 MG/1
TABLET ORAL
Qty: 90 TABLET | Refills: 0 | Status: SHIPPED | OUTPATIENT
Start: 2020-03-16 | End: 2020-04-14

## 2020-03-16 NOTE — PROGRESS NOTES
02/10/2020  1   02/10/2020  Clonazepam 0 5 MG Tablet  90 00 30 Da O'n   [id number]   Adali (5120)   0   [place]   [place]   01/08/2020  1   09/16/2019  Clonazepam 0 5 MG Tablet  90 00 30 Da O'n   [id number]   Adali (5120)   4   [place]   [place]     PDMP was reviewed and the script is appropriate

## 2020-03-19 DIAGNOSIS — I48.91 ATRIAL FIBRILLATION, UNSPECIFIED TYPE (HCC): ICD-10-CM

## 2020-03-19 RX ORDER — DIGOXIN 125 MCG
TABLET ORAL
Qty: 30 TABLET | Refills: 5 | Status: SHIPPED | OUTPATIENT
Start: 2020-03-19 | End: 2020-07-21

## 2020-03-23 DIAGNOSIS — I16.0 HYPERTENSIVE URGENCY: ICD-10-CM

## 2020-03-23 RX ORDER — AMLODIPINE BESYLATE 5 MG/1
TABLET ORAL
Qty: 60 TABLET | Refills: 5 | Status: SHIPPED | OUTPATIENT
Start: 2020-03-23 | End: 2020-08-26

## 2020-03-23 RX ORDER — AMLODIPINE BESYLATE 5 MG/1
TABLET ORAL
Qty: 60 TABLET | Refills: 0 | OUTPATIENT
Start: 2020-03-23

## 2020-03-24 DIAGNOSIS — E78.00 HYPERCHOLESTEROLEMIA: ICD-10-CM

## 2020-03-24 RX ORDER — CHLORAL HYDRATE 500 MG
CAPSULE ORAL
Qty: 30 EACH | Refills: 5 | Status: SHIPPED | OUTPATIENT
Start: 2020-03-24 | End: 2020-09-18

## 2020-03-30 DIAGNOSIS — F41.1 GENERALIZED ANXIETY DISORDER: ICD-10-CM

## 2020-03-30 RX ORDER — CLONAZEPAM 0.5 MG/1
TABLET ORAL
Qty: 90 TABLET | Refills: 0 | OUTPATIENT
Start: 2020-03-30

## 2020-04-03 DIAGNOSIS — R39.9 UTI SYMPTOMS: Primary | ICD-10-CM

## 2020-04-06 DIAGNOSIS — M81.0 OSTEOPOROSIS, UNSPECIFIED OSTEOPOROSIS TYPE, UNSPECIFIED PATHOLOGICAL FRACTURE PRESENCE: ICD-10-CM

## 2020-04-06 RX ORDER — CALCIUM CARBONATE 500(1250)
TABLET ORAL
Qty: 60 EACH | Refills: 5 | Status: SHIPPED | OUTPATIENT
Start: 2020-04-06 | End: 2020-10-01

## 2020-04-07 DIAGNOSIS — I16.0 HYPERTENSIVE URGENCY: Primary | ICD-10-CM

## 2020-04-07 DIAGNOSIS — I10 BENIGN ESSENTIAL HTN: ICD-10-CM

## 2020-04-14 DIAGNOSIS — F41.1 GENERALIZED ANXIETY DISORDER: ICD-10-CM

## 2020-04-14 RX ORDER — CLONAZEPAM 0.5 MG/1
TABLET ORAL
Qty: 90 TABLET | Refills: 2 | Status: SHIPPED | OUTPATIENT
Start: 2020-04-14 | End: 2020-07-06

## 2020-04-27 DIAGNOSIS — K59.04 CHRONIC IDIOPATHIC CONSTIPATION: ICD-10-CM

## 2020-04-27 RX ORDER — POLYETHYLENE GLYCOL 3350 17 G/17G
POWDER ORAL
Qty: 510 G | Refills: 0 | Status: SHIPPED | OUTPATIENT
Start: 2020-04-27 | End: 2020-05-12

## 2020-05-12 DIAGNOSIS — K59.04 CHRONIC IDIOPATHIC CONSTIPATION: ICD-10-CM

## 2020-05-12 RX ORDER — POLYETHYLENE GLYCOL 3350 17 G/17G
POWDER ORAL
Qty: 510 G | Refills: 4 | Status: SHIPPED | OUTPATIENT
Start: 2020-05-12 | End: 2021-01-07

## 2020-06-05 DIAGNOSIS — N30.00 ACUTE CYSTITIS WITHOUT HEMATURIA: Primary | ICD-10-CM

## 2020-06-05 RX ORDER — SULFAMETHOXAZOLE AND TRIMETHOPRIM 800; 160 MG/1; MG/1
1 TABLET ORAL EVERY 12 HOURS SCHEDULED
Qty: 14 TABLET | Refills: 0 | Status: SHIPPED | OUTPATIENT
Start: 2020-06-05 | End: 2020-06-08

## 2020-06-08 DIAGNOSIS — N30.00 ACUTE CYSTITIS WITHOUT HEMATURIA: Primary | ICD-10-CM

## 2020-06-08 RX ORDER — NITROFURANTOIN 25; 75 MG/1; MG/1
100 CAPSULE ORAL 2 TIMES DAILY
Qty: 14 CAPSULE | Refills: 0 | Status: SHIPPED | OUTPATIENT
Start: 2020-06-08 | End: 2020-06-15

## 2020-06-22 DIAGNOSIS — I47.1 SUPRAVENTRICULAR TACHYCARDIA (HCC): ICD-10-CM

## 2020-06-22 RX ORDER — LISINOPRIL 10 MG/1
TABLET ORAL
Qty: 30 TABLET | Refills: 5 | Status: SHIPPED | OUTPATIENT
Start: 2020-06-22 | End: 2020-12-18 | Stop reason: SDUPTHER

## 2020-06-27 DIAGNOSIS — F41.1 GENERALIZED ANXIETY DISORDER: ICD-10-CM

## 2020-06-29 DIAGNOSIS — K21.9 GASTROESOPHAGEAL REFLUX DISEASE, ESOPHAGITIS PRESENCE NOT SPECIFIED: ICD-10-CM

## 2020-06-29 RX ORDER — FAMOTIDINE 20 MG/1
TABLET, FILM COATED ORAL
Qty: 30 TABLET | Refills: 5 | Status: SHIPPED | OUTPATIENT
Start: 2020-06-29 | End: 2020-12-28

## 2020-07-06 DIAGNOSIS — F41.1 GENERALIZED ANXIETY DISORDER: ICD-10-CM

## 2020-07-06 RX ORDER — CLONAZEPAM 0.5 MG/1
TABLET ORAL
Qty: 90 TABLET | Refills: 0 | Status: SHIPPED | OUTPATIENT
Start: 2020-07-06 | End: 2020-08-03

## 2020-08-03 DIAGNOSIS — F41.1 GENERALIZED ANXIETY DISORDER: ICD-10-CM

## 2020-08-03 RX ORDER — CLONAZEPAM 0.5 MG/1
TABLET ORAL
Qty: 90 TABLET | Refills: 0 | Status: SHIPPED | OUTPATIENT
Start: 2020-08-03 | End: 2020-09-01

## 2020-08-03 NOTE — PROGRESS NOTES
07/06/2020  1   07/06/2020  Clonazepam 0 5 MG Tablet  90 00 30 Da O'n   [id number]   Adali (5120)   0   [place]   [place]   06/06/2020  1   04/14/2020  Clonazepam 0 5 MG Tablet  90 00 30 Da O'n   [id number]   Adali (5120)   2   [place] Ins   [place]

## 2020-08-12 DIAGNOSIS — I48.91 ATRIAL FIBRILLATION, UNSPECIFIED TYPE (HCC): ICD-10-CM

## 2020-08-12 DIAGNOSIS — I10 HYPERTENSION, UNSPECIFIED TYPE: ICD-10-CM

## 2020-08-12 DIAGNOSIS — E50.9 VITAMIN A DEFICIENCY: ICD-10-CM

## 2020-08-12 RX ORDER — ASPIRIN 81 MG/1
TABLET ORAL
Qty: 30 TABLET | Refills: 5 | Status: SHIPPED | OUTPATIENT
Start: 2020-08-12 | End: 2021-02-10

## 2020-08-12 RX ORDER — UBIDECARENONE 100 MG
CAPSULE ORAL
Qty: 30 CAPSULE | Refills: 5 | Status: SHIPPED | OUTPATIENT
Start: 2020-08-12 | End: 2021-03-16

## 2020-08-13 DIAGNOSIS — K21.9 GASTROESOPHAGEAL REFLUX DISEASE, ESOPHAGITIS PRESENCE NOT SPECIFIED: ICD-10-CM

## 2020-08-13 RX ORDER — PANTOPRAZOLE SODIUM 40 MG/1
TABLET, DELAYED RELEASE ORAL
Qty: 30 TABLET | Refills: 5 | Status: SHIPPED | OUTPATIENT
Start: 2020-08-13 | End: 2021-02-12

## 2020-08-26 DIAGNOSIS — I16.0 HYPERTENSIVE URGENCY: ICD-10-CM

## 2020-08-26 RX ORDER — AMLODIPINE BESYLATE 5 MG/1
TABLET ORAL
Qty: 60 TABLET | Refills: 5 | Status: SHIPPED | OUTPATIENT
Start: 2020-08-26 | End: 2021-03-29

## 2020-09-01 DIAGNOSIS — F41.1 GENERALIZED ANXIETY DISORDER: ICD-10-CM

## 2020-09-01 RX ORDER — CLONAZEPAM 0.5 MG/1
TABLET ORAL
Qty: 90 TABLET | Refills: 0 | Status: SHIPPED | OUTPATIENT
Start: 2020-09-01 | End: 2020-09-28

## 2020-09-17 DIAGNOSIS — K59.03 DRUG-INDUCED CONSTIPATION: ICD-10-CM

## 2020-09-17 RX ORDER — SENNOSIDES 8.6 MG
TABLET ORAL
Qty: 30 TABLET | Refills: 5 | Status: SHIPPED | OUTPATIENT
Start: 2020-09-17 | End: 2021-02-03

## 2020-09-18 DIAGNOSIS — E78.00 HYPERCHOLESTEROLEMIA: ICD-10-CM

## 2020-09-18 RX ORDER — CHLORAL HYDRATE 500 MG
CAPSULE ORAL
Qty: 30 EACH | Refills: 3 | Status: SHIPPED | OUTPATIENT
Start: 2020-09-18 | End: 2021-01-15

## 2020-09-26 DIAGNOSIS — F41.1 GENERALIZED ANXIETY DISORDER: ICD-10-CM

## 2020-09-28 RX ORDER — CLONAZEPAM 0.5 MG/1
TABLET ORAL
Qty: 90 TABLET | Refills: 2 | Status: SHIPPED | OUTPATIENT
Start: 2020-09-28 | End: 2020-12-01

## 2020-09-28 NOTE — PROGRESS NOTES
09/01/2020  1   09/01/2020  Clonazepam 0 5 MG Tablet  90 00  30 Da O'n   [id number]   Adali (5120)   0   [place]   08/03/2020  1   08/03/2020  Clonazepam 0 5 MG Tablet  90 00  30 Da O'n   [id number]   Adali (5120)   0   [place]   [place]

## 2020-10-01 DIAGNOSIS — M81.0 OSTEOPOROSIS, UNSPECIFIED OSTEOPOROSIS TYPE, UNSPECIFIED PATHOLOGICAL FRACTURE PRESENCE: ICD-10-CM

## 2020-10-01 RX ORDER — CALCIUM CARBONATE 500(1250)
TABLET ORAL
Qty: 60 EACH | Refills: 4 | Status: SHIPPED | OUTPATIENT
Start: 2020-10-01 | End: 2021-03-04

## 2020-10-03 DIAGNOSIS — I48.0 PAROXYSMAL ATRIAL FIBRILLATION (HCC): Primary | ICD-10-CM

## 2020-10-05 DIAGNOSIS — G43.B0 OPHTHALMOPLEGIC MIGRAINE, NOT INTRACTABLE: ICD-10-CM

## 2020-10-05 RX ORDER — IBUPROFEN 200 MG/1
TABLET, FILM COATED ORAL
Qty: 50 TABLET | Refills: 0 | Status: SHIPPED | OUTPATIENT
Start: 2020-10-05 | End: 2021-01-01

## 2020-10-05 RX ORDER — DIGOXIN 125 MCG
TABLET ORAL
Qty: 30 TABLET | Refills: 5 | Status: SHIPPED | OUTPATIENT
Start: 2020-10-05 | End: 2021-04-13

## 2020-10-19 DIAGNOSIS — F41.1 GENERALIZED ANXIETY DISORDER: ICD-10-CM

## 2020-11-02 DIAGNOSIS — I10 BENIGN ESSENTIAL HTN: ICD-10-CM

## 2020-11-12 ENCOUNTER — TELEPHONE (OUTPATIENT)
Dept: INTERNAL MEDICINE CLINIC | Facility: CLINIC | Age: 85
End: 2020-11-12

## 2020-11-12 DIAGNOSIS — N30.00 ACUTE CYSTITIS WITHOUT HEMATURIA: Primary | ICD-10-CM

## 2020-11-12 RX ORDER — SULFAMETHOXAZOLE AND TRIMETHOPRIM 800; 160 MG/1; MG/1
1 TABLET ORAL EVERY 12 HOURS SCHEDULED
Qty: 14 TABLET | Refills: 0 | Status: SHIPPED | OUTPATIENT
Start: 2020-11-12 | End: 2020-11-19

## 2020-12-01 DIAGNOSIS — F41.1 GENERALIZED ANXIETY DISORDER: ICD-10-CM

## 2020-12-01 LAB — EXT SARS-COV-2: NOT DETECTED

## 2020-12-01 RX ORDER — CLONAZEPAM 0.5 MG/1
TABLET ORAL
Qty: 90 TABLET | Refills: 0 | Status: SHIPPED | OUTPATIENT
Start: 2020-12-01 | End: 2021-01-29

## 2020-12-15 LAB — EXT SARS-COV-2: NOT DETECTED

## 2020-12-17 DIAGNOSIS — I47.1 SUPRAVENTRICULAR TACHYCARDIA (HCC): ICD-10-CM

## 2020-12-18 DIAGNOSIS — I47.1 SUPRAVENTRICULAR TACHYCARDIA (HCC): ICD-10-CM

## 2020-12-18 RX ORDER — LISINOPRIL 10 MG/1
10 TABLET ORAL DAILY
Qty: 90 TABLET | Refills: 1 | Status: SHIPPED | OUTPATIENT
Start: 2020-12-18 | End: 2022-01-01 | Stop reason: HOSPADM

## 2020-12-18 RX ORDER — LISINOPRIL 10 MG/1
TABLET ORAL
Qty: 30 TABLET | Refills: 0 | Status: SHIPPED | OUTPATIENT
Start: 2020-12-18 | End: 2021-02-12

## 2020-12-18 RX ORDER — LISINOPRIL 10 MG/1
TABLET ORAL
Qty: 30 TABLET | Refills: 5 | OUTPATIENT
Start: 2020-12-18

## 2020-12-28 DIAGNOSIS — K21.9 GASTROESOPHAGEAL REFLUX DISEASE: ICD-10-CM

## 2020-12-28 RX ORDER — FAMOTIDINE 20 MG/1
TABLET, FILM COATED ORAL
Qty: 30 TABLET | Refills: 0 | Status: SHIPPED | OUTPATIENT
Start: 2020-12-28 | End: 2021-01-29

## 2020-12-29 LAB — EXT SARS-COV-2: NOT DETECTED

## 2021-01-01 ENCOUNTER — APPOINTMENT (EMERGENCY)
Dept: RADIOLOGY | Facility: HOSPITAL | Age: 86
End: 2021-01-01
Payer: MEDICARE

## 2021-01-01 ENCOUNTER — OFFICE VISIT (OUTPATIENT)
Dept: INTERNAL MEDICINE CLINIC | Facility: CLINIC | Age: 86
End: 2021-01-01
Payer: MEDICARE

## 2021-01-01 ENCOUNTER — APPOINTMENT (EMERGENCY)
Dept: CT IMAGING | Facility: HOSPITAL | Age: 86
End: 2021-01-01
Payer: MEDICARE

## 2021-01-01 ENCOUNTER — TELEPHONE (OUTPATIENT)
Dept: INTERNAL MEDICINE CLINIC | Facility: CLINIC | Age: 86
End: 2021-01-01

## 2021-01-01 ENCOUNTER — HOSPITAL ENCOUNTER (EMERGENCY)
Facility: HOSPITAL | Age: 86
Discharge: HOME | End: 2021-08-17
Attending: EMERGENCY MEDICINE | Admitting: EMERGENCY MEDICINE
Payer: MEDICARE

## 2021-01-01 VITALS
SYSTOLIC BLOOD PRESSURE: 209 MMHG | RESPIRATION RATE: 16 BRPM | HEART RATE: 80 BPM | OXYGEN SATURATION: 95 % | HEIGHT: 62 IN | BODY MASS INDEX: 24.95 KG/M2 | TEMPERATURE: 97.8 F | WEIGHT: 135.58 LBS | DIASTOLIC BLOOD PRESSURE: 110 MMHG

## 2021-01-01 VITALS
HEART RATE: 77 BPM | WEIGHT: 125.8 LBS | BODY MASS INDEX: 23.15 KG/M2 | RESPIRATION RATE: 14 BRPM | DIASTOLIC BLOOD PRESSURE: 60 MMHG | SYSTOLIC BLOOD PRESSURE: 148 MMHG | OXYGEN SATURATION: 93 % | HEIGHT: 62 IN | TEMPERATURE: 98 F

## 2021-01-01 VITALS
SYSTOLIC BLOOD PRESSURE: 162 MMHG | HEART RATE: 84 BPM | OXYGEN SATURATION: 95 % | TEMPERATURE: 98.7 F | DIASTOLIC BLOOD PRESSURE: 74 MMHG | RESPIRATION RATE: 14 BRPM | HEIGHT: 62 IN | WEIGHT: 126.8 LBS | BODY MASS INDEX: 23.34 KG/M2

## 2021-01-01 DIAGNOSIS — M15.9 GENERALIZED OSTEOARTHRITIS OF MULTIPLE SITES: ICD-10-CM

## 2021-01-01 DIAGNOSIS — I10 BENIGN ESSENTIAL HTN: ICD-10-CM

## 2021-01-01 DIAGNOSIS — F41.1 GENERALIZED ANXIETY DISORDER: ICD-10-CM

## 2021-01-01 DIAGNOSIS — I16.0 HYPERTENSIVE URGENCY: ICD-10-CM

## 2021-01-01 DIAGNOSIS — K59.04 CHRONIC IDIOPATHIC CONSTIPATION: ICD-10-CM

## 2021-01-01 DIAGNOSIS — N39.0 ACUTE UTI: Primary | ICD-10-CM

## 2021-01-01 DIAGNOSIS — I48.0 PAROXYSMAL ATRIAL FIBRILLATION (HCC): ICD-10-CM
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
## 2021-01-01 DIAGNOSIS — G47.00 INSOMNIA, UNSPECIFIED TYPE: ICD-10-CM

## 2021-01-01 DIAGNOSIS — K22.70 BARRETT'S ESOPHAGUS WITHOUT DYSPLASIA: ICD-10-CM

## 2021-01-01 DIAGNOSIS — I48.91 ATRIAL FIBRILLATION, UNSPECIFIED TYPE (HCC): ICD-10-CM

## 2021-01-01 DIAGNOSIS — M75.21 BICEPS TENDONITIS ON RIGHT: ICD-10-CM

## 2021-01-01 DIAGNOSIS — I10 HYPERTENSION, UNSPECIFIED TYPE: ICD-10-CM

## 2021-01-01 DIAGNOSIS — N30.01 ACUTE CYSTITIS WITH HEMATURIA: ICD-10-CM

## 2021-01-01 DIAGNOSIS — E50.9 VITAMIN A DEFICIENCY: ICD-10-CM

## 2021-01-01 DIAGNOSIS — E78.00 HYPERCHOLESTEROLEMIA: ICD-10-CM

## 2021-01-01 DIAGNOSIS — K21.9 GASTROESOPHAGEAL REFLUX DISEASE: ICD-10-CM

## 2021-01-01 DIAGNOSIS — I10 BENIGN ESSENTIAL HTN: Primary | ICD-10-CM

## 2021-01-01 DIAGNOSIS — Z20.822 EXPOSURE TO COVID-19 VIRUS: Primary | ICD-10-CM

## 2021-01-01 DIAGNOSIS — K59.03 DRUG-INDUCED CONSTIPATION: ICD-10-CM

## 2021-01-01 DIAGNOSIS — G43.B0 OPHTHALMOPLEGIC MIGRAINE, NOT INTRACTABLE: ICD-10-CM

## 2021-01-01 DIAGNOSIS — B36.9 FUNGAL DERMATOSIS: Primary | ICD-10-CM

## 2021-01-01 DIAGNOSIS — S20.96XA INSECT BITE OF THORACIC WALL, UNSPECIFIED WHETHER FRONT OR BACK, INITIAL ENCOUNTER: ICD-10-CM

## 2021-01-01 LAB
ALBUMIN SERPL BCP-MCNC: 3.6 G/DL (ref 3.5–5)
ALP SERPL-CCNC: 83 U/L (ref 46–116)
ALT SERPL W P-5'-P-CCNC: 29 U/L (ref 12–78)
ANION GAP SERPL CALCULATED.3IONS-SCNC: 9 MMOL/L (ref 4–13)
APTT PPP: 27 SECONDS (ref 23–37)
AST SERPL W P-5'-P-CCNC: 20 U/L (ref 5–45)
ATRIAL RATE: 84 BPM
BACTERIA UR CULT: ABNORMAL
BACTERIA UR CULT: ABNORMAL
BACTERIA UR QL AUTO: ABNORMAL /HPF
BASOPHILS # BLD AUTO: 0.08 THOUSANDS/ΜL (ref 0–0.1)
BASOPHILS NFR BLD AUTO: 1 % (ref 0–1)
BILIRUB SERPL-MCNC: 0.25 MG/DL (ref 0.2–1)
BILIRUB UR QL STRIP: NEGATIVE
BUN SERPL-MCNC: 14 MG/DL (ref 5–25)
CALCIUM SERPL-MCNC: 9.4 MG/DL (ref 8.3–10.1)
CHLORIDE SERPL-SCNC: 96 MMOL/L (ref 100–108)
CLARITY UR: ABNORMAL
CO2 SERPL-SCNC: 27 MMOL/L (ref 21–32)
COLOR UR: ABNORMAL
CREAT SERPL-MCNC: 0.98 MG/DL (ref 0.6–1.3)
EOSINOPHIL # BLD AUTO: 0.31 THOUSAND/ΜL (ref 0–0.61)
EOSINOPHIL NFR BLD AUTO: 5 % (ref 0–6)
ERYTHROCYTE [DISTWIDTH] IN BLOOD BY AUTOMATED COUNT: 14.6 % (ref 11.6–15.1)
GFR SERPL CREATININE-BSD FRML MDRD: 51 ML/MIN/1.73SQ M
GLUCOSE SERPL-MCNC: 167 MG/DL (ref 65–140)
GLUCOSE UR STRIP-MCNC: NEGATIVE MG/DL
HCT VFR BLD AUTO: 41.7 % (ref 34.8–46.1)
HGB BLD-MCNC: 12.8 G/DL (ref 11.5–15.4)
HGB UR QL STRIP.AUTO: ABNORMAL
IMM GRANULOCYTES # BLD AUTO: 0.02 THOUSAND/UL (ref 0–0.2)
IMM GRANULOCYTES NFR BLD AUTO: 0 % (ref 0–2)
INR PPP: 0.95 (ref 0.84–1.19)
KETONES UR STRIP-MCNC: NEGATIVE MG/DL
LACTATE SERPL-SCNC: 2.4 MMOL/L (ref 0.5–2)
LEUKOCYTE ESTERASE UR QL STRIP: ABNORMAL
LIPASE SERPL-CCNC: 168 U/L (ref 73–393)
LYMPHOCYTES # BLD AUTO: 1.73 THOUSANDS/ΜL (ref 0.6–4.47)
LYMPHOCYTES NFR BLD AUTO: 27 % (ref 14–44)
MAGNESIUM SERPL-MCNC: 2.1 MG/DL (ref 1.6–2.6)
MCH RBC QN AUTO: 26.3 PG (ref 26.8–34.3)
MCHC RBC AUTO-ENTMCNC: 30.7 G/DL (ref 31.4–37.4)
MCV RBC AUTO: 86 FL (ref 82–98)
MONOCYTES # BLD AUTO: 0.75 THOUSAND/ΜL (ref 0.17–1.22)
MONOCYTES NFR BLD AUTO: 12 % (ref 4–12)
NEUTROPHILS # BLD AUTO: 3.62 THOUSANDS/ΜL (ref 1.85–7.62)
NEUTS SEG NFR BLD AUTO: 55 % (ref 43–75)
NITRITE UR QL STRIP: POSITIVE
NON-SQ EPI CELLS URNS QL MICRO: ABNORMAL /HPF
NRBC BLD AUTO-RTO: 0 /100 WBCS
NT-PROBNP SERPL-MCNC: 169 PG/ML
P AXIS: 69 DEGREES
PH UR STRIP.AUTO: 7 [PH]
PLATELET # BLD AUTO: 332 THOUSANDS/UL (ref 149–390)
PMV BLD AUTO: 9.8 FL (ref 8.9–12.7)
POTASSIUM SERPL-SCNC: 4.2 MMOL/L (ref 3.5–5.3)
PR INTERVAL: 222 MS
PROT SERPL-MCNC: 8.6 G/DL (ref 6.4–8.2)
PROT UR STRIP-MCNC: ABNORMAL MG/DL
PROTHROMBIN TIME: 12.5 SECONDS (ref 11.6–14.5)
QRS AXIS: 50 DEGREES
QRSD INTERVAL: 82 MS
QT INTERVAL: 376 MS
QTC INTERVAL: 444 MS
RBC # BLD AUTO: 4.86 MILLION/UL (ref 3.81–5.12)
RBC #/AREA URNS AUTO: ABNORMAL /HPF
SODIUM SERPL-SCNC: 132 MMOL/L (ref 136–145)
SP GR UR STRIP.AUTO: 1.01 (ref 1–1.03)
T WAVE AXIS: 53 DEGREES
TROPONIN I SERPL-MCNC: <0.02 NG/ML
UROBILINOGEN UR QL STRIP.AUTO: 0.2 E.U./DL
VENTRICULAR RATE: 84 BPM
WBC # BLD AUTO: 6.51 THOUSAND/UL (ref 4.31–10.16)
WBC #/AREA URNS AUTO: ABNORMAL /HPF

## 2021-01-01 PROCEDURE — 84484 ASSAY OF TROPONIN QUANT: CPT | Performed by: EMERGENCY MEDICINE

## 2021-01-01 PROCEDURE — 83690 ASSAY OF LIPASE: CPT | Performed by: EMERGENCY MEDICINE

## 2021-01-01 PROCEDURE — 81001 URINALYSIS AUTO W/SCOPE: CPT | Performed by: EMERGENCY MEDICINE

## 2021-01-01 PROCEDURE — 80053 COMPREHEN METABOLIC PANEL: CPT | Performed by: EMERGENCY MEDICINE

## 2021-01-01 PROCEDURE — 87186 SC STD MICRODIL/AGAR DIL: CPT | Performed by: EMERGENCY MEDICINE

## 2021-01-01 PROCEDURE — G1004 CDSM NDSC: HCPCS

## 2021-01-01 PROCEDURE — 96360 HYDRATION IV INFUSION INIT: CPT

## 2021-01-01 PROCEDURE — 85610 PROTHROMBIN TIME: CPT | Performed by: EMERGENCY MEDICINE

## 2021-01-01 PROCEDURE — 36415 COLL VENOUS BLD VENIPUNCTURE: CPT | Performed by: EMERGENCY MEDICINE

## 2021-01-01 PROCEDURE — 85730 THROMBOPLASTIN TIME PARTIAL: CPT | Performed by: EMERGENCY MEDICINE

## 2021-01-01 PROCEDURE — 71045 X-RAY EXAM CHEST 1 VIEW: CPT

## 2021-01-01 PROCEDURE — 87077 CULTURE AEROBIC IDENTIFY: CPT | Performed by: EMERGENCY MEDICINE

## 2021-01-01 PROCEDURE — 93010 ELECTROCARDIOGRAM REPORT: CPT | Performed by: INTERNAL MEDICINE

## 2021-01-01 PROCEDURE — 99214 OFFICE O/P EST MOD 30 MIN: CPT | Performed by: INTERNAL MEDICINE

## 2021-01-01 PROCEDURE — 99285 EMERGENCY DEPT VISIT HI MDM: CPT | Performed by: EMERGENCY MEDICINE

## 2021-01-01 PROCEDURE — 87086 URINE CULTURE/COLONY COUNT: CPT | Performed by: EMERGENCY MEDICINE

## 2021-01-01 PROCEDURE — 83880 ASSAY OF NATRIURETIC PEPTIDE: CPT | Performed by: EMERGENCY MEDICINE

## 2021-01-01 PROCEDURE — 83735 ASSAY OF MAGNESIUM: CPT | Performed by: EMERGENCY MEDICINE

## 2021-01-01 PROCEDURE — 99285 EMERGENCY DEPT VISIT HI MDM: CPT

## 2021-01-01 PROCEDURE — 93005 ELECTROCARDIOGRAM TRACING: CPT

## 2021-01-01 PROCEDURE — G0438 PPPS, INITIAL VISIT: HCPCS | Performed by: INTERNAL MEDICINE

## 2021-01-01 PROCEDURE — 85025 COMPLETE CBC W/AUTO DIFF WBC: CPT | Performed by: EMERGENCY MEDICINE

## 2021-01-01 PROCEDURE — 99213 OFFICE O/P EST LOW 20 MIN: CPT | Performed by: INTERNAL MEDICINE

## 2021-01-01 PROCEDURE — 96361 HYDRATE IV INFUSION ADD-ON: CPT

## 2021-01-01 PROCEDURE — 83605 ASSAY OF LACTIC ACID: CPT | Performed by: EMERGENCY MEDICINE

## 2021-01-01 PROCEDURE — 70450 CT HEAD/BRAIN W/O DYE: CPT

## 2021-01-01 RX ORDER — DOXYCYCLINE HYCLATE 100 MG/1
100 CAPSULE ORAL EVERY 12 HOURS SCHEDULED
Qty: 20 CAPSULE | Refills: 0 | Status: SHIPPED | OUTPATIENT
Start: 2021-01-01 | End: 2021-01-01

## 2021-01-01 RX ORDER — SODIUM CHLORIDE 9 MG/ML
3 INJECTION INTRAVENOUS
Status: DISCONTINUED | OUTPATIENT
Start: 2021-01-01 | End: 2021-01-01 | Stop reason: HOSPADM

## 2021-01-01 RX ORDER — ASPIRIN 81 MG/1
TABLET ORAL
Qty: 30 TABLET | Refills: 5 | Status: SHIPPED | OUTPATIENT
Start: 2021-01-01

## 2021-01-01 RX ORDER — SULFAMETHOXAZOLE AND TRIMETHOPRIM 800; 160 MG/1; MG/1
1 TABLET ORAL DAILY
Qty: 30 TABLET | Refills: 5 | Status: SHIPPED | OUTPATIENT
Start: 2021-01-01 | End: 2022-01-01 | Stop reason: HOSPADM

## 2021-01-01 RX ORDER — CHOLECALCIFEROL (VITAMIN D3) 125 MCG
CAPSULE ORAL
Qty: 30 TABLET | Refills: 5 | Status: SHIPPED | OUTPATIENT
Start: 2021-01-01

## 2021-01-01 RX ORDER — CALCIUM CARBONATE 500(1250)
TABLET ORAL
Qty: 60 TABLET | Refills: 4 | Status: SHIPPED | OUTPATIENT
Start: 2021-01-01

## 2021-01-01 RX ORDER — AMLODIPINE BESYLATE 5 MG/1
TABLET ORAL
Qty: 60 TABLET | Refills: 5 | Status: SHIPPED | OUTPATIENT
Start: 2021-01-01 | End: 2022-01-01 | Stop reason: HOSPADM

## 2021-01-01 RX ORDER — IBUPROFEN 200 MG
TABLET ORAL
Qty: 30 TABLET | Refills: 5 | Status: SHIPPED | OUTPATIENT
Start: 2021-01-01

## 2021-01-01 RX ORDER — LISINOPRIL 10 MG/1
TABLET ORAL
Qty: 30 TABLET | Refills: 5 | Status: SHIPPED | OUTPATIENT
Start: 2021-01-01 | End: 2021-01-01

## 2021-01-01 RX ORDER — CLONAZEPAM 0.5 MG/1
TABLET ORAL
Qty: 90 TABLET | Refills: 4 | Status: SHIPPED | OUTPATIENT
Start: 2021-01-01 | End: 2022-01-01 | Stop reason: SDUPTHER

## 2021-01-01 RX ORDER — FAMOTIDINE 20 MG/1
TABLET, FILM COATED ORAL
Qty: 30 TABLET | Refills: 5 | Status: SHIPPED | OUTPATIENT
Start: 2021-01-01

## 2021-01-01 RX ORDER — CARBOXYMETHYLCELLULOSE SODIUM, GLYCERIN, AND POLYSORBATE 80 5; 10; 5 MG/ML; MG/ML; MG/ML
SOLUTION/ DROPS OPHTHALMIC 3 TIMES DAILY
COMMUNITY
Start: 2021-01-01

## 2021-01-01 RX ORDER — UBIDECARENONE 100 MG
CAPSULE ORAL
Qty: 30 CAPSULE | Refills: 5 | Status: SHIPPED | OUTPATIENT
Start: 2021-01-01

## 2021-01-01 RX ORDER — SULFAMETHOXAZOLE AND TRIMETHOPRIM 800; 160 MG/1; MG/1
1 TABLET ORAL 2 TIMES DAILY
Qty: 14 TABLET | Refills: 0 | Status: SHIPPED | OUTPATIENT
Start: 2021-01-01 | End: 2021-01-01

## 2021-01-01 RX ORDER — CHLORAL HYDRATE 500 MG
CAPSULE ORAL
Qty: 30 CAPSULE | Refills: 5 | Status: SHIPPED | OUTPATIENT
Start: 2021-01-01

## 2021-01-01 RX ORDER — SULFAMETHOXAZOLE AND TRIMETHOPRIM 800; 160 MG/1; MG/1
1 TABLET ORAL EVERY 12 HOURS SCHEDULED
Qty: 14 TABLET | Refills: 0 | Status: SHIPPED | OUTPATIENT
Start: 2021-01-01 | End: 2021-01-01

## 2021-01-01 RX ORDER — OMEPRAZOLE 20 MG/1
CAPSULE, DELAYED RELEASE ORAL
Qty: 30 CAPSULE | Refills: 5 | Status: SHIPPED | OUTPATIENT
Start: 2021-01-01

## 2021-01-01 RX ORDER — DIGOXIN 125 MCG
TABLET ORAL
Qty: 30 TABLET | Refills: 5 | Status: SHIPPED | OUTPATIENT
Start: 2021-01-01

## 2021-01-01 RX ORDER — FLUCONAZOLE 150 MG/1
150 TABLET ORAL DAILY
Qty: 5 TABLET | Refills: 0 | Status: SHIPPED | OUTPATIENT
Start: 2021-01-01 | End: 2021-01-01

## 2021-01-01 RX ORDER — SENNOSIDES 8.6 MG
TABLET ORAL
Qty: 30 TABLET | Refills: 5 | Status: SHIPPED | OUTPATIENT
Start: 2021-01-01

## 2021-01-01 RX ORDER — DIPHENHYDRAMINE HCL 50 MG
50 CAPSULE ORAL EVERY 6 HOURS PRN
Qty: 30 CAPSULE | Refills: 0 | Status: SHIPPED | OUTPATIENT
Start: 2021-01-01 | End: 2022-01-01 | Stop reason: HOSPADM

## 2021-01-01 RX ORDER — ACETAMINOPHEN 325 MG/1
650 TABLET ORAL EVERY 6 HOURS PRN
Qty: 60 TABLET | Refills: 11 | Status: SHIPPED | OUTPATIENT
Start: 2021-01-01 | End: 2022-11-04

## 2021-01-01 RX ORDER — SULFAMETHOXAZOLE AND TRIMETHOPRIM 800; 160 MG/1; MG/1
1 TABLET ORAL ONCE
Status: COMPLETED | OUTPATIENT
Start: 2021-01-01 | End: 2021-01-01

## 2021-01-01 RX ORDER — POLYETHYLENE GLYCOL 3350 17 G/17G
POWDER ORAL
Qty: 510 G | Refills: 4 | Status: SHIPPED | OUTPATIENT
Start: 2021-01-01

## 2021-01-01 RX ORDER — CALCIUM CARBONATE 500(1250)
TABLET ORAL
Qty: 60 TABLET | Refills: 4 | Status: SHIPPED | OUTPATIENT
Start: 2021-01-01 | End: 2021-01-01

## 2021-01-01 RX ADMIN — SODIUM CHLORIDE 1000 ML: 0.9 INJECTION, SOLUTION INTRAVENOUS at 19:16

## 2021-01-01 RX ADMIN — SODIUM CHLORIDE 1000 ML: 0.9 INJECTION, SOLUTION INTRAVENOUS at 18:42

## 2021-01-01 RX ADMIN — SULFAMETHOXAZOLE AND TRIMETHOPRIM 1 TABLET: 800; 160 TABLET ORAL at 21:07

## 2021-01-05 DIAGNOSIS — I10 BENIGN ESSENTIAL HTN: ICD-10-CM

## 2021-01-05 DIAGNOSIS — I49.5 SICK SINUS SYNDROME (HCC): Primary | ICD-10-CM

## 2021-01-05 RX ORDER — GUAIFENESIN 200 MG/10ML
SOLUTION ORAL
Qty: 120 ML | Refills: 4 | Status: SHIPPED | OUTPATIENT
Start: 2021-01-05 | End: 2021-03-18

## 2021-01-07 DIAGNOSIS — K59.04 CHRONIC IDIOPATHIC CONSTIPATION: ICD-10-CM

## 2021-01-07 RX ORDER — POLYETHYLENE GLYCOL 3350 17 G/17G
POWDER ORAL
Qty: 510 G | Refills: 0 | Status: SHIPPED | OUTPATIENT
Start: 2021-01-07 | End: 2021-02-16

## 2021-01-15 DIAGNOSIS — E78.00 HYPERCHOLESTEROLEMIA: ICD-10-CM

## 2021-01-15 RX ORDER — CHLORAL HYDRATE 500 MG
CAPSULE ORAL
Qty: 30 EACH | Refills: 0 | Status: SHIPPED | OUTPATIENT
Start: 2021-01-15 | End: 2021-02-15

## 2021-01-28 DIAGNOSIS — K21.9 GASTROESOPHAGEAL REFLUX DISEASE: ICD-10-CM

## 2021-01-28 DIAGNOSIS — F41.1 GENERALIZED ANXIETY DISORDER: ICD-10-CM

## 2021-01-29 RX ORDER — FAMOTIDINE 20 MG/1
TABLET, FILM COATED ORAL
Qty: 30 TABLET | Refills: 0 | Status: SHIPPED | OUTPATIENT
Start: 2021-01-29 | End: 2021-02-22

## 2021-01-29 RX ORDER — CLONAZEPAM 0.5 MG/1
TABLET ORAL
Qty: 90 TABLET | Refills: 4 | Status: SHIPPED | OUTPATIENT
Start: 2021-01-29 | End: 2021-01-01

## 2021-01-30 DIAGNOSIS — I10 BENIGN ESSENTIAL HTN: ICD-10-CM

## 2021-02-02 DIAGNOSIS — K59.03 DRUG-INDUCED CONSTIPATION: ICD-10-CM

## 2021-02-03 RX ORDER — SENNOSIDES 8.6 MG
TABLET ORAL
Qty: 30 TABLET | Refills: 2 | Status: SHIPPED | OUTPATIENT
Start: 2021-02-03 | End: 2021-06-01

## 2021-02-10 DIAGNOSIS — I48.91 ATRIAL FIBRILLATION, UNSPECIFIED TYPE (HCC): ICD-10-CM

## 2021-02-10 DIAGNOSIS — I10 HYPERTENSION, UNSPECIFIED TYPE: ICD-10-CM

## 2021-02-10 RX ORDER — ASPIRIN 81 MG/1
TABLET ORAL
Qty: 30 TABLET | Refills: 4 | Status: SHIPPED | OUTPATIENT
Start: 2021-02-10 | End: 2021-01-01

## 2021-02-12 DIAGNOSIS — K21.9 GASTROESOPHAGEAL REFLUX DISEASE: ICD-10-CM

## 2021-02-12 DIAGNOSIS — I47.1 SUPRAVENTRICULAR TACHYCARDIA (HCC): ICD-10-CM

## 2021-02-12 RX ORDER — LISINOPRIL 10 MG/1
TABLET ORAL
Qty: 30 TABLET | Refills: 5 | Status: SHIPPED | OUTPATIENT
Start: 2021-02-12

## 2021-02-12 RX ORDER — PANTOPRAZOLE SODIUM 40 MG/1
TABLET, DELAYED RELEASE ORAL
Qty: 30 TABLET | Refills: 5 | Status: SHIPPED | OUTPATIENT
Start: 2021-02-12 | End: 2021-03-22 | Stop reason: HOSPADM

## 2021-02-15 ENCOUNTER — OFFICE VISIT (OUTPATIENT)
Dept: INTERNAL MEDICINE CLINIC | Facility: CLINIC | Age: 86
End: 2021-02-15
Payer: MEDICARE

## 2021-02-15 VITALS
SYSTOLIC BLOOD PRESSURE: 144 MMHG | DIASTOLIC BLOOD PRESSURE: 68 MMHG | TEMPERATURE: 98.8 F | HEART RATE: 89 BPM | RESPIRATION RATE: 14 BRPM | BODY MASS INDEX: 22.93 KG/M2 | HEIGHT: 63 IN | WEIGHT: 129.4 LBS | OXYGEN SATURATION: 95 %

## 2021-02-15 DIAGNOSIS — I47.1 SVT (SUPRAVENTRICULAR TACHYCARDIA) (HCC): ICD-10-CM

## 2021-02-15 DIAGNOSIS — R91.8 LUNG MASS: Primary | ICD-10-CM

## 2021-02-15 DIAGNOSIS — E78.00 HYPERCHOLESTEROLEMIA: ICD-10-CM

## 2021-02-15 DIAGNOSIS — I77.9 BILATERAL CAROTID ARTERY DISEASE, UNSPECIFIED TYPE (HCC): ICD-10-CM

## 2021-02-15 PROCEDURE — 99214 OFFICE O/P EST MOD 30 MIN: CPT | Performed by: INTERNAL MEDICINE

## 2021-02-15 RX ORDER — CHLORAL HYDRATE 500 MG
CAPSULE ORAL
Qty: 30 EACH | Refills: 5 | Status: SHIPPED | OUTPATIENT
Start: 2021-02-15 | End: 2021-01-01

## 2021-02-15 NOTE — PROGRESS NOTES
Assessment/Plan:  Repeat CT of the chest follow up on 1 cm mass in chest     Problem List Items Addressed This Visit        Cardiovascular and Mediastinum    Bilateral carotid artery disease (HCC)    SVT (supraventricular tachycardia) (HCC)       Other    Lung mass - Primary    Relevant Orders    CT chest w contrast    Basic metabolic panel           Diagnoses and all orders for this visit:    Lung mass  -     CT chest w contrast; Future  -     Basic metabolic panel; Future    Bilateral carotid artery disease, unspecified type (HCC)    SVT (supraventricular tachycardia) (HCC)        No problem-specific Assessment & Plan notes found for this encounter  Subjective: The patient has continued pain in the axilla and is very concerned regarding the nodules noted in her chest     Patient ID: Sharon Cardozo is a 80 y o  female  HPI  The patient is noted to have issues with pain in her bilateral axilla  The patient states that this pain is severe at time with the L > R regarding severity  She is not sure of the cause  It is not made worse with movement or rest   She states no fevers and notes no SOB  We discussed Colon CA screening and noted that we would recommend avoidance  The patient states that there are no other issues  She is doing well with the Digoxin and notes no issues with palpitations  We will follow up on her Carotids in the spring    Her anxiety is controlled with the current dose of Clonazepam     The following portions of the patient's history were reviewed and updated as appropriate:   She has a past medical history of Abdominal distension, Abnormal weight loss, Arthritis, Bronchitis, Bursitis of left hip, Chest pain, Colon polyp, Dysuria, External hemorrhoids, Generalized anxiety disorder, GERD (gastroesophageal reflux disease), History of echocardiogram (03/20/2018), Hyperlipidemia, Hypertension, Hypomagnesemia, Lyme disease, Multiple renal cysts (7/5/2019), Nonrheumatic mitral valve regurgitation, Osteoporosis, Pneumonia of right middle lobe due to infectious organism, SVT (supraventricular tachycardia), and Urinary tract infection  ,  does not have any pertinent problems on file  ,   has a past surgical history that includes Cataract extraction; Hemorrhoid surgery; Hysterectomy; and Pelvic floor repair  ,  family history is not on file  She was adopted  ,   reports that she has never smoked  She has never used smokeless tobacco  She reports that she does not drink alcohol or use drugs  ,  is allergic to codeine; epinephrine; iodinated diagnostic agents; and magnesium citrate     Current Outpatient Medications   Medication Sig Dispense Refill    acetaminophen (TYLENOL) 325 mg tablet Take 2 tablets (650 mg total) by mouth every 6 (six) hours as needed for mild pain, headaches or fever  0    amLODIPine (NORVASC) 5 mg tablet TAKE ONE TABLET BY MOUTH TWICE A DAY *HOLD FOR SBP<110* (HTN) 60 tablet 5    aspirin (ECOTRIN LOW STRENGTH) 81 mg EC tablet TAKE ONE TABLET BY MOUTH ONCE DAILY (STROKE PREVENTION) 30 tablet 4    calcium carbonate (OYSTER SHELL,OSCAL) 500 mg TAKE ONE TABLET BY MOUTH TWICE DAILY (SUPPLEMENT) 60 each 4    clonazePAM (KlonoPIN) 0 5 mg tablet TAKE THREE TABLETS (1 5MG) BY MOUTH AT BEDTIME (ANXIETY) 90 tablet 4    D3-1000 25 MCG (1000 UT) capsule TAKE ONE CAPSULE BY MOUTH ONCE DAILY (VITAMIN D DEFICIENCY) 30 capsule 5    digoxin (LANOXIN) 0 125 mg tablet TAKE ONE TABLET BY MOUTH ONCE DAILY (AFIB) 30 tablet 5    famotidine (PEPCID) 20 mg tablet TAKE ONE TABLET BY MOUTH DAILY AT BEDTIME (GERD) 30 tablet 0    IBU-200 200 MG tablet TAKE ONE TABLET BY MOUTH ONCE DAILY AS NEEDED FOR MIGRAINES/HEADACHE 50 tablet 0    lisinopril (ZESTRIL) 10 mg tablet TAKE ONE TABLET BY MOUTH ONCE DAILY (HTN) 30 tablet 5    magnesium hydroxide (MILK OF MAGNESIA) 800 MG/5ML suspension Take 30 mL by mouth daily as needed for constipation      Melatonin 5 MG TABS Take 1 tablet (5 mg total) by mouth daily at bedtime 30 tablet 5    metoprolol tartrate (LOPRESSOR) 25 mg tablet TAKE THREE TABLETS (75MG) BY MOUTH EVERY 12 HOURS  HOLD FOR HR <60 OR SBP <110 (HTN)**TAKE W/ FOOD/SNACK** 180 tablet 2    Omega-3 Fatty Acids (fish oil) 1,000 mg TAKE ONE CAPSULE BY MOUTH ONCE DAILY (SUPPLEMENT) 30 each 5    pantoprazole (PROTONIX) 40 mg tablet TAKE ONE TABLET BY MOUTH ONCE DAILY AT 11AM (GERD) 30 tablet 5    polyethylene glycol (GLYCOLAX) 17 GM/SCOOP DISSOLVE 17 GRAMS (ONE CAPFUL) IN 8 OUNCES OF WATER AND DRINK BY MOUTH DAILY FOR CONSTIPATION 510 g 0    senna (SENOKOT) 8 6 mg TAKE ONE TABLET BY MOUTH ONCE DAILY AT BEDTIME  HOLD FOR DIARRHEA OR LOOSE STOOLS (CONSTIPATION) 30 tablet 2    Tussin Mucus+Chest Congestion 100 MG/5ML oral liquid TAKE TWO TEASPOONS (10MLS) BY MOUTH EVERY 6 HOURS AS NEEDED FOR COUGH 120 mL 4    lisinopril (ZESTRIL) 10 mg tablet Take 1 tablet (10 mg total) by mouth daily (Patient not taking: Reported on 2/15/2021) 90 tablet 1    Melatonin 5 MG CAPS TAKE ONE CAPSULE BY MOUTH AT BEDTIME (INSOMNIA) (Patient not taking: Reported on 2/15/2021) 30 capsule 5    metoprolol tartrate (LOPRESSOR) 50 mg tablet Take 1 5 tablets (75 mg total) by mouth every 12 (twelve) hours (Patient not taking: Reported on 2/15/2021) 90 tablet 0    Oyster Shell 500 MG TABS Take 1 tablet (500 mg total) by mouth 2 (two) times a day (Patient not taking: Reported on 2/15/2021) 60 tablet 5     No current facility-administered medications for this visit  Review of Systems   Constitutional: Negative for chills, fatigue and fever  HENT: Negative  Respiratory: Negative for cough, chest tightness and shortness of breath  Cardiovascular: Positive for chest pain  Negative for palpitations and leg swelling  Gastrointestinal: Negative for abdominal pain, constipation, diarrhea, nausea and vomiting  Genitourinary: Negative  Musculoskeletal: Positive for myalgias  Negative for arthralgias and back pain     Skin: Negative  Neurological: Negative  Psychiatric/Behavioral: Negative  Objective:  Vitals:    02/15/21 1401   BP: 144/68   BP Location: Left arm   Patient Position: Sitting   Cuff Size: Standard   Pulse: 89   Resp: 14   Temp: 98 8 °F (37 1 °C)   SpO2: 95%   Weight: 58 7 kg (129 lb 6 4 oz)   Height: 5' 3" (1 6 m)     Body mass index is 22 92 kg/m²  Physical Exam  Vitals signs and nursing note reviewed  Constitutional:       Appearance: She is well-developed  HENT:      Head: Normocephalic and atraumatic  Eyes:      Pupils: Pupils are equal, round, and reactive to light  Neck:      Musculoskeletal: Normal range of motion and neck supple  Cardiovascular:      Rate and Rhythm: Normal rate and regular rhythm  Heart sounds: Normal heart sounds  No murmur  Pulmonary:      Effort: Pulmonary effort is normal  No respiratory distress  Breath sounds: Normal breath sounds  No wheezing  Abdominal:      General: Bowel sounds are normal       Palpations: Abdomen is soft  Tenderness: There is no abdominal tenderness  Musculoskeletal: Normal range of motion  Skin:     General: Skin is warm and dry  Neurological:      Mental Status: She is alert and oriented to person, place, and time            PHQ-9 Depression Screening    PHQ-9:   Frequency of the following problems over the past two weeks:      Little interest or pleasure in doing things: 0 - not at all  Feeling down, depressed, or hopeless: 0 - not at all  PHQ-2 Score: 0

## 2021-02-16 DIAGNOSIS — K59.04 CHRONIC IDIOPATHIC CONSTIPATION: ICD-10-CM

## 2021-02-16 RX ORDER — POLYETHYLENE GLYCOL 3350 17 G/17G
POWDER ORAL
Qty: 510 G | Refills: 4 | Status: ON HOLD | OUTPATIENT
Start: 2021-02-16 | End: 2021-03-22 | Stop reason: SDUPTHER

## 2021-02-17 ENCOUNTER — TELEPHONE (OUTPATIENT)
Dept: INTERVENTIONAL RADIOLOGY/VASCULAR | Facility: HOSPITAL | Age: 86
End: 2021-02-17

## 2021-02-17 ENCOUNTER — TELEPHONE (OUTPATIENT)
Dept: INTERNAL MEDICINE CLINIC | Facility: CLINIC | Age: 86
End: 2021-02-17

## 2021-02-17 DIAGNOSIS — R91.1 LUNG NODULE < 6CM ON CT: Primary | ICD-10-CM

## 2021-02-22 DIAGNOSIS — K21.9 GASTROESOPHAGEAL REFLUX DISEASE: ICD-10-CM

## 2021-02-22 RX ORDER — FAMOTIDINE 20 MG/1
TABLET, FILM COATED ORAL
Qty: 30 TABLET | Refills: 2 | Status: SHIPPED | OUTPATIENT
Start: 2021-02-22 | End: 2021-05-24

## 2021-03-02 ENCOUNTER — HOSPITAL ENCOUNTER (OUTPATIENT)
Dept: CT IMAGING | Facility: HOSPITAL | Age: 86
Discharge: HOME | End: 2021-03-02
Payer: MEDICARE

## 2021-03-02 DIAGNOSIS — R91.1 LUNG NODULE < 6CM ON CT: ICD-10-CM

## 2021-03-02 PROCEDURE — 71250 CT THORAX DX C-: CPT

## 2021-03-02 PROCEDURE — G1004 CDSM NDSC: HCPCS

## 2021-03-04 DIAGNOSIS — M81.0 OSTEOPOROSIS, UNSPECIFIED OSTEOPOROSIS TYPE, UNSPECIFIED PATHOLOGICAL FRACTURE PRESENCE: ICD-10-CM

## 2021-03-04 RX ORDER — CALCIUM CARBONATE 500(1250)
TABLET ORAL
Qty: 60 EACH | Refills: 5 | Status: SHIPPED | OUTPATIENT
Start: 2021-03-04 | End: 2021-01-01

## 2021-03-12 ENCOUNTER — OFFICE VISIT (OUTPATIENT)
Dept: URGENT CARE | Facility: CLINIC | Age: 86
End: 2021-03-12
Payer: MEDICARE

## 2021-03-12 VITALS
HEART RATE: 81 BPM | BODY MASS INDEX: 22.86 KG/M2 | WEIGHT: 129 LBS | HEIGHT: 63 IN | DIASTOLIC BLOOD PRESSURE: 70 MMHG | OXYGEN SATURATION: 97 % | RESPIRATION RATE: 18 BRPM | TEMPERATURE: 98.2 F | SYSTOLIC BLOOD PRESSURE: 144 MMHG

## 2021-03-12 DIAGNOSIS — L03.031 CELLULITIS OF GREAT TOE OF RIGHT FOOT: Primary | ICD-10-CM

## 2021-03-12 PROCEDURE — 99213 OFFICE O/P EST LOW 20 MIN: CPT | Performed by: PHYSICIAN ASSISTANT

## 2021-03-12 PROCEDURE — G0463 HOSPITAL OUTPT CLINIC VISIT: HCPCS | Performed by: PHYSICIAN ASSISTANT

## 2021-03-12 RX ORDER — CEPHALEXIN 500 MG/1
500 CAPSULE ORAL EVERY 8 HOURS SCHEDULED
Qty: 21 CAPSULE | Refills: 0 | Status: SHIPPED | OUTPATIENT
Start: 2021-03-12 | End: 2021-03-12 | Stop reason: SDUPTHER

## 2021-03-12 RX ORDER — CEPHALEXIN 500 MG/1
500 CAPSULE ORAL EVERY 8 HOURS SCHEDULED
Qty: 21 CAPSULE | Refills: 0 | Status: SHIPPED | OUTPATIENT
Start: 2021-03-12 | End: 2021-03-22 | Stop reason: HOSPADM

## 2021-03-12 NOTE — PROGRESS NOTES
[address] [facility]        NAME: Raffaele Mitchell is a 80 y o  female  : 1931    MRN: [id number]  DATE: 2021  TIME: 2:26 PM    Assessment and Plan   Cellulitis of great toe of right foot [L03 [id number]]  1  Cellulitis of great toe of right foot  cephalexin (KEFLEX) 500 mg capsule    DISCONTINUED: cephalexin (KEFLEX) 500 mg capsule         Patient Instructions     Discussed condition with pt  She has cellulitis of the right great toe which I will treat with an oral abx and rec warm soaks intermittently, otherwise keeping the toe clean and dry  Should be reevaluated if condition persists/worsens  Follow up with PCP in 3-5 days  Proceed to  ER if symptoms worsen  Chief Complaint     Chief Complaint   Patient presents with    Foot Pain     pain in right great toe for 2-3 days         History of Present Illness       Pt presents with a few day hx of a swollen, red right great toe  Denies known trauma  She reports she is in assisted living and someone comes in to provide nail care and she believes they may have cut the nail too short  Denies bleeding, discharge, red streaking, fever, chills  She does have a podiatrist but did not want to wait until she could get in with them because her toe hurts  Review of Systems   Review of Systems   Constitutional: Negative  Respiratory: Negative  Cardiovascular: Negative  Gastrointestinal: Negative  Genitourinary: Negative  Skin: Positive for color change (Right great toe)           Current Medications       Current Outpatient Medications:     acetaminophen (TYLENOL) 325 mg tablet, Take 2 tablets (650 mg total) by mouth every 6 (six) hours as needed for mild pain, headaches or fever, Disp: , Rfl: 0    amLODIPine (NORVASC) 5 mg tablet, TAKE ONE TABLET BY MOUTH TWICE A DAY *HOLD FOR SBP<110* (HTN), Disp: 60 tablet, Rfl: 5    aspirin (ECOTRIN LOW STRENGTH) 81 mg EC tablet, TAKE ONE TABLET BY MOUTH ONCE DAILY (STROKE PREVENTION), Disp: 30 tablet, Rfl: 4    calcium carbonate (OYSTER SHELL,OSCAL) 500 mg, TAKE ONE TABLET BY MOUTH TWICE DAILY (SUPPLEMENT), Disp: 60 each, Rfl: 5    clonazePAM (KlonoPIN) 0 5 mg tablet, TAKE THREE TABLETS (1 5MG) BY MOUTH AT BEDTIME (ANXIETY), Disp: 90 tablet, Rfl: 4    D3-1000 25 MCG (1000 UT) capsule, TAKE ONE CAPSULE BY MOUTH ONCE DAILY (VITAMIN D DEFICIENCY), Disp: 30 capsule, Rfl: 5    digoxin (LANOXIN) 0 125 mg tablet, TAKE ONE TABLET BY MOUTH ONCE DAILY (AFIB), Disp: 30 tablet, Rfl: 5    famotidine (PEPCID) 20 mg tablet, TAKE ONE TABLET BY MOUTH DAILY AT BEDTIME (GERD), Disp: 30 tablet, Rfl: 2    IBU-200 200 MG tablet, TAKE ONE TABLET BY MOUTH ONCE DAILY AS NEEDED FOR MIGRAINES/HEADACHE, Disp: 50 tablet, Rfl: 0    lisinopril (ZESTRIL) 10 mg tablet, Take 1 tablet (10 mg total) by mouth daily, Disp: 90 tablet, Rfl: 1    lisinopril (ZESTRIL) 10 mg tablet, TAKE ONE TABLET BY MOUTH ONCE DAILY (HTN), Disp: 30 tablet, Rfl: 5    magnesium hydroxide (MILK OF MAGNESIA) 800 MG/5ML suspension, Take 30 mL by mouth daily as needed for constipation, Disp: , Rfl:     Melatonin 5 MG CAPS, TAKE ONE CAPSULE BY MOUTH AT BEDTIME (INSOMNIA), Disp: 30 capsule, Rfl: 5    metoprolol tartrate (LOPRESSOR) 25 mg tablet, TAKE THREE TABLETS (75MG) BY MOUTH EVERY 12 HOURS   HOLD FOR HR <60 OR SBP <110 (HTN)**TAKE W/ FOOD/SNACK**, Disp: 180 tablet, Rfl: 2    metoprolol tartrate (LOPRESSOR) 50 mg tablet, Take 1 5 tablets (75 mg total) by mouth every 12 (twelve) hours, Disp: 90 tablet, Rfl: 0    Omega-3 Fatty Acids (fish oil) 1,000 mg, TAKE ONE CAPSULE BY MOUTH ONCE DAILY (SUPPLEMENT), Disp: 30 each, Rfl: 5    Oyster Shell 500 MG TABS, Take 1 tablet (500 mg total) by mouth 2 (two) times a day, Disp: 60 tablet, Rfl: 5    pantoprazole (PROTONIX) 40 mg tablet, TAKE ONE TABLET BY MOUTH ONCE DAILY AT 11AM (GERD), Disp: 30 tablet, Rfl: 5    polyethylene glycol (GLYCOLAX) 17 GM/SCOOP, DISSOLVE 17 GRAMS (ONE CAPFUL) IN 8 OUNCES OF WATER AND DRINK BY MOUTH DAILY FOR CONSTIPATION, Disp: 510 g, Rfl: 4    senna (SENOKOT) 8 6 mg, TAKE ONE TABLET BY MOUTH ONCE DAILY AT BEDTIME  HOLD FOR DIARRHEA OR LOOSE STOOLS (CONSTIPATION), Disp: 30 tablet, Rfl: 2    cephalexin (KEFLEX) 500 mg capsule, Take 1 capsule (500 mg total) by mouth every 8 (eight) hours for 7 days, Disp: 21 capsule, Rfl: 0    Melatonin 5 MG TABS, Take 1 tablet (5 mg total) by mouth daily at bedtime (Patient not taking: Reported on 3/12/2021), Disp: 30 tablet, Rfl: 5    Tussin Mucus+Chest Congestion 100 MG/5ML oral liquid, TAKE TWO TEASPOONS (10MLS) BY MOUTH EVERY 6 HOURS AS NEEDED FOR COUGH (Patient not taking: Reported on 3/12/2021), Disp: 120 mL, Rfl: 4    Current Allergies     Allergies as of 03/12/2021 - Reviewed 03/12/2021   Allergen Reaction Noted    Codeine GI Intolerance 06/19/2013    Epinephrine Other (See Comments) 06/19/2013    Iodinated diagnostic agents  11/23/2016    Magnesium citrate GI Intolerance 06/26/2013            The following portions of the patient's history were reviewed and updated as appropriate: allergies, current medications, past family history, past medical history, past social history, past surgical history and problem list      Past Medical History:   Diagnosis Date    Abdominal distension     Last Assessed: [id number]    Abnormal weight loss     Last Assessed: [id number]    Arthritis     Bronchitis     Bursitis of left hip     Lst Assessed: [id number]    Chest pain     Last Assessed: [id number]    Colon polyp     Dysuria     Last Assessed: [id number]    External hemorrhoids     Last Assessed: [id number]    Generalized anxiety disorder     GERD (gastroesophageal reflux disease)     History of echocardiogram 03/20/2018    EF 60%, mild to moderate mitral annular calcification  mild AS, mild TR       Hyperlipidemia     Hypertension     Hypomagnesemia     Last Assessed: [id number]    Lyme disease     Last Assessed: [id number]    Multiple renal cysts 7/5/2019  Nonrheumatic mitral valve regurgitation     Osteoporosis     Pneumonia of right middle lobe due to infectious organism     Last Assessed: [id number]    SVT (supraventricular tachycardia)     Urinary tract infection        Past Surgical History:   Procedure Laterality Date    CATARACT EXTRACTION      HEMORRHOID SURGERY      HYSTERECTOMY      PELVIC FLOOR REPAIR         Family History   Adopted: Yes         Medications have been verified  Objective   /70   Pulse 81   Temp 98 2 °F (36 8 °C) (Temporal)   Resp 18   Ht 5' 3" (1 6 m)   Wt 58 5 kg (129 lb)   SpO2 97%   BMI 22 85 kg/m²   No LMP recorded  Patient is postmenopausal        Physical Exam     Physical Exam  Vitals signs reviewed  Constitutional:       General: She is not in acute distress  Appearance: She is well-developed  Skin:     Comments: Right distal great toe with erythema, STS, TTP  No definitive evidence of ingrown nail  No active drainage/bleeding, disruption of superficial skin surface  Neurological:      Mental Status: She is alert and oriented to person, place, and time

## 2021-03-12 NOTE — PATIENT INSTRUCTIONS
Cellulitis   WHAT YOU NEED TO KNOW:   Cellulitis is a skin infection caused by bacteria  Cellulitis may go away on its own or you may need treatment  Your healthcare provider may draw a [place] around the outside edges of your cellulitis  If your cellulitis spreads, your healthcare provider will see it outside of the [place]  DISCHARGE INSTRUCTIONS:   Call 911 if:   · You have sudden trouble breathing or chest pain  Return to the emergency department if:   · Your wound gets larger and more painful  · You feel a crackling under your skin when you touch it  · You have purple dots or bumps on your skin, or you see bleeding under your skin  · You have new swelling and pain in your legs  · The red, warm, swollen area gets larger  · You see red streaks coming from the infected area  Contact your healthcare provider if:   · You have a fever  · Your fever or pain does not go away or gets worse  · The area does not get smaller after 2 days of antibiotics  · Your skin is flaking or peeling off  · You have questions or concerns about your condition or care  Medicines:   · Antibiotics  help treat the bacterial infection  · NSAIDs , such as ibuprofen, help decrease swelling, pain, and fever  NSAIDs can cause stomach bleeding or kidney problems in certain people  If you take blood thinner medicine, always ask if NSAIDs are safe for you  Always read the medicine label and follow directions  Do not give these medicines to children under 10months of age without direction from your child's healthcare provider  · Acetaminophen  decreases pain and fever  It is available without a doctor's order  Ask how much to take and how often to take it  Follow directions  Read the labels of all other medicines you are using to see if they also contain acetaminophen, or ask your doctor or pharmacist  Acetaminophen can cause liver damage if not taken correctly   Do not use more than 4 grams (4,000 milligrams) total of acetaminophen in one day  · Take your medicine as directed  Contact your healthcare provider if you think your medicine is not helping or if you have side effects  Tell him or her if you are allergic to any medicine  Keep a list of the medicines, vitamins, and herbs you take  Include the amounts, and when and why you take them  Bring the list or the pill bottles to follow-up visits  Carry your medicine list with you in case of an emergency  Self-care:   · Elevate the area above the level of your heart  as often as you can  This will help decrease swelling and pain  Prop the area on pillows or blankets to keep it elevated comfortably  · Clean the area daily until the wound scabs over  Gently wash the area with soap and water  Pat dry  Use dressings as directed  · Place cool or warm, wet cloths on the area as directed  Use clean cloths and clean water  Leave it on the area until the cloth is room temperature  Pat the area dry with a clean, dry cloth  The cloths may help decrease pain  Prevent cellulitis:   · Do not scratch bug bites or areas of injury  You increase your risk for cellulitis by scratching these areas  · Do not share personal items, such as towels, clothing, and razors  · Clean exercise equipment  with germ-killing  before and after you use it  · Wash your hands often  Use soap and water  Wash your hands after you use the bathroom, change a child's diapers, or sneeze  Wash your hands before you prepare or eat food  Use lotion to prevent dry, cracked skin  · Wear pressure stockings as directed  You may be told to wear the stockings if you have peripheral edema  The stockings improve blood flow and decrease swelling  · Treat athlete's foot  This can help prevent the spread of a bacterial skin infection  Follow up with your healthcare provider within 3 days, or as directed:   Your healthcare provider will check if your cellulitis is getting better  You may need different medicine  Write down your questions so you remember to ask them during your visits  © Copyright [address] Information is for End User's use only and may not be sold, redistributed or otherwise used for commercial purposes  All illustrations and images included in CareNotes® are the copyrighted property of [employer] D [employer]  or [facility] Chiquita Franklin   The above information is an  only  It is not intended as medical advice for individual conditions or treatments  Talk to your doctor, nurse or pharmacist before following any medical regimen to see if it is safe and effective for you

## 2021-03-16 DIAGNOSIS — E50.9 VITAMIN A DEFICIENCY: ICD-10-CM

## 2021-03-16 RX ORDER — UBIDECARENONE 100 MG
CAPSULE ORAL
Qty: 30 CAPSULE | Refills: 5 | Status: SHIPPED | OUTPATIENT
Start: 2021-03-16 | End: 2021-01-01

## 2021-03-18 ENCOUNTER — APPOINTMENT (EMERGENCY)
Dept: CT IMAGING | Facility: HOSPITAL | Age: 86
DRG: 871 | End: 2021-03-18
Payer: MEDICARE

## 2021-03-18 ENCOUNTER — APPOINTMENT (EMERGENCY)
Dept: RADIOLOGY | Facility: HOSPITAL | Age: 86
DRG: 871 | End: 2021-03-18
Payer: MEDICARE

## 2021-03-18 ENCOUNTER — HOSPITAL ENCOUNTER (INPATIENT)
Facility: HOSPITAL | Age: 86
LOS: 4 days | Discharge: HOME HEALTH | DRG: 871 | End: 2021-03-22
Attending: EMERGENCY MEDICINE | Admitting: INTERNAL MEDICINE
Payer: MEDICARE

## 2021-03-18 DIAGNOSIS — K59.04 CHRONIC IDIOPATHIC CONSTIPATION: ICD-10-CM

## 2021-03-18 DIAGNOSIS — K52.9 ENTEROCOLITIS: ICD-10-CM

## 2021-03-18 DIAGNOSIS — R09.02 HYPOXIA: Primary | ICD-10-CM

## 2021-03-18 DIAGNOSIS — J18.9 HCAP (HEALTHCARE-ASSOCIATED PNEUMONIA): ICD-10-CM

## 2021-03-18 DIAGNOSIS — R11.2 NAUSEA & VOMITING: ICD-10-CM

## 2021-03-18 DIAGNOSIS — R19.7 DIARRHEA: ICD-10-CM

## 2021-03-18 DIAGNOSIS — K22.70 BARRETT'S ESOPHAGUS WITHOUT DYSPLASIA: ICD-10-CM

## 2021-03-18 DIAGNOSIS — E86.0 DEHYDRATION: ICD-10-CM

## 2021-03-18 DIAGNOSIS — E87.2 LACTIC ACIDOSIS: ICD-10-CM

## 2021-03-18 PROBLEM — N17.9 SEPSIS WITH ACUTE RENAL FAILURE WITHOUT SEPTIC SHOCK (HCC): Status: ACTIVE | Noted: 2021-03-18

## 2021-03-18 PROBLEM — N18.32 STAGE 3B CHRONIC KIDNEY DISEASE (HCC): Status: ACTIVE | Noted: 2021-03-18

## 2021-03-18 PROBLEM — N17.9 AKI (ACUTE KIDNEY INJURY) (HCC): Status: ACTIVE | Noted: 2021-03-18

## 2021-03-18 PROBLEM — R91.1 PULMONARY NODULE: Status: ACTIVE | Noted: 2021-03-18

## 2021-03-18 PROBLEM — R65.20 SEPSIS WITH ACUTE RENAL FAILURE WITHOUT SEPTIC SHOCK (HCC): Status: ACTIVE | Noted: 2021-03-18

## 2021-03-18 PROBLEM — A41.9 SEPSIS WITH ACUTE RENAL FAILURE WITHOUT SEPTIC SHOCK (HCC): Status: ACTIVE | Noted: 2021-03-18

## 2021-03-18 LAB
ABO GROUP BLD: NORMAL
ALBUMIN SERPL BCP-MCNC: 3.4 G/DL (ref 3.5–5)
ALP SERPL-CCNC: 61 U/L (ref 46–116)
ALT SERPL W P-5'-P-CCNC: 33 U/L (ref 12–78)
ANION GAP SERPL CALCULATED.3IONS-SCNC: 12 MMOL/L (ref 4–13)
APTT PPP: 24 SECONDS (ref 23–37)
AST SERPL W P-5'-P-CCNC: 29 U/L (ref 5–45)
ATRIAL RATE: 116 BPM
BASOPHILS # BLD MANUAL: 0 THOUSAND/UL (ref 0–0.1)
BASOPHILS NFR MAR MANUAL: 0 % (ref 0–1)
BILIRUB SERPL-MCNC: 0.5 MG/DL (ref 0.2–1)
BUN SERPL-MCNC: 19 MG/DL (ref 5–25)
CALCIUM ALBUM COR SERPL-MCNC: 9.8 MG/DL (ref 8.3–10.1)
CALCIUM SERPL-MCNC: 9.3 MG/DL (ref 8.3–10.1)
CHLORIDE SERPL-SCNC: 98 MMOL/L (ref 100–108)
CK SERPL-CCNC: 61 U/L (ref 26–192)
CO2 SERPL-SCNC: 24 MMOL/L (ref 21–32)
CREAT SERPL-MCNC: 1.15 MG/DL (ref 0.6–1.3)
CRP SERPL QL: 10.7 MG/L
D DIMER PPP FEU-MCNC: 2.92 UG/ML FEU
DIGOXIN SERPL-MCNC: 0.9 NG/ML (ref 0.8–2)
EOSINOPHIL # BLD MANUAL: 0 THOUSAND/UL (ref 0–0.4)
EOSINOPHIL NFR BLD MANUAL: 0 % (ref 0–6)
ERYTHROCYTE [DISTWIDTH] IN BLOOD BY AUTOMATED COUNT: 13.2 % (ref 11.6–15.1)
FERRITIN SERPL-MCNC: 15 NG/ML (ref 8–388)
FLUAV RNA RESP QL NAA+PROBE: NEGATIVE
FLUBV RNA RESP QL NAA+PROBE: NEGATIVE
GFR SERPL CREATININE-BSD FRML MDRD: 42 ML/MIN/1.73SQ M
GLUCOSE SERPL-MCNC: 176 MG/DL (ref 65–140)
HBV CORE AB SER QL: NORMAL
HBV CORE IGM SER QL: NORMAL
HBV SURFACE AG SER QL: NORMAL
HCT VFR BLD AUTO: 44.5 % (ref 34.8–46.1)
HCV AB SER QL: NORMAL
HGB BLD-MCNC: 13.9 G/DL (ref 11.5–15.4)
HIV 1+2 AB+HIV1 P24 AG SERPL QL IA: NORMAL
HIV1 P24 AG SER QL: NORMAL
INR PPP: 1.03 (ref 0.84–1.19)
LACTATE SERPL-SCNC: 2.1 MMOL/L (ref 0.5–2)
LACTATE SERPL-SCNC: 2.5 MMOL/L (ref 0.5–2)
LACTATE SERPL-SCNC: 2.8 MMOL/L (ref 0.5–2)
LACTATE SERPL-SCNC: 3.6 MMOL/L (ref 0.5–2)
LYMPHOCYTES # BLD AUTO: 0.2 THOUSAND/UL (ref 0.6–4.47)
LYMPHOCYTES # BLD AUTO: 2 % (ref 14–44)
MCH RBC QN AUTO: 28.4 PG (ref 26.8–34.3)
MCHC RBC AUTO-ENTMCNC: 31.2 G/DL (ref 31.4–37.4)
MCV RBC AUTO: 91 FL (ref 82–98)
MONOCYTES # BLD AUTO: 0.2 THOUSAND/UL (ref 0–1.22)
MONOCYTES NFR BLD: 2 % (ref 4–12)
NEUTROPHILS # BLD MANUAL: 9.77 THOUSAND/UL (ref 1.85–7.62)
NEUTS BAND NFR BLD MANUAL: 7 % (ref 0–8)
NEUTS SEG NFR BLD AUTO: 89 % (ref 43–75)
NRBC BLD AUTO-RTO: 0 /100 WBCS
NT-PROBNP SERPL-MCNC: 138 PG/ML
P AXIS: 73 DEGREES
PLATELET # BLD AUTO: 310 THOUSANDS/UL (ref 149–390)
PLATELET BLD QL SMEAR: ADEQUATE
PMV BLD AUTO: 9.6 FL (ref 8.9–12.7)
POTASSIUM SERPL-SCNC: 4.2 MMOL/L (ref 3.5–5.3)
PR INTERVAL: 198 MS
PROCALCITONIN SERPL-MCNC: 0.28 NG/ML
PROCALCITONIN SERPL-MCNC: 3.7 NG/ML
PROT SERPL-MCNC: 8.4 G/DL (ref 6.4–8.2)
PROTHROMBIN TIME: 13.3 SECONDS (ref 11.6–14.5)
QRS AXIS: 56 DEGREES
QRSD INTERVAL: 74 MS
QT INTERVAL: 302 MS
QTC INTERVAL: 419 MS
RBC # BLD AUTO: 4.89 MILLION/UL (ref 3.81–5.12)
RH BLD: POSITIVE
RSV RNA RESP QL NAA+PROBE: NEGATIVE
SARS-COV-2 RNA RESP QL NAA+PROBE: NEGATIVE
SODIUM SERPL-SCNC: 134 MMOL/L (ref 136–145)
T WAVE AXIS: 62 DEGREES
TOTAL CELLS COUNTED SPEC: 100
TROPONIN I SERPL-MCNC: <0.02 NG/ML
VENTRICULAR RATE: 116 BPM
WBC # BLD AUTO: 10.18 THOUSAND/UL (ref 4.31–10.16)

## 2021-03-18 PROCEDURE — 87806 HIV AG W/HIV1&2 ANTB W/OPTIC: CPT | Performed by: EMERGENCY MEDICINE

## 2021-03-18 PROCEDURE — 87493 C DIFF AMPLIFIED PROBE: CPT | Performed by: INTERNAL MEDICINE

## 2021-03-18 PROCEDURE — 83605 ASSAY OF LACTIC ACID: CPT | Performed by: INTERNAL MEDICINE

## 2021-03-18 PROCEDURE — 85027 COMPLETE CBC AUTOMATED: CPT | Performed by: EMERGENCY MEDICINE

## 2021-03-18 PROCEDURE — 74176 CT ABD & PELVIS W/O CONTRAST: CPT

## 2021-03-18 PROCEDURE — 86900 BLOOD TYPING SEROLOGIC ABO: CPT | Performed by: EMERGENCY MEDICINE

## 2021-03-18 PROCEDURE — 85007 BL SMEAR W/DIFF WBC COUNT: CPT | Performed by: EMERGENCY MEDICINE

## 2021-03-18 PROCEDURE — 80162 ASSAY OF DIGOXIN TOTAL: CPT | Performed by: EMERGENCY MEDICINE

## 2021-03-18 PROCEDURE — 99285 EMERGENCY DEPT VISIT HI MDM: CPT

## 2021-03-18 PROCEDURE — 97535 SELF CARE MNGMENT TRAINING: CPT

## 2021-03-18 PROCEDURE — 1123F ACP DISCUSS/DSCN MKR DOCD: CPT | Performed by: EMERGENCY MEDICINE

## 2021-03-18 PROCEDURE — 86704 HEP B CORE ANTIBODY TOTAL: CPT | Performed by: EMERGENCY MEDICINE

## 2021-03-18 PROCEDURE — 87505 NFCT AGENT DETECTION GI: CPT | Performed by: INTERNAL MEDICINE

## 2021-03-18 PROCEDURE — 82728 ASSAY OF FERRITIN: CPT | Performed by: EMERGENCY MEDICINE

## 2021-03-18 PROCEDURE — 93010 ELECTROCARDIOGRAM REPORT: CPT | Performed by: INTERNAL MEDICINE

## 2021-03-18 PROCEDURE — 85730 THROMBOPLASTIN TIME PARTIAL: CPT | Performed by: EMERGENCY MEDICINE

## 2021-03-18 PROCEDURE — 93005 ELECTROCARDIOGRAM TRACING: CPT

## 2021-03-18 PROCEDURE — 96360 HYDRATION IV INFUSION INIT: CPT

## 2021-03-18 PROCEDURE — 83605 ASSAY OF LACTIC ACID: CPT | Performed by: EMERGENCY MEDICINE

## 2021-03-18 PROCEDURE — 84145 PROCALCITONIN (PCT): CPT | Performed by: EMERGENCY MEDICINE

## 2021-03-18 PROCEDURE — 86901 BLOOD TYPING SEROLOGIC RH(D): CPT | Performed by: EMERGENCY MEDICINE

## 2021-03-18 PROCEDURE — 86140 C-REACTIVE PROTEIN: CPT | Performed by: EMERGENCY MEDICINE

## 2021-03-18 PROCEDURE — 36415 COLL VENOUS BLD VENIPUNCTURE: CPT | Performed by: EMERGENCY MEDICINE

## 2021-03-18 PROCEDURE — 87340 HEPATITIS B SURFACE AG IA: CPT | Performed by: EMERGENCY MEDICINE

## 2021-03-18 PROCEDURE — 0241U HB NFCT DS VIR RESP RNA 4 TRGT: CPT | Performed by: EMERGENCY MEDICINE

## 2021-03-18 PROCEDURE — 87040 BLOOD CULTURE FOR BACTERIA: CPT | Performed by: EMERGENCY MEDICINE

## 2021-03-18 PROCEDURE — 99223 1ST HOSP IP/OBS HIGH 75: CPT | Performed by: INTERNAL MEDICINE

## 2021-03-18 PROCEDURE — 97163 PT EVAL HIGH COMPLEX 45 MIN: CPT

## 2021-03-18 PROCEDURE — 86803 HEPATITIS C AB TEST: CPT | Performed by: EMERGENCY MEDICINE

## 2021-03-18 PROCEDURE — 85610 PROTHROMBIN TIME: CPT | Performed by: EMERGENCY MEDICINE

## 2021-03-18 PROCEDURE — 96375 TX/PRO/DX INJ NEW DRUG ADDON: CPT

## 2021-03-18 PROCEDURE — 80053 COMPREHEN METABOLIC PANEL: CPT | Performed by: EMERGENCY MEDICINE

## 2021-03-18 PROCEDURE — 96365 THER/PROPH/DIAG IV INF INIT: CPT

## 2021-03-18 PROCEDURE — 86705 HEP B CORE ANTIBODY IGM: CPT | Performed by: EMERGENCY MEDICINE

## 2021-03-18 PROCEDURE — 85379 FIBRIN DEGRADATION QUANT: CPT | Performed by: EMERGENCY MEDICINE

## 2021-03-18 PROCEDURE — 84484 ASSAY OF TROPONIN QUANT: CPT | Performed by: EMERGENCY MEDICINE

## 2021-03-18 PROCEDURE — 83880 ASSAY OF NATRIURETIC PEPTIDE: CPT | Performed by: EMERGENCY MEDICINE

## 2021-03-18 PROCEDURE — 84145 PROCALCITONIN (PCT): CPT | Performed by: INTERNAL MEDICINE

## 2021-03-18 PROCEDURE — 99291 CRITICAL CARE FIRST HOUR: CPT | Performed by: EMERGENCY MEDICINE

## 2021-03-18 PROCEDURE — 71045 X-RAY EXAM CHEST 1 VIEW: CPT

## 2021-03-18 PROCEDURE — 97167 OT EVAL HIGH COMPLEX 60 MIN: CPT

## 2021-03-18 PROCEDURE — 82550 ASSAY OF CK (CPK): CPT | Performed by: EMERGENCY MEDICINE

## 2021-03-18 PROCEDURE — 97116 GAIT TRAINING THERAPY: CPT

## 2021-03-18 RX ORDER — ACETAMINOPHEN 325 MG/1
650 TABLET ORAL ONCE
Status: COMPLETED | OUTPATIENT
Start: 2021-03-18 | End: 2021-03-18

## 2021-03-18 RX ORDER — ONDANSETRON 2 MG/ML
4 INJECTION INTRAMUSCULAR; INTRAVENOUS EVERY 6 HOURS PRN
Status: DISCONTINUED | OUTPATIENT
Start: 2021-03-18 | End: 2021-03-22 | Stop reason: HOSPADM

## 2021-03-18 RX ORDER — PANTOPRAZOLE SODIUM 40 MG/1
40 TABLET, DELAYED RELEASE ORAL
Status: DISCONTINUED | OUTPATIENT
Start: 2021-03-18 | End: 2021-03-22 | Stop reason: HOSPADM

## 2021-03-18 RX ORDER — ASPIRIN 81 MG/1
81 TABLET ORAL DAILY
Status: DISCONTINUED | OUTPATIENT
Start: 2021-03-18 | End: 2021-03-22 | Stop reason: HOSPADM

## 2021-03-18 RX ORDER — AMLODIPINE BESYLATE 5 MG/1
5 TABLET ORAL 2 TIMES DAILY
Status: DISCONTINUED | OUTPATIENT
Start: 2021-03-18 | End: 2021-03-18

## 2021-03-18 RX ORDER — LEVOFLOXACIN 5 MG/ML
750 INJECTION, SOLUTION INTRAVENOUS
Status: DISCONTINUED | OUTPATIENT
Start: 2021-03-18 | End: 2021-03-21

## 2021-03-18 RX ORDER — CEFTRIAXONE 1 G/50ML
1000 INJECTION, SOLUTION INTRAVENOUS ONCE
Status: COMPLETED | OUTPATIENT
Start: 2021-03-18 | End: 2021-03-18

## 2021-03-18 RX ORDER — HEPARIN SODIUM 5000 [USP'U]/ML
5000 INJECTION, SOLUTION INTRAVENOUS; SUBCUTANEOUS EVERY 8 HOURS SCHEDULED
Status: DISCONTINUED | OUTPATIENT
Start: 2021-03-18 | End: 2021-03-22 | Stop reason: HOSPADM

## 2021-03-18 RX ORDER — CLONAZEPAM 0.5 MG/1
1.5 TABLET ORAL
Status: DISCONTINUED | OUTPATIENT
Start: 2021-03-18 | End: 2021-03-22 | Stop reason: HOSPADM

## 2021-03-18 RX ORDER — FAMOTIDINE 20 MG/1
10 TABLET, FILM COATED ORAL DAILY
Status: DISCONTINUED | OUTPATIENT
Start: 2021-03-18 | End: 2021-03-22 | Stop reason: HOSPADM

## 2021-03-18 RX ORDER — ONDANSETRON 2 MG/ML
4 INJECTION INTRAMUSCULAR; INTRAVENOUS ONCE
Status: COMPLETED | OUTPATIENT
Start: 2021-03-18 | End: 2021-03-18

## 2021-03-18 RX ORDER — SODIUM CHLORIDE 9 MG/ML
100 INJECTION, SOLUTION INTRAVENOUS CONTINUOUS
Status: DISCONTINUED | OUTPATIENT
Start: 2021-03-18 | End: 2021-03-20

## 2021-03-18 RX ORDER — LANOLIN ALCOHOL/MO/W.PET/CERES
6 CREAM (GRAM) TOPICAL
Status: DISCONTINUED | OUTPATIENT
Start: 2021-03-18 | End: 2021-03-22 | Stop reason: HOSPADM

## 2021-03-18 RX ORDER — ACETAMINOPHEN 325 MG/1
650 TABLET ORAL EVERY 6 HOURS PRN
Status: DISCONTINUED | OUTPATIENT
Start: 2021-03-18 | End: 2021-03-22 | Stop reason: HOSPADM

## 2021-03-18 RX ORDER — CIPROFLOXACIN 2 MG/ML
400 INJECTION, SOLUTION INTRAVENOUS EVERY 12 HOURS
Status: DISCONTINUED | OUTPATIENT
Start: 2021-03-18 | End: 2021-03-18

## 2021-03-18 RX ORDER — DIGOXIN 125 MCG
125 TABLET ORAL DAILY
Status: DISCONTINUED | OUTPATIENT
Start: 2021-03-18 | End: 2021-03-22 | Stop reason: HOSPADM

## 2021-03-18 RX ORDER — MAGNESIUM HYDROXIDE/ALUMINUM HYDROXICE/SIMETHICONE 120; 1200; 1200 MG/30ML; MG/30ML; MG/30ML
30 SUSPENSION ORAL EVERY 6 HOURS PRN
Status: DISCONTINUED | OUTPATIENT
Start: 2021-03-18 | End: 2021-03-22 | Stop reason: HOSPADM

## 2021-03-18 RX ADMIN — HEPARIN SODIUM 5000 UNITS: 5000 INJECTION INTRAVENOUS; SUBCUTANEOUS at 21:13

## 2021-03-18 RX ADMIN — ACETAMINOPHEN 650 MG: 325 TABLET, FILM COATED ORAL at 07:11

## 2021-03-18 RX ADMIN — SODIUM CHLORIDE 75 ML/HR: 0.9 INJECTION, SOLUTION INTRAVENOUS at 13:41

## 2021-03-18 RX ADMIN — FAMOTIDINE 10 MG: 20 TABLET, FILM COATED ORAL at 13:41

## 2021-03-18 RX ADMIN — METOPROLOL TARTRATE 75 MG: 50 TABLET, FILM COATED ORAL at 18:42

## 2021-03-18 RX ADMIN — HEPARIN SODIUM 5000 UNITS: 5000 INJECTION INTRAVENOUS; SUBCUTANEOUS at 13:43

## 2021-03-18 RX ADMIN — ONDANSETRON 4 MG: 2 INJECTION INTRAMUSCULAR; INTRAVENOUS at 21:17

## 2021-03-18 RX ADMIN — DIGOXIN 125 MCG: 125 TABLET ORAL at 13:42

## 2021-03-18 RX ADMIN — ACETAMINOPHEN 650 MG: 325 TABLET, FILM COATED ORAL at 21:39

## 2021-03-18 RX ADMIN — METRONIDAZOLE 500 MG: 500 INJECTION, SOLUTION INTRAVENOUS at 13:44

## 2021-03-18 RX ADMIN — ASPIRIN 81 MG: 81 TABLET, COATED ORAL at 13:41

## 2021-03-18 RX ADMIN — ONDANSETRON 4 MG: 2 INJECTION INTRAMUSCULAR; INTRAVENOUS at 07:09

## 2021-03-18 RX ADMIN — METRONIDAZOLE 500 MG: 500 INJECTION, SOLUTION INTRAVENOUS at 21:14

## 2021-03-18 RX ADMIN — LEVOFLOXACIN 750 MG: 5 INJECTION, SOLUTION INTRAVENOUS at 15:17

## 2021-03-18 RX ADMIN — SODIUM CHLORIDE 500 ML: 0.9 INJECTION, SOLUTION INTRAVENOUS at 06:01

## 2021-03-18 RX ADMIN — CEFTRIAXONE 1000 MG: 1 INJECTION, SOLUTION INTRAVENOUS at 07:13

## 2021-03-18 RX ADMIN — CLONAZEPAM 1.5 MG: 0.5 TABLET ORAL at 21:13

## 2021-03-18 RX ADMIN — MELATONIN TAB 3 MG 6 MG: 3 TAB at 21:13

## 2021-03-18 RX ADMIN — PANTOPRAZOLE SODIUM 40 MG: 40 TABLET, DELAYED RELEASE ORAL at 13:55

## 2021-03-18 NOTE — H&P
[facility] [address] [place]  H&P- Shayna Even 9/2/1931, 80 y o  female MRN: [id number]  Unit/Bed#: RM07 Encounter: [id number]  Primary Care Provider: Babar Fernandez DO   Date and time admitted to hospital: 3/18/2021  5:54 AM    * sepsis  Assessment & Plan  Sepsis based on findings of fever, tachycardia, and tachypnea with a suspected GI source  CT scan obtained and shows:  · Air in bladder - infection verses instrumentation  · Liquid stool in colon - inflammation versus infection  · Vague bilateral ground-glass lower lobe infiltrates - cannot exclude a pneumonia  Plan as follows:  · Continue IV fluids  · Trend lactic levels acid until normalized  · Urinalysis ordered and pending  · Blood cultures ordered and pending  · Check procalcitonin  · Check fecal leukocytes, stool for enteric pathogens  · Blood pressure is soft and patient with FIONA - hold CCB and ACE-I   · Continue BB therapy due to history of SVTs, but monitor heart rate and blood pressure carefully  · Patient was receiving cephalexin as outpatient - will also check a C diff  · Initiate on antibiotic coverage with combination of fluoroquinolone and metronidazole, but monitor stool studies carefully  Patient is being initiated on levofloxacin for additional pulmonary coverage as well given uncertainty of CT findings in the lungs  · Await procalcitonin results - unsure if lower lobe findings represent atelectasis or true infection  Diarrhea of presumed infectious origin  Assessment & Plan  Symptom onset of nausea, vomiting, and diarrhea, reportedly ongoing for 1 day  CT with a potential enterocolitis  Initiate on antibiotics as above, and monitor stool studies carefully  FIONA (acute kidney injury) ([facility] [address] )  Assessment & Plan  FIONA superimposed on CKD - patient with concurrent hyponatremia and hypochloremia, very likely hypovolemic in nature    Continue fluid resuscitation, monitor renal function, renally dose medications, and avoid nephrotoxins  Hold ACE-I    Lactic acidosis  Assessment & Plan  Likely secondary to sepsis  Lactic acid is improving with IVF's  Monitor until normalized  Esophageal reflux  Assessment & Plan  Also with mention of Gomez's esophagus  Continue PPI therapy  Also with mention of Esophageal Mass - had been scheduled for EUS via GI, cancelled secondary to family request     Pulmonary nodule  Assessment & Plan  Mention of pulmonary nodules, which appears stable by repeat CT on 03/06/2021  Stage 3b chronic kidney disease  Assessment & Plan  Lab Results   Component Value Date    EGFR 42 03/18/2021    EGFR 58 07/05/2019    EGFR 55 07/04/2019    CREATININE 1 15 03/18/2021    CREATININE 0 90 07/05/2019    CREATININE 0 93 07/04/2019     Baseline creatinine of 0 9, which based on patient's age and weight has a clearance of approximately 40  CKD 3B  SVT (supraventricular tachycardia) (HCC)  Assessment & Plan  Prior history of SVTs - also with mention of Afib, however per last cardiology notes there has been no evidence of this  Remains on Digoxin without toxic levels  Continue BB therapy  VTE Prophylaxis: Heparin  / sequential compression device   Code Status: DNR/DNI  POLST: POLST form is not discussed and not completed at this time  Discussion with family: Yes    Anticipated Length of Stay:  Patient will be admitted on an Inpatient basis with an anticipated length of stay of  > 2 midnights  Justification for Hospital Stay:  Evaluation and treatment of sepsis  Total Time for Visit, including Counseling / Coordination of Care: 1 hour  Greater than 50% of this total time spent on direct patient counseling and coordination of care  Chief Complaint:   Nausea, Vomiting, Diarrhea    History of Present Illness:    Ariadna andujar 60-year-old woman nursing home resident at [facility], being admitted from [facility] ED with a diagnosis of sepsis      Mrs  Duke Ruiz has a past medical history significant for GERD with mention of Gomez's esophagus  She was previously noted to have an esophageal mass, with tentative plans for an EUS via GI canceled secondary to family's request   She also has a history of SVTs chronically maintained on digoxin, chronic constipation, HTN and dyslipidemia, OA, and GALE  There is also mention of bilateral carotid artery stenosis  She was noted to have onset of nausea and vomiting, along with diarrhea, which she reports as starting on the day prior to admission  She was reportedly week with ambulation while at the nursing facility, and was sent in to the ED for further evaluation  Upon presentation the patient was found to be febrile, tachycardic, and tachypneic, with signs of dehydration by labs  Her CXR was unremarkable, but a CT of the abdomen and pelvis without contrast showed evidence of an enterocolitis  Of note, she was also thought to have air in the bladder that either represented infection or instrumentation, as well as vague ground-glass bilateral lower lobe infiltrates, unable to exclude pneumonia  She was also noted to be hypoxic at presentation, requiring supplemental oxygen  COVID testing was negative, but the patient's lactic acid levels were elevated  She was at that time referred for admission for further evaluation treatment  Review of Systems:    Review of Systems   Constitutional: Positive for fatigue  Respiratory: Negative for shortness of breath  Cardiovascular: Negative for chest pain  Gastrointestinal: Positive for abdominal pain, diarrhea, nausea and vomiting  Genitourinary: Negative for dysuria       Past Medical and Surgical History:     Past Medical History:   Diagnosis Date    Abdominal distension     Last Assessed: [id number]    Abnormal weight loss     Last Assessed: [id number]    Arthritis     Bronchitis     Bursitis of left hip     Lst Assessed: [id number]    Chest pain     Last Assessed: [id number]    Colon polyp     Dysuria     Last Assessed: [id number]    External hemorrhoids     Last Assessed: [id number]    Generalized anxiety disorder     GERD (gastroesophageal reflux disease)     History of echocardiogram 03/20/2018    EF 60%, mild to moderate mitral annular calcification  mild AS, mild TR   Hyperlipidemia     Hypertension     Hypomagnesemia     Last Assessed: [id number]    Lyme disease     Last Assessed: [id number]    Multiple renal cysts 7/5/2019    Nonrheumatic mitral valve regurgitation     Osteoporosis     Pneumonia of right middle lobe due to infectious organism     Last Assessed: [id number]    SVT (supraventricular tachycardia)     Urinary tract infection        Past Surgical History:   Procedure Laterality Date    CATARACT EXTRACTION      HEMORRHOID SURGERY      HYSTERECTOMY      PELVIC FLOOR REPAIR         Meds/Allergies:    Prior to Admission medications    Medication Sig Start Date End Date Taking?  Authorizing Provider   amLODIPine (NORVASC) 5 mg tablet TAKE ONE TABLET BY MOUTH TWICE A DAY *HOLD FOR SBP<110* (HTN) 8/26/20  Yes Mendez Barrios DO   aspirin (ECOTRIN LOW STRENGTH) 81 mg EC tablet TAKE ONE TABLET BY MOUTH ONCE DAILY (STROKE PREVENTION) 2/10/21  Yes Mendez Barrios DO   calcium carbonate (OYSTER SHELL,OSCAL) 500 mg TAKE ONE TABLET BY MOUTH TWICE DAILY (SUPPLEMENT) 3/4/21  Yes Mendez Barrios DO   clonazePAM (KlonoPIN) 0 5 mg tablet TAKE THREE TABLETS (1 5MG) BY MOUTH AT BEDTIME (ANXIETY) 1/29/21  Yes Mendez Barrios DO   D3-1000 25 MCG (1000 UT) capsule TAKE ONE CAPSULE BY MOUTH ONCE DAILY (VITAMIN D DEFICIENCY) 3/16/21  Yes Mendez Barrios DO   digoxin (LANOXIN) 0 125 mg tablet TAKE ONE TABLET BY MOUTH ONCE DAILY (AFIB) 10/5/20  Yes Mendez Barrios DO   famotidine (PEPCID) 20 mg tablet TAKE ONE TABLET BY MOUTH DAILY AT BEDTIME (GERD) 2/22/21  Yes Mendez Barrios DO   lisinopril (ZESTRIL) 10 mg tablet Take 1 tablet (10 mg total) by mouth daily 12/18/20 6/16/21 Yes Den Valles DO   lisinopril (ZESTRIL) 10 mg tablet TAKE ONE TABLET BY MOUTH ONCE DAILY (HTN) 2/12/21  Yes Den Valles DO   magnesium hydroxide (MILK OF MAGNESIA) 800 MG/5ML suspension Take 30 mL by mouth daily as needed for constipation   Yes Historical Provider, MD   Melatonin 5 MG CAPS TAKE ONE CAPSULE BY MOUTH AT BEDTIME (INSOMNIA) 10/19/20  Yes Den Valles DO   Melatonin 5 MG TABS Take 1 tablet (5 mg total) by mouth daily at bedtime 4/30/18  Yes Den Valles DO   metoprolol tartrate (LOPRESSOR) 25 mg tablet TAKE THREE TABLETS (75MG) BY MOUTH EVERY 12 HOURS  HOLD FOR HR <60 OR SBP <110 (HTN)**TAKE W/ FOOD/SNACK** 2/3/21  Yes Den Valles DO   metoprolol tartrate (LOPRESSOR) 50 mg tablet Take 1 5 tablets (75 mg total) by mouth every 12 (twelve) hours 7/5/19  Yes Сергей Prasad DO   Omega-3 Fatty Acids (fish oil) 1,000 mg TAKE ONE CAPSULE BY MOUTH ONCE DAILY (SUPPLEMENT) 2/15/21  Yes Den Valles DO   Oyster Shell 500 MG TABS Take 1 tablet (500 mg total) by mouth 2 (two) times a day 9/5/19  Yes Den Valles DO   pantoprazole (PROTONIX) 40 mg tablet TAKE ONE TABLET BY MOUTH ONCE DAILY AT 11AM (GERD) 2/12/21  Yes Den Valles DO   polyethylene glycol (GLYCOLAX) 17 GM/SCOOP DISSOLVE 17 GRAMS (ONE CAPFUL) IN 8 OUNCES OF WATER AND DRINK BY MOUTH DAILY FOR CONSTIPATION 2/16/21  Yes Den Valles DO   senna (SENOKOT) 8 6 mg TAKE ONE TABLET BY MOUTH ONCE DAILY AT BEDTIME   HOLD FOR DIARRHEA OR LOOSE STOOLS (CONSTIPATION) 2/3/21  Yes Den Valles DO   acetaminophen (TYLENOL) 325 mg tablet Take 2 tablets (650 mg total) by mouth every 6 (six) hours as needed for mild pain, headaches or fever 7/5/19   Сергей Prasad DO   cephalexin (KEFLEX) 500 mg capsule Take 1 capsule (500 mg total) by mouth every 8 (eight) hours for 7 days 3/12/21 3/19/21  Landen Ware PA-C   IBU-200 200 MG tablet TAKE ONE TABLET BY MOUTH ONCE DAILY AS NEEDED FOR MIGRAINES/HEADACHE 10/5/20   Den Valles, DO   Tussin Mucus+Chest Congestion 100 MG/5ML oral liquid TAKE TWO TEASPOONS (10MLS) BY MOUTH EVERY 6 HOURS AS NEEDED FOR COUGH  Patient not taking: Reported on 3/12/2021 1/5/21 3/18/21  Lexis Rothman DO     I have reviewed home medications using allscripts  Allergies: Allergies   Allergen Reactions    Codeine GI Intolerance    Epinephrine Other (See Comments)     Increased Heart Rate, Palpitations    Iodinated Diagnostic Agents     Iodine Other (See Comments)          Magnesium Citrate GI Intolerance       Social History:     Marital Status:    Occupation: Retired  Patient Pre-hospital Living Situation:  Resident at [address] [place] [address]  Patient Pre-hospital Level of Mobility:  Independent  Patient Pre-hospital Diet Restrictions:  None  Substance Use History:   Social History     Substance and Sexual Activity   Alcohol Use Never    Frequency: Never    Binge frequency: Never     Social History     Tobacco Use   Smoking Status Never Smoker   Smokeless Tobacco Never Used     Social History     Substance and Sexual Activity   Drug Use No       Family History:    non-contributory    Physical Exam:     Vitals:   Blood Pressure: 109/53 (03/18/21 1145)  Pulse: 69 (03/18/21 1145)  Temperature: (!) 102 3 °F (39 1 °C) (03/18/21 0553)  Temp Source: Temporal (03/18/21 0553)  Respirations: 18 (03/18/21 1145)  Height: 5' 2 99" (160 cm) (03/18/21 0553)  Weight - Scale: 60 kg (132 lb 4 4 oz) (03/18/21 0553)  SpO2: 96 % (03/18/21 1145)    Physical Exam  Constitutional:       General: She is not in acute distress  Appearance: Normal appearance  She is normal weight  She is ill-appearing  She is not toxic-appearing  HENT:      Mouth/Throat:      Mouth: Mucous membranes are dry  Neck:      Musculoskeletal: Neck supple  Cardiovascular:      Rate and Rhythm: Normal rate and regular rhythm  Heart sounds: No murmur  No gallop         Comments: Non tachycardic at time of exam   Pulmonary: Effort: Pulmonary effort is normal  No respiratory distress  Breath sounds: Normal breath sounds  No wheezing, rhonchi or rales  Comments: Limited anterior and lateral exam   Abdominal:      General: Abdomen is flat  Bowel sounds are normal  There is no distension  Palpations: Abdomen is soft  Tenderness: There is no abdominal tenderness  There is no guarding or rebound  Comments: Bowel sounds not hyperactive  Musculoskeletal:         General: No swelling or tenderness  Skin:     General: Skin is warm and dry  Neurological:      General: No focal deficit present  Mental Status: She is alert and oriented to person, place, and time  Psychiatric:         Mood and Affect: Mood normal          Behavior: Behavior normal          Thought Content: Thought content normal          Judgment: Judgment normal         Additional Data:     Lab Results: I have personally reviewed pertinent reports  Results from last 7 days   Lab Units 03/18/21  0606   WBC Thousand/uL 10 18*   HEMOGLOBIN g/dL 13 9   HEMATOCRIT % 44 5   PLATELETS Thousands/uL 310   BANDS PCT % 7   LYMPHO PCT % 2*   MONO PCT % 2*   EOS PCT % 0     Results from last 7 days   Lab Units 03/18/21  0606   SODIUM mmol/L 134*   POTASSIUM mmol/L 4 2   CHLORIDE mmol/L 98*   CO2 mmol/L 24   BUN mg/dL 19   CREATININE mg/dL 1 15   ANION GAP mmol/L 12   CALCIUM mg/dL 9 3   ALBUMIN g/dL 3 4*   TOTAL BILIRUBIN mg/dL 0 50   ALK PHOS U/L 61   ALT U/L 33   AST U/L 29   GLUCOSE RANDOM mg/dL 176*     Results from last 7 days   Lab Units 03/18/21  0606   INR  1 03             Results from last 7 days   Lab Units 03/18/21  0848 03/18/21  0606   LACTIC ACID mmol/L 2 1* 3 6*       Imaging: I have personally reviewed pertinent reports  CT abdomen pelvis wo contrast   Final Result by Conchita Hoover DO (03/18 9124)   1    Severely limited examination due to lack of intravenous and oral contrast material as well as patient motion artifact throughout the entire examination  2   Air present in the urinary bladder  Although the findings may be related to recent instrumentation, gas-forming urinary tract infection  Also in the differential, however less likely would be enterovesical fistula  Clinical correlation    recommended  3   Fluid-filled large and small bowel, most compatible with enterocolitis, either infectious or inflammatory  4   Bilateral lower lobe infiltrates, likely related to respiratory motion artifact  Given the history of hypoxia, bilateral lower lobe pneumonia not excluded  Recommend dedicated chest radiographs for further evaluation  The study was marked in [facility] for immediate notification  Workstation performed: [id number]         XR chest 1 view portable   ED Interpretation by Stefano Marr MD (03/18 8861)   No acute cardiopulmonary disease as interpreted by myself  Final Result by Dede Smith MD (03/18 0608)      No acute cardiopulmonary disease  Workstation performed: [id number]             [facility] Reviewed: Yes     ** Please Note: This note has been constructed using a voice recognition system   **

## 2021-03-18 NOTE — ASSESSMENT & PLAN NOTE
Prior history of SVTs - also with mention of Afib, however per last cardiology notes there has been no evidence of this  Remains on Digoxin without toxic levels  Continue BB therapy

## 2021-03-18 NOTE — ED PROVIDER NOTES
History  Chief Complaint   Patient presents with    Vomiting     n/v/d since last night, also c/o abd pain  This is an 27-year-old female with a history of hypertension, paroxysmal AFib on digoxin who presents from the nursing home with fever, nausea/vomiting, diarrhea  Starting last night, the patient started to experience innumerable episodes of nonbloody, nonbilious vomiting and nonbloody, nonmelanotic diarrhea  Nursing staff also noted a fever  She was brought to the emergency department for evaluation  She has no other complaints  Nursing staff also noted the patient to be hypoxic to around 80% on room air  She does not wear oxygen at baseline  Patient placed on 4 L nasal cannula with improvement of her oxygen saturation to the mid 90s  Denies lightheadedness/dizziness, numbness/weakness, headache, change in vision, URI symptoms, neck pain, chest pain, palpitations, shortness of breath, cough, back pain, flank pain, abdominal pain, hematochezia, melena, dysuria, hematuria, abnormal vaginal discharge/bleeding  Prior to Admission Medications   Prescriptions Last Dose Informant Patient Reported? Taking?    D3-1000 25 MCG (1000 UT) capsule 3/17/2021 at Unknown time  No Yes   Sig: TAKE ONE CAPSULE BY MOUTH ONCE DAILY (VITAMIN D DEFICIENCY)   IBU-200 200 MG tablet   No No   Sig: TAKE ONE TABLET BY MOUTH ONCE DAILY AS NEEDED FOR MIGRAINES/HEADACHE   Melatonin 5 MG CAPS 3/17/2021 at Unknown time  No Yes   Sig: TAKE ONE CAPSULE BY MOUTH AT BEDTIME (INSOMNIA)   Melatonin 5 MG TABS  Other (Specify) No Yes   Sig: Take 1 tablet (5 mg total) by mouth daily at bedtime   Omega-3 Fatty Acids (fish oil) 1,000 mg 3/17/2021 at Unknown time  No Yes   Sig: TAKE ONE CAPSULE BY MOUTH ONCE DAILY (SUPPLEMENT)   Oyster Shell 500 MG TABS 3/17/2021 at Unknown time Other (Specify) No Yes   Sig: Take 1 tablet (500 mg total) by mouth 2 (two) times a day   acetaminophen (TYLENOL) 325 mg tablet Unknown at Unknown time Other (Specify) No No   Sig: Take 2 tablets (650 mg total) by mouth every 6 (six) hours as needed for mild pain, headaches or fever   amLODIPine (NORVASC) 5 mg tablet 3/17/2021 at Unknown time  No Yes   Sig: TAKE ONE TABLET BY MOUTH TWICE A DAY *HOLD FOR SBP<110* (HTN)   aspirin (ECOTRIN LOW STRENGTH) 81 mg EC tablet 3/17/2021 at Unknown time  No Yes   Sig: TAKE ONE TABLET BY MOUTH ONCE DAILY (STROKE PREVENTION)   calcium carbonate (OYSTER SHELL,OSCAL) 500 mg 3/17/2021 at Unknown time  No Yes   Sig: TAKE ONE TABLET BY MOUTH TWICE DAILY (SUPPLEMENT)   cephalexin (KEFLEX) 500 mg capsule Unknown at Unknown time  No No   Sig: Take 1 capsule (500 mg total) by mouth every 8 (eight) hours for 7 days   clonazePAM (KlonoPIN) 0 5 mg tablet 3/17/2021 at Unknown time  No Yes   Sig: TAKE THREE TABLETS (1 5MG) BY MOUTH AT BEDTIME (ANXIETY)   digoxin (LANOXIN) 0 125 mg tablet 3/17/2021 at Unknown time  No Yes   Sig: TAKE ONE TABLET BY MOUTH ONCE DAILY (AFIB)   famotidine (PEPCID) 20 mg tablet 3/17/2021 at Unknown time  No Yes   Sig: TAKE ONE TABLET BY MOUTH DAILY AT BEDTIME (GERD)   lisinopril (ZESTRIL) 10 mg tablet 3/17/2021 at Unknown time  No Yes   Sig: Take 1 tablet (10 mg total) by mouth daily   lisinopril (ZESTRIL) 10 mg tablet   No Yes   Sig: TAKE ONE TABLET BY MOUTH ONCE DAILY (HTN)   magnesium hydroxide (MILK OF MAGNESIA) 800 MG/5ML suspension  Other (Specify) Yes Yes   Sig: Take 30 mL by mouth daily as needed for constipation   metoprolol tartrate (LOPRESSOR) 25 mg tablet   No Yes   Sig: TAKE THREE TABLETS (75MG) BY MOUTH EVERY 12 HOURS   HOLD FOR HR <60 OR SBP <110 (HTN)**TAKE W/ FOOD/SNACK**   metoprolol tartrate (LOPRESSOR) 50 mg tablet 3/17/2021 at Unknown time Other (Specify) No Yes   Sig: Take 1 5 tablets (75 mg total) by mouth every 12 (twelve) hours   pantoprazole (PROTONIX) 40 mg tablet 3/17/2021 at Unknown time  No Yes   Sig: TAKE ONE TABLET BY MOUTH ONCE DAILY AT 11AM (GERD)   polyethylene glycol (GLYCOLAX) 17 GM/SCOOP 3/17/2021 at Unknown time  No Yes   Sig: DISSOLVE 17 GRAMS (ONE CAPFUL) IN 8 OUNCES OF WATER AND DRINK BY MOUTH DAILY FOR CONSTIPATION   senna (SENOKOT) 8 6 mg 3/17/2021 at Unknown time  No Yes   Sig: TAKE ONE TABLET BY MOUTH ONCE DAILY AT BEDTIME  HOLD FOR DIARRHEA OR LOOSE STOOLS (CONSTIPATION)      Facility-Administered Medications: None       Past Medical History:   Diagnosis Date    Abdominal distension     Last Assessed: [id number]    Abnormal weight loss     Last Assessed: [id number]    Arthritis     Bronchitis     Bursitis of left hip     Lst Assessed: [id number]    Chest pain     Last Assessed: [id number]    Colon polyp     Dysuria     Last Assessed: [id number]    External hemorrhoids     Last Assessed: [id number]    Generalized anxiety disorder     GERD (gastroesophageal reflux disease)     History of echocardiogram 03/20/2018    EF 60%, mild to moderate mitral annular calcification  mild AS, mild TR   Hyperlipidemia     Hypertension     Hypomagnesemia     Last Assessed: [id number]    Lyme disease     Last Assessed: [id number]    Multiple renal cysts 7/5/2019    Nonrheumatic mitral valve regurgitation     Osteoporosis     Pneumonia of right middle lobe due to infectious organism     Last Assessed: [id number]    SVT (supraventricular tachycardia)     Urinary tract infection        Past Surgical History:   Procedure Laterality Date    CATARACT EXTRACTION      HEMORRHOID SURGERY      HYSTERECTOMY      PELVIC FLOOR REPAIR         Family History   Adopted: Yes     I have reviewed and agree with the history as documented      E-Cigarette/Vaping    E-Cigarette Use Never User      E-Cigarette/Vaping Substances    Nicotine No     THC No     CBD No     Flavoring No     Other No     Unknown No      Social History     Tobacco Use    Smoking status: Never Smoker    Smokeless tobacco: Never Used   Substance Use Topics    Alcohol use: Never     Frequency: Never     Binge frequency: Never    Drug use: No       Review of Systems   Constitutional: Positive for fever  Negative for chills  HENT: Negative for rhinorrhea, sore throat and trouble swallowing  Eyes: Negative for photophobia and visual disturbance  Respiratory: Negative for cough, chest tightness and shortness of breath  Cardiovascular: Negative for chest pain, palpitations and leg swelling  Gastrointestinal: Positive for diarrhea, nausea and vomiting  Negative for abdominal pain and blood in stool  Endocrine: Negative for polyuria  Genitourinary: Negative for dysuria, flank pain, hematuria, vaginal bleeding and vaginal discharge  Musculoskeletal: Negative for back pain and neck pain  Skin: Negative for color change and rash  Allergic/Immunologic: Negative for immunocompromised state  Neurological: Negative for dizziness, weakness, light-headedness, numbness and headaches  All other systems reviewed and are negative  Physical Exam  Physical Exam  Constitutional:       General: She is not in acute distress  Appearance: Normal appearance  She is underweight  Comments: Chronically ill-appearing   HENT:      Nose:      Comments: Nasal cannula in place  Mouth/Throat:      Pharynx: Uvula midline  Eyes:      Conjunctiva/sclera: Conjunctivae normal       Pupils: Pupils are equal, round, and reactive to light  Neck:      Thyroid: No thyroid mass or thyromegaly  Trachea: Trachea normal    Cardiovascular:      Rate and Rhythm: Regular rhythm  Tachycardia present  Pulses: Normal pulses  Heart sounds: Normal heart sounds  No murmur  Pulmonary:      Effort: Pulmonary effort is normal       Breath sounds: Normal breath sounds  Abdominal:      General: Bowel sounds are normal  There is distension  Palpations: Abdomen is soft  Tenderness: There is no abdominal tenderness  There is no guarding or rebound  Genitourinary:     Comments: Incontinent of diarrhea    Skin: General: Skin is warm and dry  Neurological:      Mental Status: She is alert  Psychiatric:         Speech: Speech normal          Behavior: Behavior normal  Behavior is cooperative  Thought Content:  Thought content normal          Vital Signs  ED Triage Vitals   Temperature Pulse Respirations Blood Pressure SpO2   03/18/21 0553 03/18/21 0553 03/18/21 0553 03/18/21 0553 03/18/21 0553   (!) 102 3 °F (39 1 °C) (!) 119 (!) 30 166/72 90 %      Temp Source Heart Rate Source Patient Position - Orthostatic VS BP Location FiO2 (%)   03/18/21 0553 03/18/21 0553 03/18/21 0553 03/18/21 0553 --   Temporal Monitor Sitting Left arm       Pain Score       03/18/21 2031       No Pain           Vitals:    03/18/21 1255 03/18/21 1507 03/18/21 1842 03/18/21 1902   BP: 132/50 (!) 124/47 129/52    Pulse: 67 71 91 90   Patient Position - Orthostatic VS:  Sitting           Visual Acuity      ED Medications  Medications   aspirin (ECOTRIN LOW STRENGTH) EC tablet 81 mg (81 mg Oral Given 3/18/21 1341)   clonazePAM (KlonoPIN) tablet 1 5 mg (has no administration in time range)   digoxin (LANOXIN) tablet 125 mcg (125 mcg Oral Given 3/18/21 1342)   famotidine (PEPCID) tablet 10 mg (10 mg Oral Given 3/18/21 1341)   melatonin tablet 6 mg (has no administration in time range)   metoprolol tartrate (LOPRESSOR) tablet 75 mg (75 mg Oral Given 3/18/21 1842)   pantoprazole (PROTONIX) EC tablet 40 mg (40 mg Oral Given 3/18/21 1355)   sodium chloride 0 9 % infusion (100 mL/hr Intravenous Rate/Dose Change 3/18/21 1700)   acetaminophen (TYLENOL) tablet 650 mg (has no administration in time range)   ondansetron (ZOFRAN) injection 4 mg (has no administration in time range)   aluminum-magnesium hydroxide-simethicone (MYLANTA) oral suspension 30 mL (has no administration in time range)   heparin (porcine) subcutaneous injection 5,000 Units (5,000 Units Subcutaneous Given 3/18/21 1343)   metroNIDAZOLE (FLAGYL) IVPB (premix) 500 mg 100 mL (500 mg Intravenous New Bag 3/18/21 1344)   levofloxacin (LEVAQUIN) IVPB (premix in dextrose) 750 mg 150 mL (750 mg Intravenous New Bag 3/18/21 1517)   sodium chloride 0 9 % bolus 500 mL (0 mL Intravenous Stopped 3/18/21 0709)   ondansetron (ZOFRAN) injection 4 mg (4 mg Intravenous Given 3/18/21 0709)   cefTRIAXone (ROCEPHIN) IVPB (premix in dextrose) 1,000 mg 50 mL (0 mg Intravenous Stopped 3/18/21 0743)   acetaminophen (TYLENOL) tablet 650 mg (650 mg Oral Given 3/18/21 0711)       Diagnostic Studies  Results Reviewed     Procedure Component Value Units Date/Time    Procalcitonin with AM Reflex [[id number]]  (Abnormal) Collected: 03/18/21 1225    Lab Status: Final result Specimen: Blood from Arm, Left Updated: 03/18/21 1945     Procalcitonin 3 70 ng/ml     Procalcitonin with AM Reflex [[id number]]  (Abnormal) Collected: 03/18/21 0606    Lab Status: Final result Specimen: Blood from Arm, Right Updated: 03/18/21 1325     Procalcitonin 0 28 ng/ml     Lactic acid, plasma [[id number]]  (Abnormal) Collected: 03/18/21 1225    Lab Status: Final result Specimen: Blood from Arm, Left Updated: 03/18/21 1311     LACTIC ACID 2 8 mmol/L     Narrative:      Result may be elevated if tourniquet was used during collection  Blood culture #1 [[id number]] Collected: 03/18/21 0606    Lab Status: Preliminary result Specimen: Blood from Arm, Right Updated: 03/18/21 1301     Blood Culture Received in Microbiology Lab  Culture in Progress  Blood culture #2 [[id number]] Collected: 03/18/21 0606    Lab Status: Preliminary result Specimen: Blood from Arm, Right Updated: 03/18/21 1301     Blood Culture Received in Microbiology Lab  Culture in Progress      Chronic Hepatitis Panel [[id number]]  (Normal) Collected: 03/18/21 0606    Lab Status: Final result Specimen: Blood from Arm, Right Updated: 03/18/21 1217     Hepatitis B Surface Ag Non-reactive     Hepatitis C Ab Non-reactive     Hep B C IgM Non-reactive     Hep B Core Total Ab Non-reactive    Stool Enteric Bacterial Panel by PCR [[id number]]     Lab Status: No result Specimen: Stool from Per Rectum     Clostridium difficile toxin by PCR with EIA [[id number]]     Lab Status: No result Specimen: Stool from Per Rectum     Fecal leukocytes [[id number]]     Lab Status: No result Specimen: Stool from Per Rectum     Ferritin [[id number]]  (Normal) Collected: 03/18/21 0606    Lab Status: Final result Specimen: Blood from Arm, Right Updated: 03/18/21 1132     Ferritin 15 ng/mL     Lactic acid 2 Hours [[id number]]  (Abnormal) Collected: 03/18/21 0848    Lab Status: Final result Specimen: Blood from Arm, Right Updated: 03/18/21 0915     LACTIC ACID 2 1 mmol/L     Narrative:      Result may be elevated if tourniquet was used during collection  Rapid HIV 1/2 AB-AG Combo [[id number]]  (Normal) Collected: 03/18/21 0606    Lab Status: Final result Specimen: Blood from Arm, Right Updated: 03/18/21 0704     Rapid HIV 1 AND 2 Non-Reactive     HIV-1 P24 Ag Screen Non-Reactive    Narrative:      Negative for HIV-1 p24 Antigen  Negative for HIV-1 and/or HIV-2 Antibody  COVID19, Influenza A/B, RSV PCR, [facility] [[id number]]  (Normal) Collected: 03/18/21 0605    Lab Status: Final result Specimen: Nares from Nasopharyngeal Swab Updated: 03/18/21 0701     SARS-CoV-2 Negative     INFLUENZA A PCR Negative     INFLUENZA B PCR Negative     RSV PCR Negative    Narrative: This test has been authorized by FDA under an EUA (Emergency Use Assay) for use by authorized laboratories  Clinical caution and judgement should be used with the interpretation of these results with consideration of the clinical impression and other laboratory testing  Testing reported as "Positive" or "Negative" has been proven to be accurate according to standard laboratory validation requirements  All testing is performed with control materials showing appropriate reactivity at standard intervals      Lactic acid, plasma [[id number]]  (Abnormal) Collected: 03/18/21 0228    Lab Status: Final result Specimen: Blood from Arm, Right Updated: 03/18/21 0653     LACTIC ACID 3 6 mmol/L     Narrative:      Result may be elevated if tourniquet was used during collection  C-reactive protein [[id number]]  (Abnormal) Collected: 03/18/21 0606    Lab Status: Final result Specimen: Blood from Arm, Right Updated: 03/18/21 0650     CRP 10 7 mg/L     NT-BNP PRO [[id number]]  (Normal) Collected: 03/18/21 0606    Lab Status: Final result Specimen: Blood from Arm, Right Updated: 03/18/21 0650     NT-proBNP 138 pg/mL     CK (with reflex to MB) [[id number]]  (Normal) Collected: 03/18/21 0606    Lab Status: Final result Specimen: Blood from Arm, Right Updated: 03/18/21 0650     Total CK 61 U/L     UA w Reflex to Microscopic w Reflex to Culture [[id number]]     Lab Status: No result Specimen: Urine     Digoxin level [[id number]]  (Normal) Collected: 03/18/21 0606    Lab Status: Final result Specimen: Blood from Arm, Right Updated: 03/18/21 0647     Digoxin Lvl 0 9 ng/mL     Troponin I [[id number]]  (Normal) Collected: 03/18/21 0606    Lab Status: Final result Specimen: Blood from Arm, Right Updated: 03/18/21 0646     Troponin I <0 02 ng/mL     CBC and differential [[id number]]  (Abnormal) Collected: 03/18/21 0606    Lab Status: Final result Specimen: Blood from Arm, Right Updated: 03/18/21 0645     WBC 10 18 Thousand/uL      RBC 4 89 Million/uL      Hemoglobin 13 9 g/dL      Hematocrit 44 5 %      MCV 91 fL      MCH 28 4 pg      MCHC 31 2 g/dL      RDW 13 2 %      MPV 9 6 fL      Platelets 994 Thousands/uL      nRBC 0 /100 WBCs     Narrative: This is an appended report  These results have been appended to a previously verified report      Manual Differential(PHLEBS Do Not Order) [[id number]]  (Abnormal) Collected: 03/18/21 0606    Lab Status: Final result Specimen: Blood from Arm, Right Updated: 03/18/21 0645     Segmented % 89 %      Bands % 7 %      Lymphocytes % 2 %      Monocytes % 2 % Eosinophils, % 0 %      Basophils % 0 %      Absolute Neutrophils 9 77 Thousand/uL      Lymphocytes Absolute 0 20 Thousand/uL      Monocytes Absolute 0 20 Thousand/uL      Eosinophils Absolute 0 00 Thousand/uL      Basophils Absolute 0 00 Thousand/uL      Total Counted 100     Platelet Estimate Adequate    Comprehensive metabolic panel [[id number]]  (Abnormal) Collected: 03/18/21 0606    Lab Status: Final result Specimen: Blood from Arm, Right Updated: 03/18/21 0644     Sodium 134 mmol/L      Potassium 4 2 mmol/L      Chloride 98 mmol/L      CO2 24 mmol/L      ANION GAP 12 mmol/L      BUN 19 mg/dL      Creatinine 1 15 mg/dL      Glucose 176 mg/dL      Calcium 9 3 mg/dL      Corrected Calcium 9 8 mg/dL      AST 29 U/L      ALT 33 U/L      Alkaline Phosphatase 61 U/L      Total Protein 8 4 g/dL      Albumin 3 4 g/dL      Total Bilirubin 0 50 mg/dL      eGFR 42 ml/min/1 73sq m     Narrative:      [place] guidelines for Chronic Kidney Disease (CKD):     Stage 1 with normal or high GFR (GFR > 90 mL/min/1 73 square meters)    Stage 2 Mild CKD (GFR = 60-89 mL/min/1 73 square meters)    Stage 3A Moderate CKD (GFR = 45-59 mL/min/1 73 square meters)    Stage 3B Moderate CKD (GFR = 30-44 mL/min/1 73 square meters)    Stage 4 Severe CKD (GFR = 15-29 mL/min/1 73 square meters)    Stage 5 End Stage CKD (GFR <15 mL/min/1 73 square meters)  Note: GFR calculation is accurate only with a steady state creatinine    D-dimer, quantitative [[id number]]  (Abnormal) Collected: 03/18/21 0606    Lab Status: Final result Specimen: Blood from Arm, Right Updated: 03/18/21 0638     D-Dimer, Quant 2 92 ug/ml FEU     Protime-INR [[id number]]  (Normal) Collected: 03/18/21 0606    Lab Status: Final result Specimen: Blood from Arm, Right Updated: 03/18/21 0632     Protime 13 3 seconds      INR 1 03    APTT [[id number]]  (Normal) Collected: 03/18/21 0606    Lab Status: Final result Specimen: Blood from Arm, Right Updated: 03/18/21 0632     PTT 24 seconds                  CT abdomen pelvis wo contrast   Final Result by Katelin Santillan DO (03/18 1314)   1  Severely limited examination due to lack of intravenous and oral contrast material as well as patient motion artifact throughout the entire examination  2   Air present in the urinary bladder  Although the findings may be related to recent instrumentation, gas-forming urinary tract infection  Also in the differential, however less likely would be enterovesical fistula  Clinical correlation    recommended  3   Fluid-filled large and small bowel, most compatible with enterocolitis, either infectious or inflammatory  4   Bilateral lower lobe infiltrates, likely related to respiratory motion artifact  Given the history of hypoxia, bilateral lower lobe pneumonia not excluded  Recommend dedicated chest radiographs for further evaluation  The study was marked in [facility] for immediate notification  Workstation performed: [id number]         XR chest 1 view portable   ED Interpretation by Master Arevalo MD (03/18 9318)   No acute cardiopulmonary disease as interpreted by myself  Final Result by Ita Rosas MD (03/18 1036)      No acute cardiopulmonary disease                  Workstation performed: [id number]                    Procedures  ECG 12 Lead Documentation Only    Date/Time: 3/18/2021 6:30 AM  Performed by: Master Arevalo MD  Authorized by: Master Arevalo MD     ECG reviewed by me, the ED Provider: yes    Patient location:  ED  Previous ECG:     Previous ECG:  Compared to current    Comparison ECG info:  7/3/19    Similarity:  Changes noted    Comparison to cardiac monitor: Yes    Interpretation:     Interpretation: non-specific    Rate:     ECG rate:  116    ECG rate assessment: tachycardic    Rhythm:     Rhythm: sinus tachycardia    Ectopy:     Ectopy: none    QRS:     QRS axis:  Normal    QRS intervals:  Normal  Conduction:     Conduction: normal ST segments:     ST segments:  Normal  T waves:     T waves: inverted      Inverted:  III    CriticalCare Time  Performed by: Giovana Mcclendon MD  Authorized by: Giovana Mcclendon MD     Critical care provider statement:     Critical care time (minutes):  45    Critical care start time:  3/18/2021 5:55 AM    Critical care end time:  3/18/2021 6:40 AM    Critical care time was exclusive of:  Separately billable procedures and treating other patients    Critical care was necessary to treat or prevent imminent or life-threatening deterioration of the following conditions:  Respiratory failure    Critical care was time spent personally by me on the following activities:  Obtaining history from patient or surrogate, development of treatment plan with patient or surrogate, evaluation of patient's response to treatment, examination of patient, review of old charts, re-evaluation of patient's condition, ordering and review of radiographic studies and ordering and review of laboratory studies    I assumed direction of critical care for this patient from another provider in my specialty: no               ED Course  ED Course as of Mar 18 2108   Thu Mar 18, 2021   0654 Patient is DNR/DNI  She is receiving gentle IV fluids  I worry that too much IV fluid will worsen her respiratory status  Will continue gentle IV fluids  LACTIC ACID(!!): 3 6                             SBIRT 22yo+      Most Recent Value   SBIRT (22 yo +)   In order to provide better care to our patients, we are screening all of our patients for alcohol and drug use  Would it be okay to ask you these screening questions? Yes Filed at: 03/18/2021 0720   Initial Alcohol Screen: US AUDIT-C    1  How often do you have a drink containing alcohol?  0 Filed at: 03/18/2021 0720   2  How many drinks containing alcohol do you have on a typical day you are drinking? 0 Filed at: 03/18/2021 0720   3a  Male UNDER 65:  How often do you have five or more drinks on one occasion? 0 Filed at: 03/18/2021 0720   3b  FEMALE Any Age, or MALE 65+: How often do you have 4 or more drinks on one occassion? 0 Filed at: 03/18/2021 0720   Audit-C Score  0 Filed at: 03/18/2021 0949   NATE: How many times in the past year have you    Used an illegal drug or used a prescription medication for non-medical reasons? Never Filed at: 03/18/2021 0720                    [facility]  Number of Diagnoses or Management Options  Diagnosis management comments: Given the patient's symptoms, I do have concern for COVID  Will check labs, EKG, chest x-ray  CT abdomen/pelvis with contrast   COVID swab  Patient will need to be admitted to the hospital   She does have paperwork that states she is DNR/DNI  Disposition  Final diagnoses:   Nausea & vomiting   Enterocolitis   Hypoxia   Dehydration   Lactic acidosis     Time reflects when diagnosis was documented in both MDM as applicable and the Disposition within this note     Time User Action Codes Description Comment    3/18/2021  7:59 AM Donnella Both T Add [R11 2] Nausea & vomiting     3/18/2021  7:59 AM Donnella Both T Add [K52 9] Enterocolitis     3/18/2021  7:59 AM Donnella Both T Add [R09 02] Hypoxia     3/18/2021  7:59 AM Donnella Both T Add [E86 0] Dehydration     3/18/2021  7:59 AM Donnella Both T Add [E87 2] Lactic acidosis     3/18/2021  7:59 AM Donnella Both T Modify [R11 2] Nausea & vomiting     3/18/2021  7:59 AM Donnella Both T Modify [R09 02] Hypoxia       ED Disposition     ED Disposition Condition Date/Time Comment    Admit Stable Thu Mar 18, 2021  8:00 AM Case was discussed with LOIS and the patient's admission status was agreed to be Admission Status: inpatient status to the service of Dr Lili Mancuso           Follow-up Information    None         Current Discharge Medication List      CONTINUE these medications which have NOT CHANGED    Details   amLODIPine (NORVASC) 5 mg tablet TAKE ONE TABLET BY MOUTH TWICE A DAY *HOLD FOR SBP<110* (HTN)  Qty: 60 tablet, Refills: 5    Associated Diagnoses: Hypertensive urgency      aspirin (ECOTRIN LOW STRENGTH) 81 mg EC tablet TAKE ONE TABLET BY MOUTH ONCE DAILY (STROKE PREVENTION)  Qty: 30 tablet, Refills: 4    Associated Diagnoses: Hypertension, unspecified type; Atrial fibrillation, unspecified type ([facility] 75 )      ! ! calcium carbonate (OYSTER SHELL,OSCAL) 500 mg TAKE ONE TABLET BY MOUTH TWICE DAILY (SUPPLEMENT)  Qty: 60 each, Refills: 5    Associated Diagnoses: Osteoporosis, unspecified osteoporosis type, unspecified pathological fracture presence      clonazePAM (KlonoPIN) 0 5 mg tablet TAKE THREE TABLETS (1 5MG) BY MOUTH AT BEDTIME (ANXIETY)  Qty: 90 tablet, Refills: 4    Associated Diagnoses: Generalized anxiety disorder      D3-1000 25 MCG (1000 UT) capsule TAKE ONE CAPSULE BY MOUTH ONCE DAILY (VITAMIN D DEFICIENCY)  Qty: 30 capsule, Refills: 5    Associated Diagnoses: Vitamin A deficiency      digoxin (LANOXIN) 0 125 mg tablet TAKE ONE TABLET BY MOUTH ONCE DAILY (AFIB)  Qty: 30 tablet, Refills: 5    Associated Diagnoses: Paroxysmal atrial fibrillation (HCC)      famotidine (PEPCID) 20 mg tablet TAKE ONE TABLET BY MOUTH DAILY AT BEDTIME (GERD)  Qty: 30 tablet, Refills: 2    Associated Diagnoses: Gastroesophageal reflux disease      !! lisinopril (ZESTRIL) 10 mg tablet Take 1 tablet (10 mg total) by mouth daily  Qty: 90 tablet, Refills: 1    Associated Diagnoses: Supraventricular tachycardia ([facility] )      ! ! lisinopril (ZESTRIL) 10 mg tablet TAKE ONE TABLET BY MOUTH ONCE DAILY (HTN)  Qty: 30 tablet, Refills: 5    Associated Diagnoses: Supraventricular tachycardia (HCC)      magnesium hydroxide (MILK OF MAGNESIA) 800 MG/5ML suspension Take 30 mL by mouth daily as needed for constipation      Melatonin 5 MG CAPS TAKE ONE CAPSULE BY MOUTH AT BEDTIME (INSOMNIA)  Qty: 30 capsule, Refills: 5    Associated Diagnoses: Generalized anxiety disorder      Melatonin 5 MG TABS Take 1 tablet (5 mg total) by mouth daily at bedtime  Qty: 30 tablet, Refills: 5    Associated Diagnoses: Insomnia, unspecified type      !! metoprolol tartrate (LOPRESSOR) 25 mg tablet TAKE THREE TABLETS (75MG) BY MOUTH EVERY 12 HOURS  HOLD FOR HR <60 OR SBP <110 (HTN)**TAKE W/ FOOD/SNACK**  Qty: 180 tablet, Refills: 2    Associated Diagnoses: Benign essential HTN      !! metoprolol tartrate (LOPRESSOR) 50 mg tablet Take 1 5 tablets (75 mg total) by mouth every 12 (twelve) hours  Qty: 90 tablet, Refills: 0    Comments: Hold for HR<60 or SBP<110  Associated Diagnoses: Hypertension, unspecified type      Omega-3 Fatty Acids (fish oil) 1,000 mg TAKE ONE CAPSULE BY MOUTH ONCE DAILY (SUPPLEMENT)  Qty: 30 each, Refills: 5    Associated Diagnoses: Hypercholesterolemia      !! Oyster Shell 500 MG TABS Take 1 tablet (500 mg total) by mouth 2 (two) times a day  Qty: 60 tablet, Refills: 5    Associated Diagnoses: Generalized osteoarthritis of multiple sites      pantoprazole (PROTONIX) 40 mg tablet TAKE ONE TABLET BY MOUTH ONCE DAILY AT 11AM (GERD)  Qty: 30 tablet, Refills: 5    Associated Diagnoses: Gastroesophageal reflux disease      polyethylene glycol (GLYCOLAX) 17 GM/SCOOP DISSOLVE 17 GRAMS (ONE CAPFUL) IN 8 OUNCES OF WATER AND DRINK BY MOUTH DAILY FOR CONSTIPATION  Qty: 510 g, Refills: 4    Associated Diagnoses: Chronic idiopathic constipation      senna (SENOKOT) 8 6 mg TAKE ONE TABLET BY MOUTH ONCE DAILY AT BEDTIME  HOLD FOR DIARRHEA OR LOOSE STOOLS (CONSTIPATION)  Qty: 30 tablet, Refills: 2    Associated Diagnoses: Drug-induced constipation      acetaminophen (TYLENOL) 325 mg tablet Take 2 tablets (650 mg total) by mouth every 6 (six) hours as needed for mild pain, headaches or fever  Refills: 0    Comments: Do not exceed a total of 3 grams of tylenol/acetaminophen in a 24-hour period    Associated Diagnoses: Acute cystitis with hematuria      cephalexin (KEFLEX) 500 mg capsule Take 1 capsule (500 mg total) by mouth every 8 (eight) hours for 7 days  Qty: 21 capsule, Refills: 0    Associated Diagnoses: Cellulitis of great toe of right foot      IBU-200 200 MG tablet TAKE ONE TABLET BY MOUTH ONCE DAILY AS NEEDED FOR MIGRAINES/HEADACHE  Qty: 50 tablet, Refills: 0    Associated Diagnoses: Ophthalmoplegic migraine, not intractable       !! - Potential duplicate medications found  Please discuss with provider  No discharge procedures on file      PDMP Review       Value Time User    PDMP Reviewed  Yes 12/1/2020  4:48 PM Altaf Lewis O&#39;DO Shaheed          ED Provider  Electronically Signed by           Onel Mancilla MD  03/18/21 9513

## 2021-03-18 NOTE — PLAN OF CARE
Problem: Potential for Falls  Goal: Patient will remain free of falls  Description: INTERVENTIONS:  - Assess patient frequently for physical needs  -  Identify cognitive and physical deficits and behaviors that affect risk of falls    -  [place] fall precautions as indicated by assessment   - Educate patient/family on patient safety including physical limitations  - Instruct patient to call for assistance with activity based on assessment  - Modify environment to reduce risk of injury  - Consider OT/PT consult to assist with strengthening/mobility  Outcome: Progressing

## 2021-03-18 NOTE — ASSESSMENT & PLAN NOTE
FIONA superimposed on CKD - patient with concurrent hyponatremia and hypochloremia, very likely hypovolemic in nature  Continue fluid resuscitation, monitor renal function, renally dose medications, and avoid nephrotoxins   Hold ACE-I

## 2021-03-18 NOTE — PHYSICAL THERAPY NOTE
Physical Therapy Evaluation    Patient Name: Nessa Donaldson    HHGXW'C Date: 3/18/2021     Problem List  Principal Problem:    sepsis  Active Problems:    Esophageal reflux    SVT (supraventricular tachycardia) ([facility] )    Diarrhea of presumed infectious origin    FIONA (acute kidney injury) ([facility] )    Stage 3b chronic kidney disease    Lactic acidosis    Pulmonary nodule       Past Medical History  Past Medical History:   Diagnosis Date    Abdominal distension     Last Assessed: [id number]    Abnormal weight loss     Last Assessed: [id number]    Arthritis     Bronchitis     Bursitis of left hip     Lst Assessed: [id number]    Chest pain     Last Assessed: [id number]    Colon polyp     Dysuria     Last Assessed: [id number]    External hemorrhoids     Last Assessed: [id number]    Generalized anxiety disorder     GERD (gastroesophageal reflux disease)     History of echocardiogram 03/20/2018    EF 60%, mild to moderate mitral annular calcification  mild AS, mild TR       Hyperlipidemia     Hypertension     Hypomagnesemia     Last Assessed: [id number]    Lyme disease     Last Assessed: [id number]    Multiple renal cysts 7/5/2019    Nonrheumatic mitral valve regurgitation     Osteoporosis     Pneumonia of right middle lobe due to infectious organism     Last Assessed: [id number]    SVT (supraventricular tachycardia)     Urinary tract infection         Past Surgical History  Past Surgical History:   Procedure Laterality Date    CATARACT EXTRACTION      HEMORRHOID SURGERY      HYSTERECTOMY      PELVIC FLOOR REPAIR             03/18/21 1503   PT Last Visit   PT Visit Date 03/18/21   Note Type   Note type Evaluation   Pain Assessment   Pain Assessment Tool Pain Assessment not indicated - pt denies pain   Home Living   Type of Home Assisted living   Home Layout One level   Home Equipment Walker;Cane   Additional Comments pt is a LTR of [facility]   Prior Function   Level of [place] Needs assistance with IADLs; Needs assistance with ADLs and functional mobility   Lives With Facility staff   Receives Help From Personal care attendant   ADL Assistance Needs assistance   IADLs Needs assistance   Falls in the last 6 months 0   Vocational Retired   Restrictions/Precautions   [facility] Bearing Precautions Per Order No   Other Precautions Fall Risk;O2;Multiple lines; Bed Alarm; Chair Alarm;Contact/isolation   General   Family/Caregiver Present No   Cognition   Overall Cognitive Status Impaired   Orientation Level Oriented to person;Oriented to place; Disoriented to time;Disoriented to situation   Following Commands Follows one step commands without difficulty   RLE Assessment   RLE Assessment WFL  (assessed functionally)   LLE Assessment   LLE Assessment WFL  (assessed functionally)   Bed Mobility   Supine to Sit 5  Supervision   Additional items HOB elevated; Bedrails; Increased time required;Verbal cues   Sit to Supine   (pt OOB at end of session)   Transfers   Sit to Stand 5  Supervision   Additional items Armrests; Increased time required;Verbal cues   Stand to Sit 5  Supervision   Additional items Armrests; Increased time required;Verbal cues   Toilet transfer 5  Supervision   Additional items Increased time required;Verbal cues;Standard toilet   Additional Comments RW used   Ambulation/Elevation   Gait pattern Excessively slow; Short stride; Foward flexed;Decreased foot clearance;Narrow GENARO   Gait Assistance 5  Supervision   Additional items Verbal cues   Assistive Device Rolling walker   Distance 15'   Balance   Static Sitting Good   Dynamic Sitting Good   Static Standing Fair   Dynamic Standing Alejandro Cisse 3334 -  (with RW)   Endurance Deficit   Endurance Deficit Yes   Activity Tolerance   Activity Tolerance Patient limited by fatigue   Assessment   Prognosis Good   Problem List Decreased strength;Decreased endurance; Impaired balance;Decreased mobility; Decreased cognition; Impaired judgement;Decreased safety awareness   Assessment Patient is a 80 y o  female evaluated by Physical Therapy s/p admit [facility] on 3/18/2021 with admitting diagnosis of: Dehydration, Enterocolitis, Lactic acidosis, Vomiting, Nausea & vomiting, Hypoxia, and principal problem of: Sepsis with acute renal failure without septic shock  PT was consulted to assess patient's functional mobility and discharge needs  Ordered are PT Evaluation and treatment with activity level of: up with assistance  Comorbidities affecting patient's physical performance at time of assessment include: lyme disease, GERD, arthritis, osteoporosis, HTN, HLD, SVT  Personal factors affecting the patient at time of IE include: ambulating with assistive device, inability to navigate community distances, impaired safety awareness, decreased initiation and engagement, limited insight into impairments, inability/difficulty performing IADLs and inability/difficulty performing ADLs  Please locate objective findings from PT assessment regarding body systems outlined above  Upon evaluation, pt able to perform all functional mobility with SUP, RW, and increased time  Moderate verbal cuing provided for safety awareness and direction  Ambulation limited to short functional distance within room d/t fatigue and needing to use bathroom  No LOB experienced; HR and SpO2 remained WFL on 4L O2 throughout  The patient's AM-PAC Basic Mobility Inpatient Short Form Raw Score is 22, Standardized Score is 47 4  A standardized score greater than 42 9 suggests the patient may benefit from discharge to home  Please also refer to the recommendation of the Physical Therapist for safe discharge planning  Pt will benefit from continued PT intervention during LOS to address current deficits, increase LOF, and facilitate safe d/c to next level of care when medically appropriate   D/c recommendation at this time is return to [address] [place] [address] with OP PT intervention  Goals   Patient Goals to go home   Short Term Goal #1 Pt will participate in B LE strenghtening exercises to facilitate improved functional mobility  LTG Expiration Date 04/01/21   Long Term Goal #1 Pt will perform all functional transfers and bed mobility mod(I) with good safety awareness  Long Term Goal #2 Pt will ambulate 150' with RW and SUP while maintaining good functional dynamic balance  Plan   Treatment/Interventions Functional transfer training;LE strengthening/ROM; Therapeutic exercise; Endurance training;Bed mobility;Gait training   PT Frequency Other (Comment)  (3-5x/week)   Recommendation   PT Discharge Recommendation Home with skilled therapy  ([facility] PT [facility])   AM-PAC Basic Mobility Inpatient   Turning in Bed Without Bedrails 4   Lying on Back to Sitting on Edge of Flat Bed 4   Moving Bed to Chair 4   Standing Up From Chair 4   Walk in Room 4   Climb 3-5 Stairs 2   Basic Mobility Inpatient Raw Score 22   Basic Mobility Standardized Score 47 4     Pt seated in recliner at end of session with all needs in reach

## 2021-03-18 NOTE — ASSESSMENT & PLAN NOTE
Symptom onset of nausea, vomiting, and diarrhea, reportedly ongoing for 1 day  CT with a potential enterocolitis  Initiate on antibiotics as above, and monitor stool studies carefully

## 2021-03-18 NOTE — OCCUPATIONAL THERAPY NOTE
Occupational Therapy Evaluation     Patient Name: Naheed Gomez  VFEHZ'U Date: 3/18/2021  Problem List  Principal Problem:    sepsis  Active Problems:    Esophageal reflux    SVT (supraventricular tachycardia) ([facility] )    Diarrhea of presumed infectious origin    FIONA (acute kidney injury) ([facility] )    Stage 3b chronic kidney disease    Lactic acidosis    Pulmonary nodule    Past Medical History  Past Medical History:   Diagnosis Date    Abdominal distension     Last Assessed: [id number]    Abnormal weight loss     Last Assessed: [id number]    Arthritis     Bronchitis     Bursitis of left hip     Lst Assessed: [id number]    Chest pain     Last Assessed: 37Vrh3396    Colon polyp     Dysuria     Last Assessed: [id number]    External hemorrhoids     Last Assessed: [id number]    Generalized anxiety disorder     GERD (gastroesophageal reflux disease)     History of echocardiogram 03/20/2018    EF 60%, mild to moderate mitral annular calcification  mild AS, mild TR   Hyperlipidemia     Hypertension     Hypomagnesemia     Last Assessed: [id number]    Lyme disease     Last Assessed: 52Bfz3624    Multiple renal cysts 7/5/2019    Nonrheumatic mitral valve regurgitation     Osteoporosis     Pneumonia of right middle lobe due to infectious organism     Last Assessed: 09Hff8650    SVT (supraventricular tachycardia)     Urinary tract infection      Past Surgical History  Past Surgical History:   Procedure Laterality Date    CATARACT EXTRACTION      HEMORRHOID SURGERY      HYSTERECTOMY      PELVIC FLOOR REPAIR               03/18/21 1502   OT Last Visit   OT Visit Date 03/18/21   Note Type   Note type Evaluation   Restrictions/Precautions   Weight Bearing Precautions Per Order No   Other Precautions Chair Alarm; Bed Alarm;Multiple lines;Telemetry;O2;Fall Risk   Pain Assessment   Pain Assessment Tool Pain Assessment not indicated - pt denies pain   Home Living   Type of Home Assisted living   Additional Comments pt resides at [facility]   Prior Function   Level of [place] Needs assistance with ADLs and functional mobility; Needs assistance with IADLs   Lives With Facility staff   Receives Help From Personal care attendant   ADL Assistance Needs assistance   IADLs Needs assistance   Vocational Retired   Comments pt does not drive and utilizes RW at baseline during mobility   Psychosocial   Psychosocial (WDL) WDL   Subjective   Subjective "I didn't know I had a bowel movement"   ADL   Where Assessed Other (Comment)  (bathroom)   LB Dressing Assistance 4  Minimal Assistance   LB Dressing Deficit Thread RLE into underwear; Thread LLE into underwear;Pull up over hips   Toileting Assistance  4  Minimal Assistance   Toileting Deficit Clothing management up;Clothing management down;Perineal hygiene; Increased time to complete   Additional Comments pt with cues for sequencing and problem solving during LB dressing and toileting tasks; pt unsafe in bathroom with RW and mobility   Bed Mobility   Supine to Sit 5  Supervision   Additional items Increased time required   Sit to Supine   (seated in chair at end of session)   Additional Comments pt on 4L O2 and SpO2 was [facility] with minimal SOB   Transfers   Sit to Stand 5  Supervision   Additional items Increased time required;Verbal cues  (RW)   Stand to Sit 4  Minimal assistance   Additional items Increased time required;Verbal cues  (RW)   Toilet transfer 5  Supervision   Additional items Increased time required;Standard toilet;Verbal cues  (RW)   Additional Comments pt requires consistent verbal and physical cues during session; poor safety awareness in bathroom   Functional Mobility   Functional Mobility 5  Supervision   Additional Comments pt performed functional mobility to and from bathroom this date with max VC with RW due to poor safety awareness   Additional items Rolling walker   Balance   Static Sitting Good   Dynamic Sitting Good   Static Standing Fair + Dynamic Standing Fair -   Ambulatory Fair -   Activity Tolerance   Activity Tolerance Patient limited by fatigue   RUE Assessment   RUE Assessment WFL   LUE Assessment   LUE Assessment WFL   Hand Function   Gross Motor Coordination Functional   Fine Motor Coordination Functional   Sensation   Light Touch No apparent deficits   Sharp/Dull No apparent deficits   Cognition   Overall Cognitive Status Impaired   Arousal/Participation Alert   Attention Attends with cues to redirect   Orientation Level Oriented to person;Oriented to place; Disoriented to time;Disoriented to situation   Memory Decreased short term memory;Decreased long term memory   Following Commands Follows one step commands with increased time or repetition   Assessment   Limitation Decreased ADL status; Decreased UE strength;Decreased Safe judgement during ADL;Decreased endurance;Decreased self-care trans;Decreased high-level ADLs; Decreased cognition   Assessment Pt is a 80 y o  female seen for OT evaluation s/p admit to [facility] on 3/18/2021 w/ Sepsis with acute renal failure without septic shock ([facility] )  Comorbidities affecting pt's functional performance at time of assessment include: bursitis, dysuria, chest pain, lyme disease, bronchitis, arthritis, GERD, osteoarthritis, SVT  Personal factors affecting pt at time of IE include:behavioral pattern, difficulty performing ADLS, difficulty performing IADLS , limited insight into deficits, compliance, decreased initiation and engagement  and health management   Prior to admission, pt was (A) with ADLs and IADLs with use of RW during mobility  Upon evaluation: Pt requires (S)-mod (A) x1-2 with use of RW during mobility 2* the following deficits impacting occupational performance: weakness, decreased strength, decreased balance, decreased tolerance, impaired initiation, impaired memory, impaired sequencing, impaired problem solving, impulsivity, decreased safety awareness and impaired interpersonal skills   Pt to benefit from continued skilled OT tx while in the hospital to address deficits as defined above and maximize level of functional independence w ADL's and functional mobility  Occupational Performance areas to address include: grooming, bathing/shower, toilet hygiene, dressing, functional mobility, community mobility and clothing management  From OT standpoint, recommendation at time of d/c would be home with OP PT services at [facility]  Pt's raw score on the AM-PAC Daily Activity inpatient short form is 19, standardized score is 40 22, greater than 39 4  Patients at this level are likely to benefit from DC to home, however, please refer to therapist recommendation for safe DC planning  Goals   Patient Goals to feel better   Short Term Goal  pt will perform UE strengthening exercises    Long Term Goal #1 pt will demonstrate toilet transfers and hygiene at (S) level    Long Term Goal #2 pt will demonstrate UB/LB bathing and grooming tasks at min (A) level   Long Term Goal pt will demonstrate functional mobility with RW at mod (I) level    Plan   Treatment Interventions ADL retraining;Functional transfer training;UE strengthening/ROM; Endurance training;Patient/family training;Equipment evaluation/education; Activityengagement;Cognitive reorientation   Goal Expiration Date 04/01/21   OT Frequency 3-5x/wk   Recommendation   OT Discharge Recommendation Home with skilled therapy  (return to [facility] with OP PT services)   AM-PAC Daily Activity Inpatient   Lower Body Dressing 3   Bathing 3   Toileting 3   Upper Body Dressing 3   Grooming 3   Eating 4   Daily Activity Raw Score 19   Daily Activity Standardized Score (Calc for Raw Score >=11) 40 22   AM-PAC Applied Cognition Inpatient   Following a Speech/Presentation 3   Understanding Ordinary Conversation 3   Taking Medications 2   Remembering Where Things Are Placed or Put Away 2   Remembering List of 4-5 Errands 2   Taking Care of Complicated Tasks 1   Applied Cognition Raw Score 13   Applied Cognition Standardized Score 30 46     Pt will benefit from continued OT services in order to maximize (I) c ADL performance, FM c RW, and improve overall endurance/strength required to complete functional tasks in preparation for d/c  Pt left seated in chair at end of session; all needs within reach; all lines intact; scds connected and turned on

## 2021-03-18 NOTE — ASSESSMENT & PLAN NOTE
Lab Results   Component Value Date    EGFR 42 03/18/2021    EGFR 58 07/05/2019    EGFR 55 07/04/2019    CREATININE 1 15 03/18/2021    CREATININE 0 90 07/05/2019    CREATININE 0 93 07/04/2019     Baseline creatinine of 0 9, which based on patient's age and weight has a clearance of approximately 40  CKD 3B

## 2021-03-18 NOTE — PLAN OF CARE
Problem: PHYSICAL THERAPY ADULT  Goal: Performs mobility at highest level of function for planned discharge setting  See evaluation for individualized goals  Description: Treatment/Interventions: Functional transfer training, LE strengthening/ROM, Therapeutic exercise, Endurance training, Bed mobility, Gait training          See flowsheet documentation for full assessment, interventions and recommendations  Note: Prognosis: Good  Problem List: Decreased strength, Decreased endurance, Impaired balance, Decreased mobility, Decreased cognition, Impaired judgement, Decreased safety awareness  Assessment: Patient is a 80 y o  female evaluated by Physical Therapy s/p admit to [address] [place] [address],[address] on 3/18/2021 with admitting diagnosis of: Dehydration, Enterocolitis, Lactic acidosis, Vomiting, Nausea & vomiting, Hypoxia, and principal problem of: Sepsis with acute renal failure without septic shock  PT was consulted to assess patient's functional mobility and discharge needs  Ordered are PT Evaluation and treatment with activity level of: up with assistance  Comorbidities affecting patient's physical performance at time of assessment include: lyme disease, GERD, arthritis, osteoporosis, HTN, HLD, SVT  Personal factors affecting the patient at time of IE include: ambulating with assistive device, inability to navigate community distances, impaired safety awareness, decreased initiation and engagement, limited insight into impairments, inability/difficulty performing IADLs and inability/difficulty performing ADLs  Please locate objective findings from PT assessment regarding body systems outlined above  Upon evaluation, pt able to perform all functional mobility with SUP, RW, and increased time  Moderate verbal cuing provided for safety awareness and direction  Ambulation limited to short functional distance within room d/t fatigue and needing to use bathroom   No LOB experienced; HR and SpO2 remained WFL on 4L O2 throughout  The patient's AM-PAC Basic Mobility Inpatient Short Form Raw Score is 22, Standardized Score is 47 4  A standardized score greater than 42 9 suggests the patient may benefit from discharge to home  Please also refer to the recommendation of the Physical Therapist for safe discharge planning  Pt will benefit from continued PT intervention during LOS to address current deficits, increase LOF, and facilitate safe d/c to next level of care when medically appropriate  D/c recommendation at this time is return to [address] [place] [address] with OP PT intervention  PT Discharge Recommendation: Home with skilled therapy(OP PT at [address] [place] [address])          See flowsheet documentation for full assessment

## 2021-03-18 NOTE — PLAN OF CARE
Problem: OCCUPATIONAL THERAPY ADULT  Goal: Performs self-care activities at highest level of function for planned discharge setting  See evaluation for individualized goals  Description: Treatment Interventions: ADL retraining, Functional transfer training, UE strengthening/ROM, Endurance training, Patient/family training, Equipment evaluation/education, Activityengagement, Cognitive reorientation          See flowsheet documentation for full assessment, interventions and recommendations  Note: Limitation: Decreased ADL status, Decreased UE strength, Decreased Safe judgement during ADL, Decreased endurance, Decreased self-care trans, Decreased high-level ADLs, Decreased cognition     Assessment: Pt is a 80 y o  female seen for OT evaluation s/p admit to [facility] on 3/18/2021 w/ Sepsis with acute renal failure without septic shock ([facility] [address] )  Comorbidities affecting pt's functional performance at time of assessment include: bursitis, dysuria, chest pain, lyme disease, bronchitis, arthritis, GERD, osteoarthritis, SVT  Personal factors affecting pt at time of IE include:behavioral pattern, difficulty performing ADLS, difficulty performing IADLS , limited insight into deficits, compliance, decreased initiation and engagement  and health management   Prior to admission, pt was (A) with ADLs and IADLs with use of RW during mobility  Upon evaluation: Pt requires (S)-mod (A) x1-2 with use of RW during mobility 2* the following deficits impacting occupational performance: weakness, decreased strength, decreased balance, decreased tolerance, impaired initiation, impaired memory, impaired sequencing, impaired problem solving, impulsivity, decreased safety awareness and impaired interpersonal skills  Pt to benefit from continued skilled OT tx while in the hospital to address deficits as defined above and maximize level of functional independence w ADL's and functional mobility   Occupational Performance areas to address include: grooming, bathing/shower, toilet hygiene, dressing, functional mobility, community mobility and clothing management  From OT standpoint, recommendation at time of d/c would be home with OP PT services at [facility]  Pt's raw score on the AM-PAC Daily Activity inpatient short form is 19, standardized score is 40 22, greater than 39 4  Patients at this level are likely to benefit from DC to home, however, please refer to therapist recommendation for safe DC planning       OT Discharge Recommendation: Home with skilled therapy(return to [facility] with OP PT services)

## 2021-03-18 NOTE — ASSESSMENT & PLAN NOTE
Sepsis based on findings of fever, tachycardia, and tachypnea with a suspected GI source  CT scan obtained and shows:  · Air in bladder - infection verses instrumentation  · Liquid stool in colon - inflammation versus infection  · Vague bilateral ground-glass lower lobe infiltrates - cannot exclude a pneumonia  Plan as follows:  · Continue IV fluids  · Trend lactic levels acid until normalized  · Urinalysis ordered and pending  · Blood cultures ordered and pending  · Check procalcitonin  · Check fecal leukocytes, stool for enteric pathogens  · Blood pressure is soft and patient with FIONA - hold CCB and ACE-I   · Continue BB therapy due to history of SVTs, but monitor heart rate and blood pressure carefully  · Patient was receiving cephalexin as outpatient for cellulitis of the toe - will also check a C diff  · Initiate on antibiotic coverage with combination of fluoroquinolone and metronidazole, as patient's complaints are mostly GI in nature, and monitor stool studies carefully  Patient is being initiated on levofloxacin for additional pulmonary coverage as well given uncertainty of CT findings in the lungs  · Await procalcitonin results - unsure if lower lobe findings represent atelectasis or true infection

## 2021-03-18 NOTE — ASSESSMENT & PLAN NOTE
Also with mention of Gomez's esophagus  Continue PPI therapy      Also with mention of Esophageal Mass - had been scheduled for EUS via GI, cancelled secondary to family request

## 2021-03-19 LAB
ANION GAP SERPL CALCULATED.3IONS-SCNC: 8 MMOL/L (ref 4–13)
BASOPHILS # BLD AUTO: 0.06 THOUSANDS/ΜL (ref 0–0.1)
BASOPHILS NFR BLD AUTO: 1 % (ref 0–1)
BILIRUB UR QL STRIP: NEGATIVE
BUN SERPL-MCNC: 23 MG/DL (ref 5–25)
C DIFF TOX B TCDB STL QL NAA+PROBE: NEGATIVE
CALCIUM SERPL-MCNC: 7.5 MG/DL (ref 8.3–10.1)
CAMPYLOBACTER DNA SPEC NAA+PROBE: NORMAL
CHLORIDE SERPL-SCNC: 102 MMOL/L (ref 100–108)
CLARITY UR: NORMAL
CO2 SERPL-SCNC: 26 MMOL/L (ref 21–32)
COLOR UR: YELLOW
CREAT SERPL-MCNC: 1.13 MG/DL (ref 0.6–1.3)
EOSINOPHIL # BLD AUTO: 0 THOUSAND/ΜL (ref 0–0.61)
EOSINOPHIL NFR BLD AUTO: 0 % (ref 0–6)
ERYTHROCYTE [DISTWIDTH] IN BLOOD BY AUTOMATED COUNT: 13.7 % (ref 11.6–15.1)
GFR SERPL CREATININE-BSD FRML MDRD: 43 ML/MIN/1.73SQ M
GLUCOSE SERPL-MCNC: 106 MG/DL (ref 65–140)
GLUCOSE UR STRIP-MCNC: NEGATIVE MG/DL
HCT VFR BLD AUTO: 34 % (ref 34.8–46.1)
HGB BLD-MCNC: 10.4 G/DL (ref 11.5–15.4)
HGB UR QL STRIP.AUTO: NEGATIVE
IMM GRANULOCYTES # BLD AUTO: 0.02 THOUSAND/UL (ref 0–0.2)
IMM GRANULOCYTES NFR BLD AUTO: 0 % (ref 0–2)
KETONES UR STRIP-MCNC: NEGATIVE MG/DL
LACTATE SERPL-SCNC: 1.3 MMOL/L (ref 0.5–2)
LEUKOCYTE ESTERASE UR QL STRIP: NEGATIVE
LYMPHOCYTES # BLD AUTO: 0.4 THOUSANDS/ΜL (ref 0.6–4.47)
LYMPHOCYTES NFR BLD AUTO: 6 % (ref 14–44)
MAGNESIUM SERPL-MCNC: 1.5 MG/DL (ref 1.6–2.6)
MCH RBC QN AUTO: 28.5 PG (ref 26.8–34.3)
MCHC RBC AUTO-ENTMCNC: 30.6 G/DL (ref 31.4–37.4)
MCV RBC AUTO: 93 FL (ref 82–98)
MONOCYTES # BLD AUTO: 0.25 THOUSAND/ΜL (ref 0.17–1.22)
MONOCYTES NFR BLD AUTO: 4 % (ref 4–12)
NEUTROPHILS # BLD AUTO: 5.51 THOUSANDS/ΜL (ref 1.85–7.62)
NEUTS SEG NFR BLD AUTO: 89 % (ref 43–75)
NITRITE UR QL STRIP: NEGATIVE
NRBC BLD AUTO-RTO: 0 /100 WBCS
PH UR STRIP.AUTO: 6 [PH]
PLATELET # BLD AUTO: 198 THOUSANDS/UL (ref 149–390)
PMV BLD AUTO: 10.4 FL (ref 8.9–12.7)
POTASSIUM SERPL-SCNC: 3.8 MMOL/L (ref 3.5–5.3)
PROCALCITONIN SERPL-MCNC: 3.36 NG/ML
PROT UR STRIP-MCNC: NEGATIVE MG/DL
RBC # BLD AUTO: 3.65 MILLION/UL (ref 3.81–5.12)
SALMONELLA DNA SPEC QL NAA+PROBE: NORMAL
SHIGA TOXIN STX GENE SPEC NAA+PROBE: NORMAL
SHIGELLA DNA SPEC QL NAA+PROBE: NORMAL
SODIUM SERPL-SCNC: 136 MMOL/L (ref 136–145)
SP GR UR STRIP.AUTO: 1.01 (ref 1–1.03)
UROBILINOGEN UR QL STRIP.AUTO: 0.2 E.U./DL
WBC # BLD AUTO: 6.24 THOUSAND/UL (ref 4.31–10.16)

## 2021-03-19 PROCEDURE — 97116 GAIT TRAINING THERAPY: CPT

## 2021-03-19 PROCEDURE — 97530 THERAPEUTIC ACTIVITIES: CPT

## 2021-03-19 PROCEDURE — 84145 PROCALCITONIN (PCT): CPT | Performed by: EMERGENCY MEDICINE

## 2021-03-19 PROCEDURE — 99232 SBSQ HOSP IP/OBS MODERATE 35: CPT | Performed by: INTERNAL MEDICINE

## 2021-03-19 PROCEDURE — 83605 ASSAY OF LACTIC ACID: CPT | Performed by: INTERNAL MEDICINE

## 2021-03-19 PROCEDURE — 85025 COMPLETE CBC W/AUTO DIFF WBC: CPT | Performed by: INTERNAL MEDICINE

## 2021-03-19 PROCEDURE — 81003 URINALYSIS AUTO W/O SCOPE: CPT | Performed by: INTERNAL MEDICINE

## 2021-03-19 PROCEDURE — 83735 ASSAY OF MAGNESIUM: CPT | Performed by: INTERNAL MEDICINE

## 2021-03-19 PROCEDURE — 80048 BASIC METABOLIC PNL TOTAL CA: CPT | Performed by: INTERNAL MEDICINE

## 2021-03-19 RX ORDER — POTASSIUM CHLORIDE 20 MEQ/1
20 TABLET, EXTENDED RELEASE ORAL ONCE
Status: COMPLETED | OUTPATIENT
Start: 2021-03-19 | End: 2021-03-19

## 2021-03-19 RX ORDER — MAGNESIUM SULFATE HEPTAHYDRATE 40 MG/ML
2 INJECTION, SOLUTION INTRAVENOUS ONCE
Status: COMPLETED | OUTPATIENT
Start: 2021-03-19 | End: 2021-03-19

## 2021-03-19 RX ADMIN — SODIUM CHLORIDE 100 ML/HR: 0.9 INJECTION, SOLUTION INTRAVENOUS at 13:20

## 2021-03-19 RX ADMIN — CLONAZEPAM 1.5 MG: 0.5 TABLET ORAL at 21:18

## 2021-03-19 RX ADMIN — SODIUM CHLORIDE 100 ML/HR: 0.9 INJECTION, SOLUTION INTRAVENOUS at 01:46

## 2021-03-19 RX ADMIN — METOPROLOL TARTRATE 75 MG: 50 TABLET, FILM COATED ORAL at 21:18

## 2021-03-19 RX ADMIN — ASPIRIN 81 MG: 81 TABLET, COATED ORAL at 08:38

## 2021-03-19 RX ADMIN — FAMOTIDINE 10 MG: 20 TABLET, FILM COATED ORAL at 08:37

## 2021-03-19 RX ADMIN — DIGOXIN 125 MCG: 125 TABLET ORAL at 08:38

## 2021-03-19 RX ADMIN — PANTOPRAZOLE SODIUM 40 MG: 40 TABLET, DELAYED RELEASE ORAL at 05:29

## 2021-03-19 RX ADMIN — HEPARIN SODIUM 5000 UNITS: 5000 INJECTION INTRAVENOUS; SUBCUTANEOUS at 05:29

## 2021-03-19 RX ADMIN — MELATONIN TAB 3 MG 6 MG: 3 TAB at 21:18

## 2021-03-19 RX ADMIN — METRONIDAZOLE 500 MG: 500 INJECTION, SOLUTION INTRAVENOUS at 05:29

## 2021-03-19 RX ADMIN — POTASSIUM CHLORIDE 20 MEQ: 20 TABLET, EXTENDED RELEASE ORAL at 08:37

## 2021-03-19 RX ADMIN — METRONIDAZOLE 500 MG: 500 INJECTION, SOLUTION INTRAVENOUS at 21:18

## 2021-03-19 RX ADMIN — MAGNESIUM SULFATE HEPTAHYDRATE 2 G: 40 INJECTION, SOLUTION INTRAVENOUS at 08:37

## 2021-03-19 RX ADMIN — HEPARIN SODIUM 5000 UNITS: 5000 INJECTION INTRAVENOUS; SUBCUTANEOUS at 13:48

## 2021-03-19 RX ADMIN — METOPROLOL TARTRATE 75 MG: 50 TABLET, FILM COATED ORAL at 08:38

## 2021-03-19 RX ADMIN — HEPARIN SODIUM 5000 UNITS: 5000 INJECTION INTRAVENOUS; SUBCUTANEOUS at 21:18

## 2021-03-19 RX ADMIN — METRONIDAZOLE 500 MG: 500 INJECTION, SOLUTION INTRAVENOUS at 13:48

## 2021-03-19 NOTE — CASE MANAGEMENT
Pt was admitted from [facility] assisted living  Cm spoke with Eloy Marin RN at [facility] who stated pt was pretty independent there with her adls and used either a walker or cane to ambulate  Pt was also receiving therapy back at [facility]  Pt's family drives her to appointments  PCP is Ashley Schultz and pharmacy is [facility]  Plans at this time are for pt to return to [facility] and continue OP therapy there  CM will continue to follow and assist in dc planning

## 2021-03-19 NOTE — PHYSICAL THERAPY NOTE
PHYSICAL THERAPY NOTE          Patient Name: Bell HILTON Date: 3/19/2021     03/19/21 8365   Note Type   Note Type Treatment   Restrictions/Precautions   Weight Bearing Precautions Per Order No   Other Precautions Contact/isolation  (Fall Risk;O2;Multiple lines; Bed Alarm; Chair Alarm)   Subjective   Subjective Pt  would like to ambulate   Bed Mobility   Supine to Sit 5  Supervision   Additional items Assist x 1;HOB elevated; Increased time required;Verbal cues   Transfers   Sit to Stand 5  Supervision   Additional items Armrests; Increased time required;Verbal cues   Stand to Sit 5  Supervision   Additional items Assist x 1; Armrests; Increased time required;Verbal cues   Stand pivot 5  Supervision   Toilet transfer 5  Supervision   Additional items Assist x 1;Standard toilet   Ambulation/Elevation   Gait pattern Excessively slow; Short stride; Foward flexed;Decreased foot clearance;Narrow GENARO   Gait Assistance 5  Supervision   Assistive Device Rolling walker   Distance 20' x 2, 135' x 1   Balance   Ambulatory Fair -  (RW)   Endurance Deficit   Endurance Deficit Yes   Activity Tolerance   Activity Tolerance Patient limited by fatigue   Exercises   Hip Flexion 20 reps   Hip Abduction 20 reps   Hip Adduction 20 reps   Knee AROM Long Arc Quad 20 reps   Ankle Pumps 20 reps   Assessment   Prognosis Excellent   Problem List Decreased strength;Decreased endurance; Impaired balance;Decreased mobility; Impaired judgement;Decreased cognition;Decreased safety awareness   Assessment Pt  seen for PT treatment session this date with interventions consisting of  therapeutic exercises, bed mobility, transfers and  gait training w/ emphasis on improving pt's ability to ambulate  Pt  Currently performing  tx and ambulation at ( SUP) x 1 level of function  Utilizing RW with fair balance and stability   The patient's AM-PAC Basic Mobility Inpatient Short Form Raw Score is 22, Standardized Score is 47 4  A standardized score greater than 42 9 suggests the patient may benefit from discharge to home  Please also refer to physical therapy recommendation for safe DC planning  In comparison to previous session, Pt  With improvements in activity tolerance  Pt is in need of continued activity in PT to improve strength balance endurance mobility transfers and ambulation with return to maximize LOF  From PT/mobility standpoint, recommendation at time of d/c would be return to [facility] with no rehab needs in order to promote return to PLOF and independence  Goals   LTG Expiration Date 04/01/21   Plan   Treatment/Interventions Functional transfer training;LE strengthening/ROM; Therapeutic exercise; Endurance training;Bed mobility;Gait training   Progress Progressing toward goals   AM-PAC Basic Mobility Inpatient   Turning in Bed Without Bedrails 4   Lying on Back to Sitting on Edge of Flat Bed 4   Moving Bed to Chair 4   Standing Up From Chair 4   Walk in Room 4   Climb 3-5 Stairs 2   Basic Mobility Inpatient Raw Score 22   Basic Mobility Standardized Score 47 4   Pt  OOB in chair  with call bell within reach, all lines intact and alarm on at end of PT session  Discussed with  PT today's treatment and patient's current level of function for care coordination

## 2021-03-19 NOTE — ASSESSMENT & PLAN NOTE
Sepsis based on findings of fever, tachycardia, and tachypnea with a suspected GI source  Tachycardia resolved, tachypnea improved, and fever curve improving as well  CT scan obtained and shows:  · Air in bladder - infection verses instrumentation  · Liquid stool in colon - inflammation versus infection  · Vague bilateral ground-glass lower lobe infiltrates - cannot exclude a pneumonia  Plan as follows:  · Continue IV fluids for now  · Trend lactic levels acid until normalized  · Urinalysis negative for infection  · Blood cultures with no growth X 24 hours  · Procalcitonin 0 28 --> 3 7, 3 3  · Check fecal leukocytes, stool for enteric pathogens  · Patient was receiving antibiotics as outpatient  C diff also checked and negative  · Blood pressure is soft and patient with FIONA - hold CCB and ACE-I   · Continue BB therapy due to history of SVTs, but monitor heart rate and blood pressure carefully  · Initiate on antibiotic coverage with combination of fluoroquinolone and metronidazole, as patient's complaints are mostly GI in nature, and monitor stool studies carefully  Patient is being initiated on levofloxacin for additional pulmonary coverage as well given uncertainty of CT findings in the lungs

## 2021-03-19 NOTE — ASSESSMENT & PLAN NOTE
Symptom onset of nausea, vomiting, and diarrhea, reportedly ongoing for 1 day  CT with a potential enterocolitis  Initiate on antibiotics as above, and monitor stool studies

## 2021-03-19 NOTE — PROGRESS NOTES
[address] [facility] [address]  Progress Note - Adriano Rodriguez 9/2/1931, 80 y o  female MRN: [id number]  Unit/Bed#: 403-01 Encounter: [id number]  Primary Care Provider: Chloe Reinoso,    Date and time admitted to hospital: 3/18/2021  5:54 AM    * sepsis  Assessment & Plan  Sepsis based on findings of fever, tachycardia, and tachypnea with a suspected GI source  Tachycardia resolved, tachypnea improved, and fever curve improving as well  CT scan obtained and shows:  · Air in bladder - infection verses instrumentation  · Liquid stool in colon - inflammation versus infection  · Vague bilateral ground-glass lower lobe infiltrates - cannot exclude a pneumonia  Plan as follows:  · Continue IV fluids for now  · Trend lactic levels acid until normalized  · Urinalysis negative for infection  · Blood cultures with no growth X 24 hours  · Procalcitonin 0 28 --> 3 7, 3 3  · Check fecal leukocytes, stool for enteric pathogens  · Patient was receiving antibiotics as outpatient  C diff also checked and negative  · Blood pressure is soft and patient with FIONA - hold CCB and ACE-I   · Continue BB therapy due to history of SVTs, but monitor heart rate and blood pressure carefully  · Initiate on antibiotic coverage with combination of fluoroquinolone and metronidazole, as patient's complaints are mostly GI in nature, and monitor stool studies carefully  Patient is being initiated on levofloxacin for additional pulmonary coverage as well given uncertainty of CT findings in the lungs  Diarrhea of presumed infectious origin  Assessment & Plan  Symptom onset of nausea, vomiting, and diarrhea, reportedly ongoing for 1 day  CT with a potential enterocolitis  Initiate on antibiotics as above, and monitor stool studies  FIONA (acute kidney injury) ([facility] [address] )  Assessment & Plan  FIONA superimposed on CKD - patient with concurrent hyponatremia and hypochloremia, very likely hypovolemic in nature    Continue fluid resuscitation, monitor renal function, renally dose medications, and avoid nephrotoxins  Hold ACE-I    Lactic acidosis  Assessment & Plan  Likely secondary to sepsis  Monitor until normalized  Esophageal reflux  Assessment & Plan  Also with mention of Gomez's esophagus  Continue PPI therapy  Also with mention of Esophageal Mass - had been scheduled for EUS via GI, cancelled secondary to family request     Pulmonary nodule  Assessment & Plan  Mention of pulmonary nodules, which appears stable by repeat CT on 03/06/2021  Stage 3b chronic kidney disease  Assessment & Plan  Lab Results   Component Value Date    EGFR 43 03/19/2021    EGFR 42 03/18/2021    EGFR 58 07/05/2019    CREATININE 1 13 03/19/2021    CREATININE 1 15 03/18/2021    CREATININE 0 90 07/05/2019     Baseline creatinine of 0 9, which based on patient's age and weight has a clearance of approximately 40  CKD 3B  SVT (supraventricular tachycardia) (HCC)  Assessment & Plan  Prior history of SVTs - also with mention of Afib, however per last cardiology notes there has been no evidence of this  Remains on Digoxin without toxic levels  Continue BB therapy  VTE Pharmacologic Prophylaxis:   Pharmacologic: Heparin  Mechanical VTE Prophylaxis in Place: Yes    Patient Centered Rounds: I have performed bedside rounds with nursing staff today  Discussions with Specialists or Other Care Team Provider: None    Education and Discussions with Family / Patient: Yes    Time Spent for Care: 20 minutes  More than 50% of total time spent on counseling and coordination of care as described above  Current Length of Stay: 1 day(s)    Current Patient Status: Inpatient   Certification Statement: The patient will continue to require additional inpatient hospital stay due to Or continued evaluation of infection, treatment of sepsis      Discharge Plan: TBD    Code Status: Level 3 - DNAR and DNI      Subjective:   Patient seen and examined - appears clinically improved, and reports subjectively improvement in her symptoms as well  Remains febrile, but with what appears to be in improvement in her fever curve overall  No overnight events reported  Objective:     Vitals:   Temp (24hrs), Av 7 °F (38 2 °C), Min:97 3 °F (36 3 °C), Max:102 9 °F (39 4 °C)    Temp:  [97 3 °F (36 3 °C)-102 9 °F (39 4 °C)] 97 3 °F (36 3 °C)  HR:  [65-97] 65  Resp:  [18-20] 20  BP: ()/(35-52) 101/35  SpO2:  [90 %-100 %] 90 %  Body mass index is 24 15 kg/m²  Input and Output Summary (last 24 hours): Intake/Output Summary (Last 24 hours) at 3/19/2021 1439  Last data filed at 3/19/2021 1030  Gross per 24 hour   Intake 1440 ml   Output 650 ml   Net 790 ml       Physical Exam:   Constitutional:       General: She is not in acute distress  Appearance: Normal appearance  She is normal weight  She is no longer ill-apearing  She is not toxic-appearing  HENT:      Mouth/Throat:      Mouth: Mucous membranes are moist    Neck:      Musculoskeletal: Neck supple  Cardiovascular:      Rate and Rhythm: Normal rate and regular rhythm  Heart sounds: No murmur  No gallop  Comments: Non tachycardic at time of exam   Pulmonary:      Effort: Pulmonary effort is normal  No respiratory distress  Breath sounds: Normal breath sounds  No wheezing, rhonchi or rales  Comments:  Bibasilar crackles  Abdominal:      General: Abdomen is flat  Bowel sounds are normal  There is no distension  Palpations: Abdomen is soft  Tenderness: There is no abdominal tenderness  There is no guarding or rebound  Comments: Bowel sounds not hyperactive  Musculoskeletal:         General: No swelling or tenderness  Skin:     General: Skin is warm and dry  Neurological:      General: No focal deficit present  Mental Status: She is alert and oriented to person, place, and time     Psychiatric:         Mood and Affect: Mood normal          Behavior: Behavior normal          Thought Content: Thought content normal          Judgment: Judgment normal      Additional Data:     Labs:    Results from last 7 days   Lab Units 03/19/21  0422 03/18/21  0606   WBC Thousand/uL 6 24 10 18*   HEMOGLOBIN g/dL 10 4* 13 9   HEMATOCRIT % 34 0* 44 5   PLATELETS Thousands/uL 198 310   BANDS PCT %  --  7   NEUTROS PCT % 89*  --    LYMPHS PCT % 6*  --    LYMPHO PCT %  --  2*   MONOS PCT % 4  --    MONO PCT %  --  2*   EOS PCT % 0 0     Results from last 7 days   Lab Units 03/19/21  0422 03/18/21  0606   SODIUM mmol/L 136 134*   POTASSIUM mmol/L 3 8 4 2   CHLORIDE mmol/L 102 98*   CO2 mmol/L 26 24   BUN mg/dL 23 19   CREATININE mg/dL 1 13 1 15   ANION GAP mmol/L 8 12   CALCIUM mg/dL 7 5* 9 3   ALBUMIN g/dL  --  3 4*   TOTAL BILIRUBIN mg/dL  --  0 50   ALK PHOS U/L  --  61   ALT U/L  --  33   AST U/L  --  29   GLUCOSE RANDOM mg/dL 106 176*     Results from last 7 days   Lab Units 03/18/21  0606   INR  1 03             Results from last 7 days   Lab Units 03/19/21  0422 03/18/21  1553 03/18/21  1225 03/18/21  0848 03/18/21  0606   LACTIC ACID mmol/L  --  2 5* 2 8* 2 1* 3 6*   PROCALCITONIN ng/ml 3 36*  --  3 70*  --  0 28*           * I Have Reviewed All Lab Data Listed Above  * Additional Pertinent Lab Tests Reviewed: All Labs Within Last 24 Hours Reviewed     Recent Cultures (last 7 days):     Results from last 7 days   Lab Units 03/18/21  2222 03/18/21  0606   BLOOD CULTURE   --  No Growth at 24 hrs  No Growth at 24 hrs     C DIFF TOXIN B BY PCR  Negative  --        Last 24 Hours Medication List:   Current Facility-Administered Medications   Medication Dose Route Frequency Provider Last Rate    acetaminophen  650 mg Oral Q6H PRN Eva To MD      aluminum-magnesium hydroxide-simethicone  30 mL Oral Q6H PRN Eva To MD      aspirin  81 mg Oral Daily Eva To MD      clonazePAM  1 5 mg Oral HS Eva To MD      digoxin  125 mcg Oral Daily Eva To MD      famotidine  10 mg Oral Daily Lesley Aguilera MD      heparin (porcine)  5,000 Units Subcutaneous [facility] Lesley Aguilera MD      levofloxacin  750 mg Intravenous Q48H Lesley Aguilera  mg (03/18/21 1517)    melatonin  6 mg Oral HS Lesley Aguilera MD      metoprolol tartrate  75 mg Oral Q12H [facility] Lesley Aguilera MD      metroNIDAZOLE  500 mg Intravenous Gal Rendon  mg (03/19/21 1348)    ondansetron  4 mg Intravenous Q6H PRN Lesley Aguilera MD      pantoprazole  40 mg Oral Early Morning Lesley Aguilera MD      sodium chloride  100 mL/hr Intravenous Continuous Lesley Aguilera  mL/hr (03/19/21 1320)        Today, Patient Was Seen By: Lesley Aguilera MD    ** Please Note: Dictation voice to text software may have been used in the creation of this document   **

## 2021-03-19 NOTE — NURSING NOTE
Pt unable to void this a m  Per Dr Arcadio Balbuena, UA on admission not completed  Bladder scanned pt for 550mL  2nd attempt up to BR to void unsuccessful  Pt straight cath'd for 650mL clear deb urine  Odor noted  Dr Arcadio Balbuena aware of same  UA sent   Pt mele well

## 2021-03-19 NOTE — PLAN OF CARE
Problem: PHYSICAL THERAPY ADULT  Goal: Performs mobility at highest level of function for planned discharge setting  See evaluation for individualized goals  Description: Treatment/Interventions: Functional transfer training, LE strengthening/ROM, Therapeutic exercise, Endurance training, Bed mobility, Gait training          See flowsheet documentation for full assessment, interventions and recommendations  Outcome: Progressing  Note: Prognosis: Excellent  Problem List: Decreased strength, Decreased endurance, Impaired balance, Decreased mobility, Impaired judgement, Decreased cognition, Decreased safety awareness  Assessment: Pt  seen for PT treatment session this date with interventions consisting of  therapeutic exercises, bed mobility, transfers and  gait training w/ emphasis on improving pt's ability to ambulate  Pt  Currently performing  tx and ambulation at ( SUP) x 1 level of function  Utilizing RW with fair balance and stability  The patient's AM-PAC Basic Mobility Inpatient Short Form Raw Score is 22, Standardized Score is 47 4  A standardized score greater than 42 9 suggests the patient may benefit from discharge to home  Please also refer to physical therapy recommendation for safe DC planning  In comparison to previous session, Pt  With improvements in activity tolerance  Pt is in need of continued activity in PT to improve strength balance endurance mobility transfers and ambulation with return to maximize LOF  From PT/mobility standpoint, recommendation at time of d/c would be return to [facility] with no rehab needs in order to promote return to PLOF and independence  PT Discharge Recommendation: Home with skilled therapy(OP PT at [address] [place] [address])          See flowsheet documentation for full assessment

## 2021-03-19 NOTE — ASSESSMENT & PLAN NOTE
Lab Results   Component Value Date    EGFR 43 03/19/2021    EGFR 42 03/18/2021    EGFR 58 07/05/2019    CREATININE 1 13 03/19/2021    CREATININE 1 15 03/18/2021    CREATININE 0 90 07/05/2019     Baseline creatinine of 0 9, which based on patient's age and weight has a clearance of approximately 40  CKD 3B

## 2021-03-20 LAB
ANION GAP SERPL CALCULATED.3IONS-SCNC: 7 MMOL/L (ref 4–13)
BASOPHILS # BLD AUTO: 0.02 THOUSANDS/ΜL (ref 0–0.1)
BASOPHILS NFR BLD AUTO: 0 % (ref 0–1)
BUN SERPL-MCNC: 11 MG/DL (ref 5–25)
CALCIUM SERPL-MCNC: 7.7 MG/DL (ref 8.3–10.1)
CHLORIDE SERPL-SCNC: 106 MMOL/L (ref 100–108)
CO2 SERPL-SCNC: 23 MMOL/L (ref 21–32)
CREAT SERPL-MCNC: 0.79 MG/DL (ref 0.6–1.3)
EOSINOPHIL # BLD AUTO: 0.16 THOUSAND/ΜL (ref 0–0.61)
EOSINOPHIL NFR BLD AUTO: 3 % (ref 0–6)
ERYTHROCYTE [DISTWIDTH] IN BLOOD BY AUTOMATED COUNT: 14 % (ref 11.6–15.1)
GFR SERPL CREATININE-BSD FRML MDRD: 67 ML/MIN/1.73SQ M
GLUCOSE SERPL-MCNC: 117 MG/DL (ref 65–140)
HCT VFR BLD AUTO: 35.7 % (ref 34.8–46.1)
HGB BLD-MCNC: 10.7 G/DL (ref 11.5–15.4)
IMM GRANULOCYTES # BLD AUTO: 0.02 THOUSAND/UL (ref 0–0.2)
IMM GRANULOCYTES NFR BLD AUTO: 0 % (ref 0–2)
LYMPHOCYTES # BLD AUTO: 0.68 THOUSANDS/ΜL (ref 0.6–4.47)
LYMPHOCYTES NFR BLD AUTO: 14 % (ref 14–44)
MAGNESIUM SERPL-MCNC: 2.1 MG/DL (ref 1.6–2.6)
MCH RBC QN AUTO: 28.4 PG (ref 26.8–34.3)
MCHC RBC AUTO-ENTMCNC: 30 G/DL (ref 31.4–37.4)
MCV RBC AUTO: 95 FL (ref 82–98)
MONOCYTES # BLD AUTO: 0.37 THOUSAND/ΜL (ref 0.17–1.22)
MONOCYTES NFR BLD AUTO: 7 % (ref 4–12)
NEUTROPHILS # BLD AUTO: 3.78 THOUSANDS/ΜL (ref 1.85–7.62)
NEUTS SEG NFR BLD AUTO: 76 % (ref 43–75)
NRBC BLD AUTO-RTO: 0 /100 WBCS
PLATELET # BLD AUTO: 206 THOUSANDS/UL (ref 149–390)
PMV BLD AUTO: 10.3 FL (ref 8.9–12.7)
POTASSIUM SERPL-SCNC: 4 MMOL/L (ref 3.5–5.3)
PROCALCITONIN SERPL-MCNC: 1.6 NG/ML
RBC # BLD AUTO: 3.77 MILLION/UL (ref 3.81–5.12)
SODIUM SERPL-SCNC: 136 MMOL/L (ref 136–145)
WBC # BLD AUTO: 5.03 THOUSAND/UL (ref 4.31–10.16)

## 2021-03-20 PROCEDURE — 84145 PROCALCITONIN (PCT): CPT | Performed by: INTERNAL MEDICINE

## 2021-03-20 PROCEDURE — 80048 BASIC METABOLIC PNL TOTAL CA: CPT | Performed by: INTERNAL MEDICINE

## 2021-03-20 PROCEDURE — 99232 SBSQ HOSP IP/OBS MODERATE 35: CPT | Performed by: INTERNAL MEDICINE

## 2021-03-20 PROCEDURE — 85025 COMPLETE CBC W/AUTO DIFF WBC: CPT | Performed by: INTERNAL MEDICINE

## 2021-03-20 PROCEDURE — 83735 ASSAY OF MAGNESIUM: CPT | Performed by: INTERNAL MEDICINE

## 2021-03-20 RX ADMIN — HEPARIN SODIUM 5000 UNITS: 5000 INJECTION INTRAVENOUS; SUBCUTANEOUS at 21:56

## 2021-03-20 RX ADMIN — FAMOTIDINE 10 MG: 20 TABLET, FILM COATED ORAL at 10:12

## 2021-03-20 RX ADMIN — METRONIDAZOLE 500 MG: 500 INJECTION, SOLUTION INTRAVENOUS at 13:48

## 2021-03-20 RX ADMIN — PANTOPRAZOLE SODIUM 40 MG: 40 TABLET, DELAYED RELEASE ORAL at 05:00

## 2021-03-20 RX ADMIN — METOPROLOL TARTRATE 75 MG: 50 TABLET, FILM COATED ORAL at 11:58

## 2021-03-20 RX ADMIN — ASPIRIN 81 MG: 81 TABLET, COATED ORAL at 10:12

## 2021-03-20 RX ADMIN — METRONIDAZOLE 500 MG: 500 INJECTION, SOLUTION INTRAVENOUS at 21:58

## 2021-03-20 RX ADMIN — METRONIDAZOLE 500 MG: 500 INJECTION, SOLUTION INTRAVENOUS at 05:00

## 2021-03-20 RX ADMIN — CLONAZEPAM 1.5 MG: 0.5 TABLET ORAL at 21:53

## 2021-03-20 RX ADMIN — HEPARIN SODIUM 5000 UNITS: 5000 INJECTION INTRAVENOUS; SUBCUTANEOUS at 05:00

## 2021-03-20 RX ADMIN — MELATONIN TAB 3 MG 6 MG: 3 TAB at 21:53

## 2021-03-20 RX ADMIN — LEVOFLOXACIN 750 MG: 5 INJECTION, SOLUTION INTRAVENOUS at 14:45

## 2021-03-20 RX ADMIN — DIGOXIN 125 MCG: 125 TABLET ORAL at 10:11

## 2021-03-20 RX ADMIN — SODIUM CHLORIDE 100 ML/HR: 0.9 INJECTION, SOLUTION INTRAVENOUS at 00:47

## 2021-03-20 RX ADMIN — METOPROLOL TARTRATE 75 MG: 50 TABLET, FILM COATED ORAL at 21:53

## 2021-03-20 RX ADMIN — HEPARIN SODIUM 5000 UNITS: 5000 INJECTION INTRAVENOUS; SUBCUTANEOUS at 13:52

## 2021-03-20 NOTE — ASSESSMENT & PLAN NOTE
Lab Results   Component Value Date    EGFR 67 03/20/2021    EGFR 43 03/19/2021    EGFR 42 03/18/2021    CREATININE 0 79 03/20/2021    CREATININE 1 13 03/19/2021    CREATININE 1 15 03/18/2021     Baseline creatinine of 0 9, which based on patient's age and weight has a clearance of approximately 40  CKD 3B

## 2021-03-20 NOTE — PROGRESS NOTES
[address] [facility] [address]  Progress Note - Gera Fabian 9/2/1931, 80 y o  female MRN: [id number]  Unit/Bed#: 403-01 Encounter: [id number]  Primary Care Provider: Lou Alejandro DO   Date and time admitted to hospital: 3/18/2021  5:54 AM    * sepsis  Assessment & Plan  Sepsis based on findings of fever, tachycardia, and tachypnea with a suspected GI source  Tachycardia resolved, tachypnea improved, and fever curve improving as well with patient afebrile for over 24 hours  CT scan obtained and shows:  · Air in bladder - infection verses instrumentation  · Liquid stool in colon - inflammation versus infection  · Vague bilateral ground-glass lower lobe infiltrates - cannot exclude a pneumonia  Plan as follows:  · Discontinue IV fluids  · Lactic acidosis resolved  · Urinalysis negative for infection  · Blood cultures with no growth X 48 hours  · Procalcitonin 0 28 --> 3 7, 3 3  Repeat pending  · Check fecal leukocytes  Stool for enteric pathogens negative  · Patient was receiving antibiotics as outpatient  C diff also checked and negative  · Blood pressure is soft and patient with initial FIONA - hold CCB and ACE-I   · Continue BB therapy due to history of SVTs, but monitor heart rate and blood pressure  · Initiated on antibiotic coverage with combination of fluoroquinolone and metronidazole, as patient's complaints were mostly GI in nature  However, stool studies negative, and urinalysis negative  Symptoms may be pulmonary in nature  Await repeat procalcitonin  Diarrhea of presumed infectious origin  Assessment & Plan  Symptom onset of nausea, vomiting, and diarrhea, reportedly ongoing for 1 day  CT with a potential enterocolitis  C diff and stool for enteric pathogens both negative  Fecal leukocytes pending  FIONA (acute kidney injury) ([facility] [address] )  Assessment & Plan  FIONA superimposed on CKD - patient with concurrent hyponatremia and hypochloremia, very likely hypovolemic in nature  Continue fluid resuscitation, monitor renal function, renally dose medications, and avoid nephrotoxins  Continue to hold ACE-I, and avoid hypotension  Creatinine has improved, and FIONA appears resolved  Lactic acidosis  Assessment & Plan  Likely secondary to sepsis  Monitor until normalized  Esophageal reflux  Assessment & Plan  Also with mention of Gomez's esophagus  Continue PPI therapy  Also with mention of Esophageal Mass - had been scheduled for EUS via GI, cancelled secondary to family request     Pulmonary nodule  Assessment & Plan  Mention of pulmonary nodules, which appears stable by repeat CT on 03/06/2021  Stage 3b chronic kidney disease  Assessment & Plan  Lab Results   Component Value Date    EGFR 67 03/20/2021    EGFR 43 03/19/2021    EGFR 42 03/18/2021    CREATININE 0 79 03/20/2021    CREATININE 1 13 03/19/2021    CREATININE 1 15 03/18/2021     Baseline creatinine of 0 9, which based on patient's age and weight has a clearance of approximately 40  CKD 3B  SVT (supraventricular tachycardia) (HCC)  Assessment & Plan  Prior history of SVTs - also with mention of Afib, however per last cardiology notes there has been no evidence of this  Remains on Digoxin without toxic levels  Continue BB therapy  VTE Pharmacologic Prophylaxis:   Pharmacologic: Heparin  Mechanical VTE Prophylaxis in Place: Yes     Patient Centered Rounds: I have performed bedside rounds with nursing staff today      Discussions with Specialists or Other Care Team Provider: None     Education and Discussions with Family / Patient: Yes     Time Spent for Care: 20 minutes    More than 50% of total time spent on counseling and coordination of care as described above      Current Length of Stay: 3 day(s)     Current Patient Status: Inpatient   Certification Statement: The patient will continue to require additional inpatient hospital stay due to Or continued evaluation of infection, treatment of sepsis      Discharge Plan: TBD     Code Status: Level 3 - DNAR and DNI    Subjective:   Patient seen and examined - reports symptom improvement in her symptoms, with sepsis that has resolved  Is now afebrile for greater than 24 hours  No overnight events reported  Objective:     Vitals:   Temp (24hrs), Av 7 °F (37 1 °C), Min:98 °F (36 7 °C), Max:99 6 °F (37 6 °C)    Temp:  [98 °F (36 7 °C)-99 6 °F (37 6 °C)] 98 6 °F (37 °C)  HR:  [78-87] 80  Resp:  [18-20] 18  BP: (119-163)/(43-74) 152/52  SpO2:  [96 %-97 %] 97 %  Body mass index is 24 44 kg/m²  Input and Output Summary (last 24 hours): Intake/Output Summary (Last 24 hours) at 3/20/2021 1802  Last data filed at 3/20/2021 1730  Gross per 24 hour   Intake 1660 ml   Output --   Net 1660 ml       Physical Exam:   Constitutional:       General: She is not in acute distress      Appearance: Normal appearance  She is normal weight  She is no longer ill-apearing  She is not toxic-appearing  Cardiovascular:      Rate and Rhythm: Normal rate and regular rhythm       Heart sounds: No murmur  No gallop        Comments: Non tachycardic at time of exam   Pulmonary:      Effort: Pulmonary effort is normal  No respiratory distress       Breath sounds: Normal breath sounds  No wheezing, rhonchi or rales       Comments:  Bibasilar crackles continued  Abdominal:      General: Abdomen is flat  Bowel sounds are normal  There is no distension       Palpations: Abdomen is soft       Tenderness: There is no abdominal tenderness  There is no guarding or rebound       Comments: Bowel sounds not hyperactive    Musculoskeletal:         General: No swelling or tenderness  Skin:     General: Skin is warm and dry  Neurological:      General: No focal deficit present       Mental Status: She is alert and oriented to person, place, and time  Psychiatric:         Mood and Affect: Mood normal          BehaviorOralee Mccall         Thought Content:  Thought content normal          Judgment: Judgment normal      Additional Data:     Labs:    Results from last 7 days   Lab Units 03/20/21  0423  03/18/21  0606   WBC Thousand/uL 5 03   < > 10 18*   HEMOGLOBIN g/dL 10 7*   < > 13 9   HEMATOCRIT % 35 7   < > 44 5   PLATELETS Thousands/uL 206   < > 310   BANDS PCT %  --   --  7   NEUTROS PCT % 76*   < >  --    LYMPHS PCT % 14   < >  --    LYMPHO PCT %  --   --  2*   MONOS PCT % 7   < >  --    MONO PCT %  --   --  2*   EOS PCT % 3   < > 0    < > = values in this interval not displayed  Results from last 7 days   Lab Units 03/20/21  0423  03/18/21  0606   SODIUM mmol/L 136   < > 134*   POTASSIUM mmol/L 4 0   < > 4 2   CHLORIDE mmol/L 106   < > 98*   CO2 mmol/L 23   < > 24   BUN mg/dL 11   < > 19   CREATININE mg/dL 0 79   < > 1 15   ANION GAP mmol/L 7   < > 12   CALCIUM mg/dL 7 7*   < > 9 3   ALBUMIN g/dL  --   --  3 4*   TOTAL BILIRUBIN mg/dL  --   --  0 50   ALK PHOS U/L  --   --  61   ALT U/L  --   --  33   AST U/L  --   --  29   GLUCOSE RANDOM mg/dL 117   < > 176*    < > = values in this interval not displayed  Results from last 7 days   Lab Units 03/18/21  0606   INR  1 03             Results from last 7 days   Lab Units 03/19/21  1517 03/19/21  0422 03/18/21  1553 03/18/21  1225 03/18/21  0848 03/18/21  0606   LACTIC ACID mmol/L 1 3  --  2 5* 2 8* 2 1* 3 6*   PROCALCITONIN ng/ml  --  3 36*  --  3 70*  --  0 28*           * I Have Reviewed All Lab Data Listed Above  * Additional Pertinent Lab Tests Reviewed: All Labs Within Last 24 Hours Reviewed     Recent Cultures (last 7 days):     Results from last 7 days   Lab Units 03/18/21  2222 03/18/21  0606   BLOOD CULTURE   --  No Growth at 48 hrs  No Growth at 48 hrs     C DIFF TOXIN B BY PCR  Negative  --        Last 24 Hours Medication List:   Current Facility-Administered Medications   Medication Dose Route Frequency Provider Last Rate    acetaminophen  650 mg Oral Q6H PRN Barrett Ko MD      aluminum-magnesium hydroxide-simethicone  30 mL Oral Q6H PRN Key Gasca MD      aspirin  81 mg Oral Daily Key Gasca MD      clonazePAM  1 5 mg Oral HS Key Gasca MD      digoxin  125 mcg Oral Daily Key Gasca MD      famotidine  10 mg Oral Daily Key Gasca MD      heparin (porcine)  5,000 Units Subcutaneous [facility] Key Gasca MD      levofloxacin  750 mg Intravenous Q48H Key Gasca  mg (03/20/21 1445)    melatonin  6 mg Oral HS Key Gasca MD      metoprolol tartrate  75 mg Oral Q12H [facility] Key Gasca MD      metroNIDAZOLE  500 mg Intravenous Kym Barger  mg (03/20/21 1348)    ondansetron  4 mg Intravenous Q6H PRN Key Gasca MD      pantoprazole  40 mg Oral Early Morning Key Gasca MD          Today, Patient Was Seen By: Key Gasca MD    ** Please Note: Dictation voice to text software may have been used in the creation of this document   **

## 2021-03-20 NOTE — ASSESSMENT & PLAN NOTE
Sepsis based on findings of fever, tachycardia, and tachypnea with a suspected GI source  Tachycardia resolved, tachypnea improved, and fever curve improving as well with patient afebrile for over 24 hours  CT scan obtained and shows:  · Air in bladder - infection verses instrumentation  · Liquid stool in colon - inflammation versus infection  · Vague bilateral ground-glass lower lobe infiltrates - cannot exclude a pneumonia  Plan as follows:  · Discontinue IV fluids  · Lactic acidosis resolved  · Urinalysis negative for infection  · Blood cultures with no growth X 48 hours  · Procalcitonin 0 28 --> 3 7, 3 3  Repeat pending  · Check fecal leukocytes  Stool for enteric pathogens negative  · Patient was receiving antibiotics as outpatient  C diff also checked and negative  · Blood pressure is soft and patient with initial FIONA - hold CCB and ACE-I   · Continue BB therapy due to history of SVTs, but monitor heart rate and blood pressure  · Initiated on antibiotic coverage with combination of fluoroquinolone and metronidazole, as patient's complaints were mostly GI in nature  However, stool studies negative, and urinalysis negative  Symptoms may be pulmonary in nature  Await repeat procalcitonin

## 2021-03-20 NOTE — ASSESSMENT & PLAN NOTE
Symptom onset of nausea, vomiting, and diarrhea, reportedly ongoing for 1 day  CT with a potential enterocolitis  C diff and stool for enteric pathogens both negative  Fecal leukocytes pending

## 2021-03-20 NOTE — ASSESSMENT & PLAN NOTE
FIONA superimposed on CKD - patient with concurrent hyponatremia and hypochloremia, very likely hypovolemic in nature  Continue fluid resuscitation, monitor renal function, renally dose medications, and avoid nephrotoxins  Continue to hold ACE-I, and avoid hypotension  Creatinine has improved, and FIONA appears resolved

## 2021-03-21 LAB
ANION GAP SERPL CALCULATED.3IONS-SCNC: 8 MMOL/L (ref 4–13)
BASOPHILS # BLD AUTO: 0.03 THOUSANDS/ΜL (ref 0–0.1)
BASOPHILS NFR BLD AUTO: 1 % (ref 0–1)
BUN SERPL-MCNC: 7 MG/DL (ref 5–25)
CALCIUM SERPL-MCNC: 7.6 MG/DL (ref 8.3–10.1)
CHLORIDE SERPL-SCNC: 103 MMOL/L (ref 100–108)
CO2 SERPL-SCNC: 23 MMOL/L (ref 21–32)
CREAT SERPL-MCNC: 0.66 MG/DL (ref 0.6–1.3)
EOSINOPHIL # BLD AUTO: 0.11 THOUSAND/ΜL (ref 0–0.61)
EOSINOPHIL NFR BLD AUTO: 2 % (ref 0–6)
ERYTHROCYTE [DISTWIDTH] IN BLOOD BY AUTOMATED COUNT: 13.9 % (ref 11.6–15.1)
GFR SERPL CREATININE-BSD FRML MDRD: 79 ML/MIN/1.73SQ M
GLUCOSE SERPL-MCNC: 110 MG/DL (ref 65–140)
HCT VFR BLD AUTO: 33.8 % (ref 34.8–46.1)
HGB BLD-MCNC: 10.4 G/DL (ref 11.5–15.4)
IMM GRANULOCYTES # BLD AUTO: 0.02 THOUSAND/UL (ref 0–0.2)
IMM GRANULOCYTES NFR BLD AUTO: 0 % (ref 0–2)
LYMPHOCYTES # BLD AUTO: 0.83 THOUSANDS/ΜL (ref 0.6–4.47)
LYMPHOCYTES NFR BLD AUTO: 17 % (ref 14–44)
MAGNESIUM SERPL-MCNC: 1.8 MG/DL (ref 1.6–2.6)
MCH RBC QN AUTO: 28.5 PG (ref 26.8–34.3)
MCHC RBC AUTO-ENTMCNC: 30.8 G/DL (ref 31.4–37.4)
MCV RBC AUTO: 93 FL (ref 82–98)
MONOCYTES # BLD AUTO: 0.56 THOUSAND/ΜL (ref 0.17–1.22)
MONOCYTES NFR BLD AUTO: 11 % (ref 4–12)
NEUTROPHILS # BLD AUTO: 3.47 THOUSANDS/ΜL (ref 1.85–7.62)
NEUTS SEG NFR BLD AUTO: 69 % (ref 43–75)
NRBC BLD AUTO-RTO: 0 /100 WBCS
PLATELET # BLD AUTO: 200 THOUSANDS/UL (ref 149–390)
PMV BLD AUTO: 10.2 FL (ref 8.9–12.7)
POTASSIUM SERPL-SCNC: 3.9 MMOL/L (ref 3.5–5.3)
PROCALCITONIN SERPL-MCNC: 0.71 NG/ML
RBC # BLD AUTO: 3.65 MILLION/UL (ref 3.81–5.12)
SODIUM SERPL-SCNC: 134 MMOL/L (ref 136–145)
WBC # BLD AUTO: 5.02 THOUSAND/UL (ref 4.31–10.16)

## 2021-03-21 PROCEDURE — 84145 PROCALCITONIN (PCT): CPT | Performed by: INTERNAL MEDICINE

## 2021-03-21 PROCEDURE — 83735 ASSAY OF MAGNESIUM: CPT | Performed by: INTERNAL MEDICINE

## 2021-03-21 PROCEDURE — 80048 BASIC METABOLIC PNL TOTAL CA: CPT | Performed by: INTERNAL MEDICINE

## 2021-03-21 PROCEDURE — 99232 SBSQ HOSP IP/OBS MODERATE 35: CPT | Performed by: INTERNAL MEDICINE

## 2021-03-21 PROCEDURE — 85025 COMPLETE CBC W/AUTO DIFF WBC: CPT | Performed by: INTERNAL MEDICINE

## 2021-03-21 RX ORDER — METRONIDAZOLE 500 MG/1
500 TABLET ORAL EVERY 8 HOURS SCHEDULED
Status: DISCONTINUED | OUTPATIENT
Start: 2021-03-21 | End: 2021-03-22 | Stop reason: HOSPADM

## 2021-03-21 RX ORDER — POTASSIUM CHLORIDE 750 MG/1
10 TABLET, EXTENDED RELEASE ORAL ONCE
Status: COMPLETED | OUTPATIENT
Start: 2021-03-21 | End: 2021-03-21

## 2021-03-21 RX ORDER — LEVOFLOXACIN 750 MG/1
750 TABLET ORAL EVERY OTHER DAY
Status: DISCONTINUED | OUTPATIENT
Start: 2021-03-22 | End: 2021-03-22 | Stop reason: HOSPADM

## 2021-03-21 RX ORDER — AMLODIPINE BESYLATE 5 MG/1
5 TABLET ORAL 2 TIMES DAILY
Status: DISCONTINUED | OUTPATIENT
Start: 2021-03-21 | End: 2021-03-22 | Stop reason: HOSPADM

## 2021-03-21 RX ADMIN — CLONAZEPAM 1.5 MG: 0.5 TABLET ORAL at 21:55

## 2021-03-21 RX ADMIN — METRONIDAZOLE 500 MG: 500 INJECTION, SOLUTION INTRAVENOUS at 13:27

## 2021-03-21 RX ADMIN — METRONIDAZOLE 500 MG: 500 TABLET ORAL at 21:55

## 2021-03-21 RX ADMIN — AMLODIPINE BESYLATE 5 MG: 5 TABLET ORAL at 20:33

## 2021-03-21 RX ADMIN — HEPARIN SODIUM 5000 UNITS: 5000 INJECTION INTRAVENOUS; SUBCUTANEOUS at 05:05

## 2021-03-21 RX ADMIN — MELATONIN TAB 3 MG 6 MG: 3 TAB at 21:55

## 2021-03-21 RX ADMIN — HEPARIN SODIUM 5000 UNITS: 5000 INJECTION INTRAVENOUS; SUBCUTANEOUS at 13:27

## 2021-03-21 RX ADMIN — HEPARIN SODIUM 5000 UNITS: 5000 INJECTION INTRAVENOUS; SUBCUTANEOUS at 21:55

## 2021-03-21 RX ADMIN — METOPROLOL TARTRATE 75 MG: 50 TABLET, FILM COATED ORAL at 11:07

## 2021-03-21 RX ADMIN — PANTOPRAZOLE SODIUM 40 MG: 40 TABLET, DELAYED RELEASE ORAL at 05:02

## 2021-03-21 RX ADMIN — METRONIDAZOLE 500 MG: 500 INJECTION, SOLUTION INTRAVENOUS at 05:02

## 2021-03-21 RX ADMIN — POTASSIUM CHLORIDE 10 MEQ: 750 TABLET, EXTENDED RELEASE ORAL at 08:59

## 2021-03-21 RX ADMIN — MAGNESIUM OXIDE TAB 400 MG (241.3 MG ELEMENTAL MG) 400 MG: 400 (241.3 MG) TAB at 08:59

## 2021-03-21 RX ADMIN — METOPROLOL TARTRATE 75 MG: 50 TABLET, FILM COATED ORAL at 20:28

## 2021-03-21 RX ADMIN — ASPIRIN 81 MG: 81 TABLET, COATED ORAL at 08:59

## 2021-03-21 RX ADMIN — ACETAMINOPHEN 650 MG: 325 TABLET, FILM COATED ORAL at 08:58

## 2021-03-21 RX ADMIN — FAMOTIDINE 10 MG: 20 TABLET, FILM COATED ORAL at 08:59

## 2021-03-21 RX ADMIN — DIGOXIN 125 MCG: 125 TABLET ORAL at 08:59

## 2021-03-21 RX ADMIN — AMLODIPINE BESYLATE 5 MG: 5 TABLET ORAL at 15:01

## 2021-03-21 NOTE — ASSESSMENT & PLAN NOTE
FIONA superimposed on CKD - patient with concurrent hyponatremia and hypochloremia, very likely hypovolemic in nature  Continue fluid resuscitation, monitor renal function, renally dose medications, and avoid nephrotoxins  Continue to hold ACE-I, and avoid hypotension  Creatinine has improved, and FIONA remains resolved

## 2021-03-21 NOTE — ASSESSMENT & PLAN NOTE
Lab Results   Component Value Date    EGFR 79 03/21/2021    EGFR 67 03/20/2021    EGFR 43 03/19/2021    CREATININE 0 66 03/21/2021    CREATININE 0 79 03/20/2021    CREATININE 1 13 03/19/2021     Baseline creatinine of 0 9, which based on patient's age and weight has a clearance of approximately 40  CKD 3B

## 2021-03-21 NOTE — ASSESSMENT & PLAN NOTE
Sepsis based on findings of fever, tachycardia, and tachypnea with a suspected GI source  Sepsis resolved  CT scan obtained and shows:  · Air in bladder - infection verses instrumentation  · Liquid stool in colon - inflammation versus infection  · Vague bilateral ground-glass lower lobe infiltrates - cannot exclude a pneumonia  Plan as follows:  · Discontinue IV fluids  · Lactic acidosis resolved  · Urinalysis negative for infection  · Blood cultures with no growth X 72 hours  · Procalcitonin 0 28 --> 3 7, 3 3, 1 6 on current Abx regimen  · Check fecal leukocytes  Stool for enteric pathogens negative  · Patient was receiving antibiotics as outpatient  C  diff also checked and negative  · Blood pressure has improved and patient with initial FIONA - Continue to hold ACE-I, but resume CCB  · Continue BB therapy due to history of SVTs, but monitor heart rate and blood pressure  · Initiated on antibiotic coverage with combination of fluoroquinolone and metronidazole, as patient's complaints were mostly GI in nature  However, stool studies negative, and urinalysis negative  Symptoms likely pulmonary in nature  Symptoms, sepsis, procalcitonin all improved on current regimen - Continue but change to renally dosed oral Levofloxacin and Metronidazole

## 2021-03-21 NOTE — PROGRESS NOTES
[facility] [facility] [address]  Progress Note - Modesto Weber 9/2/1931, 80 y o  female MRN: [id number]  Unit/Bed#: 403-01 Encounter: [id number]  Primary Care Provider: Viridiana Drummond DO   Date and time admitted to hospital: 3/18/2021  5:54 AM    * sepsis  Assessment & Plan  Sepsis based on findings of fever, tachycardia, and tachypnea with a suspected GI source  Sepsis resolved  CT scan obtained and shows:  · Air in bladder - infection verses instrumentation  · Liquid stool in colon - inflammation versus infection  · Vague bilateral ground-glass lower lobe infiltrates - cannot exclude a pneumonia  Plan as follows:  · Discontinue IV fluids  · Lactic acidosis resolved  · Urinalysis negative for infection  · Blood cultures with no growth X 72 hours  · Procalcitonin 0 28 --> 3 7, 3 3, 1 6 on current Abx regimen  · Check fecal leukocytes  Stool for enteric pathogens negative  · Patient was receiving antibiotics as outpatient  C  diff also checked and negative  · Blood pressure has improved and patient with initial FIONA - Continue to hold ACE-I, but resume CCB  · Continue BB therapy due to history of SVTs, but monitor heart rate and blood pressure  · Initiated on antibiotic coverage with combination of fluoroquinolone and metronidazole, as patient's complaints were mostly GI in nature  However, stool studies negative, and urinalysis negative  Symptoms likely pulmonary in nature  Symptoms, sepsis, procalcitonin all improved on current regimen - Continue but change to renally dosed oral Levofloxacin and Metronidazole  Diarrhea  Assessment & Plan  Symptom onset of nausea, vomiting, and diarrhea, reportedly ongoing for 1 day  CT with a potential enterocolitis  C diff and stool for enteric pathogens both negative  Fecal leukocytes pending  Patient has not had any further nausea or vomiting, but continues to complain of loose stools  Question whether this is antibiotic related  Monitor  FIONA (acute kidney injury) ([facility] [address] )  Assessment & Plan  FIONA superimposed on CKD - patient with concurrent hyponatremia and hypochloremia, very likely hypovolemic in nature  Continue fluid resuscitation, monitor renal function, renally dose medications, and avoid nephrotoxins  Continue to hold ACE-I, and avoid hypotension  Creatinine has improved, and FIONA remains resolved  Lactic acidosis  Assessment & Plan  Secondary to sepsis - lactic acidosis has resolved  Esophageal reflux  Assessment & Plan  Also with mention of Gomez's esophagus  Continue PPI therapy  Also with mention of Esophageal Mass - had been scheduled for EUS via GI, cancelled secondary to family request     Pulmonary nodule  Assessment & Plan  Mention of pulmonary nodules, which appears stable by repeat CT on 03/06/2021  Stage 3b chronic kidney disease  Assessment & Plan  Lab Results   Component Value Date    EGFR 79 03/21/2021    EGFR 67 03/20/2021    EGFR 43 03/19/2021    CREATININE 0 66 03/21/2021    CREATININE 0 79 03/20/2021    CREATININE 1 13 03/19/2021     Baseline creatinine of 0 9, which based on patient's age and weight has a clearance of approximately 40  CKD 3B  SVT (supraventricular tachycardia) (HCC)  Assessment & Plan  Prior history of SVTs - also with mention of Afib, however per last cardiology notes there has been no evidence of this  Remains on Digoxin without toxic levels  Continue BB therapy  VTE Pharmacologic Prophylaxis:   Pharmacologic: Heparin  Mechanical VTE Prophylaxis in Place: Yes     Patient Centered Rounds: I have performed bedside rounds with nursing staff today      Discussions with Specialists or Other Care Team Provider: None     Education and Discussions with Family / Patient: Yes     Time Spent for Care: 20 minutes    More than 50% of total time spent on counseling and coordination of care as described above      Current Length of Stay: 3 day(s)     Current Patient Status: Inpatient   Certification Statement: The patient will continue to require additional inpatient hospital stay due to  treatment of pneumonia, return to [address] [place] [address]       Discharge Plan: TBD     Code Status: Level 3 - DNAR and DNI    Subjective:   Patient seen and examined - reports continued loose stools, but has no other complaints and overall feels better  No overnight events were reported  Remains afebrile  Objective:     Vitals:   Temp (24hrs), Av 2 °F (36 8 °C), Min:98 2 °F (36 8 °C), Max:98 2 °F (36 8 °C)    Temp:  [98 2 °F (36 8 °C)] 98 2 °F (36 8 °C)  HR:  [] 80  Resp:  [18] 18  BP: (138-178)/(55-65) 138/55  SpO2:  [90 %-95 %] 90 %  Body mass index is 24 84 kg/m²  Input and Output Summary (last 24 hours): Intake/Output Summary (Last 24 hours) at 3/21/2021 1410  Last data filed at 3/21/2021 1334  Gross per 24 hour   Intake 760 ml   Output 250 ml   Net 510 ml       Physical Exam:   Constitutional:       General: She is not in acute distress      Appearance: Normal appearance  She is normal weight  She is no longer ill-apearing  She is not toxic-appearing  Cardiovascular:      Rate and Rhythm: Normal rate and regular rhythm       Heart sounds: No murmur  No gallop        Comments: Non tachycardic at time of exam   Pulmonary:      Effort: Pulmonary effort is normal  No respiratory distress       Breath sounds: Normal breath sounds  No wheezing, rhonchi or rales       Comments:  Bibasilar crackles continued but improved  Abdominal:      General: Abdomen is flat  Bowel sounds are normal  There is no distension       Palpations: Abdomen is soft       Tenderness: There is no abdominal tenderness  There is no guarding or rebound       Comments: Bowel sounds not hyperactive    Musculoskeletal:         General: No swelling or tenderness  Skin:     General: Skin is warm and dry     Neurological:      General: No focal deficit present       Mental Status: She is alert and oriented to person, place, and time  Psychiatric:         Mood and Affect: Mood normal          BehaviorAnette Rodriguez         Thought Content: Thought content normal          Judgment: Judgment normal      Additional Data:     Labs:    Results from last 7 days   Lab Units 03/21/21 0432 03/18/21  0606   WBC Thousand/uL 5 02   < > 10 18*   HEMOGLOBIN g/dL 10 4*   < > 13 9   HEMATOCRIT % 33 8*   < > 44 5   PLATELETS Thousands/uL 200   < > 310   BANDS PCT %  --   --  7   NEUTROS PCT % 69   < >  --    LYMPHS PCT % 17   < >  --    LYMPHO PCT %  --   --  2*   MONOS PCT % 11   < >  --    MONO PCT %  --   --  2*   EOS PCT % 2   < > 0    < > = values in this interval not displayed  Results from last 7 days   Lab Units 03/21/21 0432 03/18/21  0606   SODIUM mmol/L 134*   < > 134*   POTASSIUM mmol/L 3 9   < > 4 2   CHLORIDE mmol/L 103   < > 98*   CO2 mmol/L 23   < > 24   BUN mg/dL 7   < > 19   CREATININE mg/dL 0 66   < > 1 15   ANION GAP mmol/L 8   < > 12   CALCIUM mg/dL 7 6*   < > 9 3   ALBUMIN g/dL  --   --  3 4*   TOTAL BILIRUBIN mg/dL  --   --  0 50   ALK PHOS U/L  --   --  61   ALT U/L  --   --  33   AST U/L  --   --  29   GLUCOSE RANDOM mg/dL 110   < > 176*    < > = values in this interval not displayed  Results from last 7 days   Lab Units 03/18/21  0606   INR  1 03             Results from last 7 days   Lab Units 03/20/21  0423 03/19/21  1517 03/19/21  0422 03/18/21  1553 03/18/21  1225 03/18/21  0848 03/18/21  0606   LACTIC ACID mmol/L  --  1 3  --  2 5* 2 8* 2 1* 3 6*   PROCALCITONIN ng/ml 1 60*  --  3 36*  --  3 70*  --  0 28*           * I Have Reviewed All Lab Data Listed Above  * Additional Pertinent Lab Tests Reviewed: All Labs Within Last 24 Hours Reviewed     Recent Cultures (last 7 days):     Results from last 7 days   Lab Units 03/18/21  2222 03/18/21  0606   BLOOD CULTURE   --  No Growth at 72 hrs  No Growth at 72 hrs     C DIFF TOXIN B BY PCR  Negative  --        Last 24 Hours Medication List:   Current Facility-Administered Medications   Medication Dose Route Frequency Provider Last Rate    acetaminophen  650 mg Oral Q6H PRN Renée Mary MD      aluminum-magnesium hydroxide-simethicone  30 mL Oral Q6H PRN Renée Mary MD      amLODIPine  5 mg Oral BID Renée Mary MD      aspirin  81 mg Oral Daily Renée Mary MD      clonazePAM  1 5 mg Oral HS Renée Mary MD      digoxin  125 mcg Oral Daily Renée Mary MD      famotidine  10 mg Oral Daily Renée Mary MD      heparin (porcine)  5,000 Units Subcutaneous Q8H MD Jeanna Hdez ON 3/22/2021] levofloxacin  750 mg Oral Every Other Day Renée Mary MD      melatonin  6 mg Oral HS Renée Mary MD      metoprolol tartrate  75 mg Oral Q12H [address] Renée Mary MD      metroNIDAZOLE  500 mg Oral Q8H Leonor Mariscal MD      ondansetron  4 mg Intravenous Q6H PRN Renée Mary MD      pantoprazole  40 mg Oral Early Morning Renée Mary MD          Today, Patient Was Seen By: Renée Mary MD    ** Please Note: Dictation voice to text software may have been used in the creation of this document   **

## 2021-03-21 NOTE — ASSESSMENT & PLAN NOTE
Symptom onset of nausea, vomiting, and diarrhea, reportedly ongoing for 1 day  CT with a potential enterocolitis  C diff and stool for enteric pathogens both negative  Fecal leukocytes pending  Patient has not had any further nausea or vomiting, but continues to complain of loose stools  Question whether this is antibiotic related  Monitor

## 2021-03-22 VITALS
RESPIRATION RATE: 18 BRPM | WEIGHT: 135.14 LBS | OXYGEN SATURATION: 90 % | HEART RATE: 78 BPM | HEIGHT: 62 IN | BODY MASS INDEX: 24.87 KG/M2 | TEMPERATURE: 100.4 F | DIASTOLIC BLOOD PRESSURE: 57 MMHG | SYSTOLIC BLOOD PRESSURE: 132 MMHG

## 2021-03-22 LAB
ANION GAP SERPL CALCULATED.3IONS-SCNC: 4 MMOL/L (ref 4–13)
BASOPHILS # BLD AUTO: 0.03 THOUSANDS/ΜL (ref 0–0.1)
BASOPHILS NFR BLD AUTO: 1 % (ref 0–1)
BUN SERPL-MCNC: 6 MG/DL (ref 5–25)
CALCIUM SERPL-MCNC: 8.4 MG/DL (ref 8.3–10.1)
CHLORIDE SERPL-SCNC: 104 MMOL/L (ref 100–108)
CO2 SERPL-SCNC: 28 MMOL/L (ref 21–32)
CREAT SERPL-MCNC: 0.72 MG/DL (ref 0.6–1.3)
EOSINOPHIL # BLD AUTO: 0.22 THOUSAND/ΜL (ref 0–0.61)
EOSINOPHIL NFR BLD AUTO: 4 % (ref 0–6)
ERYTHROCYTE [DISTWIDTH] IN BLOOD BY AUTOMATED COUNT: 13.8 % (ref 11.6–15.1)
FLUAV RNA RESP QL NAA+PROBE: NEGATIVE
FLUBV RNA RESP QL NAA+PROBE: NEGATIVE
GFR SERPL CREATININE-BSD FRML MDRD: 74 ML/MIN/1.73SQ M
GLUCOSE SERPL-MCNC: 102 MG/DL (ref 65–140)
GLUCOSE SERPL-MCNC: 91 MG/DL (ref 65–140)
HCT VFR BLD AUTO: 33.5 % (ref 34.8–46.1)
HGB BLD-MCNC: 10.4 G/DL (ref 11.5–15.4)
IMM GRANULOCYTES # BLD AUTO: 0.05 THOUSAND/UL (ref 0–0.2)
IMM GRANULOCYTES NFR BLD AUTO: 1 % (ref 0–2)
LYMPHOCYTES # BLD AUTO: 1.15 THOUSANDS/ΜL (ref 0.6–4.47)
LYMPHOCYTES NFR BLD AUTO: 22 % (ref 14–44)
MAGNESIUM SERPL-MCNC: 1.9 MG/DL (ref 1.6–2.6)
MCH RBC QN AUTO: 28.3 PG (ref 26.8–34.3)
MCHC RBC AUTO-ENTMCNC: 31 G/DL (ref 31.4–37.4)
MCV RBC AUTO: 91 FL (ref 82–98)
MONOCYTES # BLD AUTO: 0.64 THOUSAND/ΜL (ref 0.17–1.22)
MONOCYTES NFR BLD AUTO: 13 % (ref 4–12)
NEUTROPHILS # BLD AUTO: 3.04 THOUSANDS/ΜL (ref 1.85–7.62)
NEUTS SEG NFR BLD AUTO: 59 % (ref 43–75)
NRBC BLD AUTO-RTO: 0 /100 WBCS
PLATELET # BLD AUTO: 221 THOUSANDS/UL (ref 149–390)
PMV BLD AUTO: 9.8 FL (ref 8.9–12.7)
POTASSIUM SERPL-SCNC: 3.6 MMOL/L (ref 3.5–5.3)
RBC # BLD AUTO: 3.67 MILLION/UL (ref 3.81–5.12)
RSV RNA RESP QL NAA+PROBE: NEGATIVE
SARS-COV-2 RNA RESP QL NAA+PROBE: NEGATIVE
SODIUM SERPL-SCNC: 136 MMOL/L (ref 136–145)
WBC # BLD AUTO: 5.13 THOUSAND/UL (ref 4.31–10.16)

## 2021-03-22 PROCEDURE — 94760 N-INVAS EAR/PLS OXIMETRY 1: CPT

## 2021-03-22 PROCEDURE — 83735 ASSAY OF MAGNESIUM: CPT | Performed by: INTERNAL MEDICINE

## 2021-03-22 PROCEDURE — 80048 BASIC METABOLIC PNL TOTAL CA: CPT | Performed by: INTERNAL MEDICINE

## 2021-03-22 PROCEDURE — 97110 THERAPEUTIC EXERCISES: CPT

## 2021-03-22 PROCEDURE — 92610 EVALUATE SWALLOWING FUNCTION: CPT

## 2021-03-22 PROCEDURE — 0241U HB NFCT DS VIR RESP RNA 4 TRGT: CPT | Performed by: FAMILY MEDICINE

## 2021-03-22 PROCEDURE — 99239 HOSP IP/OBS DSCHRG MGMT >30: CPT | Performed by: FAMILY MEDICINE

## 2021-03-22 PROCEDURE — 94761 N-INVAS EAR/PLS OXIMETRY MLT: CPT

## 2021-03-22 PROCEDURE — 85025 COMPLETE CBC W/AUTO DIFF WBC: CPT | Performed by: INTERNAL MEDICINE

## 2021-03-22 PROCEDURE — 82948 REAGENT STRIP/BLOOD GLUCOSE: CPT

## 2021-03-22 RX ORDER — POLYETHYLENE GLYCOL 3350 17 G/17G
17 POWDER ORAL DAILY PRN
Qty: 510 G | Refills: 0 | Status: SHIPPED | OUTPATIENT
Start: 2021-03-22 | End: 2021-05-24

## 2021-03-22 RX ORDER — LEVOFLOXACIN 750 MG/1
750 TABLET ORAL EVERY OTHER DAY
Qty: 2 TABLET | Refills: 0 | Status: SHIPPED | OUTPATIENT
Start: 2021-03-24 | End: 2021-03-28

## 2021-03-22 RX ORDER — OMEPRAZOLE 20 MG/1
20 TABLET, DELAYED RELEASE ORAL DAILY
Qty: 30 TABLET | Refills: 0 | Status: SHIPPED | OUTPATIENT
Start: 2021-03-22 | End: 2021-04-13 | Stop reason: SDUPTHER

## 2021-03-22 RX ORDER — LACTOBACILLUS ACIDOPHILUS / LACTOBACILLUS BULGARICUS 100 MILLION CFU STRENGTH
1 GRANULES ORAL
Qty: 90 PACKET | Refills: 0 | Status: SHIPPED | OUTPATIENT
Start: 2021-03-22

## 2021-03-22 RX ORDER — METRONIDAZOLE 500 MG/1
500 TABLET ORAL EVERY 8 HOURS SCHEDULED
Qty: 12 TABLET | Refills: 0 | Status: SHIPPED | OUTPATIENT
Start: 2021-03-22 | End: 2021-03-26

## 2021-03-22 RX ORDER — SACCHAROMYCES BOULARDII 250 MG
250 CAPSULE ORAL 2 TIMES DAILY
Qty: 60 CAPSULE | Refills: 0 | Status: SHIPPED | OUTPATIENT
Start: 2021-03-22 | End: 2021-03-22 | Stop reason: HOSPADM

## 2021-03-22 RX ADMIN — HEPARIN SODIUM 5000 UNITS: 5000 INJECTION INTRAVENOUS; SUBCUTANEOUS at 05:57

## 2021-03-22 RX ADMIN — AMLODIPINE BESYLATE 5 MG: 5 TABLET ORAL at 08:31

## 2021-03-22 RX ADMIN — METOPROLOL TARTRATE 75 MG: 50 TABLET, FILM COATED ORAL at 08:32

## 2021-03-22 RX ADMIN — HEPARIN SODIUM 5000 UNITS: 5000 INJECTION INTRAVENOUS; SUBCUTANEOUS at 14:57

## 2021-03-22 RX ADMIN — LEVOFLOXACIN 750 MG: 750 TABLET, FILM COATED ORAL at 08:32

## 2021-03-22 RX ADMIN — ASPIRIN 81 MG: 81 TABLET, COATED ORAL at 08:31

## 2021-03-22 RX ADMIN — METRONIDAZOLE 500 MG: 500 TABLET ORAL at 14:57

## 2021-03-22 RX ADMIN — DIGOXIN 125 MCG: 125 TABLET ORAL at 08:31

## 2021-03-22 RX ADMIN — METRONIDAZOLE 500 MG: 500 TABLET ORAL at 05:57

## 2021-03-22 RX ADMIN — PANTOPRAZOLE SODIUM 40 MG: 40 TABLET, DELAYED RELEASE ORAL at 05:57

## 2021-03-22 RX ADMIN — FAMOTIDINE 10 MG: 20 TABLET, FILM COATED ORAL at 08:39

## 2021-03-22 NOTE — CASE MANAGEMENT
Pt is being discharged today   Pt is returning to [facility] Brad Olvera will pick the patient up at 4:15pm  I notified Merrill Griffin and patient's son Kate Lopez of transport time   I in basket messaged patient's PCP to call for a follow up appt  A copy of AVS sent with patient

## 2021-03-22 NOTE — SPEECH THERAPY NOTE
Speech-Language Pathology Bedside Swallow Evaluation    Patient Name: Raffaele Mitchell    QKPDQ'V Date: 3/22/2021     Problem List  Principal Problem:    sepsis  Active Problems:    Esophageal reflux    SVT (supraventricular tachycardia) (HCC)    Diarrhea    FIONA (acute kidney injury) ([facility] [address] )    Stage 3b chronic kidney disease    Lactic acidosis    Pulmonary nodule      Past Medical History  Past Medical History:   Diagnosis Date    Abdominal distension     Last Assessed: [id number]    Abnormal weight loss     Last Assessed: [id number]    Arthritis     Bronchitis     Bursitis of left hip     Lst Assessed: [id number]    Chest pain     Last Assessed: [id number]    Colon polyp     Dysuria     Last Assessed: [id number]    External hemorrhoids     Last Assessed: [id number]    Generalized anxiety disorder     GERD (gastroesophageal reflux disease)     History of echocardiogram 03/20/2018    EF 60%, mild to moderate mitral annular calcification  mild AS, mild TR   Hyperlipidemia     Hypertension     Hypomagnesemia     Last Assessed: [id number]    Lyme disease     Last Assessed: [id number]    Multiple renal cysts 7/5/2019    Nonrheumatic mitral valve regurgitation     Osteoporosis     Pneumonia of right middle lobe due to infectious organism     Last Assessed: [id number]    SVT (supraventricular tachycardia)     Urinary tract infection        Past Surgical History  Past Surgical History:   Procedure Laterality Date    CATARACT EXTRACTION      HEMORRHOID SURGERY      HYSTERECTOMY      PELVIC FLOOR REPAIR         Summary  Pt presented with functional appearing oral and pharyngeal stage swallowing skills with materials administered today  She reported occasional pain in the roof of her mouth when she starts eating (questioning thrush, although she feels it has improved), however no pain with swallowing, and no s/s aspiration noted with PO trials       Risk/s for Aspiration: suspect low    Recommended Diet: regular diet and thin liquids   Recommended Form of Meds: whole with liquid   Aspiration precautions and swallowing strategies: upright posture      Current Medical Status per H&P 3/18/21  Ariadna andujar 44-year-old woman nursing home resident at [address] [place] [address], being admitted from [facility] ED with a diagnosis of sepsis  Mrs Arnulfo Roper has a past medical history significant for GERD with mention of Gomez's esophagus  She was previously noted to have an esophageal mass, with tentative plans for an EUS via GI canceled secondary to family's request   She also has a history of SVTs chronically maintained on digoxin, chronic constipation, HTN and dyslipidemia, OA, and GALE  There is also mention of bilateral carotid artery stenosis  She was noted to have onset of nausea and vomiting, along with diarrhea, which she reports as starting on the day prior to admission  She was reportedly week with ambulation while at the nursing facility, and was sent in to the ED for further evaluation  Upon presentation the patient was found to be febrile, tachycardic, and tachypneic, with signs of dehydration by labs  Her CXR was unremarkable, but a CT of the abdomen and pelvis without contrast showed evidence of an enterocolitis  Of note, she was also thought to have air in the bladder that either represented infection or instrumentation, as well as vague ground-glass bilateral lower lobe infiltrates, unable to exclude pneumonia  She was also noted to be hypoxic at presentation, requiring supplemental oxygen  COVID testing was negative, but the patient's lactic acid levels were elevated  She was at that time referred for admission for further evaluation treatment      Current Precautions:  Fall, Aspiration    Special Studies:  CXR 3/18/21 IMPRESSION:  No acute cardiopulmonary disease      Social/Education/Vocational Hx:  Pt lives in SNF/ECF    Swallow Information   Current Diet: regular diet and thin liquids   Baseline Diet: regular diet and thin liquids    Baseline Assessment   Behavior/Cognition: alert  Speech/Language Status: able to participate in conversation and able to follow commands  Patient Positioning: upright in chair  Pain Status/Interventions/Response to Interventions: No report of or nonverbal indications of pain  Swallow Mechanism Exam  Facial: symmetrical  Labial: WFL  Lingual: WFL  Velum: symmetrical  Mandible: adequate ROM  Dentition: adequate  Vocal quality:clear/adequate   Volitional Cough: adequate   Respiratory Status: on RA     Consistencies Assessed and Performance   Consistencies Administered: thin liquids, puree and hard solids    Oral Stage: WFL  Mastication was slow but adequate with the materials administered today  Bolus formation and transfer were functional with no significant oral residue noted  No overt s/s reduced oral control  Pharyngeal Stage: WFL  Swallow Mechanics: Swallowing initiation appeared prompt  Laryngeal rise was palpated and judged to be within functional limits  No coughing, throat clearing, change in vocal quality or respiratory status noted today  Esophageal Concerns: hx of Gomez's esophagus and esophageal mass    Summary and Recommendations (see above)    Results Reviewed with: patient and RN     Treatment Recommended: no additional ST f/u needed at this time

## 2021-03-22 NOTE — RESPIRATORY THERAPY NOTE
Home Oxygen Qualifying Test       Patient name: Veronique Bose        : 1931   Date of Test:  2021  Diagnosis:      Home Oxygen Test:    **Medicare Guidelines require item(s) 1-5 on all ambulatory patients or 1 and 2 on non-ambulatory patients  1   Baseline SPO2 on Room Air at rest 92 %  2   SPO2 during exercise on Room Air 90 %  During exercise monitor SpO2  If SPO2 increases >=89% with ambulation do not add supplemental             oxygen  If <= 88% on room air add O2 via NC and titrate patient  Patient must be ambulated with O2 and titrated to > 88% with exertion  3   SPO2 on Oxygen at rest na % na lpm     4   SPO2 during exercise on Oxygen  na% a liter flow of na lpm     5   Exercise performed:          walking, distance 150 (feet)          []  Supplemental Home Oxygen is indicated  [x]  Client does not qualify for home oxygen        Respiratory Additional Notes- ivan Valladares, RT

## 2021-03-22 NOTE — DISCHARGE SUMMARY
Discharge Summary - Vandana Pérez 80 y o  female MRN: [id number]    Unit/Bed#: 403-01 Encounter: [id number]    Admission Date:   Admission Orders (From admission, onward)     Ordered        03/18/21 0800  Inpatient Admission  Once                     Admitting Diagnosis: Dehydration [E86 0]  Enterocolitis [K52 9]  Lactic acidosis [E87 2]  Vomiting [R11 10]  Nausea & vomiting [R11 2]  Hypoxia [R09 02]    HPI: Ariadna andujar 80-year-old woman nursing home resident at [address] [place] [address], being admitted from [facility] ED with a diagnosis of sepsis      Mrs Ellie Contreras has a past medical history significant for GERD with mention of Gomez's esophagus  She was previously noted to have an esophageal mass, with tentative plans for an EUS via GI canceled secondary to family's request   She also has a history of SVTs chronically maintained on digoxin, chronic constipation, HTN and dyslipidemia, OA, and GALE  There is also mention of bilateral carotid artery stenosis  She was noted to have onset of nausea and vomiting, along with diarrhea, which she reports as starting on the day prior to admission  She was reportedly week with ambulation while at the nursing facility, and was sent in to the ED for further evaluation  Upon presentation the patient was found to be febrile, tachycardic, and tachypneic, with signs of dehydration by labs  Her CXR was unremarkable, but a CT of the abdomen and pelvis without contrast showed evidence of an enterocolitis  Of note, she was also thought to have air in the bladder that either represented infection or instrumentation, as well as vague ground-glass bilateral lower lobe infiltrates, unable to exclude pneumonia  She was also noted to be hypoxic at presentation, requiring supplemental oxygen  COVID testing was negative, but the patient's lactic acid levels were elevated    She was at that time referred for admission for further evaluation treatment  Procedures Performed:   Orders Placed This Encounter   Procedures   [facility] Only       Summary of Hospital Course:     Sepsis present on admission secondary to    Healthcare associated pneumonia (possible gram-negative pneumonia)    Diarrhea presumed infectious    Acute kidney injury      The patient presented to the emergency room diarrhea and cough  CT of the abdomen pelvis was obtained and is discussed below  She had an elevated lactic acid and was initiated on IV fluids with resolution of lactic acidosis  Given possible abdominal and pulmonary source antibiotics were initiated in the form of Levaquin/Flagyl  Renal function slowly improved and normalized  Procalcitonin was trended and was also improving  Bowel movements began to form  The patient was weaned off supplemental oxygen  She was ultimately transitioned to oral Levaquin and Flagyl monitored for 24 hours  An ambulatory desaturation trial revealed she does not require supplemental oxygen  Patient will be discharged follow-up with her PCP as an outpatient    Significant Findings, Care, Treatment and Services Provided:     CT    1   Severely limited examination due to lack of intravenous and oral contrast material as well as patient motion artifact throughout the entire examination  2   Air present in the urinary bladder   Although the findings may be related to recent instrumentation, gas-forming urinary tract infection   Also in the differential, however less likely would be enterovesical fistula   Clinical correlation   recommended  3   Fluid-filled large and small bowel, most compatible with enterocolitis, either infectious or inflammatory  4   Bilateral lower lobe infiltrates, likely related to respiratory motion artifact   Given the history of hypoxia, bilateral lower lobe pneumonia not excluded   Recommend dedicated chest radiographs for further evaluation       Complications: none    Discharge Diagnosis: see above    Resolved Problems  Date Reviewed: 3/21/2021    None          Condition at Discharge: fair         Discharge instructions/Information to patient and family:   See after visit summary for information provided to patient and family  Provisions for Follow-Up Care:  See after visit summary for information related to follow-up care and any pertinent home health orders  PCP: Vanesa Cummings DO    Disposition: [place]    Planned Readmission: No      Discharge Statement   I spent 45 minutes discharging the patient  This time was spent on the day of discharge  I had direct contact with the patient on the day of discharge  Additional documentation is required if more than 30 minutes were spent on discharge  Discharge Medications:  See after visit summary for reconciled discharge medications provided to patient and family

## 2021-03-22 NOTE — PLAN OF CARE
Problem: PHYSICAL THERAPY ADULT  Goal: Performs mobility at highest level of function for planned discharge setting  See evaluation for individualized goals  Description: Treatment/Interventions: Functional transfer training, LE strengthening/ROM, Therapeutic exercise, Endurance training, Bed mobility, Gait training          See flowsheet documentation for full assessment, interventions and recommendations  Outcome: Progressing  Note: Prognosis: Good  Problem List: Decreased strength, Decreased endurance, Impaired balance, Decreased mobility, Decreased cognition, Impaired judgement  Assessment: Pt  seen for PT treatment session this date with interventions consisting of  therapeutic exercises to improve strength and function The patient's [facility] Basic Mobility Inpatient Short Form Raw Score is 22, Standardized Score is 47 4  A standardized score greater than 42 9 suggests the patient may benefit from discharge to home  Please also refer to physical therapy recommendation for safe DC planning  In comparison to previous session, Pt  With improvements in activity tolerance  Pt is in need of continued activity in PT to improve strength balance endurance mobility transfers and ambulation with return to maximize LOF  From PT/mobility standpoint, recommendation at time of d/c would be return to [facility] with no rehab needs in order to promote return to PLOF and independence  PT Discharge Recommendation: Home with skilled therapy(OP PT at [address] [place] [address])          See flowsheet documentation for full assessment

## 2021-03-22 NOTE — PHYSICAL THERAPY NOTE
PHYSICAL THERAPY NOTE          Patient Name: Rene Leslie  EWPXW'O Date: 3/22/2021     03/22/21 1103   Note Type   Note Type Treatment   Restrictions/Precautions   Weight Bearing Precautions Per Order No   Other Precautions Chair Alarm; Fall Risk   Subjective   Subjective I already walked this morning but I'll exercise  Exercises   Hip Flexion Sitting;15 reps   Hip Abduction Sitting;15 reps   Hip Adduction Sitting;15 reps   Knee AROM Long Arc Quad Sitting;15 reps   Ankle Pumps Sitting;15 reps   Assessment   Prognosis Good   Problem List Decreased strength;Decreased endurance; Impaired balance;Decreased mobility; Decreased cognition; Impaired judgement   Assessment Pt  seen for PT treatment session this date with interventions consisting of  therapeutic exercises to improve strength and function The patient's [facility] Basic Mobility Inpatient Short Form Raw Score is 22, Standardized Score is 47 4  A standardized score greater than 42 9 suggests the patient may benefit from discharge to home  Please also refer to physical therapy recommendation for safe DC planning  In comparison to previous session, Pt  With improvements in activity tolerance  Pt is in need of continued activity in PT to improve strength balance endurance mobility transfers and ambulation with return to maximize LOF  From PT/mobility standpoint, recommendation at time of d/c would be return to [facility] with no rehab needs in order to promote return to PLOF and independence  Plan   Treatment/Interventions Functional transfer training;LE strengthening/ROM; Therapeutic exercise; Endurance training;Bed mobility;Gait training   Progress Progressing toward goals   [facility] Basic Mobility Inpatient   Turning in Bed Without Bedrails 4   Lying on Back to Sitting on Edge of Flat Bed 4   Moving Bed to Chair 4   Standing Up From Chair 4   Walk in Room 4   Climb 3-5 Stairs 2   Basic Mobility Inpatient Raw Score 22   Basic Mobility Standardized Score 47 4   Pt  OOB in chair  with call bell within reach, all lines intact and alarm on at end of PT session  Discussed with  PT today's treatment and patient's current level of function for care coordination

## 2021-03-22 NOTE — PLAN OF CARE
Problem: Potential for Falls  Goal: Patient will remain free of falls  Description: INTERVENTIONS:  - Assess patient frequently for physical needs  -  Identify cognitive and physical deficits and behaviors that affect risk of falls    -  [place] fall precautions as indicated by assessment   - Educate patient/family on patient safety including physical limitations  - Instruct patient to call for assistance with activity based on assessment  - Modify environment to reduce risk of injury  - Consider OT/PT consult to assist with strengthening/mobility  Outcome: Progressing     Problem: Prexisting or High Potential for Compromised Skin Integrity  Goal: Skin integrity is maintained or improved  Description: INTERVENTIONS:  - Identify patients at risk for skin breakdown  - Assess and monitor skin integrity  - Assess and monitor nutrition and hydration status  - Monitor labs   - Assess for incontinence   - Turn and reposition patient  - Assist with mobility/ambulation  - Relieve pressure over bony prominences  - Avoid friction and shearing  - Provide appropriate hygiene as needed including keeping skin clean and dry  - Evaluate need for skin moisturizer/barrier cream  - Collaborate with interdisciplinary team   - Patient/family teaching  - Consider wound care consult   Outcome: Progressing     Problem: PAIN - ADULT  Goal: Verbalizes/displays adequate comfort level or baseline comfort level  Description: Interventions:  - Encourage patient to monitor pain and request assistance  - Assess pain using appropriate pain scale  - Administer analgesics based on type and severity of pain and evaluate response  - Implement non-pharmacological measures as appropriate and evaluate response  - Consider cultural and social influences on pain and pain management  - Notify physician/advanced practitioner if interventions unsuccessful or patient reports new pain  Outcome: Progressing     Problem: INFECTION - ADULT  Goal: Absence or prevention of progression during hospitalization  Description: INTERVENTIONS:  - Assess and monitor for signs and symptoms of infection  - Monitor lab/diagnostic results  - Monitor all insertion sites, i e  indwelling lines, tubes, and drains  - Monitor endotracheal if appropriate and nasal secretions for changes in amount and color  - [place] appropriate cooling/warming therapies per order  - Administer medications as ordered  - Instruct and encourage patient and family to use good hand hygiene technique  - Identify and instruct in appropriate isolation precautions for identified infection/condition  Outcome: Progressing     Problem: SAFETY ADULT  Goal: Patient will remain free of falls  Description: INTERVENTIONS:  - Assess patient frequently for physical needs  -  Identify cognitive and physical deficits and behaviors that affect risk of falls    -  [place] fall precautions as indicated by assessment   - Educate patient/family on patient safety including physical limitations  - Instruct patient to call for assistance with activity based on assessment  - Modify environment to reduce risk of injury  - Consider OT/PT consult to assist with strengthening/mobility  Outcome: Progressing  Goal: Maintain or return to baseline ADL function  Description: INTERVENTIONS:  -  Assess patient's ability to carry out ADLs; assess patient's baseline for ADL function and identify physical deficits which impact ability to perform ADLs (bathing, care of mouth/teeth, toileting, grooming, dressing, etc )  - Assess/evaluate cause of self-care deficits   - Assess range of motion  - Assess patient's mobility; develop plan if impaired  - Assess patient's need for assistive devices and provide as appropriate  - Encourage maximum independence but intervene and supervise when necessary  - Involve family in performance of ADLs  - Assess for home care needs following discharge   - Consider OT consult to assist with ADL evaluation and planning for discharge  - Provide patient education as appropriate  Outcome: Progressing  Goal: Maintain or return mobility status to optimal level  Description: INTERVENTIONS:  - Assess patient's baseline mobility status (ambulation, transfers, stairs, etc )    - Identify cognitive and physical deficits and behaviors that affect mobility  - Identify mobility aids required to assist with transfers and/or ambulation (gait belt, sit-to-stand, lift, walker, cane, etc )  - [place] fall precautions as indicated by assessment  - Record patient progress and toleration of activity level on Mobility SBAR; progress patient to next Phase/Stage  - Instruct patient to call for assistance with activity based on assessment  - Consider rehabilitation consult to assist with strengthening/weightbearing, etc   Outcome: Progressing     Problem: DISCHARGE PLANNING  Goal: Discharge to home or other facility with appropriate resources  Description: INTERVENTIONS:  - Identify barriers to discharge w/patient and caregiver  - Arrange for needed discharge resources and transportation as appropriate  - Identify discharge learning needs (meds, wound care, etc )  - Arrange for interpretive services to assist at discharge as needed  - Refer to Case Management Department for coordinating discharge planning if the patient needs post-hospital services based on physician/advanced practitioner order or complex needs related to functional status, cognitive ability, or social support system  Outcome: Progressing     Problem: Knowledge Deficit  Goal: Patient/family/caregiver demonstrates understanding of disease process, treatment plan, medications, and discharge instructions  Description: Complete learning assessment and assess knowledge base    Interventions:  - Provide teaching at level of understanding  - Provide teaching via preferred learning methods  Outcome: Progressing

## 2021-03-23 LAB
BACTERIA BLD CULT: NORMAL
BACTERIA BLD CULT: NORMAL

## 2021-03-25 ENCOUNTER — TELEPHONE (OUTPATIENT)
Dept: INTERNAL MEDICINE CLINIC | Facility: CLINIC | Age: 86
End: 2021-03-25

## 2021-03-25 NOTE — TELEPHONE ENCOUNTER
----- Message from Louise Bowling sent at 3/24/2021  9:06 AM EDT -----  Regarding: FW: follow up appt    ----- Message -----  From: Fredo Leung RN  Sent: 3/22/2021   4:49 PM EDT  To: [address] [facility]  Subject: follow up appt                                   Pt is being discharged today from [facility]  Please call  the patient to schedule a follow up appointment  Pt is returning to [address] [place] [address]                                                Thanks                                                                                Case Management

## 2021-03-29 DIAGNOSIS — I16.0 HYPERTENSIVE URGENCY: ICD-10-CM

## 2021-03-29 RX ORDER — AMLODIPINE BESYLATE 5 MG/1
TABLET ORAL
Qty: 60 TABLET | Refills: 0 | Status: SHIPPED | OUTPATIENT
Start: 2021-03-29 | End: 2021-05-10

## 2021-03-30 NOTE — TELEPHONE ENCOUNTER
Pt cannot come until 4-21-21 w dr Vickie Rose family cannot bring her in until then   This is going to be a hospital f/u appt-ov long (we were unable to sched neymar appt) thanks

## 2021-04-07 DIAGNOSIS — B37.9 YEAST INFECTION: Primary | ICD-10-CM

## 2021-04-07 DIAGNOSIS — R30.0 DYSURIA: ICD-10-CM

## 2021-04-07 RX ORDER — FLUCONAZOLE 150 MG/1
150 TABLET ORAL DAILY
Qty: 3 TABLET | Refills: 0 | Status: SHIPPED | OUTPATIENT
Start: 2021-04-07 | End: 2021-04-10

## 2021-04-09 DIAGNOSIS — N30.00 ACUTE CYSTITIS WITHOUT HEMATURIA: Primary | ICD-10-CM

## 2021-04-09 RX ORDER — SULFAMETHOXAZOLE AND TRIMETHOPRIM 800; 160 MG/1; MG/1
1 TABLET ORAL EVERY 12 HOURS SCHEDULED
Qty: 14 TABLET | Refills: 0 | Status: SHIPPED | OUTPATIENT
Start: 2021-04-09 | End: 2021-04-16

## 2021-04-13 DIAGNOSIS — K22.70 BARRETT'S ESOPHAGUS WITHOUT DYSPLASIA: ICD-10-CM

## 2021-04-13 DIAGNOSIS — I48.0 PAROXYSMAL ATRIAL FIBRILLATION (HCC): ICD-10-CM

## 2021-04-13 RX ORDER — OMEPRAZOLE 20 MG/1
20 TABLET, DELAYED RELEASE ORAL DAILY
Qty: 30 TABLET | Refills: 5 | Status: SHIPPED | OUTPATIENT
Start: 2021-04-13 | End: 2021-01-01

## 2021-04-13 RX ORDER — DIGOXIN 125 MCG
TABLET ORAL
Qty: 30 TABLET | Refills: 5 | Status: SHIPPED | OUTPATIENT
Start: 2021-04-13 | End: 2021-01-01

## 2021-04-21 ENCOUNTER — OFFICE VISIT (OUTPATIENT)
Dept: INTERNAL MEDICINE CLINIC | Facility: CLINIC | Age: 86
End: 2021-04-21
Payer: MEDICARE

## 2021-04-21 ENCOUNTER — TELEPHONE (OUTPATIENT)
Dept: INTERNAL MEDICINE CLINIC | Facility: CLINIC | Age: 86
End: 2021-04-21

## 2021-04-21 VITALS
DIASTOLIC BLOOD PRESSURE: 58 MMHG | RESPIRATION RATE: 14 BRPM | OXYGEN SATURATION: 93 % | WEIGHT: 124.4 LBS | BODY MASS INDEX: 22.89 KG/M2 | TEMPERATURE: 99.2 F | HEIGHT: 62 IN | HEART RATE: 72 BPM | SYSTOLIC BLOOD PRESSURE: 142 MMHG

## 2021-04-21 DIAGNOSIS — H90.3 SENSORINEURAL HEARING LOSS (SNHL) OF BOTH EARS: ICD-10-CM

## 2021-04-21 DIAGNOSIS — H61.22 IMPACTED CERUMEN OF LEFT EAR: ICD-10-CM

## 2021-04-21 DIAGNOSIS — R33.9 URINARY RETENTION: ICD-10-CM

## 2021-04-21 DIAGNOSIS — R63.4 WEIGHT LOSS: Primary | ICD-10-CM

## 2021-04-21 PROCEDURE — 69209 REMOVE IMPACTED EAR WAX UNI: CPT | Performed by: INTERNAL MEDICINE

## 2021-04-21 PROCEDURE — 99214 OFFICE O/P EST MOD 30 MIN: CPT | Performed by: INTERNAL MEDICINE

## 2021-04-21 RX ORDER — LACTOSE-REDUCED FOOD
237 LIQUID (ML) ORAL DAILY
Qty: 7110 ML | Refills: 5 | Status: SHIPPED | OUTPATIENT
Start: 2021-04-21 | End: 2022-01-01

## 2021-04-21 NOTE — PROGRESS NOTES
Assessment/Plan:  The patient notes difficulty starting a stream in the AM with improving symptoms as the day goes on  The patient states the UTI has resolved  The patient notes that there are no other concerns at this time  Follow up on weight loss and see how things go  Problem List Items Addressed This Visit     None      Visit Diagnoses     Weight loss    -  Primary    Relevant Medications    Nutritional Supplements (Ensure Complete) LIQD    Urinary retention        Relevant Orders    US pelvis complete non OB    Sensorineural hearing loss (SNHL) of both ears               Diagnoses and all orders for this visit:    Weight loss  -     Nutritional Supplements (Ensure Complete) LIQD; Take 1 Can by mouth daily    Urinary retention  -     US pelvis complete non OB; Future    Sensorineural hearing loss (SNHL) of both ears        No problem-specific Assessment & Plan notes found for this encounter  Subjective: hearing loss and urinary retention  Patient ID: Michelle Thakur is a 80 y o  female  The patient has issues with left hearing loss and we will follow up with lavage  She has a resolving UTI with urinary retention  The patient state that this is worse in the AM and improves as the day goes on  Weight loss noted and we will increase her caloric intake  The patient notes debility, but is s/p admission for pneumonia        The following portions of the patient's history were reviewed and updated as appropriate:   She has a past medical history of Abdominal distension, Abnormal weight loss, Arthritis, Bronchitis, Bursitis of left hip, Chest pain, Colon polyp, Dysuria, External hemorrhoids, Generalized anxiety disorder, GERD (gastroesophageal reflux disease), History of echocardiogram (03/20/2018), Hyperlipidemia, Hypertension, Hypomagnesemia, Lyme disease, Multiple renal cysts (7/5/2019), Nonrheumatic mitral valve regurgitation, Osteoporosis, Pneumonia of right middle lobe due to infectious organism, SVT (supraventricular tachycardia), and Urinary tract infection  ,  does not have any pertinent problems on file  ,   has a past surgical history that includes Cataract extraction; Hemorrhoid surgery; Hysterectomy; and Pelvic floor repair  ,  family history is not on file  She was adopted  ,   reports that she has never smoked  She has never used smokeless tobacco  She reports that she does not drink alcohol or use drugs  ,  is allergic to codeine; epinephrine; iodinated diagnostic agents; iodine - food allergy; and magnesium citrate     Current Outpatient Medications   Medication Sig Dispense Refill    acetaminophen (TYLENOL) 325 mg tablet Take 2 tablets (650 mg total) by mouth every 6 (six) hours as needed for mild pain, headaches or fever  0    amLODIPine (NORVASC) 5 mg tablet TAKE ONE TABLET BY MOUTH TWICE A DAY *HOLD FOR SBP<110* (HTN) 60 tablet 0    aspirin (ECOTRIN LOW STRENGTH) 81 mg EC tablet TAKE ONE TABLET BY MOUTH ONCE DAILY (STROKE PREVENTION) 30 tablet 4    calcium carbonate (OYSTER SHELL,OSCAL) 500 mg TAKE ONE TABLET BY MOUTH TWICE DAILY (SUPPLEMENT) 60 each 5    clonazePAM (KlonoPIN) 0 5 mg tablet TAKE THREE TABLETS (1 5MG) BY MOUTH AT BEDTIME (ANXIETY) 90 tablet 4    D3-1000 25 MCG (1000 UT) capsule TAKE ONE CAPSULE BY MOUTH ONCE DAILY (VITAMIN D DEFICIENCY) 30 capsule 5    digoxin (LANOXIN) 0 125 mg tablet TAKE ONE TABLET BY MOUTH ONCE DAILY (AFIB) 30 tablet 5    famotidine (PEPCID) 20 mg tablet TAKE ONE TABLET BY MOUTH DAILY AT BEDTIME (GERD) 30 tablet 2    IBU-200 200 MG tablet TAKE ONE TABLET BY MOUTH ONCE DAILY AS NEEDED FOR MIGRAINES/HEADACHE 50 tablet 0    lisinopril (ZESTRIL) 10 mg tablet TAKE ONE TABLET BY MOUTH ONCE DAILY (HTN) 30 tablet 5    magnesium hydroxide (MILK OF MAGNESIA) 800 MG/5ML suspension Take 30 mL by mouth daily as needed for constipation      Melatonin 5 MG TABS Take 1 tablet (5 mg total) by mouth daily at bedtime 30 tablet 5    metoprolol tartrate (LOPRESSOR) 25 mg tablet TAKE THREE TABLETS (75MG) BY MOUTH EVERY 12 HOURS  HOLD FOR HR <60 OR SBP <110 (HTN)**TAKE W/ FOOD/SNACK** 180 tablet 2    Omega-3 Fatty Acids (fish oil) 1,000 mg TAKE ONE CAPSULE BY MOUTH ONCE DAILY (SUPPLEMENT) 30 each 5    omeprazole (PriLOSEC OTC) 20 MG tablet Take 1 tablet (20 mg total) by mouth daily 30 tablet 5    polyethylene glycol (GLYCOLAX) 17 GM/SCOOP Take 17 g by mouth daily as needed (constipation) 510 g 0    senna (SENOKOT) 8 6 mg TAKE ONE TABLET BY MOUTH ONCE DAILY AT BEDTIME  HOLD FOR DIARRHEA OR LOOSE STOOLS (CONSTIPATION) 30 tablet 2    lactobacillus acidophilus-bulgaricus (FLORANEX) packet Take 1 packet by mouth 3 (three) times a day with meals (Patient not taking: Reported on 4/21/2021) 90 packet 0    lisinopril (ZESTRIL) 10 mg tablet Take 1 tablet (10 mg total) by mouth daily (Patient not taking: Reported on 4/21/2021) 90 tablet 1    Melatonin 5 MG CAPS TAKE ONE CAPSULE BY MOUTH AT BEDTIME (INSOMNIA) (Patient not taking: Reported on 4/21/2021) 30 capsule 5    metoprolol tartrate (LOPRESSOR) 50 mg tablet Take 1 5 tablets (75 mg total) by mouth every 12 (twelve) hours (Patient not taking: Reported on 4/21/2021) 90 tablet 0    Nutritional Supplements (Ensure Complete) LIQD Take 1 Can by mouth daily 30 Bottle 5    Oyster Shell 500 MG TABS Take 1 tablet (500 mg total) by mouth 2 (two) times a day (Patient not taking: Reported on 4/21/2021) 60 tablet 5     No current facility-administered medications for this visit  Review of Systems   Constitutional: Negative for chills, fatigue and fever  HENT: Negative  Respiratory: Negative for cough, chest tightness and shortness of breath  Cardiovascular: Negative for chest pain, palpitations and leg swelling  Gastrointestinal: Negative for abdominal pain, constipation, diarrhea, nausea and vomiting  Genitourinary: Negative  Musculoskeletal: Negative for arthralgias, back pain and myalgias     Skin: Negative  Neurological: Negative  Psychiatric/Behavioral: Negative  Objective:  Vitals:    04/21/21 1305   BP: 142/58   BP Location: Left arm   Patient Position: Sitting   Cuff Size: Child   Pulse: 72   Resp: 14   Temp: 99 2 °F (37 3 °C)   SpO2: 93%   Weight: 56 4 kg (124 lb 6 4 oz)   Height: 5' 2" (1 575 m)     Body mass index is 22 75 kg/m²  Physical Exam  Vitals signs and nursing note reviewed  Constitutional:       Appearance: She is well-developed  HENT:      Head: Normocephalic and atraumatic  Eyes:      Pupils: Pupils are equal, round, and reactive to light  Neck:      Musculoskeletal: Normal range of motion and neck supple  Cardiovascular:      Rate and Rhythm: Normal rate and regular rhythm  Heart sounds: Normal heart sounds  No murmur  Pulmonary:      Effort: Pulmonary effort is normal  No respiratory distress  Breath sounds: Normal breath sounds  No wheezing  Abdominal:      General: Bowel sounds are normal       Palpations: Abdomen is soft  Tenderness: There is no abdominal tenderness  Musculoskeletal: Normal range of motion  Skin:     General: Skin is warm and dry  Neurological:      Mental Status: She is alert and oriented to person, place, and time  Ear cerumen removal    Date/Time: 4/21/2021 2:00 PM  Performed by: Morgan Smith DO  Authorized by: Morgan Smith DO   Universal Protocol:  Consent: Verbal consent obtained  Consent given by: patient  Timeout called at: 4/21/2021 2:01 PM   Patient understanding: patient states understanding of the procedure being performed  Patient identity confirmed: verbally with patient      Patient location:  Clinic  Procedure details:     Location:  L ear    Procedure type: irrigation only    Post-procedure details:     Complication:  None    Patient tolerance of procedure:   Tolerated well, no immediate complications      PHQ-9 Depression Screening    PHQ-9:   Frequency of the following problems over the past two weeks:

## 2021-04-26 DIAGNOSIS — G47.00 INSOMNIA, UNSPECIFIED TYPE: ICD-10-CM

## 2021-04-26 RX ORDER — CHOLECALCIFEROL (VITAMIN D3) 125 MCG
CAPSULE ORAL
Qty: 30 TABLET | Refills: 4 | Status: SHIPPED | OUTPATIENT
Start: 2021-04-26 | End: 2021-01-01

## 2021-04-27 ENCOUNTER — HOSPITAL ENCOUNTER (OUTPATIENT)
Dept: ULTRASOUND IMAGING | Facility: HOSPITAL | Age: 86
Discharge: HOME | End: 2021-04-27
Attending: INTERNAL MEDICINE
Payer: MEDICARE

## 2021-04-27 DIAGNOSIS — R33.9 URINARY RETENTION: ICD-10-CM

## 2021-04-27 PROCEDURE — 51798 US URINE CAPACITY MEASURE: CPT

## 2021-05-10 DIAGNOSIS — I16.0 HYPERTENSIVE URGENCY: ICD-10-CM

## 2021-05-10 RX ORDER — AMLODIPINE BESYLATE 5 MG/1
TABLET ORAL
Qty: 60 TABLET | Refills: 4 | Status: SHIPPED | OUTPATIENT
Start: 2021-05-10 | End: 2021-01-01

## 2021-05-18 ENCOUNTER — OFFICE VISIT (OUTPATIENT)
Dept: INTERNAL MEDICINE CLINIC | Facility: CLINIC | Age: 86
End: 2021-05-18
Payer: MEDICARE

## 2021-05-18 VITALS
BODY MASS INDEX: 23.04 KG/M2 | OXYGEN SATURATION: 94 % | HEART RATE: 69 BPM | SYSTOLIC BLOOD PRESSURE: 152 MMHG | RESPIRATION RATE: 14 BRPM | DIASTOLIC BLOOD PRESSURE: 56 MMHG | TEMPERATURE: 99.9 F | HEIGHT: 62 IN | WEIGHT: 125.2 LBS

## 2021-05-18 DIAGNOSIS — R33.9 URINARY RETENTION: ICD-10-CM

## 2021-05-18 DIAGNOSIS — E78.00 HYPERCHOLESTEROLEMIA: ICD-10-CM

## 2021-05-18 DIAGNOSIS — B37.9 YEAST INFECTION: Primary | ICD-10-CM

## 2021-05-18 DIAGNOSIS — I10 BENIGN ESSENTIAL HTN: ICD-10-CM

## 2021-05-18 PROBLEM — N32.89 MASS OF URINARY BLADDER DETERMINED BY ULTRASOUND: Status: RESOLVED | Noted: 2018-03-23 | Resolved: 2021-05-18

## 2021-05-18 PROCEDURE — 99214 OFFICE O/P EST MOD 30 MIN: CPT | Performed by: INTERNAL MEDICINE

## 2021-05-18 RX ORDER — FLUCONAZOLE 150 MG/1
150 TABLET ORAL DAILY
Qty: 3 TABLET | Refills: 0 | Status: SHIPPED | OUTPATIENT
Start: 2021-05-18 | End: 2021-05-21

## 2021-05-18 NOTE — PROGRESS NOTES
Assessment/Plan:  The patient will follow up in 6 months  Problem List Items Addressed This Visit        Cardiovascular and Mediastinum    Benign essential HTN       Genitourinary    Urinary retention       Other    Hypercholesterolemia      Other Visit Diagnoses     Yeast infection    -  Primary    Relevant Medications    fluconazole (DIFLUCAN) 150 mg tablet           Diagnoses and all orders for this visit:    Yeast infection  -     fluconazole (DIFLUCAN) 150 mg tablet; Take 1 tablet (150 mg total) by mouth daily for 3 doses    Urinary retention    Benign essential HTN    Hypercholesterolemia        No problem-specific Assessment & Plan notes found for this encounter  Subjective: follow up on US of bladder and patient noted yeast infection  Weight loss stable now  Patient ID: Mamadou Atwood is a 80 y o  female  The patient is doing well and is noted to have issues  Her US of the bladder noted no mass  She is noted to have urinary retention  However, there might not be a easy fix for this  We will hold off on medications  She has not had a UTI in the past month  The patient weight is stable and she is doing well on the Ensure  It will be placed in a refrigerator in her room for easier access for the patient  The patient's BP is noted to be elevated, but was good at [facility]  She is concerned that she might have a yeast infection and we will start diflucan          The following portions of the patient's history were reviewed and updated as appropriate:   She has a past medical history of Abdominal distension, Abnormal weight loss, Arthritis, Bronchitis, Bursitis of left hip, Chest pain, Colon polyp, Dysuria, External hemorrhoids, Generalized anxiety disorder, GERD (gastroesophageal reflux disease), History of echocardiogram (03/20/2018), Hyperlipidemia, Hypertension, Hypomagnesemia, Lyme disease, Multiple renal cysts (7/5/2019), Nonrheumatic mitral valve regurgitation, Osteoporosis, Pneumonia of right middle lobe due to infectious organism, SVT (supraventricular tachycardia), and Urinary tract infection  ,  does not have any pertinent problems on file  ,   has a past surgical history that includes Cataract extraction; Hemorrhoid surgery; Hysterectomy; and Pelvic floor repair  ,  family history is not on file  She was adopted  ,   reports that she has never smoked  She has never used smokeless tobacco  She reports that she does not drink alcohol or use drugs  ,  is allergic to codeine; epinephrine; iodinated diagnostic agents; iodine - food allergy; and magnesium citrate     Current Outpatient Medications   Medication Sig Dispense Refill    acetaminophen (TYLENOL) 325 mg tablet Take 2 tablets (650 mg total) by mouth every 6 (six) hours as needed for mild pain, headaches or fever  0    amLODIPine (NORVASC) 5 mg tablet TAKE ONE TABLET BY MOUTH TWICE A DAY *HOLD FOR SBP<110* (HTN) 60 tablet 4    aspirin (ECOTRIN LOW STRENGTH) 81 mg EC tablet TAKE ONE TABLET BY MOUTH ONCE DAILY (STROKE PREVENTION) 30 tablet 4    calcium carbonate (OYSTER SHELL,OSCAL) 500 mg TAKE ONE TABLET BY MOUTH TWICE DAILY (SUPPLEMENT) 60 each 5    clonazePAM (KlonoPIN) 0 5 mg tablet TAKE THREE TABLETS (1 5MG) BY MOUTH AT BEDTIME (ANXIETY) 90 tablet 4    D3-1000 25 MCG (1000 UT) capsule TAKE ONE CAPSULE BY MOUTH ONCE DAILY (VITAMIN D DEFICIENCY) 30 capsule 5    digoxin (LANOXIN) 0 125 mg tablet TAKE ONE TABLET BY MOUTH ONCE DAILY (AFIB) 30 tablet 5    famotidine (PEPCID) 20 mg tablet TAKE ONE TABLET BY MOUTH DAILY AT BEDTIME (GERD) 30 tablet 2    IBU-200 200 MG tablet TAKE ONE TABLET BY MOUTH ONCE DAILY AS NEEDED FOR MIGRAINES/HEADACHE 50 tablet 0    lisinopril (ZESTRIL) 10 mg tablet TAKE ONE TABLET BY MOUTH ONCE DAILY (HTN) 30 tablet 5    magnesium hydroxide (MILK OF MAGNESIA) 800 MG/5ML suspension Take 30 mL by mouth daily as needed for constipation      Melatonin 5 MG TABS TAKE ONE TABLET BY MOUTH AT BEDTIME (INSOMNIA) 30 tablet 4    metoprolol tartrate (LOPRESSOR) 25 mg tablet TAKE THREE TABLETS (75MG) BY MOUTH EVERY 12 HOURS  HOLD FOR HR <60 OR SBP <110 (HTN)**TAKE W/ FOOD/SNACK** 180 tablet 2    Nutritional Supplements (Ensure) Take 237 mL by mouth daily 7110 mL 5    Omega-3 Fatty Acids (fish oil) 1,000 mg TAKE ONE CAPSULE BY MOUTH ONCE DAILY (SUPPLEMENT) 30 each 5    omeprazole (PriLOSEC OTC) 20 MG tablet Take 1 tablet (20 mg total) by mouth daily 30 tablet 5    polyethylene glycol (GLYCOLAX) 17 GM/SCOOP Take 17 g by mouth daily as needed (constipation) 510 g 0    senna (SENOKOT) 8 6 mg TAKE ONE TABLET BY MOUTH ONCE DAILY AT BEDTIME  HOLD FOR DIARRHEA OR LOOSE STOOLS (CONSTIPATION) 30 tablet 2    fluconazole (DIFLUCAN) 150 mg tablet Take 1 tablet (150 mg total) by mouth daily for 3 doses 3 tablet 0    lactobacillus acidophilus-bulgaricus (FLORANEX) packet Take 1 packet by mouth 3 (three) times a day with meals (Patient not taking: Reported on 4/21/2021) 90 packet 0    lisinopril (ZESTRIL) 10 mg tablet Take 1 tablet (10 mg total) by mouth daily (Patient not taking: Reported on 4/21/2021) 90 tablet 1    Melatonin 5 MG CAPS TAKE ONE CAPSULE BY MOUTH AT BEDTIME (INSOMNIA) (Patient not taking: Reported on 4/21/2021) 30 capsule 5    metoprolol tartrate (LOPRESSOR) 50 mg tablet Take 1 5 tablets (75 mg total) by mouth every 12 (twelve) hours (Patient not taking: Reported on 4/21/2021) 90 tablet 0    Oyster Shell 500 MG TABS Take 1 tablet (500 mg total) by mouth 2 (two) times a day (Patient not taking: Reported on 4/21/2021) 60 tablet 5     No current facility-administered medications for this visit  Review of Systems   Constitutional: Negative for chills, fatigue and fever  HENT: Negative  Respiratory: Negative for cough, chest tightness and shortness of breath  Cardiovascular: Negative for chest pain, palpitations and leg swelling     Gastrointestinal: Negative for abdominal pain, constipation, diarrhea, nausea and vomiting  Genitourinary: Negative  Musculoskeletal: Negative for arthralgias, back pain and myalgias  Skin: Negative  Neurological: Negative  Psychiatric/Behavioral: Negative  Objective:  Vitals:    05/18/21 1435   BP: 152/56   BP Location: Left arm   Patient Position: Sitting   Cuff Size: Child   Pulse: 69   Resp: 14   Temp: 99 9 °F (37 7 °C)   SpO2: 94%   Weight: 56 8 kg (125 lb 3 2 oz)   Height: 5' 2" (1 575 m)     Body mass index is 22 9 kg/m²  Physical Exam  Vitals signs and nursing note reviewed  Constitutional:       Appearance: She is well-developed  HENT:      Head: Normocephalic and atraumatic  Eyes:      Pupils: Pupils are equal, round, and reactive to light  Neck:      Musculoskeletal: Normal range of motion and neck supple  Cardiovascular:      Rate and Rhythm: Normal rate and regular rhythm  Heart sounds: Normal heart sounds  No murmur  Pulmonary:      Effort: Pulmonary effort is normal  No respiratory distress  Breath sounds: Normal breath sounds  No wheezing  Abdominal:      General: Bowel sounds are normal       Palpations: Abdomen is soft  Tenderness: There is no abdominal tenderness  Musculoskeletal: Normal range of motion  Skin:     General: Skin is warm and dry  Neurological:      Mental Status: She is alert and oriented to person, place, and time            PHQ-9 Depression Screening    PHQ-9:   Frequency of the following problems over the past two weeks:

## 2021-05-24 DIAGNOSIS — K21.9 GASTROESOPHAGEAL REFLUX DISEASE: ICD-10-CM

## 2021-05-24 DIAGNOSIS — K59.04 CHRONIC IDIOPATHIC CONSTIPATION: ICD-10-CM

## 2021-05-24 RX ORDER — POLYETHYLENE GLYCOL 3350 17 G/17G
POWDER ORAL
Qty: 510 G | Refills: 0 | Status: SHIPPED | OUTPATIENT
Start: 2021-05-24 | End: 2021-01-01

## 2021-05-24 RX ORDER — FAMOTIDINE 20 MG/1
TABLET, FILM COATED ORAL
Qty: 30 TABLET | Refills: 4 | Status: SHIPPED | OUTPATIENT
Start: 2021-05-24 | End: 2021-01-01

## 2021-05-29 DIAGNOSIS — K59.03 DRUG-INDUCED CONSTIPATION: ICD-10-CM

## 2021-06-01 DIAGNOSIS — N30.01 ACUTE CYSTITIS WITH HEMATURIA: ICD-10-CM

## 2021-06-01 RX ORDER — ACETAMINOPHEN 325 MG/1
650 TABLET ORAL 3 TIMES DAILY PRN
Qty: 60 TABLET | Refills: 11
Start: 2021-06-01 | End: 2021-01-01 | Stop reason: SDUPTHER

## 2021-06-01 RX ORDER — SENNOSIDES 8.6 MG
TABLET ORAL
Qty: 30 TABLET | Refills: 5 | Status: SHIPPED | OUTPATIENT
Start: 2021-06-01 | End: 2021-01-01

## 2021-08-17 PROBLEM — N39.0 ACUTE UTI: Status: ACTIVE | Noted: 2021-01-01

## 2021-08-17 NOTE — PATIENT INSTRUCTIONS
Medicare Preventive Visit Patient Instructions  Thank you for completing your Welcome to Medicare Visit or Medicare Annual Wellness Visit today  Your next wellness visit will be due in one year (8/18/2022)  The screening/preventive services that you may require over the next 5-10 years are detailed below  Some tests may not apply to you based off risk factors and/or age  Screening tests ordered at today's visit but not completed yet may show as past due  Also, please note that scanned in results may not display below  Preventive Screenings:  Service Recommendations Previous Testing/Comments   Colorectal Cancer Screening  * Colonoscopy    * Fecal Occult Blood Test (FOBT)/Fecal Immunochemical Test (FIT)  * Fecal DNA/Cologuard Test  * Flexible Sigmoidoscopy Age: 54-65 years old   Colonoscopy: every 10 years (may be performed more frequently if at higher risk)  OR  FOBT/FIT: every 1 year  OR  Cologuard: every 3 years  OR  Sigmoidoscopy: every 5 years  Screening may be recommended earlier than age 48 if at higher risk for colorectal cancer  Also, an individualized decision between you and your healthcare provider will decide whether screening between the ages of 74-80 would be appropriate  Colonoscopy: Not on file  FOBT/FIT: Not on file  Cologuard: Not on file  Sigmoidoscopy: Not on file    Screening Not Indicated     Breast Cancer Screening Age: 36 years old  Frequency: every 1-2 years  Not required if history of left and right mastectomy Mammogram: 10/17/2017        Cervical Cancer Screening Between the ages of 21-29, pap smear recommended once every 3 years  Between the ages of 33-67, can perform pap smear with HPV co-testing every 5 years     Recommendations may differ for women with a history of total hysterectomy, cervical cancer, or abnormal pap smears in past  Pap Smear: Not on file    Screening Not Indicated   Hepatitis C Screening Once for adults born between 1945 and 1965  More frequently in patients at high risk for Hepatitis C Hep C Antibody: Not on file        Diabetes Screening 1-2 times per year if you're at risk for diabetes or have pre-diabetes Fasting glucose: 105 mg/dL   A1C: No results in last 5 years    Screening Current   Cholesterol Screening Once every 5 years if you don't have a lipid disorder  May order more often based on risk factors  Lipid panel: 03/14/2018    Screening Not Indicated  History Lipid Disorder     Other Preventive Screenings Covered by Medicare:  1  Abdominal Aortic Aneurysm (AAA) Screening: covered once if your at risk  You're considered to be at risk if you have a family history of AAA  2  Lung Cancer Screening: covers low dose CT scan once per year if you meet all of the following conditions: (1) Age 50-69; (2) No signs or symptoms of lung cancer; (3) Current smoker or have quit smoking within the last 15 years; (4) You have a tobacco smoking history of at least 30 pack years (packs per day multiplied by number of years you smoked); (5) You get a written order from a healthcare provider  3  Glaucoma Screening: covered annually if you're considered high risk: (1) You have diabetes OR (2) Family history of glaucoma OR (3)  aged 48 and older OR (3)  American aged 72 and older  3  Osteoporosis Screening: covered every 2 years if you meet one of the following conditions: (1) You're estrogen deficient and at risk for osteoporosis based off medical history and other findings; (2) Have a vertebral abnormality; (3) On glucocorticoid therapy for more than 3 months; (4) Have primary hyperparathyroidism; (5) On osteoporosis medications and need to assess response to drug therapy  · Last bone density test (DXA Scan): Not on file  5  HIV Screening: covered annually if you're between the age of 12-76  Also covered annually if you are younger than 13 and older than 72 with risk factors for HIV infection   For pregnant patients, it is covered up to 3 times per pregnancy  Immunizations:  Immunization Recommendations   Influenza Vaccine Annual influenza vaccination during flu season is recommended for all persons aged >= 6 months who do not have contraindications   Pneumococcal Vaccine (Prevnar and Pneumovax)  * Prevnar = PCV13  * Pneumovax = PPSV23   Adults 25-60 years old: 1-3 doses may be recommended based on certain risk factors  Adults 72 years old: Prevnar (PCV13) vaccine recommended followed by Pneumovax (PPSV23) vaccine  If already received PPSV23 since turning 65, then PCV13 recommended at least one year after PPSV23 dose  Hepatitis B Vaccine 3 dose series if at intermediate or high risk (ex: diabetes, end stage renal disease, liver disease)   Tetanus (Td) Vaccine - COST NOT COVERED BY MEDICARE PART B Following completion of primary series, a booster dose should be given every 10 years to maintain immunity against tetanus  Td may also be given as tetanus wound prophylaxis  Tdap Vaccine - COST NOT COVERED BY MEDICARE PART B Recommended at least once for all adults  For pregnant patients, recommended with each pregnancy  Shingles Vaccine (Shingrix) - COST NOT COVERED BY MEDICARE PART B  2 shot series recommended in those aged 48 and above     Health Maintenance Due:  There are no preventive care reminders to display for this patient  Immunizations Due:      Topic Date Due    COVID-19 Vaccine (1) Never done    DTaP,Tdap,and Td Vaccines (1 - Tdap) 09/02/1952    Influenza Vaccine (1) 09/01/2021     Advance Directives   What are advance directives? Advance directives are legal documents that state your wishes and plans for medical care  These plans are made ahead of time in case you lose your ability to make decisions for yourself  Advance directives can apply to any medical decision, such as the treatments you want, and if you want to donate organs  What are the types of advance directives?   There are many types of advance directives, and each state has rules about how to use them  You may choose a combination of any of the following:  · Living will: This is a written record of the treatment you want  You can also choose which treatments you do not want, which to limit, and which to stop at a certain time  This includes surgery, medicine, IV fluid, and tube feedings  · Durable power of  for healthcare [facility]): This is a written record that states who you want to make healthcare choices for you when you are unable to make them for yourself  This person, called a proxy, is usually a family member or a friend  You may choose more than 1 proxy  · Do not resuscitate (DNR) order:  A DNR order is used in case your heart stops beating or you stop breathing  It is a request not to have certain forms of treatment, such as CPR  A DNR order may be included in other types of advance directives  · Medical directive: This covers the care that you want if you are in a coma, near death, or unable to make decisions for yourself  You can list the treatments you want for each condition  Treatment may include pain medicine, surgery, blood transfusions, dialysis, IV or tube feedings, and a ventilator (breathing machine)  · Values history: This document has questions about your views, beliefs, and how you feel and think about life  This information can help others choose the care that you would choose  Why are advance directives important? An advance directive helps you control your care  Although spoken wishes may be used, it is better to have your wishes written down  Spoken wishes can be misunderstood, or not followed  Treatments may be given even if you do not want them  An advance directive may make it easier for your family to make difficult choices about your care  Fall Prevention    Fall prevention  includes ways to make your home and other areas safer  It also includes ways you can move more carefully to prevent a fall   Health conditions that cause changes in your blood pressure, vision, or muscle strength and coordination may increase your risk for falls  Medicines may also increase your risk for falls if they make you dizzy, weak, or sleepy  Fall prevention tips:   · Stand or sit up slowly  · Use assistive devices as directed  · Wear shoes that fit well and have soles that   · Wear a personal alarm  · Stay active  · Manage your medical conditions  Home Safety Tips:  · Add items to prevent falls in the bathroom  · Keep paths clear  · Install bright lights in your home  · Keep items you use often on shelves within reach  · Paint or place reflective tape on the edges of your stairs  © Copyright [employer] 2018 Information is for End User's use only and may not be sold, redistributed or otherwise used for commercial purposes   All illustrations and images included in CareNotes® are the copyrighted property of [employer]  or [facility] [employer]

## 2021-08-17 NOTE — PROGRESS NOTES
Assessment/Plan:    Problem List Items Addressed This Visit     None           There are no diagnoses linked to this encounter  No problem-specific Assessment & Plan notes found for this encounter  Subjective:      Patient ID: Sahara Doll is a 80 y o  female  The patient was seen and examined and noted to have issues with discomfort in the area around the vagina  We had treated the patient previously with fluconazole  We will try Nystatin instead (topical powder)  The patient states notes that there are other issues at this time  The patient notes issues with UTI at this time  The patient is noted to have pain in the lateral left shoulder and we will try a topical NSAID  The following portions of the patient's history were reviewed and updated as appropriate:   She has a past medical history of Abdominal distension, Abnormal weight loss, Arthritis, Bronchitis, Bursitis of left hip, Chest pain, Colon polyp, Dysuria, External hemorrhoids, Generalized anxiety disorder, GERD (gastroesophageal reflux disease), History of echocardiogram (03/20/2018), Hyperlipidemia, Hypertension, Hypomagnesemia, Lyme disease, Multiple renal cysts (7/5/2019), Nonrheumatic mitral valve regurgitation, Osteoporosis, Pneumonia of right middle lobe due to infectious organism, SVT (supraventricular tachycardia), and Urinary tract infection  ,  does not have any pertinent problems on file  ,   has a past surgical history that includes Cataract extraction; Hemorrhoid surgery; Hysterectomy; and Pelvic floor repair  ,  family history is not on file  She was adopted  ,   reports that she has never smoked  She has never used smokeless tobacco  She reports that she does not drink alcohol and does not use drugs  ,  is allergic to codeine, epinephrine, iodinated diagnostic agents, iodine - food allergy, and magnesium citrate     Current Outpatient Medications   Medication Sig Dispense Refill    acetaminophen (TYLENOL) 325 mg tablet Take 2 tablets (650 mg total) by mouth 3 (three) times a day as needed for mild pain, headaches or fever 60 tablet 11    amLODIPine (NORVASC) 5 mg tablet TAKE ONE TABLET BY MOUTH TWICE A DAY *HOLD FOR SBP<110* (HTN) 60 tablet 4    aspirin (ECOTRIN LOW STRENGTH) 81 mg EC tablet TAKE ONE TABLET BY MOUTH ONCE DAILY (STROKE PREVENTION) 30 tablet 5    clonazePAM (KlonoPIN) 0 5 mg tablet TAKE THREE TABLETS (1 5MG) BY MOUTH AT BEDTIME (ANXIETY) 90 tablet 4    D3-1000 25 MCG (1000 UT) capsule TAKE ONE CAPSULE BY MOUTH ONCE DAILY (VITAMIN D DEFICIENCY) 30 capsule 5    digoxin (LANOXIN) 0 125 mg tablet TAKE ONE TABLET BY MOUTH ONCE DAILY (AFIB) 30 tablet 5    famotidine (PEPCID) 20 mg tablet TAKE ONE TABLET BY MOUTH DAILY AT BEDTIME (GERD) 30 tablet 4    IBU-200 200 MG tablet TAKE ONE TABLET BY MOUTH ONCE DAILY AS NEEDED FOR MIGRAINES/HEADACHE 50 tablet 0    lisinopril (ZESTRIL) 10 mg tablet TAKE ONE TABLET BY MOUTH ONCE DAILY (HTN) 30 tablet 5    Melatonin 5 MG CAPS TAKE ONE CAPSULE BY MOUTH AT BEDTIME (INSOMNIA) 30 capsule 5    metoprolol tartrate (LOPRESSOR) 25 mg tablet Take 3 tablets (75 mg total) by mouth every 12 (twelve) hours 180 tablet 2    Nutritional Supplements (Ensure) Take 237 mL by mouth daily 7110 mL 5    Omega-3 Fatty Acids (fish oil) 1,000 mg TAKE ONE CAPSULE BY MOUTH ONCE DAILY (SUPPLEMENT) 30 capsule 5    omeprazole (PriLOSEC OTC) 20 MG tablet Take 1 tablet (20 mg total) by mouth daily 30 tablet 5    Oyster Shell 500 MG TABS Take 1 tablet (500 mg total) by mouth 2 (two) times a day 60 tablet 5    polyethylene glycol (GLYCOLAX) 17 GM/SCOOP DISSOLVE 17 GRAMS (ONE CAPFUL) IN 8 OUNCES OF WATER AND DRINK BY MOUTH DAILY FOR CONSTIPATION 510 g 4    senna (SENOKOT) 8 6 mg TAKE ONE TABLET BY MOUTH ONCE DAILY AT BEDTIME   HOLD FOR DIARRHEA OR LOOSE STOOLS (CONSTIPATION) 30 tablet 5    lactobacillus acidophilus-bulgaricus (FLORANEX) packet Take 1 packet by mouth 3 (three) times a day with meals (Patient not taking: Reported on 4/21/2021) 90 packet 0    lisinopril (ZESTRIL) 10 mg tablet Take 1 tablet (10 mg total) by mouth daily (Patient not taking: Reported on 4/21/2021) 90 tablet 1    magnesium hydroxide (MILK OF MAGNESIA) 800 MG/5ML suspension Take 30 mL by mouth daily as needed for constipation (Patient not taking: Reported on 8/17/2021)      Melatonin 5 MG TABS TAKE ONE TABLET BY MOUTH AT BEDTIME (INSOMNIA) 30 tablet 5     No current facility-administered medications for this visit  Review of Systems   Constitutional: Negative for chills, fatigue and fever  HENT: Negative  Respiratory: Negative for cough, chest tightness and shortness of breath  Cardiovascular: Negative for chest pain, palpitations and leg swelling  Gastrointestinal: Negative for abdominal pain, constipation, diarrhea, nausea and vomiting  Genitourinary: Negative  Musculoskeletal: Negative for arthralgias, back pain and myalgias  Skin: Negative  Neurological: Negative  Psychiatric/Behavioral: Negative  Objective:  Vitals:    08/17/21 1449   BP: 148/60   BP Location: Left arm   Patient Position: Sitting   Cuff Size: Child   Pulse: 77   Resp: 14   Temp: 98 °F (36 7 °C)   SpO2: 93%   Weight: 57 1 kg (125 lb 12 8 oz)   Height: 5' 2" (1 575 m)     Body mass index is 23 01 kg/m²  Physical Exam  Vitals and nursing note reviewed  Constitutional:       Appearance: She is well-developed  HENT:      Head: Normocephalic and atraumatic  Eyes:      Pupils: Pupils are equal, round, and reactive to light  Cardiovascular:      Rate and Rhythm: Normal rate and regular rhythm  Heart sounds: Normal heart sounds  No murmur heard  Pulmonary:      Effort: Pulmonary effort is normal  No respiratory distress  Breath sounds: Normal breath sounds  No wheezing  Abdominal:      General: Bowel sounds are normal       Palpations: Abdomen is soft  Tenderness: There is no abdominal tenderness  Musculoskeletal:         General: Normal range of motion  Cervical back: Normal range of motion and neck supple  Skin:     General: Skin is warm and dry  Neurological:      Mental Status: She is alert and oriented to person, place, and time            PHQ-9 Depression Screening    PHQ-9:   Frequency of the following problems over the past two weeks:      Little interest or pleasure in doing things: 0 - not at all  Feeling down, depressed, or hopeless: 0 - not at all  PHQ-2 Score: 0

## 2021-08-17 NOTE — PROGRESS NOTES
Assessment and Plan:     Problem List Items Addressed This Visit     None           Preventive health issues were discussed with patient, and age appropriate screening tests were ordered as noted in patient's After Visit Summary  Personalized health advice and appropriate referrals for health education or preventive services given if needed, as noted in patient's After Visit Summary       History of Present Illness:     Patient presents for Medicare Annual Wellness visit    Patient Care Team:  Antolin Barajas DO as PCP - General (Internal Medicine)     Problem List:     Patient Active Problem List   Diagnosis    Gomez's esophagus    [facility]    Breast mass, left    Chronic constipation    Colon, diverticulosis    Esophageal reflux    Generalized anxiety disorder    Generalized osteoarthritis of multiple sites    Glucose intolerance (impaired glucose tolerance)    Hypercholesterolemia    Neuralgia    Osteoporosis    SV[facility]ra[facility]ricular tachycardia) [facility]    Vitamin D deficiency    HCAP (healthcare-associated pneumonia)    Acute febrile illness    Cough    Esophageal mass    Hyponatremia    Right lower lobe lung mass    History of frequent urinary tract infections    Paroxysmal atrial fibrillation (HCC)    Sinus bradycardia    Debilitated    Eczema    Hypertensive urgency    Acute metabolic encephalopathy    Acute cystitis with hematuria    Lung mass    Acute encephalopathy    Multiple renal cysts    Hepatic steatosis    Left eye pain    Sick sinus syndrome (HCC)    Benign essential HTN    Diarrhea    sepsis    [facility]IONA [facility]acute kidney injury) [facility]    Stage 3b chronic kidney disease (HCC)    Lactic acidosis    Pulmonary nodule    Urinary retention      Past Medical and Surgical History:     Past Medical History:   Diagnosis Date    Abdominal distension     Last Assessed: [id number]    Abnormal weight loss     Last Assessed: [id number]    Arthritis  Bronchitis     Bursitis of left hip     Lst Assessed: [id number]    Chest pain     Last Assessed: [id number]    Colon polyp     Dysuria     Last Assessed: [id number]    External hemorrhoids     Last Assessed: [id number]    Generalized anxiety disorder     GERD (gastroesophageal reflux disease)     History of echocardiogram 03/20/2018    EF 60%, mild to moderate mitral annular calcification  mild AS, mild TR   Hyperlipidemia     Hypertension     Hypomagnesemia     Last Assessed: [id number]    Lyme disease     Last Assessed: [id number]    Multiple renal cysts 7/5/2019    Nonrheumatic mitral valve regurgitation     Osteoporosis     Pneumonia of right middle lobe due to infectious organism     Last Assessed: [id number]    SVT (supraventricular tachycardia)     Urinary tract infection      Past Surgical History:   Procedure Laterality Date    CATARACT EXTRACTION      HEMORRHOID SURGERY      HYSTERECTOMY      PELVIC FLOOR REPAIR        Family History:     Family History   Adopted: Yes      Social History:     Social History     Socioeconomic History    Marital status:      Spouse name: None    Number of children: None    Years of education: None    Highest education level: None   Occupational History    Occupation: Retired   Tobacco Use    Smoking status: Never Smoker    Smokeless tobacco: Never Used   Vaping Use    Vaping Use: Never used   Substance and Sexual Activity    Alcohol use: Never    Drug use: No    Sexual activity: Not Currently   Other Topics Concern    None   Social History Narrative    RETIRED     Social Determinants of Health     Financial Resource Strain:     Difficulty of Paying Living Expenses:    Food Insecurity:     Worried About Running Out of Food in the Last Year:     Ran Out of Food in the Last Year:    Transportation Needs:     Lack of Transportation (Medical):      Lack of Transportation (Non-Medical):    Physical Activity:     Days of Exercise per Week:     Minutes of Exercise per Session:    Stress:     Feeling of Stress :    Social Connections:     Frequency of Communication with Friends and Family:     Frequency of Social Gatherings with Friends and Family:     Attends Taoist Services:     Active Member of Clubs or Organizations:     Attends Club or Organization Meetings:     Marital Status:    Intimate Partner Violence:     Fear of Current or Ex-Partner:     Emotionally Abused:     Physically Abused:     Sexually Abused:       Medications and Allergies:     Current Outpatient Medications   Medication Sig Dispense Refill    acetaminophen (TYLENOL) 325 mg tablet Take 2 tablets (650 mg total) by mouth 3 (three) times a day as needed for mild pain, headaches or fever 60 tablet 11    amLODIPine (NORVASC) 5 mg tablet TAKE ONE TABLET BY MOUTH TWICE A DAY *HOLD FOR SBP<110* (HTN) 60 tablet 4    aspirin (ECOTRIN LOW STRENGTH) 81 mg EC tablet TAKE ONE TABLET BY MOUTH ONCE DAILY (STROKE PREVENTION) 30 tablet 5    clonazePAM (KlonoPIN) 0 5 mg tablet TAKE THREE TABLETS (1 5MG) BY MOUTH AT BEDTIME (ANXIETY) 90 tablet 4    D3-1000 25 MCG (1000 UT) capsule TAKE ONE CAPSULE BY MOUTH ONCE DAILY (VITAMIN D DEFICIENCY) 30 capsule 5    digoxin (LANOXIN) 0 125 mg tablet TAKE ONE TABLET BY MOUTH ONCE DAILY (AFIB) 30 tablet 5    famotidine (PEPCID) 20 mg tablet TAKE ONE TABLET BY MOUTH DAILY AT BEDTIME (GERD) 30 tablet 4    IBU-200 200 MG tablet TAKE ONE TABLET BY MOUTH ONCE DAILY AS NEEDED FOR MIGRAINES/HEADACHE 50 tablet 0    lisinopril (ZESTRIL) 10 mg tablet TAKE ONE TABLET BY MOUTH ONCE DAILY (HTN) 30 tablet 5    Melatonin 5 MG CAPS TAKE ONE CAPSULE BY MOUTH AT BEDTIME (INSOMNIA) 30 capsule 5    metoprolol tartrate (LOPRESSOR) 25 mg tablet Take 3 tablets (75 mg total) by mouth every 12 (twelve) hours 180 tablet 2    Nutritional Supplements (Ensure) Take 237 mL by mouth daily 7110 mL 5    Omega-3 Fatty Acids (fish oil) 1,000 mg TAKE ONE CAPSULE BY MOUTH ONCE DAILY (SUPPLEMENT) 30 capsule 5    omeprazole (PriLOSEC OTC) 20 MG tablet Take 1 tablet (20 mg total) by mouth daily 30 tablet 5    Oyster Shell 500 MG TABS Take 1 tablet (500 mg total) by mouth 2 (two) times a day 60 tablet 5    polyethylene glycol (GLYCOLAX) 17 GM/SCOOP DISSOLVE 17 GRAMS (ONE CAPFUL) IN 8 OUNCES OF WATER AND DRINK BY MOUTH DAILY FOR CONSTIPATION 510 g 4    senna (SENOKOT) 8 6 mg TAKE ONE TABLET BY MOUTH ONCE DAILY AT BEDTIME  HOLD FOR DIARRHEA OR LOOSE STOOLS (CONSTIPATION) 30 tablet 5    lactobacillus acidophilus-bulgaricus (FLORANEX) packet Take 1 packet by mouth 3 (three) times a day with meals (Patient not taking: Reported on 4/21/2021) 90 packet 0    lisinopril (ZESTRIL) 10 mg tablet Take 1 tablet (10 mg total) by mouth daily (Patient not taking: Reported on 4/21/2021) 90 tablet 1    magnesium hydroxide (MILK OF MAGNESIA) 800 MG/5ML suspension Take 30 mL by mouth daily as needed for constipation (Patient not taking: Reported on 8/17/2021)      Melatonin 5 MG TABS TAKE ONE TABLET BY MOUTH AT BEDTIME (INSOMNIA) 30 tablet 5     No current facility-administered medications for this visit       Allergies   Allergen Reactions    Codeine GI Intolerance    Epinephrine Other (See Comments)     Increased Heart Rate, Palpitations    Iodinated Diagnostic Agents     Iodine - Food Allergy Other (See Comments)          Magnesium Citrate GI Intolerance      Immunizations:     Immunization History   Administered Date(s) Administered    INFLUENZA 01/13/2015, 10/07/2015, 09/12/2016, 11/09/2017    Influenza Quadrivalent Preservative Free 3 years and older IM 01/13/2015    Influenza Split High Dose Preservative Free IM 10/07/2015, 09/12/2016, 11/09/2017, 09/24/2019    Influenza, high dose seasonal 0 7 mL 01/09/2019    Influenza, seasonal, injectable 11/27/2012, 10/14/2013, 09/19/2014    Pneumococcal Polysaccharide PPV23 02/28/2008    Td (adult), adsorbed 07/26/1998    Zoster 04/23/2014      Health Maintenance: There are no preventive care reminders to display for this patient  Topic Date Due    COVID-19 Vaccine (1) Never done    DTaP,Tdap,and Td Vaccines (1 - Tdap) 09/02/1952    Influenza Vaccine (1) 09/01/2021      Medicare Health Risk Assessment:     /60 (BP Location: Left arm, Patient Position: Sitting, Cuff Size: Child)   Pulse 77   Temp 98 °F (36 7 °C)   Resp 14   Ht 5' 2" (1 575 m)   Wt 57 1 kg (125 lb 12 8 oz)   SpO2 93%   BMI 23 01 kg/m²      Liseth Odonnell is here for her Initial Wellness visit  Health Risk Assessment:   Patient rates overall health as good  Patient feels that their physical health rating is same  Patient is satisfied with their life  Eyesight was rated as same  Hearing was rated as slightly worse  Patient feels that their emotional and mental health rating is same  Patients states they are sometimes angry  Patient states they are never, rarely unusually tired/fatigued  Pain experienced in the last 7 days has been none  Patient states that she has experienced no weight loss or gain in last 6 months  Depression Screening:   PHQ-2 Score: 0      Fall Risk Screening: In the past year, patient has experienced: history of falling in past year    Number of falls: 1  Injured during fall?: No    Feels unsteady when standing or walking?: No    Worried about falling?: No      Urinary Incontinence Screening:   Patient has not leaked urine accidently in the last six months  Home Safety:  Patient does not have trouble with stairs inside or outside of their home  Patient has working smoke alarms and has working carbon monoxide detector  Home safety hazards include: none  Nutrition:   Current diet is Regular  Medications:   Patient is currently taking over-the-counter supplements  OTC medications include: see medication list  Patient is not able to manage medications   Pt is placed in [address] of Daily Living (ADLs)/Instrumental Activities of Daily Living (IADLs):   Walk and transfer into and out of bed and chair?: Yes  Dress and groom yourself?: Yes    Bathe or shower yourself?: Yes    Feed yourself? Yes  Do your laundry/housekeeping?: No  Manage your money, pay your bills and track your expenses?: No  Make your own meals?: No    Do your own shopping?: No    Previous Hospitalizations:   Any hospitalizations or ED visits within the last 12 months?: No      Advance Care Planning:   Living will: Yes    Durable POA for healthcare: Yes    Advanced directive: Yes    Advanced directive counseling given: No    Five wishes given: No    Patient declined ACP directive: No    End of Life Decisions reviewed with patient: Yes    Provider agrees with end of life decisions: Yes      Cognitive Screening:   Provider or family/friend/caregiver concerned regarding cognition?: No    PREVENTIVE SCREENINGS      Cardiovascular Screening:    General: Screening Not Indicated and History Lipid Disorder      Diabetes Screening:     General: Screening Current      Colorectal Cancer Screening:     General: Screening Not Indicated      Breast Cancer Screening:     General: Screening Not Indicated      Cervical Cancer Screening:    General: Screening Not Indicated      Osteoporosis Screening:    General: Screening Not Indicated and History Osteoporosis      Abdominal Aortic Aneurysm (AAA) Screening:        General: Screening Not Indicated      Lung Cancer Screening:     General: Screening Not Indicated      Hepatitis C Screening:    General: Screening Not Indicated    Screening, Brief Intervention, and Referral to Treatment (SBIRT)    Screening  Typical number of drinks in a day: 0  Typical number of drinks in a week: 0  Interpretation: Low risk drinking behavior      Single Item Drug Screening:  How often have you used an illegal drug (including marijuana) or a prescription medication for non-medical reasons in the past year? never    Single Item Drug Screen Score: 0  Interpretation: Negative screen for possible drug use disorder      Sam Ivey DO

## 2021-08-18 NOTE — ED NOTES
Patient and son given dc instructions with verbalizing understanding of such     Damon Blizzard, RN  08/17/21 5304

## 2021-08-18 NOTE — ED PROVIDER NOTES
History  Chief Complaint   Patient presents with    CVA/TIA-like Symptoms     was at a loss for words at 36 today, son expressed concern, stroke like symptoms per [facility], symptoms resolved prior to arrival     HPI     Pt presents from [facility], hx of GERD, HTN, HLD, prior UTI, takes a daily aspirin, was sent in due to "she was confused and had some trouble talking," similar to prior times when she had a UTI, mild intensity and currently resolved  Pt is currently frustrated, "I feel fine and they made me come in "  Pt states she has "pressure in my bladder  I keep having to go "  Pt o/w denies any symptoms  Pt denies ha, fevers, cough, cp, sob, n/v/d/c, abd pain, focal def or syncope  Prior to Admission Medications   Prescriptions Last Dose Informant Patient Reported? Taking?    D3-1000 25 MCG (1000 UT) capsule   No No   Sig: TAKE ONE CAPSULE BY MOUTH ONCE DAILY (VITAMIN D DEFICIENCY)   IBU-200 200 MG tablet   No No   Sig: TAKE ONE TABLET BY MOUTH ONCE DAILY AS NEEDED FOR MIGRAINES/HEADACHE   Melatonin 5 MG CAPS   No No   Sig: TAKE ONE CAPSULE BY MOUTH AT BEDTIME (INSOMNIA)   Melatonin 5 MG TABS   No No   Sig: TAKE ONE TABLET BY MOUTH AT BEDTIME (INSOMNIA)   Nutritional Supplements (Ensure)   No No   Sig: Take 237 mL by mouth daily   Omega-3 Fatty Acids (fish oil) 1,000 mg   No No   Sig: TAKE ONE CAPSULE BY MOUTH ONCE DAILY (SUPPLEMENT)   Oyster Shell 500 MG TABS  Other (Specify) No No   Sig: Take 1 tablet (500 mg total) by mouth 2 (two) times a day   acetaminophen (TYLENOL) 325 mg tablet   No No   Sig: Take 2 tablets (650 mg total) by mouth 3 (three) times a day as needed for mild pain, headaches or fever   amLODIPine (NORVASC) 5 mg tablet   No No   Sig: TAKE ONE TABLET BY MOUTH TWICE A DAY *HOLD FOR SBP<110* (HTN)   aspirin (ECOTRIN LOW STRENGTH) 81 mg EC tablet   No No   Sig: TAKE ONE TABLET BY MOUTH ONCE DAILY (STROKE PREVENTION)   clonazePAM (KlonoPIN) 0 5 mg tablet   No No Sig: TAKE THREE TABLETS (1 5MG) BY MOUTH AT BEDTIME (ANXIETY)   digoxin (LANOXIN) 0 125 mg tablet   No No   Sig: TAKE ONE TABLET BY MOUTH ONCE DAILY (AFIB)   famotidine (PEPCID) 20 mg tablet   No No   Sig: TAKE ONE TABLET BY MOUTH DAILY AT BEDTIME (GERD)   lactobacillus acidophilus-bulgaricus (FLORANEX) packet   No No   Sig: Take 1 packet by mouth 3 (three) times a day with meals   Patient not taking: Reported on 4/21/2021   lisinopril (ZESTRIL) 10 mg tablet   No No   Sig: Take 1 tablet (10 mg total) by mouth daily   Patient not taking: Reported on 4/21/2021   lisinopril (ZESTRIL) 10 mg tablet   No No   Sig: TAKE ONE TABLET BY MOUTH ONCE DAILY (HTN)   magnesium hydroxide (MILK OF MAGNESIA) 800 MG/5ML suspension  Other (Specify) Yes No   Sig: Take 30 mL by mouth daily as needed for constipation   Patient not taking: Reported on 8/17/2021   metoprolol tartrate (LOPRESSOR) 25 mg tablet   No No   Sig: Take 3 tablets (75 mg total) by mouth every 12 (twelve) hours   omeprazole (PriLOSEC OTC) 20 MG tablet   No No   Sig: Take 1 tablet (20 mg total) by mouth daily   polyethylene glycol (GLYCOLAX) 17 GM/SCOOP   No No   Sig: DISSOLVE 17 GRAMS (ONE CAPFUL) IN 8 OUNCES OF WATER AND DRINK BY MOUTH DAILY FOR CONSTIPATION   senna (SENOKOT) 8 6 mg   No No   Sig: TAKE ONE TABLET BY MOUTH ONCE DAILY AT BEDTIME   HOLD FOR DIARRHEA OR LOOSE STOOLS (CONSTIPATION)      Facility-Administered Medications: None       Past Medical History:   Diagnosis Date    Abdominal distension     Last Assessed: [id number]    Abnormal weight loss     Last Assessed: [id number]    Arthritis     Bronchitis     Bursitis of left hip     Lst Assessed: [id number]    Chest pain     Last Assessed: [id number]    Colon polyp     Dysuria     Last Assessed: [id number]    External hemorrhoids     Last Assessed: [id number]    Generalized anxiety disorder     GERD (gastroesophageal reflux disease)     History of echocardiogram 03/20/2018    EF 60%, mild to moderate mitral annular calcification  mild AS, mild TR   Hyperlipidemia     Hypertension     Hypomagnesemia     Last Assessed: [id number]    Lyme disease     Last Assessed: [id number]    Multiple renal cysts 7/5/2019    Nonrheumatic mitral valve regurgitation     Osteoporosis     Pneumonia of right middle lobe due to infectious organism     Last Assessed: [id number]    SVT (supraventricular tachycardia)     Urinary tract infection        Past Surgical History:   Procedure Laterality Date    CATARACT EXTRACTION      HEMORRHOID SURGERY      HYSTERECTOMY      PELVIC FLOOR REPAIR         Family History   Adopted: Yes     I have reviewed and agree with the history as documented  E-Cigarette/Vaping    E-Cigarette Use Never User      E-Cigarette/Vaping Substances    Nicotine No     THC No     CBD No     Flavoring No     Other No     Unknown No      Social History     Tobacco Use    Smoking status: Never Smoker    Smokeless tobacco: Never Used   Vaping Use    Vaping Use: Never used   Substance Use Topics    Alcohol use: Never    Drug use: No       Review of Systems   Constitutional: Negative for activity change, appetite change, diaphoresis, fatigue and fever  HENT: Negative for congestion, facial swelling, mouth sores and trouble swallowing  Eyes: Negative for photophobia, discharge and visual disturbance  Respiratory: Negative for apnea, cough, shortness of breath and wheezing  Cardiovascular: Negative for chest pain and leg swelling  Gastrointestinal: Negative for abdominal pain, constipation, diarrhea, nausea and vomiting  Endocrine: Negative for heat intolerance and polydipsia  Genitourinary: Positive for dysuria, frequency and urgency  Negative for flank pain and hematuria  Musculoskeletal: Negative for back pain, gait problem, myalgias and neck pain  Skin: Negative for rash and wound  Allergic/Immunologic: Negative for immunocompromised state     Neurological: Negative for dizziness, syncope, weakness, light-headedness and headaches  Hematological: Negative for adenopathy  Psychiatric/Behavioral: Positive for confusion  Negative for agitation and self-injury  The patient is not nervous/anxious  Physical Exam  Physical Exam  Vitals and nursing note reviewed  Constitutional:       General: She is not in acute distress  Appearance: She is well-developed  She is not diaphoretic  HENT:      Head: Normocephalic and atraumatic  Right Ear: External ear normal       Left Ear: External ear normal       Nose: Nose normal       Mouth/Throat:      Pharynx: No oropharyngeal exudate  Eyes:      General: No scleral icterus  Right eye: No discharge  Left eye: No discharge  Conjunctiva/sclera: Conjunctivae normal       Pupils: Pupils are equal, round, and reactive to light  Neck:      Thyroid: No thyromegaly  Vascular: No JVD  Trachea: No tracheal deviation  Cardiovascular:      Rate and Rhythm: Normal rate and regular rhythm  Heart sounds: Normal heart sounds  No murmur heard  Pulmonary:      Effort: Pulmonary effort is normal  No respiratory distress  Breath sounds: Normal breath sounds  No stridor  No wheezing or rales  Chest:      Chest wall: No tenderness  Abdominal:      General: Bowel sounds are normal  There is no distension  Palpations: Abdomen is soft  There is no mass  Tenderness: There is no abdominal tenderness  There is no guarding or rebound  Musculoskeletal:         General: No tenderness or deformity  Normal range of motion  Cervical back: Normal range of motion and neck supple  Lymphadenopathy:      Cervical: No cervical adenopathy  Skin:     General: Skin is warm and dry  Coloration: Skin is not pale  Findings: No erythema or rash  Neurological:      Mental Status: She is alert and oriented to person, place, and time  Cranial Nerves: No cranial nerve deficit  Motor: No abnormal muscle tone  Coordination: Coordination normal       Deep Tendon Reflexes: Reflexes are normal and symmetric  Reflexes normal       Comments: Pt is oriented to person, place and time  She is frustrated that she is here in the ED  Psychiatric:         Behavior: Behavior normal          Thought Content:  Thought content normal          Judgment: Judgment normal          Vital Signs  ED Triage Vitals [08/17/21 1828]   Temperature Pulse Respirations Blood Pressure SpO2   97 8 °F (36 6 °C) 80 16 (!) 209/110 95 %      Temp Source Heart Rate Source Patient Position - Orthostatic VS BP Location FiO2 (%)   Temporal Monitor Lying Right arm --      Pain Score       --           Vitals:    08/17/21 1828   BP: (!) 209/110   Pulse: 80   Patient Position - Orthostatic VS: Lying         Visual Acuity      ED Medications  Medications   sodium chloride 0 9 % bolus 1,000 mL (0 mL Intravenous Stopped 8/17/21 1916)   sodium chloride 0 9 % bolus 1,000 mL (0 mL Intravenous Stopped 8/17/21 2107)   sulfamethoxazole-trimethoprim (BACTRIM DS) 800-160 mg per tablet 1 tablet (1 tablet Oral Given 8/17/21 2107)       Diagnostic Studies  Results Reviewed     Procedure Component Value Units Date/Time    Urine culture [[id number]]  (Abnormal)  (Susceptibility) Collected: 08/17/21 2047    Lab Status: Final result Specimen: Urine Updated: 08/20/21 1153     Urine Culture >100,000 cfu/ml Escherichia coli      30,000-39,000 cfu/ml     Susceptibility     Escherichia coli (1)     Antibiotic Interpretation Microscan Method Status    ZID Performed  Yes  FARZANA Final    Amikacin ($$) Susceptible <=16 ug/ml FARZANA Final    Ampicillin ($$) Susceptible <=8 00 ug/ml FARZANA Final    Aztreonam ($$$)  Susceptible <=4 ug/ml FAZRANA Final    Cefazolin ($) Susceptible <=2 00 ug/ml FARZANA Final    Ciprofloxacin ($)  Resistant >2 00 ug/ml FARZANA Final    Ertapenem ($$$) Susceptible <=0 5 ug/ml FARZANA Final    Gentamicin ($$) Intermediate 8 ug/ml FARZANA Final Levofloxacin ($) Resistant >4 00 ug/ml FARZANA Final    Nitrofurantoin Susceptible <=32 ug/ml FARZANA Final    Tetracycline Susceptible <=4 ug/ml FARZANA Final    Tobramycin ($) Susceptible 2 ug/ml FARZANA Final    Trimethoprim + Sulfamethoxazole ($$$) Susceptible <=2/38 ug/ml FARZANA Final                   Urine Microscopic [[id number]]  (Abnormal) Collected: 08/17/21 2047    Lab Status: Final result Specimen: Urine Updated: 08/17/21 2100     RBC, UA       Field obscured, unable to enumerate     /hpf     WBC, UA Innumerable /hpf      Epithelial Cells       Field obscured, unable to enumerate     /hpf     Bacteria, UA       Field obscured, unable to enumerate     /hpf    UA w Reflex to Microscopic w Reflex to Culture [[id number]]  (Abnormal) Collected: 08/17/21 2047    Lab Status: Final result Specimen: Urine Updated: 08/17/21 2055     Color, UA Light Yellow     Clarity, UA Slightly Cloudy     Specific [place], UA 1 010     pH, UA 7 0     Leukocytes, UA Moderate     Nitrite, UA Positive     Protein, UA Trace mg/dl      Glucose, UA Negative mg/dl      Ketones, UA Negative mg/dl      Urobilinogen, UA 0 2 E U /dl      Bilirubin, UA Negative     Blood, UA Trace-Intact    Troponin I [[id number]]  (Normal) Collected: 08/17/21 1835    Lab Status: Final result Specimen: Blood from Arm, Left Updated: 08/17/21 1939     Troponin I <0 02 ng/mL     Lactic acid, plasma [[id number]]  (Abnormal) Collected: 08/17/21 1835    Lab Status: Final result Specimen: Blood from Arm, Left Updated: 08/17/21 1906     LACTIC ACID 2 4 mmol/L     Narrative:      Result may be elevated if tourniquet was used during collection      Lipase [[id number]]  (Normal) Collected: 08/17/21 1835    Lab Status: Final result Specimen: Blood from Arm, Left Updated: 08/17/21 1906     Lipase 168 u/L     NT-BNP PRO [[id number]]  (Normal) Collected: 08/17/21 1835    Lab Status: Final result Specimen: Blood from Arm, Left Updated: 08/17/21 1906     NT-proBNP 169 pg/mL     Magnesium [[id number]]  (Normal) Collected: 08/17/21 1835    Lab Status: Final result Specimen: Blood from Arm, Left Updated: 08/17/21 1906     Magnesium 2 1 mg/dL     Comprehensive metabolic panel [[id number]]  (Abnormal) Collected: 08/17/21 1835    Lab Status: Final result Specimen: Blood from Arm, Left Updated: 08/17/21 1902     Sodium 132 mmol/L      Potassium 4 2 mmol/L      Chloride 96 mmol/L      CO2 27 mmol/L      ANION GAP 9 mmol/L      BUN 14 mg/dL      Creatinine 0 98 mg/dL      Glucose 167 mg/dL      Calcium 9 4 mg/dL      AST 20 U/L      ALT 29 U/L      Alkaline Phosphatase 83 U/L      Total Protein 8 6 g/dL      Albumin 3 6 g/dL      Total Bilirubin 0 25 mg/dL      eGFR 51 ml/min/1 73sq m     Narrative:      [place] guidelines for Chronic Kidney Disease (CKD):     Stage 1 with normal or high GFR (GFR > 90 mL/min/1 73 square meters)    Stage 2 Mild CKD (GFR = 60-89 mL/min/1 73 square meters)    Stage 3A Moderate CKD (GFR = 45-59 mL/min/1 73 square meters)    Stage 3B Moderate CKD (GFR = 30-44 mL/min/1 73 square meters)    Stage 4 Severe CKD (GFR = 15-29 mL/min/1 73 square meters)    Stage 5 End Stage CKD (GFR <15 mL/min/1 73 square meters)  Note: GFR calculation is accurate only with a steady state creatinine    Protime-INR [[id number]]  (Normal) Collected: 08/17/21 1835    Lab Status: Final result Specimen: Blood from Arm, Left Updated: 08/17/21 1853     Protime 12 5 seconds      INR 0 95    APTT [[id number]]  (Normal) Collected: 08/17/21 1835    Lab Status: Final result Specimen: Blood from Arm, Left Updated: 08/17/21 1853     PTT 27 seconds     CBC and differential [[id number]]  (Abnormal) Collected: 08/17/21 1835    Lab Status: Final result Specimen: Blood from Arm, Left Updated: 08/17/21 1843     WBC 6 51 Thousand/uL      RBC 4 86 Million/uL      Hemoglobin 12 8 g/dL      Hematocrit 41 7 %      MCV 86 fL      MCH 26 3 pg      MCHC 30 7 g/dL      RDW 14 6 %      MPV 9 8 fL Platelets 373 Thousands/uL      nRBC 0 /100 WBCs      Neutrophils Relative 55 %      Immat GRANS % 0 %      Lymphocytes Relative 27 %      Monocytes Relative 12 %      Eosinophils Relative 5 %      Basophils Relative 1 %      Neutrophils Absolute 3 62 Thousands/µL      Immature Grans Absolute 0 02 Thousand/uL      Lymphocytes Absolute 1 73 Thousands/µL      Monocytes Absolute 0 75 Thousand/µL      Eosinophils Absolute 0 31 Thousand/µL      Basophils Absolute 0 08 Thousands/µL                  CT head without contrast   Final Result by Judy Root MD (08/17 1944)      No acute intracranial abnormality  Moderate chronic small vessel ischemic changes  Workstation performed: [id number]         X-ray chest 1 view portable   Final Result by Grady Pierce MD (08/18 1011)   Persistent cardiomegaly      No acute cardiopulmonary disease  Stable            Workstation performed: [id number]                    Procedures  Procedures         ED Course                                           MDM  Number of Diagnoses or Management Options  Acute UTI  Diagnosis management comments: IMP: uti versus pyelo, electrolyte abnormality, medication side affect, dehydration, anxiety  Doubt acs, pe, dissection, tamponade, cva, bacteremia, surgical abd process  Plan: cardiac labs, ekg, cxr, ct head, urinalysis, give ivf and po abx prn   - urinalysis shows likely uti  - lactic 2 4  - ekg no acute ischemia  - ct head no acute  - Pt with uti now improved  She does not wish to stay for observation  She will f/up w/ her pcp         Amount and/or Complexity of Data Reviewed  Clinical lab tests: ordered and reviewed  Tests in the radiology section of CPT®: ordered and reviewed  Tests in the medicine section of CPT®: ordered and reviewed  Decide to obtain previous medical records or to obtain history from someone other than the patient: yes  Obtain history from someone other than the patient: yes ([facility] staff and patient's son)  Review and summarize past medical records: yes  Independent visualization of images, tracings, or specimens: yes    Risk of Complications, Morbidity, and/or Mortality  Presenting problems: high  Diagnostic procedures: high  Management options: high    Patient Progress  Patient progress: improved      Disposition  Final diagnoses:   Acute UTI     Time reflects when diagnosis was documented in both MDM as applicable and the Disposition within this note     Time User Action Codes Description Comment    8/17/2021  9:04 PM Chela Wisdom Add [N39 0] Acute UTI       ED Disposition     ED Disposition Condition Date/Time Comment    Discharge Stable Tue Aug 17, 2021  9:04 PM Jong Hillman discharge to home/self care              Follow-up Information     Follow up With Specialties Details Why [address], DO Internal Medicine Schedule an appointment as soon as possible for a visit in 1 day Return immediately, If symptoms worsen Edward  [address]  [phone]            Discharge Medication List as of 8/17/2021  9:05 PM      START taking these medications    Details   sulfamethoxazole-trimethoprim (BACTRIM DS) 800-160 mg per tablet Take 1 tablet by mouth 2 (two) times a day for 7 days smx-tmp DS (BACTRIM) 800-160 mg tabs (1tab q12 D10), Starting Tue 8/17/2021, Until Tue 8/24/2021, Normal         CONTINUE these medications which have NOT CHANGED    Details   acetaminophen (TYLENOL) 325 mg tablet Take 2 tablets (650 mg total) by mouth 3 (three) times a day as needed for mild pain, headaches or fever, Starting Tue 6/1/2021, Until Wed 6/1/2022, No Print      amLODIPine (NORVASC) 5 mg tablet TAKE ONE TABLET BY MOUTH TWICE A DAY *HOLD FOR SBP<110* (HTN), Normal      aspirin (ECOTRIN LOW STRENGTH) 81 mg EC tablet TAKE ONE TABLET BY MOUTH ONCE DAILY (STROKE PREVENTION), Normal      clonazePAM (KlonoPIN) 0 5 mg tablet TAKE THREE TABLETS (1 5MG) BY MOUTH AT BEDTIME (ANXIETY), Normal D3-1000 25 MCG (1000 UT) capsule TAKE ONE CAPSULE BY MOUTH ONCE DAILY (VITAMIN D DEFICIENCY), Normal      digoxin (LANOXIN) 0 125 mg tablet TAKE ONE TABLET BY MOUTH ONCE DAILY (AFIB), Normal      famotidine (PEPCID) 20 mg tablet TAKE ONE TABLET BY MOUTH DAILY AT BEDTIME (GERD), Normal      IBU-200 200 MG tablet TAKE ONE TABLET BY MOUTH ONCE DAILY AS NEEDED FOR MIGRAINES/HEADACHE, Normal      lactobacillus acidophilus-bulgaricus (FLORANEX) packet Take 1 packet by mouth 3 (three) times a day with meals, Starting Mon 3/22/2021, Normal      lisinopril (ZESTRIL) 10 mg tablet TAKE ONE TABLET BY MOUTH ONCE DAILY (HTN), Normal      magnesium hydroxide (MILK OF MAGNESIA) 800 MG/5ML suspension Take 30 mL by mouth daily as needed for constipation, Historical Med      Melatonin 5 MG CAPS TAKE ONE CAPSULE BY MOUTH AT BEDTIME (INSOMNIA), Normal      Melatonin 5 MG TABS TAKE ONE TABLET BY MOUTH AT BEDTIME (INSOMNIA), Normal      metoprolol tartrate (LOPRESSOR) 25 mg tablet Take 3 tablets (75 mg total) by mouth every 12 (twelve) hours, Starting Fri 6/11/2021, Until Wed 12/8/2021, Normal      Nutritional Supplements (Ensure) Take 237 mL by mouth daily, Starting Wed 4/21/2021, Until Mon 10/18/2021, Normal      Omega-3 Fatty Acids (fish oil) 1,000 mg TAKE ONE CAPSULE BY MOUTH ONCE DAILY (SUPPLEMENT), Normal      omeprazole (PriLOSEC OTC) 20 MG tablet Take 1 tablet (20 mg total) by mouth daily, Starting Tue 4/13/2021, Normal      Oyster Shell 500 MG TABS Take 1 tablet (500 mg total) by mouth 2 (two) times a day, Starting Thu 9/5/2019, Normal      polyethylene glycol (GLYCOLAX) 17 GM/SCOOP DISSOLVE 17 GRAMS (ONE CAPFUL) IN 8 OUNCES OF WATER AND DRINK BY MOUTH DAILY FOR CONSTIPATION, Normal      senna (SENOKOT) 8 6 mg TAKE ONE TABLET BY MOUTH ONCE DAILY AT BEDTIME  HOLD FOR DIARRHEA OR LOOSE STOOLS (CONSTIPATION), Normal           No discharge procedures on file      PDMP Review       Value Time User    PDMP Reviewed  Yes 6/30/2021 1:27 PM Olga Royalty O&#39;DO Shaheed          ED Provider  Electronically Signed by           Dmitry Levine DO  08/21/21 9107

## 2022-01-01 ENCOUNTER — HOME CARE VISIT (OUTPATIENT)
Dept: HOME HOSPICE | Facility: HOSPICE | Age: 87
End: 2022-01-01
Payer: MEDICARE

## 2022-01-01 ENCOUNTER — APPOINTMENT (EMERGENCY)
Dept: CT IMAGING | Facility: HOSPITAL | Age: 87
DRG: 521 | End: 2022-01-01
Payer: MEDICARE

## 2022-01-01 ENCOUNTER — HOME CARE VISIT (OUTPATIENT)
Dept: HOME HEALTH SERVICES | Facility: HOME HEALTHCARE | Age: 87
End: 2022-01-01
Payer: MEDICARE

## 2022-01-01 ENCOUNTER — APPOINTMENT (INPATIENT)
Dept: RADIOLOGY | Facility: HOSPITAL | Age: 87
DRG: 521 | End: 2022-01-01
Payer: MEDICARE

## 2022-01-01 ENCOUNTER — TRANSITIONAL CARE MANAGEMENT (OUTPATIENT)
Dept: INTERNAL MEDICINE CLINIC | Facility: CLINIC | Age: 87
End: 2022-01-01

## 2022-01-01 ENCOUNTER — OFFICE VISIT (OUTPATIENT)
Dept: UROLOGY | Facility: CLINIC | Age: 87
End: 2022-01-01
Payer: MEDICARE

## 2022-01-01 ENCOUNTER — OFFICE VISIT (OUTPATIENT)
Dept: OBGYN CLINIC | Facility: CLINIC | Age: 87
End: 2022-01-01

## 2022-01-01 ENCOUNTER — TELEPHONE (OUTPATIENT)
Dept: OBGYN CLINIC | Facility: HOSPITAL | Age: 87
End: 2022-01-01

## 2022-01-01 ENCOUNTER — APPOINTMENT (OUTPATIENT)
Dept: RADIOLOGY | Facility: MEDICAL CENTER | Age: 87
End: 2022-01-01
Payer: COMMERCIAL

## 2022-01-01 ENCOUNTER — APPOINTMENT (EMERGENCY)
Dept: RADIOLOGY | Facility: HOSPITAL | Age: 87
DRG: 521 | End: 2022-01-01
Payer: MEDICARE

## 2022-01-01 ENCOUNTER — APPOINTMENT (INPATIENT)
Dept: NON INVASIVE DIAGNOSTICS | Facility: HOSPITAL | Age: 87
DRG: 521 | End: 2022-01-01
Payer: MEDICARE

## 2022-01-01 ENCOUNTER — APPOINTMENT (OUTPATIENT)
Dept: RADIOLOGY | Facility: CLINIC | Age: 87
End: 2022-01-01
Payer: MEDICARE

## 2022-01-01 ENCOUNTER — TELEPHONE (OUTPATIENT)
Dept: UROLOGY | Facility: AMBULATORY SURGERY CENTER | Age: 87
End: 2022-01-01

## 2022-01-01 ENCOUNTER — TELEPHONE (OUTPATIENT)
Dept: PALLIATIVE MEDICINE | Facility: HOSPITAL | Age: 87
End: 2022-01-01

## 2022-01-01 ENCOUNTER — TRANSCRIBE ORDERS (OUTPATIENT)
Dept: HOME HEALTH SERVICES | Facility: HOME HEALTHCARE | Age: 87
End: 2022-01-01

## 2022-01-01 ENCOUNTER — TELEPHONE (OUTPATIENT)
Dept: OTHER | Facility: OTHER | Age: 87
End: 2022-01-01

## 2022-01-01 ENCOUNTER — HOSPICE ADMISSION (OUTPATIENT)
Dept: HOME HOSPICE | Facility: HOSPICE | Age: 87
End: 2022-01-01
Payer: MEDICARE

## 2022-01-01 ENCOUNTER — HOSPITAL ENCOUNTER (INPATIENT)
Facility: HOSPITAL | Age: 87
LOS: 6 days | Discharge: NURSING, REHAB OR LONG-TERM CARE | DRG: 521 | End: 2022-01-22
Attending: EMERGENCY MEDICINE | Admitting: INTERNAL MEDICINE
Payer: MEDICARE

## 2022-01-01 ENCOUNTER — TELEPHONE (OUTPATIENT)
Dept: ADMINISTRATIVE | Facility: HOSPITAL | Age: 87
End: 2022-01-01

## 2022-01-01 ENCOUNTER — ANESTHESIA EVENT (INPATIENT)
Dept: PERIOP | Facility: HOSPITAL | Age: 87
DRG: 521 | End: 2022-01-01
Payer: MEDICARE

## 2022-01-01 ENCOUNTER — ANESTHESIA (INPATIENT)
Dept: PERIOP | Facility: HOSPITAL | Age: 87
DRG: 521 | End: 2022-01-01
Payer: MEDICARE

## 2022-01-01 VITALS
TEMPERATURE: 98.7 F | SYSTOLIC BLOOD PRESSURE: 100 MMHG | RESPIRATION RATE: 18 BRPM | DIASTOLIC BLOOD PRESSURE: 56 MMHG | HEART RATE: 100 BPM

## 2022-01-01 VITALS
BODY MASS INDEX: 20.65 KG/M2 | WEIGHT: 112.21 LBS | TEMPERATURE: 99.3 F | DIASTOLIC BLOOD PRESSURE: 68 MMHG | HEIGHT: 62 IN | SYSTOLIC BLOOD PRESSURE: 140 MMHG | RESPIRATION RATE: 21 BRPM | OXYGEN SATURATION: 99 % | HEART RATE: 88 BPM

## 2022-01-01 VITALS — HEIGHT: 62 IN | BODY MASS INDEX: 20.52 KG/M2

## 2022-01-01 VITALS — RESPIRATION RATE: 22 BRPM | TEMPERATURE: 100.1 F | HEART RATE: 130 BPM

## 2022-01-01 VITALS
RESPIRATION RATE: 16 BRPM | SYSTOLIC BLOOD PRESSURE: 118 MMHG | TEMPERATURE: 98.5 F | DIASTOLIC BLOOD PRESSURE: 64 MMHG | HEART RATE: 76 BPM

## 2022-01-01 VITALS — BODY MASS INDEX: 20.52 KG/M2 | HEIGHT: 62 IN

## 2022-01-01 VITALS — HEART RATE: 100 BPM | SYSTOLIC BLOOD PRESSURE: 160 MMHG | DIASTOLIC BLOOD PRESSURE: 40 MMHG | TEMPERATURE: 100 F

## 2022-01-01 VITALS — HEART RATE: 88 BPM | DIASTOLIC BLOOD PRESSURE: 88 MMHG | SYSTOLIC BLOOD PRESSURE: 138 MMHG

## 2022-01-01 VITALS — DIASTOLIC BLOOD PRESSURE: 51 MMHG | SYSTOLIC BLOOD PRESSURE: 130 MMHG | HEART RATE: 71 BPM

## 2022-01-01 DIAGNOSIS — E87.1 HYPONATREMIA: ICD-10-CM

## 2022-01-01 DIAGNOSIS — S72.002D CLOSED FRACTURE OF NECK OF LEFT FEMUR WITH ROUTINE HEALING, SUBSEQUENT ENCOUNTER: Primary | ICD-10-CM

## 2022-01-01 DIAGNOSIS — F41.1 GENERALIZED ANXIETY DISORDER: ICD-10-CM

## 2022-01-01 DIAGNOSIS — Z51.5 HOSPICE CARE PATIENT: Primary | ICD-10-CM

## 2022-01-01 DIAGNOSIS — L89.629 DECUBITUS ULCER OF HEEL, BILATERAL: ICD-10-CM

## 2022-01-01 DIAGNOSIS — M25.552 PAIN IN LEFT HIP: ICD-10-CM
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
## 2022-01-01 DIAGNOSIS — Z09 POSTOP CHECK: ICD-10-CM

## 2022-01-01 DIAGNOSIS — Z96.649 STATUS POST HIP HEMIARTHROPLASTY: Primary | ICD-10-CM

## 2022-01-01 DIAGNOSIS — N39.0 RECURRENT UTI: ICD-10-CM

## 2022-01-01 DIAGNOSIS — L89.619 DECUBITUS ULCER OF HEEL, BILATERAL: ICD-10-CM

## 2022-01-01 DIAGNOSIS — J96.01 ACUTE RESPIRATORY FAILURE WITH HYPOXIA (HCC): ICD-10-CM

## 2022-01-01 DIAGNOSIS — I50.31 ACUTE DIASTOLIC CHF (CONGESTIVE HEART FAILURE) (HCC): ICD-10-CM

## 2022-01-01 DIAGNOSIS — Z96.642 STATUS POST LEFT HIP REPLACEMENT: ICD-10-CM

## 2022-01-01 DIAGNOSIS — S72.032A: ICD-10-CM

## 2022-01-01 DIAGNOSIS — Z96.649 STATUS POST HIP HEMIARTHROPLASTY: ICD-10-CM

## 2022-01-01 DIAGNOSIS — S72.002A CLOSED FRACTURE OF NECK OF LEFT FEMUR, INITIAL ENCOUNTER (HCC): ICD-10-CM

## 2022-01-01 DIAGNOSIS — D62 ACUTE BLOOD LOSS ANEMIA: ICD-10-CM

## 2022-01-01 DIAGNOSIS — I10 BENIGN ESSENTIAL HTN: ICD-10-CM

## 2022-01-01 DIAGNOSIS — Z01.818 PREOPERATIVE CLEARANCE: ICD-10-CM

## 2022-01-01 DIAGNOSIS — R33.9 RETENTION, URINE: Primary | ICD-10-CM

## 2022-01-01 DIAGNOSIS — I10 BENIGN ESSENTIAL HTN: Primary | ICD-10-CM

## 2022-01-01 DIAGNOSIS — G96.9 CENTRAL NERVOUS SYSTEM COMPLICATION: Primary | ICD-10-CM

## 2022-01-01 LAB
ABO GROUP BLD: NORMAL
ABO GROUP BLD: NORMAL
ALBUMIN SERPL BCP-MCNC: 2.7 G/DL (ref 3.5–5)
ALP SERPL-CCNC: 54 U/L (ref 46–116)
ALT SERPL W P-5'-P-CCNC: 24 U/L (ref 12–78)
ANION GAP SERPL CALCULATED.3IONS-SCNC: 10 MMOL/L (ref 4–13)
ANION GAP SERPL CALCULATED.3IONS-SCNC: 4 MMOL/L (ref 4–13)
ANION GAP SERPL CALCULATED.3IONS-SCNC: 5 MMOL/L (ref 4–13)
ANION GAP SERPL CALCULATED.3IONS-SCNC: 5 MMOL/L (ref 4–13)
ANION GAP SERPL CALCULATED.3IONS-SCNC: 6 MMOL/L (ref 4–13)
ANION GAP SERPL CALCULATED.3IONS-SCNC: 6 MMOL/L (ref 4–13)
ANION GAP SERPL CALCULATED.3IONS-SCNC: 7 MMOL/L (ref 4–13)
ANION GAP SERPL CALCULATED.3IONS-SCNC: 8 MMOL/L (ref 4–13)
ANION GAP SERPL CALCULATED.3IONS-SCNC: 9 MMOL/L (ref 4–13)
AORTIC ROOT: 2.8 CM
AORTIC VALVE MEAN VELOCITY: 11.2 M/S
AST SERPL W P-5'-P-CCNC: 25 U/L (ref 5–45)
ATRIAL RATE: 60 BPM
ATRIAL RATE: 71 BPM
ATRIAL RATE: 76 BPM
AV AREA BY CONTINUOUS VTI: 1.9 CM2
AV AREA PEAK VELOCITY: 1.8 CM2
AV LVOT MEAN GRADIENT: 2 MMHG
AV LVOT PEAK GRADIENT: 4 MMHG
AV MEAN GRADIENT: 6 MMHG
AV PEAK GRADIENT: 12 MMHG
AV VALVE AREA: 1.86 CM2
AV VELOCITY RATIO: 0.54
BACTERIA UR QL AUTO: NORMAL /HPF
BASOPHILS # BLD AUTO: 0.04 THOUSANDS/ΜL (ref 0–0.1)
BASOPHILS # BLD AUTO: 0.07 THOUSANDS/ΜL (ref 0–0.1)
BASOPHILS # BLD AUTO: 0.08 THOUSANDS/ΜL (ref 0–0.1)
BASOPHILS NFR BLD AUTO: 1 % (ref 0–1)
BILIRUB SERPL-MCNC: 0.48 MG/DL (ref 0.2–1)
BILIRUB UR QL STRIP: NEGATIVE
BLD GP AB SCN SERPL QL: NEGATIVE
BLD GP AB SCN SERPL QL: NEGATIVE
BUN SERPL-MCNC: 10 MG/DL (ref 5–25)
BUN SERPL-MCNC: 11 MG/DL (ref 5–25)
BUN SERPL-MCNC: 11 MG/DL (ref 5–25)
BUN SERPL-MCNC: 12 MG/DL (ref 5–25)
BUN SERPL-MCNC: 13 MG/DL (ref 5–25)
BUN SERPL-MCNC: 15 MG/DL (ref 5–25)
BUN SERPL-MCNC: 20 MG/DL (ref 5–25)
BUN SERPL-MCNC: 21 MG/DL (ref 5–25)
BUN SERPL-MCNC: 23 MG/DL (ref 5–25)
BUN SERPL-MCNC: 9 MG/DL (ref 5–25)
CALCIUM ALBUM COR SERPL-MCNC: 8.5 MG/DL (ref 8.3–10.1)
CALCIUM SERPL-MCNC: 10.3 MG/DL (ref 8.3–10.1)
CALCIUM SERPL-MCNC: 7.5 MG/DL (ref 8.3–10.1)
CALCIUM SERPL-MCNC: 8.3 MG/DL (ref 8.3–10.1)
CALCIUM SERPL-MCNC: 8.9 MG/DL (ref 8.3–10.1)
CALCIUM SERPL-MCNC: 9 MG/DL (ref 8.3–10.1)
CALCIUM SERPL-MCNC: 9.2 MG/DL (ref 8.3–10.1)
CALCIUM SERPL-MCNC: 9.3 MG/DL (ref 8.3–10.1)
CALCIUM SERPL-MCNC: 9.3 MG/DL (ref 8.3–10.1)
CALCIUM SERPL-MCNC: 9.4 MG/DL (ref 8.3–10.1)
CALCIUM SERPL-MCNC: 9.4 MG/DL (ref 8.3–10.1)
CALCIUM SERPL-MCNC: 9.6 MG/DL (ref 8.3–10.1)
CALCIUM SERPL-MCNC: 9.7 MG/DL (ref 8.3–10.1)
CHLORIDE SERPL-SCNC: 84 MMOL/L (ref 100–108)
CHLORIDE SERPL-SCNC: 84 MMOL/L (ref 100–108)
CHLORIDE SERPL-SCNC: 85 MMOL/L (ref 100–108)
CHLORIDE SERPL-SCNC: 86 MMOL/L (ref 100–108)
CHLORIDE SERPL-SCNC: 86 MMOL/L (ref 100–108)
CHLORIDE SERPL-SCNC: 87 MMOL/L (ref 100–108)
CHLORIDE SERPL-SCNC: 89 MMOL/L (ref 100–108)
CHLORIDE SERPL-SCNC: 91 MMOL/L (ref 100–108)
CHLORIDE SERPL-SCNC: 92 MMOL/L (ref 100–108)
CHLORIDE SERPL-SCNC: 92 MMOL/L (ref 100–108)
CHLORIDE SERPL-SCNC: 93 MMOL/L (ref 100–108)
CHLORIDE SERPL-SCNC: 93 MMOL/L (ref 100–108)
CHLORIDE SERPL-SCNC: 97 MMOL/L (ref 100–108)
CHLORIDE SERPL-SCNC: 97 MMOL/L (ref 100–108)
CHLORIDE SERPL-SCNC: 99 MMOL/L (ref 100–108)
CLARITY UR: CLEAR
CO2 SERPL-SCNC: 23 MMOL/L (ref 21–32)
CO2 SERPL-SCNC: 25 MMOL/L (ref 21–32)
CO2 SERPL-SCNC: 28 MMOL/L (ref 21–32)
CO2 SERPL-SCNC: 29 MMOL/L (ref 21–32)
CO2 SERPL-SCNC: 29 MMOL/L (ref 21–32)
CO2 SERPL-SCNC: 30 MMOL/L (ref 21–32)
CO2 SERPL-SCNC: 31 MMOL/L (ref 21–32)
COLOR UR: YELLOW
CORTIS AM PEAK SERPL-MCNC: 27.1 UG/DL (ref 4.2–22.4)
CREAT SERPL-MCNC: 0.76 MG/DL (ref 0.6–1.3)
CREAT SERPL-MCNC: 0.83 MG/DL (ref 0.6–1.3)
CREAT SERPL-MCNC: 0.9 MG/DL (ref 0.6–1.3)
CREAT SERPL-MCNC: 0.94 MG/DL (ref 0.6–1.3)
CREAT SERPL-MCNC: 0.94 MG/DL (ref 0.6–1.3)
CREAT SERPL-MCNC: 0.96 MG/DL (ref 0.6–1.3)
CREAT SERPL-MCNC: 0.96 MG/DL (ref 0.6–1.3)
CREAT SERPL-MCNC: 1 MG/DL (ref 0.6–1.3)
CREAT SERPL-MCNC: 1.01 MG/DL (ref 0.6–1.3)
CREAT SERPL-MCNC: 1.02 MG/DL (ref 0.6–1.3)
CREAT SERPL-MCNC: 1.03 MG/DL (ref 0.6–1.3)
CREAT SERPL-MCNC: 1.07 MG/DL (ref 0.6–1.3)
CREAT SERPL-MCNC: 1.12 MG/DL (ref 0.6–1.3)
CREAT SERPL-MCNC: 1.16 MG/DL (ref 0.6–1.3)
CREAT SERPL-MCNC: 1.29 MG/DL (ref 0.6–1.3)
CREAT UR-MCNC: 118 MG/DL
DOP CALC AO PEAK VEL: 1.74 M/S
DOP CALC AO VTI: 39.57 CM
DOP CALC LVOT AREA: 3.14 CM2
DOP CALC LVOT DIAMETER: 2 CM
DOP CALC LVOT PEAK VEL VTI: 23.38 CM
DOP CALC LVOT PEAK VEL: 0.94 M/S
DOP CALC LVOT STROKE INDEX: 44.6 ML/M2
DOP CALC LVOT STROKE VOLUME: 73.41 CM3
E WAVE DECELERATION TIME: 389 MS
EOSINOPHIL # BLD AUTO: 0.16 THOUSAND/ΜL (ref 0–0.61)
EOSINOPHIL # BLD AUTO: 0.26 THOUSAND/ΜL (ref 0–0.61)
EOSINOPHIL # BLD AUTO: 0.31 THOUSAND/ΜL (ref 0–0.61)
EOSINOPHIL NFR BLD AUTO: 2 % (ref 0–6)
EOSINOPHIL NFR BLD AUTO: 4 % (ref 0–6)
EOSINOPHIL NFR BLD AUTO: 5 % (ref 0–6)
ERYTHROCYTE [DISTWIDTH] IN BLOOD BY AUTOMATED COUNT: 14 % (ref 11.6–15.1)
ERYTHROCYTE [DISTWIDTH] IN BLOOD BY AUTOMATED COUNT: 14.2 % (ref 11.6–15.1)
ERYTHROCYTE [DISTWIDTH] IN BLOOD BY AUTOMATED COUNT: 14.6 % (ref 11.6–15.1)
ERYTHROCYTE [DISTWIDTH] IN BLOOD BY AUTOMATED COUNT: 14.7 % (ref 11.6–15.1)
ERYTHROCYTE [DISTWIDTH] IN BLOOD BY AUTOMATED COUNT: 14.8 % (ref 11.6–15.1)
ERYTHROCYTE [DISTWIDTH] IN BLOOD BY AUTOMATED COUNT: 14.8 % (ref 11.6–15.1)
FLUAV RNA RESP QL NAA+PROBE: NEGATIVE
FLUAV RNA RESP QL NAA+PROBE: NEGATIVE
FLUBV RNA RESP QL NAA+PROBE: NEGATIVE
FLUBV RNA RESP QL NAA+PROBE: NEGATIVE
FRACTIONAL SHORTENING: 33 % (ref 28–44)
GFR SERPL CREATININE-BSD FRML MDRD: 36 ML/MIN/1.73SQ M
GFR SERPL CREATININE-BSD FRML MDRD: 41 ML/MIN/1.73SQ M
GFR SERPL CREATININE-BSD FRML MDRD: 43 ML/MIN/1.73SQ M
GFR SERPL CREATININE-BSD FRML MDRD: 45 ML/MIN/1.73SQ M
GFR SERPL CREATININE-BSD FRML MDRD: 47 ML/MIN/1.73SQ M
GFR SERPL CREATININE-BSD FRML MDRD: 48 ML/MIN/1.73SQ M
GFR SERPL CREATININE-BSD FRML MDRD: 49 ML/MIN/1.73SQ M
GFR SERPL CREATININE-BSD FRML MDRD: 49 ML/MIN/1.73SQ M
GFR SERPL CREATININE-BSD FRML MDRD: 52 ML/MIN/1.73SQ M
GFR SERPL CREATININE-BSD FRML MDRD: 52 ML/MIN/1.73SQ M
GFR SERPL CREATININE-BSD FRML MDRD: 53 ML/MIN/1.73SQ M
GFR SERPL CREATININE-BSD FRML MDRD: 53 ML/MIN/1.73SQ M
GFR SERPL CREATININE-BSD FRML MDRD: 56 ML/MIN/1.73SQ M
GFR SERPL CREATININE-BSD FRML MDRD: 62 ML/MIN/1.73SQ M
GFR SERPL CREATININE-BSD FRML MDRD: 69 ML/MIN/1.73SQ M
GLUCOSE SERPL-MCNC: 101 MG/DL (ref 65–140)
GLUCOSE SERPL-MCNC: 117 MG/DL (ref 65–140)
GLUCOSE SERPL-MCNC: 119 MG/DL (ref 65–140)
GLUCOSE SERPL-MCNC: 122 MG/DL (ref 65–140)
GLUCOSE SERPL-MCNC: 123 MG/DL (ref 65–140)
GLUCOSE SERPL-MCNC: 124 MG/DL (ref 65–140)
GLUCOSE SERPL-MCNC: 124 MG/DL (ref 65–140)
GLUCOSE SERPL-MCNC: 128 MG/DL (ref 65–140)
GLUCOSE SERPL-MCNC: 129 MG/DL (ref 65–140)
GLUCOSE SERPL-MCNC: 138 MG/DL (ref 65–140)
GLUCOSE SERPL-MCNC: 138 MG/DL (ref 65–140)
GLUCOSE SERPL-MCNC: 139 MG/DL (ref 65–140)
GLUCOSE SERPL-MCNC: 144 MG/DL (ref 65–140)
GLUCOSE SERPL-MCNC: 152 MG/DL (ref 65–140)
GLUCOSE SERPL-MCNC: 191 MG/DL (ref 65–140)
GLUCOSE SERPL-MCNC: 99 MG/DL (ref 65–140)
GLUCOSE UR STRIP-MCNC: NEGATIVE MG/DL
HCT VFR BLD AUTO: 32.4 % (ref 34.8–46.1)
HCT VFR BLD AUTO: 36.2 % (ref 34.8–46.1)
HCT VFR BLD AUTO: 37 % (ref 34.8–46.1)
HCT VFR BLD AUTO: 38 % (ref 34.8–46.1)
HCT VFR BLD AUTO: 39 % (ref 34.8–46.1)
HCT VFR BLD AUTO: 42.2 % (ref 34.8–46.1)
HGB BLD-MCNC: 10.1 G/DL (ref 11.5–15.4)
HGB BLD-MCNC: 11.8 G/DL (ref 11.5–15.4)
HGB BLD-MCNC: 12.2 G/DL (ref 11.5–15.4)
HGB BLD-MCNC: 12.7 G/DL (ref 11.5–15.4)
HGB BLD-MCNC: 12.7 G/DL (ref 11.5–15.4)
HGB BLD-MCNC: 13 G/DL (ref 11.5–15.4)
HGB UR QL STRIP.AUTO: ABNORMAL
IMM GRANULOCYTES # BLD AUTO: 0.02 THOUSAND/UL (ref 0–0.2)
IMM GRANULOCYTES # BLD AUTO: 0.02 THOUSAND/UL (ref 0–0.2)
IMM GRANULOCYTES # BLD AUTO: 0.03 THOUSAND/UL (ref 0–0.2)
IMM GRANULOCYTES NFR BLD AUTO: 0 % (ref 0–2)
INR PPP: 0.99 (ref 0.84–1.19)
INTERVENTRICULAR SEPTUM IN DIASTOLE (PARASTERNAL SHORT AXIS VIEW): 1.3 CM
KETONES UR STRIP-MCNC: NEGATIVE MG/DL
LEFT ATRIUM SIZE: 3.2 CM
LEFT INTERNAL DIMENSION IN SYSTOLE: 2.7 CM (ref 2.1–4)
LEFT VENTRICULAR INTERNAL DIMENSION IN DIASTOLE: 4 CM (ref 3.72–5.54)
LEFT VENTRICULAR POSTERIOR WALL IN END DIASTOLE: 1.4 CM
LEFT VENTRICULAR STROKE VOLUME: 42 ML
LEUKOCYTE ESTERASE UR QL STRIP: NEGATIVE
LYMPHOCYTES # BLD AUTO: 0.58 THOUSANDS/ΜL (ref 0.6–4.47)
LYMPHOCYTES # BLD AUTO: 1.02 THOUSANDS/ΜL (ref 0.6–4.47)
LYMPHOCYTES # BLD AUTO: 1.3 THOUSANDS/ΜL (ref 0.6–4.47)
LYMPHOCYTES NFR BLD AUTO: 14 % (ref 14–44)
LYMPHOCYTES NFR BLD AUTO: 18 % (ref 14–44)
LYMPHOCYTES NFR BLD AUTO: 9 % (ref 14–44)
MAGNESIUM SERPL-MCNC: 1.5 MG/DL (ref 1.6–2.6)
MAGNESIUM SERPL-MCNC: 2 MG/DL (ref 1.6–2.6)
MAGNESIUM SERPL-MCNC: 2.1 MG/DL (ref 1.6–2.6)
MCH RBC QN AUTO: 27.2 PG (ref 26.8–34.3)
MCH RBC QN AUTO: 27.4 PG (ref 26.8–34.3)
MCH RBC QN AUTO: 27.8 PG (ref 26.8–34.3)
MCH RBC QN AUTO: 27.9 PG (ref 26.8–34.3)
MCH RBC QN AUTO: 28 PG (ref 26.8–34.3)
MCH RBC QN AUTO: 28.6 PG (ref 26.8–34.3)
MCHC RBC AUTO-ENTMCNC: 30.8 G/DL (ref 31.4–37.4)
MCHC RBC AUTO-ENTMCNC: 31.2 G/DL (ref 31.4–37.4)
MCHC RBC AUTO-ENTMCNC: 32.6 G/DL (ref 31.4–37.4)
MCHC RBC AUTO-ENTMCNC: 32.6 G/DL (ref 31.4–37.4)
MCHC RBC AUTO-ENTMCNC: 33 G/DL (ref 31.4–37.4)
MCHC RBC AUTO-ENTMCNC: 33.4 G/DL (ref 31.4–37.4)
MCV RBC AUTO: 82 FL (ref 82–98)
MCV RBC AUTO: 83 FL (ref 82–98)
MCV RBC AUTO: 86 FL (ref 82–98)
MCV RBC AUTO: 88 FL (ref 82–98)
MCV RBC AUTO: 88 FL (ref 82–98)
MCV RBC AUTO: 91 FL (ref 82–98)
MONOCYTES # BLD AUTO: 0.6 THOUSAND/ΜL (ref 0.17–1.22)
MONOCYTES # BLD AUTO: 0.68 THOUSAND/ΜL (ref 0.17–1.22)
MONOCYTES # BLD AUTO: 0.81 THOUSAND/ΜL (ref 0.17–1.22)
MONOCYTES NFR BLD AUTO: 11 % (ref 4–12)
MONOCYTES NFR BLD AUTO: 11 % (ref 4–12)
MONOCYTES NFR BLD AUTO: 8 % (ref 4–12)
MRSA NOSE QL CULT: NORMAL
MV E'TISSUE VEL-SEP: 5 CM/S
MV PEAK A VEL: 1.24 M/S
MV PEAK E VEL: 91 CM/S
NEUTROPHILS # BLD AUTO: 4.75 THOUSANDS/ΜL (ref 1.85–7.62)
NEUTROPHILS # BLD AUTO: 5.12 THOUSANDS/ΜL (ref 1.85–7.62)
NEUTROPHILS # BLD AUTO: 5.34 THOUSANDS/ΜL (ref 1.85–7.62)
NEUTS SEG NFR BLD AUTO: 70 % (ref 43–75)
NEUTS SEG NFR BLD AUTO: 71 % (ref 43–75)
NEUTS SEG NFR BLD AUTO: 74 % (ref 43–75)
NITRITE UR QL STRIP: NEGATIVE
NON-SQ EPI CELLS URNS QL MICRO: NORMAL /HPF
NRBC BLD AUTO-RTO: 0 /100 WBCS
NT-PROBNP SERPL-MCNC: 215 PG/ML
OSMOLALITY UR: 141 MMOL/KG
OSMOLALITY UR: 418 MMOL/KG
P AXIS: 67 DEGREES
P AXIS: 75 DEGREES
P AXIS: 79 DEGREES
PH UR STRIP.AUTO: 7 [PH]
PHOSPHATE SERPL-MCNC: 3.7 MG/DL (ref 2.3–4.1)
PLATELET # BLD AUTO: 238 THOUSANDS/UL (ref 149–390)
PLATELET # BLD AUTO: 251 THOUSANDS/UL (ref 149–390)
PLATELET # BLD AUTO: 251 THOUSANDS/UL (ref 149–390)
PLATELET # BLD AUTO: 256 THOUSANDS/UL (ref 149–390)
PLATELET # BLD AUTO: 269 THOUSANDS/UL (ref 149–390)
PLATELET # BLD AUTO: 292 THOUSANDS/UL (ref 149–390)
PMV BLD AUTO: 8.9 FL (ref 8.9–12.7)
PMV BLD AUTO: 9 FL (ref 8.9–12.7)
PMV BLD AUTO: 9 FL (ref 8.9–12.7)
PMV BLD AUTO: 9.3 FL (ref 8.9–12.7)
PMV BLD AUTO: 9.4 FL (ref 8.9–12.7)
PMV BLD AUTO: 9.6 FL (ref 8.9–12.7)
POST-VOID RESIDUAL VOLUME, ML POC: 126 ML
POTASSIUM SERPL-SCNC: 3.8 MMOL/L (ref 3.5–5.3)
POTASSIUM SERPL-SCNC: 4.1 MMOL/L (ref 3.5–5.3)
POTASSIUM SERPL-SCNC: 4.2 MMOL/L (ref 3.5–5.3)
POTASSIUM SERPL-SCNC: 4.2 MMOL/L (ref 3.5–5.3)
POTASSIUM SERPL-SCNC: 4.4 MMOL/L (ref 3.5–5.3)
POTASSIUM SERPL-SCNC: 4.5 MMOL/L (ref 3.5–5.3)
POTASSIUM SERPL-SCNC: 4.5 MMOL/L (ref 3.5–5.3)
POTASSIUM SERPL-SCNC: 4.6 MMOL/L (ref 3.5–5.3)
POTASSIUM SERPL-SCNC: 4.7 MMOL/L (ref 3.5–5.3)
POTASSIUM SERPL-SCNC: 4.7 MMOL/L (ref 3.5–5.3)
POTASSIUM SERPL-SCNC: 4.8 MMOL/L (ref 3.5–5.3)
POTASSIUM SERPL-SCNC: 4.8 MMOL/L (ref 3.5–5.3)
POTASSIUM SERPL-SCNC: 4.9 MMOL/L (ref 3.5–5.3)
PR INTERVAL: 210 MS
PR INTERVAL: 230 MS
PR INTERVAL: 242 MS
PROT SERPL-MCNC: 6.5 G/DL (ref 6.4–8.2)
PROT UR STRIP-MCNC: ABNORMAL MG/DL
PROTHROMBIN TIME: 12.6 SECONDS (ref 11.6–14.5)
QRS AXIS: 23 DEGREES
QRS AXIS: 52 DEGREES
QRS AXIS: 62 DEGREES
QRSD INTERVAL: 72 MS
QRSD INTERVAL: 80 MS
QRSD INTERVAL: 82 MS
QT INTERVAL: 370 MS
QT INTERVAL: 376 MS
QT INTERVAL: 408 MS
QTC INTERVAL: 402 MS
QTC INTERVAL: 408 MS
QTC INTERVAL: 423 MS
RBC # BLD AUTO: 3.69 MILLION/UL (ref 3.81–5.12)
RBC # BLD AUTO: 4.13 MILLION/UL (ref 3.81–5.12)
RBC # BLD AUTO: 4.49 MILLION/UL (ref 3.81–5.12)
RBC # BLD AUTO: 4.56 MILLION/UL (ref 3.81–5.12)
RBC # BLD AUTO: 4.57 MILLION/UL (ref 3.81–5.12)
RBC # BLD AUTO: 4.65 MILLION/UL (ref 3.81–5.12)
RBC #/AREA URNS AUTO: NORMAL /HPF
RH BLD: POSITIVE
RH BLD: POSITIVE
RSV RNA RESP QL NAA+PROBE: NEGATIVE
RSV RNA RESP QL NAA+PROBE: NEGATIVE
SARS-COV-2 RNA RESP QL NAA+PROBE: NEGATIVE
SARS-COV-2 RNA RESP QL NAA+PROBE: NEGATIVE
SL CV LV EF: 60
SL CV PED ECHO LEFT VENTRICLE DIASTOLIC VOLUME (MOD BIPLANE) 2D: 70 ML
SL CV PED ECHO LEFT VENTRICLE SYSTOLIC VOLUME (MOD BIPLANE) 2D: 27 ML
SODIUM 24H UR-SCNC: 19 MOL/L
SODIUM 24H UR-SCNC: 41 MOL/L
SODIUM SERPL-SCNC: 116 MMOL/L (ref 136–145)
SODIUM SERPL-SCNC: 118 MMOL/L (ref 136–145)
SODIUM SERPL-SCNC: 119 MMOL/L (ref 136–145)
SODIUM SERPL-SCNC: 119 MMOL/L (ref 136–145)
SODIUM SERPL-SCNC: 122 MMOL/L (ref 136–145)
SODIUM SERPL-SCNC: 123 MMOL/L (ref 136–145)
SODIUM SERPL-SCNC: 123 MMOL/L (ref 136–145)
SODIUM SERPL-SCNC: 125 MMOL/L (ref 136–145)
SODIUM SERPL-SCNC: 127 MMOL/L (ref 136–145)
SODIUM SERPL-SCNC: 128 MMOL/L (ref 136–145)
SODIUM SERPL-SCNC: 128 MMOL/L (ref 136–145)
SODIUM SERPL-SCNC: 130 MMOL/L (ref 136–145)
SODIUM SERPL-SCNC: 133 MMOL/L (ref 136–145)
SODIUM SERPL-SCNC: 136 MMOL/L (ref 136–145)
SODIUM SERPL-SCNC: 137 MMOL/L (ref 136–145)
SP GR UR STRIP.AUTO: 1.01 (ref 1–1.03)
SPECIMEN EXPIRATION DATE: NORMAL
SPECIMEN EXPIRATION DATE: NORMAL
T WAVE AXIS: 15 DEGREES
T WAVE AXIS: 21 DEGREES
T WAVE AXIS: 34 DEGREES
TR MAX PG: 26 MMHG
TRICUSPID VALVE PEAK REGURGITATION VELOCITY: 2.54 M/S
TSH SERPL DL<=0.05 MIU/L-ACNC: 3.13 UIU/ML (ref 0.36–3.74)
URATE SERPL-MCNC: 4.2 MG/DL (ref 2–6.8)
UROBILINOGEN UR QL STRIP.AUTO: 0.2 E.U./DL
VENTRICULAR RATE: 60 BPM
VENTRICULAR RATE: 71 BPM
VENTRICULAR RATE: 76 BPM
WBC # BLD AUTO: 5.86 THOUSAND/UL (ref 4.31–10.16)
WBC # BLD AUTO: 6.38 THOUSAND/UL (ref 4.31–10.16)
WBC # BLD AUTO: 7.29 THOUSAND/UL (ref 4.31–10.16)
WBC # BLD AUTO: 7.52 THOUSAND/UL (ref 4.31–10.16)
WBC # BLD AUTO: 7.66 THOUSAND/UL (ref 4.31–10.16)
WBC # BLD AUTO: 7.7 THOUSAND/UL (ref 4.31–10.16)
WBC #/AREA URNS AUTO: NORMAL /HPF
Z-SCORE OF LEFT VENTRICULAR DIMENSION IN END SYSTOLE: -1.26

## 2022-01-01 PROCEDURE — G0299 HHS/HOSPICE OF RN EA 15 MIN: HCPCS

## 2022-01-01 PROCEDURE — 80048 BASIC METABOLIC PNL TOTAL CA: CPT | Performed by: PHYSICIAN ASSISTANT

## 2022-01-01 PROCEDURE — 97530 THERAPEUTIC ACTIVITIES: CPT

## 2022-01-01 PROCEDURE — 84300 ASSAY OF URINE SODIUM: CPT | Performed by: PHYSICIAN ASSISTANT

## 2022-01-01 PROCEDURE — 80048 BASIC METABOLIC PNL TOTAL CA: CPT | Performed by: NURSE PRACTITIONER

## 2022-01-01 PROCEDURE — 84100 ASSAY OF PHOSPHORUS: CPT | Performed by: PHYSICIAN ASSISTANT

## 2022-01-01 PROCEDURE — 85025 COMPLETE CBC W/AUTO DIFF WBC: CPT | Performed by: EMERGENCY MEDICINE

## 2022-01-01 PROCEDURE — 71045 X-RAY EXAM CHEST 1 VIEW: CPT

## 2022-01-01 PROCEDURE — 82570 ASSAY OF URINE CREATININE: CPT | Performed by: PHYSICIAN ASSISTANT

## 2022-01-01 PROCEDURE — 10330064 SYRINGE, LL 10CC (100/BX 12BX/CS)

## 2022-01-01 PROCEDURE — 80048 BASIC METABOLIC PNL TOTAL CA: CPT | Performed by: ORTHOPAEDIC SURGERY

## 2022-01-01 PROCEDURE — G0156 HHCP-SVS OF AIDE,EA 15 MIN: HCPCS

## 2022-01-01 PROCEDURE — 36415 COLL VENOUS BLD VENIPUNCTURE: CPT | Performed by: PHYSICIAN ASSISTANT

## 2022-01-01 PROCEDURE — 99024 POSTOP FOLLOW-UP VISIT: CPT | Performed by: PHYSICIAN ASSISTANT

## 2022-01-01 PROCEDURE — 0241U HB NFCT DS VIR RESP RNA 4 TRGT: CPT | Performed by: PHYSICIAN ASSISTANT

## 2022-01-01 PROCEDURE — 99232 SBSQ HOSP IP/OBS MODERATE 35: CPT | Performed by: PHYSICIAN ASSISTANT

## 2022-01-01 PROCEDURE — T2042 HOSPICE ROUTINE HOME CARE: HCPCS

## 2022-01-01 PROCEDURE — G9197 ORDER FOR CEPH: HCPCS | Performed by: ORTHOPAEDIC SURGERY

## 2022-01-01 PROCEDURE — 93306 TTE W/DOPPLER COMPLETE: CPT

## 2022-01-01 PROCEDURE — 10330064 TAPE, ADHSV TRANSPORE WHT 1" (12RL/BX 10

## 2022-01-01 PROCEDURE — 83880 ASSAY OF NATRIURETIC PEPTIDE: CPT | Performed by: PHYSICIAN ASSISTANT

## 2022-01-01 PROCEDURE — 51798 US URINE CAPACITY MEASURE: CPT | Performed by: NURSE PRACTITIONER

## 2022-01-01 PROCEDURE — 10330064 WIPE, SKIN GEL PROT DRSNG (50/BX)

## 2022-01-01 PROCEDURE — 86900 BLOOD TYPING SEROLOGIC ABO: CPT | Performed by: ORTHOPAEDIC SURGERY

## 2022-01-01 PROCEDURE — 10330064 CATH SECURE, STATLOCK FOLEY SWIVEL SIL P

## 2022-01-01 PROCEDURE — 70450 CT HEAD/BRAIN W/O DYE: CPT

## 2022-01-01 PROCEDURE — C1776 JOINT DEVICE (IMPLANTABLE): HCPCS | Performed by: ORTHOPAEDIC SURGERY

## 2022-01-01 PROCEDURE — 97530 THERAPEUTIC ACTIVITIES: CPT | Performed by: PHYSICAL THERAPIST

## 2022-01-01 PROCEDURE — 94660 CPAP INITIATION&MGMT: CPT

## 2022-01-01 PROCEDURE — 83735 ASSAY OF MAGNESIUM: CPT | Performed by: PHYSICIAN ASSISTANT

## 2022-01-01 PROCEDURE — 99024 POSTOP FOLLOW-UP VISIT: CPT | Performed by: ORTHOPAEDIC SURGERY

## 2022-01-01 PROCEDURE — 10330064 SPONGE, GAUZE 12PLY STR 4"X4" (1/PK 50/B

## 2022-01-01 PROCEDURE — 73502 X-RAY EXAM HIP UNI 2-3 VIEWS: CPT

## 2022-01-01 PROCEDURE — 1124F ACP DISCUSS-NO DSCNMKR DOCD: CPT | Performed by: ORTHOPAEDIC SURGERY

## 2022-01-01 PROCEDURE — 82533 TOTAL CORTISOL: CPT | Performed by: NURSE PRACTITIONER

## 2022-01-01 PROCEDURE — G0155 HHCP-SVS OF CSW,EA 15 MIN: HCPCS

## 2022-01-01 PROCEDURE — 86850 RBC ANTIBODY SCREEN: CPT | Performed by: ORTHOPAEDIC SURGERY

## 2022-01-01 PROCEDURE — 86901 BLOOD TYPING SEROLOGIC RH(D): CPT | Performed by: ORTHOPAEDIC SURGERY

## 2022-01-01 PROCEDURE — 94760 N-INVAS EAR/PLS OXIMETRY 1: CPT

## 2022-01-01 PROCEDURE — 10330064 SALINE, IRR SOL STR 100ML (48/CS) MGM37

## 2022-01-01 PROCEDURE — 85025 COMPLETE CBC W/AUTO DIFF WBC: CPT | Performed by: PHYSICIAN ASSISTANT

## 2022-01-01 PROCEDURE — 99285 EMERGENCY DEPT VISIT HI MDM: CPT

## 2022-01-01 PROCEDURE — 0241U HB NFCT DS VIR RESP RNA 4 TRGT: CPT | Performed by: EMERGENCY MEDICINE

## 2022-01-01 PROCEDURE — 10330087 HSPC SERVICE INTENSITY ADD-ON

## 2022-01-01 PROCEDURE — 92610 EVALUATE SWALLOWING FUNCTION: CPT

## 2022-01-01 PROCEDURE — 27236 TREAT THIGH FRACTURE: CPT | Performed by: PHYSICIAN ASSISTANT

## 2022-01-01 PROCEDURE — 10330057 MEDICATION, GENERAL

## 2022-01-01 PROCEDURE — 85027 COMPLETE CBC AUTOMATED: CPT | Performed by: ORTHOPAEDIC SURGERY

## 2022-01-01 PROCEDURE — 97167 OT EVAL HIGH COMPLEX 60 MIN: CPT

## 2022-01-01 PROCEDURE — 87081 CULTURE SCREEN ONLY: CPT | Performed by: INTERNAL MEDICINE

## 2022-01-01 PROCEDURE — 82948 REAGENT STRIP/BLOOD GLUCOSE: CPT

## 2022-01-01 PROCEDURE — 10330064

## 2022-01-01 PROCEDURE — 99239 HOSP IP/OBS DSCHRG MGMT >30: CPT | Performed by: PHYSICIAN ASSISTANT

## 2022-01-01 PROCEDURE — 93005 ELECTROCARDIOGRAM TRACING: CPT

## 2022-01-01 PROCEDURE — 0SRS019 REPLACEMENT OF LEFT HIP JOINT, FEMORAL SURFACE WITH METAL SYNTHETIC SUBSTITUTE, CEMENTED, OPEN APPROACH: ICD-10-PCS | Performed by: ORTHOPAEDIC SURGERY

## 2022-01-01 PROCEDURE — 10330064 CATHETER, FOLEY 2WAY 5CC 16FR (10/BX)

## 2022-01-01 PROCEDURE — 10330064 GLOVE, EXAM VNYL MED N/S (100/BX 10BX/CS

## 2022-01-01 PROCEDURE — 99223 1ST HOSP IP/OBS HIGH 75: CPT | Performed by: ORTHOPAEDIC SURGERY

## 2022-01-01 PROCEDURE — 10330064 SYRINGE, CATH TIP FLAT STR 60CC (50/CS)

## 2022-01-01 PROCEDURE — 81001 URINALYSIS AUTO W/SCOPE: CPT | Performed by: EMERGENCY MEDICINE

## 2022-01-01 PROCEDURE — 10330064 SPONGE, GAUZE 8PLY N/S 4"X4" (200/PK 20P

## 2022-01-01 PROCEDURE — 10330064 PAD, ABD 5X9" STR LF (1/PK 20PK/BX) MGM1

## 2022-01-01 PROCEDURE — 80053 COMPREHEN METABOLIC PANEL: CPT | Performed by: PHYSICIAN ASSISTANT

## 2022-01-01 PROCEDURE — 80048 BASIC METABOLIC PNL TOTAL CA: CPT | Performed by: EMERGENCY MEDICINE

## 2022-01-01 PROCEDURE — 73501 X-RAY EXAM HIP UNI 1 VIEW: CPT

## 2022-01-01 PROCEDURE — 99213 OFFICE O/P EST LOW 20 MIN: CPT | Performed by: NURSE PRACTITIONER

## 2022-01-01 PROCEDURE — 83735 ASSAY OF MAGNESIUM: CPT | Performed by: ORTHOPAEDIC SURGERY

## 2022-01-01 PROCEDURE — 83935 ASSAY OF URINE OSMOLALITY: CPT | Performed by: PHYSICIAN ASSISTANT

## 2022-01-01 PROCEDURE — 94640 AIRWAY INHALATION TREATMENT: CPT

## 2022-01-01 PROCEDURE — 99232 SBSQ HOSP IP/OBS MODERATE 35: CPT | Performed by: INTERNAL MEDICINE

## 2022-01-01 PROCEDURE — 84443 ASSAY THYROID STIM HORMONE: CPT | Performed by: NURSE PRACTITIONER

## 2022-01-01 PROCEDURE — NC001 PR NO CHARGE: Performed by: ORTHOPAEDIC SURGERY

## 2022-01-01 PROCEDURE — 97535 SELF CARE MNGMENT TRAINING: CPT

## 2022-01-01 PROCEDURE — 83735 ASSAY OF MAGNESIUM: CPT | Performed by: NURSE PRACTITIONER

## 2022-01-01 PROCEDURE — 10330064 SPONGE, GAUZE 8PLY STR 2"X2" (2/PK 50PK/

## 2022-01-01 PROCEDURE — 97163 PT EVAL HIGH COMPLEX 45 MIN: CPT

## 2022-01-01 PROCEDURE — 36415 COLL VENOUS BLD VENIPUNCTURE: CPT | Performed by: EMERGENCY MEDICINE

## 2022-01-01 PROCEDURE — 97116 GAIT TRAINING THERAPY: CPT

## 2022-01-01 PROCEDURE — 93306 TTE W/DOPPLER COMPLETE: CPT | Performed by: INTERNAL MEDICINE

## 2022-01-01 PROCEDURE — 92526 ORAL FUNCTION THERAPY: CPT

## 2022-01-01 PROCEDURE — 85027 COMPLETE CBC AUTOMATED: CPT | Performed by: PHYSICIAN ASSISTANT

## 2022-01-01 PROCEDURE — 86900 BLOOD TYPING SEROLOGIC ABO: CPT | Performed by: EMERGENCY MEDICINE

## 2022-01-01 PROCEDURE — 85610 PROTHROMBIN TIME: CPT | Performed by: EMERGENCY MEDICINE

## 2022-01-01 PROCEDURE — 99223 1ST HOSP IP/OBS HIGH 75: CPT | Performed by: PHYSICIAN ASSISTANT

## 2022-01-01 PROCEDURE — 84550 ASSAY OF BLOOD/URIC ACID: CPT | Performed by: NURSE PRACTITIONER

## 2022-01-01 PROCEDURE — 73700 CT LOWER EXTREMITY W/O DYE: CPT

## 2022-01-01 PROCEDURE — 10330064 BAG, URINE ANTI-REFLUX 2000ML (20/CS)

## 2022-01-01 PROCEDURE — 10330064 BANDAGE, CNFRM 4"X4.1YDS N/S LF (12RL/BG

## 2022-01-01 PROCEDURE — 94664 DEMO&/EVAL PT USE INHALER: CPT

## 2022-01-01 PROCEDURE — 10330064 DRESSING, HYDROGEL SKINTEGRITY1OZ (30/CS

## 2022-01-01 PROCEDURE — 99232 SBSQ HOSP IP/OBS MODERATE 35: CPT | Performed by: NURSE PRACTITIONER

## 2022-01-01 PROCEDURE — 27236 TREAT THIGH FRACTURE: CPT | Performed by: ORTHOPAEDIC SURGERY

## 2022-01-01 PROCEDURE — 93010 ELECTROCARDIOGRAM REPORT: CPT | Performed by: INTERNAL MEDICINE

## 2022-01-01 PROCEDURE — 99285 EMERGENCY DEPT VISIT HI MDM: CPT | Performed by: EMERGENCY MEDICINE

## 2022-01-01 PROCEDURE — 10330064 DRESSING, ADAPTIC 3"X3" (50/BX)

## 2022-01-01 PROCEDURE — 10330064 SPONGE, GAUZE 8PLY N/S 2"X2" (200/PK 25P

## 2022-01-01 PROCEDURE — 96374 THER/PROPH/DIAG INJ IV PUSH: CPT

## 2022-01-01 PROCEDURE — 99223 1ST HOSP IP/OBS HIGH 75: CPT | Performed by: INTERNAL MEDICINE

## 2022-01-01 PROCEDURE — 10330064 DRESSING, CALCIUM ALGINATE SHEET 4"X4.75

## 2022-01-01 PROCEDURE — 10330064 DRESSING, NONADH OUCHLESS TELFA CTN 3"X4

## 2022-01-01 PROCEDURE — 10330064 CLEANSER, WND SEA-CLEANS 6OZ  COLPLT

## 2022-01-01 PROCEDURE — 99222 1ST HOSP IP/OBS MODERATE 55: CPT | Performed by: NURSE PRACTITIONER

## 2022-01-01 PROCEDURE — 10330064 HOLDER, CATHETER TU    (12/PK)0105

## 2022-01-01 PROCEDURE — 86901 BLOOD TYPING SEROLOGIC RH(D): CPT | Performed by: EMERGENCY MEDICINE

## 2022-01-01 PROCEDURE — 86850 RBC ANTIBODY SCREEN: CPT | Performed by: EMERGENCY MEDICINE

## 2022-01-01 PROCEDURE — 10330064 CATH TRAY, FOLEY SYR 10CC (20/CS)

## 2022-01-01 PROCEDURE — C1713 ANCHOR/SCREW BN/BN,TIS/BN: HCPCS | Performed by: ORTHOPAEDIC SURGERY

## 2022-01-01 DEVICE — IMPLANTABLE DEVICE: Type: IMPLANTABLE DEVICE | Site: HIP | Status: FUNCTIONAL

## 2022-01-01 DEVICE — IMPLANTABLE DEVICE
Type: IMPLANTABLE DEVICE | Site: HIP | Status: FUNCTIONAL
Brand: MULTIPOLAR®

## 2022-01-01 DEVICE — SMARTSET HIGH PERFORMANCE MV MEDIUM VISCOSITY BONE CEMENT 40G
Type: IMPLANTABLE DEVICE | Site: HIP | Status: FUNCTIONAL
Brand: SMARTSET

## 2022-01-01 RX ORDER — ASPIRIN 81 MG/1
81 TABLET ORAL DAILY
Status: DISCONTINUED | OUTPATIENT
Start: 2022-01-01 | End: 2022-01-01

## 2022-01-01 RX ORDER — LORAZEPAM 2 MG/ML
0.5 CONCENTRATE ORAL EVERY 6 HOURS PRN
Qty: 30 ML | Refills: 0 | Status: SHIPPED | OUTPATIENT
Start: 2022-01-01 | End: 2022-01-01 | Stop reason: CLARIF

## 2022-01-01 RX ORDER — FUROSEMIDE 10 MG/ML
20 INJECTION INTRAMUSCULAR; INTRAVENOUS ONCE
Status: DISCONTINUED | OUTPATIENT
Start: 2022-01-01 | End: 2022-01-01

## 2022-01-01 RX ORDER — HYDROMORPHONE HCL/PF 1 MG/ML
0.5 SYRINGE (ML) INJECTION EVERY 6 HOURS PRN
Status: DISCONTINUED | OUTPATIENT
Start: 2022-01-01 | End: 2022-01-01

## 2022-01-01 RX ORDER — MAGNESIUM SULFATE 1 G/100ML
1 INJECTION INTRAVENOUS ONCE
Status: COMPLETED | OUTPATIENT
Start: 2022-01-01 | End: 2022-01-01

## 2022-01-01 RX ORDER — HEPARIN SODIUM 5000 [USP'U]/ML
5000 INJECTION, SOLUTION INTRAVENOUS; SUBCUTANEOUS EVERY 8 HOURS SCHEDULED
Status: DISCONTINUED | OUTPATIENT
Start: 2022-01-01 | End: 2022-01-01

## 2022-01-01 RX ORDER — DIGOXIN 125 MCG
125 TABLET ORAL DAILY
Status: DISCONTINUED | OUTPATIENT
Start: 2022-01-01 | End: 2022-01-01 | Stop reason: HOSPADM

## 2022-01-01 RX ORDER — LACTOBACILLUS ACIDOPHILUS / LACTOBACILLUS BULGARICUS 100 MILLION CFU STRENGTH
1 GRANULES ORAL
Status: DISCONTINUED | OUTPATIENT
Start: 2022-01-01 | End: 2022-01-01

## 2022-01-01 RX ORDER — FAMOTIDINE 20 MG/1
20 TABLET, FILM COATED ORAL DAILY
Status: DISCONTINUED | OUTPATIENT
Start: 2022-01-01 | End: 2022-01-01

## 2022-01-01 RX ORDER — HEPARIN SODIUM 5000 [USP'U]/ML
5000 INJECTION, SOLUTION INTRAVENOUS; SUBCUTANEOUS EVERY 8 HOURS SCHEDULED
Status: COMPLETED | OUTPATIENT
Start: 2022-01-01 | End: 2022-01-01

## 2022-01-01 RX ORDER — CLONAZEPAM 0.5 MG/1
1.5 TABLET ORAL
Qty: 9 TABLET | Refills: 0 | OUTPATIENT
Start: 2022-01-01 | End: 2022-01-01

## 2022-01-01 RX ORDER — SODIUM CHLORIDE, SODIUM LACTATE, POTASSIUM CHLORIDE, CALCIUM CHLORIDE 600; 310; 30; 20 MG/100ML; MG/100ML; MG/100ML; MG/100ML
INJECTION, SOLUTION INTRAVENOUS CONTINUOUS PRN
Status: DISCONTINUED | OUTPATIENT
Start: 2022-01-01 | End: 2022-01-01

## 2022-01-01 RX ORDER — HYDROMORPHONE HCL/PF 1 MG/ML
0.2 SYRINGE (ML) INJECTION ONCE
Status: COMPLETED | OUTPATIENT
Start: 2022-01-01 | End: 2022-01-01

## 2022-01-01 RX ORDER — FERROUS SULFATE 325(65) MG
325 TABLET ORAL
Qty: 30 TABLET | Refills: 0
Start: 2022-01-01 | End: 2022-01-01

## 2022-01-01 RX ORDER — OXYCODONE HYDROCHLORIDE 5 MG/1
5 TABLET ORAL EVERY 6 HOURS PRN
Status: DISCONTINUED | OUTPATIENT
Start: 2022-01-01 | End: 2022-01-01 | Stop reason: HOSPADM

## 2022-01-01 RX ORDER — FUROSEMIDE 10 MG/ML
20 INJECTION INTRAMUSCULAR; INTRAVENOUS
Status: DISCONTINUED | OUTPATIENT
Start: 2022-01-01 | End: 2022-01-01

## 2022-01-01 RX ORDER — OXYCODONE HYDROCHLORIDE 5 MG/1
2.5 TABLET ORAL EVERY 6 HOURS PRN
Qty: 5 TABLET | Refills: 0 | Status: CANCELLED | OUTPATIENT
Start: 2022-01-01

## 2022-01-01 RX ORDER — SODIUM CHLORIDE, SODIUM LACTATE, POTASSIUM CHLORIDE, CALCIUM CHLORIDE 600; 310; 30; 20 MG/100ML; MG/100ML; MG/100ML; MG/100ML
20 INJECTION, SOLUTION INTRAVENOUS CONTINUOUS
Status: DISCONTINUED | OUTPATIENT
Start: 2022-01-01 | End: 2022-01-01

## 2022-01-01 RX ORDER — ALBUTEROL SULFATE 2.5 MG/3ML
2.5 SOLUTION RESPIRATORY (INHALATION) EVERY 6 HOURS PRN
Status: DISCONTINUED | OUTPATIENT
Start: 2022-01-01 | End: 2022-01-01 | Stop reason: HOSPADM

## 2022-01-01 RX ORDER — MAGNESIUM HYDROXIDE 1200 MG/15ML
LIQUID ORAL AS NEEDED
Status: DISCONTINUED | OUTPATIENT
Start: 2022-01-01 | End: 2022-01-01 | Stop reason: HOSPADM

## 2022-01-01 RX ORDER — SENNOSIDES 8.6 MG
1 TABLET ORAL 2 TIMES DAILY
Status: DISCONTINUED | OUTPATIENT
Start: 2022-01-01 | End: 2022-01-01 | Stop reason: HOSPADM

## 2022-01-01 RX ORDER — PANTOPRAZOLE SODIUM 40 MG/1
40 TABLET, DELAYED RELEASE ORAL
Status: DISCONTINUED | OUTPATIENT
Start: 2022-01-01 | End: 2022-01-01 | Stop reason: HOSPADM

## 2022-01-01 RX ORDER — ONDANSETRON 2 MG/ML
4 INJECTION INTRAMUSCULAR; INTRAVENOUS ONCE AS NEEDED
Status: DISCONTINUED | OUTPATIENT
Start: 2022-01-01 | End: 2022-01-01 | Stop reason: HOSPADM

## 2022-01-01 RX ORDER — ONDANSETRON 2 MG/ML
4 INJECTION INTRAMUSCULAR; INTRAVENOUS EVERY 6 HOURS PRN
Status: DISCONTINUED | OUTPATIENT
Start: 2022-01-01 | End: 2022-01-01

## 2022-01-01 RX ORDER — CEFAZOLIN SODIUM 2 G/50ML
2000 SOLUTION INTRAVENOUS
Status: DISCONTINUED | OUTPATIENT
Start: 2022-01-01 | End: 2022-01-01 | Stop reason: HOSPADM

## 2022-01-01 RX ORDER — CHLORHEXIDINE GLUCONATE 0.12 MG/ML
15 RINSE ORAL ONCE
Status: DISCONTINUED | OUTPATIENT
Start: 2022-01-01 | End: 2022-01-01 | Stop reason: HOSPADM

## 2022-01-01 RX ORDER — LORAZEPAM 2 MG/ML
0.5 CONCENTRATE ORAL EVERY 6 HOURS PRN
Qty: 30 ML | Refills: 0 | Status: SHIPPED | OUTPATIENT
Start: 2022-01-01 | End: 2022-06-27

## 2022-01-01 RX ORDER — AMLODIPINE BESYLATE 10 MG/1
10 TABLET ORAL DAILY
Refills: 0
Start: 2022-01-01

## 2022-01-01 RX ORDER — ACETAMINOPHEN 325 MG/1
650 TABLET ORAL EVERY 6 HOURS PRN
Status: DISCONTINUED | OUTPATIENT
Start: 2022-01-01 | End: 2022-01-01 | Stop reason: HOSPADM

## 2022-01-01 RX ORDER — SODIUM CHLORIDE, SODIUM LACTATE, POTASSIUM CHLORIDE, CALCIUM CHLORIDE 600; 310; 30; 20 MG/100ML; MG/100ML; MG/100ML; MG/100ML
50 INJECTION, SOLUTION INTRAVENOUS CONTINUOUS
Status: DISCONTINUED | OUTPATIENT
Start: 2022-01-01 | End: 2022-01-01

## 2022-01-01 RX ORDER — OXYCODONE HYDROCHLORIDE 5 MG/1
2.5 TABLET ORAL EVERY 6 HOURS PRN
Qty: 12 TABLET | Refills: 0 | OUTPATIENT
Start: 2022-01-01 | End: 2022-01-01

## 2022-01-01 RX ORDER — MELATONIN
1000 DAILY
Status: DISCONTINUED | OUTPATIENT
Start: 2022-01-01 | End: 2022-01-01

## 2022-01-01 RX ORDER — OXYCODONE HYDROCHLORIDE 5 MG/1
2.5 TABLET ORAL EVERY 6 HOURS PRN
Qty: 5 TABLET | Refills: 0 | Status: SHIPPED | OUTPATIENT
Start: 2022-01-01 | End: 2022-01-01 | Stop reason: SDUPTHER

## 2022-01-01 RX ORDER — CEFAZOLIN SODIUM 1 G/50ML
1000 SOLUTION INTRAVENOUS EVERY 8 HOURS
Status: COMPLETED | OUTPATIENT
Start: 2022-01-01 | End: 2022-01-01

## 2022-01-01 RX ORDER — FERROUS SULFATE 325(65) MG
325 TABLET ORAL
Status: DISCONTINUED | OUTPATIENT
Start: 2022-01-01 | End: 2022-01-01 | Stop reason: HOSPADM

## 2022-01-01 RX ORDER — HYDROMORPHONE HCL/PF 1 MG/ML
0.5 SYRINGE (ML) INJECTION EVERY 4 HOURS PRN
Status: DISCONTINUED | OUTPATIENT
Start: 2022-01-01 | End: 2022-01-01

## 2022-01-01 RX ORDER — CALCIUM CARBONATE 500(1250)
1 TABLET ORAL
Status: DISCONTINUED | OUTPATIENT
Start: 2022-01-01 | End: 2022-01-01

## 2022-01-01 RX ORDER — LISINOPRIL 5 MG/1
10 TABLET ORAL DAILY
Status: DISCONTINUED | OUTPATIENT
Start: 2022-01-01 | End: 2022-01-01

## 2022-01-01 RX ORDER — PANTOPRAZOLE SODIUM 40 MG/1
40 TABLET, DELAYED RELEASE ORAL
Refills: 0
Start: 2022-01-01

## 2022-01-01 RX ORDER — OXYCODONE HYDROCHLORIDE 5 MG/1
5 TABLET ORAL 3 TIMES DAILY PRN
Status: DISCONTINUED | OUTPATIENT
Start: 2022-01-01 | End: 2022-01-01

## 2022-01-01 RX ORDER — SODIUM CHLORIDE/ALOE VERA
SPRAY, NON-AEROSOL (ML) NASAL
COMMUNITY

## 2022-01-01 RX ORDER — ACETAMINOPHEN 325 MG/1
975 TABLET ORAL EVERY 8 HOURS SCHEDULED
Status: DISCONTINUED | OUTPATIENT
Start: 2022-01-01 | End: 2022-01-01 | Stop reason: HOSPADM

## 2022-01-01 RX ORDER — MAGNESIUM SULFATE HEPTAHYDRATE 40 MG/ML
2 INJECTION, SOLUTION INTRAVENOUS ONCE
Status: COMPLETED | OUTPATIENT
Start: 2022-01-01 | End: 2022-01-01

## 2022-01-01 RX ORDER — FERROUS SULFATE 325(65) MG
325 TABLET ORAL
Refills: 0
Start: 2022-01-01

## 2022-01-01 RX ORDER — CEFAZOLIN SODIUM 2 G/50ML
2000 SOLUTION INTRAVENOUS ONCE
Status: DISCONTINUED | OUTPATIENT
Start: 2022-01-01 | End: 2022-01-01

## 2022-01-01 RX ORDER — HYDRALAZINE HYDROCHLORIDE 20 MG/ML
5 INJECTION INTRAMUSCULAR; INTRAVENOUS EVERY 6 HOURS PRN
Status: DISCONTINUED | OUTPATIENT
Start: 2022-01-01 | End: 2022-01-01 | Stop reason: HOSPADM

## 2022-01-01 RX ORDER — HYDROMORPHONE HCL/PF 1 MG/ML
0.2 SYRINGE (ML) INJECTION EVERY 4 HOURS PRN
Status: DISCONTINUED | OUTPATIENT
Start: 2022-01-01 | End: 2022-01-01

## 2022-01-01 RX ORDER — DOCUSATE SODIUM 100 MG/1
100 CAPSULE, LIQUID FILLED ORAL 2 TIMES DAILY
Refills: 0
Start: 2022-01-01

## 2022-01-01 RX ORDER — ACETAMINOPHEN 325 MG/1
650 TABLET ORAL ONCE
Status: COMPLETED | OUTPATIENT
Start: 2022-01-01 | End: 2022-01-01

## 2022-01-01 RX ORDER — LORAZEPAM 0.5 MG/1
0.5 TABLET ORAL ONCE
Status: COMPLETED | OUTPATIENT
Start: 2022-01-01 | End: 2022-01-01

## 2022-01-01 RX ORDER — BUPIVACAINE HYDROCHLORIDE 7.5 MG/ML
INJECTION, SOLUTION EPIDURAL; RETROBULBAR AS NEEDED
Status: DISCONTINUED | OUTPATIENT
Start: 2022-01-01 | End: 2022-01-01

## 2022-01-01 RX ORDER — DOCUSATE SODIUM 100 MG/1
100 CAPSULE, LIQUID FILLED ORAL 2 TIMES DAILY
Status: DISCONTINUED | OUTPATIENT
Start: 2022-01-01 | End: 2022-01-01 | Stop reason: HOSPADM

## 2022-01-01 RX ORDER — ACETAMINOPHEN 325 MG/1
650 TABLET ORAL EVERY 6 HOURS PRN
Status: DISCONTINUED | OUTPATIENT
Start: 2022-01-01 | End: 2022-01-01

## 2022-01-01 RX ORDER — ACETAMINOPHEN 325 MG/1
975 TABLET ORAL EVERY 8 HOURS SCHEDULED
Refills: 0
Start: 2022-01-01

## 2022-01-01 RX ORDER — FENTANYL CITRATE/PF 50 MCG/ML
25 SYRINGE (ML) INJECTION
Status: DISCONTINUED | OUTPATIENT
Start: 2022-01-01 | End: 2022-01-01 | Stop reason: HOSPADM

## 2022-01-01 RX ORDER — ASCORBIC ACID 500 MG
500 TABLET ORAL DAILY
Status: DISCONTINUED | OUTPATIENT
Start: 2022-01-01 | End: 2022-01-01 | Stop reason: HOSPADM

## 2022-01-01 RX ORDER — PROPOFOL 10 MG/ML
INJECTION, EMULSION INTRAVENOUS CONTINUOUS PRN
Status: DISCONTINUED | OUTPATIENT
Start: 2022-01-01 | End: 2022-01-01

## 2022-01-01 RX ORDER — L. ACIDOPHILUS/L.BULGARICUS 100MM CELL
1 GRANULES IN PACKET (EA) ORAL
Status: DISCONTINUED | OUTPATIENT
Start: 2022-01-01 | End: 2022-01-01 | Stop reason: HOSPADM

## 2022-01-01 RX ORDER — HYDROMORPHONE HCL/PF 1 MG/ML
0.2 SYRINGE (ML) INJECTION EVERY 6 HOURS PRN
Status: DISCONTINUED | OUTPATIENT
Start: 2022-01-01 | End: 2022-01-01 | Stop reason: HOSPADM

## 2022-01-01 RX ORDER — CALCIUM CARBONATE 500(1250)
1 TABLET ORAL 2 TIMES DAILY WITH MEALS
Status: DISCONTINUED | OUTPATIENT
Start: 2022-01-01 | End: 2022-01-01

## 2022-01-01 RX ORDER — AMLODIPINE BESYLATE 10 MG/1
10 TABLET ORAL DAILY
Status: DISCONTINUED | OUTPATIENT
Start: 2022-01-01 | End: 2022-01-01 | Stop reason: HOSPADM

## 2022-01-01 RX ORDER — BISACODYL 10 MG
10 SUPPOSITORY, RECTAL RECTAL DAILY
COMMUNITY

## 2022-01-01 RX ORDER — OXYCODONE HYDROCHLORIDE 5 MG/1
2.5 TABLET ORAL EVERY 6 HOURS PRN
Status: DISCONTINUED | OUTPATIENT
Start: 2022-01-01 | End: 2022-01-01 | Stop reason: HOSPADM

## 2022-01-01 RX ORDER — MORPHINE SULFATE 100 MG/5ML
10 SOLUTION, CONCENTRATE ORAL EVERY 4 HOURS
Qty: 30 ML | Refills: 0 | Status: SHIPPED | OUTPATIENT
Start: 2022-01-01

## 2022-01-01 RX ORDER — AMLODIPINE BESYLATE 5 MG/1
TABLET ORAL
Qty: 56 TABLET | Refills: 4 | Status: SHIPPED | OUTPATIENT
Start: 2022-01-01

## 2022-01-01 RX ORDER — DOCUSATE SODIUM 100 MG/1
100 CAPSULE, LIQUID FILLED ORAL 2 TIMES DAILY
Refills: 0
Start: 2022-01-01 | End: 2022-01-01

## 2022-01-01 RX ORDER — LANOLIN ALCOHOL/MO/W.PET/CERES
3 CREAM (GRAM) TOPICAL
Status: DISCONTINUED | OUTPATIENT
Start: 2022-01-01 | End: 2022-01-01

## 2022-01-01 RX ORDER — ONDANSETRON 2 MG/ML
4 INJECTION INTRAMUSCULAR; INTRAVENOUS EVERY 6 HOURS PRN
Status: DISCONTINUED | OUTPATIENT
Start: 2022-01-01 | End: 2022-01-01 | Stop reason: HOSPADM

## 2022-01-01 RX ORDER — LACTOBACILLUS ACIDOPHILUS / LACTOBACILLUS BULGARICUS 100 MILLION CFU STRENGTH
1 GRANULES ORAL
Status: DISCONTINUED | OUTPATIENT
Start: 2022-01-01 | End: 2022-01-01 | Stop reason: SDUPTHER

## 2022-01-01 RX ORDER — MORPHINE SULFATE 100 MG/5ML
5 SOLUTION, CONCENTRATE ORAL EVERY 4 HOURS PRN
Qty: 30 ML | Refills: 0 | Status: SHIPPED | OUTPATIENT
Start: 2022-01-01 | End: 2022-01-01 | Stop reason: CLARIF

## 2022-01-01 RX ORDER — AMLODIPINE BESYLATE 5 MG/1
5 TABLET ORAL DAILY
Status: DISCONTINUED | OUTPATIENT
Start: 2022-01-01 | End: 2022-01-01

## 2022-01-01 RX ORDER — OXYCODONE HYDROCHLORIDE 5 MG/1
5 TABLET ORAL EVERY 6 HOURS PRN
Status: DISCONTINUED | OUTPATIENT
Start: 2022-01-01 | End: 2022-01-01

## 2022-01-01 RX ORDER — CHLORAL HYDRATE 500 MG
1000 CAPSULE ORAL DAILY
Status: DISCONTINUED | OUTPATIENT
Start: 2022-01-01 | End: 2022-01-01

## 2022-01-01 RX ORDER — CHLORHEXIDINE GLUCONATE 4 G/100ML
SOLUTION TOPICAL DAILY PRN
Status: DISCONTINUED | OUTPATIENT
Start: 2022-01-01 | End: 2022-01-01 | Stop reason: HOSPADM

## 2022-01-01 RX ORDER — FOLIC ACID 1 MG/1
1 TABLET ORAL DAILY
Status: DISCONTINUED | OUTPATIENT
Start: 2022-01-01 | End: 2022-01-01

## 2022-01-01 RX ORDER — CLONAZEPAM 0.5 MG/1
0.5 TABLET ORAL
Qty: 30 TABLET | Refills: 0 | Status: CANCELLED | OUTPATIENT
Start: 2022-01-01 | End: 2022-01-01

## 2022-01-01 RX ORDER — ASCORBIC ACID 500 MG
500 TABLET ORAL DAILY
Refills: 0
Start: 2022-01-01

## 2022-01-01 RX ORDER — FENTANYL CITRATE 50 UG/ML
INJECTION, SOLUTION INTRAMUSCULAR; INTRAVENOUS AS NEEDED
Status: DISCONTINUED | OUTPATIENT
Start: 2022-01-01 | End: 2022-01-01

## 2022-01-01 RX ORDER — ALBUTEROL SULFATE 2.5 MG/3ML
2.5 SOLUTION RESPIRATORY (INHALATION) EVERY 6 HOURS PRN
Refills: 0
Start: 2022-01-01

## 2022-01-01 RX ORDER — CLONAZEPAM 0.5 MG/1
1.5 TABLET ORAL
Status: DISCONTINUED | OUTPATIENT
Start: 2022-01-01 | End: 2022-01-01 | Stop reason: HOSPADM

## 2022-01-01 RX ORDER — IPRATROPIUM BROMIDE AND ALBUTEROL SULFATE 2.5; .5 MG/3ML; MG/3ML
3 SOLUTION RESPIRATORY (INHALATION)
Status: DISCONTINUED | OUTPATIENT
Start: 2022-01-01 | End: 2022-01-01

## 2022-01-01 RX ORDER — FUROSEMIDE 10 MG/ML
20 INJECTION INTRAMUSCULAR; INTRAVENOUS ONCE
Status: COMPLETED | OUTPATIENT
Start: 2022-01-01 | End: 2022-01-01

## 2022-01-01 RX ADMIN — METOPROLOL TARTRATE 75 MG: 25 TABLET, FILM COATED ORAL at 11:21

## 2022-01-01 RX ADMIN — ACETAMINOPHEN 975 MG: 325 TABLET, FILM COATED ORAL at 14:23

## 2022-01-01 RX ADMIN — PANTOPRAZOLE SODIUM 40 MG: 40 TABLET, DELAYED RELEASE ORAL at 05:33

## 2022-01-01 RX ADMIN — METOPROLOL TARTRATE 75 MG: 25 TABLET, FILM COATED ORAL at 21:04

## 2022-01-01 RX ADMIN — LACTOBACILLUS ACIDOPHILUS / LACTOBACILLUS BULGARICUS 1 PACKET: 100 MILLION CFU STRENGTH GRANULES at 12:23

## 2022-01-01 RX ADMIN — CEFAZOLIN SODIUM 2000 MG: 2 SOLUTION INTRAVENOUS at 06:24

## 2022-01-01 RX ADMIN — LACTOBACILLUS ACIDOPHILUS / LACTOBACILLUS BULGARICUS 1 PACKET: 100 MILLION CFU STRENGTH GRANULES at 08:15

## 2022-01-01 RX ADMIN — METOPROLOL TARTRATE 75 MG: 25 TABLET, FILM COATED ORAL at 08:43

## 2022-01-01 RX ADMIN — AMLODIPINE BESYLATE 10 MG: 10 TABLET ORAL at 09:00

## 2022-01-01 RX ADMIN — ENOXAPARIN SODIUM 30 MG: 30 INJECTION SUBCUTANEOUS at 10:54

## 2022-01-01 RX ADMIN — LORAZEPAM 0.5 MG: 0.5 TABLET ORAL at 18:53

## 2022-01-01 RX ADMIN — HYDROMORPHONE HYDROCHLORIDE 0.5 MG: 1 INJECTION, SOLUTION INTRAMUSCULAR; INTRAVENOUS; SUBCUTANEOUS at 00:13

## 2022-01-01 RX ADMIN — BUPIVACAINE HYDROCHLORIDE 1.6 ML: 7.5 INJECTION, SOLUTION EPIDURAL; RETROBULBAR at 07:39

## 2022-01-01 RX ADMIN — ACETAMINOPHEN 975 MG: 325 TABLET, FILM COATED ORAL at 21:17

## 2022-01-01 RX ADMIN — CALCIUM 1 TABLET: 500 TABLET ORAL at 17:27

## 2022-01-01 RX ADMIN — Medication 3 MG: at 22:43

## 2022-01-01 RX ADMIN — METOPROLOL TARTRATE 75 MG: 25 TABLET, FILM COATED ORAL at 21:16

## 2022-01-01 RX ADMIN — CHOLECALCIFEROL TAB 25 MCG (1000 UNIT) 1000 UNITS: 25 TAB at 09:23

## 2022-01-01 RX ADMIN — FERROUS SULFATE TAB 325 MG (65 MG ELEMENTAL FE) 325 MG: 325 (65 FE) TAB at 08:30

## 2022-01-01 RX ADMIN — Medication 3 MG: at 21:17

## 2022-01-01 RX ADMIN — ACETAMINOPHEN 975 MG: 325 TABLET, FILM COATED ORAL at 13:44

## 2022-01-01 RX ADMIN — ACETAMINOPHEN 975 MG: 325 TABLET, FILM COATED ORAL at 05:21

## 2022-01-01 RX ADMIN — FENTANYL CITRATE 25 MCG: 50 INJECTION, SOLUTION INTRAMUSCULAR; INTRAVENOUS at 07:34

## 2022-01-01 RX ADMIN — ASPIRIN 81 MG: 81 TABLET, COATED ORAL at 08:43

## 2022-01-01 RX ADMIN — METOPROLOL TARTRATE 75 MG: 25 TABLET, FILM COATED ORAL at 09:00

## 2022-01-01 RX ADMIN — HEPARIN SODIUM 5000 UNITS: 5000 INJECTION INTRAVENOUS; SUBCUTANEOUS at 11:22

## 2022-01-01 RX ADMIN — OXYCODONE HYDROCHLORIDE 5 MG: 5 TABLET ORAL at 18:27

## 2022-01-01 RX ADMIN — STANDARDIZED SENNA CONCENTRATE 8.6 MG: 8.6 TABLET ORAL at 08:43

## 2022-01-01 RX ADMIN — SODIUM CHLORIDE, SODIUM LACTATE, POTASSIUM CHLORIDE, AND CALCIUM CHLORIDE 125 ML/HR: .6; .31; .03; .02 INJECTION, SOLUTION INTRAVENOUS at 11:47

## 2022-01-01 RX ADMIN — HYDROMORPHONE HYDROCHLORIDE 0.2 MG: 1 INJECTION, SOLUTION INTRAMUSCULAR; INTRAVENOUS; SUBCUTANEOUS at 19:37

## 2022-01-01 RX ADMIN — AMLODIPINE BESYLATE 10 MG: 10 TABLET ORAL at 11:18

## 2022-01-01 RX ADMIN — Medication 15 MG: at 12:23

## 2022-01-01 RX ADMIN — HEPARIN SODIUM 5000 UNITS: 5000 INJECTION INTRAVENOUS; SUBCUTANEOUS at 05:21

## 2022-01-01 RX ADMIN — OXYCODONE HYDROCHLORIDE 5 MG: 5 TABLET ORAL at 16:31

## 2022-01-01 RX ADMIN — DIGOXIN 125 MCG: 0.12 TABLET ORAL at 08:43

## 2022-01-01 RX ADMIN — STANDARDIZED SENNA CONCENTRATE 8.6 MG: 8.6 TABLET ORAL at 17:27

## 2022-01-01 RX ADMIN — STANDARDIZED SENNA CONCENTRATE 8.6 MG: 8.6 TABLET ORAL at 09:25

## 2022-01-01 RX ADMIN — HYDROMORPHONE HYDROCHLORIDE 0.2 MG: 1 INJECTION, SOLUTION INTRAMUSCULAR; INTRAVENOUS; SUBCUTANEOUS at 17:27

## 2022-01-01 RX ADMIN — ACETAMINOPHEN 975 MG: 325 TABLET, FILM COATED ORAL at 14:56

## 2022-01-01 RX ADMIN — AMLODIPINE BESYLATE 10 MG: 10 TABLET ORAL at 09:38

## 2022-01-01 RX ADMIN — IPRATROPIUM BROMIDE AND ALBUTEROL SULFATE 3 ML: 2.5; .5 SOLUTION RESPIRATORY (INHALATION) at 18:28

## 2022-01-01 RX ADMIN — CLONAZEPAM 1.5 MG: 0.5 TABLET ORAL at 21:17

## 2022-01-01 RX ADMIN — CEFAZOLIN SODIUM 1000 MG: 1 SOLUTION INTRAVENOUS at 14:47

## 2022-01-01 RX ADMIN — OXYCODONE HYDROCHLORIDE AND ACETAMINOPHEN 500 MG: 500 TABLET ORAL at 09:37

## 2022-01-01 RX ADMIN — Medication 3 MG: at 21:16

## 2022-01-01 RX ADMIN — PANTOPRAZOLE SODIUM 40 MG: 40 TABLET, DELAYED RELEASE ORAL at 11:20

## 2022-01-01 RX ADMIN — STANDARDIZED SENNA CONCENTRATE 8.6 MG: 8.6 TABLET ORAL at 09:37

## 2022-01-01 RX ADMIN — ACETAMINOPHEN 650 MG: 325 TABLET, FILM COATED ORAL at 18:52

## 2022-01-01 RX ADMIN — MAGNESIUM SULFATE HEPTAHYDRATE 1 G: 1 INJECTION, SOLUTION INTRAVENOUS at 07:29

## 2022-01-01 RX ADMIN — METOPROLOL TARTRATE 75 MG: 25 TABLET, FILM COATED ORAL at 20:51

## 2022-01-01 RX ADMIN — FAMOTIDINE 20 MG: 20 TABLET ORAL at 08:43

## 2022-01-01 RX ADMIN — CLONAZEPAM 1.5 MG: 0.5 TABLET ORAL at 20:51

## 2022-01-01 RX ADMIN — METOPROLOL TARTRATE 75 MG: 25 TABLET, FILM COATED ORAL at 09:25

## 2022-01-01 RX ADMIN — CLONAZEPAM 1.5 MG: 0.5 TABLET ORAL at 22:43

## 2022-01-01 RX ADMIN — HYDROMORPHONE HYDROCHLORIDE 0.2 MG: 1 INJECTION, SOLUTION INTRAMUSCULAR; INTRAVENOUS; SUBCUTANEOUS at 23:10

## 2022-01-01 RX ADMIN — METOPROLOL TARTRATE 75 MG: 25 TABLET, FILM COATED ORAL at 22:52

## 2022-01-01 RX ADMIN — ACETAMINOPHEN 975 MG: 325 TABLET, FILM COATED ORAL at 10:26

## 2022-01-01 RX ADMIN — ACETAMINOPHEN 975 MG: 325 TABLET, FILM COATED ORAL at 17:06

## 2022-01-01 RX ADMIN — HYDROMORPHONE HYDROCHLORIDE 0.5 MG: 1 INJECTION, SOLUTION INTRAMUSCULAR; INTRAVENOUS; SUBCUTANEOUS at 12:30

## 2022-01-01 RX ADMIN — LACTOBACILLUS ACIDOPHILUS / LACTOBACILLUS BULGARICUS 1 PACKET: 100 MILLION CFU STRENGTH GRANULES at 11:18

## 2022-01-01 RX ADMIN — FAMOTIDINE 20 MG: 20 TABLET ORAL at 09:24

## 2022-01-01 RX ADMIN — OXYCODONE HYDROCHLORIDE 5 MG: 5 TABLET ORAL at 05:56

## 2022-01-01 RX ADMIN — HEPARIN SODIUM 5000 UNITS: 5000 INJECTION INTRAVENOUS; SUBCUTANEOUS at 21:04

## 2022-01-01 RX ADMIN — SODIUM CHLORIDE, SODIUM LACTATE, POTASSIUM CHLORIDE, AND CALCIUM CHLORIDE: .6; .31; .03; .02 INJECTION, SOLUTION INTRAVENOUS at 07:28

## 2022-01-01 RX ADMIN — CLONAZEPAM 1.5 MG: 0.5 TABLET ORAL at 21:03

## 2022-01-01 RX ADMIN — HEPARIN SODIUM 5000 UNITS: 5000 INJECTION INTRAVENOUS; SUBCUTANEOUS at 10:19

## 2022-01-01 RX ADMIN — ACETAMINOPHEN 975 MG: 325 TABLET, FILM COATED ORAL at 05:34

## 2022-01-01 RX ADMIN — DIGOXIN 125 MCG: 0.12 TABLET ORAL at 09:00

## 2022-01-01 RX ADMIN — DOCUSATE SODIUM 100 MG: 100 CAPSULE ORAL at 17:06

## 2022-01-01 RX ADMIN — CEFAZOLIN SODIUM 1000 MG: 2 SOLUTION INTRAVENOUS at 07:52

## 2022-01-01 RX ADMIN — ASPIRIN 81 MG: 81 TABLET, COATED ORAL at 09:22

## 2022-01-01 RX ADMIN — AMLODIPINE BESYLATE 10 MG: 10 TABLET ORAL at 10:55

## 2022-01-01 RX ADMIN — DIGOXIN 125 MCG: 0.12 TABLET ORAL at 09:37

## 2022-01-01 RX ADMIN — METOPROLOL TARTRATE 75 MG: 25 TABLET, FILM COATED ORAL at 21:17

## 2022-01-01 RX ADMIN — FENTANYL CITRATE 25 MCG: 50 INJECTION, SOLUTION INTRAMUSCULAR; INTRAVENOUS at 07:28

## 2022-01-01 RX ADMIN — OMEGA-3 FATTY ACIDS CAP 1000 MG 1000 MG: 1000 CAP at 08:43

## 2022-01-01 RX ADMIN — CLONAZEPAM 1.5 MG: 0.5 TABLET ORAL at 21:15

## 2022-01-01 RX ADMIN — DOCUSATE SODIUM 100 MG: 100 CAPSULE ORAL at 17:27

## 2022-01-01 RX ADMIN — ACETAMINOPHEN 975 MG: 325 TABLET, FILM COATED ORAL at 05:08

## 2022-01-01 RX ADMIN — CHOLECALCIFEROL TAB 25 MCG (1000 UNIT) 1000 UNITS: 25 TAB at 08:43

## 2022-01-01 RX ADMIN — DIGOXIN 125 MCG: 0.12 TABLET ORAL at 09:24

## 2022-01-01 RX ADMIN — STANDARDIZED SENNA CONCENTRATE 8.6 MG: 8.6 TABLET ORAL at 17:06

## 2022-01-01 RX ADMIN — FERROUS SULFATE TAB 325 MG (65 MG ELEMENTAL FE) 325 MG: 325 (65 FE) TAB at 10:54

## 2022-01-01 RX ADMIN — OXYCODONE HYDROCHLORIDE 5 MG: 5 TABLET ORAL at 12:30

## 2022-01-01 RX ADMIN — Medication 3 MG: at 21:34

## 2022-01-01 RX ADMIN — DOCUSATE SODIUM 100 MG: 100 CAPSULE ORAL at 09:37

## 2022-01-01 RX ADMIN — AMLODIPINE BESYLATE 10 MG: 10 TABLET ORAL at 11:21

## 2022-01-01 RX ADMIN — HEPARIN SODIUM 5000 UNITS: 5000 INJECTION INTRAVENOUS; SUBCUTANEOUS at 13:46

## 2022-01-01 RX ADMIN — METOPROLOL TARTRATE 75 MG: 25 TABLET, FILM COATED ORAL at 10:55

## 2022-01-01 RX ADMIN — METOPROLOL TARTRATE 75 MG: 25 TABLET, FILM COATED ORAL at 21:33

## 2022-01-01 RX ADMIN — DIGOXIN 125 MCG: 0.12 TABLET ORAL at 11:21

## 2022-01-01 RX ADMIN — HEPARIN SODIUM 5000 UNITS: 5000 INJECTION INTRAVENOUS; SUBCUTANEOUS at 16:28

## 2022-01-01 RX ADMIN — DOCUSATE SODIUM 100 MG: 100 CAPSULE ORAL at 10:54

## 2022-01-01 RX ADMIN — DIGOXIN 125 MCG: 0.12 TABLET ORAL at 10:55

## 2022-01-01 RX ADMIN — ACETAMINOPHEN 650 MG: 325 TABLET, FILM COATED ORAL at 11:19

## 2022-01-01 RX ADMIN — ACETAMINOPHEN 975 MG: 325 TABLET, FILM COATED ORAL at 21:15

## 2022-01-01 RX ADMIN — OXYCODONE HYDROCHLORIDE 5 MG: 5 TABLET ORAL at 15:46

## 2022-01-01 RX ADMIN — STANDARDIZED SENNA CONCENTRATE 8.6 MG: 8.6 TABLET ORAL at 21:34

## 2022-01-01 RX ADMIN — CALCIUM 1 TABLET: 500 TABLET ORAL at 08:16

## 2022-01-01 RX ADMIN — STANDARDIZED SENNA CONCENTRATE 8.6 MG: 8.6 TABLET ORAL at 10:54

## 2022-01-01 RX ADMIN — PROPOFOL 50 MCG/KG/MIN: 10 INJECTION, EMULSION INTRAVENOUS at 07:41

## 2022-01-01 RX ADMIN — HYDROMORPHONE HYDROCHLORIDE 0.2 MG: 1 INJECTION, SOLUTION INTRAMUSCULAR; INTRAVENOUS; SUBCUTANEOUS at 06:02

## 2022-01-01 RX ADMIN — SODIUM CHLORIDE, SODIUM LACTATE, POTASSIUM CHLORIDE, AND CALCIUM CHLORIDE 50 ML/HR: .6; .31; .03; .02 INJECTION, SOLUTION INTRAVENOUS at 14:22

## 2022-01-01 RX ADMIN — HYDROMORPHONE HYDROCHLORIDE 0.5 MG: 1 INJECTION, SOLUTION INTRAMUSCULAR; INTRAVENOUS; SUBCUTANEOUS at 22:54

## 2022-01-01 RX ADMIN — PANTOPRAZOLE SODIUM 40 MG: 40 TABLET, DELAYED RELEASE ORAL at 05:08

## 2022-01-01 RX ADMIN — OXYCODONE HYDROCHLORIDE AND ACETAMINOPHEN 500 MG: 500 TABLET ORAL at 10:54

## 2022-01-01 RX ADMIN — FUROSEMIDE 20 MG: 10 INJECTION, SOLUTION INTRAMUSCULAR; INTRAVENOUS at 23:12

## 2022-01-01 RX ADMIN — Medication 15 MG: at 18:54

## 2022-01-01 RX ADMIN — OMEGA-3 FATTY ACIDS CAP 1000 MG 1000 MG: 1000 CAP at 09:25

## 2022-01-01 RX ADMIN — HYDROMORPHONE HYDROCHLORIDE 0.2 MG: 1 INJECTION, SOLUTION INTRAMUSCULAR; INTRAVENOUS; SUBCUTANEOUS at 01:35

## 2022-01-01 RX ADMIN — FUROSEMIDE 20 MG: 10 INJECTION, SOLUTION INTRAMUSCULAR; INTRAVENOUS at 11:16

## 2022-01-01 RX ADMIN — CEFAZOLIN SODIUM 1000 MG: 1 SOLUTION INTRAVENOUS at 21:03

## 2022-01-01 RX ADMIN — PHENYLEPHRINE HYDROCHLORIDE 50 MCG/MIN: 10 INJECTION INTRAVENOUS at 07:41

## 2022-01-01 RX ADMIN — FENTANYL CITRATE 25 MCG: 50 INJECTION, SOLUTION INTRAMUSCULAR; INTRAVENOUS at 07:31

## 2022-01-01 RX ADMIN — METOPROLOL TARTRATE 75 MG: 25 TABLET, FILM COATED ORAL at 09:37

## 2022-01-01 RX ADMIN — ACETAMINOPHEN 975 MG: 325 TABLET, FILM COATED ORAL at 22:43

## 2022-01-01 RX ADMIN — HEPARIN SODIUM 5000 UNITS: 5000 INJECTION INTRAVENOUS; SUBCUTANEOUS at 14:56

## 2022-01-01 RX ADMIN — Medication 1 EACH: at 12:30

## 2022-01-01 RX ADMIN — HYDROMORPHONE HYDROCHLORIDE 0.5 MG: 1 INJECTION, SOLUTION INTRAMUSCULAR; INTRAVENOUS; SUBCUTANEOUS at 18:54

## 2022-01-01 RX ADMIN — HEPARIN SODIUM 5000 UNITS: 5000 INJECTION INTRAVENOUS; SUBCUTANEOUS at 21:16

## 2022-01-01 RX ADMIN — ACETAMINOPHEN 975 MG: 325 TABLET, FILM COATED ORAL at 20:51

## 2022-01-01 RX ADMIN — MAGNESIUM SULFATE HEPTAHYDRATE 2 G: 40 INJECTION, SOLUTION INTRAVENOUS at 10:21

## 2022-01-01 RX ADMIN — ENOXAPARIN SODIUM 30 MG: 30 INJECTION SUBCUTANEOUS at 17:27

## 2022-01-01 RX ADMIN — CLONAZEPAM 1.5 MG: 0.5 TABLET ORAL at 21:34

## 2022-01-01 RX ADMIN — HEPARIN SODIUM 5000 UNITS: 5000 INJECTION INTRAVENOUS; SUBCUTANEOUS at 05:08

## 2022-01-01 RX ADMIN — AMLODIPINE BESYLATE 10 MG: 10 TABLET ORAL at 08:43

## 2022-01-01 RX ADMIN — HEPARIN SODIUM 5000 UNITS: 5000 INJECTION INTRAVENOUS; SUBCUTANEOUS at 22:43

## 2022-01-16 PROBLEM — S72.002A CLOSED FRACTURE OF NECK OF LEFT FEMUR (HCC): Status: ACTIVE | Noted: 2022-01-01

## 2022-01-16 NOTE — ED PROVIDER NOTES
History  Chief Complaint   Patient presents with    Fall     fall aftering triping on carpet  pt denies hitting head but complains of left hip pain     40-year-old female presents via EMS after fall at her nursing facility  Patient reports she tripped on a rug about an hour ago, she was unable to pick herself off the ground  She denies head strike or loss of consciousness  Patient is intermittently complaining of left hip pain, on examination when asked to raise her leg she complains of inguinal and buttock pain  She denies neck or back pain, chest pain, dyspnea, abdominal pain  Patient does admit to difficulty urinating, she feels a sensation of incomplete voiding  She denies any recent fevers or chills, coughing  Patient states she is eating and drinking normally  On arrival her oxygen saturation fluctuates between 88-90 in room air  Prior to Admission Medications   Prescriptions Last Dose Informant Patient Reported? Taking?    D3-1000 25 MCG (1000 UT) capsule   No Yes   Sig: TAKE ONE CAPSULE BY MOUTH ONCE DAILY (VITAMIN D DEFICIENCY)   Melatonin 5 MG CAPS   No No   Sig: TAKE ONE CAPSULE BY MOUTH AT BEDTIME (INSOMNIA)   Melatonin 5 MG TABS   No Yes   Sig: TAKE ONE TABLET BY MOUTH AT BEDTIME (INSOMNIA)   Nutritional Supplements (Ensure)   No No   Sig: Take 237 mL by mouth daily   Omega-3 Fatty Acids (fish oil) 1,000 mg   No No   Sig: TAKE ONE CAPSULE BY MOUTH ONCE DAILY (SUPPLEMENT)   Refresh Optive Advanced 0 5-1-0 5 % SOLN   Yes Yes   Sig: 3 (three) times a day   acetaminophen (TYLENOL) 325 mg tablet   No No   Sig: Take 2 tablets (650 mg total) by mouth every 6 (six) hours as needed for mild pain, headaches or fever   amLODIPine (NORVASC) 5 mg tablet   No Yes   Sig: TAKE ONE TABLET BY MOUTH TWICE A DAY *HOLD FOR SBP<110* (HTN)   aspirin (ECOTRIN LOW STRENGTH) 81 mg EC tablet   No Yes   Sig: TAKE ONE TABLET BY MOUTH ONCE DAILY (STROKE PREVENTION)   calcium carbonate (OYSTER SHELL,OSCAL) 500 mg No Yes   Si TAB ORALLY TWICE DAILY DX: SUPPLEMENT   clonazePAM (KlonoPIN) 0 5 mg tablet   No Yes   Sig: TAKE THREE TABLETS (1 5MG) BY MOUTH AT BEDTIME (ANXIETY)   digoxin (LANOXIN) 0 125 mg tablet   No Yes   Sig: TAKE ONE TABLET BY MOUTH ONCE DAILY (AFIB)   diphenhydrAMINE (BENADRYL) 50 mg capsule   No No   Sig: Take 1 capsule (50 mg total) by mouth every 6 (six) hours as needed for itching for up to 1 day   famotidine (PEPCID) 20 mg tablet   No Yes   Sig: TAKE ONE TABLET BY MOUTH DAILY AT BEDTIME (GERD)   ibuprofen (MOTRIN) 200 mg tablet   No No   Sig: TAKE ONE TABLET BY MOUTH ONCE DAILY AS NEEDED FOR MIGRAINES/HEADACHE   lactobacillus acidophilus-bulgaricus (FLORANEX) packet   No No   Sig: Take 1 packet by mouth 3 (three) times a day with meals   lisinopril (ZESTRIL) 10 mg tablet   No Yes   Sig: Take 1 tablet (10 mg total) by mouth daily   lisinopril (ZESTRIL) 10 mg tablet   No Yes   Sig: TAKE ONE TABLET BY MOUTH ONCE DAILY (HTN)   magnesium hydroxide (MILK OF MAGNESIA) 800 MG/5ML suspension  Other (Specify) Yes No   Sig: Take 30 mL by mouth daily as needed for constipation    metoprolol tartrate (LOPRESSOR) 25 mg tablet   No Yes   Sig: TAKE THREE TABLETS (75MG) BY MOUTH EVERY 12 HOURS  HOLD FOR HR <60 OR SBP <110 (HTN)**TAKE W/ FOOD/SNACK**   omeprazole (PriLOSEC) 20 mg delayed release capsule   No Yes   Sig: TAKE ONE CAPSULE BY MOUTH ONCE DAILY (GERD)   polyethylene glycol (GLYCOLAX) 17 GM/SCOOP   No No   Sig: DISSOLVE 17 GRAMS (ONE CAPFUL) IN 8 OUNCES OF WATER AND DRINK BY MOUTH DAILY FOR CONSTIPATION   senna (SENOKOT) 8 6 mg   No Yes   Sig: TAKE ONE TABLET BY MOUTH ONCE DAILY AT BEDTIME   HOLD FOR DIARRHEA OR LOOSE STOOLS (CONSTIPATION)   sulfamethoxazole-trimethoprim (BACTRIM DS) 800-160 mg per tablet   No Yes   Sig: Take 1 tablet by mouth daily      Facility-Administered Medications: None       Past Medical History:   Diagnosis Date    Abdominal distension     Last Assessed: [id number]    Abnormal weight loss     Last Assessed: [id number]    Arthritis     Bronchitis     Bursitis of left hip     Lst Assessed: [id number]    Chest pain     Last Assessed: [id number]    Colon polyp     Dysuria     Last Assessed: [id number]    External hemorrhoids     Last Assessed: [id number]    Generalized anxiety disorder     GERD (gastroesophageal reflux disease)     History of echocardiogram 03/20/2018    EF 60%, mild to moderate mitral annular calcification  mild AS, mild TR   Hyperlipidemia     Hypertension     Hypomagnesemia     Last Assessed: [id number]    Lyme disease     Last Assessed: [id number]    Multiple renal cysts 7/5/2019    Nonrheumatic mitral valve regurgitation     Osteoporosis     Pneumonia of right middle lobe due to infectious organism     Last Assessed: [id number]    SVT (supraventricular tachycardia)     Urinary tract infection        Past Surgical History:   Procedure Laterality Date    CATARACT EXTRACTION      HEMORRHOID SURGERY      HYSTERECTOMY      PELVIC FLOOR REPAIR         Family History   Adopted: Yes     I have reviewed and agree with the history as documented  E-Cigarette/Vaping    E-Cigarette Use Never User      E-Cigarette/Vaping Substances    Nicotine No     THC No     CBD No     Flavoring No     Other No     Unknown No      Social History     Tobacco Use    Smoking status: Never Smoker    Smokeless tobacco: Never Used   Vaping Use    Vaping Use: Never used   Substance Use Topics    Alcohol use: Never    Drug use: No       Review of Systems   Constitutional: Negative for appetite change, chills and fever  Respiratory: Negative for shortness of breath  Cardiovascular: Negative for chest pain  Gastrointestinal: Negative for abdominal pain, nausea and vomiting  Genitourinary: Positive for difficulty urinating  Musculoskeletal: Positive for arthralgias and gait problem  Negative for back pain and neck pain  Skin: Negative for wound     Neurological: Negative for syncope and headaches  All other systems reviewed and are negative  Physical Exam  Physical Exam  Vitals reviewed  Constitutional:       General: She is not in acute distress  Appearance: Normal appearance  She is not toxic-appearing  HENT:      Head: Normocephalic and atraumatic  Right Ear: External ear normal       Left Ear: External ear normal       Nose: Nose normal    Eyes:      General:         Right eye: No discharge  Left eye: No discharge  Extraocular Movements: Extraocular movements intact  Cardiovascular:      Rate and Rhythm: Normal rate and regular rhythm  Pulmonary:      Effort: Pulmonary effort is normal  No respiratory distress  Comments: Wheezing to right anterior lung field  Abdominal:      General: There is distension  Palpations: Abdomen is soft  Tenderness: There is no abdominal tenderness  There is no right CVA tenderness, left CVA tenderness, guarding or rebound  Musculoskeletal:         General: Tenderness (no tenderness to palpation, reports tenderness with L hip and knee flexion) present  Comments: No midline c/t/l spine pain, raises b/l arms overhead, able to raise b/l LE off bed and flex knees to chest   Skin:     General: Skin is warm  Coloration: Skin is not jaundiced or pale  Findings: No bruising  Neurological:      General: No focal deficit present  Mental Status: She is alert  Mental status is at baseline        Comments: Hard of hearing however answers questions appropriately, alert and oriented, no focal neurological deficit         Vital Signs  ED Triage Vitals [01/16/22 1746]   Temperature Pulse Respirations Blood Pressure SpO2   (!) 97 3 °F (36 3 °C) 78 20 (!) 180/83 90 %      Temp Source Heart Rate Source Patient Position - Orthostatic VS BP Location FiO2 (%)   Temporal Monitor Sitting Left arm --      Pain Score       No Pain           Vitals:    01/17/22 1115 01/17/22 1330 01/17/22 1430 01/17/22 1440   BP: (!) 187/81 (!) 179/72 (!) 176/78 (!) 176/78   Pulse: 62 (!) 52 (!) 53 (!) 54   Patient Position - Orthostatic VS: Lying Lying Lying          Visual Acuity  Visual Acuity      Most Recent Value   L Pupil Size (mm) 3   R Pupil Size (mm) 3   L Pupil Shape Round   R Pupil Shape Round          ED Medications  Medications   aspirin (ECOTRIN LOW STRENGTH) EC tablet 81 mg (81 mg Oral Given 1/17/22 0922)   calcium carbonate (OYSTER SHELL,OSCAL) 500 mg tablet 1 tablet (1 tablet Oral Given 1/17/22 0816)   clonazePAM (KlonoPIN) tablet 1 5 mg (1 5 mg Oral Given 1/16/22 2134)   cholecalciferol (VITAMIN D3) tablet 1,000 Units (1,000 Units Oral Given 1/17/22 0923)   digoxin (LANOXIN) tablet 125 mcg (125 mcg Oral Given 1/17/22 0924)   famotidine (PEPCID) tablet 20 mg (20 mg Oral Given 1/17/22 0924)   lactobacillus acidophilus-bulgaricus (FLORANEX) packet 1 packet (1 packet Oral Given 1/17/22 1118)   magnesium hydroxide (MILK OF MAGNESIA) oral suspension 30 mL (has no administration in time range)   melatonin tablet 3 mg (3 mg Oral Given 1/16/22 2134)   metoprolol tartrate (LOPRESSOR) tablet 75 mg (75 mg Oral Given 1/17/22 0925)   fish oil capsule 1,000 mg (1,000 mg Oral Given 1/17/22 0925)   senna (SENOKOT) tablet 8 6 mg (8 6 mg Oral Given 1/17/22 0925)   ondansetron (ZOFRAN) injection 4 mg (has no administration in time range)   amLODIPine (NORVASC) tablet 10 mg (10 mg Oral Given 1/17/22 1118)   acetaminophen (TYLENOL) tablet 975 mg (975 mg Oral Given 1/17/22 1344)   HYDROmorphone (DILAUDID) injection 0 5 mg (has no administration in time range)   oxyCODONE (ROXICODONE) IR tablet 5 mg (has no administration in time range)   tolvaptan (SAMSCA) split tablet 15 mg (has no administration in time range)   acetaminophen (TYLENOL) tablet 650 mg (650 mg Oral Given 1/16/22 1852)   LORazepam (ATIVAN) tablet 0 5 mg (0 5 mg Oral Given 1/16/22 1853)   HYDROmorphone (DILAUDID) injection 0 2 mg (0 2 mg Intravenous Given 1/16/22 1937)   furosemide (LASIX) injection 20 mg (20 mg Intravenous Given 1/16/22 2312)   HYDROmorphone (DILAUDID) injection 0 2 mg (0 2 mg Intravenous Given 1/16/22 2310)   magnesium sulfate IVPB (premix) SOLN 1 g (0 g Intravenous Stopped 1/17/22 0829)   heparin (porcine) subcutaneous injection 5,000 Units (5,000 Units Subcutaneous Given 1/17/22 1346)   magnesium sulfate 2 g/50 mL IVPB (premix) 2 g (0 g Intravenous Stopped 1/17/22 1221)       Diagnostic Studies  Results Reviewed     Procedure Component Value Units Date/Time    Basic metabolic panel [[id number]]  (Abnormal) Collected: 01/17/22 1425    Lab Status: Final result Specimen: Blood from Arm, Right Updated: 01/17/22 1518     Sodium 116 mmol/L      Potassium 4 2 mmol/L      Chloride 84 mmol/L      CO2 28 mmol/L      ANION GAP 4 mmol/L      BUN 10 mg/dL      Creatinine 0 96 mg/dL      Glucose 123 mg/dL      Calcium 9 2 mg/dL      eGFR 52 ml/min/1 73sq m     Narrative:      [place] guidelines for Chronic Kidney Disease (CKD):     Stage 1 with normal or high GFR (GFR > 90 mL/min/1 73 square meters)    Stage 2 Mild CKD (GFR = 60-89 mL/min/1 73 square meters)    Stage 3A Moderate CKD (GFR = 45-59 mL/min/1 73 square meters)    Stage 3B Moderate CKD (GFR = 30-44 mL/min/1 73 square meters)    Stage 4 Severe CKD (GFR = 15-29 mL/min/1 73 square meters)    Stage 5 End Stage CKD (GFR <15 mL/min/1 73 square meters)  Note: GFR calculation is accurate only with a steady state creatinine    Osmolality, urine [[id number]]  (Abnormal) Collected: 01/16/22 1832    Lab Status: Final result Specimen: Urine Updated: 01/17/22 1021     Osmolality, Ur 141 mmol/KG     Sodium, urine, random [[id number]] Collected: 01/16/22 1832    Lab Status: Final result Specimen: Urine Updated: 01/17/22 1018     Sodium, Ur 41    Comprehensive metabolic panel [[id number]]  (Abnormal) Collected: 01/17/22 0422    Lab Status: Final result Specimen: Blood from Arm, Left Updated: 01/17/22 0452     Sodium 123 mmol/L      Potassium 4 2 mmol/L      Chloride 91 mmol/L      CO2 23 mmol/L      ANION GAP 9 mmol/L      BUN 9 mg/dL      Creatinine 0 76 mg/dL      Glucose 122 mg/dL      Calcium 7 5 mg/dL      Corrected Calcium 8 5 mg/dL      AST 25 U/L      ALT 24 U/L      Alkaline Phosphatase 54 U/L      Total Protein 6 5 g/dL      Albumin 2 7 g/dL      Total Bilirubin 0 48 mg/dL      eGFR 69 ml/min/1 73sq m     Narrative:      [place] guidelines for Chronic Kidney Disease (CKD):     Stage 1 with normal or high GFR (GFR > 90 mL/min/1 73 square meters)    Stage 2 Mild CKD (GFR = 60-89 mL/min/1 73 square meters)    Stage 3A Moderate CKD (GFR = 45-59 mL/min/1 73 square meters)    Stage 3B Moderate CKD (GFR = 30-44 mL/min/1 73 square meters)    Stage 4 Severe CKD (GFR = 15-29 mL/min/1 73 square meters)    Stage 5 End Stage CKD (GFR <15 mL/min/1 73 square meters)  Note: GFR calculation is accurate only with a steady state creatinine    Magnesium [[id number]]  (Abnormal) Collected: 01/17/22 0422    Lab Status: Final result Specimen: Blood from Arm, Left Updated: 01/17/22 0444     Magnesium 1 5 mg/dL     Phosphorus [[id number]]  (Normal) Collected: 01/17/22 0422    Lab Status: Final result Specimen: Blood from Arm, Left Updated: 01/17/22 0444     Phosphorus 3 7 mg/dL     CBC (With Platelets) [[id number]]  (Normal) Collected: 01/17/22 0422    Lab Status: Final result Specimen: Blood from Arm, Left Updated: 01/17/22 0427     WBC 7 70 Thousand/uL      RBC 4 49 Million/uL      Hemoglobin 12 2 g/dL      Hematocrit 37 0 %      MCV 82 fL      MCH 27 2 pg      MCHC 33 0 g/dL      RDW 14 0 %      Platelets 906 Thousands/uL      MPV 9 0 fL     Basic metabolic panel [[id number]]  (Abnormal) Collected: 01/16/22 7186    Lab Status: Final result Specimen: Blood from Arm, Right Updated: 01/17/22 0011     Sodium 119 mmol/L      Potassium 4 5 mmol/L      Chloride 86 mmol/L      CO2 25 mmol/L      ANION GAP 8 mmol/L      BUN 12 mg/dL      Creatinine 1 03 mg/dL      Glucose 139 mg/dL      Calcium 8 3 mg/dL      eGFR 47 ml/min/1 73sq m     Narrative:      [place] guidelines for Chronic Kidney Disease (CKD):     Stage 1 with normal or high GFR (GFR > 90 mL/min/1 73 square meters)    Stage 2 Mild CKD (GFR = 60-89 mL/min/1 73 square meters)    Stage 3A Moderate CKD (GFR = 45-59 mL/min/1 73 square meters)    Stage 3B Moderate CKD (GFR = 30-44 mL/min/1 73 square meters)    Stage 4 Severe CKD (GFR = 15-29 mL/min/1 73 square meters)    Stage 5 End Stage CKD (GFR <15 mL/min/1 73 square meters)  Note: GFR calculation is accurate only with a steady state creatinine    NT-BNP PRO [[id number]]  (Normal) Collected: 01/16/22 2132    Lab Status: Final result Specimen: Blood Updated: 01/16/22 2151     NT-proBNP 215 pg/mL     Urine Microscopic [[id number]]  (Normal) Collected: 01/16/22 1832    Lab Status: Final result Specimen: Urine, Straight Cath Updated: 01/16/22 1912     RBC, UA 0-1 /hpf      WBC, UA None Seen /hpf      Epithelial Cells Occasional /hpf      Bacteria, UA Occasional /hpf     Basic metabolic panel [[id number]]  (Abnormal) Collected: 01/16/22 1832    Lab Status: Final result Specimen: Blood from Arm, Right Updated: 01/16/22 1908     Sodium 119 mmol/L      Potassium 4 6 mmol/L      Chloride 85 mmol/L      CO2 25 mmol/L      ANION GAP 9 mmol/L      BUN 13 mg/dL      Creatinine 1 02 mg/dL      Glucose 124 mg/dL      Calcium 9 0 mg/dL      eGFR 48 ml/min/1 73sq m     Narrative:      [place] guidelines for Chronic Kidney Disease (CKD):     Stage 1 with normal or high GFR (GFR > 90 mL/min/1 73 square meters)    Stage 2 Mild CKD (GFR = 60-89 mL/min/1 73 square meters)    Stage 3A Moderate CKD (GFR = 45-59 mL/min/1 73 square meters)    Stage 3B Moderate CKD (GFR = 30-44 mL/min/1 73 square meters)    Stage 4 Severe CKD (GFR = 15-29 mL/min/1 73 square meters)    Stage 5 End Stage CKD (GFR <15 mL/min/1 73 square meters)  Note: GFR calculation is accurate only with a steady state creatinine    Protime-INR [[id number]]  (Normal) Collected: 01/16/22 1832    Lab Status: Final result Specimen: Blood from Arm, Right Updated: 01/16/22 1858     Protime 12 6 seconds      INR 0 99    UA w Reflex to Microscopic w Reflex to Culture [[id number]]  (Abnormal) Collected: 01/16/22 1832    Lab Status: Final result Specimen: Urine, Straight Cath Updated: 01/16/22 1853     Color, UA Yellow     Clarity, UA Clear     Specific Gravity, UA 1 010     pH, UA 7 0     Leukocytes, UA Negative     Nitrite, UA Negative     Protein, UA Trace mg/dl      Glucose, UA Negative mg/dl      Ketones, UA Negative mg/dl      Urobilinogen, UA 0 2 E U /dl      Bilirubin, UA Negative     Blood, UA Small    COVID/FLU/RSV - 2 hour TAT [[id number]]  (Normal) Collected: 01/16/22 1801    Lab Status: Final result Specimen: Nares from Nose Updated: 01/16/22 1846     SARS-CoV-2 Negative     INFLUENZA A PCR Negative     INFLUENZA B PCR Negative     RSV PCR Negative    Narrative:      FOR PEDIATRIC PATIENTS - copy/paste COVID Guidelines URL to browser: [email or web address]    SARS-CoV-2 assay is a Nucleic Acid Amplification assay intended for the  qualitative detection of nucleic acid from SARS-CoV-2 in nasopharyngeal  swabs  Results are for the presumptive identification of SARS-CoV-2 RNA  Positive results are indicative of infection with SARS-CoV-2, the virus  causing COVID-19, but do not rule out bacterial infection or co-infection  with other viruses  Laboratories within the [place] and its  territories are required to report all positive results to the appropriate  public health authorities  Negative results do not preclude SARS-CoV-2  infection and should not be used as the sole basis for treatment or other  patient management decisions  Negative results must be combined with  clinical observations, patient history, and epidemiological information  This test has not been FDA cleared or approved  This test has been authorized by FDA under an Emergency Use Authorization  (EUA)  This test is only authorized for the duration of time the  declaration that circumstances exist justifying the authorization of the  emergency use of an in vitro diagnostic tests for detection of SARS-CoV-2  virus and/or diagnosis of COVID-19 infection under section 564(b)(1) of  the Act, 21 U  [place] C  412EQZ-7(T)(0), unless the authorization is terminated  or revoked sooner  The test has been validated but independent review by FDA  and CLIA is pending  Test performed using [employer] GeneXpert: This RT-PCR assay targets N2,  a region unique to SARS-CoV-2  A conserved region in the E-gene was chosen  for pan-Sarbecovirus detection which includes SARS-CoV-2      Basic metabolic panel [[id number]]  (Abnormal) Collected: 01/16/22 1801    Lab Status: Final result Specimen: Blood from Arm, Right Updated: 01/16/22 1822     Sodium 118 mmol/L      Potassium 4 9 mmol/L      Chloride 84 mmol/L      CO2 25 mmol/L      ANION GAP 9 mmol/L      BUN 13 mg/dL      Creatinine 1 07 mg/dL      Glucose 119 mg/dL      Calcium 9 3 mg/dL      eGFR 45 ml/min/1 73sq m     Narrative:      [place] guidelines for Chronic Kidney Disease (CKD):     Stage 1 with normal or high GFR (GFR > 90 mL/min/1 73 square meters)    Stage 2 Mild CKD (GFR = 60-89 mL/min/1 73 square meters)    Stage 3A Moderate CKD (GFR = 45-59 mL/min/1 73 square meters)    Stage 3B Moderate CKD (GFR = 30-44 mL/min/1 73 square meters)    Stage 4 Severe CKD (GFR = 15-29 mL/min/1 73 square meters)    Stage 5 End Stage CKD (GFR <15 mL/min/1 73 square meters)  Note: GFR calculation is accurate only with a steady state creatinine    CBC and differential [[id number]] Collected: 01/16/22 1801    Lab Status: Final result Specimen: Blood from Arm, Right Updated: 01/16/22 1809     WBC 7 29 Thousand/uL      RBC 4 57 Million/uL      Hemoglobin 12 7 g/dL      Hematocrit 38 0 %      MCV 83 fL      MCH 27 8 pg      MCHC 33 4 g/dL      RDW 14 2 %      MPV 8 9 fL      Platelets 191 Thousands/uL      nRBC 0 /100 WBCs      Neutrophils Relative 71 %      Immat GRANS % 0 %      Lymphocytes Relative 18 %      Monocytes Relative 8 %      Eosinophils Relative 2 %      Basophils Relative 1 %      Neutrophils Absolute 5 12 Thousands/µL      Immature Grans Absolute 0 03 Thousand/uL      Lymphocytes Absolute 1 30 Thousands/µL      Monocytes Absolute 0 60 Thousand/µL      Eosinophils Absolute 0 16 Thousand/µL      Basophils Absolute 0 08 Thousands/µL                  CT lower extremity wo contrast left   Final Result by Chilo Jorge MD (01/16 1936)      Acute left femoral transcervical neck fracture as described  Workstation performed: [id number]         CT head without contrast   Final Result by Chilo Jorge MD (01/16 1929)      No acute intracranial abnormality  Workstation performed: [id number]         XR chest 1 view portable   Final Result by Jovany Gee MD (01/17 0818)      No acute cardiopulmonary disease  Workstation performed: [id number]         XR hip/pelv 2-3 vws left   ED Interpretation by Coni Fabry, DO (01/16 1821)   Abnormal   Left femoral neck fracture      Final Result by Jovany Gee MD (01/17 5772)      Acute subcapital left femoral neck fracture  Workstation performed: [id number]                    Procedures  Procedures         ED Course  ED Course as of 01/17/22 1553   Sun Jan 16, 2022   1827 Sodium(!): 118  Previously within normal, will repeat   1843 Son, Gypsy Baum (per request), updated to hip fracture and expected need for surgery   1920 Per ortho, okay to keep   Asks to place ortho consult from ED, NPO at midnight, asks to advise SLIM to NOT give chemical VTE ppx                                             MDM  Number of Diagnoses or Management Options  Closed transcervical fracture of femur, left, initial encounter ([facility] )  Fall  Hyponatremia  Diagnosis management comments: 70-year-old female presents for evaluation after fall at nursing facility  Patient is complaining of left hip pain  She denies head strike or loss of consciousness, she is on aspirin daily  Will evaluate CT head, chest x-ray, hip x-ray  Will also obtain preop labs  Disposition  Final diagnoses:   Hyponatremia   Closed transcervical fracture of femur, left, initial encounter [facility])   Fall     Time reflects when diagnosis was documented in both MDM as applicable and the Disposition within this note     Time User Action Codes Description Comment    1/16/2022  7:59 PM Susan Jeffrey [[id number]] Hyponatremia     1/16/2022  8:00 PM Nico Santana Closed transcervical fracture of femur, left, initial encounter ([facility] )     1/16/2022  8:00 PM Susan Jeffrey [[id number]] Fall     1/17/2022  9:14 AM Lui Keller [[id number]] Closed fracture of neck of left femur, initial encounter [facility])       ED Disposition     ED Disposition Condition Date/Time Comment    Admit Stable Sun Jan 16, 2022  7:59 PM Case was discussed with LOIS and the patient's admission status was agreed to be Admission Status: inpatient status to the service of Dr Lora Fuentes           Follow-up Information    None         Current Discharge Medication List      CONTINUE these medications which have NOT CHANGED    Details   amLODIPine (NORVASC) 5 mg tablet TAKE ONE TABLET BY MOUTH TWICE A DAY *HOLD FOR SBP<110* (HTN)  Qty: 60 tablet, Refills: 5    Associated Diagnoses: Hypertensive urgency      aspirin (ECOTRIN LOW STRENGTH) 81 mg EC tablet TAKE ONE TABLET BY MOUTH ONCE DAILY (STROKE PREVENTION)  Qty: 30 tablet, Refills: 5    Associated Diagnoses: Hypertension, unspecified type; Atrial fibrillation, unspecified type (HCC)      calcium carbonate (OYSTER SHELL,OSCAL) 500 mg 1 TAB ORALLY TWICE DAILY DX: SUPPLEMENT  Qty: 60 tablet, Refills: 4    Associated Diagnoses: Generalized osteoarthritis of multiple sites      clonazePAM (KlonoPIN) 0 5 mg tablet TAKE THREE TABLETS (1 5MG) BY MOUTH AT BEDTIME (ANXIETY)  Qty: 90 tablet, Refills: 4    Associated Diagnoses: Generalized anxiety disorder      D3-1000 25 MCG (1000 UT) capsule TAKE ONE CAPSULE BY MOUTH ONCE DAILY (VITAMIN D DEFICIENCY)  Qty: 30 capsule, Refills: 5    Associated Diagnoses: Vitamin A deficiency      digoxin (LANOXIN) 0 125 mg tablet TAKE ONE TABLET BY MOUTH ONCE DAILY (AFIB)  Qty: 30 tablet, Refills: 5    Associated Diagnoses: Paroxysmal atrial fibrillation (HCC)      famotidine (PEPCID) 20 mg tablet TAKE ONE TABLET BY MOUTH DAILY AT BEDTIME (GERD)  Qty: 30 tablet, Refills: 5    Associated Diagnoses: Gastroesophageal reflux disease      lisinopril (ZESTRIL) 10 mg tablet TAKE ONE TABLET BY MOUTH ONCE DAILY (HTN)  Qty: 30 tablet, Refills: 5    Associated Diagnoses: Supraventricular tachycardia (HCC)      Melatonin 5 MG TABS TAKE ONE TABLET BY MOUTH AT BEDTIME (INSOMNIA)  Qty: 30 tablet, Refills: 5    Associated Diagnoses: Insomnia, unspecified type      metoprolol tartrate (LOPRESSOR) 25 mg tablet TAKE THREE TABLETS (75MG) BY MOUTH EVERY 12 HOURS  HOLD FOR HR <60 OR SBP <110 (HTN)**TAKE W/ FOOD/SNACK**  Qty: 180 tablet, Refills: 5    Associated Diagnoses: Benign essential HTN      omeprazole (PriLOSEC) 20 mg delayed release capsule TAKE ONE CAPSULE BY MOUTH ONCE DAILY (GERD)  Qty: 30 capsule, Refills: 5    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      Refresh Optive Advanced 0 5-1-0 5 % SOLN 3 (three) times a day      senna (SENOKOT) 8 6 mg TAKE ONE TABLET BY MOUTH ONCE DAILY AT BEDTIME   HOLD FOR DIARRHEA OR LOOSE STOOLS (CONSTIPATION)  Qty: 30 tablet, Refills: 5    Associated Diagnoses: Drug-induced constipation      sulfamethoxazole-trimethoprim (BACTRIM DS) 800-160 mg per tablet Take 1 tablet by mouth daily  Qty: 30 tablet, Refills: 5    Associated Diagnoses: Acute UTI      acetaminophen (TYLENOL) 325 mg tablet Take 2 tablets (650 mg total) by mouth every 6 (six) hours as needed for mild pain, headaches or fever  Qty: 60 tablet, Refills: 11    Comments: Do not exceed a total of 3 grams of tylenol/acetaminophen in a 24-hour period    Associated Diagnoses: Acute cystitis with hematuria      diphenhydrAMINE (BENADRYL) 50 mg capsule Take 1 capsule (50 mg total) by mouth every 6 (six) hours as needed for itching for up to 1 day  Qty: 30 capsule, Refills: 0    Associated Diagnoses: Insect bite of thoracic wall, unspecified whether front or back, initial encounter      ibuprofen (MOTRIN) 200 mg tablet TAKE ONE TABLET BY MOUTH ONCE DAILY AS NEEDED FOR MIGRAINES/HEADACHE  Qty: 30 tablet, Refills: 5    Associated Diagnoses: Ophthalmoplegic migraine, not intractable      lactobacillus acidophilus-bulgaricus (FLORANEX) packet Take 1 packet by mouth 3 (three) times a day with meals  Qty: 90 packet, Refills: 0    Associated Diagnoses: Diarrhea      magnesium hydroxide (MILK OF MAGNESIA) 800 MG/5ML suspension Take 30 mL by mouth daily as needed for constipation       Melatonin 5 MG CAPS TAKE ONE CAPSULE BY MOUTH AT BEDTIME (INSOMNIA)  Qty: 30 capsule, Refills: 5    Associated Diagnoses: Generalized anxiety disorder      Nutritional Supplements (Ensure) Take 237 mL by mouth daily  Qty: 7110 mL, Refills: 5    Associated Diagnoses: Weight loss      Omega-3 Fatty Acids (fish oil) 1,000 mg TAKE ONE CAPSULE BY MOUTH ONCE DAILY (SUPPLEMENT)  Qty: 30 capsule, Refills: 5    Associated Diagnoses: Hypercholesterolemia      polyethylene glycol (GLYCOLAX) 17 GM/SCOOP DISSOLVE 17 GRAMS (ONE CAPFUL) IN 8 OUNCES OF WATER AND DRINK BY MOUTH DAILY FOR CONSTIPATION  Qty: 510 g, Refills: 4    Associated Diagnoses: Chronic idiopathic constipation             No discharge procedures on file     PDMP Review       Value Time User    PDMP Reviewed  Yes 1/16/2022  8:45 PM Leopold Golds, PA-C          ED Provider  Electronically Signed by           Neal Fraser DO  01/17/22 [id number]

## 2022-01-17 PROBLEM — Z01.818 PREOPERATIVE CLEARANCE: Status: ACTIVE | Noted: 2022-01-01

## 2022-01-17 PROBLEM — I50.31 ACUTE DIASTOLIC CHF (CONGESTIVE HEART FAILURE) (HCC): Status: ACTIVE | Noted: 2022-01-01

## 2022-01-17 NOTE — ASSESSMENT & PLAN NOTE
Blood pressure initially elevated upon arrival to the ER  Most likely secondary to increased pain  Currently stable  Continue PTA blood pressure medication  Monitor Blood pressure closely  Continue PCP follow up post discharge

## 2022-01-17 NOTE — PLAN OF CARE
Problem: Potential for Falls  Goal: Patient will remain free of falls  Description: INTERVENTIONS:  - Educate patient/family on patient safety including physical limitations  - Instruct patient to call for assistance with activity   - Consult OT/PT to assist with strengthening/mobility   - Keep Call bell within reach  - Keep bed low and locked with side rails adjusted as appropriate  - Keep care items and personal belongings within reach  - Initiate and maintain comfort rounds  - Make Fall Risk Sign visible to staff  - Offer Toileting every 2 Hours, in advance of need  - Initiate/Maintain bed/chair alarm  - Obtain necessary fall risk management equipment:   - Apply yellow socks and bracelet for high fall risk patients  - Consider moving patient to room near nurses station  Outcome: Progressing     Problem: Prexisting or High Potential for Compromised Skin Integrity  Goal: Skin integrity is maintained or improved  Description: INTERVENTIONS:  - Identify patients at risk for skin breakdown  - Assess and monitor skin integrity  - Assess and monitor nutrition and hydration status  - Monitor labs   - Assess for incontinence   - Turn and reposition patient  - Assist with mobility/ambulation  - Relieve pressure over bony prominences  - Avoid friction and shearing  - Provide appropriate hygiene as needed including keeping skin clean and dry  - Evaluate need for skin moisturizer/barrier cream  - Collaborate with interdisciplinary team   - Patient/family teaching  - Consider wound care consult   Outcome: Progressing     Problem: PAIN - ADULT  Goal: Verbalizes/displays adequate comfort level or baseline comfort level  Description: Interventions:  - Encourage patient to monitor pain and request assistance  - Assess pain using appropriate pain scale  - Administer analgesics based on type and severity of pain and evaluate response  - Implement non-pharmacological measures as appropriate and evaluate response  - Consider cultural and social influences on pain and pain management  - Notify physician/advanced practitioner if interventions unsuccessful or patient reports new pain  Outcome: Progressing     Problem: INFECTION - ADULT  Goal: Absence or prevention of progression during hospitalization  Description: INTERVENTIONS:  - Assess and monitor for signs and symptoms of infection  - Monitor lab/diagnostic results  - Monitor all insertion sites, i e  indwelling lines, tubes, and drains  - Monitor endotracheal if appropriate and nasal secretions for changes in amount and color  - [place] appropriate cooling/warming therapies per order  - Administer medications as ordered  - Instruct and encourage patient and family to use good hand hygiene technique  - Identify and instruct in appropriate isolation precautions for identified infection/condition  Outcome: Progressing  Goal: Absence of fever/infection during neutropenic period  Description: INTERVENTIONS:  - Monitor WBC    Outcome: Progressing     Problem: SAFETY ADULT  Goal: Patient will remain free of falls  Description: INTERVENTIONS:  - Educate patient/family on patient safety including physical limitations  - Instruct patient to call for assistance with activity   - Consult OT/PT to assist with strengthening/mobility   - Keep Call bell within reach  - Keep bed low and locked with side rails adjusted as appropriate  - Keep care items and personal belongings within reach  - Initiate and maintain comfort rounds  - Make Fall Risk Sign visible to staff  - Offer Toileting every 2 Hours, in advance of need  - Initiate/Maintain bed/chair alarm  - Obtain necessary fall risk management equipment:   - Apply yellow socks and bracelet for high fall risk patients  - Consider moving patient to room near nurses station  Outcome: Progressing  Goal: Maintain or return to baseline ADL function  Description: INTERVENTIONS:  -  Assess patient's ability to carry out ADLs; assess patient's baseline for ADL function and identify physical deficits which impact ability to perform ADLs (bathing, care of mouth/teeth, toileting, grooming, dressing, etc )  - Assess/evaluate cause of self-care deficits   - Assess range of motion  - Assess patient's mobility; develop plan if impaired  - Assess patient's need for assistive devices and provide as appropriate  - Encourage maximum independence but intervene and supervise when necessary  - Involve family in performance of ADLs  - Assess for home care needs following discharge   - Consider OT consult to assist with ADL evaluation and planning for discharge  - Provide patient education as appropriate  Outcome: Progressing  Goal: Maintains/Returns to pre admission functional level  Description: INTERVENTIONS:  - Perform BMAT or MOVE assessment daily    - Set and communicate daily mobility goal to care team and patient/family/caregiver  - Collaborate with rehabilitation services on mobility goals if consulted  - Perform Range of Motion 3 times a day  - Reposition patient every 2 hours    - Dangle patient 3 times a day  - Stand patient 3 times a day  - Ambulate patient 3 times a day  - Out of bed to chair 3 times a day   - Out of bed for meals 3 times a day  - Out of bed for toileting  - Record patient progress and toleration of activity level   Outcome: Progressing     Problem: DISCHARGE PLANNING  Goal: Discharge to home or other facility with appropriate resources  Description: INTERVENTIONS:  - Identify barriers to discharge w/patient and caregiver  - Arrange for needed discharge resources and transportation as appropriate  - Identify discharge learning needs (meds, wound care, etc )  - Arrange for interpretive services to assist at discharge as needed  - Refer to Case Management Department for coordinating discharge planning if the patient needs post-hospital services based on physician/advanced practitioner order or complex needs related to functional status, cognitive ability, or social support system  Outcome: Progressing     Problem: Knowledge Deficit  Goal: Patient/family/caregiver demonstrates understanding of disease process, treatment plan, medications, and discharge instructions  Description: Complete learning assessment and assess knowledge base    Interventions:  - Provide teaching at level of understanding  - Provide teaching via preferred learning methods  Outcome: Progressing     Problem: MUSCULOSKELETAL - ADULT  Goal: Maintain or return mobility to safest level of function  Description: INTERVENTIONS:  - Assess patient's ability to carry out ADLs; assess patient's baseline for ADL function and identify physical deficits which impact ability to perform ADLs (bathing, care of mouth/teeth, toileting, grooming, dressing, etc )  - Assess/evaluate cause of self-care deficits   - Assess range of motion  - Assess patient's mobility  - Assess patient's need for assistive devices and provide as appropriate  - Encourage maximum independence but intervene and supervise when necessary  - Involve family in performance of ADLs  - Assess for home care needs following discharge   - Consider OT consult to assist with ADL evaluation and planning for discharge  - Provide patient education as appropriate  Outcome: Progressing  Goal: Maintain proper alignment of affected body part  Description: INTERVENTIONS:  - Support, maintain and protect limb and body alignment  - Provide patient/ family with appropriate education  Outcome: Progressing

## 2022-01-17 NOTE — ASSESSMENT & PLAN NOTE
Sodium level at 118 --> rechecked at 123  Possibly hypervolemic hyponatremia  Possible SIADH  Check Sodium urine random, Osmolality Urine  Received IV lasix x 1 dose in ER  Continue lasix 20mg IV x 2 more doses  Follow BMP in afternoon  Appreciate nephrology consult and ongoing follow up / management

## 2022-01-17 NOTE — PROGRESS NOTES
[address] [facility] [address] [place]  Progress Note - Michelle Thakur 9/2/1931, 80 y o  female MRN: [id number]  Unit/Bed#: Darlene Dietrich Encounter: [id number]  Primary Care Provider: Nam Irving DO   Date and time admitted to hospital: 1/16/2022  5:43 PM    Closed fracture of neck of left femur [facility])  Assessment & Plan  Presented to the ER for evalauation after a fall at SNF  She reports tripping on a rug, mechanical fall  Denies LOC, hitting her head  CT shows-Acute left femoral transcervical neck fracture as described  Admited to med surg  NPO for now  DVT proph - SC heparin x 2 doses for planned OR tomorrow AM   Pain medication - IV dilaudid, PO oxycodone  Fall precautions  Orthopedic Surgery Consult and ongoing support appreciated  Acute diastolic CHF (congestive heart failure) (HCC)  Assessment & Plan  Wt Readings from Last 3 Encounters:   01/16/22 57 2 kg (126 lb)   10/25/21 57 5 kg (126 lb 12 8 oz)   08/17/21 61 5 kg (135 lb 9 3 oz)       Suspect acute diastolic CHF with acute respiratory failure with hypoxia, hyponatremia  Will continue diuresis with IV lasix  Monitor intake and output, daily weights  Repeat Echocardiogram (last study from 2018)  Titrate oxygen as needed  Benign essential HTN  Assessment & Plan  Blood pressure initially elevated upon arrival to the ER  Most likely secondary to increased pain  Currently stable  Continue PTA blood pressure medication  Monitor Blood pressure closely  Continue PCP follow up post discharge   Paroxysmal atrial fibrillation (HCC)  Assessment & Plan  Rate currently controlled   Not on anticoagulation, likely due to fall risk  Continue digoxin, metoprolol  Continue Outpatient cardiology follow up  Check EKG preoperatively  Hyponatremia  Assessment & Plan  Sodium level at 118 --> rechecked at 123  Possibly hypervolemic hyponatremia  Possible SIADH    Check Sodium urine random, Osmolality Urine  Received IV lasix x 1 dose in ER   Continue lasix 20mg IV x 2 more doses  Follow BMP in afternoon  Appreciate nephrology consult and ongoing follow up / management  Hypercholesterolemia  Assessment & Plan  Continue Fish oil    Esophageal reflux  Assessment & Plan  Continue Pepcid      VTE Pharmacologic Prophylaxis: VTE Score: 5 Moderate Risk (Score 3-4) - Pharmacological DVT Prophylaxis Ordered: heparin  Patient Centered Rounds: I performed bedside rounds with nursing staff today  Discussions with Specialists or Other Care Team Provider: d/w orthopedics  Education and Discussions with Family / Patient: Updated  (son) via phone  Time Spent for Care: 30 minutes  More than 50% of total time spent on counseling and coordination of care as described above  Current Length of Stay: 1 day(s)  Current Patient Status: Inpatient   Certification Statement: The patient will continue to require additional inpatient hospital stay due to need for planned surgery tomorrow  Discharge Plan: Anticipate discharge in 48-72 hrs to rehab facility  Code Status: Level 3 - DNAR and DNI    Subjective:       Objective:   Vitals:   Temp (24hrs), Av 3 °F (36 3 °C), Min:97 3 °F (36 3 °C), Max:97 3 °F (36 3 °C)    Temp:  [97 3 °F (36 3 °C)] 97 3 °F (36 3 °C)  HR:  [47-78] 52  Resp:  [17-31] 18  BP: (132-197)/(62-83) 179/72  SpO2:  [90 %-97 %] 94 %  Body mass index is 23 05 kg/m²  Input and Output Summary (last 24 hours): Intake/Output Summary (Last 24 hours) at 2022 1431  Last data filed at 2022 1221  Gross per 24 hour   Intake 150 ml   Output 1800 ml   Net -1650 ml       Physical Exam:    Physical Exam  Vitals and nursing note reviewed  Constitutional:       General: She is not in acute distress  Appearance: She is not ill-appearing  Comments: Hard of hearing  Oxygen requiring - 2L/min at present   HENT:      Head: Normocephalic        Nose: Nose normal       Mouth/Throat:      Mouth: Mucous membranes are moist    Cardiovascular:      Rate and Rhythm: Normal rate and regular rhythm  Heart sounds: Normal heart sounds  Pulmonary:      Effort: Pulmonary effort is normal       Breath sounds: Rales (bilateral bases) present  Abdominal:      General: There is no distension  Musculoskeletal:      Right lower leg: No edema  Left lower leg: No edema  Skin:     General: Skin is warm and dry  Neurological:      Mental Status: She is oriented to person, place, and time  Psychiatric:         Mood and Affect: Mood normal             Additional Data:   Labs:  Results from last 7 days   Lab Units 01/17/22  0422 01/16/22  1801 01/16/22  1801   WBC Thousand/uL 7 70   < > 7 29   HEMOGLOBIN g/dL 12 2   < > 12 7   HEMATOCRIT % 37 0   < > 38 0   PLATELETS Thousands/uL 256   < > 292   NEUTROS PCT %  --   --  71   LYMPHS PCT %  --   --  18   MONOS PCT %  --   --  8   EOS PCT %  --   --  2    < > = values in this interval not displayed       Results from last 7 days   Lab Units 01/17/22  0422   SODIUM mmol/L 123*   POTASSIUM mmol/L 4 2   CHLORIDE mmol/L 91*   CO2 mmol/L 23   BUN mg/dL 9   CREATININE mg/dL 0 76   ANION GAP mmol/L 9   CALCIUM mg/dL 7 5*   ALBUMIN g/dL 2 7*   TOTAL BILIRUBIN mg/dL 0 48   ALK PHOS U/L 54   ALT U/L 24   AST U/L 25   GLUCOSE RANDOM mg/dL 122     Results from last 7 days   Lab Units 01/16/22  1832   INR  0 99                   Lines/Drains:  Invasive Devices  Report    Peripheral Intravenous Line            Peripheral IV 01/16/22 Right Antecubital <1 day          Drain            Urethral Catheter Non-latex 18 Fr  <1 day              Urinary Catheter:  Goal for removal: Plan to remove POD#1               Imaging: Reviewed radiology reports from this admission including: xray(s)    Recent Cultures (last 7 days):         Last 24 Hours Medication List:   Current Facility-Administered Medications   Medication Dose Route Frequency Provider Last Rate    acetaminophen  975 mg Oral Q8H [address] Rainie Nellie Byrnes PA-C      amLODIPine  10 mg Oral Daily TAWANA Rodriguez      aspirin  81 mg Oral Daily Bill Aguilar PA-C      calcium carbonate  1 tablet Oral BID With Meals Bill Aguilar PA-C      cholecalciferol  1,000 Units Oral Daily Bill Aguilar, AMBER      clonazePAM  1 5 mg Oral HS Bill Aguilar PA-C      digoxin  125 mcg Oral Daily Bill Aguilar PA-C      famotidine  20 mg Oral Daily Bill Aguilar PA-C      fish oil  1,000 mg Oral Daily Bill Aguilar PA-C      furosemide  20 mg Intravenous BID (diuretic) TAWANA Rodriguez      HYDROmorphone  0 5 mg Intravenous Q6H PRN Anisha Nieto PA-C      lactobacillus acidophilus-bulgaricus  1 packet Oral TID With Meals Bill Aguilar PA-C      magnesium hydroxide  30 mL Oral Daily PRN Bill Aguilar PA-C      melatonin  3 mg Oral HS Bill Aguilar PA-C      metoprolol tartrate  75 mg Oral Q12H [facility] Bill Aguilar PA-C      ondansetron  4 mg Intravenous Q6H PRN Bill Aguilar PA-C      oxyCODONE  5 mg Oral Q6H PRN Anisha Nieto PA-C      senna  1 tablet Oral BID Bill Aguilar PA-C          Today, Patient Was Seen By: Anisha Nieto PA-C    **Please Note: This note may have been constructed using a voice recognition system  **

## 2022-01-17 NOTE — CASE MANAGEMENT
Case Management Discharge Planning Note    Patient name Rohini Al  Location [facility]01/[facility]01 MRN [id number]  : 1931 Date 2022       Current Admission Date: 2022  Current Admission Diagnosis:Hyponatremia   Patient Active Problem List    Diagnosis Date Noted    [facility] 2022    Acute UTI 2021    Urinary retention 2021    Diarrhea 2021    sepsis 2021    FIONA (acute kidney injury) ([facility] ) 2021    Stage 3b chronic kidney disease ([facility] ) 2021    Lactic acidosis 2021    Pulmonary nodule 2021    Benign essential HTN 2019    Sick sinus syndrome ([facility] ) 2019    Multiple renal cysts 2019    Hepatic steatosis 2019    Left eye pain 2019    Acute metabolic encephalopathy     Acute cystitis with hematuria 2019    Lung mass 2019    Acute encephalopathy     Hypertensive urgency 10/19/2018    Debilitated 2018    Eczema 2018    Paroxysmal atrial fibrillation ([facility] ) 2018    Sinus bradycardia 2018    History of frequent urinary tract infections 2018    Right lower lobe lung mass 2018    Hyponatremia 2018    Esophageal mass 2018    Cough 2018    HCAP (healthcare-associated pneumonia) 2018    Acute febrile illness 2018    Chronic constipation 2017    Generalized osteoarthritis of multiple sites 2017    Vitamin D deficiency 2017    Bilateral carotid artery disease ([facility] ) 10/04/2017    Breast mass, left 10/03/2016    Gomez's esophagus 2016    Neuralgia 2015    Esophageal reflux 2015    Glucose intolerance (impaired glucose tolerance) 2014    Colon, diverticulosis 2013    Generalized anxiety disorder 2012    Hypercholesterolemia 2012    Osteoporosis 2012    SVT [facility]supraventricular tachycardia) [facility] 2012      LOS (days): 1  Geometric Mean LOS (GMLOS) (days):   Days to GMLOS:     OBJECTIVE:  Risk of Unplanned Readmission Score: 13         Current admission status: Inpatient   Preferred Pharmacy: No Pharmacies Listed  Primary Care Provider: Nita Saint, DO    Primary Insurance: MEDICARE  Secondary Insurance: Hari Ojeda DETAILS:Pt was admitted from [facility] assisted living with a fall, left hip fx  Tentative to the OR tomorrow (1/18) if medically cleared  Cm will follow up post op as pt will need rehab prior to returning to [facility]  (?SNF vs Acute rehab on dc)

## 2022-01-17 NOTE — ASSESSMENT & PLAN NOTE
History of sinus bradycardia, PAF, HTN  No known h/o CAD, but does have known carotid disease  Last echocardiogram from 2018 showing grade 1 diastolic dysfunction, mild AS, mild LVH at the time  Has exam findings consistent with volume overload, probable acute diastolic CHF currently  She poses at least a moderate cardiovascular risk for surgery

## 2022-01-17 NOTE — ASSESSMENT & PLAN NOTE
Wt Readings from Last 3 Encounters:   01/16/22 57 2 kg (126 lb)   10/25/21 57 5 kg (126 lb 12 8 oz)   08/17/21 61 5 kg (135 lb 9 3 oz)       Suspect acute diastolic CHF with acute respiratory failure with hypoxia, hyponatremia  Will continue diuresis with IV lasix  Monitor intake and output, daily weights  Repeat Echocardiogram (last study from 2018)  Titrate oxygen as needed

## 2022-01-17 NOTE — ASSESSMENT & PLAN NOTE
Presented to the ER for evalauation after a fall at SNF  She reports tripping on a rug, mechanical fall  Denies LOC, hitting her head  CT shows-Acute left femoral transcervical neck fracture as described  Admited to med surg  NPO for now  DVT proph - SC heparin x 2 doses for planned OR tomorrow AM   Pain medication - IV dilaudid, PO oxycodone  Fall precautions  Orthopedic Surgery Consult and ongoing support appreciated

## 2022-01-17 NOTE — CONSULTS
Cathy Lai 80 y o  female MRN: [id number]  Unit/Bed#: RM01 Encounter: [id number]        Assessment and Plan:    1  Acute on chronic hyponatremia  · Typically sodium runs around 130-133 millimoles per L, unclear etiology possibly hypervolemic +/- ADH over secretion  She presented with a sodium of 118 millimoles per L -> 123 millimoles per L with 20 mg IV Lasix  She continues to have lower extremity edema bibasilar crackles on exam therefore will re-dose Lasix 20 mg IV b i d  Today  Repeat BMP later today  If no significant improvement, recommend stopping Lasix and trialing tolvaptan in preparation for orthopedic surgery tomorrow, 1/18  · Continue fluid restriction  · Check TSH, a m  Cortisol  2  Stage 3 chronic kidney disease with baseline creatinine around 0 7-1 0 mg/dL  · Renal function is stable at this time  Will monitor closely for worsening renal function in the setting of Lasix use  Will hold lisinopril as she is tentative for OR tomorrow, 1/18  3  Left femur fracture  · Evaluated by Dr Constanza Preciado today in tentative for repair tomorrow, 1/18  Will maximize sodium and renal function as above  4  Volume overload, grade 1 diastolic dysfunction  · Will continue to attempt diuresis and monitor renal function and sodium closely  Continue fluid restriction, daily weight, strict I&O  5  Hypertension the setting of CKD  · Hold lisinopril perioperatively and increase amlodipine  Utilize hydralazine p r n  Do not use thiazide or thiazide like diuretics  6  History of urinary retention  · Urinary catheter is in place  Voiding trial to be done likely postoperatively  7  Paroxysmal atrial fibrillation  8   GERD    HPI:    Martina Martinez is a 80 y o  female with an active problem list significant for CKD 3, chronic mild hyponatremia, grade 1 diastolic dysfunction, generalized anxiety disorder, GERD, hyperlipidemia, hypertension, Lyme disease, renal cysts, nonrheumatic mitral valve regurgitation, osteoporosis, recurrent UTI, atrial fibrillation, urinary retention who presented to [facility] emergency department 1/16 from skilled nursing facility after tripping and fall  Significant imaging include acute left femoral transcervical neck fracture  She was noted to have a serum sodium of 118 millimoles per L upon presentation prompting a renal consultation  She was given Lasix 20 mg IV last evening with good response and serum sodium 123 millimoles per L this morning  Upon discussion with the patient, she relates HPI as above  She denies nausea and vomiting  She denies pain  She is on oxygen which she typically does not wear at nursing facility  She has no other physical complaints on exam     From renal standpoint, patient appears to have stage 3a with baseline creatinine around 0 7-1 0 mg/dL  She follows with her PCP for this  She has notably large PVRs and renal cysts on renal ultrasounds done in the past     From a sodium standpoint, historically has a mild hyponatremia around 130-133 millimoles per L  no previous urine studies to evaluate  Reason for Consult:  Hyponatremia    Review of Systems:  A complete 10-point review of systems was performed  Aside from what was mentioned in the HPI, it is otherwise negative  Historical Information   Past Medical History:   Diagnosis Date    Abdominal distension     Last Assessed: [id number]    Abnormal weight loss     Last Assessed: [id number]    Arthritis     Bronchitis     Bursitis of left hip     Lst Assessed: [id number]    Chest pain     Last Assessed: [id number]    Colon polyp     Dysuria     Last Assessed: [id number]    External hemorrhoids     Last Assessed: [id number]    Generalized anxiety disorder     GERD (gastroesophageal reflux disease)     History of echocardiogram 03/20/2018    EF 60%, mild to moderate mitral annular calcification  mild AS, mild TR       Hyperlipidemia     Hypertension     Hypomagnesemia     Last Assessed: [id number]    Lyme disease     Last Assessed: [id number]    Multiple renal cysts 7/5/2019    Nonrheumatic mitral valve regurgitation     Osteoporosis     Pneumonia of right middle lobe due to infectious organism     Last Assessed: [id number]    SVT (supraventricular tachycardia)     Urinary tract infection      Past Surgical History:   Procedure Laterality Date    CATARACT EXTRACTION      HEMORRHOID SURGERY      HYSTERECTOMY      PELVIC FLOOR REPAIR       Social History   Social History     Substance and Sexual Activity   Alcohol Use Never     Social History     Substance and Sexual Activity   Drug Use No     Social History     Tobacco Use   Smoking Status Never Smoker   Smokeless Tobacco Never Used       Family History:   Family History   Adopted: Yes       Medications:  Pertinent medications were reviewed  Current Facility-Administered Medications   Medication Dose Route Frequency Provider Last Rate    acetaminophen  975 mg Oral Q8H [address] Yuriy Perla PA-C      [START ON 1/18/2022] amLODIPine  10 mg Oral Daily TAWANA Cardoso      aspirin  81 mg Oral Daily Bill Aguilar PA-C      calcium carbonate  1 tablet Oral BID With Meals Bill Aguilar PA-C      cholecalciferol  1,000 Units Oral Daily Bill Aguilar PA-C      clonazePAM  1 5 mg Oral HS Bill Aguilar PA-C      digoxin  125 mcg Oral Daily Bill Aguilar PA-C      famotidine  20 mg Oral Daily Bill Aguilar PA-C      fish oil  1,000 mg Oral Daily Bill Aguilar PA-C      furosemide  20 mg Intravenous BID (diuretic) TAWANA Cardoso      heparin (porcine)  5,000 Units Subcutaneous [facility] Yves Lane MD      HYDROmorphone  0 5 mg Intravenous Q6H PRN Laurelsera Jose PA-C      lactobacillus acidophilus-bulgaricus  1 packet Oral TID With Meals Bill Aguilar PA-C      magnesium hydroxide  30 mL Oral Daily PRN Bill Aguilar PA-C      magnesium sulfate  2 g Intravenous Once Yuriy Perla PA-C 2 g (01/17/22 1021)    melatonin  3 mg Oral HS Bill Aguilar PA-C      metoprolol tartrate  75 mg Oral Q12H [address] Bill Aguilar PA-C      ondansetron  4 mg Intravenous Q6H PRN Bill Aguilar PA-C      oxyCODONE  5 mg Oral Q6H PRN Jose A Kim PA-C      senna  1 tablet Oral BID iBll Aguilar PA-C           Allergies   Allergen Reactions    Codeine GI Intolerance    Epinephrine Other (See Comments)     Increased Heart Rate, Palpitations    Iodinated Diagnostic Agents     Iodine - Food Allergy Other (See Comments)          Magnesium Citrate GI Intolerance         Vitals:   /74 (BP Location: Left arm)   Pulse 64   Temp (!) 97 3 °F (36 3 °C) (Temporal)   Resp 18   Ht 5' 2" (1 575 m)   Wt 57 2 kg (126 lb)   SpO2 93%   BMI 23 05 kg/m²   Body mass index is 23 05 kg/m²  SpO2: 93 %,   SpO2 Activity: At Rest,   O2 Device: Nasal cannula      Intake/Output Summary (Last 24 hours) at 1/17/2022 1044  Last data filed at 1/17/2022 7416  Gross per 24 hour   Intake 100 ml   Output 1800 ml   Net -1700 ml     Invasive Devices  Report    Peripheral Intravenous Line            Peripheral IV 01/16/22 Right Antecubital <1 day          Drain            Urethral Catheter Non-latex 18 Fr  <1 day                Physical Exam:  General: conscious, cooperative, in no acute distress  Eyes: conjunctivae pink, anicteric sclerae  ENT: lips and mucous membranes moist  Neck: supple, no JVD, no masses  Chest:  Diminished breath sounds bilateral, bibasilar crackles  CVS: S1 & S2, normal rate, regular rhythm  Abdomen: soft, non-tender, non-distended, normoactive bowel sounds  Extremities:  Mild edema of both legs  Skin: no rash  Neuro: awake, alert, oriented, a bit confused      Diagnostic Data:  Lab: I have personally reviewed pertinent lab results  ,   CBC:  Results from last 7 days   Lab Units 01/17/22  0422   WBC Thousand/uL 7 70   HEMOGLOBIN g/dL 12 2   HEMATOCRIT % 37 0   PLATELETS Thousands/uL 256      CMP:   Lab Results   Component Value Date    SODIUM 123 (L) 01/17/2022    K 4 2 01/17/2022    CL 91 (L) 01/17/2022    CO2 23 01/17/2022    BUN 9 01/17/2022    CREATININE 0 76 01/17/2022    CALCIUM 7 5 (L) 01/17/2022    AST 25 01/17/2022    ALT 24 01/17/2022    ALKPHOS 54 01/17/2022    EGFR 69 01/17/2022   ,   PT/INR:   Lab Results   Component Value Date    INR 0 99 01/16/2022   ,   Magnesium: No components found for: MAG,  Phosphorous:   Lab Results   Component Value Date    PHOS 3 7 01/17/2022       Microbiology:  @LABRCNTIP,(urinecx:7)@        TAWANA Irwin    Portions of the record may have been created with voice recognition software  Occasional wrong word or "sound a like" substitutions may have occurred due to the inherent limitations of voice recognition software  Read the chart carefully and recognize, using context, where substitutions have occurred

## 2022-01-17 NOTE — ANESTHESIA PREPROCEDURE EVALUATION
Per Nephro Consult for Hyponatremia  ? Typically sodium runs around 130-133 millimoles per L, unclear etiology possibly hypervolemic +/- ADH over secretion  She presented with a sodium of 118 millimoles per L -> 123 millimoles per L with 20 mg IV Lasix  She continues to have lower extremity edema bibasilar crackles on exam therefore will re-dose Lasix 20 mg IV b i d  Today  Repeat BMP later today  If no significant improvement, recommend stopping Lasix and trialing tolvaptan in preparation for orthopedic surgery tomorrow    BNP wnl  CXR wnl  Hyponatremia steadily improving  O2 Requirement steadily improving (RA as of noon 1/17)    Procedure:  HEMIARTHROPLASTY HIP (BIPOLAR) (Left Hip)    Relevant Problems   CARDIO   (+) Benign essential HTN   (+) Hypercholesterolemia   (+) Hypertensive urgency   (+) Paroxysmal atrial fibrillation (HCC)   (+) SVT (supraventricular tachycardia) (HCC)   (+) Sick sinus syndrome (HCC)   (+) Sinus bradycardia      GI/HEPATIC   (+) Esophageal reflux   (+) Hepatic steatosis      /RENAL   (+) FIONA (acute kidney injury) (HCC)   (+) Multiple renal cysts   (+) Stage 3b chronic kidney disease (HCC)   (+) sepsis      MUSCULOSKELETAL   (+) Generalized osteoarthritis of multiple sites      NEURO/PSYCH   (+) Generalized anxiety disorder   (+) History of frequent urinary tract infections      PULMONARY   (+) HCAP (healthcare-associated pneumonia)        Physical Exam    Airway    Mallampati score: II  TM Distance: >3 FB  Neck ROM: limited     Dental   No notable dental hx     Cardiovascular  Rhythm: regular, Rate: normal,     Pulmonary  Decreased breath sounds,     Other Findings        Anesthesia Plan  ASA Score- 3     Anesthesia Type- spinal and general with ASA Monitors  Additional Monitors: arterial line  Airway Plan:           Plan Factors-Exercise tolerance (METS): <4 METS  Chart reviewed  EKG reviewed  Existing labs reviewed  Patient is not a current smoker  Induction-     Postoperative Plan-     Informed Consent- Anesthetic plan and risks discussed with son and patient  I personally reviewed this patient with the CRNA  Discussed and agreed on the Anesthesia Plan with the CRNA         TTE 1/17/22:  Interpretation Summary         Left Ventricle: Left ventricular cavity size is normal  The left ventricular ejection fraction is 60%  Systolic function is normal  Wall motion is normal  Diastolic function is mildly abnormal, consistent with grade I (abnormal) relaxation  Wall thickness is mildly increased    Aortic Valve: There is mild stenosis    Tricuspid Valve: There is mild regurgitation        Lab Results   Component Value Date    WBC 7 52 01/19/2022    HGB 12 7 01/19/2022    HCT 39 0 01/19/2022    MCV 86 01/19/2022     01/19/2022     Lab Results   Component Value Date    SODIUM 130 (L) 01/19/2022    K 4 1 01/19/2022    CL 93 (L) 01/19/2022    CO2 28 01/19/2022    BUN 20 01/19/2022    CREATININE 1 29 01/19/2022    GLUC 99 01/19/2022    CALCIUM 9 6 01/19/2022     Lab Results   Component Value Date    ALT 24 01/17/2022    AST 25 01/17/2022    ALKPHOS 54 01/17/2022    BILITOT 0 4 06/29/2015     Lab Results   Component Value Date    INR 0 99 01/16/2022    INR 0 95 08/17/2021    INR 1 03 03/18/2021    PROTIME 12 6 01/16/2022    PROTIME 12 5 08/17/2021    PROTIME 13 3 03/18/2021     Lab Results   Component Value Date    HGBA1C 6 3 (H) 07/08/2015

## 2022-01-17 NOTE — H&P
[facility] [address]  H&P- Shanon Fernandez 9/2/1931, 80 y o  female MRN: [id number]  Unit/Bed#: Isabell Bess Encounter: [id number]  Primary Care Provider: Evin Ventura DO   Date and time admitted to hospital: 1/16/2022  5:43 PM    Closed fracture of neck of left femur [facility])  Assessment & Plan  Presented to the ER for evalauation after a fall at [facility]  Sh reports tripping on a rug  Denies LOC, hitting her head  CT shows-Acute left femoral transcervical neck fracture as described  ER Attending discussed case with Orthopedic Surgery  Admit to med surg  NPO for now  Hold Pharmacological VTE prophylaxis per Orhopedic Surgery Team  Pain medication  Zofran PRN  Fall precautions  Case management consult  Orthopedic Surgery Consult  Am labs  Supportive care     Hyponatremia  Assessment & Plan  Sodium level at 118  Check Sodium urine random  Check Osmolality Urine  Case discussed with Nephrology   Concern for SIADH  Lasix 20 mg IV x 1  Fluid restriction 1 8 L daily  BMP q 4 hours  Monitor sodium levels  Monitor urinary output, renal function  Nephrology consult   Supportive care     Benign essential HTN  Assessment & Plan  Blood pressure initially elevated upon arrival to the ER   most likely secondary to increased pain  Currently stable  Continue PTA blood pressure medication  Monitor Blood pressure closely  Continue PCP follow up post discharge     Paroxysmal atrial fibrillation (HCC)  Assessment & Plan  Rate currently controlled   Not on anticoagulation  Continue digoxin, metoprolol  Continue Outpatient cardiology follow up      Hypercholesterolemia  Assessment & Plan  Continue Fish oil    Esophageal reflux  Assessment & Plan  Continue Pepcid    VTE Pharmacologic Prophylaxis: VTE Score: 5 Held for scheduled Surgery in the AM  Code Status: Level 3 - DNAR and DNI   Discussion with family: None       Anticipated Length of Stay: Patient will be admitted on an inpatient basis with an anticipated length of stay of greater than 2 midnights secondary to Hyponatremia, left femoral neck fracture  Total Time for Visit, including Counseling / Coordination of Care: 30 minutes Greater than 50% of this total time spent on direct patient counseling and coordination of care  Chief Complaint: Fall, Left hip pain    History of Present Illness:  Barbara Hubbard is a 80 y o  female with a PMH of a small A-Fib, hypertension, hyperlipidemia, urinary retention who presents to the emergency room via EMS from [facility] for evaluation after a fall  Patient reports tripping on rug while using a walker and fell to the ground  She denies LOC, hitting her head  She reports she was not able to get upon our own due to pain to her left hip area  She denies chest pain, shortness of breath, cough, fever, chills, nausea, vomiting, diarrhea headaches, dizziness, neck or back pain  Labs completed in the ER with results as shown below  CT head, CT left lower extremity completed with results as shown below  Emergency room prior to admission she received lorazepam 0 5 mg p o , Dilaudid   2 mg IV, Tylenol 650 mg p o  ER attending discussed case with Orthopedic surgery  Plans for OR in the Am  At bedside in ER room 1, Patient is awake, Moderates distress secondary to pain  Patient is being admitted on inpatient status med surg level care for further managment of hyponatremia, left femoral neck fracture  Review of Systems:  Review of Systems   Constitutional: Negative for appetite change, chills, diaphoresis and fever  HENT: Positive for hearing loss  Eyes: Negative for photophobia and visual disturbance  Respiratory: Negative for cough, chest tightness, shortness of breath and wheezing  Cardiovascular: Negative for chest pain, palpitations and leg swelling  Gastrointestinal: Negative for abdominal pain, diarrhea, nausea and vomiting  Endocrine: Negative for polydipsia, polyphagia and polyuria  Genitourinary: Negative for difficulty urinating, dysuria, flank pain, frequency and hematuria  Musculoskeletal: Positive for gait problem  Negative for arthralgias, back pain and joint swelling  Skin: Negative for color change, pallor, rash and wound  Neurological: Negative for dizziness, tremors, syncope, weakness and headaches  Psychiatric/Behavioral: Negative for agitation and confusion  The patient is not nervous/anxious  Past Medical and Surgical History:   Past Medical History:   Diagnosis Date    Abdominal distension     Last Assessed: [id number]    Abnormal weight loss     Last Assessed: [id number]    Arthritis     Bronchitis     Bursitis of left hip     Lst Assessed: [id number]    Chest pain     Last Assessed: [id number]    Colon polyp     Dysuria     Last Assessed: [id number]    External hemorrhoids     Last Assessed: [id number]    Generalized anxiety disorder     GERD (gastroesophageal reflux disease)     History of echocardiogram 03/20/2018    EF 60%, mild to moderate mitral annular calcification  mild AS, mild TR   Hyperlipidemia     Hypertension     Hypomagnesemia     Last Assessed: [id number]    Lyme disease     Last Assessed: [id number]    Multiple renal cysts 7/5/2019    Nonrheumatic mitral valve regurgitation     Osteoporosis     Pneumonia of right middle lobe due to infectious organism     Last Assessed: [id number]    SVT (supraventricular tachycardia)     Urinary tract infection        Past Surgical History:   Procedure Laterality Date    CATARACT EXTRACTION      HEMORRHOID SURGERY      HYSTERECTOMY      PELVIC FLOOR REPAIR         Meds/Allergies:  Prior to Admission medications    Medication Sig Start Date End Date Taking?  Authorizing Provider   amLODIPine (NORVASC) 5 mg tablet TAKE ONE TABLET BY MOUTH TWICE A DAY *HOLD FOR SBP<110* (HTN) 9/27/21  Yes Lydia Wilburn, DO   aspirin (ECOTRIN LOW STRENGTH) 81 mg EC tablet TAKE ONE TABLET BY MOUTH ONCE DAILY (STROKE PREVENTION) 7/12/21  Yes [facility], DO   calcium carbonate (OYSTER SHELL,OSCAL) 500 mg 1 TAB ORALLY TWICE DAILY DX: SUPPLEMENT 12/1/21  Yes [facility], DO   clonazePAM (KlonoPIN) 0 5 mg tablet TAKE THREE TABLETS (1 5MG) BY MOUTH AT BEDTIME (ANXIETY) 6/30/21  Yes [facility], DO   D3-1000 25 MCG (1000 UT) capsule TAKE ONE CAPSULE BY MOUTH ONCE DAILY (VITAMIN D DEFICIENCY) 8/16/21  Yes [facility], DO   digoxin (LANOXIN) 0 125 mg tablet TAKE ONE TABLET BY MOUTH ONCE DAILY (AFIB) 10/14/21  Yes [facility], DO   famotidine (PEPCID) 20 mg tablet TAKE ONE TABLET BY MOUTH DAILY AT BEDTIME (GERD) 10/14/21  Yes [facility], DO   lisinopril (ZESTRIL) 10 mg tablet Take 1 tablet (10 mg total) by mouth daily 12/18/20 1/16/22 Yes [facility], DO   lisinopril (ZESTRIL) 10 mg tablet TAKE ONE TABLET BY MOUTH ONCE DAILY (HTN) 2/12/21  Yes [facility], DO   Melatonin 5 MG TABS TAKE ONE TABLET BY MOUTH AT BEDTIME (INSOMNIA) 8/16/21  Yes [facility], DO   metoprolol tartrate (LOPRESSOR) 25 mg tablet TAKE THREE TABLETS (75MG) BY MOUTH EVERY 12 HOURS  HOLD FOR HR <60 OR SBP <110 (HTN)**TAKE W/ FOOD/SNACK** 11/10/21  Yes [facility], DO   omeprazole (PriLOSEC) 20 mg delayed release capsule TAKE ONE CAPSULE BY MOUTH ONCE DAILY (GERD) 10/4/21  Yes [facility], DO   Refresh Optive Advanced 0 5-1-0 5 % SOLN 3 (three) times a day 10/14/21  Yes Historical Provider, MD   senna (SENOKOT) 8 6 mg TAKE ONE TABLET BY MOUTH ONCE DAILY AT BEDTIME   HOLD FOR DIARRHEA OR LOOSE STOOLS (CONSTIPATION) 11/17/21  Yes [facility], DO   sulfamethoxazole-trimethoprim (BACTRIM DS) 800-160 mg per tablet Take 1 tablet by mouth daily 10/25/21 4/23/22 Yes [facility], DO   acetaminophen (TYLENOL) 325 mg tablet Take 2 tablets (650 mg total) by mouth every 6 (six) hours as needed for mild pain, headaches or fever 11/4/21 11/4/22  Karolina Garcia,    diphenhydrAMINE (BENADRYL) 50 mg capsule Take 1 capsule (50 mg total) by mouth every 6 (six) hours as needed for itching for up to 1 day 10/25/21 10/26/21  Miroslava Ocasio DO   ibuprofen (MOTRIN) 200 mg tablet TAKE ONE TABLET BY MOUTH ONCE DAILY AS NEEDED FOR MIGRAINES/HEADACHE 11/4/21   Miroslava Ocasio DO   lactobacillus acidophilus-bulgaricus [facility]) packet Take 1 packet by mouth 3 (three) times a day with meals 3/22/21   Yariel Tate MD   magnesium hydroxide (MILK OF MAGNESIA) 800 MG/5ML suspension Take 30 mL by mouth daily as needed for constipation     Historical Provider, MD   Melatonin 5 MG CAPS TAKE ONE CAPSULE BY MOUTH AT BEDTIME (INSOMNIA) 10/19/20   Miroslava Ocasio DO   Nutritional Supplements (Ensure) Take 237 mL by mouth daily 4/21/21 10/18/21  Miroslava Ocasio DO   Omega-3 Fatty Acids (fish oil) 1,000 mg TAKE ONE CAPSULE BY MOUTH ONCE DAILY (SUPPLEMENT) 8/16/21   Miroslava Ocasio DO   polyethylene glycol (GLYCOLAX) 17 GM/SCOOP DISSOLVE 17 GRAMS (ONE CAPFUL) IN 8 OUNCES OF WATER AND DRINK BY MOUTH DAILY FOR CONSTIPATION 7/10/21   Miroslava Ocasio DO     I have reveiwed home medications using records provided by [facility]  Allergies: Allergies   Allergen Reactions    Codeine GI Intolerance    Epinephrine Other (See Comments)     Increased Heart Rate, Palpitations    Iodinated Diagnostic Agents     Iodine - Food Allergy Other (See Comments)          Magnesium Citrate GI Intolerance       Social History:  Marital Status:     Occupation: Retired  Patient Pre-hospital Living Situation: [facility]: [address]  Patient Pre-hospital Level of Mobility: walks with walker  Patient Pre-hospital Diet Restrictions: None reported  Substance Use History:   Social History     Substance and Sexual Activity   Alcohol Use Never     Social History     Tobacco Use   Smoking Status Never Smoker   Smokeless Tobacco Never Used     Social History     Substance and Sexual Activity   Drug Use No       Family History:  Family History   Adopted: Yes       Physical Exam:     Vitals:   Blood Pressure: 170/76 (01/17/22 0300)  Pulse: 57 (01/17/22 0300)  Temperature: (!) 97 3 °F (36 3 °C) (01/16/22 1746)  Temp Source: Temporal (01/16/22 1746)  Respirations: 21 (01/17/22 0300)  Height: 5' 2" (157 5 cm) (01/16/22 1746)  Weight - Scale: 57 2 kg (126 lb) (01/16/22 1746)  SpO2: 95 % (01/17/22 0300)    Physical Exam  Constitutional:       General: She is in acute distress  Appearance: She is not ill-appearing or diaphoretic  HENT:      Head: Normocephalic and atraumatic  Mouth/Throat:      Mouth: Mucous membranes are moist       Pharynx: Oropharynx is clear  Eyes:      Pupils: Pupils are equal, round, and reactive to light  Cardiovascular:      Rate and Rhythm: Normal rate and regular rhythm  Pulses: Normal pulses  Pulmonary:      Effort: Pulmonary effort is normal    Abdominal:      General: There is no distension  Tenderness: There is no abdominal tenderness  Musculoskeletal:      Cervical back: Normal range of motion and neck supple  Right lower leg: Edema present  Left lower leg: Edema present  Comments: Decreased ROM to LLE   Skin:     Capillary Refill: Capillary refill takes less than 2 seconds  Coloration: Skin is not jaundiced or pale  Findings: No bruising, erythema or rash  Neurological:      Mental Status: She is alert  Mental status is at baseline  Additional Data:     Lab Results:  Results from last 7 days   Lab Units 01/17/22  0422 01/16/22  1801 01/16/22  1801   WBC Thousand/uL 7 70   < > 7 29   HEMOGLOBIN g/dL 12 2   < > 12 7   HEMATOCRIT % 37 0   < > 38 0   PLATELETS Thousands/uL 256   < > 292   NEUTROS PCT %  --   --  71   LYMPHS PCT %  --   --  18   MONOS PCT %  --   --  8   EOS PCT %  --   --  2    < > = values in this interval not displayed       Results from last 7 days   Lab Units 01/17/22 0422   SODIUM mmol/L 123*   POTASSIUM mmol/L 4 2   CHLORIDE mmol/L 91*   CO2 mmol/L 23   BUN mg/dL 9   CREATININE mg/dL 0 76   ANION GAP mmol/L 9   CALCIUM mg/dL 7 5*   ALBUMIN g/dL 2 7*   TOTAL BILIRUBIN mg/dL 0 48   ALK PHOS U/L 54   ALT U/L 24   AST U/L 25   GLUCOSE RANDOM mg/dL 122     Results from last 7 days   Lab Units 01/16/22  1832   INR  0 99                   Imaging: Reviewed radiology reports from this admission including: CT head and Left lower extremity  CT lower extremity wo contrast left   Final Result by Carlos Lincoln MD (01/16 1936)      Acute left femoral transcervical neck fracture as described  Workstation performed: [id number]         CT head without contrast   Final Result by Carlos Lincoln MD (01/16 1929)      No acute intracranial abnormality  Workstation performed: [id number]         XR hip/pelv 2-3 vws left   ED Interpretation by Omkar Mobley DO (01/16 1821)   Abnormal   Left femoral neck fracture      XR chest 1 view portable    (Results Pending)       EKG and Other Studies Reviewed on Admission:   · EKG: No EKG obtained  ** Please Note: This note has been constructed using a voice recognition system   **

## 2022-01-17 NOTE — ASSESSMENT & PLAN NOTE
Rate currently controlled   Not on anticoagulation, likely due to fall risk  Continue digoxin, metoprolol  Continue Outpatient cardiology follow up  Check EKG preoperatively

## 2022-01-17 NOTE — QUICK NOTE
Renal quick note:  Hyponatremia worsening on afternoon BMP  Liberated from oxygen  Stop diuretics  Give 15 mg Tolvaptan now, liberate fluid restriction, BMP q4h x 3 to maximize sodium status prior to tentative ortho surgery tomorrow  -MARY Chávez

## 2022-01-17 NOTE — CONSULTS
Orthopedics   Karina Apo 80 y o  female MRN: [id number]  Unit/Bed#: [facility] CT SCAN      Chief Complaint:   left hip pain    HPI:   80 y  o female community ambulator status post fall complaining of left hip pain and inability to bear weight  Pain is well localized to the hip and is made worse with motion or contact to the area  Patient lives in an assisted facility  Obtain history from the patient and her son  Patient unable to walk after falling last night  Patient admitted in the medical team for medical optimization prior to surgery    Review Of Systems:   · Skin: Normal  · Neuro: See HPI  · Musculoskeletal: See HPI  · 14 point review of systems negative except as stated above     Past Medical History:   Past Medical History:   Diagnosis Date    Abdominal distension     Last Assessed: [id number]    Abnormal weight loss     Last Assessed: [id number]    Arthritis     Bronchitis     Bursitis of left hip     Lst Assessed: [id number]    Chest pain     Last Assessed: [id number]    Colon polyp     Dysuria     Last Assessed: [id number]    External hemorrhoids     Last Assessed: [id number]    Generalized anxiety disorder     GERD (gastroesophageal reflux disease)     History of echocardiogram 03/20/2018    EF 60%, mild to moderate mitral annular calcification  mild AS, mild TR       Hyperlipidemia     Hypertension     Hypomagnesemia     Last Assessed: [id number]    Lyme disease     Last Assessed: [id number]    Multiple renal cysts 7/5/2019    Nonrheumatic mitral valve regurgitation     Osteoporosis     Pneumonia of right middle lobe due to infectious organism     Last Assessed: [id number]    SVT (supraventricular tachycardia)     Urinary tract infection        Past Surgical History:   Past Surgical History:   Procedure Laterality Date    CATARACT EXTRACTION      HEMORRHOID SURGERY      HYSTERECTOMY      PELVIC FLOOR REPAIR         Family History:  Family history reviewed and non-contributory  Family History   Adopted: Yes       Social History:  Social History     Socioeconomic History    Marital status:      Spouse name: None    Number of children: None    Years of education: None    Highest education level: None   Occupational History    Occupation: Retired   Tobacco Use    Smoking status: Never Smoker    Smokeless tobacco: Never Used   Vaping Use    Vaping Use: Never used   Substance and Sexual Activity    Alcohol use: Never    Drug use: No    Sexual activity: Not Currently   Other Topics Concern    None   Social History Narrative    RETIRED     Social Determinants of Health     Financial Resource Strain: Not on file   Food Insecurity: Not on file   Transportation Needs: Not on file   Physical Activity: Not on file   Stress: Not on file   Social Connections: Not on file   Intimate Partner Violence: Not on file   Housing Stability: Not on file       Allergies:    Allergies   Allergen Reactions    Codeine GI Intolerance    Epinephrine Other (See Comments)     Increased Heart Rate, Palpitations    Iodinated Diagnostic Agents     Iodine - Food Allergy Other (See Comments)          Magnesium Citrate GI Intolerance           Labs:  0   Lab Value Date/Time    HCT 37 0 01/17/2022 0422    HCT 38 0 01/16/2022 1801    HCT 41 7 08/17/2021 1835    HCT 38 6 06/18/2015 1655    HCT 40 9 04/17/2015 1224    HCT 43 3 09/16/2014 0838    HGB 12 2 01/17/2022 0422    HGB 12 7 01/16/2022 1801    HGB 12 8 08/17/2021 1835    HGB 12 9 06/18/2015 1655    HGB 13 5 04/17/2015 1224    HGB 13 7 09/16/2014 0838    INR 0 99 01/16/2022 1832    WBC 7 70 01/17/2022 0422    WBC 7 29 01/16/2022 1801    WBC 6 51 08/17/2021 1835    WBC 3 26 (L) 06/18/2015 1655    WBC 5 09 04/17/2015 1224    WBC 4 37 09/16/2014 0838    CRP 10 7 (H) 03/18/2021 0606       Meds:    Current Facility-Administered Medications:     acetaminophen (TYLENOL) tablet 650 mg, 650 mg, Oral, Q6H PRN, Bill Aguilar PA-C    amLODIPine (NORVASC) tablet 5 mg, 5 mg, Oral, Daily, Bill Aguilar PA-C    aspirin (ECOTRIN LOW STRENGTH) EC tablet 81 mg, 81 mg, Oral, Daily, Bill Aguilar PA-C    calcium carbonate (OYSTER SHELL,OSCAL) 500 mg tablet 1 tablet, 1 tablet, Oral, BID With Meals, Bill Aguilar PA-C, 1 tablet at 01/17/22 0816    cholecalciferol (VITAMIN D3) tablet 1,000 Units, 1,000 Units, Oral, Daily, Bill Aguilar PA-C    clonazePAM (KlonoPIN) tablet 1 5 mg, 1 5 mg, Oral, HS, Bill Aguilar PA-C, 1 5 mg at 01/16/22 2134    digoxin (LANOXIN) tablet 125 mcg, 125 mcg, Oral, Daily, Bill Aguilar PA-C    famotidine (PEPCID) tablet 20 mg, 20 mg, Oral, Daily, Bill Aguilar PA-C    fish oil capsule 1,000 mg, 1,000 mg, Oral, Daily, Bill Aguilar PA-C    HYDROmorphone (DILAUDID) injection 0 2 mg, 0 2 mg, Intravenous, Q4H PRN, 0 2 mg at 01/17/22 0602 **OR** HYDROmorphone (DILAUDID) injection 0 5 mg, 0 5 mg, Intravenous, Q4H PRN, Bill Aguilar PA-C    lactobacillus acidophilus-bulgaricus (FLORANEX) packet 1 packet, 1 packet, Oral, TID With Meals, Bill Aguilar PA-C, 1 packet at 01/17/22 0815    lisinopril (ZESTRIL) tablet 10 mg, 10 mg, Oral, Daily, Bill Aguilar PA-C    magnesium hydroxide (MILK OF MAGNESIA) oral suspension 30 mL, 30 mL, Oral, Daily PRN, Bill Aguilar PA-C    melatonin tablet 3 mg, 3 mg, Oral, HS, Bill Aguilar PA-C, 3 mg at 01/16/22 2134    metoprolol tartrate (LOPRESSOR) tablet 75 mg, 75 mg, Oral, Q12H ROSHNI, Bill Aguilar PA-C, 75 mg at 01/16/22 2133    ondansetron (ZOFRAN) injection 4 mg, 4 mg, Intravenous, Q6H PRN, Bill Aguilar PA-C    senna (SENOKOT) tablet 8 6 mg, 1 tablet, Oral, BID, Bill Aguilar PA-C, 8 6 mg at 01/16/22 9809    Current Outpatient Medications:     amLODIPine (NORVASC) 5 mg tablet, TAKE ONE TABLET BY MOUTH TWICE A DAY *HOLD FOR SBP<110* (HTN), Disp: 60 tablet, Rfl: 5    aspirin (ECOTRIN LOW STRENGTH) 81 mg EC tablet, TAKE ONE TABLET BY MOUTH ONCE DAILY (STROKE PREVENTION), Disp: 30 tablet, Rfl: 5    calcium carbonate (OYSTER SHELL,OSCAL) 500 mg, 1 TAB ORALLY TWICE DAILY DX: SUPPLEMENT, Disp: 60 tablet, Rfl: 4    clonazePAM (KlonoPIN) 0 5 mg tablet, TAKE THREE TABLETS (1 5MG) BY MOUTH AT BEDTIME (ANXIETY), Disp: 90 tablet, Rfl: 4    D3-1000 25 MCG (1000 UT) capsule, TAKE ONE CAPSULE BY MOUTH ONCE DAILY (VITAMIN D DEFICIENCY), Disp: 30 capsule, Rfl: 5    digoxin (LANOXIN) 0 125 mg tablet, TAKE ONE TABLET BY MOUTH ONCE DAILY (AFIB), Disp: 30 tablet, Rfl: 5    famotidine (PEPCID) 20 mg tablet, TAKE ONE TABLET BY MOUTH DAILY AT BEDTIME (GERD), Disp: 30 tablet, Rfl: 5    lisinopril (ZESTRIL) 10 mg tablet, Take 1 tablet (10 mg total) by mouth daily, Disp: 90 tablet, Rfl: 1    lisinopril (ZESTRIL) 10 mg tablet, TAKE ONE TABLET BY MOUTH ONCE DAILY (HTN), Disp: 30 tablet, Rfl: 5    Melatonin 5 MG TABS, TAKE ONE TABLET BY MOUTH AT BEDTIME (INSOMNIA), Disp: 30 tablet, Rfl: 5    metoprolol tartrate (LOPRESSOR) 25 mg tablet, TAKE THREE TABLETS (75MG) BY MOUTH EVERY 12 HOURS  HOLD FOR HR <60 OR SBP <110 (HTN)**TAKE W/ FOOD/SNACK**, Disp: 180 tablet, Rfl: 5    omeprazole (PriLOSEC) 20 mg delayed release capsule, TAKE ONE CAPSULE BY MOUTH ONCE DAILY (GERD), Disp: 30 capsule, Rfl: 5    Refresh Optive Advanced 0 5-1-0 5 % SOLN, 3 (three) times a day, Disp: , Rfl:     senna (SENOKOT) 8 6 mg, TAKE ONE TABLET BY MOUTH ONCE DAILY AT BEDTIME   HOLD FOR DIARRHEA OR LOOSE STOOLS (CONSTIPATION), Disp: 30 tablet, Rfl: 5    sulfamethoxazole-trimethoprim (BACTRIM DS) 800-160 mg per tablet, Take 1 tablet by mouth daily, Disp: 30 tablet, Rfl: 5    acetaminophen (TYLENOL) 325 mg tablet, Take 2 tablets (650 mg total) by mouth every 6 (six) hours as needed for mild pain, headaches or fever, Disp: 60 tablet, Rfl: 11    diphenhydrAMINE (BENADRYL) 50 mg capsule, Take 1 capsule (50 mg total) by mouth every 6 (six) hours as needed for itching for up to 1 day, Disp: 30 capsule, Rfl: 0    ibuprofen (MOTRIN) 200 mg tablet, TAKE ONE TABLET BY MOUTH ONCE DAILY AS NEEDED FOR MIGRAINES/HEADACHE, Disp: 30 tablet, Rfl: 5    lactobacillus acidophilus-bulgaricus (FLORANEX) packet, Take 1 packet by mouth 3 (three) times a day with meals, Disp: 90 packet, Rfl: 0    magnesium hydroxide (MILK OF MAGNESIA) 800 MG/5ML suspension, Take 30 mL by mouth daily as needed for constipation , Disp: , Rfl:     Melatonin 5 MG CAPS, TAKE ONE CAPSULE BY MOUTH AT BEDTIME (INSOMNIA), Disp: 30 capsule, Rfl: 5    Nutritional Supplements (Ensure), Take 237 mL by mouth daily, Disp: 7110 mL, Rfl: 5    Omega-3 Fatty Acids (fish oil) 1,000 mg, TAKE ONE CAPSULE BY MOUTH ONCE DAILY (SUPPLEMENT), Disp: 30 capsule, Rfl: 5    polyethylene glycol (GLYCOLAX) 17 GM/SCOOP, DISSOLVE 17 GRAMS (ONE CAPFUL) IN 8 OUNCES OF WATER AND DRINK BY MOUTH DAILY FOR CONSTIPATION, Disp: 510 g, Rfl: 4    Blood Culture:   Lab Results   Component Value Date    BLOODCX No Growth After 5 Days  03/18/2021    BLOODCX No Growth After 5 Days  03/18/2021       Wound Culture:   No results found for: WOUNDCULT    Ins and Outs:  I/O last 24 hours: In: -   Out: 1800 [Urine:1800]          Physical Exam:   /66 (BP Location: Left arm)   Pulse 78   Temp (!) 97 3 °F (36 3 °C) (Temporal)   Resp 21   Ht 5' 2" (1 575 m)   Wt 57 2 kg (126 lb)   SpO2 92%   BMI 23 05 kg/m²   Gen: Alert and oriented to person, place, time  HEENT: EOMI, eyes clear, moist mucus membranes, hearing intact  Respiratory: Bilateral chest rise  No audible wheezing found  Cardiovascular: Regular Rate and Rhythm  Abdomen: soft nontender/nondistended  Musculoskeletal: left lower extremity  · Skin intact  · Tender to palpation over hip  · Pain with int/external rotation  · Sensation intact L3-S1  · Positive ankle dorsi/plantar flexion, EHL/FHL  · 2+ DP pulse  ·     Radiology:   I personally reviewed the films  X-rays left hip shows femoral neck fracture      Assessment:  89 y  o female status post fall with left femoral neck fracture    Plan:   · Non weight bearing left lower extremity  · Analgesics for pain  · Informed consent obtained  · Pre op labs in ED  · NPO at midnight  · Donaldson Catheter insertion  · Medicine consult for all medical management and preoperative risk stratification  · To OR for hemiarthroplasty  · Body mass index is 23 05 kg/m²  mildly obese  Recommend nutrition  · Dispo: Ortho will follow  · Informed consent obtained from the patient and her son operative site marked, sodium is 123 today up from 119 yesterday, discussed with the medical team Anesthesia, and the operating room    Patient is scheduled for OR tomorrow for a cemented left hip bipolar hemiarthroplasty    Lisa Carrillo MD

## 2022-01-17 NOTE — ED NOTES
DHRUV Khan made aware of Bradycardia  Pt repositioned pillows and support to left hip  Pt resting comfortably at this time   Call bell in reach      Vinicio Murphy RN  01/16/22 2271

## 2022-01-17 NOTE — ASSESSMENT & PLAN NOTE
Rate currently controlled   Not on anticoagulation  Continue digoxin, metoprolol  Continue Outpatient cardiology follow up

## 2022-01-18 PROBLEM — R45.1 AGITATION: Status: ACTIVE | Noted: 2022-01-01

## 2022-01-18 PROBLEM — R41.0 DELIRIUM: Status: ACTIVE | Noted: 2022-01-01

## 2022-01-18 PROBLEM — J96.01 ACUTE RESPIRATORY FAILURE WITH HYPOXIA (HCC): Status: ACTIVE | Noted: 2022-01-01

## 2022-01-18 NOTE — PROGRESS NOTES
[facility] [address] [place]  Progress Note - Gwyn Salguero 9/2/1931, 80 y o  female MRN: [id number]  Unit/Bed#: 539-12 Encounter: [id number]  Primary Care Provider: Mechelle Cohen DO   Date and time admitted to hospital: 1/16/2022  5:43 PM    * Closed fracture of neck of left femur [facility])  Assessment & Plan  Presented to the ER for evalauation after a fall at SNF  She reports tripping on a rug, mechanical fall  Denies LOC, hitting her head  CT shows - Acute left femoral transcervical neck fracture as described  Admited to med surg  Originally OR planned for today, however concern over cardiac / pulmonary status, so case will be delayed while patient is medically optimized  DVT proph - SC heparin   Pain medication - IV dilaudid, PO oxycodone  Fall precautions  Orthopedic Surgery Consult and ongoing support appreciated  Preoperative clearance  Assessment & Plan  History of sinus bradycardia, PAF, HTN  No known h/o CAD, but does have known carotid disease  Last echocardiogram from 2018 showing grade 1 diastolic dysfunction, mild AS, mild LVH at the time, repeat Echo from this admission confirms similar findings  Had exam findings consistent with possible volume overload on admission, rales on lung exam, hypoxia, suspected probable acute diastolic CHF currently  She poses at least a moderate cardiovascular risk for surgery, however risk outweighs benefit with high morbidity / mortality with untreated hip fx  Acute respiratory failure with hypoxia [facility])  Assessment & Plan  Wt Readings from Last 3 Encounters:   01/18/22 58 1 kg (128 lb 1 4 oz)   10/25/21 57 5 kg (126 lb 12 8 oz)   08/17/21 61 5 kg (135 lb 9 3 oz)       Initially Suspecedt acute diastolic CHF with acute respiratory failure with hypoxia, hyponatremia  Unfortunately IV lasix worsened hyponatremia, currently appearing dry by exam   No signs of volume overload on CXR     Repeated Echocardiogram this admission (last study from 2018)  Only mild diastolic dysfunction noted  Titrate oxygen as needed  Consider CT chest   Will add respiratory protocol, Duoneb tx  Discussed case with cardiology and examined patient at bedside  No need for formal consult  Do not suspect acute CHF / volume overload  Consult pulmonology for perioperative exam - consider possible COPD exacerbation / possible underlying pulmonary fibrosis? Hyponatremia  Assessment & Plan  Sodium level at 118 --> rechecked at 123  Possibly hypervolemic hyponatremia, however following additional doses of IV lasix, sodium worsened to 116  Now off diuretics, s/p tovalptan dosing with improvement  Appreciate nephrology consult and ongoing follow up / management  Further workup / orders per nephrology  Delirium  Assessment & Plan  Hospital confusion / delirium noted likely in the setting of underlying mild dementia, hyponatremia  Patient found pulling at IV and Donaldson  Soft wrist restraints applied, may need renewal if this continues  Benign essential HTN  Assessment & Plan  Blood pressure initially elevated upon arrival to the ER  Most likely secondary to increased pain, intermittent agitation  Currently stable  Continue PTA blood pressure medication - metoprolol, norvasc increased to 10mg daily  Lisinopril held in the perioperative setting to prevent FIONA  Allow hydralazine PRN for SBP > 170    Paroxysmal atrial fibrillation (HCC)  Assessment & Plan  Rate currently controlled  Not on anticoagulation, likely due to fall risk  Continue digoxin, metoprolol  Continue Outpatient cardiology follow up  Check EKG preoperatively - 1st degree AV block noted, no signs of acute ischemia  Hypercholesterolemia  Assessment & Plan  Continue Fish oil    Esophageal reflux  Assessment & Plan  Continue Pepcid        VTE Pharmacologic Prophylaxis: VTE Score: 5 High Risk (Score >/= 5) - Pharmacological DVT Prophylaxis Ordered: heparin   Sequential Compression Devices Ordered  Patient Centered Rounds: d/w nursing  Discussions with Specialists or Other Care Team Provider: d/w anesthesia, cardiology, pulmonology    Education and Discussions with Family / Patient: Updated  (son) via phone  Time Spent for Care: 30 minutes  More than 50% of total time spent on counseling and coordination of care as described above  Current Length of Stay: 2 day(s)  Current Patient Status: Inpatient   Certification Statement: The patient will continue to require additional inpatient hospital stay due to need for   Discharge Plan: Anticipate discharge in >72 hrs to rehab facility  Code Status: Level 3 - DNAR and DNI      Subjective:   Patient is pleasantly confused  Denies SOB  No coughing  No CP  Objective:   Vitals:   Temp (24hrs), Av 6 °F (37 °C), Min:97 5 °F (36 4 °C), Max:99 1 °F (37 3 °C)    Temp:  [97 5 °F (36 4 °C)-99 1 °F (37 3 °C)] 99 1 °F (37 3 °C)  HR:  [52-77] 69  Resp:  [18-35] 18  BP: (147-199)/(58-87) 147/58  SpO2:  [94 %-100 %] 95 %  Body mass index is 23 43 kg/m²  Input and Output Summary (last 24 hours): Intake/Output Summary (Last 24 hours) at 2022 1240  Last data filed at 2022 1156  Gross per 24 hour   Intake 0 ml   Output 2550 ml   Net -2550 ml       Physical Exam:   Physical Exam  Vitals and nursing note reviewed  Constitutional:       General: She is not in acute distress  Appearance: Normal appearance  She is not ill-appearing  HENT:      Head: Normocephalic  Mouth/Throat:      Mouth: Mucous membranes are dry  Cardiovascular:      Rate and Rhythm: Normal rate  Heart sounds: Normal heart sounds  No murmur heard  Comments: No JVD  Pulmonary:      Effort: Pulmonary effort is normal  No respiratory distress  Breath sounds: Rales (inspiratory rales noted ) present  Musculoskeletal:      Right lower leg: No edema  Left lower leg: No edema  Skin:     General: Skin is warm and dry     Neurological: Mental Status: She is alert and oriented to person, place, and time  Psychiatric:         Mood and Affect: Mood normal           Additional Data:     Labs:  Results from last 7 days   Lab Units 01/17/22  0422 01/16/22  1801 01/16/22  1801   WBC Thousand/uL 7 70   < > 7 29   HEMOGLOBIN g/dL 12 2   < > 12 7   HEMATOCRIT % 37 0   < > 38 0   PLATELETS Thousands/uL 256   < > 292   NEUTROS PCT %  --   --  71   LYMPHS PCT %  --   --  18   MONOS PCT %  --   --  8   EOS PCT %  --   --  2    < > = values in this interval not displayed  Results from last 7 days   Lab Units 01/18/22  0439 01/17/22  1425 01/17/22  0422   SODIUM mmol/L 125*   < > 123*   POTASSIUM mmol/L 4 6   < > 4 2   CHLORIDE mmol/L 89*   < > 91*   CO2 mmol/L 30   < > 23   BUN mg/dL 12   < > 9   CREATININE mg/dL 0 96   < > 0 76   ANION GAP mmol/L 6   < > 9   CALCIUM mg/dL 10 3*   < > 7 5*   ALBUMIN g/dL  --   --  2 7*   TOTAL BILIRUBIN mg/dL  --   --  0 48   ALK PHOS U/L  --   --  54   ALT U/L  --   --  24   AST U/L  --   --  25   GLUCOSE RANDOM mg/dL 117   < > 122    < > = values in this interval not displayed  Results from last 7 days   Lab Units 01/16/22  1832   INR  0 99                   Lines/Drains:  Invasive Devices  Report    Peripheral Intravenous Line            Peripheral IV 01/16/22 Right Antecubital 1 day          Drain            Urethral Catheter 18 Fr  <1 day              Urinary Catheter:  Goal for removal: Plan to remove POD#1               Imaging: Reviewed radiology reports from this admission including: chest xray repeat chest Xray remains with no acute cardiopulmonary findings      Recent Cultures (last 7 days):         Last 24 Hours Medication List:   Current Facility-Administered Medications   Medication Dose Route Frequency Provider Last Rate    acetaminophen  975 mg Oral Q8H [address] Yuriy Perla PA-C      amLODIPine  10 mg Oral Daily TAWANA Roberts      aspirin  81 mg Oral Daily Bill Aguilar PA-C      [START ON 1/19/2022] calcium carbonate  1 tablet Oral Daily With Breakfast Naye Chaitanya MuldrowAMBER      cefazolin  2,000 mg Intravenous Once Lynne Howard MD      chlorhexidine   Topical Daily PRN Lynne Howard MD      chlorhexidine  15 mL Swish & Spit Once Lynne Howard MD      cholecalciferol  1,000 Units Oral Daily Bill Aguilar PA-C      clonazePAM  1 5 mg Oral HS Bill Aguilar PA-C      digoxin  125 mcg Oral Daily Bill Aguilar PA-C      famotidine  20 mg Oral Daily Bill Aguilar PA-C      fish oil  1,000 mg Oral Daily Bill Aguilar PA-C      heparin (porcine)  5,000 Units Subcutaneous [address]      HYDROmorphone  0 5 mg Intravenous Q6H PRN Loree Dickens PA-C      ipratropium-albuterol  3 mL Nebulization Q6H Yuriy Perla PA-C      lactobacillus acidophilus-bulgaricus  1 packet Oral TID With Meals Bill Aguilar PA-C      magnesium hydroxide  30 mL Oral Daily PRN Bill Aguilar PA-C      melatonin  3 mg Oral HS Bill Aguilar PA-C      metoprolol tartrate  75 mg Oral Q12H [address] Bill Aguilar PA-C      ondansetron  4 mg Intravenous Q6H PRN Bill Aguilar PA-C      oxyCODONE  5 mg Oral Q6H PRN Loree Dickens PA-C      senna  1 tablet Oral BID Bill Aguilar PA-C          Today, Patient Was Seen By: Loree Dickens PA-C    **Please Note: This note may have been constructed using a voice recognition system  **

## 2022-01-18 NOTE — CONSULTS
Consultation - Pulmonary Medicine   [place] 80 y o  female MRN: [id number]  Unit/Bed#: 324-62 Encounter: [id number]      Assessment/Plan:    1  Acute respiratory insufficiency  1  Weaned to room air in my presence but did have witnessed desaturations into the high 80s  Placed back on 2 L nasal cannula  2  Suspect desaturations are secondary to atelectasis in the setting of decreased mobilization/bed-bound presently  3  No evidence of volume overload or interstitial changes that would contribute to rales heard on exam  4  Provided patient with incentive spirometer and provided extensive coaching on proper use and technique  During my evaluation patient was only able to pull <068 cc which certainly can lead to atelectasis  5  Recommend she continue DuoNeb q 6 hours for pulmonary hygiene purposes  6  Trial CPAP tonight if patient can tolerate  2  Closed fracture of left femoral neck  1  Treatment per ortho/primary team  2  On prophylactic heparin currently- at risk for fat emboli, monitor hemodynamics closely   3  Preoperative pulmonary clearance  1  Using ARISCAT risk stratification, patient is consider low to intermediate risk with up to 13 3% risk of in-hospital postop pulmonary complications such as respiratory failure, respiratory infection, pleural effusion, atelectasis, pneumothorax, bronchospasm, aspiration pneumonitis  2  No contraindications to surgery from pulmonary perspective  4  RLL nodule  1  Previously worked up with PET-CT and multiple CT chest- most recently stable and overall decreased in size with only minimal FDG uptake  2  Nonetheless, findings can be related to underlying slow growing malignancy but through thorough chart review it appears that patient had opted not to continue surveillance for this    5  Hyponatremia  1  Continue to monitor- currently 125 improved from 116  2  Treatment per primary team/nephrology   6  Dementia  1  Noted  2   Treatment per primary team     History of Present Illness   Physician Requesting Consult: Amita Vegas MD  Reason for Consult / Principal Problem: hypoxia  Hx and PE limited by: n/a  Chief Complaint: fall  HPI: Missy Vital is a 80 y o   female who presented to [place] [address] with complaints of fall  Has a past medical history positive for hypertension, hyperlipidemia, GERD, dementia  Presented to the ED 1/16 after a fall at the nursing home that resulted in severe left hip pain  Per H&P patient tripped over a rug using her walker and fell to the ground  There was no loss of consciousness or head trauma  CT shows acute left femoral Trend cervical neck fracture  There is also incidental finding of hyponatremia  Patient was planned to go to the OR today however anesthesia evaluated and was concerned with her sodium levels along with new oxygen requirement  Treated with Lasix for possible hypervolemic hyponatremia but patient had worsening sodium after Lasix  Pulmonary was consulted for preop clearance purposes  In the interim, nephrology has DC Lasix and started 12 actin  During my evaluation, patient is seen on 2 L nasal cannula with adequate saturations  Weaned to room air with mild desaturations into the high 80s  Patient denies any respiratory complaints  Denies shortness breath at rest, dyspnea on exertion, cough, sputum production, hemoptysis or wheeze  Presently, patient is pleasantly confused and wants to get up and out of bed  Denies history of pulmonary disease such as emphysema, COPD, asthma or ILD  She previously seen a pulmonologist for right lower lobe nodule concerning for slow-growing malignancy but declined further workup  Patient does not require inhalers, nebulizers or oxygen at baseline  She is a lifelong nonsmoker  She was a  in her younger years but has been retired for many years now  Denies exposures to asbestos, silica, coal, dust, chemicals      Inpatient consult to Pulmonology  Consult performed by: Kristen Dinh PA-C  Consult ordered by: Ambika Omer PA-C          Review of Systems   Unable to perform ROS: Dementia   All other systems reviewed and are negative  Historical Information   Past Medical History:   Diagnosis Date    Abdominal distension     Last Assessed: [id number]    Abnormal weight loss     Last Assessed: [id number]    Arthritis     Bronchitis     Bursitis of left hip     Lst Assessed: [id number]    Chest pain     Last Assessed: [id number]    Colon polyp     Dysuria     Last Assessed: [id number]    External hemorrhoids     Last Assessed: [id number]    Generalized anxiety disorder     GERD (gastroesophageal reflux disease)     History of echocardiogram 03/20/2018    EF 60%, mild to moderate mitral annular calcification  mild AS, mild TR   Hyperlipidemia     Hypertension     Hypomagnesemia     Last Assessed: [id number]    Lyme disease     Last Assessed: [id number]    Multiple renal cysts 7/5/2019    Nonrheumatic mitral valve regurgitation     Osteoporosis     Pneumonia of right middle lobe due to infectious organism     Last Assessed: [id number]    SVT (supraventricular tachycardia)     Urinary tract infection      Past Surgical History:   Procedure Laterality Date    CATARACT EXTRACTION      HEMORRHOID SURGERY      HYSTERECTOMY      PELVIC FLOOR REPAIR       Social History   Social History     Substance and Sexual Activity   Alcohol Use Never     Social History     Substance and Sexual Activity   Drug Use No     Social History     Tobacco Use   Smoking Status Never Smoker   Smokeless Tobacco Never Used     E-Cigarette/Vaping    E-Cigarette Use Never User      E-Cigarette/Vaping Substances    Nicotine No     THC No     CBD No     Flavoring No     Other No     Unknown No      Occupational History: retired   See HPI    Family History:   Family History   Adopted: Yes       Meds/Allergies   all current active meds have been reviewed, pertinent pulmonary meds have been reviewed and current meds:   Current Facility-Administered Medications   Medication Dose Route Frequency    acetaminophen (TYLENOL) tablet 975 mg  975 mg Oral Q8H [address]    amLODIPine (NORVASC) tablet 10 mg  10 mg Oral Daily    aspirin (ECOTRIN LOW STRENGTH) EC tablet 81 mg  81 mg Oral Daily    [START ON 1/19/2022] calcium carbonate (OYSTER SHELL,OSCAL) 500 mg tablet 1 tablet  1 tablet Oral Daily With Breakfast    ceFAZolin (ANCEF) IVPB (premix in dextrose) 2,000 mg 50 mL  2,000 mg Intravenous 60 Min Pre-Op    chlorhexidine (HIBICLENS) 4 % topical liquid   Topical Daily PRN    chlorhexidine (PERIDEX) 0 12 % oral rinse 15 mL  15 mL Swish & Spit Once    cholecalciferol (VITAMIN D3) tablet 1,000 Units  1,000 Units Oral Daily    clonazePAM (KlonoPIN) tablet 1 5 mg  1 5 mg Oral HS    digoxin (LANOXIN) tablet 125 mcg  125 mcg Oral Daily    famotidine (PEPCID) tablet 20 mg  20 mg Oral Daily    fish oil capsule 1,000 mg  1,000 mg Oral Daily    heparin (porcine) subcutaneous injection 5,000 Units  5,000 Units Subcutaneous Q8H [address]    hydrALAZINE (APRESOLINE) injection 5 mg  5 mg Intravenous Q6H PRN    HYDROmorphone (DILAUDID) injection 0 5 mg  0 5 mg Intravenous Q6H PRN    ipratropium-albuterol (DUO-NEB) 0 5-2 5 mg/3 mL inhalation solution 3 mL  3 mL Nebulization Q6H    lactobacillus acidophilus-bulgaricus (FLORANEX) packet 1 packet  1 packet Oral TID With Meals    magnesium hydroxide (MILK OF MAGNESIA) oral suspension 30 mL  30 mL Oral Daily PRN    melatonin tablet 3 mg  3 mg Oral HS    metoprolol tartrate (LOPRESSOR) tablet 75 mg  75 mg Oral Q12H [address]    ondansetron (ZOFRAN) injection 4 mg  4 mg Intravenous Q6H PRN    oxyCODONE (ROXICODONE) IR tablet 5 mg  5 mg Oral Q6H PRN    senna (SENOKOT) tablet 8 6 mg  1 tablet Oral BID       Allergies   Allergen Reactions    Codeine GI Intolerance    Epinephrine Other (See Comments)     Increased Heart Rate, Palpitations    Iodinated Diagnostic Agents     Iodine - Food Allergy Other (See Comments)          Magnesium Citrate GI Intolerance       Objective   Vitals: Blood pressure 147/62, pulse 77, temperature 100 3 °F (37 9 °C), resp  rate 17, height 5' 2" (1 575 m), weight 58 1 kg (128 lb 1 4 oz), SpO2 91 %  2L NC,Body mass index is 23 43 kg/m²  Intake/Output Summary (Last 24 hours) at 1/18/2022 1648  Last data filed at 1/18/2022 1546  Gross per 24 hour   Intake 0 ml   Output 2950 ml   Net -2950 ml     Invasive Devices  Report    Peripheral Intravenous Line            Peripheral IV 01/16/22 Right Antecubital 1 day          Drain            Urethral Catheter 18 Fr  <1 day                Physical Exam  Vitals and nursing note reviewed  Constitutional:       General: She is not in acute distress  Appearance: Normal appearance  HENT:      Head: Normocephalic and atraumatic  Right Ear: External ear normal       Left Ear: External ear normal       Nose: Nose normal       Mouth/Throat:      Mouth: Mucous membranes are moist       Pharynx: Oropharynx is clear  Eyes:      Extraocular Movements: Extraocular movements intact  Conjunctiva/sclera: Conjunctivae normal       Pupils: Pupils are equal, round, and reactive to light  Cardiovascular:      Rate and Rhythm: Normal rate and regular rhythm  Pulses: Normal pulses  Heart sounds: No murmur heard  Abdominal:      General: Bowel sounds are normal       Palpations: Abdomen is soft  There is no mass  Tenderness: There is no abdominal tenderness  Hernia: No hernia is present  Musculoskeletal:         General: No tenderness or deformity  Normal range of motion  Cervical back: Normal range of motion and neck supple  No muscular tenderness  Right lower leg: No edema  Left lower leg: No edema  Skin:     General: Skin is warm and dry  Neurological:      General: No focal deficit present        Mental Status: She is alert and oriented to person, place, and time  Mental status is at baseline  Psychiatric:         Mood and Affect: Mood normal          Behavior: Behavior normal          Thought Content: Thought content normal          Judgment: Judgment normal          Lab Results:   I have personally reviewed pertinent lab results  , ABG: No results found for: PHART, OIH2YHK, PO2ART, DJU3GCT, T2YMIPZE, BEART, SOURCE, BNP: No results found for: BNP, CBC: No results found for: WBC, HGB, HCT, MCV, PLT, ADJUSTEDWBC, MCH, MCHC, RDW, MPV, NRBC, CMP:   Lab Results   Component Value Date    SODIUM 128 (L) 01/18/2022    K 4 8 01/18/2022    CL 92 (L) 01/18/2022    CO2 28 01/18/2022    BUN 13 01/18/2022    CREATININE 1 00 01/18/2022    CALCIUM 9 6 01/18/2022    EGFR 49 01/18/2022   , PT/INR: No results found for: PT, INR, Troponin: No results found for: TROPONINI      Imaging Studies: I have personally reviewed pertinent reports  and I have personally reviewed pertinent films in PACS     Chest x-ray 01/18/2022  Cardiomegaly but no acute infiltrates, pneumothorax, pleural effusion  EKG, Pathology, and Other Studies: I have personally reviewed pertinent reports  Echocardiogram yesterday shows EF 60%  Grade 1 diastolic dysfunction  Right ventricular size and systolic function normal     Pulmonary Results (PFTs, PSG): none to review    VTE Prophylaxis: Heparin    Code Status: Level 3 - DNAR and DNI    Portions of the record may have been created with voice recognition software  Occasional wrong word or "sound a like" substitutions may have occurred due to the inherent limitations of voice recognition software  Read the chart carefully and recognize, using context, where substitutions have occurred

## 2022-01-18 NOTE — TELEPHONE ENCOUNTER
Called spoke with the son made him aware that surgery is being rescheduled due to low sodiumPatient is being seen by Nephrology, Cardiology and Internal MedicineSurgery scheduled for Thursday morning 7:00 a  m  Son made aware if for any chance surgeries canceled on Thursday she will be transferred to [facility] for higher level care

## 2022-01-18 NOTE — CASE MANAGEMENT
Case Management Progress Note    Patient name Kim Maxwell  Location [address] 128/462-80 MRN [id number]  : 1931 Date 2022       LOS (days): 2  Geometric Mean LOS (GMLOS) (days): 2 90  Days to GMLOS:1 3        OBJECTIVE:        Current admission status: Inpatient  Preferred Pharmacy: No Pharmacies Listed  Primary Care Provider: Amina Son DO    Primary Insurance: MEDICARE  Secondary Insurance: [employer]    PROGRESS NOTE:Pt not medically cleared for the OR today as anesthesia is requesting a cardiac work up pre op  Cm will follow up post op re: dc plans/needs (STR)

## 2022-01-18 NOTE — ASSESSMENT & PLAN NOTE
Sodium level at 118 --> rechecked at 123  Possibly hypervolemic hyponatremia, however following additional doses of IV lasix, sodium worsened to 116  Now off diuretics, s/p tovalptan dosing with improvement  Appreciate nephrology consult and ongoing follow up / management  Further workup / orders per nephrology

## 2022-01-18 NOTE — ASSESSMENT & PLAN NOTE
Hospital confusion / delirium noted likely in the setting of underlying mild dementia, hyponatremia  Patient found pulling at IV and [place]  Soft wrist restraints applied, may need renewal if this continues

## 2022-01-18 NOTE — ASSESSMENT & PLAN NOTE
Wt Readings from Last 3 Encounters:   01/18/22 58 1 kg (128 lb 1 4 oz)   10/25/21 57 5 kg (126 lb 12 8 oz)   08/17/21 61 5 kg (135 lb 9 3 oz)       Initially Suspecedt acute diastolic CHF with acute respiratory failure with hypoxia, hyponatremia  Unfortunately IV lasix worsened hyponatremia, currently appearing dry by exam   No signs of volume overload on CXR  Repeated Echocardiogram this admission (last study from 2018)  Only mild diastolic dysfunction noted  Titrate oxygen as needed  Consider CT chest   Will add respiratory protocol, Duoneb tx  Discussed case with cardiology and examined patient at bedside  No need for formal consult  Do not suspect acute CHF / volume overload  Consult pulmonology for perioperative exam - consider possible COPD exacerbation / possible underlying pulmonary fibrosis?

## 2022-01-18 NOTE — PROGRESS NOTES
Progress Note - Nephrology   Thaddeus Briceno 80 y o  female MRN: [id number]  Unit/Bed#: 778-25 Encounter: [id number]    Assessment and Plan    1  Acute on chronic Hyponatremia  · Prior outpatient baseline sodium 130-133 millimole per L    · Presenting serum sodium 118 millimole per L on 01/16/2022  Initially treated with furosemide 20 mg IV with improvement to 123 millimole per L 01/17/2022 at 0422, re-dosed and worsened to 160 millimole per L on 01/17/2022  · Urine studies indeterminate, Urine sodium 41, urine osmolality 141, TSH 3 13, uric acid 4 2 mg/dL, cortisol pending  · Status post tolvaptan 15 mg 01/17/2022 at 1854  · Serum sodium 125 millimole per L 01/18/2022  Not optimal prior to anesthesia  Patient examines euvolemic  Will discontinue plans for additional IV furosemide  Will repeat tolvaptan 15 mg p o  Once  Patient remains NPO besides sips of water with medications  As patient is not able to drink free water to thirst, she will require frequent serum sodium check with consideration of D5W, if indicated  Will temporarily increase BMP to Q 2 hour following tolvapatan with pt NPO  If diet is resumed, do not restrict fluid intake  2  Chronic kidney disease, stage III baseline creatinine around 0 7-1 0 mg/dL  · Renal function remains stable with no evidence of acute kidney injury  Renal function may be overestimated due to poor muscle mass  3  Left femur fracture  · Pending cardiac workup and improvement in serum sodium  Tentatively rescheduled for Thursday, 01/20/2022 with Dr Eduardo Gregory  4  Volume overload, grade 1 diastolic dysfunction  · Patient examines euvolemic  Will hold further loop diuretic  Trial tolvaptan as in #1     5  Urinary retention  · Donaldson catheter in place  Monitor urine output  Void trial postoperative  6  Hypertension in the setting of CKD / hypertensive urgency  · Blood pressure elevated 190s over 80s  Repeat blood pressure  Possibly elevated secondary to pain  Follow with metoprolol tartrate 75 mg every 12 hours, increase of amlodipine to 10 mg daily  Consider escalation of antihypertensive regimen if blood pressure does not improve upon recheck  7  Paroxysmal atrial fibrillation  · On Digoxin, metoprolol  8  GERD  · Denies breakthrough symptoms  Continue famotidine 20 mg by mouth daily  Follow up reason for today's visit:  Hyponatremia    Closed fracture of neck of left femur [facility])    Patient Active Problem List   Diagnosis    Gomez's esophagus    Bilateral carotid artery disease (HCC)    Breast mass, left    Chronic constipation    Colon, diverticulosis    Esophageal reflux    Generalized anxiety disorder    Generalized osteoarthritis of multiple sites    Glucose intolerance (impaired glucose tolerance)    Hypercholesterolemia    Neuralgia    Osteoporosis    SVT (supraventricular tachycardia) (HCC)    Vitamin D deficiency    HCAP (healthcare-associated pneumonia)    Acute febrile illness    Cough    Esophageal mass    Hyponatremia    Right lower lobe lung mass    History of frequent urinary tract infections    Paroxysmal atrial fibrillation (HCC)    Sinus bradycardia    Debilitated    Eczema    Hypertensive urgency    Acute metabolic encephalopathy    Acute cystitis with hematuria    Lung mass    Acute encephalopathy    Multiple renal cysts    Hepatic steatosis    Left eye pain    Sick sinus syndrome (HCC)    Benign essential HTN    Diarrhea    sepsis    FIONA (acute kidney injury) (HCC)    Stage 3b chronic kidney disease (HCC)    Lactic acidosis    Pulmonary nodule    Urinary retention    Acute UTI    Closed fracture of neck of left femur (HCC)    Acute respiratory failure with hypoxia (HCC)    Preoperative clearance         Subjective:   Feels tired  Denies chest pain, shortness of breath  A complete 10 point review of systems was performed and is otherwise negative      Objective:     Vitals: Blood pressure (!) 190/84, pulse 62, temperature 99 1 °F (37 3 °C), temperature source Oral, resp  rate 18, height 5' 2" (1 575 m), weight 58 1 kg (128 lb 1 4 oz), SpO2 96 %  ,Body mass index is 23 43 kg/m²  Weight (last 2 days)     Date/Time Weight    01/18/22 0558 58 1 (128 09)    01/17/22 1440 57 2 (126)    01/16/22 1746 57 2 (126)            Intake/Output Summary (Last 24 hours) at 1/18/2022 1032  Last data filed at 1/18/2022 0900  Gross per 24 hour   Intake 50 ml   Output 2550 ml   Net -2500 ml     I/O last 3 completed shifts: In: 150 [IV Piggyback:150]  Out: 4350 [Urine:4350]    Urethral Catheter 18 Fr  (Active)       Physical Exam: BP (!) 190/84 (BP Location: Left arm)   Pulse 62   Temp 99 1 °F (37 3 °C) (Oral)   Resp 18   Ht 5' 2" (1 575 m)   Wt 58 1 kg (128 lb 1 4 oz)   SpO2 96%   BMI 23 43 kg/m²     General Appearance:    No acute distress  Cooperative  Appears stated age  Head:    Normocephalic  Atraumatic  Normal jaw occlusion  Eyes:    Lids, conjunctiva normal  No scleral icterus  Ears:    Normal external ears  Nose:   Nares normal  No drainage  2 L nasal cannula  Mouth:   Lips, tongue normal  Mucosa normal  Phonation normal    Neck:   Supple  Symmetrical    Back:     Symmetric  No CVA tenderness  Lungs:     Normal respiratory effort  Clear to auscultation bilaterally  Chest wall:    No tenderness or deformity  Heart:    Regular rate and rhythm  Normal S1 and S2  No murmur  No JVD  No edema  Abdomen:     Soft  Non-tender  Bowel sounds active  Genitourinary: Donaldson catheter with clear, light yellow urine output  Extremities:   Extremities normal  Atraumatic  No cyanosis  Skin:   Warm and dry  No pallor, jaundice, rash, ecchymoses  Neurologic:   Alert and oriented to person, place, time  No focal deficit  Lab, Imaging and other studies: I have personally reviewed pertinent labs    CBC: No results found for: WBC, HGB, HCT, MCV, PLT, ADJUSTEDWBC, MCH, MCHC, RDW, MPV, NRBC  CMP: Lab Results   Component Value Date    K 4 6 01/18/2022    CL 89 (L) 01/18/2022    CO2 30 01/18/2022    BUN 12 01/18/2022    CREATININE 0 96 01/18/2022    CALCIUM 10 3 (H) 01/18/2022    EGFR 52 01/18/2022         Results from last 7 days   Lab Units 01/18/22  0439 01/18/22  0012 01/17/22  1902 01/17/22  1425 01/17/22  0422   POTASSIUM mmol/L 4 6 4 4 3 8   < > 4 2   CHLORIDE mmol/L 89* 87* 86*   < > 91*   CO2 mmol/L 30 30 28   < > 23   BUN mg/dL 12 13 11   < > 9   CREATININE mg/dL 0 96 1 01 1 12   < > 0 76   CALCIUM mg/dL 10 3* 9 7 9 4   < > 7 5*   ALK PHOS U/L  --   --   --   --  54   ALT U/L  --   --   --   --  24   AST U/L  --   --   --   --  25    < > = values in this interval not displayed  Phosphorus: No results found for: PHOS  Magnesium:   Lab Results   Component Value Date    MG 2 1 01/18/2022     Urinalysis: No results found for: Juline Bree, SPECGRAV, PHUR, LEUKOCYTESUR, NITRITE, PROTEINUA, GLUCOSEU, KETONESU, BILIRUBINUR, BLOODU  Ionized Calcium: No results found for: CAION  Coagulation: No results found for: PT, INR, APTT  Troponin: No results found for: TROPONINI  ABG: No results found for: PHART, VHR6KVF, PO2ART, PCA6HVX, R0KWWTLU, BEART, SOURCE  Radiology review:     IMAGING  Procedure: XR chest portable    Result Date: 1/18/2022  Narrative: CHEST INDICATION:   Preoperative radiograph    COMPARISON:  Chest radiograph January 16, 2022 EXAM PERFORMED/VIEWS:  XR CHEST PORTABLE FINDINGS: Heart shadow is enlarged but unchanged from prior exam  The lungs are clear  No pneumothorax or pleural effusion  Osseous structures appear within normal limits for patient age  Impression: No acute cardiopulmonary disease  Workstation performed: [id number]     Procedure: XR chest 1 view portable    Result Date: 1/17/2022  Narrative: CHEST INDICATION:   fall  COMPARISON:  Chest x-ray from 8/17/2021   EXAM PERFORMED/VIEWS:  XR CHEST PORTABLE FINDINGS: Heart shadow is enlarged but unchanged from prior exam  The lungs are clear  No pneumothorax or pleural effusion  Osseous structures appear within normal limits for patient age  Impression: No acute cardiopulmonary disease  Workstation performed: [id number]     Procedure: XR hip/pelv 2-3 vws left    Result Date: 1/17/2022  Narrative: LEFT HIP INDICATION:   fall  COMPARISON:  Pelvic plain films from 3/19/2018  VIEWS:  XR HIP/PELV 2-3 VWS LEFT  W PELVIS IF PERFORMED FINDINGS: Acute subcapital left femoral neck fracture  No significant hip degenerative changes  No lytic or blastic osseous lesion  Soft tissues are unremarkable  The visualized lumbar spine is unremarkable  Impression: Acute subcapital left femoral neck fracture  Workstation performed: [id number]     Procedure: CT head without contrast    Result Date: 1/16/2022  Narrative: CT BRAIN - WITHOUT CONTRAST INDICATION:   fall  COMPARISON:  CT 8/17/2021 TECHNIQUE:  CT examination of the brain was performed  In addition to axial images, sagittal and coronal 2D reformatted images were created and submitted for interpretation  Radiation dose length product (DLP) for this visit:  847 65 mGy-cm   This examination, like all CT scans performed in the [facility], was performed utilizing techniques to minimize radiation dose exposure, including the use of iterative  reconstruction and automated exposure control  IMAGE QUALITY:  Diagnostic  FINDINGS: PARENCHYMA: Decreased attenuation is noted in periventricular and subcortical white matter demonstrating an appearance that is statistically most likely to represent moderate microangiopathic change  No CT signs of acute infarction  No intracranial mass, mass effect or midline shift  No acute parenchymal hemorrhage  Atherosclerotic vascular calcifications in carotid and vertebral arteries are more advanced than would be expected for the patient's  age   VENTRICLES AND EXTRA-AXIAL SPACES:  Ventricles and extra-axial CSF spaces are prominent commensurate with the degree of volume loss  No hydrocephalus  No acute extra-axial hemorrhage  VISUALIZED ORBITS AND PARANASAL SINUSES: Bilateral ocular lens implants are present  Postoperative change left maxillary sinus noted with chronic mucoperiosteal thickening present  Small mucous retention cyst left sphenoid sinus noted  CALVARIUM AND EXTRACRANIAL SOFT TISSUES:  Normal      Impression: No acute intracranial abnormality  Workstation performed: [id number]     Procedure: CT lower extremity wo contrast left    Result Date: 1/16/2022  Narrative: CT left lower extremity (hip) without IV contrast INDICATION: fracture  35-year-old female presents via EMS after fall at her nursing facility  Patient reports she tripped on a rug about an hour ago, she was unable to pick herself off the ground  She denies head strike or loss of consciousness  Patient is intermittently complaining of left hip pain, on examination when asked to raise her leg she complains of inguinal and buttock pain  She denies neck or back pain, chest pain, dyspnea, abdominal pain  Patient does admit to difficulty urinating, she feels a sensation of incomplete voiding  She denies any recent fevers or chills, coughing  Patient states she is eating and drinking normally  On arrival her oxygen saturation fluctuates between 88-90 in room air  COMPARISON: Plain film study obtained 6:00 PM 1/16/2022 TECHNIQUE: CT examination of the left hip was performed  This examination, like all CT scans performed in the [facility], was performed utilizing techniques to minimize radiation dose exposure, including the use of iterative reconstruction and automated exposure control software  Sagittal and coronal two dimensional reconstructed images were also submitted for interpretation   Rad dose  400 mGy-cm FINDINGS: OSSEOUS STRUCTURES:  There is an acute, comminuted, mildly displaced and angulated, and slightly impacted transcervical left femoral neck fracture present  No other fracture and no dislocation apparent  VISUALIZED MUSCULATURE:  Mild muscular atrophy  SOFT TISSUES:  Diffuse vascular calcification  OTHER PERTINENT FINDINGS:  None  Impression: Acute left femoral transcervical neck fracture as described  Workstation performed: [id number]     Procedure: Echo complete w/ contrast if indicated    Result Date: 1/17/2022  Narrative: Deepak Hart  Left Ventricle: Left ventricular cavity size is normal  The left ventricular ejection fraction is 60%  Systolic function is normal  Wall motion is normal  Diastolic function is mildly abnormal, consistent with grade I (abnormal) relaxation  Wall thickness is mildly increased    Aortic Valve: There is mild stenosis    Tricuspid Valve: There is mild regurgitation         Current Facility-Administered Medications   Medication Dose Route Frequency    acetaminophen (TYLENOL) tablet 975 mg  975 mg Oral Q8H [facility]    amLODIPine (NORVASC) tablet 10 mg  10 mg Oral Daily    aspirin (ECOTRIN LOW STRENGTH) EC tablet 81 mg  81 mg Oral Daily    [START ON 1/19/2022] calcium carbonate (OYSTER SHELL,OSCAL) 500 mg tablet 1 tablet  1 tablet Oral Daily With Breakfast    ceFAZolin (ANCEF) IVPB (premix in dextrose) 2,000 mg 50 mL  2,000 mg Intravenous Once    chlorhexidine (HIBICLENS) 4 % topical liquid   Topical Daily PRN    chlorhexidine (PERIDEX) 0 12 % oral rinse 15 mL  15 mL Swish & Spit Once    cholecalciferol (VITAMIN D3) tablet 1,000 Units  1,000 Units Oral Daily    clonazePAM (KlonoPIN) tablet 1 5 mg  1 5 mg Oral HS    digoxin (LANOXIN) tablet 125 mcg  125 mcg Oral Daily    famotidine (PEPCID) tablet 20 mg  20 mg Oral Daily    fish oil capsule 1,000 mg  1,000 mg Oral Daily    heparin (porcine) subcutaneous injection 5,000 Units  5,000 Units Subcutaneous Q8H [facility]    HYDROmorphone (DILAUDID) injection 0 5 mg  0 5 mg Intravenous Q6H PRN    ipratropium-albuterol (DUO-NEB) 0 5-2 5 mg/3 mL inhalation solution 3 mL  3 mL Nebulization Q6H    lactobacillus acidophilus-bulgaricus (FLORANEX) packet 1 packet  1 packet Oral TID With Meals    magnesium hydroxide (MILK OF MAGNESIA) oral suspension 30 mL  30 mL Oral Daily PRN    melatonin tablet 3 mg  3 mg Oral HS    metoprolol tartrate (LOPRESSOR) tablet 75 mg  75 mg Oral Q12H [address]    ondansetron (ZOFRAN) injection 4 mg  4 mg Intravenous Q6H PRN    oxyCODONE (ROXICODONE) IR tablet 5 mg  5 mg Oral Q6H PRN    senna (SENOKOT) tablet 8 6 mg  1 tablet Oral BID    tolvaptan (SAMSCA) split tablet 15 mg  15 mg Oral Once     Medications Discontinued During This Encounter   Medication Reason    lisinopril (ZESTRIL) tablet 10 mg     amLODIPine (NORVASC) tablet 5 mg     HYDROmorphone (DILAUDID) injection 0 2 mg     acetaminophen (TYLENOL) tablet 650 mg     HYDROmorphone (DILAUDID) injection 0 5 mg     furosemide (LASIX) injection 20 mg     Melatonin 5 MG CAPS Duplicate order    lactobacillus acidophilus-bulgaricus (FLORANEX) packet 1 packet     furosemide (LASIX) injection 20 mg     calcium carbonate (OYSTER SHELL,OSCAL) 500 mg tablet 1 tablet     furosemide (LASIX) injection 20 mg        Cecelia Diggs PA-C    Portions of the record may have been created with voice recognition software  Occasional wrong word or "sound a like" substitutions may have occurred due to the inherent limitations of voice recognition software  Read the chart carefully and recognize, using context, where substitutions have occurred

## 2022-01-18 NOTE — PLAN OF CARE
Problem: Potential for Falls  Goal: Patient will remain free of falls  Description: INTERVENTIONS:  - Educate patient/family on patient safety including physical limitations  - Instruct patient to call for assistance with activity   - Consult OT/PT to assist with strengthening/mobility   - Keep Call bell within reach  - Keep bed low and locked with side rails adjusted as appropriate  - Keep care items and personal belongings within reach  - Initiate and maintain comfort rounds  - Make Fall Risk Sign visible to staff  - Offer Toileting every 2 Hours, in advance of need  - Initiate/Maintain bed/chair alarm  - Obtain necessary fall risk management equipment:   - Apply yellow socks and bracelet for high fall risk patients  - Consider moving patient to room near nurses station  Outcome: Progressing     Problem: Prexisting or High Potential for Compromised Skin Integrity  Goal: Skin integrity is maintained or improved  Description: INTERVENTIONS:  - Identify patients at risk for skin breakdown  - Assess and monitor skin integrity  - Assess and monitor nutrition and hydration status  - Monitor labs   - Assess for incontinence   - Turn and reposition patient  - Assist with mobility/ambulation  - Relieve pressure over bony prominences  - Avoid friction and shearing  - Provide appropriate hygiene as needed including keeping skin clean and dry  - Evaluate need for skin moisturizer/barrier cream  - Collaborate with interdisciplinary team   - Patient/family teaching  - Consider wound care consult   Outcome: Progressing     Problem: PAIN - ADULT  Goal: Verbalizes/displays adequate comfort level or baseline comfort level  Description: Interventions:  - Encourage patient to monitor pain and request assistance  - Assess pain using appropriate pain scale  - Administer analgesics based on type and severity of pain and evaluate response  - Implement non-pharmacological measures as appropriate and evaluate response  - Consider cultural and social influences on pain and pain management  - Notify physician/advanced practitioner if interventions unsuccessful or patient reports new pain  Outcome: Progressing     Problem: INFECTION - ADULT  Goal: Absence or prevention of progression during hospitalization  Description: INTERVENTIONS:  - Assess and monitor for signs and symptoms of infection  - Monitor lab/diagnostic results  - Monitor all insertion sites, i e  indwelling lines, tubes, and drains  - Monitor endotracheal if appropriate and nasal secretions for changes in amount and color  - [place] appropriate cooling/warming therapies per order  - Administer medications as ordered  - Instruct and encourage patient and family to use good hand hygiene technique  - Identify and instruct in appropriate isolation precautions for identified infection/condition  Outcome: Progressing  Goal: Absence of fever/infection during neutropenic period  Description: INTERVENTIONS:  - Monitor WBC    Outcome: Progressing     Problem: SAFETY ADULT  Goal: Patient will remain free of falls  Description: INTERVENTIONS:  - Educate patient/family on patient safety including physical limitations  - Instruct patient to call for assistance with activity   - Consult OT/PT to assist with strengthening/mobility   - Keep Call bell within reach  - Keep bed low and locked with side rails adjusted as appropriate  - Keep care items and personal belongings within reach  - Initiate and maintain comfort rounds  - Make Fall Risk Sign visible to staff  - Offer Toileting every 2 Hours, in advance of need  - Initiate/Maintain bed/chair alarm  - Obtain necessary fall risk management equipment:   - Apply yellow socks and bracelet for high fall risk patients  - Consider moving patient to room near nurses station  Outcome: Progressing  Goal: Maintain or return to baseline ADL function  Description: INTERVENTIONS:  -  Assess patient's ability to carry out ADLs; assess patient's baseline for ADL function and identify physical deficits which impact ability to perform ADLs (bathing, care of mouth/teeth, toileting, grooming, dressing, etc )  - Assess/evaluate cause of self-care deficits   - Assess range of motion  - Assess patient's mobility; develop plan if impaired  - Assess patient's need for assistive devices and provide as appropriate  - Encourage maximum independence but intervene and supervise when necessary  - Involve family in performance of ADLs  - Assess for home care needs following discharge   - Consider OT consult to assist with ADL evaluation and planning for discharge  - Provide patient education as appropriate  Outcome: Progressing  Goal: Maintains/Returns to pre admission functional level  Description: INTERVENTIONS:  - Perform BMAT or MOVE assessment daily    - Set and communicate daily mobility goal to care team and patient/family/caregiver  - Collaborate with rehabilitation services on mobility goals if consulted  - Perform Range of Motion 3 times a day  - Reposition patient every 2 hours    - Dangle patient 3 times a day  - Stand patient 3 times a day  - Ambulate patient 3 times a day  - Out of bed to chair 3 times a day   - Out of bed for meals 3 times a day  - Out of bed for toileting  - Record patient progress and toleration of activity level   Outcome: Progressing     Problem: DISCHARGE PLANNING  Goal: Discharge to home or other facility with appropriate resources  Description: INTERVENTIONS:  - Identify barriers to discharge w/patient and caregiver  - Arrange for needed discharge resources and transportation as appropriate  - Identify discharge learning needs (meds, wound care, etc )  - Arrange for interpretive services to assist at discharge as needed  - Refer to Case Management Department for coordinating discharge planning if the patient needs post-hospital services based on physician/advanced practitioner order or complex needs related to functional status, cognitive ability, or social support system  Outcome: Progressing     Problem: Knowledge Deficit  Goal: Patient/family/caregiver demonstrates understanding of disease process, treatment plan, medications, and discharge instructions  Description: Complete learning assessment and assess knowledge base    Interventions:  - Provide teaching at level of understanding  - Provide teaching via preferred learning methods  Outcome: Progressing     Problem: MUSCULOSKELETAL - ADULT  Goal: Maintain or return mobility to safest level of function  Description: INTERVENTIONS:  - Assess patient's ability to carry out ADLs; assess patient's baseline for ADL function and identify physical deficits which impact ability to perform ADLs (bathing, care of mouth/teeth, toileting, grooming, dressing, etc )  - Assess/evaluate cause of self-care deficits   - Assess range of motion  - Assess patient's mobility  - Assess patient's need for assistive devices and provide as appropriate  - Encourage maximum independence but intervene and supervise when necessary  - Involve family in performance of ADLs  - Assess for home care needs following discharge   - Consider OT consult to assist with ADL evaluation and planning for discharge  - Provide patient education as appropriate  Outcome: Progressing  Goal: Maintain proper alignment of affected body part  Description: INTERVENTIONS:  - Support, maintain and protect limb and body alignment  - Provide patient/ family with appropriate education  Outcome: Progressing     Problem: MOBILITY - ADULT  Goal: Maintain or return to baseline ADL function  Description: INTERVENTIONS:  -  Assess patient's ability to carry out ADLs; assess patient's baseline for ADL function and identify physical deficits which impact ability to perform ADLs (bathing, care of mouth/teeth, toileting, grooming, dressing, etc )  - Assess/evaluate cause of self-care deficits   - Assess range of motion  - Assess patient's mobility; develop plan if impaired  - Assess patient's need for assistive devices and provide as appropriate  - Encourage maximum independence but intervene and supervise when necessary  - Involve family in performance of ADLs  - Assess for home care needs following discharge   - Consider OT consult to assist with ADL evaluation and planning for discharge  - Provide patient education as appropriate  Outcome: Progressing  Goal: Maintains/Returns to pre admission functional level  Description: INTERVENTIONS:  - Perform BMAT or MOVE assessment daily    - Set and communicate daily mobility goal to care team and patient/family/caregiver  - Collaborate with rehabilitation services on mobility goals if consulted  - Perform Range of Motion 3 times a day  - Reposition patient every 2 hours    - Dangle patient 3 times a day  - Stand patient 3 times a day  - Ambulate patient 3 times a day  - Out of bed to chair 3 times a day   - Out of bed for meals 3 times a day  - Out of bed for toileting  - Record patient progress and toleration of activity level   Outcome: Progressing

## 2022-01-18 NOTE — ASSESSMENT & PLAN NOTE
History of sinus bradycardia, PAF, HTN  No known h/o CAD, but does have known carotid disease  Last echocardiogram from 2018 showing grade 1 diastolic dysfunction, mild AS, mild LVH at the time, repeat Echo from this admission confirms similar findings  Had exam findings consistent with possible volume overload on admission, rales on lung exam, hypoxia, suspected probable acute diastolic CHF currently  She poses at least a moderate cardiovascular risk for surgery, however risk outweighs benefit with high morbidity / mortality with untreated hip fx

## 2022-01-18 NOTE — ASSESSMENT & PLAN NOTE
Rate currently controlled  Not on anticoagulation, likely due to fall risk  Continue digoxin, metoprolol  Continue Outpatient cardiology follow up  Check EKG preoperatively - 1st degree AV block noted, no signs of acute ischemia

## 2022-01-18 NOTE — ASSESSMENT & PLAN NOTE
Blood pressure initially elevated upon arrival to the ER  Most likely secondary to increased pain, intermittent agitation  Currently stable  Continue PTA blood pressure medication - metoprolol, norvasc increased to 10mg daily  Lisinopril held in the perioperative setting to prevent FIONA    Allow hydralazine PRN for SBP > 170

## 2022-01-18 NOTE — RESPIRATORY THERAPY NOTE
RT Protocol Note  Martina Martinez 80 y o  female MRN: [id number]  Unit/Bed#: 682-16 Encounter: [id number]    Assessment    Principal Problem:    Closed fracture of neck of left femur ([address] )  Active Problems:    Esophageal reflux    Hypercholesterolemia    Hyponatremia    Paroxysmal atrial fibrillation (HCC)    Benign essential HTN    Acute respiratory failure with hypoxia (HCC)    Preoperative clearance    Delirium      Home Pulmonary Medications:  none  Home Devices/Therapy: Other (Comment) (none)    Past Medical History:   Diagnosis Date    Abdominal distension     Last Assessed: [id number]    Abnormal weight loss     Last Assessed: [id number]    Arthritis     Bronchitis     Bursitis of left hip     Lst Assessed: [id number]    Chest pain     Last Assessed: [id number]    Colon polyp     Dysuria     Last Assessed: [id number]    External hemorrhoids     Last Assessed: [id number]    Generalized anxiety disorder     GERD (gastroesophageal reflux disease)     History of echocardiogram 03/20/2018    EF 60%, mild to moderate mitral annular calcification  mild AS, mild TR   Hyperlipidemia     Hypertension     Hypomagnesemia     Last Assessed: [id number]    Lyme disease     Last Assessed: [id number]    Multiple renal cysts 7/5/2019    Nonrheumatic mitral valve regurgitation     Osteoporosis     Pneumonia of right middle lobe due to infectious organism     Last Assessed: [id number]    SVT (supraventricular tachycardia)     Urinary tract infection      Social History     Socioeconomic History    Marital status:       Spouse name: None    Number of children: None    Years of education: None    Highest education level: None   Occupational History    Occupation: Retired   Tobacco Use    Smoking status: Never Smoker    Smokeless tobacco: Never Used   Vaping Use    Vaping Use: Never used   Substance and Sexual Activity    Alcohol use: Never    Drug use: No    Sexual activity: Not Currently   Other Topics Concern    None   Social History Narrative    RETIRED     Social Determinants of Health     Financial Resource Strain: Not on file   Food Insecurity: Not on file   Transportation Needs: Not on file   Physical Activity: Not on file   Stress: Not on file   Social Connections: Not on file   Intimate Partner Violence: Not on file   Housing Stability: Not on file       Subjective         Objective    Physical Exam:   Assessment Type: Assess only  General Appearance: Alert,Awake,Sedated  Respiratory Pattern: Normal  Chest Assessment: Chest expansion symmetrical,Trachea midline  Bilateral Breath Sounds: Diminished,Crackles  Cough: None  O2 Device: 2 l/m nc    Vitals:  Blood pressure 147/62, pulse 78, temperature 100 3 °F (37 9 °C), resp  rate 20, height 5' 2" (1 575 m), weight 58 1 kg (128 lb 1 4 oz), SpO2 92 %  Imaging and other studies: I have personally reviewed pertinent reports  O2 Device: 2 l/m nc     Plan  Oxygen therapy with 2 l/m nc  Titrated to room air as tolerated, Albuterol neb 0 083%  Q6 prn for SOB and wheezing, son stated mom doesn't need CPAP never had, never smoked  Either, doesn't do resp tx's or  Doesn't wear oxygen  Resp Comments: pt never smoked, doesn't wear oxygen, doesn't have nebs or inhalers   son states never needed cpap unit  before

## 2022-01-18 NOTE — NURSING NOTE
Pt found with cleary almost completely out, oxygen off, pulling at IV  PCA had previously found patient pulling at cleary before, stopped her and attempted to redirect  Attempted to redirect in this instance as well- no effect  Contacted provider via [facility] to make aware of situation and ask for new cleary order and restraints  Orders obtained for both  New cleary inserted and soft wrist restraints started  Explained to pt- no confirmation of understanding

## 2022-01-18 NOTE — PLAN OF CARE
Problem: Potential for Falls  Goal: Patient will remain free of falls  Description: INTERVENTIONS:  - Educate patient/family on patient safety including physical limitations  - Instruct patient to call for assistance with activity   - Consult OT/PT to assist with strengthening/mobility   - Keep Call bell within reach  - Keep bed low and locked with side rails adjusted as appropriate  - Keep care items and personal belongings within reach  - Initiate and maintain comfort rounds  - Make Fall Risk Sign visible to staff  - Offer Toileting every 2 Hours, in advance of need  - Initiate/Maintain bed/chair alarm  - Obtain necessary fall risk management equipment:   - Apply yellow socks and bracelet for high fall risk patients  - Consider moving patient to room near nurses station  Outcome: Progressing     Problem: Prexisting or High Potential for Compromised Skin Integrity  Goal: Skin integrity is maintained or improved  Description: INTERVENTIONS:  - Identify patients at risk for skin breakdown  - Assess and monitor skin integrity  - Assess and monitor nutrition and hydration status  - Monitor labs   - Assess for incontinence   - Turn and reposition patient  - Assist with mobility/ambulation  - Relieve pressure over bony prominences  - Avoid friction and shearing  - Provide appropriate hygiene as needed including keeping skin clean and dry  - Evaluate need for skin moisturizer/barrier cream  - Collaborate with interdisciplinary team   - Patient/family teaching  - Consider wound care consult   Outcome: Progressing     Problem: PAIN - ADULT  Goal: Verbalizes/displays adequate comfort level or baseline comfort level  Description: Interventions:  - Encourage patient to monitor pain and request assistance  - Assess pain using appropriate pain scale  - Administer analgesics based on type and severity of pain and evaluate response  - Implement non-pharmacological measures as appropriate and evaluate response  - Consider cultural and social influences on pain and pain management  - Notify physician/advanced practitioner if interventions unsuccessful or patient reports new pain  Outcome: Progressing     Problem: INFECTION - ADULT  Goal: Absence or prevention of progression during hospitalization  Description: INTERVENTIONS:  - Assess and monitor for signs and symptoms of infection  - Monitor lab/diagnostic results  - Monitor all insertion sites, i e  indwelling lines, tubes, and drains  - Monitor endotracheal if appropriate and nasal secretions for changes in amount and color  - [place] appropriate cooling/warming therapies per order  - Administer medications as ordered  - Instruct and encourage patient and family to use good hand hygiene technique  - Identify and instruct in appropriate isolation precautions for identified infection/condition  Outcome: Progressing  Goal: Absence of fever/infection during neutropenic period  Description: INTERVENTIONS:  - Monitor WBC    Outcome: Progressing     Problem: SAFETY ADULT  Goal: Patient will remain free of falls  Description: INTERVENTIONS:  - Educate patient/family on patient safety including physical limitations  - Instruct patient to call for assistance with activity   - Consult OT/PT to assist with strengthening/mobility   - Keep Call bell within reach  - Keep bed low and locked with side rails adjusted as appropriate  - Keep care items and personal belongings within reach  - Initiate and maintain comfort rounds  - Make Fall Risk Sign visible to staff  - Offer Toileting every 2 Hours, in advance of need  - Initiate/Maintain bed/chair alarm  - Obtain necessary fall risk management equipment:   - Apply yellow socks and bracelet for high fall risk patients  - Consider moving patient to room near nurses station  Outcome: Progressing  Goal: Maintain or return to baseline ADL function  Description: INTERVENTIONS:  -  Assess patient's ability to carry out ADLs; assess patient's baseline for ADL function and identify physical deficits which impact ability to perform ADLs (bathing, care of mouth/teeth, toileting, grooming, dressing, etc )  - Assess/evaluate cause of self-care deficits   - Assess range of motion  - Assess patient's mobility; develop plan if impaired  - Assess patient's need for assistive devices and provide as appropriate  - Encourage maximum independence but intervene and supervise when necessary  - Involve family in performance of ADLs  - Assess for home care needs following discharge   - Consider OT consult to assist with ADL evaluation and planning for discharge  - Provide patient education as appropriate  Outcome: Progressing  Goal: Maintains/Returns to pre admission functional level  Description: INTERVENTIONS:  - Perform BMAT or MOVE assessment daily    - Set and communicate daily mobility goal to care team and patient/family/caregiver  - Collaborate with rehabilitation services on mobility goals if consulted  - Perform Range of Motion 3 times a day  - Reposition patient every 2 hours    - Dangle patient 3 times a day  - Stand patient 3 times a day  - Ambulate patient 3 times a day  - Out of bed to chair 3 times a day   - Out of bed for meals 3 times a day  - Out of bed for toileting  - Record patient progress and toleration of activity level   Outcome: Progressing     Problem: DISCHARGE PLANNING  Goal: Discharge to home or other facility with appropriate resources  Description: INTERVENTIONS:  - Identify barriers to discharge w/patient and caregiver  - Arrange for needed discharge resources and transportation as appropriate  - Identify discharge learning needs (meds, wound care, etc )  - Arrange for interpretive services to assist at discharge as needed  - Refer to Case Management Department for coordinating discharge planning if the patient needs post-hospital services based on physician/advanced practitioner order or complex needs related to functional status, cognitive ability, or social support system  Outcome: Progressing     Problem: Knowledge Deficit  Goal: Patient/family/caregiver demonstrates understanding of disease process, treatment plan, medications, and discharge instructions  Description: Complete learning assessment and assess knowledge base    Interventions:  - Provide teaching at level of understanding  - Provide teaching via preferred learning methods  Outcome: Progressing     Problem: MUSCULOSKELETAL - ADULT  Goal: Maintain or return mobility to safest level of function  Description: INTERVENTIONS:  - Assess patient's ability to carry out ADLs; assess patient's baseline for ADL function and identify physical deficits which impact ability to perform ADLs (bathing, care of mouth/teeth, toileting, grooming, dressing, etc )  - Assess/evaluate cause of self-care deficits   - Assess range of motion  - Assess patient's mobility  - Assess patient's need for assistive devices and provide as appropriate  - Encourage maximum independence but intervene and supervise when necessary  - Involve family in performance of ADLs  - Assess for home care needs following discharge   - Consider OT consult to assist with ADL evaluation and planning for discharge  - Provide patient education as appropriate  Outcome: Progressing  Goal: Maintain proper alignment of affected body part  Description: INTERVENTIONS:  - Support, maintain and protect limb and body alignment  - Provide patient/ family with appropriate education  Outcome: Progressing     Problem: MOBILITY - ADULT  Goal: Maintain or return to baseline ADL function  Description: INTERVENTIONS:  -  Assess patient's ability to carry out ADLs; assess patient's baseline for ADL function and identify physical deficits which impact ability to perform ADLs (bathing, care of mouth/teeth, toileting, grooming, dressing, etc )  - Assess/evaluate cause of self-care deficits   - Assess range of motion  - Assess patient's mobility; develop plan if impaired  - Assess patient's need for assistive devices and provide as appropriate  - Encourage maximum independence but intervene and supervise when necessary  - Involve family in performance of ADLs  - Assess for home care needs following discharge   - Consider OT consult to assist with ADL evaluation and planning for discharge  - Provide patient education as appropriate  Outcome: Progressing  Goal: Maintains/Returns to pre admission functional level  Description: INTERVENTIONS:  - Perform BMAT or MOVE assessment daily    - Set and communicate daily mobility goal to care team and patient/family/caregiver  - Collaborate with rehabilitation services on mobility goals if consulted  - Perform Range of Motion 3 times a day  - Reposition patient every 2 hours    - Dangle patient 3 times a day  - Stand patient 3 times a day  - Ambulate patient 3 times a day  - Out of bed to chair 3 times a day   - Out of bed for meals 3 times a day  - Out of bed for toileting  - Record patient progress and toleration of activity level   Outcome: Progressing

## 2022-01-18 NOTE — TELEPHONE ENCOUNTER
Patient's son Taylor Jacob just missed a call from he believes Dr Natacha Ace    His mom is supposed to have surgery today  Callback OP#[phone]

## 2022-01-18 NOTE — PROGRESS NOTES
Patient's left hip surgery for today has been canceled due to medical reasons  Anesthesia is involved and requesting cardiac workup  Therefore, case tentatively scheduled for Thursday with Dr Natacha Ace  If the surgery cannot be performed Thursday, the patient will have to be transferred as Dr Natacha Ace is leaving Friday and will be away for 3 weeks  Patient made aware but is somewhat confused  We will place the patient on 5000 units of subcu heparin Q 8 hours which is to be stopped at midnight Wednesday      Jennifer Alejandro PA-C

## 2022-01-18 NOTE — ASSESSMENT & PLAN NOTE
Presented to the ER for evalauation after [facility]  She reports tripping on a rug, mechanical fall  Denies LOC, hitting her head  CT shows - Acute left femoral transcervical neck fracture as described  Admited to med surg  Originally OR planned for today, however concern over cardiac / pulmonary status, so case will be delayed while patient is medically optimized  DVT proph - SC heparin   Pain medication - IV dilaudid, PO oxycodone  Fall precautions  Orthopedic Surgery Consult and ongoing support appreciated

## 2022-01-19 NOTE — ASSESSMENT & PLAN NOTE
Wt Readings from Last 3 Encounters:   01/19/22 58 4 kg (128 lb 12 oz)   10/25/21 57 5 kg (126 lb 12 8 oz)   08/17/21 61 5 kg (135 lb 9 3 oz)       Initially Suspecedt acute diastolic CHF with acute respiratory failure with hypoxia, hyponatremia  Unfortunately IV lasix worsened hyponatremia, currently appearing dry by exam   No signs of volume overload on CXR  Repeated Echocardiogram this admission (last study from 2018)  Only mild diastolic dysfunction noted  Titrate oxygen as needed  Consider CT chest   Will add respiratory protocol, Duoneb tx  Discussed case with cardiology and examined patient at bedside  No need for formal consult  Do not suspect acute CHF / volume overload    Consult pulmonology for perioperative exam - cleared the patient from their standpoint  Patient now on room air 1/19 with saturations >95%

## 2022-01-19 NOTE — RESPIRATORY THERAPY NOTE
01/18/22 7416   Respiratory Assessment   Assessment Type During-treatment   General Appearance Awake   Respiratory Pattern Normal   Chest Assessment Chest expansion symmetrical;Trachea midline   Bilateral Breath Sounds Diminished;Rales   Resp Comments bipap therapy started without incident  Patient went on and quickly went to sleep   Nurse and staff made aware of patient being on therapy   Non-Invasive Information   O2 Interface Device Full face mask   Non-Invasive Ventilation Mode BiPAP   $ Intermittent NIV Yes   SpO2 93 %   $ Pulse Oximetry Spot Check Charge Completed   Non-Invasive Settings   IPAP (cm) 14 cm   EPAP (cm) 6 cm   Rate (Set) 14   Flow (lpm) 3   Pressure Support (cm H2O) 8   Rise Time 3   Non-Invasive Readings   Total Rate 15   Vt (mL) (Mech) 430   MV (Mech) 6 5   Skin Intervention Mask rotated;Skin intact   Non-Invasive Alarms   MV Low (L/min) 4   Vt Low (mL) 100   High Resp Rate (BPM) 55 BPM   Low Resp Rate (BPM) 8 BPM   Apnea Interval (sec) 20   Apnea Volume (mL) 9

## 2022-01-19 NOTE — ASSESSMENT & PLAN NOTE
History of sinus bradycardia, PAF, HTN  No known h/o CAD, but does have known carotid disease  Last echocardiogram from 2018 showing grade 1 diastolic dysfunction, mild AS, mild LVH at the time, repeat Echo from this admission confirms similar findings  Had exam findings consistent with possible volume overload on admission, rales on lung exam, hypoxia, suspected probable acute diastolic CHF currently  However on examination 1/19, hypoxia resolved, lungs clear, euvolemic  Cardiology does not believe this to be an exacerbation  She poses at least a moderate cardiovascular risk for surgery, however risk outweighs benefit with high morbidity / mortality with untreated hip fx  D/w cardiology 1/18 who cleared the patient for surgery at this time  Pulmonology and nephrology have also cleared the patient for surgery

## 2022-01-19 NOTE — PROGRESS NOTES
[address] [facility]  Progress Note - Kaaren Kanner 9/2/1931, 80 y o  female MRN: [id number]  Unit/Bed#: 573-76 Encounter: [id number]  Primary Care Provider: Jean Parsons DO   Date and time admitted to hospital: 1/16/2022  5:43 PM    * Closed fracture of neck of left femur [facility])  Assessment & Plan  Presented to the ER for evalauation after a fall at SNF  She reports tripping on a rug, mechanical fall  Denies LOC, hitting her head  CT shows - Acute left femoral transcervical neck fracture as described  Admited to med surg  Originally OR planned for 1/18, however concern over cardiac / pulmonary status, so case will be delayed while patient is medically optimized  Surgery now scheduled for 1/20 at 7 AM  DVT proph - SC heparin   Pain medication - IV dilaudid, PO oxycodone  Fall precautions  Orthopedic Surgery Consult and ongoing support appreciated  Preoperative clearance  Assessment & Plan  History of sinus bradycardia, PAF, HTN  No known h/o CAD, but does have known carotid disease  Last echocardiogram from 2018 showing grade 1 diastolic dysfunction, mild AS, mild LVH at the time, repeat Echo from this admission confirms similar findings  Had exam findings consistent with possible volume overload on admission, rales on lung exam, hypoxia, suspected probable acute diastolic CHF currently  However on examination 1/19, hypoxia resolved, lungs clear, euvolemic  Cardiology does not believe this to be an exacerbation  She poses at least a moderate cardiovascular risk for surgery, however risk outweighs benefit with high morbidity / mortality with untreated hip fx  D/w cardiology 1/18 who cleared the patient for surgery at this time  Pulmonology and nephrology have also cleared the patient for surgery  Delirium  Assessment & Plan  Hospital confusion / delirium noted likely in the setting of underlying mild dementia, hyponatremia  Patient found pulling at IV and [place]    Soft wrist restraints applied, may need renewal if this continues  Improved slightly 1/19 but still trying to pocket food and soft restraints replaced    Acute respiratory failure with hypoxia [facility])  Assessment & Plan  Wt Readings from Last 3 Encounters:   01/19/22 58 4 kg (128 lb 12 oz)   10/25/21 57 5 kg (126 lb 12 8 oz)   08/17/21 61 5 kg (135 lb 9 3 oz)       Initially Suspecedt acute diastolic CHF with acute respiratory failure with hypoxia, hyponatremia  Unfortunately IV lasix worsened hyponatremia, currently appearing dry by exam   No signs of volume overload on CXR  Repeated Echocardiogram this admission (last study from 2018)  Only mild diastolic dysfunction noted  Titrate oxygen as needed  Consider CT chest   Will add respiratory protocol, Duoneb tx  Discussed case with cardiology and examined patient at bedside  No need for formal consult  Do not suspect acute CHF / volume overload  Consult pulmonology for perioperative exam - cleared the patient from their standpoint  Patient now on room air 1/19 with saturations >95%      Benign essential HTN  Assessment & Plan  Blood pressure initially elevated upon arrival to the ER  Most likely secondary to increased pain, intermittent agitation  Currently stable  Continue PTA blood pressure medication - metoprolol, norvasc increased to 10mg daily  Lisinopril held in the perioperative setting to prevent FIONA  Allow hydralazine PRN for SBP > 170    Paroxysmal atrial fibrillation (HCC)  Assessment & Plan  Rate currently controlled  Not on anticoagulation, likely due to fall risk  Continue digoxin, metoprolol  Continue Outpatient cardiology follow up  Check EKG preoperatively - 1st degree AV block noted, no signs of acute ischemia  Hyponatremia  Assessment & Plan  Sodium level at 118 --> rechecked at 123  1/19 sodium back to baseline of 130  Possibly hypervolemic hyponatremia, however following additional doses of IV lasix, sodium worsened to 116     Now off diuretics, s/p tovalptan dosing with improvement  Appreciate nephrology consult and ongoing follow up / management  Further workup / orders per nephrology  VTE Pharmacologic Prophylaxis: VTE Score: 5 High Risk (Score >/= 5) - Pharmacological DVT Prophylaxis Ordered: heparin  Sequential Compression Devices Ordered  Patient Centered Rounds: I performed bedside rounds with nursing staff today  Discussions with Specialists or Other Care Team Provider: case management, orthopedics, pulmonology, nephrology    Education and Discussions with Family / Patient: Updated  (son) via phone  Time Spent for Care: 30 minutes  More than 50% of total time spent on counseling and coordination of care as described above  Current Length of Stay: 3 day(s)  Current Patient Status: Inpatient   Certification Statement: The patient will continue to require additional inpatient hospital stay due to surgery for tomorrow with orthopedics  Discharge Plan: Anticipate discharge in 48-72 hrs to discharge location to be determined pending rehab evaluations  Code Status: Level 3 - DNAR and DNI    Subjective:   Patient reports pain in her right foot  States that she thinks she has an appointment for her hip today  Made aware she is getting surgery tomorrow  Per nursing, trying to pocket food this AM      Objective:     Vitals:   Temp (24hrs), Av 6 °F (37 °C), Min:97 8 °F (36 6 °C), Max:100 3 °F (37 9 °C)    Temp:  [97 8 °F (36 6 °C)-100 3 °F (37 9 °C)] 97 8 °F (36 6 °C)  HR:  [72-88] 72  Resp:  [17-20] 18  BP: (141-147)/(58-62) 144/58  SpO2:  [91 %-97 %] 95 %  Body mass index is 23 55 kg/m²  Input and Output Summary (last 24 hours): Intake/Output Summary (Last 24 hours) at 2022 1257  Last data filed at 2022 1213  Gross per 24 hour   Intake 0 ml   Output 400 ml   Net -400 ml       Physical Exam:   Physical Exam  Vitals and nursing note reviewed     Constitutional:       General: She is not in acute distress  Appearance: She is ill-appearing  HENT:      Head: Normocephalic and atraumatic  Cardiovascular:      Rate and Rhythm: Normal rate and regular rhythm  Pulses: Normal pulses  Heart sounds: Normal heart sounds  No murmur heard  No gallop  Pulmonary:      Effort: Pulmonary effort is normal       Breath sounds: Normal breath sounds  Abdominal:      General: Bowel sounds are normal       Palpations: Abdomen is soft  Tenderness: There is no abdominal tenderness  There is no guarding or rebound  Musculoskeletal:         General: Normal range of motion  Right lower leg: No edema  Left lower leg: No edema  Skin:     General: Skin is warm and dry  Neurological:      Mental Status: She is alert  She is disoriented  Psychiatric:         Mood and Affect: Mood normal          Behavior: Behavior normal           Additional Data:     Labs:  Results from last 7 days   Lab Units 01/19/22  0534   WBC Thousand/uL 7 52   HEMOGLOBIN g/dL 12 7   HEMATOCRIT % 39 0   PLATELETS Thousands/uL 269   NEUTROS PCT % 70   LYMPHS PCT % 14   MONOS PCT % 11   EOS PCT % 4     Results from last 7 days   Lab Units 01/19/22  0534 01/17/22  1425 01/17/22  0422   SODIUM mmol/L 130*   < > 123*   POTASSIUM mmol/L 4 1   < > 4 2   CHLORIDE mmol/L 93*   < > 91*   CO2 mmol/L 28   < > 23   BUN mg/dL 20   < > 9   CREATININE mg/dL 1 29   < > 0 76   ANION GAP mmol/L 9   < > 9   CALCIUM mg/dL 9 6   < > 7 5*   ALBUMIN g/dL  --   --  2 7*   TOTAL BILIRUBIN mg/dL  --   --  0 48   ALK PHOS U/L  --   --  54   ALT U/L  --   --  24   AST U/L  --   --  25   GLUCOSE RANDOM mg/dL 99   < > 122    < > = values in this interval not displayed       Results from last 7 days   Lab Units 01/16/22  1832   INR  0 99                   Lines/Drains:  Invasive Devices  Report    Peripheral Intravenous Line            Peripheral IV 01/16/22 Right Antecubital 2 days          Drain            Urethral Catheter 18 Fr  1 day Urinary Catheter:  Goal for removal: Voiding trial when ambulation improves               Imaging: Reviewed radiology reports from this admission including: xray(s)    Recent Cultures (last 7 days):         Last 24 Hours Medication List:   Current Facility-Administered Medications   Medication Dose Route Frequency Provider Last Rate    acetaminophen  975 mg Oral Q8H [address] Yuriy Perla PA-C      albuterol  2 5 mg Nebulization Q6H PRN Camila Carranza MD      amLODIPine  10 mg Oral Daily TAWANA Rivera      aspirin  81 mg Oral Daily Bill Aguilar PA-C      calcium carbonate  1 tablet Oral Daily With Breakfast [place] havas[place] cityAMBER      cefazolin  2,000 mg Intravenous 60 Min Pre-Op Petey Quispe MD      chlorhexidine   Topical Daily PRN Petey Quispe MD      chlorhexidine  15 mL Swish & Spit Once Petey Quispe MD      cholecalciferol  1,000 Units Oral Daily Bill Jeff, AMBER      clonazePAM  1 5 mg Oral HS Bill Aguilar PA-C      digoxin  125 mcg Oral Daily Bill AguilarAMBER [place]      famotidine  20 mg Oral Daily Bill Jeff, PA-KAT      fish oil  1,000 mg Oral Daily Bill Aguilar PA-C      heparin (porcine)  5,000 Units Subcutaneous [facility] Niraj Banks PA-C      hydrALAZINE  5 mg Intravenous Q6H PRN Hilton Jewell PA-C      HYDROmorphone  0 5 mg Intravenous Q6H PRN Hilton Jewell PA-C      Lactinex  1 each Oral TID With Meals Camila Carranza MD      magnesium hydroxide  30 mL Oral Daily PRN Bill Aguilar PA-C      melatonin  3 mg Oral HS Bill Aguilar PA-C      metoprolol tartrate  75 mg Oral Q12H [address] Bill Aguilar PA-C      ondansetron  4 mg Intravenous Q6H PRN Bill Aguilar PA-C      oxyCODONE  5 mg Oral Q6H PRN Hilton Jewell PA-C      senna  1 tablet Oral BID Bill Aguilar PA-C          Today, Patient Was Seen By: Niraj Banks PA-C    **Please Note: This note may have been constructed using a voice recognition system  **

## 2022-01-19 NOTE — PROGRESS NOTES
Progress Note - Nephrology   Kaaren Kanner 80 y o  female MRN: [id number]  Unit/Bed#: 919-69 Encounter: [id number]    Assessment and Plan    1  Acute on chronic Hyponatremia  ? Prior outpatient baseline sodium 130-133 millimole per L    ? Presenting serum sodium 118 millimole per L on 01/16/2022  Initially treated with furosemide 20 mg IV with improvement to 123 millimole per L 01/17/2022 at 0422, re-dosed and worsened to 160 millimole per L on 01/17/2022   ? Urine studies indeterminate, Urine sodium 41, urine osmolality 141, TSH 3 13, uric acid 4 2 mg/dL, cortisol pending  ? Status post tolvaptan 15 mg 01/17/2022 at 1854 and 01/18/2022 at 1223   ? Serum sodium improved from 125 millimole per L 01/18/2022 -> 130 mmol/L on 01/19/2022  Patient examines hypovolemic to euvolemic  Patient was NPO, now not eating per nursing  At risk for hypovolemic hyponatremia  ? Repeat urine sodium, urine osmolality, fractional excretion of sodium  Consider gentle isotonic saline bolus vs additional tolvaptan as indicated  2  Chronic kidney disease, stage III baseline creatinine around 0 7-1 0 mg/dL  ? Renal function remains stable with no evidence of acute kidney injury  Renal function may be overestimated due to poor muscle mass  ? sCr slowly trending upward, check FeNa  Consider volume expansion  3  Left femur fracture  ? Pending cardiac workup and improvement in serum sodium  Surgical repair pending  4  Volume overload, grade 1 diastolic dysfunction  ? Patient examines hypovolemic to euvolemic  Will hold further loop diuretic  See #1       5  Urinary retention  ? Continue Donaldson catheter in place per protocol  Monitor urine output  Oliguiric as recorded  Likely void trial postoperative  6  Hypertension in the setting of CKD   ? Hypertensive urgency resolved; possibly elevated secondary to pain  Follow with metoprolol tartrate 75 mg every 12 hours, increase of amlodipine to 10 mg daily        7  Paroxysmal atrial fibrillation  ? Appears rate controlled  On Digoxin, metoprolol    8  GERD  ? Continue famotidine 20 mg by mouth daily  9  Dysphagia  · SLP to evaluate    Follow up reason for today's visit:     Closed fracture of neck of left femur [facility])    Patient Active Problem List   Diagnosis    Gomez's esophagus    Bilateral carotid artery disease (HCC)    Breast mass, left    Chronic constipation    Colon, diverticulosis    Esophageal reflux    Generalized anxiety disorder    Generalized osteoarthritis of multiple sites    Glucose intolerance (impaired glucose tolerance)    Hypercholesterolemia    Neuralgia    Osteoporosis    SVT (supraventricular tachycardia) (HCC)    Vitamin D deficiency    HCAP (healthcare-associated pneumonia)    Acute febrile illness    Cough    Esophageal mass    Hyponatremia    Right lower lobe lung mass    History of frequent urinary tract infections    Paroxysmal atrial fibrillation (HCC)    Sinus bradycardia    Debilitated    Eczema    Hypertensive urgency    Acute metabolic encephalopathy    Acute cystitis with hematuria    Lung mass    Acute encephalopathy    Multiple renal cysts    Hepatic steatosis    Left eye pain    Sick sinus syndrome (HCC)    Benign essential HTN    Diarrhea    sepsis    FIONA (acute kidney injury) ([facility] [address] )    Stage 3b chronic kidney disease (HCC)    Lactic acidosis    Pulmonary nodule    Urinary retention    Acute UTI    Closed fracture of neck of left femur (HCC)    Acute respiratory failure with hypoxia (HCC)    Preoperative clearance    Delirium         Subjective:   "Tired " Admits to poor appetite  Nursing states patient did not swallow any breakfast  Interval history and review of systems is limited due to patient's lethargy  Objective:     Vitals: Blood pressure 144/58, pulse 72, temperature 97 8 °F (36 6 °C), resp  rate 18, height 5' 2" (1 575 m), weight 58 4 kg (128 lb 12 oz), SpO2 95 %  ,Body mass index is 23 55 kg/m²  Weight (last 2 days)     Date/Time Weight    01/19/22 0600 58 4 (128 75)    01/18/22 0558 58 1 (128 09)    01/17/22 1440 57 2 (126)            Intake/Output Summary (Last 24 hours) at 1/19/2022 1007  Last data filed at 1/19/2022 0850  Gross per 24 hour   Intake 0 ml   Output 400 ml   Net -400 ml     I/O last 3 completed shifts: In: 0   Out: 2050 [Urine:2050]    Urethral Catheter 18 Fr  (Active)   Donaldson Care Done 01/19/22 0900   Output (mL) 400 mL 01/18/22 1546       Physical Exam: /58   Pulse 72   Temp 97 8 °F (36 6 °C)   Resp 18   Ht 5' 2" (1 575 m)   Wt 58 4 kg (128 lb 12 oz)   SpO2 95%   BMI 23 55 kg/m²     General Appearance:    No acute distress  Cooperative  Appears younger than stated age  Thin-appearing  Head:    Normocephalic  Atraumatic  Normal jaw occlusion  Eyes:    Lids, conjunctiva normal  No scleral icterus  Ears:    Normal external ears  Nose:   Nares normal  No drainage  Mouth:   Lips, tongue normal  Mucosa slightly dry  Phonation normal    Neck:   Supple  Symmetrical    Back:     Symmetric  No CVA tenderness  Lungs:     Normal respiratory effort  Clear to auscultation bilaterally  Chest wall:    No tenderness or deformity  Heart:    Regular rate and rhythm  Normal S1 and S2  No murmur  No JVD  No edema  Abdomen:     Soft  Non-tender  Bowel sounds active  Genitourinary: Donaldson catheter present with large amount dark yellow-brown urine  Extremities:   Extremities normal  Atraumatic  No cyanosis  Skin:   Warm and dry  No pallor, jaundice, rash, ecchymoses  Neurologic:   Lethargic, awakes to stimuli and answers questions  Lab, Imaging and other studies: I have personally reviewed pertinent labs    CBC:   Lab Results   Component Value Date    WBC 7 52 01/19/2022    HGB 12 7 01/19/2022    HCT 39 0 01/19/2022    MCV 86 01/19/2022     01/19/2022    MCH 27 9 01/19/2022    MCHC 32 6 01/19/2022    RDW 14 6 01/19/2022    MPV 9 6 01/19/2022    NRBC 0 01/19/2022     CMP:   Lab Results   Component Value Date    K 4 1 01/19/2022    CL 93 (L) 01/19/2022    CO2 28 01/19/2022    BUN 20 01/19/2022    CREATININE 1 29 01/19/2022    CALCIUM 9 6 01/19/2022    EGFR 36 01/19/2022         Results from last 7 days   Lab Units 01/19/22  0534 01/18/22  1547 01/18/22  1410 01/17/22  1425 01/17/22  0422   POTASSIUM mmol/L 4 1 4 8 4 8   < > 4 2   CHLORIDE mmol/L 93* 92* 93*   < > 91*   CO2 mmol/L 28 28 28   < > 23   BUN mg/dL 20 13 12   < > 9   CREATININE mg/dL 1 29 1 00 0 94   < > 0 76   CALCIUM mg/dL 9 6 9 6 9 4   < > 7 5*   ALK PHOS U/L  --   --   --   --  54   ALT U/L  --   --   --   --  24   AST U/L  --   --   --   --  25    < > = values in this interval not displayed  Phosphorus: No results found for: PHOS  Magnesium: No results found for: MG  Urinalysis: No results found for: [place], SPECGRAV, PHUR, LEUKOCYTESUR, NITRITE, PROTEINUA, GLUCOSEU, KETONESU, BILIRUBINUR, BLOODU  Ionized Calcium: No results found for: CAION  Coagulation: No results found for: PT, INR, APTT  Troponin: No results found for: TROPONINI  ABG: No results found for: PHART, ZMU0SGL, PO2ART, DNY7EWJ, W6QXKPPU, BEART, SOURCE  Radiology review:     IMAGING  Procedure: XR chest portable    Result Date: 1/18/2022  Narrative: CHEST INDICATION:   Preoperative radiograph    COMPARISON:  Chest radiograph January 16, 2022 EXAM PERFORMED/VIEWS:  XR CHEST PORTABLE FINDINGS: Heart shadow is enlarged but unchanged from prior exam  The lungs are clear  No pneumothorax or pleural effusion  Osseous structures appear within normal limits for patient age  Impression: No acute cardiopulmonary disease  Workstation performed: [id number]     Procedure: XR chest 1 view portable    Result Date: 1/17/2022  Narrative: CHEST INDICATION:   fall  COMPARISON:  Chest x-ray from 8/17/2021   EXAM PERFORMED/VIEWS:  XR CHEST PORTABLE FINDINGS: Heart shadow is enlarged but unchanged from prior exam  The lungs are clear  No pneumothorax or pleural effusion  Osseous structures appear within normal limits for patient age  Impression: No acute cardiopulmonary disease  Workstation performed: [id number]     Procedure: XR hip/pelv 2-3 vws left    Result Date: 1/17/2022  Narrative: LEFT HIP INDICATION:   fall  COMPARISON:  Pelvic plain films from 3/19/2018  VIEWS:  XR HIP/PELV 2-3 VWS LEFT  W PELVIS IF PERFORMED FINDINGS: Acute subcapital left femoral neck fracture  No significant hip degenerative changes  No lytic or blastic osseous lesion  Soft tissues are unremarkable  The visualized lumbar spine is unremarkable  Impression: Acute subcapital left femoral neck fracture  Workstation performed: [id number]     Procedure: CT head without contrast    Result Date: 1/16/2022  Narrative: CT BRAIN - WITHOUT CONTRAST INDICATION:   fall  COMPARISON:  CT 8/17/2021 TECHNIQUE:  CT examination of the brain was performed  In addition to axial images, sagittal and coronal 2D reformatted images were created and submitted for interpretation  Radiation dose length product (DLP) for this visit:  847 65 mGy-cm   This examination, like all CT scans performed in the [facility], was performed utilizing techniques to minimize radiation dose exposure, including the use of iterative  reconstruction and automated exposure control  IMAGE QUALITY:  Diagnostic  FINDINGS: PARENCHYMA: Decreased attenuation is noted in periventricular and subcortical white matter demonstrating an appearance that is statistically most likely to represent moderate microangiopathic change  No CT signs of acute infarction  No intracranial mass, mass effect or midline shift  No acute parenchymal hemorrhage  Atherosclerotic vascular calcifications in carotid and vertebral arteries are more advanced than would be expected for the patient's  age   VENTRICLES AND EXTRA-AXIAL SPACES:  Ventricles and extra-axial CSF spaces are prominent commensurate with the degree of volume loss  No hydrocephalus  No acute extra-axial hemorrhage  VISUALIZED ORBITS AND PARANASAL SINUSES: Bilateral ocular lens implants are present  Postoperative change left maxillary sinus noted with chronic mucoperiosteal thickening present  Small mucous retention cyst left sphenoid sinus noted  CALVARIUM AND EXTRACRANIAL SOFT TISSUES:  Normal      Impression: No acute intracranial abnormality  Workstation performed: [id number]     Procedure: CT lower extremity wo contrast left    Result Date: 1/16/2022  Narrative: CT left lower extremity (hip) without IV contrast INDICATION: fracture  80-year-old female presents via EMS after fall at her nursing facility  Patient reports she tripped on a rug about an hour ago, she was unable to pick herself off the ground  She denies head strike or loss of consciousness  Patient is intermittently complaining of left hip pain, on examination when asked to raise her leg she complains of inguinal and buttock pain  She denies neck or back pain, chest pain, dyspnea, abdominal pain  Patient does admit to difficulty urinating, she feels a sensation of incomplete voiding  She denies any recent fevers or chills, coughing  Patient states she is eating and drinking normally  On arrival her oxygen saturation fluctuates between 88-90 in room air  COMPARISON: Plain film study obtained 6:00 PM 1/16/2022 TECHNIQUE: CT examination of the left hip was performed  This examination, like all CT scans performed in the [facility], was performed utilizing techniques to minimize radiation dose exposure, including the use of iterative reconstruction and automated exposure control software  Sagittal and coronal two dimensional reconstructed images were also submitted for interpretation   Rad dose  400 mGy-cm FINDINGS: OSSEOUS STRUCTURES:  There is an acute, comminuted, mildly displaced and angulated, and slightly impacted transcervical left femoral neck fracture present  No other fracture and no dislocation apparent  VISUALIZED MUSCULATURE:  Mild muscular atrophy  SOFT TISSUES:  Diffuse vascular calcification  OTHER PERTINENT FINDINGS:  None  Impression: Acute left femoral transcervical neck fracture as described  Workstation performed: [id number]     Procedure: Echo complete w/ contrast if indicated    Result Date: 1/17/2022  Narrative: Bonita Li  Left Ventricle: Left ventricular cavity size is normal  The left ventricular ejection fraction is 60%  Systolic function is normal  Wall motion is normal  Diastolic function is mildly abnormal, consistent with grade I (abnormal) relaxation  Wall thickness is mildly increased    Aortic Valve: There is mild stenosis    Tricuspid Valve: There is mild regurgitation         Current Facility-Administered Medications   Medication Dose Route Frequency    acetaminophen (TYLENOL) tablet 975 mg  975 mg Oral Q8H [address]    albuterol inhalation solution 2 5 mg  2 5 mg Nebulization Q6H PRN    amLODIPine (NORVASC) tablet 10 mg  10 mg Oral Daily    aspirin (ECOTRIN LOW STRENGTH) EC tablet 81 mg  81 mg Oral Daily    calcium carbonate (OYSTER SHELL,OSCAL) 500 mg tablet 1 tablet  1 tablet Oral Daily With Breakfast    ceFAZolin (ANCEF) IVPB (premix in dextrose) 2,000 mg 50 mL  2,000 mg Intravenous 60 Min Pre-Op    chlorhexidine (HIBICLENS) 4 % topical liquid   Topical Daily PRN    chlorhexidine (PERIDEX) 0 12 % oral rinse 15 mL  15 mL Swish & Spit Once    cholecalciferol (VITAMIN D3) tablet 1,000 Units  1,000 Units Oral Daily    clonazePAM (KlonoPIN) tablet 1 5 mg  1 5 mg Oral HS    digoxin (LANOXIN) tablet 125 mcg  125 mcg Oral Daily    famotidine (PEPCID) tablet 20 mg  20 mg Oral Daily    fish oil capsule 1,000 mg  1,000 mg Oral Daily    heparin (porcine) subcutaneous injection 5,000 Units  5,000 Units Subcutaneous Q8H [address]    hydrALAZINE (APRESOLINE) injection 5 mg  5 mg Intravenous Q6H PRN    HYDROmorphone (DILAUDID) injection 0 5 mg  0 5 mg Intravenous Q6H PRN    lactobacillus acidophilus-bulgaricus (FLORANEX) packet 1 packet  1 packet Oral TID With Meals    magnesium hydroxide (MILK OF MAGNESIA) oral suspension 30 mL  30 mL Oral Daily PRN    melatonin tablet 3 mg  3 mg Oral HS    metoprolol tartrate (LOPRESSOR) tablet 75 mg  75 mg Oral Q12H [address]    ondansetron (ZOFRAN) injection 4 mg  4 mg Intravenous Q6H PRN    oxyCODONE (ROXICODONE) IR tablet 5 mg  5 mg Oral Q6H PRN    senna (SENOKOT) tablet 8 6 mg  1 tablet Oral BID     Medications Discontinued During This Encounter   Medication Reason    lisinopril (ZESTRIL) tablet 10 mg     amLODIPine (NORVASC) tablet 5 mg     HYDROmorphone (DILAUDID) injection 0 2 mg     acetaminophen (TYLENOL) tablet 650 mg     HYDROmorphone (DILAUDID) injection 0 5 mg     furosemide (LASIX) injection 20 mg     Melatonin 5 MG CAPS Duplicate order    lactobacillus acidophilus-bulgaricus (FLORANEX) packet 1 packet     furosemide (LASIX) injection 20 mg     calcium carbonate (OYSTER SHELL,OSCAL) 500 mg tablet 1 tablet     furosemide (LASIX) injection 20 mg     ceFAZolin (ANCEF) IVPB (premix in dextrose) 2,000 mg 50 mL     ipratropium-albuterol (DUO-NEB) 0 5-2 5 mg/3 mL inhalation solution 3 mL        Isaac Savage PA-C    Portions of the record may have been created with voice recognition software  Occasional wrong word or "sound a like" substitutions may have occurred due to the inherent limitations of voice recognition software  Read the chart carefully and recognize, using context, where substitutions have occurred

## 2022-01-19 NOTE — ASSESSMENT & PLAN NOTE
Sodium level at 118 --> rechecked at 123  1/19 sodium back to baseline of 130  Possibly hypervolemic hyponatremia, however following additional doses of IV lasix, sodium worsened to 116  Now off diuretics, s/p tovalptan dosing with improvement  Appreciate nephrology consult and ongoing follow up / management  Further workup / orders per nephrology

## 2022-01-19 NOTE — ASSESSMENT & PLAN NOTE
Hospital confusion / delirium noted likely in the setting of underlying mild dementia, hyponatremia  Patient found pulling at IV and Donaldson  Soft wrist restraints applied, may need renewal if this continues    Improved slightly 1/19 but still trying to pocket food and soft restraints replaced

## 2022-01-19 NOTE — ASSESSMENT & PLAN NOTE
Presented to the ER for evalauation after a fall at [facility]F  She reports tripping on a rug, mechanical fall  Denies LOC, hitting her head  CT shows - Acute left femoral transcervical neck fracture as described  Admited to med surg  Originally OR planned for 1/18, however concern over cardiac / pulmonary status, so case will be delayed while patient is medically optimized  Surgery now scheduled for 1/20 at 7 AM  DVT proph - SC heparin   Pain medication - IV dilaudid, PO oxycodone  Fall precautions  Orthopedic Surgery Consult and ongoing support appreciated   NPO at midnight, stop VTE heparin injections at midnight as well in case spinal is needed

## 2022-01-19 NOTE — CASE MANAGEMENT
Case Management Progress Note    Patient name Kaaren Kanner  Location [address] [id number] MRN [id number]  : 1931 Date 2022       LOS (days): 3  Geometric Mean LOS (GMLOS) (days): 2 90  Days to GMLOS:0 1        OBJECTIVE:        Current admission status: Inpatient  Preferred Pharmacy:   [facility] [address]   [address] [address]  Phone: [phone] Fax: [phone]    Primary Care Provider: Jean Parsons DO    Primary Insurance: MEDICARE  Secondary Insurance: [facility]    PROGRESS NOTE:Continuing to follow pt  Pt at this time is for the OR tomorrow () at 7:30 am  CM will f/u post op

## 2022-01-19 NOTE — PLAN OF CARE
Problem: Potential for Falls  Goal: Patient will remain free of falls  Description: INTERVENTIONS:  - Educate patient/family on patient safety including physical limitations  - Instruct patient to call for assistance with activity   - Consult OT/PT to assist with strengthening/mobility   - Keep Call bell within reach  - Keep bed low and locked with side rails adjusted as appropriate  - Keep care items and personal belongings within reach  - Initiate and maintain comfort rounds  - Make Fall Risk Sign visible to staff  - Offer Toileting every 2 Hours, in advance of need  - Initiate/Maintain bed/chair alarm  - Obtain necessary fall risk management equipment:   - Apply yellow socks and bracelet for high fall risk patients  - Consider moving patient to room near nurses station  Outcome: Progressing     Problem: Prexisting or High Potential for Compromised Skin Integrity  Goal: Skin integrity is maintained or improved  Description: INTERVENTIONS:  - Identify patients at risk for skin breakdown  - Assess and monitor skin integrity  - Assess and monitor nutrition and hydration status  - Monitor labs   - Assess for incontinence   - Turn and reposition patient  - Assist with mobility/ambulation  - Relieve pressure over bony prominences  - Avoid friction and shearing  - Provide appropriate hygiene as needed including keeping skin clean and dry  - Evaluate need for skin moisturizer/barrier cream  - Collaborate with interdisciplinary team   - Patient/family teaching  - Consider wound care consult   Outcome: Progressing     Problem: PAIN - ADULT  Goal: Verbalizes/displays adequate comfort level or baseline comfort level  Description: Interventions:  - Encourage patient to monitor pain and request assistance  - Assess pain using appropriate pain scale  - Administer analgesics based on type and severity of pain and evaluate response  - Implement non-pharmacological measures as appropriate and evaluate response  - Consider cultural and social influences on pain and pain management  - Notify physician/advanced practitioner if interventions unsuccessful or patient reports new pain  Outcome: Progressing     Problem: INFECTION - ADULT  Goal: Absence or prevention of progression during hospitalization  Description: INTERVENTIONS:  - Assess and monitor for signs and symptoms of infection  - Monitor lab/diagnostic results  - Monitor all insertion sites, i e  indwelling lines, tubes, and drains  - Monitor endotracheal if appropriate and nasal secretions for changes in amount and color  - [place] appropriate cooling/warming therapies per order  - Administer medications as ordered  - Instruct and encourage patient and family to use good hand hygiene technique  - Identify and instruct in appropriate isolation precautions for identified infection/condition  Outcome: Progressing  Goal: Absence of fever/infection during neutropenic period  Description: INTERVENTIONS:  - Monitor WBC    Outcome: Progressing     Problem: SAFETY ADULT  Goal: Patient will remain free of falls  Description: INTERVENTIONS:  - Educate patient/family on patient safety including physical limitations  - Instruct patient to call for assistance with activity   - Consult OT/PT to assist with strengthening/mobility   - Keep Call bell within reach  - Keep bed low and locked with side rails adjusted as appropriate  - Keep care items and personal belongings within reach  - Initiate and maintain comfort rounds  - Make Fall Risk Sign visible to staff  - Offer Toileting every 2 Hours, in advance of need  - Initiate/Maintain bed/chair alarm  - Obtain necessary fall risk management equipment:   - Apply yellow socks and bracelet for high fall risk patients  - Consider moving patient to room near nurses station  Outcome: Progressing  Goal: Maintain or return to baseline ADL function  Description: INTERVENTIONS:  -  Assess patient's ability to carry out ADLs; assess patient's baseline for ADL function and identify physical deficits which impact ability to perform ADLs (bathing, care of mouth/teeth, toileting, grooming, dressing, etc )  - Assess/evaluate cause of self-care deficits   - Assess range of motion  - Assess patient's mobility; develop plan if impaired  - Assess patient's need for assistive devices and provide as appropriate  - Encourage maximum independence but intervene and supervise when necessary  - Involve family in performance of ADLs  - Assess for home care needs following discharge   - Consider OT consult to assist with ADL evaluation and planning for discharge  - Provide patient education as appropriate  Outcome: Progressing  Goal: Maintains/Returns to pre admission functional level  Description: INTERVENTIONS:  - Perform BMAT or MOVE assessment daily    - Set and communicate daily mobility goal to care team and patient/family/caregiver  - Collaborate with rehabilitation services on mobility goals if consulted  - Perform Range of Motion 3 times a day  - Reposition patient every 2 hours    - Dangle patient 3 times a day  - Stand patient 3 times a day  - Ambulate patient 3 times a day  - Out of bed to chair 3 times a day   - Out of bed for meals 3 times a day  - Out of bed for toileting  - Record patient progress and toleration of activity level   Outcome: Progressing     Problem: DISCHARGE PLANNING  Goal: Discharge to home or other facility with appropriate resources  Description: INTERVENTIONS:  - Identify barriers to discharge w/patient and caregiver  - Arrange for needed discharge resources and transportation as appropriate  - Identify discharge learning needs (meds, wound care, etc )  - Arrange for interpretive services to assist at discharge as needed  - Refer to Case Management Department for coordinating discharge planning if the patient needs post-hospital services based on physician/advanced practitioner order or complex needs related to functional status, cognitive ability, or social support system  Outcome: Progressing     Problem: Knowledge Deficit  Goal: Patient/family/caregiver demonstrates understanding of disease process, treatment plan, medications, and discharge instructions  Description: Complete learning assessment and assess knowledge base    Interventions:  - Provide teaching at level of understanding  - Provide teaching via preferred learning methods  Outcome: Progressing     Problem: MUSCULOSKELETAL - ADULT  Goal: Maintain or return mobility to safest level of function  Description: INTERVENTIONS:  - Assess patient's ability to carry out ADLs; assess patient's baseline for ADL function and identify physical deficits which impact ability to perform ADLs (bathing, care of mouth/teeth, toileting, grooming, dressing, etc )  - Assess/evaluate cause of self-care deficits   - Assess range of motion  - Assess patient's mobility  - Assess patient's need for assistive devices and provide as appropriate  - Encourage maximum independence but intervene and supervise when necessary  - Involve family in performance of ADLs  - Assess for home care needs following discharge   - Consider OT consult to assist with ADL evaluation and planning for discharge  - Provide patient education as appropriate  Outcome: Progressing  Goal: Maintain proper alignment of affected body part  Description: INTERVENTIONS:  - Support, maintain and protect limb and body alignment  - Provide patient/ family with appropriate education  Outcome: Progressing     Problem: MOBILITY - ADULT  Goal: Maintain or return to baseline ADL function  Description: INTERVENTIONS:  -  Assess patient's ability to carry out ADLs; assess patient's baseline for ADL function and identify physical deficits which impact ability to perform ADLs (bathing, care of mouth/teeth, toileting, grooming, dressing, etc )  - Assess/evaluate cause of self-care deficits   - Assess range of motion  - Assess patient's mobility; develop plan if impaired  - Assess patient's need for assistive devices and provide as appropriate  - Encourage maximum independence but intervene and supervise when necessary  - Involve family in performance of ADLs  - Assess for home care needs following discharge   - Consider OT consult to assist with ADL evaluation and planning for discharge  - Provide patient education as appropriate  Outcome: Progressing  Goal: Maintains/Returns to pre admission functional level  Description: INTERVENTIONS:  - Perform BMAT or MOVE assessment daily    - Set and communicate daily mobility goal to care team and patient/family/caregiver  - Collaborate with rehabilitation services on mobility goals if consulted  - Perform Range of Motion 3 times a day  - Reposition patient every 2 hours  - Dangle patient 3 times a day  - Stand patient 3 times a day  - Ambulate patient 3 times a day  - Out of bed to chair 3 times a day   - Out of bed for meals 3 times a day  - Out of bed for toileting  - Record patient progress and toleration of activity level   Outcome: Progressing     Problem: Nutrition/Hydration-ADULT  Goal: Nutrient/Hydration intake appropriate for improving, restoring or maintaining nutritional needs  Description: Monitor and assess patient's nutrition/hydration status for malnutrition  Collaborate with interdisciplinary team and initiate plan and interventions as ordered  Monitor patient's weight and dietary intake as ordered or per policy  Utilize nutrition screening tool and intervene as necessary  Determine patient's food preferences and provide high-protein, high-caloric foods as appropriate       INTERVENTIONS:  - Monitor oral intake, urinary output, labs, and treatment plans  - Assess nutrition and hydration status and recommend course of action  - Evaluate amount of meals eaten  - Assist patient with eating if necessary   - Allow adequate time for meals  - Recommend/ encourage appropriate diets, oral nutritional supplements, and vitamin/mineral supplements  - Order, calculate, and assess calorie counts as needed  - Recommend, monitor, and adjust tube feedings and TPN/PPN based on assessed needs  - Assess need for intravenous fluids  - Provide specific nutrition/hydration education as appropriate  - Include patient/family/caregiver in decisions related to nutrition  Outcome: Progressing

## 2022-01-20 PROBLEM — L89.891: Status: ACTIVE | Noted: 2022-01-01

## 2022-01-20 NOTE — DISCHARGE INSTR - OTHER ORDERS
Skin Care orders:  1-Hydraguard to sacrum, buttocks  BID and PRN  2-EHOB  cushion when out of bed in chair  3-Moisturize skin daily with skin nourishing cream  4-Elevate heels to offload pressure  5-Turn/reposition q2h for pressure re-distribution on skin  6   Allevyn foam to bilateral heels and top of the left foot clemencia with a P on the right and date and a T on the left heel and top of the left foot peel and check skin integrity every shift and change every 3 days

## 2022-01-20 NOTE — PLAN OF CARE
Problem: PHYSICAL THERAPY ADULT  Goal: Performs mobility at highest level of function for planned discharge setting  See evaluation for individualized goals  Description: Treatment/Interventions: Functional transfer training,LE strengthening/ROM,Therapeutic exercise,Endurance training,Bed mobility,Gait training          See flowsheet documentation for full assessment, interventions and recommendations  Note: Prognosis: Good  Problem List: Decreased strength,Decreased endurance,Impaired balance,Decreased mobility,Decreased cognition,Impaired judgement,Decreased safety awareness,Pain,Orthopedic restrictions  Assessment: Patient is a 80 y o  female evaluated by Physical Therapy s/p admit to [place] [address] on 1/16/2022 with admitting diagnosis of: Hyponatremia, Hip injury, Closed fracture of neck of left femur, initial encounter, Closed transcervical fracture of femur, left, initial encounter, and principal problem of: Closed fracture of neck of left femur  PT was consulted to assess patient's functional mobility and discharge needs  Ordered are PT Evaluation and treatment with activity level of: WBAT Left LE  Comorbidities affecting patient's physical performance at time of assessment include: lyme disease, arthritis, GERD, osteoporosis, HTN, HLD  Personal factors affecting the patient at time of IE include: ambulating with assistive device, inability to navigate community distances, impaired cognition, history of fall(s), impaired safety awareness, decreased initiation and engagement, inability/difficulty performing IADLs and inability/difficulty performing ADLs  Please locate objective findings from PT assessment regarding body systems outlined above  Upon evaluation, pt requiring maxA x 2, RW, and increased time to perform all functional mobility  Frequent verbal cuing provided for safety awareness and sequencing  Education provided on WBS and pt verbalizing understanding   Pt able to minimally move LEs in standing but unable to advance enough to take a functional step  Because of this, bed replaced with recliner behind pt to facilitate OOB mobility  Once seated in recliner, pt noted to have significant drop in BP (see flowsheet); recovered to [facility] with B LE elevation and SCDs application  HR and SpO2 remained WFL on 2L O2 throughout  The patient's AM-PAC Basic Mobility Inpatient Short Form Raw Score is 6  A Raw score of less than or equal to 16 suggests the patient may benefit from discharge to post-acute rehabilitation services  Please also refer to the recommendation of the Physical Therapist for safe discharge planning  Co treatment with OT secondary to complex medical condition of pt, possible A of 2 required to achieve and maintain transitional movements, requiring the need of skilled therapeutic intervention of 2 therapists to achieve delivery of services  Pt will benefit from continued PT intervention during LOS to address current deficits, increase LOF, and facilitate safe d/c to next level of care when medically appropriate  D/c recommendation at this time is post-acute rehabilitation services  PT Discharge Recommendation: Post acute rehabilitation services          See flowsheet documentation for full assessment

## 2022-01-20 NOTE — ASSESSMENT & PLAN NOTE
Hospital confusion / delirium noted likely in the setting of underlying mild dementia, hyponatremia, pain medications and hard of hearing  Patient found pulling at IV and [place]  Soft wrist restraints applied, may need renewal if this continues    Wean pain medications as able  Hyponatremia now at baseline

## 2022-01-20 NOTE — ASSESSMENT & PLAN NOTE
Presented to the ER for evalauation after a fall at Assisted living facility [facility]  She reports tripping on a rug, mechanical fall  Denies LOC, hitting her head  CT shows - Acute left femoral transcervical neck fracture as described  Admited to med surg  Originally OR planned for 1/18, however concern over cardiac / pulmonary status, so case will be delayed while patient is medically optimized  Surgery completed without complication on 5/56  DVT proph - SC heparin   Pain medication - IV dilaudid, PO oxycodone  Fall precautions  Orthopedic Surgery Consult and ongoing support appreciated    PT/OT likely needs SNF on discharge

## 2022-01-20 NOTE — PHYSICAL THERAPY NOTE
Physical Therapy Evaluation    Patient Name: Charissa Davidson    TSCWT'U Date: 1/20/2022     Problem List  Principal Problem:    Closed fracture of neck of left femur ([address] )  Active Problems:    Esophageal reflux    Hypercholesterolemia    Hyponatremia    Paroxysmal atrial fibrillation (HCC)    Benign essential HTN    Acute respiratory failure with hypoxia (HCC)    Preoperative clearance    Delirium    Pressure injury of dorsum of left foot, stage 1       Past Medical History  Past Medical History:   Diagnosis Date    Abdominal distension     Last Assessed: [id number]    Abnormal weight loss     Last Assessed: [id number]    Arthritis     Bronchitis     Bursitis of left hip     Lst Assessed: [id number]    Chest pain     Last Assessed: [id number]    Colon polyp     Dysuria     Last Assessed: [id number]    External hemorrhoids     Last Assessed: [id number]    Generalized anxiety disorder     GERD (gastroesophageal reflux disease)     History of echocardiogram 03/20/2018    EF 60%, mild to moderate mitral annular calcification  mild AS, mild TR       Hyperlipidemia     Hypertension     Hypomagnesemia     Last Assessed: [id number]    Lyme disease     Last Assessed: [id number]    Multiple renal cysts 7/5/2019    Nonrheumatic mitral valve regurgitation     Osteoporosis     Pneumonia of right middle lobe due to infectious organism     Last Assessed: [id number]    SVT (supraventricular tachycardia)     Urinary tract infection         Past Surgical History  Past Surgical History:   Procedure Laterality Date    CATARACT EXTRACTION      HEMORRHOID SURGERY      HYSTERECTOMY      PELVIC FLOOR REPAIR             01/20/22 1459   PT Last Visit   PT Visit Date 01/20/22   Note Type   Note type Evaluation   Pain Assessment   Pain Assessment Tool 0-10   Pain Score No Pain   Restrictions/Precautions   Weight Bearing Precautions Per Order Yes   LLE Weight Bearing Per Order WBAT   Other Precautions Fall Risk;Multiple lines;O2;Cognitive; Restraints;WBS;THR   Home Living   Type of Home Assisted living   Home Layout One level;Ramped entrance   [address]   Additional Comments pt reports of RW at baseline   Prior Function   Level of [place] Needs assistance with IADLs; Needs assistance with ADLs and functional mobility   Lives With Facility staff   Receives Help From Home health   ADL Assistance Needs assistance   IADLs Needs assistance   Falls in the last 6 months 1 to 4   Vocational Retired   Comments pt is a LTR of [facility]   General   Family/Caregiver Present No   Cognition   Overall Cognitive Status Impaired   Orientation Level Oriented to person;Oriented to place;Oriented to situation;Disoriented to time   Following Commands Follows one step commands with increased time or repetition   Subjective   Subjective "I don't know why this had to happen"   RLE Assessment   RLE Assessment X  (4-/5 grossly)   LLE Assessment   LLE Assessment X  (3-/5 grossly)   Bed Mobility   Supine to Sit 2  Maximal assistance   Additional items Assist x 2; Increased time required;Verbal cues;LE management   Sit to Supine   (pt OOB at end of session)   Transfers   Sit to Stand 2  Maximal assistance   Additional items Assist x 2; Increased time required;Verbal cues   Stand to Sit 2  Maximal assistance   Additional items Assist x 2; Increased time required;Verbal cues   Additional Comments RW used   Ambulation/Elevation   Gait pattern Not appropriate; Not tested   Balance   Static Sitting Fair   Dynamic Sitting Fair   Static Standing Poor +   Dynamic Standing Poor   Endurance Deficit   Endurance Deficit Yes   Activity Tolerance   Activity Tolerance Patient limited by fatigue;Treatment limited secondary to medical complications (Comment)  (decreased BP)   Nurse Made Aware JENNIFER Chan   Assessment   Prognosis Good   Problem List Decreased strength;Decreased endurance; Impaired balance;Decreased mobility; Decreased cognition; Impaired judgement;Decreased safety awareness;Pain;Orthopedic restrictions   Assessment Patient is a 80 y o  female evaluated by Physical Therapy s/p admit to [facility] on 1/16/2022 with admitting diagnosis of: Hyponatremia, Hip injury, Closed fracture of neck of left femur, initial encounter, Closed transcervical fracture of femur, left, initial encounter, and principal problem of: Closed fracture of neck of left femur  PT was consulted to assess patient's functional mobility and discharge needs  Ordered are PT Evaluation and treatment with activity level of: WBAT Left LE  Comorbidities affecting patient's physical performance at time of assessment include: lyme disease, arthritis, GERD, osteoporosis, HTN, HLD  Personal factors affecting the patient at time of IE include: ambulating with assistive device, inability to navigate community distances, impaired cognition, history of fall(s), impaired safety awareness, decreased initiation and engagement, inability/difficulty performing IADLs and inability/difficulty performing ADLs  Please locate objective findings from PT assessment regarding body systems outlined above  Upon evaluation, pt requiring maxA x 2, RW, and increased time to perform all functional mobility  Frequent verbal cuing provided for safety awareness and sequencing  Education provided on WBS and pt verbalizing understanding  Pt able to minimally move LEs in standing but unable to advance enough to take a functional step  Because of this, bed replaced with recliner behind pt to facilitate OOB mobility  Once seated in recliner, pt noted to have significant drop in BP (see flowsheet); recovered to [facility] with B LE elevation and SCDs application  HR and SpO2 remained WFL on 2L O2 throughout  The patient's AM-PAC Basic Mobility Inpatient Short Form Raw Score is 6   A Raw score of less than or equal to 16 suggests the patient may benefit from discharge to post-acute rehabilitation services  Please also refer to the recommendation of the Physical Therapist for safe discharge planning  Co treatment with OT secondary to complex medical condition of pt, possible A of 2 required to achieve and maintain transitional movements, requiring the need of skilled therapeutic intervention of 2 therapists to achieve delivery of services  Pt will benefit from continued PT intervention during LOS to address current deficits, increase LOF, and facilitate safe d/c to next level of care when medically appropriate  D/c recommendation at this time is post-acute rehabilitation services  Goals   Patient Goals to feel better   Short Term Goal #1 Pt will participate in B LE strengthening exercises to facilitate improved functional mobility  LTG Expiration Date 02/03/22   Long Term Goal #1 Pt will perform all functional transfers and bed mobility with Ashley x 1 and good safety awareness  Long Term Goal #2 Pt will ambulate 79' with RW and Ashley x 1 while maintaining good functional dynamic balance  Plan   Treatment/Interventions Functional transfer training;LE strengthening/ROM; Therapeutic exercise; Endurance training;Bed mobility;Gait training   PT Frequency 3-5x/wk   Recommendation   PT Discharge Recommendation Post acute rehabilitation services   AM-PAC Basic Mobility Inpatient   Turning in Bed Without Bedrails 1   Lying on Back to Sitting on Edge of Flat Bed 1   Moving Bed to Chair 1   Standing Up From Chair 1   Walk in Room 1   Climb 3-5 Stairs 1   Basic Mobility Inpatient Raw Score 6   Turning Head Towards Sound 4   Follow Simple Instructions 3   Low Function Basic Mobility Raw Score 13   Low Function Basic Mobility Standardized Score 20 14   Highest Level Of Mobility   JH-HLM Goal 2: Bed activities/Dependent transfer   JH-HLM Highest Level of Mobility 4: Move to chair/commode   JH-HLM Goal Achieved Yes   End of Consult   Patient Position at End of Consult Bedside chair; All needs within reach

## 2022-01-20 NOTE — ANESTHESIA POSTPROCEDURE EVALUATION
Post-Op Assessment Note    CV Status:  Stable  Pain Score: 0    Pain management: adequate     Mental Status:  Alert, sleepy and awake   Hydration Status:  Euvolemic   PONV Controlled:  Controlled   Airway Patency:  Patent      Post Op Vitals Reviewed: Yes      Staff: CRNA, Anesthesiologist         No complications documented      BP   151/67   Temp (P) 98 1 °F (36 7 °C) (01/20/22 0918)    Pulse  80   Resp (P) 16 (01/20/22 0918)    SpO2   97

## 2022-01-20 NOTE — PLAN OF CARE
Problem: Potential for Falls  Goal: Patient will remain free of falls  Description: INTERVENTIONS:  - Educate patient/family on patient safety including physical limitations  - Instruct patient to call for assistance with activity   - Consult OT/PT to assist with strengthening/mobility   - Keep Call bell within reach  - Keep bed low and locked with side rails adjusted as appropriate  - Keep care items and personal belongings within reach  - Initiate and maintain comfort rounds  - Make Fall Risk Sign visible to staff  - Offer Toileting every 2 Hours, in advance of need  - Initiate/Maintain bed/chair alarm  - Obtain necessary fall risk management equipment:   - Apply yellow socks and bracelet for high fall risk patients  - Consider moving patient to room near nurses station  Outcome: Progressing     Problem: Prexisting or High Potential for Compromised Skin Integrity  Goal: Skin integrity is maintained or improved  Description: INTERVENTIONS:  - Identify patients at risk for skin breakdown  - Assess and monitor skin integrity  - Assess and monitor nutrition and hydration status  - Monitor labs   - Assess for incontinence   - Turn and reposition patient  - Assist with mobility/ambulation  - Relieve pressure over bony prominences  - Avoid friction and shearing  - Provide appropriate hygiene as needed including keeping skin clean and dry  - Evaluate need for skin moisturizer/barrier cream  - Collaborate with interdisciplinary team   - Patient/family teaching  - Consider wound care consult   Outcome: Progressing     Problem: PAIN - ADULT  Goal: Verbalizes/displays adequate comfort level or baseline comfort level  Description: Interventions:  - Encourage patient to monitor pain and request assistance  - Assess pain using appropriate pain scale  - Administer analgesics based on type and severity of pain and evaluate response  - Implement non-pharmacological measures as appropriate and evaluate response  - Consider cultural and social influences on pain and pain management  - Notify physician/advanced practitioner if interventions unsuccessful or patient reports new pain  Outcome: Progressing     Problem: INFECTION - ADULT  Goal: Absence or prevention of progression during hospitalization  Description: INTERVENTIONS:  - Assess and monitor for signs and symptoms of infection  - Monitor lab/diagnostic results  - Monitor all insertion sites, i e  indwelling lines, tubes, and drains  - Monitor endotracheal if appropriate and nasal secretions for changes in amount and color  - [place] appropriate cooling/warming therapies per order  - Administer medications as ordered  - Instruct and encourage patient and family to use good hand hygiene technique  - Identify and instruct in appropriate isolation precautions for identified infection/condition  Outcome: Progressing  Goal: Absence of fever/infection during neutropenic period  Description: INTERVENTIONS:  - Monitor WBC    Outcome: Progressing     Problem: SAFETY ADULT  Goal: Patient will remain free of falls  Description: INTERVENTIONS:  - Educate patient/family on patient safety including physical limitations  - Instruct patient to call for assistance with activity   - Consult OT/PT to assist with strengthening/mobility   - Keep Call bell within reach  - Keep bed low and locked with side rails adjusted as appropriate  - Keep care items and personal belongings within reach  - Initiate and maintain comfort rounds  - Make Fall Risk Sign visible to staff  - Offer Toileting every 2 Hours, in advance of need  - Initiate/Maintain bed/chair alarm  - Obtain necessary fall risk management equipment:   - Apply yellow socks and bracelet for high fall risk patients  - Consider moving patient to room near nurses station  Outcome: Progressing  Goal: Maintain or return to baseline ADL function  Description: INTERVENTIONS:  -  Assess patient's ability to carry out ADLs; assess patient's baseline for ADL function and identify physical deficits which impact ability to perform ADLs (bathing, care of mouth/teeth, toileting, grooming, dressing, etc )  - Assess/evaluate cause of self-care deficits   - Assess range of motion  - Assess patient's mobility; develop plan if impaired  - Assess patient's need for assistive devices and provide as appropriate  - Encourage maximum independence but intervene and supervise when necessary  - Involve family in performance of ADLs  - Assess for home care needs following discharge   - Consider OT consult to assist with ADL evaluation and planning for discharge  - Provide patient education as appropriate  Outcome: Progressing  Goal: Maintains/Returns to pre admission functional level  Description: INTERVENTIONS:  - Perform BMAT or MOVE assessment daily    - Set and communicate daily mobility goal to care team and patient/family/caregiver  - Collaborate with rehabilitation services on mobility goals if consulted  - Perform Range of Motion 3 times a day  - Reposition patient every 2 hours    - Dangle patient 3 times a day  - Stand patient 3 times a day  - Ambulate patient 3 times a day  - Out of bed to chair 3 times a day   - Out of bed for meals 3 times a day  - Out of bed for toileting  - Record patient progress and toleration of activity level   Outcome: Progressing     Problem: DISCHARGE PLANNING  Goal: Discharge to home or other facility with appropriate resources  Description: INTERVENTIONS:  - Identify barriers to discharge w/patient and caregiver  - Arrange for needed discharge resources and transportation as appropriate  - Identify discharge learning needs (meds, wound care, etc )  - Arrange for interpretive services to assist at discharge as needed  - Refer to Case Management Department for coordinating discharge planning if the patient needs post-hospital services based on physician/advanced practitioner order or complex needs related to functional status, cognitive ability, or social support system  Outcome: Progressing     Problem: Knowledge Deficit  Goal: Patient/family/caregiver demonstrates understanding of disease process, treatment plan, medications, and discharge instructions  Description: Complete learning assessment and assess knowledge base    Interventions:  - Provide teaching at level of understanding  - Provide teaching via preferred learning methods  Outcome: Progressing     Problem: MUSCULOSKELETAL - ADULT  Goal: Maintain or return mobility to safest level of function  Description: INTERVENTIONS:  - Assess patient's ability to carry out ADLs; assess patient's baseline for ADL function and identify physical deficits which impact ability to perform ADLs (bathing, care of mouth/teeth, toileting, grooming, dressing, etc )  - Assess/evaluate cause of self-care deficits   - Assess range of motion  - Assess patient's mobility  - Assess patient's need for assistive devices and provide as appropriate  - Encourage maximum independence but intervene and supervise when necessary  - Involve family in performance of ADLs  - Assess for home care needs following discharge   - Consider OT consult to assist with ADL evaluation and planning for discharge  - Provide patient education as appropriate  Outcome: Progressing  Goal: Maintain proper alignment of affected body part  Description: INTERVENTIONS:  - Support, maintain and protect limb and body alignment  - Provide patient/ family with appropriate education  Outcome: Progressing     Problem: MOBILITY - ADULT  Goal: Maintain or return to baseline ADL function  Description: INTERVENTIONS:  -  Assess patient's ability to carry out ADLs; assess patient's baseline for ADL function and identify physical deficits which impact ability to perform ADLs (bathing, care of mouth/teeth, toileting, grooming, dressing, etc )  - Assess/evaluate cause of self-care deficits   - Assess range of motion  - Assess patient's mobility; develop plan if impaired  - Assess patient's need for assistive devices and provide as appropriate  - Encourage maximum independence but intervene and supervise when necessary  - Involve family in performance of ADLs  - Assess for home care needs following discharge   - Consider OT consult to assist with ADL evaluation and planning for discharge  - Provide patient education as appropriate  Outcome: Progressing  Goal: Maintains/Returns to pre admission functional level  Description: INTERVENTIONS:  - Perform BMAT or MOVE assessment daily    - Set and communicate daily mobility goal to care team and patient/family/caregiver  - Collaborate with rehabilitation services on mobility goals if consulted  - Perform Range of Motion 3 times a day  - Reposition patient every 2 hours  - Dangle patient 3 times a day  - Stand patient 3 times a day  - Ambulate patient 3 times a day  - Out of bed to chair 3 times a day   - Out of bed for meals 3 times a day  - Out of bed for toileting  - Record patient progress and toleration of activity level   Outcome: Progressing     Problem: Nutrition/Hydration-ADULT  Goal: Nutrient/Hydration intake appropriate for improving, restoring or maintaining nutritional needs  Description: Monitor and assess patient's nutrition/hydration status for malnutrition  Collaborate with interdisciplinary team and initiate plan and interventions as ordered  Monitor patient's weight and dietary intake as ordered or per policy  Utilize nutrition screening tool and intervene as necessary  Determine patient's food preferences and provide high-protein, high-caloric foods as appropriate       INTERVENTIONS:  - Monitor oral intake, urinary output, labs, and treatment plans  - Assess nutrition and hydration status and recommend course of action  - Evaluate amount of meals eaten  - Assist patient with eating if necessary   - Allow adequate time for meals  - Recommend/ encourage appropriate diets, oral nutritional supplements, and vitamin/mineral supplements  - Order, calculate, and assess calorie counts as needed  - Recommend, monitor, and adjust tube feedings and TPN/PPN based on assessed needs  - Assess need for intravenous fluids  - Provide specific nutrition/hydration education as appropriate  - Include patient/family/caregiver in decisions related to nutrition  Outcome: Progressing

## 2022-01-20 NOTE — SPEECH THERAPY NOTE
Speech Language/Pathology  Speech/Language Pathology  Assessment    Patient Name: Carlos Baez  WKKSX'N Date: 1/20/2022     Problem List  Principal Problem:    Closed fracture of neck of left femur ([address] )  Active Problems:    Esophageal reflux    Hypercholesterolemia    Hyponatremia    Paroxysmal atrial fibrillation (HCC)    Benign essential HTN    Acute respiratory failure with hypoxia (HCC)    Preoperative clearance    Delirium    Pressure injury of dorsum of left foot, stage 1    Past Medical History  Past Medical History:   Diagnosis Date    Abdominal distension     Last Assessed: [id number]    Abnormal weight loss     Last Assessed: [id number]    Arthritis     Bronchitis     Bursitis of left hip     Lst Assessed: [id number]    Chest pain     Last Assessed: [id number]    Colon polyp     Dysuria     Last Assessed: [id number]    External hemorrhoids     Last Assessed: [id number]    Generalized anxiety disorder     GERD (gastroesophageal reflux disease)     History of echocardiogram 03/20/2018    EF 60%, mild to moderate mitral annular calcification  mild AS, mild TR       Hyperlipidemia     Hypertension     Hypomagnesemia     Last Assessed: [id number]    Lyme disease     Last Assessed: [id number]    Multiple renal cysts 7/5/2019    Nonrheumatic mitral valve regurgitation     Osteoporosis     Pneumonia of right middle lobe due to infectious organism     Last Assessed: [id number]    SVT (supraventricular tachycardia)     Urinary tract infection      Past Surgical History  Past Surgical History:   Procedure Laterality Date    CATARACT EXTRACTION      HEMORRHOID SURGERY      HYSTERECTOMY      PELVIC FLOOR REPAIR          01/20/22 1500   Patient Information   Current Medical fall with hip fx   Past Medical History dementia, delirium onset, ARF, bipap use in hospital   Swallow Information   Current Risks for Dysphagia & Aspiration Weak voicing;General debilitation;Mental status change;Reduced alertnes;HX neurologic dx   Current Symptoms/Concerns Cough;Clear throat; With food; With liquids;Decreased oral intake   Current Diet Dysphagia mechanical soft; Nectar thick liquid   Baseline Diet Regular; Thin liquids   Baseline Assessment   Behavior/Cognition Cooperative; Interactive;Lethargic   Speech/Language Status WFL for today's evaluation   Patient Positioning Upright in chair   Swallow Mechanism Exam   Labial Symmetry WFL   Labial Strength WFL   Labial ROM WFL   Labial Sensation WFL   Facial Symmetry WFL   Facial Strength WFL   Facial ROM WFL   Facial Sensation WFL   Lingual Symmetry WFL   Lingual Strength WFL   Lingual ROM WFL   Lingual Sensation WFL   Dentition Adequate   Volitional Cough Weak   Consistencies Assessed and Performance   Materials Admnistered Puree/Level 1;Mechanical Soft/Level 2   Oral Stage Mild impaired   Phargngeal Stage Mild impaired; Moderate impaired   Swallow Mechanics Mild delayed; Moderate delayed;Swallow initation;Weak larygneal rise;Aspiration risk   Strategies and Efficacy small bites/small sips, slow rate, total feed while restrained   Summary   Swallow Summary Patient received upright with son Pillo Ferraro present  Patient undergoing surgery this morning, pt baseline diet is reg/thin but demonstrated some overt s/s of penetration and aspiration  NPO for surgery and placed on mech/NTL  Patient appears to be tolerating, with no overt s/s of penetration or aspiration observed  Pt lethargic, requiring verbal cues to attend to PO trials  Patient will require additional ST support to ensure she can approach baseline diet safely  Oral hold and swallow delay difficult to discern lethargy related or patient having not eaten much and feeling "dry "   Recommendations   Risk for Aspiration Moderate   Recommendations Consider oral diet   Diet Solid Recommendation Level 2 Dysphagia/ mechanical soft/altered   Diet Liquid Recommendation Nectar thick liquid   Recommended Form of Meds Crushed; With puree   General Precautions Aspiration precautions; Feed only when alert;Minimize distractions;Upright as possible for all oral intake;Remain upright for 45 mins after meals;Assist with feeding   Compensatory Swallowing Strategies Alternate solids and liquids   Results Reviewed with RN;PT/Family/Caregiver   Speech Therapy Prognosis   Prognosis Fair   Prognosis Considerations Co-Morbidities

## 2022-01-20 NOTE — PLAN OF CARE
Problem: OCCUPATIONAL THERAPY ADULT  Goal: Performs self-care activities at highest level of function for planned discharge setting  See evaluation for individualized goals  Description: Treatment Interventions: ADL retraining,Functional transfer training,UE strengthening/ROM,Endurance training,Cognitive reorientation,Patient/family training,Compensatory technique education,Energy conservation,Activityengagement          See flowsheet documentation for full assessment, interventions and recommendations  Note: Limitation: Decreased ADL status,Decreased UE strength,Decreased Safe judgement during ADL,Decreased cognition,Decreased endurance,Decreased self-care trans,Decreased high-level ADLs     Assessment: Pt is a 80 y o  female seen for OT evaluation s/p admit to [facility] on 1/16/2022 w/ Closed fracture of neck of left femur ([address] )  Comorbidities affecting pt's functional performance at time of assessment include: Abdominal distension, bursitis of L hip, chest pain, lyme disease, PNE, UTI, arthritis, GERD, osteoporosis, HTN, Generalized anxiety disorder  Personal factors affecting pt at time of IE include:difficulty performing ADLS, difficulty performing IADLS  and health management   Prior to admission, pt was Requiring assist with ADLs/IADLS and functional mobility  Upon evaluation: Pt presents supine, in B/L wrist restraints and on 1 L O2 via NC with the following vital signs: Sats 92 and above, orthostatic taken with BP reading 112/52, 112/50  After prolonged stand and return to seated pt became flush and with a downward stare, BP with significant drop pt legs elevated in chair and reclined and BP returning to 132/64 with same  Pt requires Max A x 2 throughout session  2* the following deficits impacting occupational performance: weakness, decreased strength, decreased balance, decreased tolerance, impaired memory, increased pain and orthopedic restrictions   Pt resting in chair at end of session with all needs in reach, B/L wrist restraints on, all lines in place and SCD's on  Pt to benefit from continued skilled OT tx while in the hospital to address deficits as defined above and maximize level of functional independence w ADL's and functional mobility  Occupational Performance areas to address include: grooming, bathing/shower, toilet hygiene, dressing, health maintenance, functional mobility, community mobility and clothing management  The patient's raw score on the AM-PAC Daily Activity inpatient short form is 16, standardized score is 35 96, less than 39 4  Patients at this level are likely to benefit from discharge to post-acute rehabilitation services  Please refer to the recommendation of the Occupational Therapist for safe discharge planning        OT Discharge Recommendation: Post acute rehabilitation services

## 2022-01-20 NOTE — OCCUPATIONAL THERAPY NOTE
Occupational Therapy Evaluation     Patient Name: Missy Vital  EWKXS'C Date: 1/20/2022  Problem List  Principal Problem:    Closed fracture of neck of left femur ([address] )  Active Problems:    Esophageal reflux    Hypercholesterolemia    Hyponatremia    Paroxysmal atrial fibrillation (HCC)    Benign essential HTN    Acute respiratory failure with hypoxia (HCC)    Preoperative clearance    Delirium    Pressure injury of dorsum of left foot, stage 1    Past Medical History  Past Medical History:   Diagnosis Date    Abdominal distension     Last Assessed: [id number]    Abnormal weight loss     Last Assessed: [id number]    Arthritis     Bronchitis     Bursitis of left hip     Lst Assessed: [id number]    Chest pain     Last Assessed: [id number]    Colon polyp     Dysuria     Last Assessed: [id number]    External hemorrhoids     Last Assessed: [id number]    Generalized anxiety disorder     GERD (gastroesophageal reflux disease)     History of echocardiogram 03/20/2018    EF 60%, mild to moderate mitral annular calcification  mild AS, mild TR       Hyperlipidemia     Hypertension     Hypomagnesemia     Last Assessed: [id number]    Lyme disease     Last Assessed: [id number]    Multiple renal cysts 7/5/2019    Nonrheumatic mitral valve regurgitation     Osteoporosis     Pneumonia of right middle lobe due to infectious organism     Last Assessed: [id number]    SVT (supraventricular tachycardia)     Urinary tract infection      Past Surgical History  Past Surgical History:   Procedure Laterality Date    CATARACT EXTRACTION      HEMORRHOID SURGERY      HYSTERECTOMY      PELVIC FLOOR REPAIR               01/20/22 1458   OT Last Visit   OT Visit Date 01/20/22   Note Type   Note type Evaluation   Restrictions/Precautions   Weight Bearing Precautions Per Order Yes   LLE Weight Bearing Per Order WBAT   Other Precautions Restraints;WBS;Multiple lines;O2;Fall Risk  (THP)   Pain Assessment   Pain Score No Pain Home Living   Type of Home Assisted living   Home Layout Performs ADLs on one level;Ramped entrance   [address];Grab bars   Additional Comments Pt lives at [address] [place] [address] facility   Prior Function   Level of [place] Needs assistance with IADLs; Needs assistance with ADLs and functional mobility   Lives With Facility staff   Receives Help From Other (Comment)  (facility)   ADL Assistance Needs assistance   IADLs Needs assistance   Falls in the last 6 months 1 to 4   Vocational Retired   Comments Revieves assist from facility staff at [facility] where she resides   Psychosocial   Psychosocial (WDL) WDL   Subjective   Subjective "This doesn't even hurt"   ADL   Where Assessed Edge of bed   UB Dressing Assistance 3  Moderate Assistance   UB Dressing Deficit Thread RUE; Thread LUE   LB Dressing Assistance 2  Maximal Assistance   LB Dressing Deficit Don/doff R sock; Don/doff L sock   Toileting Deficit Other (Comment)  (Donaldson cath)   Additional Comments Pt is lethargic this date, unable to attend to THP at this time - requiring increased assist for LB self cares at this time     Bed Mobility   Supine to Sit 2  Maximal assistance   Additional items Assist x 2   Additional Comments OOB at end of session   Transfers   Sit to Stand 2  Maximal assistance   Additional items Assist x 2   Stand to Sit 2  Maximal assistance   Additional items Assist x 2   Additional Comments Static standing at EOB, moved bed and brought chair behind pt to position OOB   Able to weight shift in stance stance with max A    Functional Mobility   Additional Comments Pt able to take 4 small steps backward to stand against bed with max tactile cueing for weight shifting at RW level a x 2    Balance   Static Sitting Good   Dynamic Sitting Fair +   Static Standing Fair   Dynamic Standing Fair -   Activity Tolerance   Activity Tolerance Patient limited by fatigue   RUE Assessment   RUE Assessment WFL   LUE Assessment   LUE Assessment WFL   Hand Function   Gross Motor Coordination Functional   Fine Motor Coordination Functional   Sensation   Light Touch No apparent deficits   Sharp/Dull No apparent deficits   Cognition   Overall Cognitive Status Impaired   Arousal/Participation Lethargic   Attention Difficulty attending to directions   Orientation Level Oriented to person;Oriented to place;Oriented to situation   Memory Decreased recall of precautions   Following Commands Follows one step commands with increased time or repetition   Comments Pt is very lethargic at this time, appears to be [facility] as well  REquires increased time and repeated cues for direction following   Assessment   Limitation Decreased ADL status; Decreased UE strength;Decreased Safe judgement during ADL;Decreased cognition;Decreased endurance;Decreased self-care trans;Decreased high-level ADLs   Assessment Pt is a 80 y o  female seen for OT evaluation s/p admit to [facility] on 1/16/2022 w/ Closed fracture of neck of left femur ([address] )  Comorbidities affecting pt's functional performance at time of assessment include: Abdominal distension, bursitis of L hip, chest pain, lyme disease, PNE, UTI, arthritis, GERD, osteoporosis, HTN, Generalized anxiety disorder  Personal factors affecting pt at time of IE include:difficulty performing ADLS, difficulty performing IADLS  and health management   Prior to admission, pt was Requiring assist with ADLs/IADLS and functional mobility  Upon evaluation: Pt presents supine, in B/L wrist restraints and on 1 L O2 via NC with the following vital signs: Sats 92 and above, orthostatic taken with BP reading 112/52, 112/50  After prolonged stand and return to seated pt became flush and with a downward stare, BP with significant drop pt legs elevated in chair and reclined and BP returning to 132/64 with same   Pt requires Max A x 2 throughout session  2* the following deficits impacting occupational performance: weakness, decreased strength, decreased balance, decreased tolerance, impaired memory, increased pain and orthopedic restrictions  Pt resting in chair at end of session with all needs in reach, B/L wrist restraints on, all lines in place and SCD's on  Pt to benefit from continued skilled OT tx while in the hospital to address deficits as defined above and maximize level of functional independence w ADL's and functional mobility  Occupational Performance areas to address include: grooming, bathing/shower, toilet hygiene, dressing, health maintenance, functional mobility, community mobility and clothing management  The patient's raw score on the AM-PAC Daily Activity inpatient short form is 16, standardized score is 35 96, less than 39 4  Patients at this level are likely to benefit from discharge to post-acute rehabilitation services  Please refer to the recommendation of the Occupational Therapist for safe discharge planning  Goals   Patient Goals to feel better   Short Term Goal  Pt will complete UB/LB self cares including toileting with min A with compensatory techniques and  AE PRN   Long Term Goal #1 Pt will complete transfers and mobility at Min A level with good safety awareness   Long Term Goal #2 Pt will follow multi step directions to complete self care and transfer tasks/activities   Long Term Goal Pt will complete UB TE to increase UB strength in order to improve overall functional independence    Plan   Treatment Interventions ADL retraining;Functional transfer training;UE strengthening/ROM; Endurance training;Cognitive reorientation;Patient/family training; Compensatory technique education; Energy conservation; Activityengagement   Goal Expiration Date 02/03/22   OT Frequency 3-5x/wk   Recommendation   OT Discharge Recommendation Post acute rehabilitation services   [facility] Daily Activity Inpatient   Lower Body Dressing 2   Bathing 2   Toileting 2   Upper Body Dressing 3   Grooming 3   Eating 4   Daily Activity Raw Score 16 Daily Activity Standardized Score (Calc for Raw Score >=11) 35 96   AM-PAC Applied Cognition Inpatient   Following a Speech/Presentation 3   Understanding Ordinary Conversation 4   Taking Medications 3   Remembering Where Things Are Placed or Put Away 3   Remembering List of 4-5 Errands 2   Taking Care of Complicated Tasks 2   Applied Cognition Raw Score 17   Applied Cognition Standardized Score 36 52

## 2022-01-20 NOTE — ASSESSMENT & PLAN NOTE
Wt Readings from Last 3 Encounters:   01/20/22 56 7 kg (125 lb)   10/25/21 57 5 kg (126 lb 12 8 oz)   08/17/21 61 5 kg (135 lb 9 3 oz)       Initially Suspecedt acute diastolic CHF with acute respiratory failure with hypoxia, hyponatremia  Unfortunately IV lasix worsened hyponatremia, currently appearing dry by exam   No signs of volume overload on CXR  Repeated Echocardiogram this admission (last study from 2018)  Only mild diastolic dysfunction noted  Titrate oxygen as needed  Consider CT chest   Will add respiratory protocol, Duoneb tx  Discussed case with cardiology and examined patient at bedside  No need for formal consult  Do not suspect acute CHF / volume overload    Consult pulmonology for perioperative exam - cleared the patient from their standpoint  Patient now on room air 1/19 with saturations >95%

## 2022-01-20 NOTE — NURSING NOTE
Pt upset, yelling, reaching to take off BiPap through restraints   Jose Eduardo Donald RT made aware, BiPap taken off and pt put back on 2L O2 nasal cannula

## 2022-01-20 NOTE — CASE MANAGEMENT
Case Management Discharge Planning Note    Patient name Nadine Peralta /[id number] MRN [id number]  : 1931 Date 2022       Current Admission Date: 2022  Current Admission Diagnosis:Closed fracture of neck of left femur [facility])   Patient Active Problem List    Diagnosis Date Noted    Delirium 2022    Acute respiratory failure with hypoxia ([address] ) 2022    Preoperative clearance 2022    Closed fracture of neck of left femur ([address] ) 2022    Acute UTI 2021    Urinary retention 2021    Diarrhea 2021    sepsis 2021    FIONA (acute kidney injury) ([address] ) 2021    Stage 3b chronic kidney disease ([facility] ) 2021    Lactic acidosis 2021    Pulmonary nodule 2021    Benign essential HTN 2019    Sick sinus syndrome ([address] ) 2019    Multiple renal cysts 2019    Hepatic steatosis 2019    Left eye pain 2019    Acute metabolic encephalopathy     Acute cystitis with hematuria 2019    Lung mass 2019    Acute encephalopathy     Hypertensive urgency 10/19/2018    Debilitated 2018    Eczema 2018    Paroxysmal atrial fibrillation ([address] ) 2018    Sinus bradycardia 2018    History of frequent urinary tract infections 2018    Right lower lobe lung mass 2018    Hyponatremia 2018    Esophageal mass 2018    Cough 2018    HCAP (healthcare-associated pneumonia) 2018    Acute febrile illness 2018    Chronic constipation 2017    Generalized osteoarthritis of multiple sites 2017    Vitamin D deficiency 2017    Bilateral carotid artery disease ([address] ) 10/04/2017    Breast mass, left 10/03/2016    Gomez's esophagus 2016    Neuralgia 2015    Esophageal reflux 2015    Glucose intolerance (impaired glucose tolerance) 2014    Colon, diverticulosis 2013    Generalized anxiety disorder 09/04/2012    Hypercholesterolemia 06/19/2012    Osteoporosis 06/19/2012    SVT (supraventricular tachycardia) ([facility] [address] ) 06/19/2012      LOS (days): 4  Geometric Mean LOS (GMLOS) (days): 2 90  Days to GMLOS:-0 8     OBJECTIVE:  Risk of Unplanned Readmission Score: 14         Current admission status: Inpatient   Preferred Pharmacy:   [facility] [address]  [address] [place] [address]  Phone: [phone] Fax: [phone]    Primary Care Provider: Kenny Purcell DO    Primary Insurance: MEDICARE  Secondary Insurance: [facility]    DISCHARGE DETAILS:    Discharge planning discussed with[de-identified] pt's son:Luis  Freedom of Choice: Yes  Comments - Freedom of Choice: at sons request referrals sent to SNF's/rehab facilities in this area  CM contacted family/caregiver?: Yes  Were Treatment Team discharge recommendations reviewed with patient/caregiver?: Yes  Did patient/caregiver verbalize understanding of patient care needs?: Yes  Were patient/caregiver advised of the risks associated with not following Treatment Team discharge recommendations?: Yes    Contacts  Patient Contacts: Carmen  Relationship to Patient[de-identified] Family  Contact Method: Phone  Phone Number: [phone]  Reason/Outcome: Discharge Planning    Discharge Destination Plan[de-identified] Short Term Rehab  Transport at Discharge : BLS Ambulance

## 2022-01-20 NOTE — WOUND OSTOMY CARE
Consult Note - Wound   Mamadou Atwood 80 y o  female MRN: [id number]  Unit/Bed#: 977-30 Encounter: [id number]        History and Present Illness: Patient is seen for wound care consult today   The patient is a 80year old female that has a closed fracture of the left femur from a fall at a SNF   Patient is confused at the time of the assessment   Donaldson in place and RN reports incontinence of bowel   Patient  Is very guarded with the leg and has the abductor wedge in place   Patient was initial not for surgery due to pulmonary and cardiac status   Max A of 2 to roll  Patient is very guarded   Assessment Findings:   1  Sacral  buttocks is dry and intact   2  Right heel dry and intact   3  Left heel - Hospital acquired DTI purple maroon in color and intact  Evolving DTI suspect can evolve stage 3 stage 4 or unstageable   4  Top of the left foot - hospital acquired DTI purple maroon in 3 locations measured as 1 area   Area purple and intact   Evolving DTI suspect stage 3 stage 4 or unstageable   Both areas noted in the OR and documented   Discussed the plan of care to the areas and dressings applied with the RN at the bedside   Skin Care orders:  1-Hydraguard to sacrum, buttocks  BID and PRN  2-EHOB  cushion when out of bed in chair  3-Moisturize skin daily with skin nourishing cream  4-Elevate heels to offload pressure  5-Turn/reposition q2h for pressure re-distribution on skin  6  Allevyn foam to bilateral heels and top of the left foot clemencia with a P on the right and date and a T on the left heel and top of the left foot peel and check skin integrity every shift and change every 3 days         Wounds:  Wound 03/18/21 Other (comment) Foot (Active)       Wound 01/20/22 Hip Left (Active)   Wound Description MARGY 01/20/22 1100   Treatments Ice applied 01/20/22 1100   Dressing Dry dressing 01/20/22 1100   Dressing Status Clean;Dry; Intact 01/20/22 1100       Wound 01/20/22 Pressure Injury Pretibial Distal;Left (Active)   Wound Image   01/20/22 1120   Wound Description Dry; Intact;Fragile;Light purple 01/20/22 1120   Pressure Injury Stage DTPI 01/20/22 1120   Vijaya-wound Assessment Clean;Dry; Intact 01/20/22 1120   Wound Length (cm) 13 cm 01/20/22 1120   Wound Width (cm) 2 5 cm 01/20/22 1120   Wound Surface Area (cm^2) 32 5 cm^2 01/20/22 1120   Drainage Amount None 01/20/22 1120   Treatments Site care 01/20/22 1120   Dressing Foam, Silicon (eg  Allevyn, etc) 01/20/22 1120   Dressing Changed Changed 01/20/22 1120   Patient Tolerance Tolerated well 01/20/22 1120       Wound 01/20/22 Pressure Injury Heel Left;Posterior (Active)   Wound Image   01/20/22 1122   Wound Description Dry; Intact;Fragile;Light purple 01/20/22 1122   Pressure Injury Stage DTPI 01/20/22 1122   Vijaya-wound Assessment Clean;Dry; Intact 01/20/22 1122   Wound Length (cm) 6 cm 01/20/22 1122   Wound Width (cm) 8 cm 01/20/22 1122   Wound Surface Area (cm^2) 48 cm^2 01/20/22 1122   Drainage Amount None 01/20/22 1122   Non-staged Wound Description Not applicable 94/57/87 3347   Treatments Site care 01/20/22 1122   Dressing Foam, Silicon (eg   Allevyn, etc) 01/20/22 1122   Dressing Changed Changed 01/20/22 1122   Patient Tolerance Tolerated well 01/20/22 1122       Wound care will follow weekly call or tiger text with questions or concerns     Aline Oden RN BSN CWOCN

## 2022-01-20 NOTE — PROGRESS NOTES
Progress Note - Nephrology   Martina Martinez 80 y o  female MRN: [id number]  Unit/Bed#: 304-30 Encounter: [id number]    Assessment and Plan    1  Acute on chronic Hyponatremia  ? Prior outpatient baseline sodium 130-133 millimole per L    ? Presenting serum sodium 118 millimole per L on 01/16/2022   Initially treated with furosemide 20 mg IV with improvement to 123 millimole per L 01/17/2022 at 0422, re-dosed and worsened to 160 millimole per L on 01/17/2022   ? Urine studies indeterminate, Urine sodium 41, urine osmolality 141, TSH 3 13, uric acid 4 2 mg/dL, cortisol pending  ? Status post tolvaptan 15 mg 01/17/2022 LD 9157 and 01/18/2022 at 1223   ? Serum sodium at goal 136 millimole per L on 01/20/2022     ? Repeat urine studies on 01/19/2022: Urine sodium 19  Urine osmolality 418 millimole per kg; consistent with volume depletion superimposed on SIADH  Patient received lactated Ringer's  Will decreased infusion rate from 125 mL/hr to 50 mL/hr at this time  Will re-evaluate tomorrow and provide additional volume expansion if indicated or imposed gentle fluid restriction, but once volume repleted, patient is at risk for worsening hyponatremia with isotonic volume expansion      2  Chronic kidney disease, stage III baseline creatinine around 0 7-1 0 mg/dL  ? Renal function remains stable with no evidence of acute kidney injury   Renal function may be overestimated due to poor muscle mass  ? Renal function fairly stable  Volume expanded with lactated Ringer's perioperatively  Continue to monitor  Trend renal function  Continue to provide supportive care  Optimize hemodynamics  Maintain mean arterial pressure greater than 65 mmHg  Renally dose medications  Avoid nephrotoxins  Please discuss with renal any diuretics or possible nephrotoxic agents, such as NSAIDs or IV contrast  Recommend avoidance of PPIs in favor of H2 blocker, if appropriate for clinical scenario     Monitor for urinary retention- strict I&Os, record bladder scan PVRs and follow urinary retention protocol       3  Left femur fracture  ? Status post left hemiarthroplasty on 01/20/2022 by Dr Natacha Ace      4  Volume overload, grade 1 diastolic dysfunction  ? Closely monitor volume status with volume expansion  Will presently defer sodium and fluid restriction well p o  Intake is assessed      5  Urinary retention  ? Continue Donaldson catheter in place per protocol   Monitor urine output   Oliguiric as recorded  Likely void trial postoperative      6  Hypertension in the setting of CKD, now hypotensive  · Avoid hypotension  Maintain mean arterial pressure greater than 65 mm mercury  Volume expansion        Follow up reason for today's visit:     Closed fracture of neck of left femur [facility])    Patient Active Problem List   Diagnosis    Gomez's esophagus    Bilateral carotid artery disease (HCC)    Breast mass, left    Chronic constipation    Colon, diverticulosis    Esophageal reflux    Generalized anxiety disorder    Generalized osteoarthritis of multiple sites    Glucose intolerance (impaired glucose tolerance)    Hypercholesterolemia    Neuralgia    Osteoporosis    SVT (supraventricular tachycardia) (HCC)    Vitamin D deficiency    HCAP (healthcare-associated pneumonia)    Acute febrile illness    Cough    Esophageal mass    Hyponatremia    Right lower lobe lung mass    History of frequent urinary tract infections    Paroxysmal atrial fibrillation (HCC)    Sinus bradycardia    Debilitated    Eczema    Hypertensive urgency    Acute metabolic encephalopathy    Acute cystitis with hematuria    Lung mass    Acute encephalopathy    Multiple renal cysts    Hepatic steatosis    Left eye pain    Sick sinus syndrome (HCC)    Benign essential HTN    Diarrhea    sepsis    FIONA [facility]acute kidney injury) ([facility] [address] )    Stage 3b chronic kidney disease (HCC)    Lactic acidosis    Pulmonary nodule    Urinary retention    Acute UTI  Closed fracture of neck of left femur (HCC)    Acute respiratory failure with hypoxia (HCC)    Preoperative clearance    Delirium    Pressure injury of dorsum of left foot, stage 1         Subjective:   Patient appears comfortable, is confused  Interval history and review of systems is unobtainable due to patient's mental status  Objective:     Vitals: Blood pressure (!) 90/42, pulse 72, temperature 98 7 °F (37 1 °C), resp  rate 19, height 5' 2" (1 575 m), weight 56 7 kg (125 lb), SpO2 95 %  ,Body mass index is 22 86 kg/m²  Weight (last 2 days)     Date/Time Weight    01/20/22 0554 56 7 (125)    01/19/22 0600 58 4 (128 75)    01/18/22 0558 58 1 (128 09)            Intake/Output Summary (Last 24 hours) at 1/20/2022 1612  Last data filed at 1/20/2022 1000  Gross per 24 hour   Intake 350 ml   Output 505 ml   Net -155 ml     I/O last 3 completed shifts: In: 0   Out: 700 [Urine:700]    Urethral Catheter 18 Fr  (Active)   Site Assessment Clean;Skin intact 01/20/22 0918   Donaldson Care Done 01/20/22 0900   Collection Container Standard drainage bag 01/20/22 0933   Output (mL) 100 mL 01/20/22 1000       Physical Exam: BP (!) 90/42   Pulse 72   Temp 98 7 °F (37 1 °C)   Resp 19   Ht 5' 2" (1 575 m)   Wt 56 7 kg (125 lb)   SpO2 95%   BMI 22 86 kg/m²     General Appearance:    No acute distress  Cooperative  Appears stated age  Thin appearing  Head:    Normocephalic  Atraumatic  Normal jaw occlusion  Eyes:    Lids, conjunctiva normal  No scleral icterus  Ears:    Normal external ears  Nose:   Nares normal  No drainage  Mouth:   Lips, tongue normal  Mucosa normal  Phonation normal    Neck:   Supple  Symmetrical    Back:     Symmetric  No CVA tenderness  Lungs:     Normal respiratory effort  Clear to auscultation bilaterally  Chest wall:    No tenderness or deformity  Heart:    Regular rate and rhythm  Normal S1 and S2  No murmur  No JVD  No edema  Abdomen:     Soft  Non-tender   Bowel sounds active  Genitourinary:   No Donaldson catheter present  Extremities:   Extremities normal  Atraumatic  No cyanosis  Skin:   Warm and dry  No pallor, jaundice, rash, ecchymoses  Neurologic:   Alert and oriented to person, place, time  No focal deficit  Lab, Imaging and other studies: I have personally reviewed pertinent labs  CBC:   Lab Results   Component Value Date    WBC 7 66 01/20/2022    HGB 13 0 01/20/2022    HCT 42 2 01/20/2022    MCV 91 01/20/2022     01/20/2022    MCH 28 0 01/20/2022    MCHC 30 8 (L) 01/20/2022    RDW 14 8 01/20/2022    MPV 9 0 01/20/2022     CMP:   Lab Results   Component Value Date    K 4 7 01/20/2022    CL 97 (L) 01/20/2022    CO2 29 01/20/2022    BUN 23 01/20/2022    CREATININE 1 16 01/20/2022    CALCIUM 9 6 01/20/2022    EGFR 41 01/20/2022         Results from last 7 days   Lab Units 01/20/22  1128 01/19/22  0534 01/18/22  1547 01/17/22  1425 01/17/22  0422   POTASSIUM mmol/L 4 7 4 1 4 8   < > 4 2   CHLORIDE mmol/L 97* 93* 92*   < > 91*   CO2 mmol/L 29 28 28   < > 23   BUN mg/dL 23 20 13   < > 9   CREATININE mg/dL 1 16 1 29 1 00   < > 0 76   CALCIUM mg/dL 9 6 9 6 9 6   < > 7 5*   ALK PHOS U/L  --   --   --   --  54   ALT U/L  --   --   --   --  24   AST U/L  --   --   --   --  25    < > = values in this interval not displayed  Phosphorus: No results found for: PHOS  Magnesium:   Lab Results   Component Value Date    MG 2 0 01/20/2022     Urinalysis: No results found for: Kassandra Sendy, SPECGRAV, PHUR, LEUKOCYTESUR, NITRITE, PROTEINUA, GLUCOSEU, KETONESU, BILIRUBINUR, BLOODU  Ionized Calcium: No results found for: CAION  Coagulation: No results found for: PT, INR, APTT  Troponin: No results found for: TROPONINI  ABG: No results found for: PHART, TNZ0UBE, PO2ART, OQT8ZHE, A6PRXZSC, BEART, SOURCE  Radiology review:     IMAGING  Procedure: XR chest portable    Result Date: 1/18/2022  Narrative: CHEST INDICATION:   Preoperative radiograph    COMPARISON: Chest radiograph January 16, 2022 EXAM PERFORMED/VIEWS:  XR CHEST PORTABLE FINDINGS: Heart shadow is enlarged but unchanged from prior exam  The lungs are clear  No pneumothorax or pleural effusion  Osseous structures appear within normal limits for patient age  Impression: No acute cardiopulmonary disease  Workstation performed: [id number]     Procedure: XR hip/pelv 1 vw left if performed    Result Date: 1/20/2022  Narrative: LEFT HIP INDICATION:   Postprocedure  COMPARISON:  1/16/2022 VIEWS:  XR HIP/PELV 1 VW LEFT W PELVIS IF PERFORMED 1 FINDINGS: There is no acute fracture or dislocation  Left hip hemiarthroplasty is identified in satisfactory position without evidence of hardware complication  No lytic or blastic osseous lesion  Postoperative changes are seen in the adjacent soft tissues  The visualized lumbar spine is unremarkable  Impression: Unremarkable appearance of left hip hemiarthroplasty   Workstation performed: [id number]       Current Facility-Administered Medications   Medication Dose Route Frequency    acetaminophen (TYLENOL) tablet 650 mg  650 mg Oral Q6H PRN    acetaminophen (TYLENOL) tablet 975 mg  975 mg Oral Q8H [address]    albuterol inhalation solution 2 5 mg  2 5 mg Nebulization Q6H PRN    amLODIPine (NORVASC) tablet 10 mg  10 mg Oral Daily    ascorbic acid (VITAMIN C) tablet 500 mg  500 mg Oral Daily    ceFAZolin (ANCEF) IVPB (premix in dextrose) 1,000 mg 50 mL  1,000 mg Intravenous Q8H    clonazePAM (KlonoPIN) tablet 1 5 mg  1 5 mg Oral HS    digoxin (LANOXIN) tablet 125 mcg  125 mcg Oral Daily    docusate sodium (COLACE) capsule 100 mg  100 mg Oral BID    [START ON 1/21/2022] ferrous sulfate tablet 325 mg  325 mg Oral Daily With Breakfast    heparin (porcine) subcutaneous injection 5,000 Units  5,000 Units Subcutaneous Q8H [address]    hydrALAZINE (APRESOLINE) injection 5 mg  5 mg Intravenous Q6H PRN    HYDROmorphone (DILAUDID) injection 0 5 mg  0 5 mg Intravenous Q6H PRN    lactated ringers bolus 500 mL  500 mL Intravenous Once PRN    And    lactated ringers bolus 500 mL  500 mL Intravenous Once PRN    lactated ringers infusion  125 mL/hr Intravenous Continuous    Lactinex PACK 1 each  1 each Oral TID With Meals    magnesium hydroxide (MILK OF MAGNESIA) oral suspension 30 mL  30 mL Oral Daily PRN    metoprolol tartrate (LOPRESSOR) tablet 75 mg  75 mg Oral Q12H [address]    ondansetron (ZOFRAN) injection 4 mg  4 mg Intravenous Q6H PRN    oxyCODONE (ROXICODONE) IR tablet 5 mg  5 mg Oral Q6H PRN    pantoprazole (PROTONIX) EC tablet 40 mg  40 mg Oral Early Morning    senna (SENOKOT) tablet 8 6 mg  1 tablet Oral BID    sodium chloride 0 9 % bolus 500 mL  500 mL Intravenous Once PRN    And    sodium chloride 0 9 % bolus 500 mL  500 mL Intravenous Once PRN     Medications Discontinued During This Encounter   Medication Reason    lisinopril (ZESTRIL) tablet 10 mg     amLODIPine (NORVASC) tablet 5 mg     HYDROmorphone (DILAUDID) injection 0 2 mg     acetaminophen (TYLENOL) tablet 650 mg     HYDROmorphone (DILAUDID) injection 0 5 mg     furosemide (LASIX) injection 20 mg     Melatonin 5 MG CAPS Duplicate order    lactobacillus acidophilus-bulgaricus (FLORANEX) packet 1 packet     furosemide (LASIX) injection 20 mg     calcium carbonate (OYSTER SHELL,OSCAL) 500 mg tablet 1 tablet     furosemide (LASIX) injection 20 mg     ceFAZolin (ANCEF) IVPB (premix in dextrose) 2,000 mg 50 mL     ipratropium-albuterol (DUO-NEB) 0 5-2 5 mg/3 mL inhalation solution 3 mL     lactobacillus acidophilus-bulgaricus (FLORANEX) packet 1 packet Reorder    heparin (porcine) subcutaneous injection 5,000 Units     aspirin (ECOTRIN LOW STRENGTH) EC tablet 81 mg     cholecalciferol (VITAMIN D3) tablet 1,000 Units     calcium carbonate (OYSTER SHELL,OSCAL) 500 mg tablet 1 tablet     sodium chloride 0 9 % irrigation solution Patient Discharge    fentaNYL (SUBLIMAZE) injection 25 mcg Patient Transfer  ondansetron (ZOFRAN) injection 4 mg Patient Transfer    chlorhexidine (PERIDEX) 0 12 % oral rinse 15 mL Patient Transfer    chlorhexidine (HIBICLENS) 4 % topical liquid Patient Transfer    ceFAZolin (ANCEF) IVPB (premix in dextrose) 2,000 mg 50 mL Patient Transfer    ondansetron (ZOFRAN) injection 4 mg     oxyCODONE (ROXICODONE) IR tablet 5 mg     lactated ringers infusion     famotidine (PEPCID) tablet 20 mg     melatonin tablet 3 mg     fish oil capsule 1,950 mg     folic acid (FOLVITE) tablet 1 mg        Wilhelmenia Boast, PA-C    Portions of the record may have been created with voice recognition software  Occasional wrong word or "sound a like" substitutions may have occurred due to the inherent limitations of voice recognition software  Read the chart carefully and recognize, using context, where substitutions have occurred

## 2022-01-20 NOTE — ASSESSMENT & PLAN NOTE
Sodium level at 118 --> rechecked at 123  1/19 sodium back to baseline of 130  Complete resolution at 136 as of 1/20  Possibly hypervolemic hyponatremia, however following additional doses of IV lasix, sodium worsened to 116  Now off diuretics, s/p tovalptan dosing with improvement  Appreciate nephrology consult and ongoing follow up / management  Further workup / orders per nephrology

## 2022-01-20 NOTE — CASE MANAGEMENT
Case Management Discharge Planning Note    Patient name Thaddeus Briceno  Location /482-73 MRN [id number]  : 1931 Date 2022       Current Admission Date: 2022  Current Admission Diagnosis:Closed fracture of neck of left femur [facility])   Patient Active Problem List    Diagnosis Date Noted    Delirium 2022    Acute respiratory failure with hypoxia ([address] ) 2022    Preoperative clearance 2022    Closed fracture of neck of left femur ([address] ) 2022    Acute UTI 2021    Urinary retention 2021    Diarrhea 2021    sepsis 2021    FIONA (acute kidney injury) ([address] ) 2021    Stage 3b chronic kidney disease ([facility] [address] ) 2021    Lactic acidosis 2021    Pulmonary nodule 2021    Benign essential HTN 2019    Sick sinus syndrome ([address] ) 2019    Multiple renal cysts 2019    Hepatic steatosis 2019    Left eye pain 2019    Acute metabolic encephalopathy     Acute cystitis with hematuria 2019    Lung mass 2019    Acute encephalopathy     Hypertensive urgency 10/19/2018    Debilitated 2018    Eczema 2018    Paroxysmal atrial fibrillation ([address] ) 2018    Sinus bradycardia 2018    History of frequent urinary tract infections 2018    Right lower lobe lung mass 2018    Hyponatremia 2018    Esophageal mass 2018    Cough 2018    HCAP (healthcare-associated pneumonia) 2018    Acute febrile illness 2018    Chronic constipation 2017    Generalized osteoarthritis of multiple sites 2017    Vitamin D deficiency 2017    Bilateral carotid artery disease ([address] ) 10/04/2017    Breast mass, left 10/03/2016    Gomez's esophagus 2016    Neuralgia 2015    Esophageal reflux 2015    Glucose intolerance (impaired glucose tolerance) 2014    Colon, diverticulosis 2013    Generalized anxiety disorder 09/04/2012    Hypercholesterolemia 06/19/2012    Osteoporosis 06/19/2012    SVT (supraventricular tachycardia) ([facility] [address] ) 06/19/2012      LOS (days): 4  Geometric Mean LOS (GMLOS) (days): 2 90  Days to GMLOS:-0 7     OBJECTIVE:  Risk of Unplanned Readmission Score: 13         Current admission status: Inpatient   Preferred Pharmacy:   [facility] [id number]  [address] [place] [address]  Phone: [phone] Fax: [phone]    Primary Care Provider: Clarissa Vazquez DO    Primary Insurance: MEDICARE  Secondary Insurance: Severiano Serve DETAILS:Pt went to the OR today for hemiarthroplasty of the left hip   Cm will follow up with pt's family re: STR

## 2022-01-20 NOTE — ANESTHESIA PROCEDURE NOTES
Spinal Block    Patient location during procedure: OR  Start time: 1/20/2022 7:39 AM  Reason for block: procedure for pain and at surgeon's request  Staffing  Performed: CRNA   Preanesthetic Checklist  Completed: patient identified, IV checked, site marked, risks and benefits discussed, surgical consent, monitors and equipment checked, pre-op evaluation and timeout performed  Spinal Block  Patient position: sitting  Prep: ChloraPrep  Patient monitoring: cardiac monitor, frequent blood pressure checks, continuous pulse ox and heart rate  Approach: midline  Location: L3-4  Injection technique: single-shot  Needle  Needle type: pencil-tip   Needle gauge: 25 G  Needle length: 10 cm  Assessment  Sensory level: T4  Injection Assessment:  negative aspiration for heme, no paresthesia on injection and positive aspiration for clear CSF    Post-procedure:  adhesive bandage applied, pressure dressing applied, secured with tape, site cleaned and sterile dressing applied

## 2022-01-20 NOTE — ANESTHESIA PROCEDURE NOTES
Spinal Block    Patient location during procedure: OR  Start time: 1/20/2022 7:40 AM  Reason for block: at surgeon's request and primary anesthetic  Staffing  Performed: CRNA   Resident/CRNA: Frederick Acharya CRNA  Preanesthetic Checklist  Completed: patient identified, IV checked, site marked, risks and benefits discussed, surgical consent, monitors and equipment checked, pre-op evaluation and timeout performed  Spinal Block  Patient position: sitting  Prep: ChloraPrep  Patient monitoring: heart rate  Approach: midline  Location: L3-4  Injection technique: single-shot  Needle  Needle type: pencil-tip   Needle gauge: 25 G  Assessment  Events: cerebrospinal fluid  Injection Assessment:  positive aspiration for clear CSF    Post-procedure:  site cleaned

## 2022-01-20 NOTE — PLAN OF CARE
Problem: Potential for Falls  Goal: Patient will remain free of falls  Description: INTERVENTIONS:  - Educate patient/family on patient safety including physical limitations  - Instruct patient to call for assistance with activity   - Consult OT/PT to assist with strengthening/mobility   - Keep Call bell within reach  - Keep bed low and locked with side rails adjusted as appropriate  - Keep care items and personal belongings within reach  - Initiate and maintain comfort rounds  - Make Fall Risk Sign visible to staff  - Offer Toileting every 2 Hours, in advance of need  - Initiate/Maintain bed/chair alarm  - Obtain necessary fall risk management equipment:   - Apply yellow socks and bracelet for high fall risk patients  - Consider moving patient to room near nurses station  Outcome: Progressing     Problem: Prexisting or High Potential for Compromised Skin Integrity  Goal: Skin integrity is maintained or improved  Description: INTERVENTIONS:  - Identify patients at risk for skin breakdown  - Assess and monitor skin integrity  - Assess and monitor nutrition and hydration status  - Monitor labs   - Assess for incontinence   - Turn and reposition patient  - Assist with mobility/ambulation  - Relieve pressure over bony prominences  - Avoid friction and shearing  - Provide appropriate hygiene as needed including keeping skin clean and dry  - Evaluate need for skin moisturizer/barrier cream  - Collaborate with interdisciplinary team   - Patient/family teaching  - Consider wound care consult   Outcome: Progressing     Problem: PAIN - ADULT  Goal: Verbalizes/displays adequate comfort level or baseline comfort level  Description: Interventions:  - Encourage patient to monitor pain and request assistance  - Assess pain using appropriate pain scale  - Administer analgesics based on type and severity of pain and evaluate response  - Implement non-pharmacological measures as appropriate and evaluate response  - Consider cultural and social influences on pain and pain management  - Notify physician/advanced practitioner if interventions unsuccessful or patient reports new pain  Outcome: Progressing     Problem: INFECTION - ADULT  Goal: Absence or prevention of progression during hospitalization  Description: INTERVENTIONS:  - Assess and monitor for signs and symptoms of infection  - Monitor lab/diagnostic results  - Monitor all insertion sites, i e  indwelling lines, tubes, and drains  - Monitor endotracheal if appropriate and nasal secretions for changes in amount and color  - Weed appropriate cooling/warming therapies per order  - Administer medications as ordered  - Instruct and encourage patient and family to use good hand hygiene technique  - Identify and instruct in appropriate isolation precautions for identified infection/condition  Outcome: Progressing  Goal: Absence of fever/infection during neutropenic period  Description: INTERVENTIONS:  - Monitor WBC    Outcome: Progressing     Problem: SAFETY ADULT  Goal: Patient will remain free of falls  Description: INTERVENTIONS:  - Educate patient/family on patient safety including physical limitations  - Instruct patient to call for assistance with activity   - Consult OT/PT to assist with strengthening/mobility   - Keep Call bell within reach  - Keep bed low and locked with side rails adjusted as appropriate  - Keep care items and personal belongings within reach  - Initiate and maintain comfort rounds  - Make Fall Risk Sign visible to staff  - Offer Toileting every 2 Hours, in advance of need  - Initiate/Maintain bed/chair alarm  - Obtain necessary fall risk management equipment:   - Apply yellow socks and bracelet for high fall risk patients  - Consider moving patient to room near nurses station  Outcome: Progressing  Goal: Maintain or return to baseline ADL function  Description: INTERVENTIONS:  -  Assess patient's ability to carry out ADLs; assess patient's baseline for ADL function and identify physical deficits which impact ability to perform ADLs (bathing, care of mouth/teeth, toileting, grooming, dressing, etc )  - Assess/evaluate cause of self-care deficits   - Assess range of motion  - Assess patient's mobility; develop plan if impaired  - Assess patient's need for assistive devices and provide as appropriate  - Encourage maximum independence but intervene and supervise when necessary  - Involve family in performance of ADLs  - Assess for home care needs following discharge   - Consider OT consult to assist with ADL evaluation and planning for discharge  - Provide patient education as appropriate  Outcome: Progressing  Goal: Maintains/Returns to pre admission functional level  Description: INTERVENTIONS:  - Perform BMAT or MOVE assessment daily    - Set and communicate daily mobility goal to care team and patient/family/caregiver  - Collaborate with rehabilitation services on mobility goals if consulted  - Perform Range of Motion 3 times a day  - Reposition patient every 2 hours    - Dangle patient 3 times a day  - Stand patient 3 times a day  - Ambulate patient 3 times a day  - Out of bed to chair 3 times a day   - Out of bed for meals 3 times a day  - Out of bed for toileting  - Record patient progress and toleration of activity level   Outcome: Progressing     Problem: DISCHARGE PLANNING  Goal: Discharge to home or other facility with appropriate resources  Description: INTERVENTIONS:  - Identify barriers to discharge w/patient and caregiver  - Arrange for needed discharge resources and transportation as appropriate  - Identify discharge learning needs (meds, wound care, etc )  - Arrange for interpretive services to assist at discharge as needed  - Refer to Case Management Department for coordinating discharge planning if the patient needs post-hospital services based on physician/advanced practitioner order or complex needs related to functional status, cognitive ability, or social support system  Outcome: Progressing     Problem: Knowledge Deficit  Goal: Patient/family/caregiver demonstrates understanding of disease process, treatment plan, medications, and discharge instructions  Description: Complete learning assessment and assess knowledge base    Interventions:  - Provide teaching at level of understanding  - Provide teaching via preferred learning methods  Outcome: Progressing     Problem: MUSCULOSKELETAL - ADULT  Goal: Maintain or return mobility to safest level of function  Description: INTERVENTIONS:  - Assess patient's ability to carry out ADLs; assess patient's baseline for ADL function and identify physical deficits which impact ability to perform ADLs (bathing, care of mouth/teeth, toileting, grooming, dressing, etc )  - Assess/evaluate cause of self-care deficits   - Assess range of motion  - Assess patient's mobility  - Assess patient's need for assistive devices and provide as appropriate  - Encourage maximum independence but intervene and supervise when necessary  - Involve family in performance of ADLs  - Assess for home care needs following discharge   - Consider OT consult to assist with ADL evaluation and planning for discharge  - Provide patient education as appropriate  Outcome: Progressing  Goal: Maintain proper alignment of affected body part  Description: INTERVENTIONS:  - Support, maintain and protect limb and body alignment  - Provide patient/ family with appropriate education  Outcome: Progressing     Problem: MOBILITY - ADULT  Goal: Maintain or return to baseline ADL function  Description: INTERVENTIONS:  -  Assess patient's ability to carry out ADLs; assess patient's baseline for ADL function and identify physical deficits which impact ability to perform ADLs (bathing, care of mouth/teeth, toileting, grooming, dressing, etc )  - Assess/evaluate cause of self-care deficits   - Assess range of motion  - Assess patient's mobility; develop plan if impaired  - Assess patient's need for assistive devices and provide as appropriate  - Encourage maximum independence but intervene and supervise when necessary  - Involve family in performance of ADLs  - Assess for home care needs following discharge   - Consider OT consult to assist with ADL evaluation and planning for discharge  - Provide patient education as appropriate  Outcome: Progressing  Goal: Maintains/Returns to pre admission functional level  Description: INTERVENTIONS:  - Perform BMAT or MOVE assessment daily    - Set and communicate daily mobility goal to care team and patient/family/caregiver  - Collaborate with rehabilitation services on mobility goals if consulted  - Perform Range of Motion 3 times a day  - Reposition patient every 2 hours  - Dangle patient 3 times a day  - Stand patient 3 times a day  - Ambulate patient 3 times a day  - Out of bed to chair 3 times a day   - Out of bed for meals 3 times a day  - Out of bed for toileting  - Record patient progress and toleration of activity level   Outcome: Progressing     Problem: Nutrition/Hydration-ADULT  Goal: Nutrient/Hydration intake appropriate for improving, restoring or maintaining nutritional needs  Description: Monitor and assess patient's nutrition/hydration status for malnutrition  Collaborate with interdisciplinary team and initiate plan and interventions as ordered  Monitor patient's weight and dietary intake as ordered or per policy  Utilize nutrition screening tool and intervene as necessary  Determine patient's food preferences and provide high-protein, high-caloric foods as appropriate       INTERVENTIONS:  - Monitor oral intake, urinary output, labs, and treatment plans  - Assess nutrition and hydration status and recommend course of action  - Evaluate amount of meals eaten  - Assist patient with eating if necessary   - Allow adequate time for meals  - Recommend/ encourage appropriate diets, oral nutritional supplements, and vitamin/mineral supplements  - Order, calculate, and assess calorie counts as needed  - Recommend, monitor, and adjust tube feedings and TPN/PPN based on assessed needs  - Assess need for intravenous fluids  - Provide specific nutrition/hydration education as appropriate  - Include patient/family/caregiver in decisions related to nutrition  Outcome: Progressing

## 2022-01-20 NOTE — OP NOTE
PERATIVE REPORT  PATIENT NAME: Shanon Fernandez    :  1931  MRN: [id number]  Pt Location: [facility] OR ROOM 02    SURGERY DATE: 2022    Surgeon(s) and Role:     * Dawn Kimble MD - Primary     * Rolly Kern PA-C - Assisting no qualified resident available, physician assistant medically necessary to perform left hip cemented hemiarthroplasty    Preop Diagnosis:  Closed fracture of neck of left femur, initial encounter ([place] [address] ) Liss Campbell    Post-Op Diagnosis Codes:     * Closed fracture of neck of left femur, initial encounter ([place] [address] ) [S72 002A]    Procedure(s) (LRB):  HEMIARTHROPLASTY HIP (BIPOLAR) (Left)    Specimen(s):  * No specimens in log *    Estimated Blood Loss:   100 mL    Drains:  Urethral Catheter 18 Fr  (Active)   Donaldson Care Done 22   Output (mL) 275 mL 22 0628   Number of days: 3       Anesthesia Type:   Spinal    Operative Indications:  Closed fracture of neck of left femur, initial encounter ([place] [address] ) [S72 002A]  Displaced subcapital left femoral neck fracture    Operative Findings:  Displaced subcapital femoral neck fracture with comminution  Cemented bipolar arthroplasty    Complications:   None    Procedure and Technique: All treatment options were discussed with the patient including non-operative and operative treatment options  Patient has failed non-perative treatment and has opted for surgical intervention  Risks, complications and benefits of all treatment options were discussed in detail  The risks of surgical intervention including infection, injury to vessels and nerves  risk of failure to achieve desired results, risk of need for further procedures, potential risk of loss of life and limb were discussed with the patient  Informed consent was obtained from the patient  The operative site was marked and signed  A timeout was performed prior to the procedure  The patient was re-identified ,including name, date of birth, procedure, consent form reviewed, site and laterality  Appropriate antibiotics were administered preoperatively    The Physician Assistant was present for the entire case and provided essential assistance with patient positioning, prep and draping, wound closure, sterile dressing and splint application, all under my direct supervision(there was no resident available to assist with this case)    Implant Name Type Inv  Item Serial No   Lot No  LRB No  Used Action   CEMENT BONE SMART SET GRAY MED VISC - [id number]  CEMENT BONE SMART SET GRAY MED VISC  DEPUY [id number] Left 2 Implanted   VERSYS FEMORAL STEM 7833-11 - [id number]  [facility] FEMORAL STEM 7833-11  Suzanna [id number] Left 1 Implanted   VERSYS DISTAL CENTRALIZER 7859-09 - [id number]  VERSYS DISTAL CENTRALIZER 7859-09  Suzanna [id number] Left 1 Implanted   HEAD FEM 22MM 12/14 TPR +3 5MM NECK VERSYS - [id number]  HEAD FEM 22MM 12/14 TPR +3 5MM NECK VERSYS  Suzanna [id number] Left 1 Implanted   LINER ACTB BIPOLAR CMNT 42-43MM MULTIPOLAR VERSYS LD/FX - [id number]  LINER ACTB BIPOLAR CMNT 42-43MM MULTIPOLAR VERSYS LD/FX  Suzanna [id number] Left 1 Implanted   SHELL ACTB BIPOLAR 43MM VERSYS LD/FX MULTIPOLAR - [id number]  SHELL ACTB BIPOLAR 43MM VERSYS LD/FX MULTIPOLAR  Suzanna [id number] Left 1 Implanted     Patient placed in the lateral decubitus position the skin over the ankle and he was a red with post circulation this has been documented in the medial pictures will need keep close I would prevent full break  Standard posterolateral approach was used to the hip was prepared and draped in sterile fashion  Fascia tiffany was incised in line with the skin incision  Piriformis capsule was tagged and released  Nerve was protected  Neck was sectioned as extremely high neck fracture  Displaced comminuted fracture  Head was excised sized at a 42 and 43 and 43 trial given excellent fit  The remnants of the ligament these excised    A box osteotome was used canal Finder was used to lateralize was used sequential broaching was carried out till a size 11 we trialed with a size 11 standard neck +3 head with a 43 shell hip was reduced extremely stable flexion extension 10 external rotation trials were removed hip was copiously Pulsavac cement restrictor was used  Size 11 stem was then cemented in and attention paid to lateralization and version    Then placed +3 head with a 43 shell liner hip was reduced taken range of motion found to be extremely stable in flexion extension internal external rotation with  Just minimal amount of Shuck the previously tagged the short external rotators capsule were repaired through bony tunnels layered closure was performed no complications patient tolerated procedure   I was present for the entire procedure    Patient Disposition:  PACU       SIGNATURE: Peggy Paez MD  DATE: January 20, 2022  TIME: 9:00 AM

## 2022-01-20 NOTE — PROGRESS NOTES
[address] [facility] [address]  Progress Note - Shanon Fernandez 9/2/1931, 80 y o  female MRN: [id number]  Unit/Bed#: 552-39 Encounter: [id number]  Primary Care Provider: Evin Ventura DO   Date and time admitted to hospital: 1/16/2022  5:43 PM    * Closed fracture of neck of left femur [facility])  Assessment & Plan  Presented to the ER for evalauation after a fall at Assisted living facility [facility]  She reports tripping on a rug, mechanical fall  Denies LOC, hitting her head  CT shows - Acute left femoral transcervical neck fracture as described  Admited to med surg  Originally OR planned for 1/18, however concern over cardiac / pulmonary status, so case will be delayed while patient is medically optimized  Surgery completed without complication on 9/83  DVT proph - SC heparin   Pain medication - IV dilaudid, PO oxycodone  Fall precautions  Orthopedic Surgery Consult and ongoing support appreciated  PT/OT likely needs SNF on discharge    Pressure injury of dorsum of left foot, stage 1  Assessment & Plan  Developed while patient in traction  Wound care consulted, Allevyn dressings ordered    Delirium  [facility] confusion / delirium noted likely in the setting of underlying mild dementia, hyponatremia, pain medications and hard of hearing  Patient found pulling at IV and Donaldson  Soft wrist restraints applied, may need renewal if this continues  Wean pain medications as able  Hyponatremia now at baseline    Acute respiratory failure with hypoxia [facility])  Assessment & Plan  Wt Readings from Last 3 Encounters:   01/20/22 56 7 kg (125 lb)   10/25/21 57 5 kg (126 lb 12 8 oz)   08/17/21 61 5 kg (135 lb 9 3 oz)       Initially Suspecedt acute diastolic CHF with acute respiratory failure with hypoxia, hyponatremia  Unfortunately IV lasix worsened hyponatremia, currently appearing dry by exam   No signs of volume overload on CXR  Repeated Echocardiogram this admission (last study from 2018)  Only mild diastolic dysfunction noted  Titrate oxygen as needed  Consider CT chest   Will add respiratory protocol, Duoneb tx  Discussed case with cardiology and examined patient at bedside  No need for formal consult  Do not suspect acute CHF / volume overload  Consult pulmonology for perioperative exam - cleared the patient from their standpoint  Patient now on room air 1/19 with saturations >95%      Hyponatremia  Assessment & Plan  Sodium level at 118 --> rechecked at 123  1/19 sodium back to baseline of 130  Complete resolution at 136 as of 1/20  Possibly hypervolemic hyponatremia, however following additional doses of IV lasix, sodium worsened to 116  Now off diuretics, s/p tovalptan dosing with improvement  Appreciate nephrology consult and ongoing follow up / management  Further workup / orders per nephrology  VTE Pharmacologic Prophylaxis: VTE Score: 5 High Risk (Score >/= 5) - Pharmacological DVT Prophylaxis Ordered: heparin  Sequential Compression Devices Ordered  Patient Centered Rounds: I performed bedside rounds with nursing staff today  Discussions with Specialists or Other Care Team Provider: orthopedics, wound care, case management    Education and Discussions with Family / Patient: ortho discussed with family today  Time Spent for Care: 30 minutes  More than 50% of total time spent on counseling and coordination of care as described above  Current Length of Stay: 4 day(s)  Current Patient Status: Inpatient   Certification Statement: The patient will continue to require additional inpatient hospital stay due to continued perioperative evaluation and needs for improvement in mental status  Discharge Plan: Anticipate discharge in 48-72 hrs to discharge location to be determined pending rehab evaluations  Code Status: Level 3 - DNAR and DNI    Subjective:   Patient reports significant pain in her ankles today  Delerium does seem improved   Speech eval pending    Objective: Vitals:   Temp (24hrs), Av 3 °F (37 4 °C), Min:98 1 °F (36 7 °C), Max:100 4 °F (38 °C)    Temp:  [98 1 °F (36 7 °C)-100 4 °F (38 °C)] 98 1 °F (36 7 °C)  HR:  [] 76  Resp:  [16-19] 19  BP: (112-185)/(49-88) 112/49  SpO2:  [90 %-99 %] 90 %  Body mass index is 22 86 kg/m²  Input and Output Summary (last 24 hours): Intake/Output Summary (Last 24 hours) at 2022 1430  Last data filed at 2022 1000  Gross per 24 hour   Intake 350 ml   Output 505 ml   Net -155 ml       Physical Exam:   Physical Exam  Vitals and nursing note reviewed  Constitutional:       General: She is not in acute distress  Appearance: She is ill-appearing  HENT:      Head: Normocephalic and atraumatic  Cardiovascular:      Rate and Rhythm: Normal rate and regular rhythm  Pulses: Normal pulses  Heart sounds: Normal heart sounds  No murmur heard  No gallop  Pulmonary:      Effort: Pulmonary effort is normal       Breath sounds: Normal breath sounds  No wheezing, rhonchi or rales  Abdominal:      General: Bowel sounds are normal       Palpations: Abdomen is soft  Tenderness: There is no abdominal tenderness  There is no guarding or rebound  Musculoskeletal:      Right lower leg: No edema  Left lower leg: No edema  Comments: Pain with minimal range of motion of legs   Skin:     Comments: Stage 1 pressure ulcers of dorsum of left foot   Neurological:      Mental Status: She is alert  She is disoriented     Psychiatric:         Mood and Affect: Mood normal          Behavior: Behavior normal           Additional Data:     Labs:  Results from last 7 days   Lab Units 22  1128 22  0534 22  0534   WBC Thousand/uL 7 66   < > 7 52   HEMOGLOBIN g/dL 13 0   < > 12 7   HEMATOCRIT % 42 2   < > 39 0   PLATELETS Thousands/uL 251   < > 269   NEUTROS PCT %  --   --  70   LYMPHS PCT %  --   --  14   MONOS PCT %  --   --  11   EOS PCT %  --   --  4    < > = values in this interval not displayed  Results from last 7 days   Lab Units 01/20/22  1128 01/17/22  1425 01/17/22  0422   SODIUM mmol/L 136   < > 123*   POTASSIUM mmol/L 4 7   < > 4 2   CHLORIDE mmol/L 97*   < > 91*   CO2 mmol/L 29   < > 23   BUN mg/dL 23   < > 9   CREATININE mg/dL 1 16   < > 0 76   ANION GAP mmol/L 10   < > 9   CALCIUM mg/dL 9 6   < > 7 5*   ALBUMIN g/dL  --   --  2 7*   TOTAL BILIRUBIN mg/dL  --   --  0 48   ALK PHOS U/L  --   --  54   ALT U/L  --   --  24   AST U/L  --   --  25   GLUCOSE RANDOM mg/dL 128   < > 122    < > = values in this interval not displayed  Results from last 7 days   Lab Units 01/16/22  1832   INR  0 99     Results from last 7 days   Lab Units 01/20/22  0621   POC GLUCOSE mg/dl 101               Lines/Drains:  Invasive Devices  Report    Peripheral Intravenous Line            Peripheral IV 01/20/22 Left;Proximal;Ventral (anterior) Forearm <1 day    Peripheral IV 01/20/22 Right Forearm <1 day          Drain            Urethral Catheter 18 Fr  2 days              Urinary Catheter:  Goal for removal: Plan to remove POD#1               Imaging: No pertinent imaging reviewed      Recent Cultures (last 7 days):         Last 24 Hours Medication List:   Current Facility-Administered Medications   Medication Dose Route Frequency Provider Last Rate    acetaminophen  650 mg Oral Q6H PRN Apple Lozoya MD      acetaminophen  975 mg Oral [facility] Apple Lozoya MD      albuterol  2 5 mg Nebulization Q6H PRN Apple Lozoya MD      amLODIPine  10 mg Oral Daily Apple Lozoya MD      ascorbic acid  500 mg Oral Daily Apple Lozoya MD      cefazolin  1,000 mg Intravenous Q8H Apple Lozoya MD      clonazePAM  1 5 mg Oral HS Apple Lozoya MD      digoxin  125 mcg Oral Daily Apple Lozoya MD      docusate sodium  100 mg Oral BID Apple Lozoya MD      Elva Lay ON 1/21/2022] ferrous sulfate  325 mg Oral Daily With Breakfast Apple Lozoya MD      heparin (porcine)  5,000 Units Subcutaneous [facility] [address] Lopez Ortiz MD      hydrALAZINE  5 mg Intravenous Q6H PRN Elizabeth Johnson MD      HYDROmorphone  0 5 mg Intravenous Q6H PRN Elizabeth Johnson MD      lactated ringers  500 mL Intravenous Once PRN Elizabeth Johnson MD      And    lactated ringers  500 mL Intravenous Once PRN Elizabeth Johnson MD      lactated ringers  125 mL/hr Intravenous Continuous Elizabeth Johnson  mL/hr (01/20/22 1147)    Lactinex  1 each Oral TID With Meals Elizabeth Johnson MD      magnesium hydroxide  30 mL Oral Daily PRN Elizabeth Johnson MD      metoprolol tartrate  75 mg Oral Q12H [facility] Elizabeth Johnson MD      ondansetron  4 mg Intravenous Q6H PRN Elizabeth Johnson MD      oxyCODONE  5 mg Oral Q6H PRN Elizabeth Johnson MD      pantoprazole  40 mg Oral Early Morning Elizabeth Johnson MD      senna  1 tablet Oral BID Elizabeth Johnson MD      sodium chloride  500 mL Intravenous Once PRN Elizabeth Johnson MD      And    sodium chloride  500 mL Intravenous Once PRN Elizabeth Johnson MD          Today, Patient Was Seen By: Nini Cunningham PA-C    **Please Note: This note may have been constructed using a voice recognition system  **

## 2022-01-20 NOTE — RESPIRATORY THERAPY NOTE
01/19/22 2025   Respiratory Assessment   Assessment Type During-treatment   General Appearance Drowsy   Resp Comments Patient is drowsy, I have placed her on the bipap therapy earlier, Patient was on 2 lpm nasal cannula   Subjective Data her nurse aware she is on therapy   Non-Invasive Information   O2 Interface Device Full face mask   Non-Invasive Ventilation Mode BiPAP   SpO2 94 %   $ Pulse Oximetry Spot Check Charge Completed   Non-Invasive Settings   IPAP (cm) 14 cm   EPAP (cm) 6 cm   Rate (Set) 14   Flow (lpm) 3   Non-Invasive Readings   Total Rate 23   Vt (mL) ([facility]) 360   Skin Intervention Mask rotated;Skin intact   Non-Invasive Alarms   MV Low (L/min) 4   Vt Low (mL) 100   High Resp Rate (BPM) 55 BPM   Low Resp Rate (BPM) 8 BPM   Apnea Interval (sec) 20   Apnea Volume (mL) 9         At 12:08 am, nurse informed me that the patient was awake and very upset that the mask was on her   I have taken the therapy off patient at this time and placed her on 2 lpm nasal cannula

## 2022-01-21 PROBLEM — E87.1 HYPONATREMIA: Status: RESOLVED | Noted: 2018-03-24 | Resolved: 2022-01-01

## 2022-01-21 PROBLEM — R13.10 DYSPHAGIA: Status: ACTIVE | Noted: 2022-01-01

## 2022-01-21 NOTE — TRANSPORTATION MEDICAL NECESSITY
Section I - General Information    Name of Patient: Thaddeus Briceno                 : 1931    Medicare #: [id number]  Transport Date:    (PCS is valid for round trips on this date and for all repetitive trips in the 60-day range as noted below )  Origin: [address] [place] [address] Avenue: [facility]  Is the pt's stay covered under Medicare Part A (PPS/DRG)   [x]     Closest appropriate facility? If no, why is transport to more distant facility required? Yes  If hospice pt, is this transport related to pt's terminal illness? No       Section II - Medical Necessity Questionnaire  Ambulance transportation is medically necessary only if other means of transport are contraindicated or would be potentially harmful to the patient  To meet this requirement, the patient must either be "bed confined" or suffer from a condition such that transport by means other than ambulance is contraindicated by the patient's condition  The following questions must be answered by the medical professional signing below for this form to be valid:    1)  Describe the MEDICAL CONDITION (physical and/or mental) of this patient AT [address] [place] [address] that requires the patient to be transported in an ambulance and why transport by other means is contraindicated by the patient's condition:s/p fall; fx left hip repair    2) Is the patient "bed confined" as defined below? Yes  To be "be confined" the patient must satisfy all three of the following conditions: (1) unable to get up from bed without Assistance; AND (2) unable to ambulate; AND (3) unable to sit in a chair or wheelchair  3) Can this patient safely be transported by car or wheelchair van (i e , seated during transport without a medical attendant or monitoring)?    No    4) In addition to completing questions 1-3 above, please check any of the following conditions that apply*:   *Note: supporting documentation for any boxes checked must be maintained in the patient's medical records  If hosp-hosp transfer, describe services needed at 2nd facility not available at 1st facility? Patient is confused ; fall risk; s/p left hip fx repair; fall risk    Section III - Signature of Physician or Healthcare Professional  I certify that the above information is true and correct based on my evaluation of this patient, and represent that the patient requires transport by ambulance and that other forms of transport are contraindicated  I understand that this information will be used by the Centers for Medicare and Medicaid Services (CMS) to support the determination of medical necessity for ambulance services, and I represent that I have personal knowledge of the patient's condition at time of transport  [x]  If this box is checked, I also certify that the patient is physically or mentally incapable of signing the ambulance service's claim and that the institution with which I am affiliated has furnished care, services, or assistance to the patient  My signature below is made on behalf of the patient pursuant to 42 CFR §424 36(b)(4)  In accordance with 42 CFR §424 37, the specific reason(s) that the patient is physically or mentally incapable of signing the claim form is as follows: Danisha Samuel of Physician* or Healthcare Professional______________________________________________________________  Signature Date 01/21/22 (For scheduled repetitive transports, this form is not valid for transports performed more than 60 days after this date)    Printed Name & Credentials of Physician or Healthcare Professional (MD, DO, RN, etc )________________________________  *Form must be signed by patient's attending physician for scheduled, repetitive transports   For non-repetitive, unscheduled ambulance transports, if unable to obtain the signature of the attending physician, any of the following may sign (choose appropriate option below)  [] Physician Assistant []  Clinical Nurse Specialist []  Registered Nurse  []  Nurse Practitioner  [x] Discharge Planner

## 2022-01-21 NOTE — NURSING NOTE
Patient requesting for L sided SCD to be removed due to discomfort  Repositioned and educated on the importance of SCD's  However, patient demanding for it to be removed

## 2022-01-21 NOTE — ASSESSMENT & PLAN NOTE
Developed while patient in traction  Wound care consulted, Allevyn dressings ordered  Keep foot off of bed

## 2022-01-21 NOTE — PLAN OF CARE
Problem: PHYSICAL THERAPY ADULT  Goal: Performs mobility at highest level of function for planned discharge setting  See evaluation for individualized goals  Description: Treatment/Interventions: Functional transfer training,LE strengthening/ROM,Therapeutic exercise,Endurance training,Bed mobility,Gait training          See flowsheet documentation for full assessment, interventions and recommendations  Outcome: Progressing  Note: Prognosis: Good  Problem List: Decreased strength,Decreased endurance,Impaired balance,Decreased mobility,Decreased cognition,Impaired judgement,Decreased safety awareness,Orthopedic restrictions,Pain  Assessment: Pt  seen for PT treatment session this date with interventions consisting of   bed mobility, transfers and  gait training w/ emphasis on improving pt's ability to ambulate  Pt  Currently performing  tx and ambulation at ( max-mod) x 2 level of function  Please also refer to physical therapy recommendation for safe DC planning  In comparison to previous session, Pt  With improvements in activity tolerance  Able to ambulate very short distance with chair follow  Max cues for technique  Pt is in need of continued activity in PT to improve strength balance endurance mobility transfers and ambulation with return to maximize LOF  From PT/mobility standpoint, recommendation at time of d/c would be post acute rehab  in order to promote return to PLOF and independence  The patient's AM-PAC Basic Mobility Inpatient Short Form Raw Score is 7  A Raw score of less than or equal to 16 suggests the patient may benefit from discharge to post-acute rehabilitation services  PT Discharge Recommendation: Post acute rehabilitation services          See flowsheet documentation for full assessment

## 2022-01-21 NOTE — PLAN OF CARE
Problem: OCCUPATIONAL THERAPY ADULT  Goal: Performs self-care activities at highest level of function for planned discharge setting  See evaluation for individualized goals  Description: Treatment Interventions: ADL retraining,Functional transfer training,UE strengthening/ROM,Endurance training,Cognitive reorientation,Patient/family training,Compensatory technique education,Energy conservation,Activityengagement          See flowsheet documentation for full assessment, interventions and recommendations  Outcome: Progressing  Note: Limitation: Decreased ADL status,Decreased UE strength,Decreased Safe judgement during ADL,Decreased cognition,Decreased endurance,Decreased self-care trans,Decreased high-level ADLs     Assessment: Patient participated in Skilled OT session this date with interventions consisting of ADL re training with the use of correct body mechnaics and  therapeutic activities to: increase activity tolerance   Co treatment with PTA secondary to complex medical condition of pt, max A of 2 required to achieve and maintain transitional movements, requiring the need of skilled therapeutic intervention of 2 therapists to achieve delivery of services  Patient agreeable to OT treatment session, upon arrival patient was found supine in bed  Supine to sit txfrs with Max Ax2, sit to stand txfrs from bed with Max Ax2, Pt completed 8 small steps forward with Max Ax2 and use of RW with assistance of another to bring lm chair behind  To increase posture, dynamic standing balance, balance during self cares, use of BUE  Pt required Max A to bernadine B  socks and S to complete grooming tasks  Patient requiring verbal cues for safety, verbal cues for correct technique and cognitive assistance to anticipate next step  Patient continues to be functioning below baseline level, occupational performance remains limited secondary to factors listed above and increased risk for falls and injury     From OT standpoint, recommendation at time of d/c would be Post acute rehabilitation services  The patient's raw score on the AM-PAC Daily Activity inpatient short form is 16, standardized score is 35 96, less than 39 4  Patients at this level are likely to benefit from discharge to post-acute rehabilitation services  Please refer to the recommendation of the Occupational Therapist for safe discharge planning       OT Discharge Recommendation: Post acute rehabilitation services

## 2022-01-21 NOTE — ASSESSMENT & PLAN NOTE
Noted to have difficulty with eating on admission likely secondary to delerium  Per her son, she eats regular foods and thin liquids at home  Speech consult appreciated, required dysphagia 2 diet and NTL  Will trial diet out of restraints to see if there is improvement

## 2022-01-21 NOTE — ASSESSMENT & PLAN NOTE
· Presented to the ER for evalauation after a fall at Assisted living facility [facility]  · She reports tripping on a rug, mechanical fall  Denies LOC, hitting her head  · CT shows - Acute left femoral transcervical neck fracture as described  · Originally OR planned for 1/18, however concern over cardiac / pulmonary status, so case will be delayed while patient is medically optimized  Surgery completed without complication on 4/77  · DVT proph - SC lovenox x 4 weeks  · Pain medication - geriatric dose oxycodone, breakthrough with dilaudid  Encourage weaning off quickly from these medications to avoid delirium  · Fall precautions  · Orthopedic Surgery Consult and ongoing support appreciated  WBAT  Dressing change scheduled for tomorrow and then daily  Watching for blood loss anemia  Keep feet off bed     · PT/OT needs SNF on discharge

## 2022-01-21 NOTE — RESPIRATORY THERAPY NOTE
01/21/22 0342   Respiratory Assessment   Resp Comments patient has her pap mask off her face, saying she doesn't want it any more, placed on 2 lpm nasal cannula   Oxygen Therapy/Pulse Ox   O2 Device Nasal cannula   O2 Therapy Oxygen   Nasal Cannula O2 Flow Rate (L/min) 2 L/min   Calculated FIO2 (%) - Nasal Cannula 28   SpO2 96 %   SpO2 Activity At Rest   $ Pulse Oximetry Spot Check Charge Completed

## 2022-01-21 NOTE — PROGRESS NOTES
Progress Note - Nephrology   Goldie Chen 80 y o  female MRN: [id number]  Unit/Bed#: 186-46 Encounter: [id number]    Assessment and Plan    1  Acute on chronic Hyponatremia  ? Prior outpatient baseline sodium 130-133 millimole per L    ? Presenting serum sodium 118 millimole per L on 01/16/2022   Initially treated with furosemide 20 mg IV with improvement to 123 millimole per L 01/17/2022 at 0422, re-dosed and worsened to 160 millimole per L on 01/17/2022   ? Urine studies indeterminate, Urine sodium 41, urine osmolality 141, TSH 3 13, uric acid 4 2 mg/dL, cortisol pending  ? Status post tolvaptan 15 mg 01/17/2022 AG 5850 JLZ 01/18/2022 at 1223   ? Repeat urine studies on 01/19/2022: Urine sodium 19  Urine osmolality 418 millimole per kg; consistent with volume depletion superimposed on SIADH  ? Serum sodium at goal at 137 mmol/L on 01/21/2022  Patient has started eating  Will discontinue Lactated Ringer's that was at 50 mL/hr at this time  If serum sodium decreases look to impose gentle fluid restriction      2  Chronic kidney disease, stage III baseline creatinine around 0 7-1 0 mg/dL  ? Renal function remains stable with no evidence of acute kidney injury   Renal function may be overestimated due to poor muscle mass  · Renal function remains stable in the postoperative period  Trend renal function  Continue to provide supportive care  Optimize hemodynamics  Maintain mean arterial pressure greater than 65 mmHg  Renally dose medications  Avoid nephrotoxins  Please discuss with renal any diuretics or possible nephrotoxic agents, such as NSAIDs or IV contrast  Recommend avoidance of PPIs in favor of H2 blocker, if appropriate for clinical scenario     Monitor for urinary retention- strict I&Os, record bladder scan PVRs and follow urinary retention protocol       3  Left femur fracture status post left hemiarthroplasty on 01/20/2022 by Dr Clifton Andres     4   At risk for volume overload / grade 1 diastolic dysfunction  ? Will discontinue lactated Ringer's  Consider sodium and fluid restriction if indicated  Will defer to encourage food intake      5  Urinary retention  ? Continue Donaldson catheter per urinary retention protocol  Oliguric as recorded  Consider void trial      6  Hypertension in the setting of CKD  ? Hypotension resolved, now normotensive  May continue antihypertensives with hold parameters  Follow up reason for today's visit:     Closed fracture of neck of left femur [facility])    Patient Active Problem List   Diagnosis    Gomez's esophagus    Bilateral carotid artery disease (HCC)    Breast mass, left    Chronic constipation    Colon, diverticulosis    Esophageal reflux    Generalized anxiety disorder    Generalized osteoarthritis of multiple sites    Glucose intolerance (impaired glucose tolerance)    Hypercholesterolemia    Neuralgia    Osteoporosis    SVT (supraventricular tachycardia) (HCC)    Vitamin D deficiency    HCAP (healthcare-associated pneumonia)    Acute febrile illness    Cough    Esophageal mass    Right lower lobe lung mass    History of frequent urinary tract infections    Paroxysmal atrial fibrillation (HCC)    Sinus bradycardia    Debilitated    Eczema    Hypertensive urgency    Acute metabolic encephalopathy    Acute cystitis with hematuria    Lung mass    Acute encephalopathy    Multiple renal cysts    Hepatic steatosis    Left eye pain    Sick sinus syndrome (HCC)    Benign essential HTN    Diarrhea    sepsis    FIONA (acute kidney injury) ([facility] [address] )    Stage 3b chronic kidney disease (HCC)    Lactic acidosis    Pulmonary nodule    Urinary retention    Acute UTI    Closed fracture of neck of left femur (HCC)    Acute respiratory failure with hypoxia (HCC)    Preoperative clearance    Delirium    Pressure injury of dorsum of left foot, stage 1    Dysphagia         Subjective:    Interval history and review of systems is unobtainable due to patient's mental status  Noted that patient ate about 50% food on tray  Objective:     Vitals: Blood pressure 122/54, pulse 85, temperature 99 4 °F (37 4 °C), resp  rate 16, height 5' 2" (1 575 m), weight 56 7 kg (125 lb), SpO2 97 %  ,Body mass index is 22 86 kg/m²  Weight (last 2 days)     Date/Time Weight    01/21/22 0600 56 7 (125)    01/20/22 0554 56 7 (125)    01/19/22 0600 58 4 (128 75)            Intake/Output Summary (Last 24 hours) at 1/21/2022 1518  Last data filed at 1/21/2022 1509  Gross per 24 hour   Intake 2572 08 ml   Output 550 ml   Net 2022 08 ml     I/O last 3 completed shifts: In: 1362 1 [P O :360; I V :1002 1]  Out: 1055 [Urine:955; Blood:100]         Physical Exam: /54   Pulse 85   Temp 99 4 °F (37 4 °C)   Resp 16   Ht 5' 2" (1 575 m)   Wt 56 7 kg (125 lb)   SpO2 97%   BMI 22 86 kg/m²     General Appearance:    No acute distress  Cooperative  Appears stated age  Head:    Normocephalic  Atraumatic  Normal jaw occlusion  Eyes:    Lids, conjunctiva normal  No scleral icterus  Ears:    Normal external ears  Nose:   Nares normal  No drainage  Mouth:   Lips, tongue normal  Mucosa normal  Phonation normal    Neck:   Supple  Symmetrical    Back:     Symmetric  No CVA tenderness  Lungs:     Normal respiratory effort  Clear to auscultation bilaterally  Chest wall:    No tenderness or deformity  Heart:    Regular rate and rhythm  Normal S1 and S2  No murmur  No JVD  No edema  Abdomen:     Soft  Non-tender  Bowel sounds active  Genitourinary:   No Donaldson catheter present  Extremities:   Extremities normal  Atraumatic  No cyanosis  Skin:   Warm and dry  No pallor, jaundice, rash, ecchymoses  Neurologic:   Confused            Lab, Imaging and other studies: I have personally reviewed pertinent labs    CBC:   Lab Results   Component Value Date    WBC 5 86 01/21/2022    HGB 11 8 01/21/2022    HCT 36 2 01/21/2022    MCV 88 01/21/2022     01/21/2022    MCH 28 6 01/21/2022    MCHC 32 6 01/21/2022    RDW 14 7 01/21/2022    MPV 9 3 01/21/2022     CMP:   Lab Results   Component Value Date    K 4 7 01/21/2022    CL 99 (L) 01/21/2022    CO2 29 01/21/2022    BUN 21 01/21/2022    CREATININE 0 90 01/21/2022    CALCIUM 9 3 01/21/2022    EGFR 56 01/21/2022         Results from last 7 days   Lab Units 01/21/22  0511 01/20/22  1128 01/19/22  0534 01/17/22  1425 01/17/22  0422   POTASSIUM mmol/L 4 7 4 7 4 1   < > 4 2   CHLORIDE mmol/L 99* 97* 93*   < > 91*   CO2 mmol/L 29 29 28   < > 23   BUN mg/dL 21 23 20   < > 9   CREATININE mg/dL 0 90 1 16 1 29   < > 0 76   CALCIUM mg/dL 9 3 9 6 9 6   < > 7 5*   ALK PHOS U/L  --   --   --   --  54   ALT U/L  --   --   --   --  24   AST U/L  --   --   --   --  25    < > = values in this interval not displayed  Phosphorus: No results found for: PHOS  Magnesium: No results found for: MG  Urinalysis: No results found for: Tharon Havers, SPECGRAV, PHUR, LEUKOCYTESUR, NITRITE, PROTEINUA, GLUCOSEU, KETONESU, BILIRUBINUR, BLOODU  Ionized Calcium: No results found for: CAION  Coagulation: No results found for: PT, INR, APTT  Troponin: No results found for: TROPONINI  ABG: No results found for: PHART, WTJ8LOQ, PO2ART, DXM9FLL, C4FIAPVP, BEART, SOURCE  Radiology review:     IMAGING  Procedure: XR hip/pelv 1 vw left if performed    Result Date: 1/20/2022  Narrative: LEFT HIP INDICATION:   Postprocedure  COMPARISON:  1/16/2022 VIEWS:  XR HIP/PELV 1 VW LEFT W PELVIS IF PERFORMED 1 FINDINGS: There is no acute fracture or dislocation  Left hip hemiarthroplasty is identified in satisfactory position without evidence of hardware complication  No lytic or blastic osseous lesion  Postoperative changes are seen in the adjacent soft tissues  The visualized lumbar spine is unremarkable  Impression: Unremarkable appearance of left hip hemiarthroplasty   Workstation performed: [id number]       Current Facility-Administered Medications   Medication Dose Route Frequency    acetaminophen (TYLENOL) tablet 650 mg  650 mg Oral Q6H PRN    acetaminophen (TYLENOL) tablet 975 mg  975 mg Oral Q8H [address]    albuterol inhalation solution 2 5 mg  2 5 mg Nebulization Q6H PRN    amLODIPine (NORVASC) tablet 10 mg  10 mg Oral Daily    ascorbic acid (VITAMIN C) tablet 500 mg  500 mg Oral Daily    clonazePAM (KlonoPIN) tablet 1 5 mg  1 5 mg Oral HS    digoxin (LANOXIN) tablet 125 mcg  125 mcg Oral Daily    docusate sodium (COLACE) capsule 100 mg  100 mg Oral BID    enoxaparin (LOVENOX) subcutaneous injection 30 mg  30 mg Subcutaneous Q12H [address]    ferrous sulfate tablet 325 mg  325 mg Oral Daily With Breakfast    hydrALAZINE (APRESOLINE) injection 5 mg  5 mg Intravenous Q6H PRN    HYDROmorphone (DILAUDID) injection 0 2 mg  0 2 mg Intravenous Q6H PRN    lactated ringers infusion  50 mL/hr Intravenous Continuous    Lactinex PACK 1 each  1 each Oral TID With Meals    magnesium hydroxide (MILK OF MAGNESIA) oral suspension 30 mL  30 mL Oral Daily PRN    metoprolol tartrate (LOPRESSOR) tablet 75 mg  75 mg Oral Q12H [address]    ondansetron (ZOFRAN) injection 4 mg  4 mg Intravenous Q6H PRN    oxyCODONE (ROXICODONE) IR tablet 2 5 mg  2 5 mg Oral Q6H PRN    oxyCODONE (ROXICODONE) IR tablet 5 mg  5 mg Oral Q6H PRN    pantoprazole (PROTONIX) EC tablet 40 mg  40 mg Oral Early Morning    senna (SENOKOT) tablet 8 6 mg  1 tablet Oral BID     Medications Discontinued During This Encounter   Medication Reason    lisinopril (ZESTRIL) tablet 10 mg     amLODIPine (NORVASC) tablet 5 mg     HYDROmorphone (DILAUDID) injection 0 2 mg     acetaminophen (TYLENOL) tablet 650 mg     HYDROmorphone (DILAUDID) injection 0 5 mg     furosemide (LASIX) injection 20 mg     Melatonin 5 MG CAPS Duplicate order    lactobacillus acidophilus-bulgaricus (FLORANEX) packet 1 packet     furosemide (LASIX) injection 20 mg     calcium carbonate (OYSTER SHELL,OSCAL) 500 mg tablet 1 tablet     furosemide (LASIX) injection 20 mg     ceFAZolin (ANCEF) IVPB (premix in dextrose) 2,000 mg 50 mL     ipratropium-albuterol (DUO-NEB) 0 5-2 5 mg/3 mL inhalation solution 3 mL     lactobacillus acidophilus-bulgaricus (FLORANEX) packet 1 packet Reorder    heparin (porcine) subcutaneous injection 5,000 Units     aspirin (ECOTRIN LOW STRENGTH) EC tablet 81 mg     cholecalciferol (VITAMIN D3) tablet 1,000 Units     calcium carbonate (OYSTER SHELL,OSCAL) 500 mg tablet 1 tablet     sodium chloride 0 9 % irrigation solution Patient Discharge    fentaNYL (SUBLIMAZE) injection 25 mcg Patient Transfer    ondansetron (ZOFRAN) injection 4 mg Patient Transfer    chlorhexidine (PERIDEX) 0 12 % oral rinse 15 mL Patient Transfer    chlorhexidine (HIBICLENS) 4 % topical liquid Patient Transfer    ceFAZolin (ANCEF) IVPB (premix in dextrose) 2,000 mg 50 mL Patient Transfer    ondansetron (ZOFRAN) injection 4 mg     oxyCODONE (ROXICODONE) IR tablet 5 mg     lactated ringers infusion     famotidine (PEPCID) tablet 20 mg     melatonin tablet 3 mg     fish oil capsule 6,844 mg     folic acid (FOLVITE) tablet 1 mg     HYDROmorphone (DILAUDID) injection 0 5 mg     oxyCODONE (ROXICODONE) IR tablet 5 mg     heparin (porcine) subcutaneous injection 5,000 Units        Cynthia Beltrán PA-C    Portions of the record may have been created with voice recognition software  Occasional wrong word or "sound a like" substitutions may have occurred due to the inherent limitations of voice recognition software  Read the chart carefully and recognize, using context, where substitutions have occurred

## 2022-01-21 NOTE — ASSESSMENT & PLAN NOTE
Wt Readings from Last 3 Encounters:   01/21/22 56 7 kg (125 lb)   10/25/21 57 5 kg (126 lb 12 8 oz)   08/17/21 61 5 kg (135 lb 9 3 oz)     Initially Suspecedt acute diastolic CHF with acute respiratory failure with hypoxia, hyponatremia  Unfortunately IV lasix worsened hyponatremia, currently appearing dry by exam   No signs of volume overload on CXR  Repeated Echocardiogram this admission (last study from 2018)  Only mild diastolic dysfunction noted  Titrate oxygen as needed  Consider CT chest --> no white count, no fevers, no oxygen needs can defer at this time  Will add respiratory protocol, Duoneb tx  Discussed case with cardiology and examined patient at bedside  No need for formal consult  Do not suspect acute CHF / volume overload    Consult pulmonology for perioperative exam - cleared the patient from their standpoint  Patient now on room air 1/19 with saturations >95%

## 2022-01-21 NOTE — CASE MANAGEMENT
Case Management Discharge Planning Note    Patient name Margarita Peralta /[id number] MRN [id number]  : 1931 Date 2022       Current Admission Date: 2022  Current Admission Diagnosis:Closed fracture of neck of left femur [facility])   Patient Active Problem List    Diagnosis Date Noted    Pressure injury of dorsum of left foot, stage 1 2022    Delirium 2022    Acute respiratory failure with hypoxia ([address] ) 2022    Preoperative clearance 2022    Closed fracture of neck of left femur ([address] ) 2022    Acute UTI 2021    Urinary retention 2021    Diarrhea 2021    sepsis 2021    FIONA (acute kidney injury) ([facility] ) 2021    Stage 3b chronic kidney disease ([facility] ) 2021    Lactic acidosis 2021    Pulmonary nodule 2021    Benign essential HTN 2019    Sick sinus syndrome ([address] ) 2019    Multiple renal cysts 2019    Hepatic steatosis 2019    Left eye pain 2019    Acute metabolic encephalopathy     Acute cystitis with hematuria 2019    Lung mass 2019    Acute encephalopathy     Hypertensive urgency 10/19/2018    Debilitated 2018    Eczema 2018    Paroxysmal atrial fibrillation ([address] ) 2018    Sinus bradycardia 2018    History of frequent urinary tract infections 2018    Right lower lobe lung mass 2018    Hyponatremia 2018    Esophageal mass 2018    Cough 2018    HCAP (healthcare-associated pneumonia) 2018    Acute febrile illness 2018    Chronic constipation 2017    Generalized osteoarthritis of multiple sites 2017    Vitamin D deficiency 2017    Bilateral carotid artery disease ([address] ) 10/04/2017    Breast mass, left 10/03/2016    Gomez's esophagus 2016    Neuralgia 2015    Esophageal reflux 2015    Glucose intolerance (impaired glucose tolerance) 09/19/2014    Colon, diverticulosis 01/29/2013    Generalized anxiety disorder 09/04/2012    Hypercholesterolemia 06/19/2012    Osteoporosis 06/19/2012    SVT (supraventricular tachycardia) ([facility] ) 06/19/2012      LOS (days): 5  Geometric Mean LOS (GMLOS) (days): 2 90  Days to GMLOS:-1 8     OBJECTIVE:  Risk of Unplanned Readmission Score: 14         Current admission status: Inpatient   Preferred Pharmacy:   [facility] [address], [address]  [address] [place] [address]  Phone: [phone] Fax: [phone]    Primary Care Provider: Safia Huber DO    Primary Insurance: MEDICARE  Secondary Insurance: [facility]    DISCHARGE DETAILS:       IMM Given (Date):: 01/21/22  IMM Given to[de-identified] [address] [place] [address] [address] : [facility]  Receiving Facility/Agency Phone Number: [phone]  Facility/Agency Fax Number: [phone]     CM working on arranging transport for pt to go to [facility] on 1/22/22

## 2022-01-21 NOTE — CASE MANAGEMENT
Case Management Discharge Planning Note    Patient name Gabby Briggs  Location /[id number] MRN [id number]  : 1931 Date 2022       Current Admission Date: 2022  Current Admission Diagnosis:Closed fracture of neck of left femur [facility])   Patient Active Problem List    Diagnosis Date Noted    Dysphagia 2022    Pressure injury of dorsum of left foot, stage 1 2022    Delirium 2022    Acute respiratory failure with hypoxia ([address] ) 2022    Preoperative clearance 2022    Closed fracture of neck of left femur ([address] ) 2022    Acute UTI 2021    Urinary retention 2021    Diarrhea 2021    sepsis 2021    FIONA (acute kidney injury) ([address] ) 2021    Stage 3b chronic kidney disease ([address] ) 2021    Lactic acidosis 2021    Pulmonary nodule 2021    Benign essential HTN 2019    Sick sinus syndrome ([address] ) 2019    Multiple renal cysts 2019    Hepatic steatosis 2019    Left eye pain 2019    Acute metabolic encephalopathy     Acute cystitis with hematuria 2019    Lung mass 2019    Acute encephalopathy     Hypertensive urgency 10/19/2018    Debilitated 2018    Eczema 2018    Paroxysmal atrial fibrillation ([address] ) 2018    Sinus bradycardia 2018    History of frequent urinary tract infections 2018    Right lower lobe lung mass 2018    Esophageal mass 2018    Cough 2018    HCAP (healthcare-associated pneumonia) 2018    Acute febrile illness 2018    Chronic constipation 2017    Generalized osteoarthritis of multiple sites 2017    Vitamin D deficiency 2017    Bilateral carotid artery disease ([address] ) 10/04/2017    Breast mass, left 10/03/2016    Gomez's esophagus 2016    Neuralgia 2015    Esophageal reflux 2015    Glucose intolerance (impaired glucose tolerance) 09/19/2014    Colon, diverticulosis 01/29/2013    Generalized anxiety disorder 09/04/2012    Hypercholesterolemia 06/19/2012    Osteoporosis 06/19/2012    SVT (supraventricular tachycardia) ([facility] [address] ) 06/19/2012      LOS (days): 5  Geometric Mean LOS (GMLOS) (days): 2 90  Days to GMLOS:-1 9     OBJECTIVE:  Risk of Unplanned Readmission Score: 14         Current admission status: Inpatient   Preferred Pharmacy:   [facility] [address], [address]  [address] [place] [address]  Phone: [phone] Fax: [phone]    Primary Care Provider: Razia Snyder DO    Primary Insurance: MEDICARE  Secondary Insurance: [facility]    DISCHARGE DETAILS:    Discharge Destination Plan[de-identified] SNF [facility])  Transport at Discharge : [facility]        Transported by Jonas and Unit #):  Tayla  ETA of Transport (Date): 01/22/22  ETA of Transport (Time): 1900

## 2022-01-21 NOTE — PLAN OF CARE
Problem: Potential for Falls  Goal: Patient will remain free of falls  Description: INTERVENTIONS:  - Educate patient/family on patient safety including physical limitations  - Instruct patient to call for assistance with activity   - Consult OT/PT to assist with strengthening/mobility   - Keep Call bell within reach  - Keep bed low and locked with side rails adjusted as appropriate  - Keep care items and personal belongings within reach  - Initiate and maintain comfort rounds  - Make Fall Risk Sign visible to staff  - Offer Toileting every 2 Hours, in advance of need  - Initiate/Maintain bed/chair alarm  - Obtain necessary fall risk management equipment:   - Apply yellow socks and bracelet for high fall risk patients  - Consider moving patient to room near nurses station  Outcome: Progressing     Problem: Prexisting or High Potential for Compromised Skin Integrity  Goal: Skin integrity is maintained or improved  Description: INTERVENTIONS:  - Identify patients at risk for skin breakdown  - Assess and monitor skin integrity  - Assess and monitor nutrition and hydration status  - Monitor labs   - Assess for incontinence   - Turn and reposition patient  - Assist with mobility/ambulation  - Relieve pressure over bony prominences  - Avoid friction and shearing  - Provide appropriate hygiene as needed including keeping skin clean and dry  - Evaluate need for skin moisturizer/barrier cream  - Collaborate with interdisciplinary team   - Patient/family teaching  - Consider wound care consult   Outcome: Progressing     Problem: PAIN - ADULT  Goal: Verbalizes/displays adequate comfort level or baseline comfort level  Description: Interventions:  - Encourage patient to monitor pain and request assistance  - Assess pain using appropriate pain scale  - Administer analgesics based on type and severity of pain and evaluate response  - Implement non-pharmacological measures as appropriate and evaluate response  - Consider cultural and social influences on pain and pain management  - Notify physician/advanced practitioner if interventions unsuccessful or patient reports new pain  Outcome: Progressing     Problem: INFECTION - ADULT  Goal: Absence or prevention of progression during hospitalization  Description: INTERVENTIONS:  - Assess and monitor for signs and symptoms of infection  - Monitor lab/diagnostic results  - Monitor all insertion sites, i e  indwelling lines, tubes, and drains  - Monitor endotracheal if appropriate and nasal secretions for changes in amount and color  - [place] appropriate cooling/warming therapies per order  - Administer medications as ordered  - Instruct and encourage patient and family to use good hand hygiene technique  - Identify and instruct in appropriate isolation precautions for identified infection/condition  Outcome: Progressing  Goal: Absence of fever/infection during neutropenic period  Description: INTERVENTIONS:  - Monitor WBC    Outcome: Progressing     Problem: SAFETY ADULT  Goal: Patient will remain free of falls  Description: INTERVENTIONS:  - Educate patient/family on patient safety including physical limitations  - Instruct patient to call for assistance with activity   - Consult OT/PT to assist with strengthening/mobility   - Keep Call bell within reach  - Keep bed low and locked with side rails adjusted as appropriate  - Keep care items and personal belongings within reach  - Initiate and maintain comfort rounds  - Make Fall Risk Sign visible to staff  - Offer Toileting every 2 Hours, in advance of need  - Initiate/Maintain bed/chair alarm  - Obtain necessary fall risk management equipment:   - Apply yellow socks and bracelet for high fall risk patients  - Consider moving patient to room near nurses station  Outcome: Progressing  Goal: Maintain or return to baseline ADL function  Description: INTERVENTIONS:  -  Assess patient's ability to carry out ADLs; assess patient's baseline for ADL function and identify physical deficits which impact ability to perform ADLs (bathing, care of mouth/teeth, toileting, grooming, dressing, etc )  - Assess/evaluate cause of self-care deficits   - Assess range of motion  - Assess patient's mobility; develop plan if impaired  - Assess patient's need for assistive devices and provide as appropriate  - Encourage maximum independence but intervene and supervise when necessary  - Involve family in performance of ADLs  - Assess for home care needs following discharge   - Consider OT consult to assist with ADL evaluation and planning for discharge  - Provide patient education as appropriate  Outcome: Progressing  Goal: Maintains/Returns to pre admission functional level  Description: INTERVENTIONS:  - Perform BMAT or MOVE assessment daily    - Set and communicate daily mobility goal to care team and patient/family/caregiver  - Collaborate with rehabilitation services on mobility goals if consulted  - Perform Range of Motion 3 times a day  - Reposition patient every 2 hours    - Dangle patient 3 times a day  - Stand patient 3 times a day  - Ambulate patient 3 times a day  - Out of bed to chair 3 times a day   - Out of bed for meals 3 times a day  - Out of bed for toileting  - Record patient progress and toleration of activity level   Outcome: Progressing     Problem: DISCHARGE PLANNING  Goal: Discharge to home or other facility with appropriate resources  Description: INTERVENTIONS:  - Identify barriers to discharge w/patient and caregiver  - Arrange for needed discharge resources and transportation as appropriate  - Identify discharge learning needs (meds, wound care, etc )  - Arrange for interpretive services to assist at discharge as needed  - Refer to Case Management Department for coordinating discharge planning if the patient needs post-hospital services based on physician/advanced practitioner order or complex needs related to functional status, cognitive ability, or social support system  Outcome: Progressing     Problem: Knowledge Deficit  Goal: Patient/family/caregiver demonstrates understanding of disease process, treatment plan, medications, and discharge instructions  Description: Complete learning assessment and assess knowledge base    Interventions:  - Provide teaching at level of understanding  - Provide teaching via preferred learning methods  Outcome: Progressing     Problem: MUSCULOSKELETAL - ADULT  Goal: Maintain or return mobility to safest level of function  Description: INTERVENTIONS:  - Assess patient's ability to carry out ADLs; assess patient's baseline for ADL function and identify physical deficits which impact ability to perform ADLs (bathing, care of mouth/teeth, toileting, grooming, dressing, etc )  - Assess/evaluate cause of self-care deficits   - Assess range of motion  - Assess patient's mobility  - Assess patient's need for assistive devices and provide as appropriate  - Encourage maximum independence but intervene and supervise when necessary  - Involve family in performance of ADLs  - Assess for home care needs following discharge   - Consider OT consult to assist with ADL evaluation and planning for discharge  - Provide patient education as appropriate  Outcome: Progressing  Goal: Maintain proper alignment of affected body part  Description: INTERVENTIONS:  - Support, maintain and protect limb and body alignment  - Provide patient/ family with appropriate education  Outcome: Progressing     Problem: MOBILITY - ADULT  Goal: Maintain or return to baseline ADL function  Description: INTERVENTIONS:  -  Assess patient's ability to carry out ADLs; assess patient's baseline for ADL function and identify physical deficits which impact ability to perform ADLs (bathing, care of mouth/teeth, toileting, grooming, dressing, etc )  - Assess/evaluate cause of self-care deficits   - Assess range of motion  - Assess patient's mobility; develop plan if impaired  - Assess patient's need for assistive devices and provide as appropriate  - Encourage maximum independence but intervene and supervise when necessary  - Involve family in performance of ADLs  - Assess for home care needs following discharge   - Consider OT consult to assist with ADL evaluation and planning for discharge  - Provide patient education as appropriate  Outcome: Progressing  Goal: Maintains/Returns to pre admission functional level  Description: INTERVENTIONS:  - Perform BMAT or MOVE assessment daily    - Set and communicate daily mobility goal to care team and patient/family/caregiver  - Collaborate with rehabilitation services on mobility goals if consulted  - Perform Range of Motion 3 times a day  - Reposition patient every 2 hours  - Dangle patient 3 times a day  - Stand patient 3 times a day  - Ambulate patient 3 times a day  - Out of bed to chair 3 times a day   - Out of bed for meals 3 times a day  - Out of bed for toileting  - Record patient progress and toleration of activity level   Outcome: Progressing     Problem: Nutrition/Hydration-ADULT  Goal: Nutrient/Hydration intake appropriate for improving, restoring or maintaining nutritional needs  Description: Monitor and assess patient's nutrition/hydration status for malnutrition  Collaborate with interdisciplinary team and initiate plan and interventions as ordered  Monitor patient's weight and dietary intake as ordered or per policy  Utilize nutrition screening tool and intervene as necessary  Determine patient's food preferences and provide high-protein, high-caloric foods as appropriate       INTERVENTIONS:  - Monitor oral intake, urinary output, labs, and treatment plans  - Assess nutrition and hydration status and recommend course of action  - Evaluate amount of meals eaten  - Assist patient with eating if necessary   - Allow adequate time for meals  - Recommend/ encourage appropriate diets, oral nutritional supplements, and vitamin/mineral supplements  - Order, calculate, and assess calorie counts as needed  - Recommend, monitor, and adjust tube feedings and TPN/PPN based on assessed needs  - Assess need for intravenous fluids  - Provide specific nutrition/hydration education as appropriate  - Include patient/family/caregiver in decisions related to nutrition  Outcome: Progressing

## 2022-01-21 NOTE — PLAN OF CARE
Problem: PHYSICAL THERAPY ADULT  Goal: Performs mobility at highest level of function for planned discharge setting  See evaluation for individualized goals  Description: Treatment/Interventions: Functional transfer training,LE strengthening/ROM,Therapeutic exercise,Endurance training,Bed mobility,Gait training          See flowsheet documentation for full assessment, interventions and recommendations  1/21/2022 1515 by Obdulia Mcclendon PTA  Outcome: Progressing  Note: Prognosis: Good  Problem List: Decreased strength,Decreased range of motion,Decreased endurance,Impaired balance,Decreased mobility,Decreased cognition,Impaired judgement,Decreased safety awareness,Orthopedic restrictions,Pain  Assessment: Pt  seen for PT treatment session this date with interventions consisting of   bed mobility and  transfers  w/ emphasis on improving pt's mobility  Pt  Currently performing bed mobility and   tx at (mod-max ) x 2 level of function  Please also refer to physical therapy recommendation for safe DC planning  In comparison to previous session, Pt  Is more fatigued limiting participation  Pt is in need of continued activity in PT to improve strength balance endurance mobility transfers and ambulation with return to maximize LOF  From PT/mobility standpoint, recommendation at time of d/c would be post acute rehab  in order to promote return to PLOF and independence  The patient's AM-[facility] Basic Mobility Inpatient Short Form Raw Score is 8  A Raw score of less than or equal to 16 suggests the patient may benefit from discharge to post-acute rehabilitation services  PT Discharge Recommendation: Post acute rehabilitation services          See flowsheet documentation for full assessment       1/21/2022 1254 by Obdulia Mcclendon PTA  Outcome: Progressing  Note: Prognosis: Good  Problem List: Decreased strength,Decreased endurance,Impaired balance,Decreased mobility,Decreased cognition,Impaired judgement,Decreased safety awareness,Orthopedic restrictions,Pain  Assessment: Pt  seen for PT treatment session this date with interventions consisting of   bed mobility, transfers and  gait training w/ emphasis on improving pt's ability to ambulate  Pt  Currently performing  tx and ambulation at ( max-mod) x 2 level of function  Please also refer to physical therapy recommendation for safe DC planning  In comparison to previous session, Pt  With improvements in activity tolerance  Able to ambulate very short distance with chair follow  Max cues for technique  Pt is in need of continued activity in PT to improve strength balance endurance mobility transfers and ambulation with return to maximize LOF  From PT/mobility standpoint, recommendation at time of d/c would be post acute rehab  in order to promote return to PLOF and independence  The patient's AM-[facility] Basic Mobility Inpatient Short Form Raw Score is 7  A Raw score of less than or equal to 16 suggests the patient may benefit from discharge to post-acute rehabilitation services  PT Discharge Recommendation: Post acute rehabilitation services          See flowsheet documentation for full assessment

## 2022-01-21 NOTE — PROGRESS NOTES
[address] [facility] [address]  Progress Note - Michelle Blaze 9/2/1931, 80 y o  female MRN: [id number]  Unit/Bed#: 778-26 Encounter: [id number]  Primary Care Provider: Nam Irving DO   Date and time admitted to hospital: 1/16/2022  5:43 PM    * Closed fracture of neck of left femur [facility])  Assessment & Plan  · Presented to the ER for evalauation after a fall at Assisted living facility [facility]  · She reports tripping on a rug, mechanical fall  Denies LOC, hitting her head  · CT shows - Acute left femoral transcervical neck fracture as described  · Originally OR planned for 1/18, however concern over cardiac / pulmonary status, so case will be delayed while patient is medically optimized  Surgery completed without complication on 1/65  · DVT proph - SC lovenox x 4 weeks  · Pain medication - geriatric dose oxycodone, breakthrough with dilaudid  Encourage weaning off quickly from these medications to avoid delirium  · Fall precautions  · Orthopedic Surgery Consult and ongoing support appreciated  WBAT  Dressing change scheduled for tomorrow and then daily  Watching for blood loss anemia  Keep feet off bed  · PT/OT needs SNF on discharge    Dysphagia  Assessment & Plan  Noted to have difficulty with eating on admission likely secondary to delerium  Per her son, she eats regular foods and thin liquids at home  Speech consult appreciated, required dysphagia 2 diet and NTL  Will trial diet out of restraints to see if there is improvement  Pressure injury of dorsum of left foot, stage 1  Assessment & Plan  Developed while patient in traction  Wound care consulted, Allevyn dressings ordered  Keep foot off of bed    Delirium  [address] confusion / delirium noted likely in the setting of underlying mild dementia, hyponatremia, pain medications and hard of hearing    Now appears to be at baseline, wean pain meds as able to avoid further delirium  Restraints removed  Per son she is nearly at baseline, just sleepy     Acute respiratory failure with hypoxia [facility])  Assessment & Plan  Wt Readings from Last 3 Encounters:   01/21/22 56 7 kg (125 lb)   10/25/21 57 5 kg (126 lb 12 8 oz)   08/17/21 61 5 kg (135 lb 9 3 oz)     Initially Suspecedt acute diastolic CHF with acute respiratory failure with hypoxia, hyponatremia  Unfortunately IV lasix worsened hyponatremia, currently appearing dry by exam   No signs of volume overload on CXR  Repeated Echocardiogram this admission (last study from 2018)  Only mild diastolic dysfunction noted  Titrate oxygen as needed  Consider CT chest --> no white count, no fevers, no oxygen needs can defer at this time  Will add respiratory protocol, Duoneb tx  Discussed case with cardiology and examined patient at bedside  No need for formal consult  Do not suspect acute CHF / volume overload  Consult pulmonology for perioperative exam - cleared the patient from their standpoint  Patient now on room air 1/19 with saturations >95%      Benign essential HTN  Assessment & Plan  Blood pressure initially elevated upon arrival to the ER  Most likely secondary to increased pain, intermittent agitation  Currently stable  Continue PTA blood pressure medication - metoprolol, norvasc increased to 10mg daily  Lisinopril held in the perioperative setting to prevent FIONA  Allow hydralazine PRN for SBP > 170    Hyponatremia-resolved as of 1/21/2022  Assessment & Plan  Sodium level at 118 --> rechecked at 123  1/19 sodium back to baseline of 130  Complete resolution at 136 as of 1/20  Possibly hypervolemic hyponatremia, however following additional doses of IV lasix, sodium worsened to 116  Now off diuretics, s/p tovalptan dosing with improvement  Appreciate nephrology consult and ongoing follow up / management  Further workup / orders per nephrology          VTE Pharmacologic Prophylaxis: VTE Score: 5 High Risk (Score >/= 5) - Pharmacological DVT Prophylaxis Ordered: enoxaparin (Lovenox)  Sequential Compression Devices Ordered  Patient Centered Rounds: I performed bedside rounds with nursing staff today  Discussions with Specialists or Other Care Team Provider: case management    Education and Discussions with Family / Patient: Updated  (son) via phone  Time Spent for Care: 30 minutes  More than 50% of total time spent on counseling and coordination of care as described above  Current Length of Stay: 5 day(s)  Current Patient Status: Inpatient   Certification Statement: The patient will continue to require additional inpatient hospital stay due to ensure medically clear for discharge tomorrow  Discharge Plan: Anticipate discharge tomorrow to rehab facility  Code Status: Level 3 - DNAR and DNI    Subjective:   Patient reports still having some pain in her feet but it feels better than before  She is much more lucid than on admission  Objective:     Vitals:   Temp (24hrs), Av 3 °F (37 4 °C), Min:99 1 °F (37 3 °C), Max:99 5 °F (37 5 °C)    Temp:  [99 1 °F (37 3 °C)-99 5 °F (37 5 °C)] 99 4 °F (37 4 °C)  HR:  [72-85] 85  Resp:  [16-18] 16  BP: ()/(42-54) 122/54  SpO2:  [86 %-97 %] 97 %  Body mass index is 22 86 kg/m²  Input and Output Summary (last 24 hours): Intake/Output Summary (Last 24 hours) at 2022 1430  Last data filed at 2022 1422  Gross per 24 hour   Intake 2572 08 ml   Output 550 ml   Net  08 ml       Physical Exam:   Physical Exam  Vitals and nursing note reviewed  Constitutional:       General: She is not in acute distress  Appearance: She is not ill-appearing  Comments: Resting, easily arousable   HENT:      Head: Normocephalic and atraumatic  Cardiovascular:      Rate and Rhythm: Normal rate and regular rhythm  Pulses: Normal pulses  Heart sounds: Normal heart sounds  No murmur heard  No gallop      Pulmonary:      Effort: Pulmonary effort is normal       Breath sounds: Normal breath sounds  No wheezing, rhonchi or rales  Abdominal:      General: Bowel sounds are normal       Palpations: Abdomen is soft  Tenderness: There is no abdominal tenderness  There is no guarding or rebound  Musculoskeletal:      Right lower leg: No edema  Left lower leg: No edema  Comments: Left hip with bandage   Skin:     Comments: Left foot stage 1 pressure ulcers with allevyn dressings   Neurological:      Mental Status: She is alert  Mental status is at baseline  She is disoriented  Psychiatric:         Mood and Affect: Mood normal          Behavior: Behavior normal           Additional Data:     Labs:  Results from last 7 days   Lab Units 01/21/22  0511 01/20/22  1128 01/19/22  0534   WBC Thousand/uL 5 86   < > 7 52   HEMOGLOBIN g/dL 11 8   < > 12 7   HEMATOCRIT % 36 2   < > 39 0   PLATELETS Thousands/uL 251   < > 269   NEUTROS PCT %  --   --  70   LYMPHS PCT %  --   --  14   MONOS PCT %  --   --  11   EOS PCT %  --   --  4    < > = values in this interval not displayed  Results from last 7 days   Lab Units 01/21/22  0511 01/17/22  1425 01/17/22  0422   SODIUM mmol/L 137   < > 123*   POTASSIUM mmol/L 4 7   < > 4 2   CHLORIDE mmol/L 99*   < > 91*   CO2 mmol/L 29   < > 23   BUN mg/dL 21   < > 9   CREATININE mg/dL 0 90   < > 0 76   ANION GAP mmol/L 9   < > 9   CALCIUM mg/dL 9 3   < > 7 5*   ALBUMIN g/dL  --   --  2 7*   TOTAL BILIRUBIN mg/dL  --   --  0 48   ALK PHOS U/L  --   --  54   ALT U/L  --   --  24   AST U/L  --   --  25   GLUCOSE RANDOM mg/dL 152*   < > 122    < > = values in this interval not displayed       Results from last 7 days   Lab Units 01/16/22  1832   INR  0 99     Results from last 7 days   Lab Units 01/20/22  0621   POC GLUCOSE mg/dl 101               Lines/Drains:  Invasive Devices  Report    Peripheral Intravenous Line            Peripheral IV 01/20/22 Left;Proximal;Ventral (anterior) Forearm 1 day    Peripheral IV 01/20/22 Right Forearm 1 day          Drain External Urinary Catheter <1 day                      Imaging: No pertinent imaging reviewed  Recent Cultures (last 7 days):         Last 24 Hours Medication List:   Current Facility-Administered Medications   Medication Dose Route Frequency Provider Last Rate    acetaminophen  650 mg Oral Q6H PRN Anna Funes MD      acetaminophen  975 mg Oral [facility] Anna Funes MD      albuterol  2 5 mg Nebulization Q6H PRN Anna Funes MD      amLODIPine  10 mg Oral Daily Anna Funes MD      ascorbic acid  500 mg Oral Daily Anna Funes MD      clonazePAM  1 5 mg Oral HS Anna Funes MD      digoxin  125 mcg Oral Daily Anna Funes MD      docusate sodium  100 mg Oral BID Anna Funes MD      enoxaparin  30 mg Subcutaneous Q12H [address] Олег Severe, PA-C      ferrous sulfate  325 mg Oral Daily With Breakfast Anna Funes MD      hydrALAZINE  5 mg Intravenous Q6H PRN Anna Funes MD      HYDROmorphone  0 2 mg Intravenous Q6H PRN Fredi Severe, PA-C      lactated ringers  50 mL/hr Intravenous Continuous Pamella Jara PA-C 50 mL/hr (01/21/22 1422)    Lactinex  1 each Oral TID With Meals Anna Funes MD      magnesium hydroxide  30 mL Oral Daily PRN Anna Funes MD      metoprolol tartrate  75 mg Oral Q12H [address] Anna Funes MD      ondansetron  4 mg Intravenous Q6H PRN Anna Funes MD      oxyCODONE  2 5 mg Oral Q6H PRN Олег Severe, PA-C      oxyCODONE  5 mg Oral Q6H PRN Олег Severe, PA-C      pantoprazole  40 mg Oral Early Morning Anna Funes MD      senna  1 tablet Oral BID Anna Funes MD          Today, Patient Was Seen By: Fredi Severe, PA-C    **Please Note: This note may have been constructed using a voice recognition system  **

## 2022-01-21 NOTE — SPEECH THERAPY NOTE
Speech Language/Pathology    Speech/Language Pathology Progress Note    Patient Name: Richard Self  DGFVK'H Date: 1/21/2022     Problem List  Principal Problem:    Closed fracture of neck of left femur ([address] )  Active Problems:    Esophageal reflux    Hypercholesterolemia    Hyponatremia    Paroxysmal atrial fibrillation (HCC)    Benign essential HTN    Acute respiratory failure with hypoxia (HCC)    Preoperative clearance    Delirium    Pressure injury of dorsum of left foot, stage 1       Past Medical History  Past Medical History:   Diagnosis Date    Abdominal distension     Last Assessed: [id number]    Abnormal weight loss     Last Assessed: 01Sep2017    Arthritis     Bronchitis     Bursitis of left hip     Lst Assessed: [id number]    Chest pain     Last Assessed: [id number]    Colon polyp     Dysuria     Last Assessed: [id number]    External hemorrhoids     Last Assessed: [id number]    Generalized anxiety disorder     GERD (gastroesophageal reflux disease)     History of echocardiogram 03/20/2018    EF 60%, mild to moderate mitral annular calcification  mild AS, mild TR   Hyperlipidemia     Hypertension     Hypomagnesemia     Last Assessed: [id number]    Lyme disease     Last Assessed: [id number]    Multiple renal cysts 7/5/2019    Nonrheumatic mitral valve regurgitation     Osteoporosis     Pneumonia of right middle lobe due to infectious organism     Last Assessed: 29Nov2016    SVT (supraventricular tachycardia)     Urinary tract infection         Past Surgical History  Past Surgical History:   Procedure Laterality Date    CATARACT EXTRACTION      HEMORRHOID SURGERY      HYSTERECTOMY      PELVIC FLOOR REPAIR         Subjective:  Pt received upright in chair asleep, pt awaking to name being called  Patient verbalized items she consumed for breakfast including cranberry juice and "my meds "  She denies having much of an appetite      Objective:  NTL via cup and straw:  100% acc across all trials, pt with appropriate oral transfer and timely swallow with no overt s/s of penetration or aspiration  Assessment:  Patient's son reporting yesterday that patient's baseline diet is reg/thin and she feeds herself i'ly including cutting items with a fork and knife  Yesterday's evaluation guarded 2' fatigue and suspected anesthesia effects, however pt lethargic again today on arrival with cues to attend to therapist   Patient continues to be in soft restraints, discussed with NSG  Patient will trial lunch tray i'ly without restraints, NSG present in room for this trial period  Following lunch restraints to be placed again for safety      Plan/Recommendations:  ST to cont care, pt's baseline diet reg/thin, determine safest PO

## 2022-01-21 NOTE — NURSING NOTE
Donaldson was removed at 0500 this morning  Patient did not void all shift  Bladder scanned for 96ml

## 2022-01-21 NOTE — OCCUPATIONAL THERAPY NOTE
Occupational Therapy Progress Note     Patient Name: Mamadou Atwood  TZCHRISTY Date: 1/21/2022  Problem List  Principal Problem:    Closed fracture of neck of left femur [facility])  Active Problems:    Esophageal reflux    Hypercholesterolemia    Hyponatremia    Paroxysmal atrial fibrillation (HCC)    Benign essential HTN    Acute respiratory failure with hypoxia (HCC)    Preoperative clearance    Delirium    Pressure injury of dorsum of left foot, stage 1          01/21/22 0924   OT Last Visit   OT Visit Date 01/21/22   Note Type   Note Type Treatment   Restrictions/Precautions   Weight Bearing Precautions Per Order Yes   LLE Weight Bearing Per Order WBAT   Other Precautions Restraints;Multiple lines;O2;Cognitive;THR;WBS   Pain Assessment   Pain Assessment Tool 0-10   Pain Score No Pain   ADL   Grooming Assistance 5  Supervision/Setup   Grooming Deficit Setup;Verbal cueing;Supervision/safety; Increased time to complete   Grooming Comments Pt required Set up A and cues to comb hair   LB Dressing Assistance 2  Maximal Assistance   LB Dressing Deficit Don/doff R sock; Don/doff L sock; Setup;Verbal cueing;Supervision/safety   Bed Mobility   Supine to Sit 2  Maximal assistance   Additional items Assist x 2; Increased time required;Verbal cues;LE management   Transfers   Sit to Stand 2  Maximal assistance   Additional items Assist x 2; Increased time required;Verbal cues   Stand to Sit 2  Maximal assistance   Additional items Assist x 2; Increased time required;Verbal cues   Additional Comments Use of RW   Functional Mobility   Functional Mobility 2  Maximal assistance  (Ax2)   Additional Comments Pt able to complete 8 small steps forward with use of RW and Max Ax2 with cues throughout  With assistance of another to bring lm chair behind      Additional items Rolling walker   Cognition   Overall Cognitive Status Impaired   Arousal/Participation Lethargic   Attention Difficulty attending to directions   Orientation Level Oriented to person;Oriented to place;Oriented to situation;Disoriented to time   Memory Decreased recall of precautions   Following Commands Follows one step commands with increased time or repetition   Comments I dont have any pain I just cant move my legs, When I fell I went down like a ton of bricks   Assessment   Assessment Patient participated in Skilled OT session this date with interventions consisting of ADL re training with the use of correct body mechnaics and  therapeutic activities to: increase activity tolerance   Co treatment with PTA secondary to complex medical condition of pt, max A of 2 required to achieve and maintain transitional movements, requiring the need of skilled therapeutic intervention of 2 therapists to achieve delivery of services  Patient agreeable to OT treatment session, upon arrival patient was found supine in bed  Supine to sit txfrs with Max Ax2, sit to stand txfrs from bed with Max Ax2, Pt completed 8 small steps forward with Max Ax2 and use of RW with assistance of another to bring lm chair behind  To increase posture, dynamic standing balance, balance during self cares, use of BUE  Pt required Max A to bernadine B  socks and S to complete grooming tasks  Patient requiring verbal cues for safety, verbal cues for correct technique and cognitive assistance to anticipate next step  Patient continues to be functioning below baseline level, occupational performance remains limited secondary to factors listed above and increased risk for falls and injury  From OT standpoint, recommendation at time of d/c would be Post acute rehabilitation services  The patient's raw score on the AM-PAC Daily Activity inpatient short form is 16, standardized score is 35 96, less than 39 4  Patients at this level are likely to benefit from discharge to post-acute rehabilitation services  Please refer to the recommendation of the Occupational Therapist for safe discharge planning     Plan   Goal Expiration Date 02/03/22   OT Treatment Day 1   OT Frequency 3-5x/wk   Recommendation   OT Discharge Recommendation Post acute rehabilitation services   AM-PAC Daily Activity Inpatient   Lower Body Dressing 2   Bathing 2   Toileting 2   Upper Body Dressing 3   Grooming 3   Eating 4   Daily Activity Raw Score 16   Daily Activity Standardized Score (Calc for Raw Score >=11) 35 96   AM-PAC Applied Cognition Inpatient   Following a Speech/Presentation 3   Understanding Ordinary Conversation 4   Taking Medications 3   Remembering Where Things Are Placed or Put Away 3   Remembering List of 4-5 Errands 2   Taking Care of Complicated Tasks 2   Applied Cognition Raw Score 17   Applied Cognition Standardized Score 36 52     Pt left seated in lm chair with restraints in place and all needs in reach

## 2022-01-21 NOTE — NURSING NOTE
Scds taken off and BLLE assessed at this time  Pt has 2 dressing which are dry and intact at this time  Cap refill assessed and and able to move toes when asked  Pt offers no complaints at this time  Will continue to monitor

## 2022-01-21 NOTE — PROGRESS NOTES
Orthopedics   Nadine Karoline 80 y o  female MRN: [id number]  Unit/Bed#: 412-01      Subjective:  80 y  o female post operative day 1 left hip hemiarthroplasty  Pt doing well  Pain controlled  Patient confused and in restraints keeps asking, "How did I get in here"      Labs:  0   Lab Value Date/Time    HCT 36 2 01/21/2022 0511    HCT 42 2 01/20/2022 1128    HCT 39 0 01/19/2022 0534    HCT 38 6 06/18/2015 1655    HCT 40 9 04/17/2015 1224    HCT 43 3 09/16/2014 0838    HGB 11 8 01/21/2022 0511    HGB 13 0 01/20/2022 1128    HGB 12 7 01/19/2022 0534    HGB 12 9 06/18/2015 1655    HGB 13 5 04/17/2015 1224    HGB 13 7 09/16/2014 0838    INR 0 99 01/16/2022 1832    WBC 5 86 01/21/2022 0511    WBC 7 66 01/20/2022 1128    WBC 7 52 01/19/2022 0534    WBC 3 26 (L) 06/18/2015 1655    WBC 5 09 04/17/2015 1224    WBC 4 37 09/16/2014 0838    CRP 10 7 (H) 03/18/2021 0606     Meds:    Current Facility-Administered Medications:     acetaminophen (TYLENOL) tablet 650 mg, 650 mg, Oral, Q6H PRN, Franklin Stewart MD, 650 mg at 01/20/22 1119    acetaminophen (TYLENOL) tablet 975 mg, 975 mg, Oral, Q8H [address], Franklin Stewart MD, 975 mg at 01/21/22 0508    albuterol inhalation solution 2 5 mg, 2 5 mg, Nebulization, Q6H PRN, Franklin Stewart MD    amLODIPine (NORVASC) tablet 10 mg, 10 mg, Oral, Daily, Franklin Stewart MD, 10 mg at 01/20/22 1121    ascorbic acid (VITAMIN C) tablet 500 mg, 500 mg, Oral, Daily, Franklin Stewart MD    clonazePAM (KlonoPIN) tablet 1 5 mg, 1 5 mg, Oral, HS, Franklin Stewart MD, 1 5 mg at 01/20/22 2103    digoxin (LANOXIN) tablet 125 mcg, 125 mcg, Oral, Daily, Franklin Stewart MD, 125 mcg at 01/20/22 1121    docusate sodium (COLACE) capsule 100 mg, 100 mg, Oral, BID, Fraknlin Stewart MD, 100 mg at 01/20/22 1706    ferrous sulfate tablet 325 mg, 325 mg, Oral, Daily With Breakfast, Franklin Stewart MD    heparin (porcine) subcutaneous injection 5,000 Units, 5,000 Units, Subcutaneous, Q8H [address], Franklin Stewart MD, 5,000 Units at 01/21/22 0508    hydrALAZINE (APRESOLINE) injection 5 mg, 5 mg, Intravenous, Q6H PRN, Petey Quispe MD    [DISCONTINUED] HYDROmorphone (DILAUDID) injection 0 2 mg, 0 2 mg, Intravenous, Q4H PRN, 0 2 mg at 01/17/22 0602 **OR** HYDROmorphone (DILAUDID) injection 0 5 mg, 0 5 mg, Intravenous, Q6H PRN, Petey Quispe MD, 0 5 mg at 01/20/22 2254    lactated ringers bolus 500 mL, 500 mL, Intravenous, Once PRN **AND** lactated ringers bolus 500 mL, 500 mL, Intravenous, Once PRN, Petey Quispe MD    lactated ringers infusion, 50 mL/hr, Intravenous, Continuous, Jeanie Colon PA-C, Last Rate: 50 mL/hr at 01/20/22 1700, 50 mL/hr at 01/20/22 1700    Lactinex PACK 1 each, 1 each, Oral, TID With Meals, Petey Quispe MD    magnesium hydroxide (MILK OF MAGNESIA) oral suspension 30 mL, 30 mL, Oral, Daily PRN, Petey Quispe MD    metoprolol tartrate (LOPRESSOR) tablet 75 mg, 75 mg, Oral, Q12H [address], Petey Quispe MD, 75 mg at 01/20/22 2104    ondansetron (ZOFRAN) injection 4 mg, 4 mg, Intravenous, Q6H PRN, Petey Quispe MD    oxyCODONE (ROXICODONE) IR tablet 5 mg, 5 mg, Oral, Q6H PRN, Petey Quispe MD, 5 mg at 01/21/22 0556    pantoprazole (PROTONIX) EC tablet 40 mg, 40 mg, Oral, Early Morning, Petey Quispe MD, 40 mg at 01/21/22 0508    senna (SENOKOT) tablet 8 6 mg, 1 tablet, Oral, BID, Petey Quispe MD, 8 6 mg at 01/20/22 1706    sodium chloride 0 9 % bolus 500 mL, 500 mL, Intravenous, Once PRN **AND** sodium chloride 0 9 % bolus 500 mL, 500 mL, Intravenous, Once PRN, Petey Quispe MD    Blood Culture:   Lab Results   Component Value Date    BLOODCX No Growth After 5 Days  03/18/2021    BLOODCX No Growth After 5 Days  03/18/2021     Wound Culture:   No results found for: WOUNDCULT    Ins and Outs:  I/O last 24 hours:   In: 1362 1 [P O :360; I V :1002 1]  Out: 780 [Urine:680; Blood:100]    Physical:  Vitals:    01/21/22 0627   BP: 126/52   Pulse: 78   Resp: 16   Temp: 99 5 °F (37 5 °C)   SpO2: 93% left lower extremity  · Dressing clean dry intact  · Sensation intact L2-S1  · Motor intact to EHL/FHL and ankle range of motion  Abduction pillow in place  · 2+ DP pulse    Assessment: 89 y  o female post operative day 1 left hip hemiarthroplasty  Plan:  · Up and out of bed with PT with posterior hip precautions  · Weightbearing as tolerated left lower extremity  · Keep heels off bed at all times  · DVT prophylaxis per Primary Service, would go with Lovenox for 4 weeks if okay with primary service  · Analgesics but limit narcotics due to confusion  · Dispo: Ortho will follow  · Will continue to assess for acute blood loss anemia   · Dressing change to left hip tomorrow then daily      Linda Lora PA-C

## 2022-01-21 NOTE — PHYSICAL THERAPY NOTE
PHYSICAL THERAPY NOTE          Patient Name: Gwyn COOL'S Date: 1/21/2022 01/21/22 1407   Note Type   Note Type Treatment   Pain Assessment   Pain Assessment Tool FLACC   Pain Location/Orientation Orientation: Left; Location: Hip   Pain Rating: FLACC (Rest) - Face 1   Pain Rating: FLACC (Rest) - Legs 0   Pain Rating: FLACC (Rest) - Activity 1   Pain Rating: FLACC (Rest) - Cry 0   Pain Rating: FLACC (Activity) - Face 1   Pain Rating: FLACC (Activity) - Legs 1   Pain Rating: FLACC (Activity) - Activity 0   Pain Rating: FLACC (Activity) - Cry 0   Pain Rating: FLACC (Activity) - Consolability 0   Score: FLACC (Activity) 2   Restrictions/Precautions   Weight Bearing Precautions Per Order Yes   LLE Weight Bearing Per Order WBAT   Other Precautions Fall Risk;Multiple lines;O2;Cognitive; Chair Alarm; Bed Alarm;WBS   General   Chart Reviewed Yes   Family/Caregiver Present No   Cognition   Overall Cognitive Status Impaired   Following Commands Follows one step commands with increased time or repetition   Subjective   Subjective I'm tired and want to take a nap now  Bed Mobility   Sit to Supine 2  Maximal assistance   Additional items Assist x 2; Increased time required;Verbal cues;LE management   Transfers   Sit to Stand 3  Moderate assistance   Additional items Assist x 2;Armrests; Increased time required;Verbal cues   Stand to Sit 3  Moderate assistance   Additional items Assist x 2; Increased time required;Verbal cues   Additional Comments stood with RW ~ 2 min for chair to be moved and bed placed behind pt  Unable to pivot back to bed     Balance   Static Sitting Fair   Dynamic Sitting Fair   Static Standing Poor +   Dynamic Standing Poor +   Endurance Deficit   Endurance Deficit Yes   Activity Tolerance   Activity Tolerance Patient limited by fatigue   Assessment   Prognosis Good   Problem List Decreased strength;Decreased range of motion;Decreased endurance; Impaired balance;Decreased mobility; Decreased cognition; Impaired judgement;Decreased safety awareness;Orthopedic restrictions;Pain   Assessment Pt  seen for PT treatment session this date with interventions consisting of   bed mobility and  transfers  w/ emphasis on improving pt's mobility  Pt  Currently performing bed mobility and   tx at (mod-max ) x 2 level of function  Please also refer to physical therapy recommendation for safe DC planning  In comparison to previous session, Pt  Is more fatigued limiting participation  Pt is in need of continued activity in PT to improve strength balance endurance mobility transfers and ambulation with return to maximize LOF  From PT/mobility standpoint, recommendation at time of d/c would be post acute rehab  in order to promote return to PLOF and independence  The patient's [facility] Basic Mobility Inpatient Short Form Raw Score is 8  A Raw score of less than or equal to 16 suggests the patient may benefit from discharge to post-acute rehabilitation services  Goals   LTG Expiration Date 02/03/22   Plan   Treatment/Interventions Functional transfer training;LE strengthening/ROM; Therapeutic exercise; Endurance training;Bed mobility;Gait training   Progress Slow progress, decreased activity tolerance   Recommendation   PT Discharge Recommendation Post acute rehabilitation services   [facility]ility [facility]   Turning in Bed Without Bedrails 1   Lying on Back to Sitting on Edge of Flat Bed 1   Moving Bed to Chair 2   Standing Up From Chair 2   Walk in Room 1   Climb 3-5 Stairs 1   Basic Mobility Inpatient Raw Score 8   Turning Head Towards Sound 4   Follow Simple Instructions 3   Low Function Basic Mobility Raw Score 15   Low Function Basic Mobility Standardized Score 23 9   Highest Level Of Mobility   JH-HLM Goal 3: Sit at edge of bed   JH-HLM Highest Level of Mobility 5: Stand (1 or more minutes)   JH-HLM Goal Achieved Yes   Education Education Provided Mobility training   Patient Reinforcement needed   End of Consult   Patient Position at End of Consult Supine;Bed/Chair alarm activated; All needs within reach   End of Consult Comments discussed POC with PT

## 2022-01-21 NOTE — ASSESSMENT & PLAN NOTE
Hospital confusion / delirium noted likely in the setting of underlying mild dementia, hyponatremia, pain medications and hard of hearing    Now appears to be at baseline, wean pain meds as able to avoid further delirium  Restraints removed  Per son she is nearly at baseline, just sleepy

## 2022-01-21 NOTE — CONSULTS
Consult d/t post-op (left hemiarthroplasty on 01/20/2022)  Nutrition Assessment/Ressessement viewable in the nutrition documentation  Assessed at high nutrition risk d/t dysphagia  Pt  tolerating diet  SLP on 1/20/22 recommended to continue dysphagia 2 w/  NTL; no changes made today after session  75% meal completions documented for breakfast and lunch today  For D/C tomorrow to [facility]  Due to potential for decreased intakes, lower than desirable BMI (22 86) in the elderly, and increased needs post-op, will provide MightlyShake at breakfast and Magic Cup at dinner  Will follow status should D/C plan change  Thank you for the consult

## 2022-01-21 NOTE — APP STUDENT NOTE
[place] STUDENT  Inpatient Progress Note for TRAINING ONLY  Not Part of Legal Medical Record       Progress Note - Richard Self 80 y o  female MRN: [id number]    Unit/Bed#: 184-46 Encounter: [id number]      Assessment:    81 y/o female with PMHx osteoporosis who sustained closed left femoral subcapital neck fracture after trip-and-fall on 01/16/2022, POD #1 s/p left hip hemiarthroplasty performed 01/20/2022  Afebrile, VSS  Labs reviewed: Grossly unremarkable  Hyponatremia has resolved (137 as of this AM at 0511)  Hgb WNL although down to 11 8 from 13 0  Per chart review, it appears that the patient has had acute delirious episodes during her stay here with waxing and waning orientation  Restraints remain in place on the bilateral UE as ordered  Plan:  -Recommend weightbearing on the LLE as tolerated  Continue with PT, out of bed today, would encourage sit-to-stand and ideally with early ambulation around room over the next couple of days   - Advance diet as tolerated  - Continue with current pain control measures: Tylenol q8h with narcotics on reserve PRN for moderate-severe pain  - Continue with elevation of heels to offload wounds, rest of wound care management on foot/ankle per wound care nurse recommendations  - Continue with routine labs, incentive spirometry, rest of medical management per primary team  Migue Tyler will continue to follow  Subjective:   Complains only of ankle pain, unable to localize  Otherwise doing well and without complaints    Objective:     Vitals: Blood pressure 122/54, pulse 85, temperature 99 4 °F (37 4 °C), resp  rate 16, height 5' 2" (1 575 m), weight 56 7 kg (125 lb), SpO2 97 %  ,Body mass index is 22 86 kg/m²        Intake/Output Summary (Last 24 hours) at 1/21/2022 1244  Last data filed at 1/21/2022 0853  Gross per 24 hour   Intake 1312 08 ml   Output 550 ml   Net 762 08 ml       Physical Exam: /54   Pulse 85   Temp 99 4 °F (37 4 °C)   Resp 16   Ht 5' 2" (1 575 m) Wt 56 7 kg (125 lb)   SpO2 97%   BMI 22 86 kg/m²   General appearance: Patient sitting comfortably on recliner  Alert during this examination  Oriented to person and time but not place  Non-toxic appearing  Head: atraumatic  Eyes: No evidence of conjunctival injection or icterus  Lungs: clear to auscultation bilaterally and Currently on 1 L supplemental O2 via NC  Heart: regular rate and rhythm and S1, S2 normal  Abdomen: Soft and non-tender  Extremities: LLE dressing clean, dry, and intact  There are dressings overlying the heels, medial/lateral malleoli of the bilateral extremities, as well as the dorsum of the left foot which appear clean, dry, and intact  No dressings were removed during this examination    Invasive Devices  Report    Peripheral Intravenous Line            Peripheral IV 01/20/22 Left;Proximal;Ventral (anterior) Forearm 1 day    Peripheral IV 01/20/22 Right Forearm 1 day          Drain            External Urinary Catheter <1 day                Lab, Imaging and other studies: I have personally reviewed pertinent reports      VTE Pharmacologic Prophylaxis: Heparin  VTE Mechanical Prophylaxis: sequential compression device

## 2022-01-21 NOTE — PHYSICAL THERAPY NOTE
PHYSICAL THERAPY NOTE          Patient Name: Jairo Gomez  LDPMI'E Date: 1/21/2022 01/21/22 4735   Note Type   Note Type Treatment   Pain Assessment   Pain Assessment Tool 0-10   Pain Score No Pain   Restrictions/Precautions   Weight Bearing Precautions Per Order Yes   LLE Weight Bearing Per Order WBAT   Other Precautions   (Fall Risk; Multiple lines; O2; Cognitive; Restraints; WBS; T)   General   Chart Reviewed Yes   Family/Caregiver Present No   Cognition   Overall Cognitive Status Impaired   Following Commands Follows one step commands with increased time or repetition   Subjective   Subjective States I fell like a ton of bricks  I can't believe how bad it is  Agreeable to therapy  Bed Mobility   Supine to Sit 2  Maximal assistance   Additional items Assist x 2;HOB elevated; Increased time required;Verbal cues;LE management   Additional Comments Increased time to scoot to EOB   Transfers   Sit to Stand 2  Maximal assistance   Additional items Assist x 2; Increased time required;Verbal cues   Stand to Sit 2  Maximal assistance   Additional items Assist x 2;Armrests; Increased time required;Verbal cues   Ambulation/Elevation   Gait pattern Excessively slow;Decreased foot clearance;Narrow GENARO   Gait Assistance 3  Moderate assist   Additional items Assist x 2;Verbal cues   Assistive Device Rolling walker   Distance 2' with chair follow   Balance   Static Sitting Fair   Dynamic Sitting Fair   Static Standing Poor +   Dynamic Standing Poor +   Ambulatory Poor +   Endurance Deficit   Endurance Deficit Yes   Activity Tolerance   Activity Tolerance Patient limited by fatigue   Assessment   Prognosis Good   Problem List Decreased strength;Decreased endurance; Impaired balance;Decreased mobility; Decreased cognition; Impaired judgement;Decreased safety awareness;Orthopedic restrictions;Pain   Assessment Pt  seen for PT treatment session this date with interventions consisting of   bed mobility, transfers and  gait training w/ emphasis on improving pt's ability to ambulate  Pt  Currently performing  tx and ambulation at ( max-mod) x 2 level of function  Please also refer to physical therapy recommendation for safe DC planning  In comparison to previous session, Pt  With improvements in activity tolerance  Able to ambulate very short distance with chair follow  Max cues for technique  Pt is in need of continued activity in PT to improve strength balance endurance mobility transfers and ambulation with return to maximize LOF  From PT/mobility standpoint, recommendation at time of d/c would be post acute rehab  in order to promote return to PLOF and independence  The patient's [facility] Basic Mobility Inpatient Short Form Raw Score is 7  A Raw score of less than or equal to 16 suggests the patient may benefit from discharge to post-acute rehabilitation services  Goals   LTG Expiration Date 02/03/22   Plan   Treatment/Interventions Functional transfer training;LE strengthening/ROM; Therapeutic exercise; Endurance training;Bed mobility;Gait training   Progress Slow progress, decreased activity tolerance   Recommendation   PT Discharge Recommendation Post acute rehabilitation services   [facility] Basic Mobility Inpatient   Turning in Bed Without Bedrails 1   Lying on Back to Sitting on Edge of Flat Bed 1   Moving Bed to Chair 1   Standing Up From Chair 2   Walk in Room 1   Climb 3-5 Stairs 1   Basic Mobility Inpatient Raw Score 7   Turning Head Towards Sound 4   Follow Simple Instructions 3   Low Function Basic Mobility Raw Score 14   Low Function Basic Mobility Standardized Score 22 01   Highest Level Of Mobility   JH-HLM Goal 2: Bed activities/Dependent transfer   JH-HLM Highest Level of Mobility 4: Move to chair/commode   JH-HLM Goal Achieved Yes   Education   Education Provided Mobility training   Patient Reinforcement needed   End of Consult   Patient Position at End of Consult Bedside chair; All needs within reach   End of Consult Comments discussed POC with PT   Co-treat with DESTINEY this session due to complexity of pt and requires A x 2 to provided skilled interventions

## 2022-01-21 NOTE — CASE MANAGEMENT
Case Management Discharge Planning Note    Patient name Karlos Moe  Location /170-17 MRN [id number]  : 1931 Date 2022       Current Admission Date: 2022  Current Admission Diagnosis:Closed fracture of neck of left femur [facility])   Patient Active Problem List    Diagnosis Date Noted    Pressure injury of dorsum of left foot, stage 1 2022    Delirium 2022    Acute respiratory failure with hypoxia ([address] ) 2022    Preoperative clearance 2022    Closed fracture of neck of left femur ([address] ) 2022    Acute UTI 2021    Urinary retention 2021    Diarrhea 2021    sepsis 2021    FIONA (acute kidney injury) ([address] ) 2021    Stage 3b chronic kidney disease ([address] ) 2021    Lactic acidosis 2021    Pulmonary nodule 2021    Benign essential HTN 2019    Sick sinus syndrome ([address] ) 2019    Multiple renal cysts 2019    Hepatic steatosis 2019    Left eye pain 2019    Acute metabolic encephalopathy     Acute cystitis with hematuria 2019    Lung mass 2019    Acute encephalopathy     Hypertensive urgency 10/19/2018    Debilitated 2018    Eczema 2018    Paroxysmal atrial fibrillation ([address] ) 2018    Sinus bradycardia 2018    History of frequent urinary tract infections 2018    Right lower lobe lung mass 2018    Hyponatremia 2018    Esophageal mass 2018    Cough 2018    HCAP (healthcare-associated pneumonia) 2018    Acute febrile illness 2018    Chronic constipation 2017    Generalized osteoarthritis of multiple sites 2017    Vitamin D deficiency 2017    Bilateral carotid artery disease ([address] ) 10/04/2017    Breast mass, left 10/03/2016    Gomez's esophagus 2016    Neuralgia 2015    Esophageal reflux 2015    Glucose intolerance (impaired glucose tolerance) 09/19/2014    Colon, diverticulosis 01/29/2013    Generalized anxiety disorder 09/04/2012    Hypercholesterolemia 06/19/2012    Osteoporosis 06/19/2012    SVT (supraventricular tachycardia) ([facility] [facility] ) 06/19/2012      LOS (days): 5  Geometric Mean LOS (GMLOS) (days): 2 90  Days to GMLOS:-1 7     OBJECTIVE:  Risk of Unplanned Readmission Score: 14         Current admission status: Inpatient   Preferred Pharmacy:   [facility], [address]  [address] [place] [address]  Phone: [phone] Fax: [phone]    Primary Care Provider: Morgan Smith DO    Primary Insurance: MEDICARE  Secondary Insurance: [place] DETAILS:Pt was accepted at [facility] and [facility]; and 5th floor able to accept pt pending bed availability ([facility] bed on Monday on the 5th floor)  Cm will check with pt's son as to which facility he prefers

## 2022-01-22 PROBLEM — D62 ACUTE BLOOD LOSS ANEMIA: Status: ACTIVE | Noted: 2022-01-01

## 2022-01-22 PROBLEM — N18.31 STAGE 3A CHRONIC KIDNEY DISEASE (HCC): Status: ACTIVE | Noted: 2022-01-01

## 2022-01-22 NOTE — ASSESSMENT & PLAN NOTE
· Developed while patient in traction  · Wound care consulted, Allevyn dressings ordered  · Keep foot off of bed

## 2022-01-22 NOTE — NURSING NOTE
Discharged in no acute distress to [facility]  Transported by [place] ambulance

## 2022-01-22 NOTE — ASSESSMENT & PLAN NOTE
· Noted to have difficulty with eating on admission likely secondary to delerium  · Per her son, she eats regular foods and thin liquids at home  · Speech consult appreciated, required dysphagia 2 diet and NTL - follow with speech at [facility] for diet adjustments  · Aspiration precautions

## 2022-01-22 NOTE — DISCHARGE INSTRUCTIONS
Skin Care orders:   1- Hydraguard to sacrum, buttocks  BID and PRN   2- EHOB  cushion when out of bed in chair  3- Moisturize skin daily with skin nourishing cream   4- Elevate heels to offload pressure  5- Turn/reposition q2h for pressure re-distribution on skin   6  Allevyn foam to bilateral heels and top of the left foot clemencia with a P on the right and date and a T on the left heel and top of the left foot peel and check skin integrity every shift and change every 3 days       Staple removal 16 days postop  Abduction pillow between legs to prevent crossing legs when in bed for 6 weeks  Wound care to ulcers on ankle and feet region  Weight-bearing as tolerated with walker left lower extremity  Posterior hip precautions in therapy  Physical therapy for ambulation and bipolar replacement protocol  Maintain dry sterile dressing to left hip incision  Do not soak left hip incision in water for 3 weeks      Minimally Invasive Total Hip Replacement   WHAT YOU SHOULD KNOW:   Minimally invasive total hip replacement is surgery to replace a damaged hip joint with an implant  AFTER YOU LEAVE:   Medicines:   · Anticoagulants    are a type of blood thinner medicine that helps prevent clots  Clots can cause strokes, heart attacks, and death  These medicines may cause you to bleed or bruise more easily  ¨ Watch for bleeding from your gums or nose  Watch for blood in your urine and bowel movements  Use a soft washcloth and a soft toothbrush  If you shave, use an electric razor  Avoid activities that can cause bruising or bleeding  ¨ Tell your healthcare provider about all medicines you take because many medicines cannot be used with anticoagulants  Do not start or stop any medicines unless your healthcare provider tells you to  Tell your dentist and other healthcare providers that you take anticoagulants  Wear a bracelet or necklace that says you take this medicine       ¨ You will need regular blood tests so your healthcare provider can decide how much medicine you need  Take anticoagulants exactly as directed  Tell your healthcare provider right away if you forget to take the medicine, or if you take too much  ¨ If you take warfarin, some foods can change how your blood clots  Do not make major changes to your diet while you take warfarin  Warfarin works best when you eat about the same amount of vitamin K every day  Vitamin K is found in green leafy vegetables, broccoli, grapes, and other foods  Ask for more information about what to eat when you take warfarin  · NSAIDs  help decrease swelling, pain, and fever  This medicine is available with or without a doctor's order  NSAIDs can cause stomach bleeding or kidney problems in certain people  If you take blood thinner medicine, always ask your PHP if NSAIDs are safe for you  Always read the medicine label and follow directions  · Prescription pain medicine  may be given  Ask your PHP how to take this medicine safely  · Antibiotics  help treat or prevent an infection caused by bacteria  · Stool softeners  make it easier for you to have a bowel movement  You may need this medicine to treat or prevent constipation  · Take your medicine as directed  Contact your PHP if you think your medicine is not helping or if you have side effects  Tell him if you are allergic to any medicine  Keep a list of the medicines, vitamins, and herbs you take  Include the amounts, and when and why you take them  Bring the list or the pill bottles to follow-up visits  Carry your medicine list with you in case of an emergency  Follow up with your PHP or orthopedist as directed: You may need to return to have your wound checked and stitches or staples removed  Write down your questions so you remember to ask them during your visits  Physical therapy:  A physical therapist teaches you exercises to help improve movement and strength, and to decrease pain     Self-care:   · Use a cane, walker, or crutches as directed  These devices will help decrease your risk of falling  · Wear pressure stockings  These are long, tight stockings that put pressure on your legs to promote blood flow and prevent clots  · Keep your knees apart  Place a pillow or wedge between your knees when you sit or lie down  This helps support your hip  · Prevent dislocation of your hip implant:      ¨ Do not lean forward when you are in bed or sit up with your legs straight out in front of you  ¨ Do not  sit on a low chair  Use armrests when you rise from a sitting position to decrease the force and pressure on your hips  ¨ Do not cross your legs  ¨ Lift objects with your knees bent rather than straight  Contact your PHP or orthopedist if:   · You have a fever  · You have chills, a cough, or feel weak and achy  · You have nausea and are vomiting  · You have more pain and swelling in your hip joint, even after you take pain medicine  · You have questions or concerns about your condition or care  Seek care immediately or call 911 if:   · You have a seizure or feel confused  · Blood soaks through your bandage  · Your incision comes apart  · Your incision is red, swollen, or draining pus  · You urinate less than usual or not at all  · Your leg feels warm, tender, and painful  It may look swollen and red  · You feel lightheaded, short of breath, and have chest pain  · You cough up blood  © 2014 [address] is for End User's use only and may not be sold, redistributed or otherwise used for commercial purposes  All illustrations and images included in CareNotes® are the copyrighted property of [employer]  or Kai Meneses  The above information is an  only  It is not intended as medical advice for individual conditions or treatments   Talk to your doctor, nurse or pharmacist before following any medical regimen to see if it is safe and effective for you  Acute Delirium   WHAT YOU NEED TO KNOW:   Acute delirium is temporary confusion and change in consciousness  Consciousness is how alert and aware of your surroundings you are  You may have trouble remembering, listening, or doing things you usually do  Acute delirium may be caused by an illness, injury, surgery, medicine, or alcohol or drug use  DISCHARGE INSTRUCTIONS:   Call your local emergency number (911 in the [address] [facility] [facility]) if:   · You want to harm yourself or others  Seek immediate care if:   · You cannot eat or drink, and you feel weak or dizzy  Call your doctor if:   · You have new or worsening trouble remembering  · You have new or worsening trouble sleeping  · You have questions or concerns about your condition or care  Self-care:   · Talk to counselors  Healthcare providers will work with you to help you feel calm and talk about your thoughts and feelings  They will help you remember where you are  They will work to keep you and those around you safe  · Talk to family and friends  Talk to those around you when you feel lonely or sad  Ask for help when you forget the time, place, or names of people around you  · Change your surroundings  Keep your home or room quiet and comfortable  Surround yourself with familiar objects  Keep a calendar and clock nearby to remind you of the date and time  Keep pictures of your family and friends nearby  This will help you stay aware of yourself and the area around you  It may also help you feel safe and calm  · Keep your mind and body active  Do activities that you love, such as art, gardening, or listening to music  Call or visit people often  · Take all of your medicines as directed  This will help prevent delirium caused by medicines  · Write daily schedules and routines  Record medical appointments, times to take your medicines, meal times, or any other things to remember   Write down reminders to use the bathroom if you have trouble remembering  · Eat healthy foods  Healthy foods will help prevent nutrition problems  Examples are fruits, vegetables, whole-grain breads, low-fat dairy products, beans, lean meats, and fish  Ask if you need to be on a special diet  Your healthcare provider may also recommend vitamins or other supplements if needed  Do not take anything without talking to your provider first          · Drink liquids as directed  Liquids will help prevent dehydration and constipation  · Exercise as directed  Exercise such as walking can help improve your mood and ability to think clearly  Exercise can also help you sleep more easily  Your healthcare provider can help you create a safe exercise plan  Follow up with your healthcare provider as directed:  Ask for help if you have a drug or alcohol problem  You may need several appointments to see if your treatment is working  Write down your questions so you remember to ask them during your visits  © [employer] 2021 Information is for End User's use only and may not be sold, redistributed or otherwise used for commercial purposes  All illustrations and images included in CareNotes® are the copyrighted property of A D A M , Inc  or [address] [place], [place] [address]valerie bryanna   The above information is an  only  It is not intended as medical advice for individual conditions or treatments  Talk to your doctor, nurse or pharmacist before following any medical regimen to see if it is safe and effective for you  Dysphagia   WHAT YOU NEED TO KNOW:   Dysphagia is trouble swallowing  It occurs when you have trouble moving food or liquid from your mouth to your esophagus or down to your stomach  It may occur when you eat, drink, or any time you try to swallow  DISCHARGE INSTRUCTIONS:   Return to the emergency department if:   · You choke on your own saliva  · You have chest pain       · You have shortness of breath  · You cannot eat or drink liquids at all  Contact your healthcare provider if:   · You lose weight without trying  · Your signs and symptoms get worse, or you have new signs or symptoms  · You have signs or symptoms of dehydration, such as increased thirst, dark yellow urine, or little or no urine  · You get colds often  · You have questions or concerns about your condition or care  Nutrition:  You may need to change the texture of the foods you eat to help reduce choking problems  Your healthcare provider may show you how to thicken liquids or soften foods to make them easier to swallow  Follow up with your healthcare provider as directed:  Write down your questions so you remember to ask them during your visits  © Copyright [employer] 2021 Information is for End User's use only and may not be sold, redistributed or otherwise used for commercial purposes  All illustrations and images included in CareNotes® are the copyrighted property of A D A M , Inc  or [facility] Chiquita Franklin   The above information is an  only  It is not intended as medical advice for individual conditions or treatments  Talk to your doctor, nurse or pharmacist before following any medical regimen to see if it is safe and effective for you  Hyponatremia   WHAT YOU NEED TO KNOW:   Hyponatremia occurs when the amount of sodium (salt) in your blood is lower than normal  Sodium is an electrolyte (mineral) that helps your muscles, heart, and digestive system work properly  It helps control blood pressure and fluid balance  DISCHARGE INSTRUCTIONS:   Follow up with your healthcare provider as directed: You may need to return for more tests  Write down your questions so you remember to ask them during your visits  Nutrition:  You may need to increase your intake of sodium  Foods that are high in sodium include milk, packaged snacks such as pretzels, or processed meats (nix, sausage, and ham)   Ask your dietitian to help you create a meal plan that is right for you  Liquids: Follow your healthcare provider's advice if you need to limit the amount of liquid you drink  Ask how much liquid to drink each day and which liquids are best for you  You may be asked to drink liquids that have water, sugar, and salt, such as juices, milk, or sports drinks  These liquids help your body hold in fluid and prevent dehydration  Contact your healthcare provider if:   · You have muscle cramps or twitching  · You feel very weak or tired  · You have nausea or are vomiting  · You have questions or concerns about your condition or care  Return to the emergency department if:   · You have a seizure  · You have an irregular heartbeat  · You have trouble breathing  · You cannot move your arms and legs  · You are confused or cannot think clearly  © Copyright [employer] 2021 Information is for End User's use only and may not be sold, redistributed or otherwise used for commercial purposes  All illustrations and images included in CareNotes® are the copyrighted property of A D A M , Inc  or [facility] Chiquita bryanna   The above information is an  only  It is not intended as medical advice for individual conditions or treatments  Talk to your doctor, nurse or pharmacist before following any medical regimen to see if it is safe and effective for you  Pressure Injury   WHAT YOU NEED TO KNOW:   A pressure injury is an injury to the skin and tissue under the skin  A pressure injury is also called a pressure sore, bedsore, wound, or decubitus ulcer  Pressure injuries can form over any area but are most common on the back, buttocks, hips, and heels  Pressure injuries can also happen in your mouth  DISCHARGE INSTRUCTIONS:   Call your doctor or specialist if:   · You have a fever  · You have green or yellow drainage or a bad smell coming from your pressure injury      · An area of your skin is very red and firm  · You have new pain, or pain that is getting worse  · You have questions or concerns about your condition or care  Medicines:   · Medicines  may be given to decrease pain or to help treat or prevent a bacterial infection  Ask how to take these medicines safely  · Take your medicine as directed  Contact your healthcare provider if you think your medicine is not helping or if you have side effects  Tell him or her if you are allergic to any medicine  Keep a list of the medicines, vitamins, and herbs you take  Include the amounts, and when and why you take them  Bring the list or the pill bottles to follow-up visits  Carry your medicine list with you in case of an emergency  Change your position often:  Change your position every 2 hours if you are in a bed all day  Change your position every hour if you are in a wheelchair all day  Set an alarm to help remind you when it is time to turn  Keep a written turning schedule to help you remember to turn  Care for your skin:       · Clean and cover your injury  You may need to keep a bandage over your injury to protect the skin from more damage  You may also need to clean it with saline  Ask your healthcare provider for more information about when and how to change your bandages  · Keep your skin clean, dry, and moisturized  Use mild soap and warm water to clean your skin  Do not rub or scrub when you wash  Do not use products that contain alcohol, because they can dry out your skin  Gently pat your skin dry  Do not rub your skin with a towel  Apply lotion or a moisturizer on your skin often  · Protect your skin from tubes and medical devices, such as oxygen  Use a dressing to cushion the areas of skin where tubing or devices may be laid  Move the tubing or device and look at the skin every day  You may need to remove or move the tubing or device to another place  · Protect the skin over bony areas    Use pillows or foam wedges to keep bony areas from touching, and to relieve pressure  For example, put a pillow or foam wedge between your knees to keep them from pressing on one another  Place a pillow or foam wedge under you to keep your hip raised when you lie on your side  Do not rest directly on your hipbone  Put a foam pad or pillow under your legs from calf to ankle when you lie on your back  The pad or pillow should raise your heels so that they are not touching the bed  · Use specialty equipment, mattresses, or pads to decrease pressure  A draw sheet or large pad under you may help others move you up in bed  An overhead trapeze can help you change positions in bed  Mattresses and overlays made to provide more cushioning may help decrease the risk of pressure injuries  Examples include a foam mattress pad and air or water mattresses  Ask about equipment that may be right for you and how you use it  Eat a variety of healthy foods:  Healthy foods include fruits, vegetables, whole-grain breads, and fish  Foods that are high in protein may help your pressure injury heal  This includes lean meats, beans, milk, yogurt, and cheese  Nutrition shakes may also give you extra calories and protein if you have trouble eating or are underweight  Do not smoke:  Nicotine can damage your skin and slow healing  Do not use e-cigarettes or smokeless tobacco in place of cigarettes or to help you quit  They still contain nicotine  Ask your healthcare provider for information if you currently smoke and need help to quit  Follow up with your healthcare provider as directed: You may need to return often so healthcare provider can check your injury  Write down your questions so you remember to ask them during your visits  © Copyright [employer] 2021 Information is for End User's use only and may not be sold, redistributed or otherwise used for commercial purposes   All illustrations and images included in CareNotes® are the copyrighted property of A D A M , Inc  or [facility] Chiquita Franklin   The above information is an  only  It is not intended as medical advice for individual conditions or treatments  Talk to your doctor, nurse or pharmacist before following any medical regimen to see if it is safe and effective for you

## 2022-01-22 NOTE — ASSESSMENT & PLAN NOTE
Wt Readings from Last 3 Encounters:   01/22/22 50 9 kg (112 lb 3 4 oz)   10/25/21 57 5 kg (126 lb 12 8 oz)   08/17/21 61 5 kg (135 lb 9 3 oz)     Initially Suspected acute diastolic CHF with acute respiratory failure with hypoxia, hyponatremia  Unfortunately IV lasix worsened hyponatremia  No signs of volume overload on CXR  Repeated Echocardiogram this admission (last study from 2018)   Only mild diastolic dysfunction noted  respiratory protocol, Duoneb tx  Consult pulmonology for perioperative exam - cleared the patient from their standpoint

## 2022-01-22 NOTE — CASE MANAGEMENT
Case Management Discharge Planning Note    Patient name Gwyn Salguero  Location /[id number] MRN [id number]  : 1931 Date 2022       Current Admission Date: 2022  Current Admission Diagnosis:Closed fracture of neck of left femur [facility])   Patient Active Problem List    Diagnosis Date Noted    Stage 3a chronic kidney disease ([address] ) 2022    Acute blood loss anemia 2022    Dysphagia 2022    Pressure injury of dorsum of left foot, stage 1 2022    Delirium 2022    Acute respiratory failure with hypoxia ([address] ) 2022    Preoperative clearance 2022    Closed fracture of neck of left femur ([address] ) 2022    Acute UTI 2021    Urinary retention 2021    Diarrhea 2021    sepsis 2021    FIONA (acute kidney injury) ([address] ) 2021    Stage 3b chronic kidney disease ([address] ) 2021    Lactic acidosis 2021    Pulmonary nodule 2021    Benign essential HTN 2019    Sick sinus syndrome ([address] ) 2019    Multiple renal cysts 2019    Hepatic steatosis 2019    Left eye pain 2019    Acute metabolic encephalopathy     Acute cystitis with hematuria 2019    Lung mass 2019    Acute encephalopathy     Hypertensive urgency 10/19/2018    Debilitated 2018    Eczema 2018    Paroxysmal atrial fibrillation ([address] ) 2018    Sinus bradycardia 2018    History of frequent urinary tract infections 2018    Right lower lobe lung mass 2018    Hyponatremia 2018    Esophageal mass 2018    Cough 2018    HCAP (healthcare-associated pneumonia) 2018    Acute febrile illness 2018    Chronic constipation 2017    Generalized osteoarthritis of multiple sites 2017    Vitamin D deficiency 2017    Bilateral carotid artery disease ([address] ) 10/04/2017    Breast mass, left 10/03/2016    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/24/2016    Neuralgia 09/04/2015    Esophageal reflux 01/14/2015    Glucose intolerance (impaired glucose tolerance) 09/19/2014    Colon, diverticulosis 01/29/2013    Generalized anxiety disorder 09/04/2012    Hypercholesterolemia 06/19/2012    Osteoporosis 06/19/2012    SVT (supraventricular tachycardia) ([facility] ) 06/19/2012      LOS (days): 6  Geometric Mean LOS (GMLOS) (days): 2 90  Days to GMLOS:-2 8     OBJECTIVE:  Risk of Unplanned Readmission Score: 15         Current admission status: Inpatient   Preferred Pharmacy:   [facility] [address]  [address] [place] [address]  Phone: [phone] Fax: [phone]    Primary Care Provider: Nam Irving DO    Primary Insurance: MEDICARE  Secondary Insurance: [employer] DETAILS:pt being discharged to [facility] today  1300  from [facility]  Spoke to  the facility this am and made them aware of earlier  time  AVS and covid swab faxed to facility [phone]  son Hannah Rodríguez made aware of same

## 2022-01-22 NOTE — ASSESSMENT & PLAN NOTE
Lab Results   Component Value Date    EGFR 62 01/22/2022    EGFR 56 01/21/2022    EGFR 41 01/20/2022    CREATININE 0 83 01/22/2022    CREATININE 0 90 01/21/2022    CREATININE 1 16 01/20/2022   stable  Check bmp in 5-7 days after discharge   Avoid nephrotoxic drugs and hypotension

## 2022-01-22 NOTE — PROGRESS NOTES
Progress Note - Orthopedics   Rudy Rust 80 y o  female MRN: [id number]  Unit/Bed#: 412-01      Subjective:    80 y  o female POD2 status post left hip hemiarthroplasty  No acute events, no complaints  Pt doing well  Pain controlled  Denies fevers chills, CP, SOB      Labs:  0   Lab Value Date/Time    HCT 32 4 (L) 01/22/2022 0639    HCT 36 2 01/21/2022 0511    HCT 42 2 01/20/2022 1128    HCT 38 6 06/18/2015 1655    HCT 40 9 04/17/2015 1224    HCT 43 3 09/16/2014 0838    HGB 10 1 (L) 01/22/2022 0639    HGB 11 8 01/21/2022 0511    HGB 13 0 01/20/2022 1128    HGB 12 9 06/18/2015 1655    HGB 13 5 04/17/2015 1224    HGB 13 7 09/16/2014 0838    INR 0 99 01/16/2022 1832    WBC 6 38 01/22/2022 0639    WBC 5 86 01/21/2022 0511    WBC 7 66 01/20/2022 1128    WBC 3 26 (L) 06/18/2015 1655    WBC 5 09 04/17/2015 1224    WBC 4 37 09/16/2014 0838    CRP 10 7 (H) 03/18/2021 0606       Meds:    Current Facility-Administered Medications:     acetaminophen (TYLENOL) tablet 650 mg, 650 mg, Oral, Q6H PRN, Jamison Krabbe, MD, 650 mg at 01/20/22 1119    acetaminophen (TYLENOL) tablet 975 mg, 975 mg, Oral, Q8H [address], Jamison Krabbe, MD, 975 mg at 01/22/22 0534    albuterol inhalation solution 2 5 mg, 2 5 mg, Nebulization, Q6H PRN, Jamison Krabbe, MD    amLODIPine (NORVASC) tablet 10 mg, 10 mg, Oral, Daily, Jamison Krabbe, MD, 10 mg at 01/22/22 1055    ascorbic acid (VITAMIN C) tablet 500 mg, 500 mg, Oral, Daily, Jamison Krabbe, MD, 500 mg at 01/22/22 1054    clonazePAM (KlonoPIN) tablet 1 5 mg, 1 5 mg, Oral, HS, Jamison Krabbe, MD, 1 5 mg at 01/21/22 2051    digoxin (LANOXIN) tablet 125 mcg, 125 mcg, Oral, Daily, Jamison Krabbe, MD, 125 mcg at 01/22/22 1055    docusate sodium (COLACE) capsule 100 mg, 100 mg, Oral, BID, Jamison Krabbe, MD, 100 mg at 01/22/22 1054    enoxaparin (LOVENOX) subcutaneous injection 30 mg, 30 mg, Subcutaneous, Q12H [address], Bobie Bosworth, PA-C, 30 mg at 01/22/22 1054    ferrous sulfate tablet 325 mg, 325 mg, Oral, Daily With Breakfast, Kai Villanueva MD, 325 mg at 01/22/22 1054    hydrALAZINE (APRESOLINE) injection 5 mg, 5 mg, Intravenous, Q6H PRN, MD Oneida Shay  [DISCONTINUED] HYDROmorphone (DILAUDID) injection 0 2 mg, 0 2 mg, Intravenous, Q4H PRN, 0 2 mg at 01/17/22 0602 **OR** HYDROmorphone (DILAUDID) injection 0 2 mg, 0 2 mg, Intravenous, Q6H PRN, Libertad Rico PA-C, 0 2 mg at 01/22/22 0135    Lactinex PACK 1 each, 1 each, Oral, TID With Meals, Kai Villanueva MD    magnesium hydroxide (MILK OF MAGNESIA) oral suspension 30 mL, 30 mL, Oral, Daily PRN, Kai Villanueva MD    metoprolol tartrate (LOPRESSOR) tablet 75 mg, 75 mg, Oral, Q12H [address], Kai Villanueva MD, 75 mg at 01/22/22 1055    ondansetron (ZOFRAN) injection 4 mg, 4 mg, Intravenous, Q6H PRN, Kai Villanueva MD    oxyCODONE (ROXICODONE) IR tablet 2 5 mg, 2 5 mg, Oral, Q6H PRN, Libertad Rico PA-C    oxyCODONE (ROXICODONE) IR tablet 5 mg, 5 mg, Oral, Q6H PRN, Libertad Rico PA-C, 5 mg at 01/21/22 1631    pantoprazole (PROTONIX) EC tablet 40 mg, 40 mg, Oral, Early Morning, Kai Villanueva MD, 40 mg at 01/22/22 0533    senna (SENOKOT) tablet 8 6 mg, 1 tablet, Oral, BID, aKi Villanueva MD, 8 6 mg at 01/22/22 1054    Blood Culture:   Lab Results   Component Value Date    BLOODCX No Growth After 5 Days  03/18/2021    BLOODCX No Growth After 5 Days  03/18/2021       Wound Culture:   No results found for: WOUNDCULT    Ins and Outs:  I/O last 24 hours: In: 1560 [P O :660; I V :900]  Out: 500 [Urine:500]      Physical:  Vitals:    01/22/22 0821   BP: 140/68   Pulse: 93   Resp:    Temp: 100 5 °F (38 1 °C)   SpO2: 95%     Musculoskeletal: left Lower Extremity  · Patient lying in her chair, abduction pillow not in place, beside her  The patient has two soft pads placed underneath her ankles/feet  The patient was sleeping upon arrival, in no acute distress  · Dressings were clean, dry, and intact, removed today for examination and dressing change   Incision is well approximated with staples, no active drainage, bleeding, or signs of dehiscence  Skin without ecchymosis, erythema, swelling, warmth, or other signs of infection  · No palpable tenderness  · Patient able to wiggle toes  · SILT s/s/sp/dp/t    · +fhl/ehl, +ankle dorsi/plantar flexion  · 2+ DP pulse  · Soft lower extremity compartments, negative Monico's sign  · Brisk cap refill in all toes    Assessment:    89 y  o female POD2 status post left hip hemiarthroplasty  Dressings were changed today without complication with 4x4 gauze, abds, and tape  Plan:  · WBAT LLE with walker  · Posterior hip precautions, discussed importance of abduction pillow  · Keep heels off of bed at all times   · HGB 10 1 this AM  Greater than 2 gram drop which qualifies for diagnosis of acute blood loss anemia, will monitor and administer IVF/prbc as indicated   · PT/OT gait training  · Pain control per primary team, limit narcotics due to confusion  · DVT ppx- continue to recommend course of Lovenox fora  Total of 4 weeks post op    · Dispo: [id number] for discharge from ortho perspective   · Patient scheduled for discharge, transport to [facility] today  · Patient should have daily dressing changes  · Follow up with Dr Martina Noonan in two weeks for first post op appointment, staple removal     Silver Block PA-C

## 2022-01-22 NOTE — ASSESSMENT & PLAN NOTE
· Acute on chronic hyponatremia POA  · Sodium level at 118 on admission --> 133 today   · s/p diuretics, s/p tovalptan dosing with improvement  · Nephrology consult appreciated  · Check BMP in 3 days after discharge    · Consider fluid restriction if hyponatremia pesists

## 2022-01-22 NOTE — PLAN OF CARE
Problem: Potential for Falls  Goal: Patient will remain free of falls  Description: INTERVENTIONS:  - Educate patient/family on patient safety including physical limitations  - Instruct patient to call for assistance with activity   - Consult OT/PT to assist with strengthening/mobility   - Keep Call bell within reach  - Keep bed low and locked with side rails adjusted as appropriate  - Keep care items and personal belongings within reach  - Initiate and maintain comfort rounds  - Make Fall Risk Sign visible to staff  - Offer Toileting every 2 Hours, in advance of need  - Initiate/Maintain bed/chair alarm  - Obtain necessary fall risk management equipment:   - Apply yellow socks and bracelet for high fall risk patients  - Consider moving patient to room near nurses station  Outcome: Progressing     Problem: Prexisting or High Potential for Compromised Skin Integrity  Goal: Skin integrity is maintained or improved  Description: INTERVENTIONS:  - Identify patients at risk for skin breakdown  - Assess and monitor skin integrity  - Assess and monitor nutrition and hydration status  - Monitor labs   - Assess for incontinence   - Turn and reposition patient  - Assist with mobility/ambulation  - Relieve pressure over bony prominences  - Avoid friction and shearing  - Provide appropriate hygiene as needed including keeping skin clean and dry  - Evaluate need for skin moisturizer/barrier cream  - Collaborate with interdisciplinary team   - Patient/family teaching  - Consider wound care consult   Outcome: Progressing     Problem: PAIN - ADULT  Goal: Verbalizes/displays adequate comfort level or baseline comfort level  Description: Interventions:  - Encourage patient to monitor pain and request assistance  - Assess pain using appropriate pain scale  - Administer analgesics based on type and severity of pain and evaluate response  - Implement non-pharmacological measures as appropriate and evaluate response  - Consider cultural and social influences on pain and pain management  - Notify physician/advanced practitioner if interventions unsuccessful or patient reports new pain  Outcome: Progressing     Problem: INFECTION - ADULT  Goal: Absence or prevention of progression during hospitalization  Description: INTERVENTIONS:  - Assess and monitor for signs and symptoms of infection  - Monitor lab/diagnostic results  - Monitor all insertion sites, i e  indwelling lines, tubes, and drains  - Monitor endotracheal if appropriate and nasal secretions for changes in amount and color  - [employer] appropriate cooling/warming therapies per order  - Administer medications as ordered  - Instruct and encourage patient and family to use good hand hygiene technique  - Identify and instruct in appropriate isolation precautions for identified infection/condition  Outcome: Progressing  Goal: Absence of fever/infection during neutropenic period  Description: INTERVENTIONS:  - Monitor WBC    Outcome: Progressing     Problem: SAFETY ADULT  Goal: Patient will remain free of falls  Description: INTERVENTIONS:  - Educate patient/family on patient safety including physical limitations  - Instruct patient to call for assistance with activity   - Consult OT/PT to assist with strengthening/mobility   - Keep Call bell within reach  - Keep bed low and locked with side rails adjusted as appropriate  - Keep care items and personal belongings within reach  - Initiate and maintain comfort rounds  - Make Fall Risk Sign visible to staff  - Offer Toileting every 2 Hours, in advance of need  - Initiate/Maintain bed/chair alarm  - Obtain necessary fall risk management equipment:   - Apply yellow socks and bracelet for high fall risk patients  - Consider moving patient to room near nurses station  Outcome: Progressing  Goal: Maintain or return to baseline ADL function  Description: INTERVENTIONS:  -  Assess patient's ability to carry out ADLs; assess patient's baseline for ADL function and identify physical deficits which impact ability to perform ADLs (bathing, care of mouth/teeth, toileting, grooming, dressing, etc )  - Assess/evaluate cause of self-care deficits   - Assess range of motion  - Assess patient's mobility; develop plan if impaired  - Assess patient's need for assistive devices and provide as appropriate  - Encourage maximum independence but intervene and supervise when necessary  - Involve family in performance of ADLs  - Assess for home care needs following discharge   - Consider OT consult to assist with ADL evaluation and planning for discharge  - Provide patient education as appropriate  Outcome: Progressing  Goal: Maintains/Returns to pre admission functional level  Description: INTERVENTIONS:  - Perform BMAT or MOVE assessment daily    - Set and communicate daily mobility goal to care team and patient/family/caregiver  - Collaborate with rehabilitation services on mobility goals if consulted  - Perform Range of Motion 3 times a day  - Reposition patient every 2 hours    - Dangle patient 3 times a day  - Stand patient 3 times a day  - Ambulate patient 3 times a day  - Out of bed to chair 3 times a day   - Out of bed for meals 3 times a day  - Out of bed for toileting  - Record patient progress and toleration of activity level   Outcome: Progressing     Problem: DISCHARGE PLANNING  Goal: Discharge to home or other facility with appropriate resources  Description: INTERVENTIONS:  - Identify barriers to discharge w/patient and caregiver  - Arrange for needed discharge resources and transportation as appropriate  - Identify discharge learning needs (meds, wound care, etc )  - Arrange for interpretive services to assist at discharge as needed  - Refer to Case Management Department for coordinating discharge planning if the patient needs post-hospital services based on physician/advanced practitioner order or complex needs related to functional status, cognitive ability, or social support system  Outcome: Progressing     Problem: Knowledge Deficit  Goal: Patient/family/caregiver demonstrates understanding of disease process, treatment plan, medications, and discharge instructions  Description: Complete learning assessment and assess knowledge base    Interventions:  - Provide teaching at level of understanding  - Provide teaching via preferred learning methods  Outcome: Progressing     Problem: MUSCULOSKELETAL - ADULT  Goal: Maintain or return mobility to safest level of function  Description: INTERVENTIONS:  - Assess patient's ability to carry out ADLs; assess patient's baseline for ADL function and identify physical deficits which impact ability to perform ADLs (bathing, care of mouth/teeth, toileting, grooming, dressing, etc )  - Assess/evaluate cause of self-care deficits   - Assess range of motion  - Assess patient's mobility  - Assess patient's need for assistive devices and provide as appropriate  - Encourage maximum independence but intervene and supervise when necessary  - Involve family in performance of ADLs  - Assess for home care needs following discharge   - Consider OT consult to assist with ADL evaluation and planning for discharge  - Provide patient education as appropriate  Outcome: Progressing  Goal: Maintain proper alignment of affected body part  Description: INTERVENTIONS:  - Support, maintain and protect limb and body alignment  - Provide patient/ family with appropriate education  Outcome: Progressing     Problem: MOBILITY - ADULT  Goal: Maintain or return to baseline ADL function  Description: INTERVENTIONS:  -  Assess patient's ability to carry out ADLs; assess patient's baseline for ADL function and identify physical deficits which impact ability to perform ADLs (bathing, care of mouth/teeth, toileting, grooming, dressing, etc )  - Assess/evaluate cause of self-care deficits   - Assess range of motion  - Assess patient's mobility; develop plan if impaired  - Assess patient's need for assistive devices and provide as appropriate  - Encourage maximum independence but intervene and supervise when necessary  - Involve family in performance of ADLs  - Assess for home care needs following discharge   - Consider OT consult to assist with ADL evaluation and planning for discharge  - Provide patient education as appropriate  Outcome: Progressing  Goal: Maintains/Returns to pre admission functional level  Description: INTERVENTIONS:  - Perform BMAT or MOVE assessment daily    - Set and communicate daily mobility goal to care team and patient/family/caregiver  - Collaborate with rehabilitation services on mobility goals if consulted  - Perform Range of Motion 3 times a day  - Reposition patient every 2 hours  - Dangle patient 3 times a day  - Stand patient 3 times a day  - Ambulate patient 3 times a day  - Out of bed to chair 3 times a day   - Out of bed for meals 3 times a day  - Out of bed for toileting  - Record patient progress and toleration of activity level   Outcome: Progressing     Problem: Nutrition/Hydration-ADULT  Goal: Nutrient/Hydration intake appropriate for improving, restoring or maintaining nutritional needs  Description: Monitor and assess patient's nutrition/hydration status for malnutrition  Collaborate with interdisciplinary team and initiate plan and interventions as ordered  Monitor patient's weight and dietary intake as ordered or per policy  Utilize nutrition screening tool and intervene as necessary  Determine patient's food preferences and provide high-protein, high-caloric foods as appropriate       INTERVENTIONS:  - Monitor oral intake, urinary output, labs, and treatment plans  - Assess nutrition and hydration status and recommend course of action  - Evaluate amount of meals eaten  - Assist patient with eating if necessary   - Allow adequate time for meals  - Recommend/ encourage appropriate diets, oral nutritional supplements, and vitamin/mineral supplements  - Order, calculate, and assess calorie counts as needed  - Recommend, monitor, and adjust tube feedings and TPN/PPN based on assessed needs  - Assess need for intravenous fluids  - Provide specific nutrition/hydration education as appropriate  - Include patient/family/caregiver in decisions related to nutrition  Outcome: Progressing

## 2022-01-22 NOTE — OCCUPATIONAL THERAPY NOTE
Occupational Therapy Treatment Note     Patient Name: Charissa Davidson  RMOAW'O Date: 1/22/2022  Problem List  Principal Problem:    Closed [facility] ([facility] )  Active Problems:    Esophageal reflux    Hypercholesterolemia    Hyponatremia    Paroxysmal atrial fibrillation (HCC)    Benign essential HTN    Acute respiratory failure with hypoxia (HCC)    Delirium    Pressure injury of dorsum of left foot, stage 1    Dysphagia    Stage 3a chronic kidney disease ([facility] )    Acute blood loss anemia          01/22/22 1200   OT Last Visit   OT Visit Date 01/22/22   Note Type   Note Type Treatment   Restrictions/Precautions   Weight Bearing Precautions Per Order Yes   LLE Weight Bearing Per Order WBAT   Braces or Orthoses Other (Comment)  (ADBduction wedge, heel pads )   Other Precautions Fall Risk;Multiple lines;O2;Cognitive; Chair Alarm; Bed Alarm;Pain;WBS;THR   General   Response to Previous Treatment Patient unable to report, no changes reported from family or staff   Lifestyle   [facility]    Reciprocal Relationships facility staff    Pain Assessment   Pain Assessment Tool Elena XAVIER  (pt unable to report number )   Trimble-Baker FACES Pain Rating 6   Pain Location/Orientation Orientation: Bilateral;Location: Foot   Pain Onset/Description Onset: Ongoing;Frequency: Intermittent  (c mobility )   Effect of Pain on Daily Activities effecting all functional mobility    Hospital Pain Intervention(s) Ambulation/increased activity; Elevated;Repositioned   Multiple Pain Sites No   ADL   Where Assessed Chair  (recliner )   Grooming Assistance 5  Supervision/Setup   Grooming Deficit Setup;Supervision/safety;Verbal cueing   Grooming Comments Pt combed hair and washed face with set up, VC    UB Bathing Assistance 4  Minimal Assistance   UB Bathing Deficit Setup;Supervision/safety;Verbal cueing   UB Bathing Comments washed upper body with VC   Cues to attend to underarms    LB Bathing Assistance 2  Maximal Assistance   LB Bathing Deficit Setup;Supervision/safety; Increased time to complete;Right lower leg including foot; Left lower leg including foot; Buttocks; Perineal area   LB Bathing Comments Pt washed upper legs, unable to wash past knees d/t orthopedic restrictions    UB Dressing Assistance 4  Minimal Assistance   UB Dressing Deficit Setup;Verbal cueing;Supervision/safety;Pull around back   [facility]    LB Dressing Assistance 2  Maximal Assistance   LB Dressing Deficit Setup; Requires assistive device for steadying;Verbal cueing;Supervision/safety; Don/doff R sock; Don/doff L sock; Thread RLE into underwear; Thread LLE into underwear;Pull up over hips   LB Dressing Comments Pt total A to don/doff socks, max A to don brief  Limited by orthopedic restrictions  Pt required mod A for steadying and use of BUE for steadying; unable to assist in pulling briefs over hips    Toileting Comments Pt had purewick placed throughout session  Functional Standing Tolerance   Time 1m x 3 trials  Activity to allow for vida/post care, for functional mobility    Comments Pt required mod A steadying and use of BUE on walker to maintain static standing position  VC for proper hand placement use of RW  Pt noted to consistently lean L laterally and posteriorly, VC and tactile cues for postural support for external correction     Bed Mobility   Supine to Sit 2  Maximal assistance   Additional items Assist x 2;HOB elevated; Increased time required;Verbal cues;LE management   Sit to Supine 2  Maximal assistance   Additional items Assist x 2; Increased time required;Verbal cues;LE management   Additional Comments denied lightheaded/dizziness c positional changes    Transfers   Sit to Stand 2  Maximal assistance   Additional items Assist x 2  (c RW)   Stand to Sit 2  Maximal assistance   Additional items Assist x 2; Increased time required;Verbal cues  (c RW)   Stand pivot 2  Maximal assistance   Additional items Assist x 2;Verbal cues; Increased time required  (c RW)   Additional Comments sit<>stand x 3 trials, VC for proper hand placement  Significant posterior, L lateral lean, tactile cues for postural correction  Max VC for sequencing through transfer, decreased ability to clear B feet from ground, max assistance for RW management  Functional Mobility   Functional Mobility   (unable to assess safely at this time )   Cognition   Overall Cognitive Status Impaired   Arousal/Participation Responsive; Cooperative   Attention Difficulty attending to directions   Orientation Level Oriented to person;Oriented to situation;Disoriented to time;Oriented to place  ("night time" re: time of day, unable to report date/ )   Memory Decreased recall of precautions;Decreased recall of recent events  (able to recall 0/3 THR precautions at beginning/end of tx )   Following Commands Follows multistep commands with increased time or repetition   Comments Pt agreeable to OT session, pleasant  Reoriented to day, time during session    Activity Tolerance   Activity Tolerance Patient limited by fatigue;Patient limited by pain   Medical Staff Made Aware Pt benefited from co-treatment of skilled OT and PT therapists in order to most appropriately address functional deficits d/t extensive assistance required for safe functional mobility, decreased activity tolerance, and regression from functioning level prior to admission  OT/PT objectives were addressed separately; please see PT note for specific goal areas targeted  JENNIFER Barnes verbalized pt appropriate to see, made aware of outcomes  (JENNIFRE Barnes verbalized pt appropriate to see, made aware of )   Assessment   Assessment Patient seen for OT treatment on 1/22/2022 s/p admission for Closed fracture of neck of left femur [facility]) Patient presents with active orders for OT eval and treat and up and OOB as tolerated    Upon arrival, pt found resting in bed showing no signs of distress  Patient agreeable to OT session; elected to co treat with PT in order to most appropriately address functional deficits d/t extensive assistance required for safe functional mobility, decreased activity tolerance, and regression from functioning level prior to admission and/or onset of present illness  OT/PT objectives were addressed separately; please see PT note for specific goal areas targeted  Patient participated in bathing/showering, dressing , personal hygiene/grooming , bed mobility , functional mobility, transfer to all surfaces, activity engagement  and fall prevention  with intervention focus on increasing strength and activity tolerance in order to achieve maximum functional independence  Pt required supervision for UB ADLs and Max assist for LB ADLs  Pt completed sit<>stand and stand pivot transfers with max assist x2 and use of RW  Sabrina Mater is showing improvements in decreased activity tolerance   Patient is continuing to perform below baseline due to the following deficits: decreased muscular strength , decreased standing tolerance for self care tasks , decreased dynamic balance impacting functional reach, memory , impaired safety awareness  and (+) pain   From OT standpoint, patient would benefit from skilled intervention to maximize independence with ADLs and functional mobility  Goals remain appropriate, continue POC  At this time, recommending D/C to: post-acute rehabilitation    Plan   Treatment Interventions ADL retraining;Functional transfer training;UE strengthening/ROM; Endurance training;Patient/family training;Cognitive reorientation;Equipment evaluation/education; Neuromuscular reeducation; Activityengagement   Goal Expiration Date 02/03/21   OT Treatment Day 2   OT Frequency 3-5x/wk   Recommendation   OT Discharge Recommendation Post acute rehabilitation services   OT - OK to Discharge Yes  (once medically clear)   Additional Comments  Pt resting OOB in chair at end of session, call button in reach and all needs met  Chair alarm activated    Additional Comments 2 The patient's raw score on the AM-PAC Daily Activity inpatient short form is 14, standardized score is 33 39, less than 39 4  Patients at this level are likely to benefit from discharge to post-acute rehabilitation services  Please refer to the recommendation of the Occupational Therapist for safe discharge planning     AM-PAC Daily Activity Inpatient   Lower Body Dressing 1   Bathing 2   Toileting 1   Upper Body Dressing 3   Grooming 3   Eating 4   Daily Activity Raw Score 14   Daily Activity Standardized Score (Calc for Raw Score >=11) 33 39   AM-[facility] Applied Cognition Inpatient   Following a Speech/Presentation 3   Understanding Ordinary Conversation 4   Taking Medications 3   Remembering Where Things Are Placed or Put Away 3   Remembering List of 4-5 Errands 3   Taking Care of Complicated Tasks 2   Applied Cognition Raw Score 18   Applied Cognition Standardized Score 38 07       Adrienne Ferro, OT

## 2022-01-22 NOTE — ASSESSMENT & PLAN NOTE
· Blood pressure initially elevated upon arrival to the ER  Most likely secondary to increased pain, intermittent agitation  · Currently stable  · Continue PTA blood pressure medication - metoprolol, norvasc previously on 5mg BID, changed to 10mg daily for ease of dosing  · Lisinopril held in the perioperative setting to prevent FIONA - will restart on discharge

## 2022-01-22 NOTE — PLAN OF CARE
Problem: OCCUPATIONAL THERAPY ADULT  Goal: Performs self-care activities at highest level of function for planned discharge setting  See evaluation for individualized goals  Description: Treatment Interventions: ADL retraining,Functional transfer training,UE strengthening/ROM,Endurance training,Cognitive reorientation,Patient/family training,Compensatory technique education,Energy conservation,Activityengagement          See flowsheet documentation for full assessment, interventions and recommendations  Outcome: Progressing  Note: Limitation: Decreased ADL status,Decreased UE strength,Decreased Safe judgement during ADL,Decreased cognition,Decreased endurance,Decreased self-care trans,Decreased high-level ADLs     Assessment: Patient seen for OT treatment on 1/22/2022 s/p admission for Closed fracture of neck of left femur [facility]) Patient presents with active orders for OT eval and treat and up and OOB as tolerated   Upon arrival, pt found resting in bed showing no signs of distress  Patient agreeable to OT session; elected to co treat with PT in order to most appropriately address functional deficits d/t extensive assistance required for safe functional mobility, decreased activity tolerance, and regression from functioning level prior to admission and/or onset of present illness  OT/PT objectives were addressed separately; please see PT note for specific goal areas targeted  Patient participated in bathing/showering, dressing , personal hygiene/grooming , bed mobility , functional mobility, transfer to all surfaces, activity engagement  and fall prevention  with intervention focus on increasing strength and activity tolerance in order to achieve maximum functional independence  Pt required supervision for UB ADLs and Max assist for LB ADLs  Pt completed sit<>stand and stand pivot transfers with max assist x2 and use of RW  Rohini Al is showing improvements in decreased activity tolerance    Patient is continuing to perform below baseline due to the following deficits: decreased muscular strength , decreased standing tolerance for self care tasks , decreased dynamic balance impacting functional reach, memory , impaired safety awareness  and (+) pain   From OT standpoint, patient would benefit from skilled intervention to maximize independence with ADLs and functional mobility  Goals remain appropriate, continue POC   At this time, recommending D/C to: post-acute rehabilitation      OT Discharge Recommendation: Post acute rehabilitation services  OT - OK to Discharge: Yes (once medically clear)  Abnre Haque, OT

## 2022-01-22 NOTE — PLAN OF CARE
Problem: PHYSICAL THERAPY ADULT  Goal: Performs mobility at highest level of function for planned discharge setting  See evaluation for individualized goals  Description: Treatment/Interventions: Functional transfer training,LE strengthening/ROM,Therapeutic exercise,Endurance training,Bed mobility,Gait training          See flowsheet documentation for full assessment, interventions and recommendations  Note: Prognosis: Good  Problem List: Decreased strength,Decreased range of motion,Decreased endurance,Impaired balance,Decreased mobility  Assessment: Patient is agreeable to PT this date  Requires Max A x 2 for bed mobility, and stand pivot transfer to the chair this date  Cues for sequencing during transfer and safety awareness  Posterior lean during sit to stand transfer from bed  Did not attempt to ambulate this session  She would benefit from PT to help improve strength ROM, balance, and to help facilitate return to [facility]            PT Discharge Recommendation: Post acute rehabilitation services          See flowsheet documentation for full assessment

## 2022-01-22 NOTE — ASSESSMENT & PLAN NOTE
· Rate currently controlled  · Not on anticoagulation, likely due to fall risk  · Continue digoxin, metoprolol  · Continue Outpatient cardiology follow up  · EKG preoperatively - 1st degree AV block noted, no signs of acute ischemia

## 2022-01-22 NOTE — ASSESSMENT & PLAN NOTE
· Presented to the ER for evalauation after a mechanical fall at Assisted living facility [facility] after tripping on a rug  · CT showed - Acute left femoral transcervical neck fracture    · Originally OR planned for 1/18, however concern over cardiac / pulmonary status, so case will be delayed while patient is medically optimized  Surgery completed without complication on 9/08  · DVT proph - SC lovenox x 3 weeks  · Pain medication - oxycodone 2 5mg q6h prn x3 days at discharge, PRN tylenol  · Encourage weaning off quickly from these medications to avoid delirium  · Fall precautions  · Orthopedic Surgery Consult and ongoing support appreciated  WBAT  Dressing change daily  Keep feet off bed     · PT/OT STR- d/c today

## 2022-01-22 NOTE — DISCHARGE SUMMARY
[facility] [address]  Discharge- Severa Sea 9/2/1931, 80 y o  female MRN: [id number]  Unit/Bed#: 077-52 Encounter: [id number]  Primary Care Provider: Maria De Jesus De La Torre DO   Date and time admitted to hospital: 1/16/2022  5:43 PM    * Closed fracture of neck of left femur [facility])  Assessment & Plan  · Presented to the ER for evalauation after a mechanical fall at Assisted living facility [facility] after tripping on a rug  · CT showed - Acute left femoral transcervical neck fracture    · Originally OR planned for 1/18, however concern over cardiac / pulmonary status, so case will be delayed while patient is medically optimized  Surgery completed without complication on 5/59  · DVT proph - SC lovenox x 3 weeks  · Pain medication - oxycodone 2 5mg q6h prn x3 days at discharge, PRN tylenol  · Encourage weaning off quickly from these medications to avoid delirium  · Fall precautions  · Orthopedic Surgery Consult and ongoing support appreciated  WBAT  Dressing change daily  Keep feet off bed  · PT/OT STR- d/c today     Acute respiratory failure with hypoxia [facility])  Assessment & Plan  Wt Readings from Last 3 Encounters:   01/22/22 50 9 kg (112 lb 3 4 oz)   10/25/21 57 5 kg (126 lb 12 8 oz)   08/17/21 61 5 kg (135 lb 9 3 oz)     Initially Suspected acute diastolic CHF with acute respiratory failure with hypoxia, hyponatremia  Unfortunately IV lasix worsened hyponatremia  No signs of volume overload on CXR  Repeated Echocardiogram this admission (last study from 2018)  Only mild diastolic dysfunction noted  respiratory protocol, Duoneb tx  Consult pulmonology for perioperative exam - cleared the patient from their standpoint      Hyponatremia  Assessment & Plan  · Acute on chronic hyponatremia POA  · Sodium level at 118 on admission --> 133 today   · s/p diuretics, s/p tovalptan dosing with improvement  · Nephrology consult appreciated  · Check BMP in 3 days after discharge    · Consider fluid restriction if hyponatremia pesists     Acute blood loss anemia  Assessment & Plan  Hgb has trended down from 13 range to 10 1 on discharge  Likely in the setting of acute blood loss anemia  Continue iron supplementation BID with colace  Follow up CBC in 5 days  Stage 3a chronic kidney disease [facility])  Assessment & Plan  Lab Results   Component Value Date    EGFR 62 01/22/2022    EGFR 56 01/21/2022    EGFR 41 01/20/2022    CREATININE 0 83 01/22/2022    CREATININE 0 90 01/21/2022    CREATININE 1 16 01/20/2022   stable  Check bmp in 5-7 days after discharge   Avoid nephrotoxic drugs and hypotension     Dysphagia  Assessment & Plan  · Noted to have difficulty with eating on admission likely secondary to delerium  · Per her son, she eats regular foods and thin liquids at home  · Speech consult appreciated, required dysphagia 2 diet and NTL - follow with speech at [facility] for diet adjustments  · Aspiration precautions       Pressure injury of dorsum of left foot, stage 1  Assessment & Plan  · Developed while patient in traction  · Wound care consulted, Allevyn dressings ordered  · Keep foot off of bed    Delirium  Assessment & Plan  · Hospital confusion / delirium noted likely in the setting of underlying mild cognitive impairment, hyponatremia, pain medications and hard of hearing  · Now appears to be at baseline, wean pain meds as able to avoid further delirium    Benign essential HTN  Assessment & Plan  · Blood pressure initially elevated upon arrival to the ER  Most likely secondary to increased pain, intermittent agitation  · Currently stable  · Continue PTA blood pressure medication - metoprolol, norvasc previously on 5mg BID, changed to 10mg daily for ease of dosing  · Lisinopril held in the perioperative setting to prevent FIONA - will restart on discharge  Paroxysmal atrial fibrillation (HCC)  Assessment & Plan  · Rate currently controlled  · Not on anticoagulation, likely due to fall risk    · Continue digoxin, metoprolol  · Continue Outpatient cardiology follow up  · EKG preoperatively - 1st degree AV block noted, no signs of acute ischemia  Hypercholesterolemia  Assessment & Plan  Continue Fish oil    Esophageal reflux  Assessment & Plan  Continue PPI      Medical Problems             Resolved Problems  Date Reviewed: 1/22/2022    None              Discharging Physician / Practitioner: Zoraida Feliz  PCP: Leonora Rachel DO  Admission Date:   Admission Orders (From admission, onward)     Ordered        01/16/22 2001  INPATIENT ADMISSION  Once                      Discharge Date: 01/22/22    Consultations During Hospital Stay:  · Pulmonary   · Nephrology  · Orthopedics   · Nutrition  · Pt/ot    Procedures Performed:   · 1/17 left hip xray- Acute subcapital left femoral neck fracture  · 1/17 chest xray- No acute cardiopulmonary disease  · 1/16 CT head- No acute intracranial abnormality  · 1/16 CT lower extremity left without contrast- Acute left femoral transcervical neck fracture as described  · 1/18 chest xray- No acute cardiopulmonary disease  · 1/20 left hip hemiarthroplasty   · 1/20 left hip xray- Unremarkable appearance of left hip hemiarthroplasty  · 1/16 covid/flu/rsv negative   · mrsa culture negative     Significant Findings / Test Results:   · left femoral neck fracture, hyponatremia    Incidental Findings:   · none     Test Results Pending at Discharge (will require follow up):   · none     Outpatient Tests Requested:  · Cbc and bmp in 5 days    Complications:  none    Reason for Admission: fall, left hip pain    Hospital Course:   Richard Self is a 80 y o  female patient with a PMH of a small A-Fib, hypertension, mild cognitive impairment, grade 1 diastolic dysfunction, hyperlipidemia, urinary retention who originally presented to the hospital on 1/16/2022 due to mechanical fall at assisted living facility [facility]) with left hip pain   Pt tripped on a rug while using her walker and fell to ground  She was brought to the hospital secondary to left hip pain and found to have a left femoral neck fracture  She originally was going to be taken to the OR on 1/18 but required preop clearance by pulmonary due to acute respiratory failure  Case discussed with cardiology who did not feel this was Acute CHF / volume overload, appeared dry, therefore further diuretics avoiding  Formal consult also obtained from pulmonology, who cleared the patient for surgery from there standpoint with moderate risk  Lajean Cassette She was taken to the OR on 1/20 with orthopedics and had a left hip hemiarthroplasty  Continue with posterior hip precautions, weightbearing as tolerated left lower extremity  Continue Lovenox for 3 weeks cautiously in the setting of anemia  ; may continue longer if orthopedics so chooses at follow up appointment    Pt will need STR at discharge  The patient was seen by nephrology during her stay for acute on chronic hyponatremia with a NA of 118 on admission  The patient was treated with IV lasix with some initial worsening of sodium  She was then given a dose of tolvaptan 1/17 and 1/18 and IVFs with improvement in her sodium levels  Continue to monitor closely after discharge and consider fluid restriction if sodium decreases  Repeat BMP recommended for 1 week after discharge  The patient was noted to have delirium likely in the setting of a mild cognitive impairment, and pain  She did require restraints temporarily  She was found to be pocketing food during this time and was seen by ST who recommended dysphagia level 2 diet with nectar thick liquids  She should be followed at [facility] for diet adjustments after discharge  Delirium precautions  Discharge to short term rehab at [facility] this afternoon  Please see above list of diagnoses and related plan for additional information       Condition at Discharge: good    Discharge Day Visit / Exam:   Subjective:  Patient is a vague historian, drowsy at the time of my exam  Denies Left hip pain  No acute events overnight, no nursing concerns  Did have some hospital delirium earlier in stay but currently off restraints, cooperative  Vitals: Blood Pressure: 140/68 (01/22/22 0821)  Pulse: 93 (01/22/22 0821)  Temperature: 100 5 °F (38 1 °C) (01/22/22 0821)  Temp Source: Tympanic Core (01/22/22 0037)  Respirations: 21 (01/22/22 0037)  Height: 5' 2" (157 5 cm) (01/17/22 1440)  Weight - Scale: 50 9 kg (112 lb 3 4 oz) (01/22/22 0543)  SpO2: 95 % (01/22/22 8455)  Exam:   Physical Exam  Vitals and nursing note reviewed  Constitutional:       Appearance: Normal appearance  Comments: Drowsy, wakes easily to voice   HENT:      Head: Normocephalic  Mouth/Throat:      Mouth: Mucous membranes are moist    Cardiovascular:      Rate and Rhythm: Regular rhythm  Heart sounds: Normal heart sounds  Pulmonary:      Effort: Pulmonary effort is normal  No respiratory distress  Breath sounds: Normal breath sounds  Abdominal:      General: There is no distension  Palpations: Abdomen is soft  Musculoskeletal:      Right lower leg: No edema  Left lower leg: No edema  Skin:     General: Skin is warm  Comments: Left hip surgical dressing intact, clean and dry  Discussion with Family: Updated  (son) via phone  Discharge instructions/Information to patient and family:   See after visit summary for information provided to patient and family  Provisions for Follow-Up Care:  See after visit summary for information related to follow-up care and any pertinent home health orders  Disposition:   Other [facility]    Planned Readmission: no     Discharge Statement:  I spent 45 minutes discharging the patient  This time was spent on the day of discharge  I had direct contact with the patient on the day of discharge   Greater than 50% of the total time was spent examining patient, answering all patient questions, arranging and discussing plan of care with patient as well as directly providing post-discharge instructions  Additional time then spent on discharge activities  Discharge Medications:  See after visit summary for reconciled discharge medications provided to patient and/or family        **Please Note: This note may have been constructed using a voice recognition system**

## 2022-01-22 NOTE — PROGRESS NOTES
NEPHROLOGY PROGRESS NOTE   Michelle Thakur 80 y o  female MRN: [id number]  Unit/Bed#: 769-87 Encounter: [id number]    Assessment/Plan:    Michelle Thakur is a 80 y o  female whose pertinent medical problems include CKD, chronic mild hyponatremia, grade 1 diastolic dysfunction admitted 1/16/22 with chief complaint of fall being treated for left femur fracture status post repair 01/20/2022, hyponatremia  Renal following along for hyponatremia, CKD, volume status, electrolytes  Plan outlined below  1  Acute on chronic hyponatremia  · Serum sodium 133 millimoles per L which is a slight decline from yesterday however stable, within her typical serum sodium level as an outpatient, and she is still safe for discharge  Her diet is modified so she is likely not eating and drinking the best   If she begins eat and drink better or thin liquids are recommended, would reintroduce fluid restriction  Continue to avoid thiazide diuretics  Encourage high solute foods  BMP 2-3 days after discharge from acute care  2  Stage 3 chronic kidney disease with baseline creatinine around 0 7-1 0 mg/dL  3  Left femur fracture status post repair 01/20/2022 by Dr Obed Dang  4  Hypertension in the setting of CKD  · Avoid thiazide and thiazide like diuretics  Blood pressure fairly appropriate given age and underlying comorbidities today  5  Acute hypoxic respiratory failure  · Requiring nasal cannula after therapy this morning  Has wheezes in all fields  Cardiology and pulmonology do not feel this is a volume issue  She needs continuous pulmonary toileting  6  Urinary retention  · Last intermittent straight catheterization was 1/21  Continue to follow closely and insert indwelling urinary catheter per protocol  7  Volume depletion-resolved    ROS  No physical complaints on exam   A complete 10 point review of systems have been performed and are otherwise negative         Historical Information   Past Medical History:   Diagnosis Date  Abdominal distension     Last Assessed: [id number]    Abnormal weight loss     Last Assessed: [id number]    Arthritis     Bronchitis     Bursitis of left hip     Lst Assessed: [id number]    Chest pain     Last Assessed: [id number]    Colon polyp     Dysuria     Last Assessed: [id number]    External hemorrhoids     Last Assessed: [id number]    Generalized anxiety disorder     GERD (gastroesophageal reflux disease)     History of echocardiogram 03/20/2018    EF 60%, mild to moderate mitral annular calcification  mild AS, mild TR       Hyperlipidemia     Hypertension     Hypomagnesemia     Last Assessed: [id number]    Lyme disease     Last Assessed: [id number]    Multiple renal cysts 7/5/2019    Nonrheumatic mitral valve regurgitation     Osteoporosis     Pneumonia of right middle lobe due to infectious organism     Last Assessed: [id number]    SVT (supraventricular tachycardia)     Urinary tract infection      Past Surgical History:   Procedure Laterality Date    CATARACT EXTRACTION      HEMORRHOID SURGERY      HYSTERECTOMY      PELVIC FLOOR REPAIR       Social History   Social History     Substance and Sexual Activity   Alcohol Use Never     Social History     Substance and Sexual Activity   Drug Use No     Social History     Tobacco Use   Smoking Status Never Smoker   Smokeless Tobacco Never Used       Family History:   Family History   Adopted: Yes       Medications:  Pertinent medications were reviewed  Current Facility-Administered Medications   Medication Dose Route Frequency Provider Last Rate    acetaminophen  650 mg Oral Q6H PRN Eleanor Easley MD      acetaminophen  975 mg Oral [facility] Eleanor Easley MD      albuterol  2 5 mg Nebulization Q6H PRN Eleanor Easley MD      amLODIPine  10 mg Oral Daily Eleanor Easley MD      ascorbic acid  500 mg Oral Daily Eleanor Easley MD      clonazePAM  1 5 mg Oral HS Eleanor Easley MD      digoxin  125 mcg Oral Daily MD Mina Cheek docusate sodium  100 mg Oral BID Apple Lozoya MD      enoxaparin  30 mg Subcutaneous Q12H [facility] Daphane Guard, PA-      ferrous sulfate  325 mg Oral Daily With Breakfast Apple Lozoya MD      hydrALAZINE  5 mg Intravenous Q6H PRN Apple Lozoya MD      HYDROmorphone  0 2 mg Intravenous Q6H PRN Daphane Guard, PA-C      Lactinex  1 each Oral TID With Meals Apple Lozoya MD      magnesium hydroxide  30 mL Oral Daily PRN Apple Lozoya MD      metoprolol tartrate  75 mg Oral Q12H [facility] Apple Lozoya MD      ondansetron  4 mg Intravenous Q6H PRN Apple Lozoya MD      oxyCODONE  2 5 mg Oral Q6H PRN Daphane Guard, PA-C      oxyCODONE  5 mg Oral Q6H PRN Daphane Guard, PA-C      pantoprazole  40 mg Oral Early Morning Apple Lozoya MD      senna  1 tablet Oral BID Apple Lozoya MD           Allergies   Allergen Reactions    Codeine GI Intolerance    Epinephrine Other (See Comments)     Increased Heart Rate, Palpitations    Iodinated Diagnostic Agents     Iodine - Food Allergy Other (See Comments)          Magnesium Citrate GI Intolerance         Vitals:   /68   Pulse 93   Temp 100 5 °F (38 1 °C)   Resp 21   Ht 5' 2" (1 575 m)   Wt 50 9 kg (112 lb 3 4 oz)   SpO2 95%   BMI 20 52 kg/m²   Body mass index is 20 52 kg/m²    SpO2: 95 %,   SpO2 Activity: At Rest,   O2 Device: Nasal cannula      Intake/Output Summary (Last 24 hours) at 1/22/2022 1041  Last data filed at 1/21/2022 2005  Gross per 24 hour   Intake 1260 ml   Output 500 ml   Net 760 ml     Invasive Devices  Report    Peripheral Intravenous Line            Peripheral IV 01/20/22 Left;Proximal;Ventral (anterior) Forearm 2 days    Peripheral IV 01/20/22 Right Forearm 2 days          Drain            External Urinary Catheter 1 day                Physical Exam  General: conscious, cooperative, in no acute distress  Eyes: conjunctivae pink, anicteric sclerae  ENT: lips and mucous membranes moist  Neck: supple, no JVD, no masses  Chest:  Fine expiratory wheezes bilaterally on nasal cannula  CVS: S1 & S2, normal rate, regular rhythm  Abdomen: soft, non-tender, non-distended, normoactive bowel sounds  Extremities:  Trace edema of both legs  Skin: no rash  Neuro: awake, alert, oriented      Diagnostic Data:  Lab: I have personally reviewed pertinent lab results  ,   CBC:  Results from last 7 days   Lab Units 01/22/22  0639   WBC Thousand/uL 6 38   HEMOGLOBIN g/dL 10 1*   HEMATOCRIT % 32 4*   PLATELETS Thousands/uL 238      CMP:   Lab Results   Component Value Date    SODIUM 133 (L) 01/22/2022    K 4 5 01/22/2022    CL 97 (L) 01/22/2022    CO2 31 01/22/2022    BUN 15 01/22/2022    CREATININE 0 83 01/22/2022    CALCIUM 8 9 01/22/2022    EGFR 62 01/22/2022   ,   PT/INR: No results found for: PT, INR,   Magnesium: No components found for: MAG,  Phosphorous: No results found for: PHOS    Microbiology:  @[facility],(urinecx:7)@        TAWANA Roberts    Portions of the record may have been created with voice recognition software  Occasional wrong word or "sound a like" substitutions may have occurred due to the inherent limitations of voice recognition software  Read the chart carefully and recognize, using context, where substitutions have occurred

## 2022-01-22 NOTE — PHYSICAL THERAPY NOTE
Physical Therapy Treatment    Patient Name: Gwyn Salguero    KFDMU'U Date: 1/22/2022     Problem List  Principal Problem:    Closed fracture of neck of left femur ([facility] )  Active Problems:    Esophageal reflux    Hypercholesterolemia    Hyponatremia    Paroxysmal atrial fibrillation (HCC)    Benign essential HTN    Acute respiratory failure with hypoxia ([facility] )    Delirium    Pressure injury of dorsum of left foot, stage 1    Dysphagia    Stage 3a chronic kidney disease ([facility] )    Acute blood loss anemia       Past Medical History  Past Medical History:   Diagnosis Date    Abdominal distension     Last Assessed: [id number]    Abnormal weight loss     Last Assessed: 07Rxo1690    Arthritis     Bronchitis     Bursitis of left hip     Lst Assessed: [id number]    Chest pain     Last Assessed: 90Awg0420    Colon polyp     Dysuria     Last Assessed: [id number]    External hemorrhoids     Last Assessed: [id number]    Generalized anxiety disorder     GERD (gastroesophageal reflux disease)     History of echocardiogram 03/20/2018    EF 60%, mild to moderate mitral annular calcification  mild AS, mild TR       Hyperlipidemia     Hypertension     Hypomagnesemia     Last Assessed: [id number]    Lyme disease     Last Assessed: 38Woh8549    Multiple renal cysts 7/5/2019    Nonrheumatic mitral valve regurgitation     Osteoporosis     Pneumonia of right middle lobe due to infectious organism     Last Assessed: [id number]    SVT (supraventricular tachycardia)     Urinary tract infection         Past Surgical History  Past Surgical History:   Procedure Laterality Date    CATARACT EXTRACTION      HEMORRHOID SURGERY      HYSTERECTOMY      PELVIC FLOOR REPAIR             01/22/22 0930   Note Type   Note Type Treatment   Restrictions/Precautions   Weight Bearing Precautions Per Order Yes   LLE Weight Bearing Per Order WBAT   Other Precautions Fall Risk;Multiple lines;O2;Pain   Cognition   Overall Cognitive Status Impaired   Orientation Level Oriented to person;Oriented to place;Oriented to situation;Disoriented to time   Memory Decreased long term memory   Subjective   Subjective " Were going to get out of bed "   Bed Mobility   Supine to Sit 2  Maximal assistance   Additional items Assist x 2;Verbal cues;LE management; Increased time required;HOB elevated   Additional Comments Seated in chair at the end of the session   Transfers   Sit to Stand 2  Maximal assistance   Additional items Assist x 2;Verbal cues; Increased time required   Stand to Sit 2  Maximal assistance   Additional items Assist x 2; Increased time required;Verbal cues   Stand pivot 2  Maximal assistance   Additional items Assist x 2; Increased time required;Verbal cues   Additional Comments Stand pivot to chair this sesison  Balance   Static Sitting Good   Dynamic Sitting Fair   Static Standing Fair   Dynamic Standing Fair -   Assessment   Prognosis Good   Problem List Decreased strength;Decreased range of motion;Decreased endurance; Impaired balance;Decreased mobility   Assessment Patient is agreeable to PT this date  Requires Max A x 2 for bed mobility, and stand pivot transfer to the chair this date  Cues for sequencing during transfer and safety awareness  Posterior lean during sit to stand transfer from bed  Did not attempt to ambulate this session   She would benefit from PT to help improve strength ROM, balance, and to help facilitate return to PLOF    Plan   Progress Progressing toward goals   Recommendation   PT Discharge Recommendation Post acute rehabilitation services   AM-PAC Basic Mobility Inpatient   Turning in Bed Without Bedrails 2   Lying on Back to Sitting on Edge of Flat Bed 2   Moving Bed to Chair 2   Standing Up From Chair 2   Walk in Room 1   Climb 3-5 Stairs 1   Basic Mobility Inpatient Raw Score 10   Highest Level Of Mobility   -M Goal 4: Move to chair/commode   JH-HLM Goal Achieved Yes     Pt in bed when PT entered  Patient seated in chair when PT left  All lines intact, all needs within reach  Co treatment with OT secondary to complex medical condition of pt, possible A of 2 required to achieve and maintain transitional movements, requiring the need of skilled therapeutic intervention of 2 therapists to achieve delivery of services

## 2022-01-22 NOTE — ASSESSMENT & PLAN NOTE
Hgb has trended down from 13 range to 10 1 on discharge  Likely in the setting of acute blood loss anemia  Continue iron supplementation BID with colace  Follow up CBC in 5 days

## 2022-01-22 NOTE — ASSESSMENT & PLAN NOTE
· Hospital confusion / delirium noted likely in the setting of underlying mild cognitive impairment, hyponatremia, pain medications and hard of hearing    · Now appears to be at baseline, wean pain meds as able to avoid further delirium

## 2022-01-25 NOTE — TELEPHONE ENCOUNTER
Dr Orlando Mo    L leg is shorter than the R leg, is this a result of sx? Cristina Keita from [facility] # [phone]

## 2022-01-26 NOTE — TELEPHONE ENCOUNTER
Spoke to Neelima and advised above  She stated she spoke to their oncall attending and will relay the message  Thank you!

## 2022-01-26 NOTE — TELEPHONE ENCOUNTER
Spoke to Elza Ortega at facility  She reports the patient had a fall on Sunday after she arrived to the facility  She was found sitting on he bottom on the floor  No pain or obvious deformity of either lower extremity  When she walks with PT she only walks about 10 feet and has a limp  Reports that PT and the provider int he facility are noticing a leg length discrepancy  Patient is not in any pain and there is no outward or inward pointing of the foot, but the leg length has them concerned and the limp  She asked if the team would like an xray done in the facility before her follow up appointment  Please advise

## 2022-01-27 NOTE — TELEPHONE ENCOUNTER
Spoke to Leo Portillo6 were completed and they will  disc from outside [facility] that no changes noted on xray

## 2022-01-31 NOTE — PROGRESS NOTES
80 y o female presents for 13 days postoperative visit status post left cemented hip hemiarthroplasty bipolar component for displaced subcapital fracture  Surgery date was 01/20/2022  She presents for her 1st postop visit and wound check  Telephone message is for concern as the patient was found on the floor 1 time with the left leg shortening  The patient had no pain and was not found to have any internal external rotation  Review of the operative films noted some shortening of the left hip  Patient did have fracture blisters in decubiti I on the healed time of surgery likely related to the White's traction  Patient denies any hip pain at current  She states she feels weak  He does note some heel pain  Review of Systems  Review of systems negative unless otherwise specified in HPI    Past Medical History  Past Medical History:   Diagnosis Date    Abdominal distension     Last Assessed: [id number]    Abnormal weight loss     Last Assessed: [id number]    Arthritis     Bronchitis     Bursitis of left hip     Lst Assessed: [id number]    Chest pain     Last Assessed: [id number]    Colon polyp     Dysuria     Last Assessed: [id number]    External hemorrhoids     Last Assessed: [id number]    Generalized anxiety disorder     GERD (gastroesophageal reflux disease)     History of echocardiogram 03/20/2018    EF 60%, mild to moderate mitral annular calcification  mild AS, mild TR       Hyperlipidemia     Hypertension     Hypomagnesemia     Last Assessed: [id number]    Lyme disease     Last Assessed: [id number]    Multiple renal cysts 7/5/2019    Nonrheumatic mitral valve regurgitation     Osteoporosis     Pneumonia of right middle lobe due to infectious organism     Last Assessed: [id number]    SVT (supraventricular tachycardia)     Urinary tract infection      Past Surgical History  Past Surgical History:   Procedure Laterality Date    CATARACT EXTRACTION      HEMORRHOID SURGERY      HYSTERECTOMY  PELVIC FLOOR REPAIR      [place] PARTIAL HIP REPLACEMENT Left 1/20/2022    Procedure: HEMIARTHROPLASTY HIP (BIPOLAR);   Surgeon: Goldie Cordero MD;  Location: [facility];  Service: Orthopedics     Current Medications  Current Outpatient Medications on File Prior to Visit   Medication Sig Dispense Refill    acetaminophen (TYLENOL) 325 mg tablet Take 2 tablets (650 mg total) by mouth every 6 (six) hours as needed for mild pain, headaches or fever 60 tablet 11    amLODIPine (NORVASC) 10 mg tablet Take 1 tablet (10 mg total) by mouth daily  0    aspirin (ECOTRIN LOW STRENGTH) 81 mg EC tablet TAKE ONE TABLET BY MOUTH ONCE DAILY (STROKE PREVENTION) 30 tablet 5    calcium carbonate (OYSTER SHELL,OSCAL) 500 mg 1 TAB ORALLY TWICE DAILY DX: SUPPLEMENT 60 tablet 4    clonazePAM (KlonoPIN) 0 5 mg tablet TAKE THREE TABLETS (1 5MG) BY MOUTH AT BEDTIME (ANXIETY) 90 tablet 4    D3-1000 25 MCG (1000 UT) capsule TAKE ONE CAPSULE BY MOUTH ONCE DAILY (VITAMIN D DEFICIENCY) 30 capsule 5    digoxin (LANOXIN) 0 125 mg tablet TAKE ONE TABLET BY MOUTH ONCE DAILY (AFIB) 30 tablet 5    docusate sodium (COLACE) 100 mg capsule Take 1 capsule (100 mg total) by mouth 2 (two) times a day  0    enoxaparin (LOVENOX) 30 mg/0 3 mL Inject 0 3 mL (30 mg total) under the skin every 12 (twelve) hours for 21 days  0    famotidine (PEPCID) 20 mg tablet TAKE ONE TABLET BY MOUTH DAILY AT BEDTIME (GERD) 30 tablet 5    ferrous sulfate 325 (65 Fe) mg tablet Take 1 tablet (325 mg total) by mouth daily with breakfast 30 tablet 0    guaiFENesin (ROBITUSSIN) 100 MG/5ML oral liquid Take 200 mg by mouth 4 (four) times a day as needed for cough      ibuprofen (MOTRIN) 200 mg tablet TAKE ONE TABLET BY MOUTH ONCE DAILY AS NEEDED FOR MIGRAINES/HEADACHE 30 tablet 5    lactobacillus acidophilus-bulgaricus (FLORANEX) packet Take 1 packet by mouth 3 (three) times a day with meals 90 packet 0    lisinopril (ZESTRIL) 10 mg tablet TAKE ONE TABLET BY MOUTH ONCE DAILY (HTN) 30 tablet 5    magnesium hydroxide (MILK OF MAGNESIA) 800 MG/5ML suspension Take 30 mL by mouth daily as needed for constipation       Melatonin 5 MG TABS TAKE ONE TABLET BY MOUTH AT BEDTIME (INSOMNIA) 30 tablet 5    metoprolol tartrate (LOPRESSOR) 25 mg tablet TAKE THREE TABLETS (75MG) BY MOUTH EVERY 12 HOURS  HOLD FOR HR <60 OR SBP <110 (HTN)**TAKE W/ FOOD/SNACK** 180 tablet 5    Omega-3 Fatty Acids (fish oil) 1,000 mg TAKE ONE CAPSULE BY MOUTH ONCE DAILY (SUPPLEMENT) 30 capsule 5    omeprazole (PriLOSEC) 20 mg delayed release capsule TAKE ONE CAPSULE BY MOUTH ONCE DAILY (GERD) 30 capsule 5    oxyCODONE (Roxicodone) 5 immediate release tablet Take 0 5 tablets (2 5 mg total) by mouth every 6 (six) hours as needed for moderate pain or severe pain for up to 10 doses Max Daily Amount: 10 mg 5 tablet 0    polyethylene glycol (GLYCOLAX) 17 GM/SCOOP DISSOLVE 17 GRAMS (ONE CAPFUL) IN 8 OUNCES OF WATER AND DRINK BY MOUTH DAILY FOR CONSTIPATION 510 g 4    Refresh Optive Advanced 0 5-1-0 5 % SOLN 3 (three) times a day      senna (SENOKOT) 8 6 mg TAKE ONE TABLET BY MOUTH ONCE DAILY AT BEDTIME  HOLD FOR DIARRHEA OR LOOSE STOOLS (CONSTIPATION) 30 tablet 5    Nutritional Supplements (Ensure) Take 237 mL by mouth daily 7110 mL 5     No current facility-administered medications on file prior to visit  Recent Labs [facility])  0   Lab Value Date/Time    HCT 32 4 (L) 01/22/2022 0639    HCT 38 6 06/18/2015 1655    HGB 10 1 (L) 01/22/2022 0639    HGB 12 9 06/18/2015 1655    WBC 6 38 01/22/2022 0639    WBC 3 26 (L) 06/18/2015 1655    INR 0 99 01/16/2022 1832    CRP 10 7 (H) 03/18/2021 0606    GLUCOSE 165 (H) 06/29/2015 1546    HGBA1C 6 3 (H) 07/08/2015 1310     Physical exam  Body mass index is 20 52 kg/m²    · General: Awake, Alert, Oriented  · Eyes: Pupils equal, round and reactive to light  · Heart: regular rate and rhythm  · Lungs: No audible wheezing  · Abdomen: soft  left hip:  · Stable incision clean and dry healing nicely without signs of infection or discharge  Staples removed in the office today in their entirety and Steri-Strips applied  Pain-free passive hip flexion, abduction/adduction and internal/external rotation  No calf pain  Fracture blisters about the left ankle scabbed over and healing nicely  There is dry eschar the posterior left heel without gross infection or discharge  Patient able to plantar dorsiflex the ankle  Patient able to stand with a walker for staple removal with support  Imaging  I personally reviewed x-rays of the left hip which note located cemented left hip hemiarthroplasty with slight shortening no fracture or dislocation  Procedure  01/20/2022 left hip bipolar cemented hemiarthroplasty  1  Status post hip hemiarthroplasty    2  Decubitus ulcer of heel, bilateral    3  Postop check       Asessment:  13 days postop left hip doing well for her age with bilateral heel decubitus ulcers  Plan:  Weight-bearing as tolerated left lower extremity with walker  Follow posterior hip precautions for 6 weeks postop  Recheck in orthopedic office in 6 weeks  Maintain Steri-Strips left hip incision for another 7 days  Strict wound care by wound care staff and strict elevation of heels off bed  This note was created using voice recognition software

## 2022-02-02 NOTE — TELEPHONE ENCOUNTER
Called and spoke with [employer] at [address] [place] [address] Pkwy  Patient scheduled for appointment on 2/14/22 @ 2:00 pm with AP in [facility] to remove patients Donaldson catheter at 6:00 am on 2/14/22  [place] office address provided to rehab

## 2022-02-02 NOTE — PATIENT INSTRUCTIONS
Weight-bearing as tolerated left lower extremity with walker  Follow posterior hip precautions for 6 weeks postop  Recheck in orthopedic office in 6 weeks  Maintain Steri-Strips left hip incision for another 7 days  Strict wound care by wound care staff and strict elevation of heels off bed

## 2022-02-02 NOTE — TELEPHONE ENCOUNTER
AP visit  Can have catheter pulled in the facility in the morning by 6 a m  and schedule her for the afternoon for PVR in a week or 2    They should make sure she is not constipated

## 2022-02-02 NOTE — TELEPHONE ENCOUNTER
Pt managed by KYLE Li last seen 2019,Tracy with [facility] called stating pt needed to be catheterized over weekend due to retention needs appointment,please contact her directly  [phone]

## 2022-02-14 NOTE — PROGRESS NOTES
Assessment and plan:     Retention, urine  · S/p hip fracture  · Daily bowel regimen to prevent constipation  · Increase ambulation  · US of kidney and bladder with PVR in 3 months  · Straight cath prn, replace catheter if straight cath for >350 ml x 3    Recurrent UTI  · Daily cranberry supplement  · Daily probiotic  · Call for uti symptoms          Saadia LateTAWANA    History of Present Illness     Katty Ramirez is a 80 y o  female who presents with her son  She had a recent fall with hip fracture and urinary retention  Per her son she would complain of intermittent episodes where she felt like she could not urinate  This would resolved when she drank water and cranberry  She is now on thickened liquids and is not drinking as much  Her PVR today is 126 ml  Laboratory     Lab Results   Component Value Date    BUN 15 01/22/2022    CREATININE 0 83 01/22/2022       No components found for: GFR    Lab Results   Component Value Date    GLUCOSE 165 (H) 06/29/2015    CALCIUM 8 9 01/22/2022     06/29/2015    K 4 5 01/22/2022    CO2 31 01/22/2022    CL 97 (L) 01/22/2022       Lab Results   Component Value Date    WBC 6 38 01/22/2022    HGB 10 1 (L) 01/22/2022    HCT 32 4 (L) 01/22/2022    MCV 88 01/22/2022     01/22/2022       No results found for: PSA    Recent Results (from the past 1 hour(s))   POCT Measure PVR    Collection Time: 02/14/22  2:05 PM   Result Value Ref Range    POST-VOID RESIDUAL VOLUME, ML  mL       @RESULT(URINEMICROSCOPIC)@    @RESULT(URINECULTURE)@    Radiology       Review of Systems     Review of Systems   Constitutional: Negative for activity change, appetite change, chills, fatigue, fever and unexpected weight change  HENT: Positive for hearing loss  Negative for facial swelling  Eyes: Negative for discharge  Respiratory: Negative  Negative for cough and shortness of breath  Cardiovascular: Negative for chest pain and leg swelling          Oxygen Gastrointestinal: Positive for constipation  Negative for abdominal distention, abdominal pain, diarrhea, nausea and vomiting  Endocrine: Negative  Genitourinary: Negative  Negative for decreased urine volume, difficulty urinating, dysuria, enuresis, flank pain, frequency, genital sores, hematuria and urgency  Musculoskeletal: Positive for gait problem  Negative for back pain and myalgias  In a wheelchair, s/p hip repair   Skin: Negative for pallor and rash  Allergic/Immunologic: Negative  Negative for immunocompromised state  Neurological: Negative for facial asymmetry and speech difficulty  Psychiatric/Behavioral: Negative for agitation and confusion  Allergies     Allergies   Allergen Reactions    Codeine GI Intolerance    Epinephrine Other (See Comments)     Increased Heart Rate, Palpitations    Iodinated Diagnostic Agents     Iodine - Food Allergy Other (See Comments)          Magnesium Citrate GI Intolerance       Physical Exam     Physical Exam  Vitals reviewed  Constitutional:       General: She is not in acute distress  Appearance: Normal appearance  She is normal weight  She is not ill-appearing  HENT:      Head: Normocephalic and atraumatic  Ears:      Comments: [place]  Eyes:      General: No scleral icterus  Cardiovascular:      Rate and Rhythm: Normal rate  Pulmonary:      Effort: Pulmonary effort is normal  No respiratory distress  Comments: On oxygen  Abdominal:      General: Abdomen is flat  There is no distension  Palpations: Abdomen is soft  Tenderness: There is no abdominal tenderness  There is no guarding or rebound  Musculoskeletal:         General: No swelling  Cervical back: Normal range of motion  Skin:     General: Skin is warm and dry  Coloration: Skin is not jaundiced or pale  Findings: No rash  Neurological:      General: No focal deficit present        Mental Status: She is alert and oriented to person, place, and time  Gait: Gait normal    Psychiatric:         Mood and Affect: Mood normal          Behavior: Behavior normal          Thought Content:  Thought content normal          Judgment: Judgment normal          Vital Signs     Vitals:    02/14/22 1354   BP: 138/88   Pulse: 88       Current Medications       Current Outpatient Medications:     acetaminophen (TYLENOL) 325 mg tablet, Take 2 tablets (650 mg total) by mouth every 6 (six) hours as needed for mild pain, headaches or fever, Disp: 60 tablet, Rfl: 11    amLODIPine (NORVASC) 10 mg tablet, Take 1 tablet (10 mg total) by mouth daily, Disp: , Rfl: 0    amLODIPine (NORVASC) 5 mg tablet, 1 TAB ORALLY TWICE DAILY *HOLD FOR SBP<110* DX: HYPERTENSION, Disp: 56 tablet, Rfl: 4    aspirin (ECOTRIN LOW STRENGTH) 81 mg EC tablet, TAKE ONE TABLET BY MOUTH ONCE DAILY (STROKE PREVENTION), Disp: 30 tablet, Rfl: 5    calcium carbonate (OYSTER SHELL,OSCAL) 500 mg, 1 TAB ORALLY TWICE DAILY DX: SUPPLEMENT, Disp: 60 tablet, Rfl: 4    clonazePAM (KlonoPIN) 0 5 mg tablet, TAKE THREE TABLETS (1 5MG) BY MOUTH AT BEDTIME (ANXIETY), Disp: 90 tablet, Rfl: 4    D3-1000 25 MCG (1000 UT) capsule, TAKE ONE CAPSULE BY MOUTH ONCE DAILY (VITAMIN D DEFICIENCY), Disp: 30 capsule, Rfl: 5    digoxin (LANOXIN) 0 125 mg tablet, TAKE ONE TABLET BY MOUTH ONCE DAILY (AFIB), Disp: 30 tablet, Rfl: 5    docusate sodium (COLACE) 100 mg capsule, Take 1 capsule (100 mg total) by mouth 2 (two) times a day, Disp: , Rfl: 0    famotidine (PEPCID) 20 mg tablet, TAKE ONE TABLET BY MOUTH DAILY AT BEDTIME (GERD), Disp: 30 tablet, Rfl: 5    ferrous sulfate 325 (65 Fe) mg tablet, Take 1 tablet (325 mg total) by mouth daily with breakfast, Disp: 30 tablet, Rfl: 0    guaiFENesin (ROBITUSSIN) 100 MG/5ML oral liquid, Take 200 mg by mouth 4 (four) times a day as needed for cough, Disp: , Rfl:     ibuprofen (MOTRIN) 200 mg tablet, TAKE ONE TABLET BY MOUTH ONCE DAILY AS NEEDED FOR MIGRAINES/HEADACHE, Disp: 30 tablet, Rfl: 5    lactobacillus acidophilus-bulgaricus (FLORANEX) packet, Take 1 packet by mouth 3 (three) times a day with meals, Disp: 90 packet, Rfl: 0    lisinopril (ZESTRIL) 10 mg tablet, TAKE ONE TABLET BY MOUTH ONCE DAILY (HTN), Disp: 30 tablet, Rfl: 5    magnesium hydroxide (MILK OF MAGNESIA) 800 MG/5ML suspension, Take 30 mL by mouth daily as needed for constipation , Disp: , Rfl:     Melatonin 5 MG TABS, TAKE ONE TABLET BY MOUTH AT BEDTIME (INSOMNIA), Disp: 30 tablet, Rfl: 5    metoprolol tartrate (LOPRESSOR) 25 mg tablet, TAKE THREE TABLETS (75MG) BY MOUTH EVERY 12 HOURS  HOLD FOR HR <60 OR SBP <110 (HTN)**TAKE W/ FOOD/SNACK**, Disp: 180 tablet, Rfl: 5    Omega-3 Fatty Acids (fish oil) 1,000 mg, TAKE ONE CAPSULE BY MOUTH ONCE DAILY (SUPPLEMENT), Disp: 30 capsule, Rfl: 5    omeprazole (PriLOSEC) 20 mg delayed release capsule, TAKE ONE CAPSULE BY MOUTH ONCE DAILY (GERD), Disp: 30 capsule, Rfl: 5    oxyCODONE (Roxicodone) 5 immediate release tablet, Take 0 5 tablets (2 5 mg total) by mouth every 6 (six) hours as needed for moderate pain or severe pain for up to 10 doses Max Daily Amount: 10 mg, Disp: 5 tablet, Rfl: 0    polyethylene glycol (GLYCOLAX) 17 GM/SCOOP, DISSOLVE 17 GRAMS (ONE CAPFUL) IN 8 OUNCES OF WATER AND DRINK BY MOUTH DAILY FOR CONSTIPATION, Disp: 510 g, Rfl: 4    Refresh Optive Advanced 0 5-1-0 5 % SOLN, 3 (three) times a day, Disp: , Rfl:     senna (SENOKOT) 8 6 mg, TAKE ONE TABLET BY MOUTH ONCE DAILY AT BEDTIME   HOLD FOR DIARRHEA OR LOOSE STOOLS (CONSTIPATION), Disp: 30 tablet, Rfl: 5    enoxaparin (LOVENOX) 30 mg/0 3 mL, Inject 0 3 mL (30 mg total) under the skin every 12 (twelve) hours for 21 days, Disp: , Rfl: 0    Nutritional Supplements (Ensure), Take 237 mL by mouth daily, Disp: 7110 mL, Rfl: 5    Active Problems     Patient Active Problem List   Diagnosis    Gomez's esophagus    Bilateral carotid artery disease ([facility] [address] )    Breast mass, left    Chronic constipation    Colon, diverticulosis    Esophageal reflux    Generalized anxiety disorder    Generalized osteoarthritis of multiple sites    Glucose intolerance (impaired glucose tolerance)    Hypercholesterolemia    Neuralgia    Osteoporosis    SVT (supraventricular tachycardia) (HCC)    Vitamin D deficiency    HCAP (healthcare-associated pneumonia)    Acute febrile illness    Cough    Esophageal mass    Hyponatremia    Right lower lobe lung mass    History of frequent urinary tract infections    Paroxysmal atrial fibrillation (HCC)    Sinus bradycardia    Debilitated    Eczema    Hypertensive urgency    Acute metabolic encephalopathy    Acute cystitis with hematuria    Lung mass    Acute encephalopathy    Multiple renal cysts    Hepatic steatosis    Left eye pain    Sick sinus syndrome (HCC)    Benign essential HTN    Diarrhea    sepsis    FIONA (acute kidney injury) (HCC)    Stage 3b chronic kidney disease (HCC)    Lactic acidosis    Pulmonary nodule    Retention, urine    Recurrent UTI    Closed fracture of neck of left femur (HCC)    Acute respiratory failure with hypoxia (HCC)    Preoperative clearance    Delirium    Pressure injury of dorsum of left foot, stage 1    Dysphagia    Stage 3a chronic kidney disease (HCC)    Acute blood loss anemia       Past Medical History     Past Medical History:   Diagnosis Date    Abdominal distension     Last Assessed: [id number]    Abnormal weight loss     Last Assessed: [id number]    Arthritis     Bronchitis     Bursitis of left hip     Lst Assessed: [id number]    Chest pain     Last Assessed: [id number]    Colon polyp     Dysuria     Last Assessed: [id number]    External hemorrhoids     Last Assessed: [id number]    Generalized anxiety disorder     GERD (gastroesophageal reflux disease)     History of echocardiogram 03/20/2018    EF 60%, mild to moderate mitral annular calcification  mild AS, mild TR       Hyperlipidemia  Hypertension     Hypomagnesemia     Last Assessed: [id number]    Lyme disease     Last Assessed: [id number]    Multiple renal cysts 7/5/2019    Nonrheumatic mitral valve regurgitation     Osteoporosis     Pneumonia of right middle lobe due to infectious organism     Last Assessed: [id number]    SVT (supraventricular tachycardia)     Urinary tract infection        Surgical History     Past Surgical History:   Procedure Laterality Date    CATARACT EXTRACTION      HEMORRHOID SURGERY      HYSTERECTOMY      PELVIC FLOOR REPAIR      [place] PARTIAL HIP REPLACEMENT Left 1/20/2022    Procedure: HEMIARTHROPLASTY HIP (BIPOLAR); Surgeon: Casey Davis MD;  Location: [facility];  Service: Orthopedics       Family History     Family History   Adopted: Yes       Social History     Social History     Social History     Tobacco Use   Smoking Status Never Smoker   Smokeless Tobacco Never Used       Past Surgical History:   Procedure Laterality Date    CATARACT EXTRACTION      HEMORRHOID SURGERY      HYSTERECTOMY      PELVIC FLOOR REPAIR      [place] PARTIAL HIP REPLACEMENT Left 1/20/2022    Procedure: HEMIARTHROPLASTY HIP (BIPOLAR); Surgeon: Casey Davis MD;  Location: [facility];  Service: Orthopedics         The following portions of the patient's history were reviewed and updated as appropriate: allergies, current medications, past family history, past medical history, past social history, past surgical history and problem list    Please note :  Voice dictation software has been used to create this document  There may be inadvertent transcription errors      [id number]  [facility]

## 2022-02-14 NOTE — ASSESSMENT & PLAN NOTE
· S/p hip fracture  · Daily bowel regimen to prevent constipation  · Increase ambulation  · US of kidney and bladder with PVR in 3 months  · Straight cath prn, replace catheter if straight cath for >350 ml x 3

## 2022-02-14 NOTE — PATIENT INSTRUCTIONS
Bladder scan if no void or less than 200ml in 6hrs  If bladder scan or straight cath is >350ml, repeat process  If patient requires straight cath x3 , re-insert cleary and call for Urology follow-up  Cleary can remain in place for up to 4 weeks at a time

## 2022-02-15 NOTE — TELEPHONE ENCOUNTER
[place], NP from Rehab would like to clarify somethings with Monico Garcia due to pt's OV yesterday  There are multiple concerns about a cleary, pt's d/cand urine retention

## 2022-02-15 NOTE — TELEPHONE ENCOUNTER
Lives in assisted living  Currently in rehab  Recommended a void trial while at rehab with parameters to straight cath her or do a PVR of no void or void < 200 mL in 6 hours  Per Shyann Morales the DON they can't measure her urine output when she goes to bathroom  They did report that they attempted to remove the catheter however she was unable to urinate and was straight cathed for over 300 mL on multiple times  They will be replacing the Donaldson catheter at this time  They question whether not she can be given Flomax  I advised against this  We discussed that her recurrent UTI could be secondary to urinary retention  Again I recommended probiotic and cranberry supplement  Can consider addition of prophylactic antibiotic if her infections continue

## 2022-03-23 NOTE — PATIENT INSTRUCTIONS
Continue daily wound care  Continue physical therapy  Follow-up in orthopedic office in 3 months with repeat x-rays  Weightbearing as tolerated  May discontinue use of abduction pillow

## 2022-03-23 NOTE — PROGRESS NOTES
89 y o female presents for 8 weeks postoperative visit status post left hemiarthroplasty (op date:  01/20/2022)  Patient is doing well from a hip standpoint  She notes no significant pain  She does ambulate physical therapy  She has more troubles with her lateral heel wounds  She gets wound care twice a day  She denies groin pain  Denies calf pain  Review of Systems  Review of systems negative unless otherwise specified in HPI    Past Medical History  Past Medical History:   Diagnosis Date    Abdominal distension     Last Assessed: [id number]    Abnormal weight loss     Last Assessed: [id number]    Arthritis     Bronchitis     Bursitis of left hip     Lst Assessed: [id number]    Chest pain     Last Assessed: [id number]    Colon polyp     Dysuria     Last Assessed: [id number]    External hemorrhoids     Last Assessed: [id number]    Generalized anxiety disorder     GERD (gastroesophageal reflux disease)     History of echocardiogram 03/20/2018    EF 60%, mild to moderate mitral annular calcification  mild AS, mild TR   Hyperlipidemia     Hypertension     Hypomagnesemia     Last Assessed: [id number]    Lyme disease     Last Assessed: [id number]    Multiple renal cysts 7/5/2019    Nonrheumatic mitral valve regurgitation     Osteoporosis     Pneumonia of right middle lobe due to infectious organism     Last Assessed: [id number]    SVT (supraventricular tachycardia)     Urinary tract infection      Past Surgical History  Past Surgical History:   Procedure Laterality Date    CATARACT EXTRACTION      HEMORRHOID SURGERY      HYSTERECTOMY      PELVIC FLOOR REPAIR      HI PARTIAL HIP REPLACEMENT Left 1/20/2022    Procedure: HEMIARTHROPLASTY HIP (BIPOLAR);   Surgeon: Anna Funes MD;  Location: [facility];  Service: Orthopedics     Current Medications  Current Outpatient Medications on File Prior to Visit   Medication Sig Dispense Refill    acetaminophen (TYLENOL) 325 mg tablet Take 2 tablets (650 mg total) by mouth every 6 (six) hours as needed for mild pain, headaches or fever 60 tablet 11    amLODIPine (NORVASC) 10 mg tablet Take 1 tablet (10 mg total) by mouth daily  0    amLODIPine (NORVASC) 5 mg tablet 1 TAB ORALLY TWICE DAILY *HOLD FOR SBP<110* DX: HYPERTENSION 56 tablet 4    aspirin (ECOTRIN LOW STRENGTH) 81 mg EC tablet TAKE ONE TABLET BY MOUTH ONCE DAILY (STROKE PREVENTION) 30 tablet 5    calcium carbonate (OYSTER SHELL,OSCAL) 500 mg 1 TAB ORALLY TWICE DAILY DX: SUPPLEMENT 60 tablet 4    clonazePAM (KlonoPIN) 0 5 mg tablet TAKE THREE TABLETS (1 5MG) BY MOUTH AT BEDTIME (ANXIETY) 90 tablet 4    D3-1000 25 MCG (1000 UT) capsule TAKE ONE CAPSULE BY MOUTH ONCE DAILY (VITAMIN D DEFICIENCY) 30 capsule 5    digoxin (LANOXIN) 0 125 mg tablet TAKE ONE TABLET BY MOUTH ONCE DAILY (AFIB) 30 tablet 5    docusate sodium (COLACE) 100 mg capsule Take 1 capsule (100 mg total) by mouth 2 (two) times a day  0    enoxaparin (LOVENOX) 30 mg/0 3 mL Inject 0 3 mL (30 mg total) under the skin every 12 (twelve) hours for 21 days  0    famotidine (PEPCID) 20 mg tablet TAKE ONE TABLET BY MOUTH DAILY AT BEDTIME (GERD) 30 tablet 5    ferrous sulfate 325 (65 Fe) mg tablet Take 1 tablet (325 mg total) by mouth daily with breakfast 30 tablet 0    guaiFENesin (ROBITUSSIN) 100 MG/5ML oral liquid Take 200 mg by mouth 4 (four) times a day as needed for cough      ibuprofen (MOTRIN) 200 mg tablet TAKE ONE TABLET BY MOUTH ONCE DAILY AS NEEDED FOR MIGRAINES/HEADACHE 30 tablet 5    lactobacillus acidophilus-bulgaricus (FLORANEX) packet Take 1 packet by mouth 3 (three) times a day with meals 90 packet 0    lisinopril (ZESTRIL) 10 mg tablet TAKE ONE TABLET BY MOUTH ONCE DAILY (HTN) 30 tablet 5    magnesium hydroxide (MILK OF MAGNESIA) 800 MG/5ML suspension Take 30 mL by mouth daily as needed for constipation       Melatonin 5 MG TABS TAKE ONE TABLET BY MOUTH AT BEDTIME (INSOMNIA) 30 tablet 5    metoprolol tartrate (LOPRESSOR) 25 mg tablet TAKE THREE TABLETS (75MG) BY MOUTH EVERY 12 HOURS  HOLD FOR HR <60 OR SBP <110 (HTN)**TAKE W/ FOOD/SNACK** 180 tablet 5    Nutritional Supplements (Ensure) Take 237 mL by mouth daily 7110 mL 5    Omega-3 Fatty Acids (fish oil) 1,000 mg TAKE ONE CAPSULE BY MOUTH ONCE DAILY (SUPPLEMENT) 30 capsule 5    omeprazole (PriLOSEC) 20 mg delayed release capsule TAKE ONE CAPSULE BY MOUTH ONCE DAILY (GERD) 30 capsule 5    oxyCODONE (Roxicodone) 5 immediate release tablet Take 0 5 tablets (2 5 mg total) by mouth every 6 (six) hours as needed for moderate pain or severe pain for up to 10 doses Max Daily Amount: 10 mg 5 tablet 0    polyethylene glycol (GLYCOLAX) 17 GM/SCOOP DISSOLVE 17 GRAMS (ONE CAPFUL) IN 8 OUNCES OF WATER AND DRINK BY MOUTH DAILY FOR CONSTIPATION 510 g 4    Refresh Optive Advanced 0 5-1-0 5 % SOLN 3 (three) times a day      senna (SENOKOT) 8 6 mg TAKE ONE TABLET BY MOUTH ONCE DAILY AT BEDTIME  HOLD FOR DIARRHEA OR LOOSE STOOLS (CONSTIPATION) 30 tablet 5     No current facility-administered medications on file prior to visit  Recent Labs [facility])  0   Lab Value Date/Time    HCT 32 4 (L) 01/22/2022 0639    HCT 38 6 06/18/2015 1655    HGB 10 1 (L) 01/22/2022 0639    HGB 12 9 06/18/2015 1655    WBC 6 38 01/22/2022 0639    WBC 3 26 (L) 06/18/2015 1655    INR 0 99 01/16/2022 1832    CRP 10 7 (H) 03/18/2021 0606    GLUCOSE 165 (H) 06/29/2015 1546    HGBA1C 6 3 (H) 07/08/2015 1310     Physical exam  Body mass index is 20 52 kg/m²  General:  Alert and oriented x3,  Pulmonary:  Nonlabored breathing  Cardiac:  Regular pulse  Abdomen:  Soft  left hip:  Incision healed nicely without default  No signs of infection  Pain-free passive internal external rotation of the hip in the seated position  No pain with log-rolling  Patient able to stand from wheelchair without pain  She is able do a seated straight raise and knee extension without hip pain       heels: Right heel notes dry eschar region smaller in size than previous without signs of infection or discharge  Left heel wound is dressed  Imaging  None today    Procedure   left hip hemiarthroplasty 01/20/2022    1  Status post hip hemiarthroplasty    2  Decubitus ulcer of heel, bilateral    3  Postop check       Assessment:  8 weeks status post left hip hemiarthroplasty with healing bilateral heels wounds  Plan:  Continue daily wound care  Continue physical therapy  Follow-up in orthopedic office in 3 months with repeat x-rays  Weightbearing as tolerated  May discontinue use of abduction pillow  This note was created using voice recognition software

## 2022-04-13 NOTE — PROGRESS NOTES
Chief Complaint  Status post a left hip fracture treated with hemiarthroplasty    History Of Presenting Illness  Gabriela Wilkins 9/2/1931 presents with left hip hemiarthroplasty for fracture January 28th, 2022  Patient presents for evaluation of x-rays  Patient has no complaints    Patient is ambulating with a walker      Current Medications  Current Outpatient Medications   Medication Sig Dispense Refill    acetaminophen (TYLENOL) 325 mg tablet Take 2 tablets (650 mg total) by mouth every 6 (six) hours as needed for mild pain, headaches or fever 60 tablet 11    amLODIPine (NORVASC) 10 mg tablet Take 1 tablet (10 mg total) by mouth daily  0    amLODIPine (NORVASC) 5 mg tablet 1 TAB ORALLY TWICE DAILY *HOLD FOR SBP<110* DX: HYPERTENSION 56 tablet 4    aspirin (ECOTRIN LOW STRENGTH) 81 mg EC tablet TAKE ONE TABLET BY MOUTH ONCE DAILY (STROKE PREVENTION) 30 tablet 5    calcium carbonate (OYSTER SHELL,OSCAL) 500 mg 1 TAB ORALLY TWICE DAILY DX: SUPPLEMENT 60 tablet 4    clonazePAM (KlonoPIN) 0 5 mg tablet TAKE THREE TABLETS (1 5MG) BY MOUTH AT BEDTIME (ANXIETY) 90 tablet 4    D3-1000 25 MCG (1000 UT) capsule TAKE ONE CAPSULE BY MOUTH ONCE DAILY (VITAMIN D DEFICIENCY) 30 capsule 5    digoxin (LANOXIN) 0 125 mg tablet TAKE ONE TABLET BY MOUTH ONCE DAILY (AFIB) 30 tablet 5    docusate sodium (COLACE) 100 mg capsule Take 1 capsule (100 mg total) by mouth 2 (two) times a day  0    enoxaparin (LOVENOX) 30 mg/0 3 mL Inject 0 3 mL (30 mg total) under the skin every 12 (twelve) hours for 21 days  0    famotidine (PEPCID) 20 mg tablet TAKE ONE TABLET BY MOUTH DAILY AT BEDTIME (GERD) 30 tablet 5    ferrous sulfate 325 (65 Fe) mg tablet Take 1 tablet (325 mg total) by mouth daily with breakfast 30 tablet 0    guaiFENesin (ROBITUSSIN) 100 MG/5ML oral liquid Take 200 mg by mouth 4 (four) times a day as needed for cough      ibuprofen (MOTRIN) 200 mg tablet TAKE ONE TABLET BY MOUTH ONCE DAILY AS NEEDED FOR MIGRAINES/HEADACHE 30 tablet 5    lactobacillus acidophilus-bulgaricus (FLORANEX) packet Take 1 packet by mouth 3 (three) times a day with meals 90 packet 0    lisinopril (ZESTRIL) 10 mg tablet TAKE ONE TABLET BY MOUTH ONCE DAILY (HTN) 30 tablet 5    magnesium hydroxide (MILK OF MAGNESIA) 800 MG/5ML suspension Take 30 mL by mouth daily as needed for constipation       Melatonin 5 MG TABS TAKE ONE TABLET BY MOUTH AT BEDTIME (INSOMNIA) 30 tablet 5    metoprolol tartrate (LOPRESSOR) 25 mg tablet TAKE THREE TABLETS (75MG) BY MOUTH EVERY 12 HOURS  HOLD FOR HR <60 OR SBP <110 (HTN)**TAKE W/ FOOD/SNACK** 180 tablet 5    Nutritional Supplements (Ensure) Take 237 mL by mouth daily 7110 mL 5    Omega-3 Fatty Acids (fish oil) 1,000 mg TAKE ONE CAPSULE BY MOUTH ONCE DAILY (SUPPLEMENT) 30 capsule 5    omeprazole (PriLOSEC) 20 mg delayed release capsule TAKE ONE CAPSULE BY MOUTH ONCE DAILY (GERD) 30 capsule 5    oxyCODONE (Roxicodone) 5 immediate release tablet Take 0 5 tablets (2 5 mg total) by mouth every 6 (six) hours as needed for moderate pain or severe pain for up to 10 doses Max Daily Amount: 10 mg 5 tablet 0    polyethylene glycol (GLYCOLAX) 17 GM/SCOOP DISSOLVE 17 GRAMS (ONE CAPFUL) IN 8 OUNCES OF WATER AND DRINK BY MOUTH DAILY FOR CONSTIPATION 510 g 4    Refresh Optive Advanced 0 5-1-0 5 % SOLN 3 (three) times a day      senna (SENOKOT) 8 6 mg TAKE ONE TABLET BY MOUTH ONCE DAILY AT BEDTIME  HOLD FOR DIARRHEA OR LOOSE STOOLS (CONSTIPATION) 30 tablet 5     No current facility-administered medications for this visit         Current Problems    Active Problems:   Patient Active Problem List    Diagnosis Date Noted    Stage 3a chronic kidney disease ([facility] ) 01/22/2022    Acute blood loss anemia 01/22/2022    Dysphagia 01/21/2022    Pressure injury of dorsum of left foot, stage 1 01/20/2022    Delirium 01/18/2022    Acute respiratory failure with hypoxia ([facility] [address] ) 01/17/2022    Preoperative clearance 01/17/2022  [facility] fracture [facility] of [facility] 01/16/2022    Recurrent UTI 08/17/2021    Retention, urine 05/18/2021    Diarrhea 03/18/2021    sepsis 03/18/2021    FIONA (acute kidney injury) ([facility] ) 03/18/2021    Stage 3b chronic kidney disease ([facility] ) 03/18/2021    Lactic acidosis 03/18/2021    Pulmonary nodule 03/18/2021    Benign essential HTN 07/11/2019    Sick sinus syndrome ([facility] ) 07/09/2019    Multiple renal cysts 07/05/2019    Hepatic steatosis 07/05/2019    Left eye pain 07/05/2019    Acute metabolic encephalopathy 31/65/6758    Acute cystitis with hematuria 07/04/2019    Lung mass 07/04/2019    Acute encephalopathy     Hypertensive urgency 10/19/2018    Debilitated 07/16/2018    Eczema 07/16/2018    Paroxysmal atrial fibrillation ([facility] ) 04/25/2018    Sinus bradycardia 04/25/2018    History of frequent urinary tract infections 04/24/2018    Right lower lobe lung mass 04/05/2018    Hyponatremia 03/24/2018    Esophageal mass 03/23/2018    Cough 03/22/2018    HCAP (healthcare-associated pneumonia) 03/19/2018    Acute febrile illness 03/19/2018    Chronic constipation 12/14/2017    Generalized osteoarthritis of multiple sites 11/30/2017    Vitamin D deficiency 11/30/2017    Bilateral carotid artery disease ([facility] ) 10/04/2017    Breast mass, left 10/03/2016    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/24/2016    Neuralgia 09/04/2015    Esophageal reflux 01/14/2015    Glucose intolerance (impaired glucose tolerance) 09/19/2014    Colon, diverticulosis 01/29/2013    Generalized anxiety disorder 09/04/2012    Hypercholesterolemia 06/19/2012    Osteoporosis 06/19/2012    SVT (supraventricular tachycardia) ([facility] ) 06/19/2012         Review of Systems:    General: negative for - chills, fatigue, fever,  weight gain or weight loss  Psychological: negative for - anxiety, behavioral disorder, concentration difficulties  Ophthalmic: negative for - blurry vision, decreased vision, double vision,      Past Medical History:   Past Medical History:   Diagnosis Date    Abdominal distension     Last Assessed: [id number]    Abnormal weight loss     Last Assessed: [id number]    Arthritis     Bronchitis     Bursitis of left hip     Lst Assessed: [id number]    Chest pain     Last Assessed: [id number]    Colon polyp     Dysuria     Last Assessed: [id number]    External hemorrhoids     Last Assessed: [id number]    Generalized anxiety disorder     GERD (gastroesophageal reflux disease)     History of echocardiogram 03/20/2018    EF 60%, mild to moderate mitral annular calcification  mild AS, mild TR   Hyperlipidemia     Hypertension     Hypomagnesemia     Last Assessed: [id number]    Lyme disease     Last Assessed: [id number]    Multiple renal cysts 7/5/2019    Nonrheumatic mitral valve regurgitation     Osteoporosis     Pneumonia of right middle lobe due to infectious organism     Last Assessed: [id number]    SVT (supraventricular tachycardia)     Urinary tract infection        Past Surgical History:   Past Surgical History:   Procedure Laterality Date    CATARACT EXTRACTION      HEMORRHOID SURGERY      HYSTERECTOMY      PELVIC FLOOR REPAIR      VT PARTIAL HIP REPLACEMENT Left 1/20/2022    Procedure: HEMIARTHROPLASTY HIP (BIPOLAR); Surgeon: Lida Juarez MD;  Location: [facility];  Service: Orthopedics       Family History:  Family history reviewed and non-contributory  Family History   Adopted: Yes       Social History:  Social History     Socioeconomic History    Marital status:       Spouse name: None    Number of children: None    Years of education: None    Highest education level: None   Occupational History    Occupation: Retired   Tobacco Use    Smoking status: Never Smoker    Smokeless tobacco: Never Used   Vaping Use    Vaping Use: Never used   Substance and Sexual Activity    Alcohol use: Never    Drug use: No    Sexual activity: Not Currently   Other Topics Concern    None Social History Narrative    RETIRED     Social Determinants of Health     Financial Resource Strain: Not on file   Food Insecurity: Not on file   Transportation Needs: Not on file   Physical Activity: Not on file   Stress: Not on file   Social Connections: Not on file   Intimate Partner Violence: Not on file   Housing Stability: Not on file       Allergies: Allergies   Allergen Reactions    Codeine GI Intolerance    Epinephrine Other (See Comments)     Increased Heart Rate, Palpitations    Iodinated Diagnostic Agents     Iodine - Food Allergy Other (See Comments)          Magnesium Citrate GI Intolerance           Physical ExaminationBP 130/51   Pulse 71   Gen: Alert and oriented to person, place, time  HEENT: EOMI, eyes clear, moist mucus membranes, hearing intact      Orthopedic Exam  Left hip incision well healed no evidence of infection left lower extremity limb length about equal calf is soft nontender no distal deficits  Radiographs done today satisfactory          Impression  Stable status post left hip hemiarthroplasty        Plan    Patient allowed to weightbear as tolerated continue posterior hip precautions, follow-up delon Chase MD        Portions of the record may have been created with voice recognition software  Occasional wrong word or "sound a like" substitutions may have occurred due to the inherent limitations of voice recognition software  Read the chart carefully and recognize, using context, where substitutions have occurred

## 2022-05-03 NOTE — TELEPHONE ENCOUNTER
Spoke with patients son Tami Arredondo  He was currently driving and said he would give us a call back to schedule an office visit for his mom       Patient is being seen for:    Review MOUSTAPHA done on 4/22/2022 @ [address] [facility], [address]  B/L pressure wounds on her feet/legs  PAD  Referred by Dr Angelique Carlisle

## 2022-05-28 NOTE — TELEPHONE ENCOUNTER
5/28/2022 11:31 AM  [facility] facility patient requests emergency fill until [facility] order arrives  Filled electronically via Epic as per [place] State Law  Requested Prescriptions     Signed Prescriptions Disp Refills    Morphine Sulfate, Concentrate, 20 mg/mL concentrated solution 30 mL 0     Sig: Take 0 25 mL (5 mg total) by mouth every 4 (four) hours as needed for moderate pain or severe pain (sob) for up to 14 days Max Daily Amount: 30 mg     Authorizing Provider: Estela Rice    LORazepam (ATIVAN) 2 mg/mL concentrated solution 30 mL 0     Sig: Take 0 25 mL (0 5 mg total) by mouth every 6 (six) hours as needed for anxiety (sob)     Authorizing Provider: Shaylee Huerta MD  [facility]: [phone]  You can find me on [employer]!

## 2022-05-28 NOTE — TELEPHONE ENCOUNTER
5/28/2022 11:52 AM  [facility] facility patient requests emergency fill until [facility] order arrives  Filled electronically via Epic as per [place] State Law  Requested Prescriptions     Signed Prescriptions Disp Refills    Morphine Sulfate, Concentrate, 20 mg/mL concentrated solution 30 mL 0     Sig: Take 0 5 mL (10 mg total) by mouth every 4 (four) hours Max Daily Amount: 60 mg     Authorizing Provider: Yane Carvajal    LORazepam (ATIVAN) 2 mg/mL concentrated solution 30 mL 0     Sig: Take 0 25 mL (0 5 mg total) by mouth every 6 (six) hours as needed for anxiety (sob, restlessness)     Authorizing Provider: Maikel Manley MD  [facility] Visiting [facility]: [phone]  You can find me on [employer]!

## 2022-05-28 NOTE — Clinical Note
spoke to facility staff Ivana Saba regarding [facility] receiving scripts for patient and then they were canceled  SN clarified with Dr Paul Spence and Meenu Larose and scripts sent to a local pharmacy per Nurse request to be delivered per pharmacy to facility   called pharmacy backand verbal rorder [facility] for ativan 0 25ml every 6 hours prn restlessness and Morphine 5mg (0 25ml) every 4 hours prn pain mod/severe pain or SOB

## 2022-06-06 VITALS
HEART RATE: 100 BPM | TEMPERATURE: 101 F | DIASTOLIC BLOOD PRESSURE: 58 MMHG | HEART RATE: 96 BPM | RESPIRATION RATE: 20 BRPM | RESPIRATION RATE: 18 BRPM | SYSTOLIC BLOOD PRESSURE: 96 MMHG | TEMPERATURE: 97.3 F

## 2024-01-04 NOTE — ASSESSMENT & PLAN NOTE
Lung mass noted on CT Chest is slightly smaller than when she had the pneumonia and the PET scan shows only minimal uptake, making the suspicion of cancer mild but not non existent  We will repeat the CT Chest in 2 months to monitor and if the lung mass is any larger we will plan for biopsy  Female

## (undated) DEVICE — 3M™ TEGADERM™ TRANSPARENT FILM DRESSING FRAME STYLE, 1626W, 4 IN X 4-3/4 IN (10 CM X 12 CM), 50/CT 4CT/CASE: Brand: 3M™ TEGADERM™

## (undated) DEVICE — TRANSPOSAL ULTRAFLEX DUO/QUAD ULTRA CART MANIFOLD

## (undated) DEVICE — 3M™ STERI-STRIP™ REINFORCED ADHESIVE SKIN CLOSURES, R1546, 1/4 IN X 4 IN (6 MM X 100 MM), 10 STRIPS/ENVELOPE: Brand: 3M™ STERI-STRIP™

## (undated) DEVICE — SYRINGE 20ML LL

## (undated) DEVICE — SUT VICRYL 1 CT-1 36 IN J947H

## (undated) DEVICE — INTENT TO BE USED WITH SUTURE MATERIAL FOR TISSUE CLOSURE: Brand: RICHARD-ALLAN®  NEEDLE 1/2 CIRCLE REVERSE CUTTING

## (undated) DEVICE — ELECTRODE EXTENDED BLADE 162CM

## (undated) DEVICE — BLADE SAW 5072-181

## (undated) DEVICE — PAD GROUNDING ADULT

## (undated) DEVICE — Device

## (undated) DEVICE — 3M™ IOBAN™ 2 ANTIMICROBIAL INCISE DRAPE 6648EZ: Brand: IOBAN™ 2

## (undated) DEVICE — PLUMEPEN PRO 10FT

## (undated) DEVICE — BULB SYRINGE,IRRIGATION WITH PROTECTIVE CAP: Brand: DOVER

## (undated) DEVICE — SUT VICRYL 2-0 CP-1 27 IN J266H

## (undated) DEVICE — TRAY FOLEY 16FR URIMETER SURESTEP

## (undated) DEVICE — HEAVY DUTY TABLE COVER: Brand: CONVERTORS

## (undated) DEVICE — DRESSING XEROFORM 5 X 9

## (undated) DEVICE — NEEDLE SPINAL18G X 3.5 IN QUINCKE

## (undated) DEVICE — INTENDED FOR TISSUE SEPARATION, AND OTHER PROCEDURES THAT REQUIRE A SHARP SURGICAL BLADE TO PUNCTURE OR CUT.: Brand: BARD-PARKER ® CARBON RIB-BACK BLADES

## (undated) DEVICE — U-DRAPE: Brand: CONVERTORS

## (undated) DEVICE — 3M™ STERI-DRAPE™ U-DRAPE 1015: Brand: STERI-DRAPE™

## (undated) DEVICE — ABDOMINAL PAD: Brand: DERMACEA

## (undated) DEVICE — GAUZE SPONGES,16 PLY: Brand: CURITY

## (undated) DEVICE — DRAPE TOWEL: Brand: CONVERTORS

## (undated) DEVICE — HEWSON SUTURE RETRIEVER: Brand: HEWSON SUTURE RETRIEVER

## (undated) DEVICE — DRAPE ISOLATION

## (undated) DEVICE — CHLORAPREP HI-LITE 26ML ORANGE

## (undated) DEVICE — GLOVE INDICATOR PI UNDERGLOVE SZ 8 BLUE

## (undated) DEVICE — DRAPE C-ARM X-RAY

## (undated) DEVICE — SUT FIBERWIRE #2 1/2 CIRCLE T-5 38IN AR-7200

## (undated) DEVICE — PROXIMATE SKIN STAPLERS (35 WIDE) CONTAINS 35 STAINLESS STEEL STAPLES (FIXED HEAD): Brand: PROXIMATE

## (undated) DEVICE — GLOVE SRG BIOGEL 8

## (undated) DEVICE — STRL ALLENTOWN HIP SHOULDER PK: Brand: CARDINAL HEALTH